# Patient Record
Sex: MALE | Race: WHITE | NOT HISPANIC OR LATINO
[De-identification: names, ages, dates, MRNs, and addresses within clinical notes are randomized per-mention and may not be internally consistent; named-entity substitution may affect disease eponyms.]

---

## 2017-03-23 PROBLEM — Z00.00 ENCOUNTER FOR PREVENTIVE HEALTH EXAMINATION: Status: ACTIVE | Noted: 2017-03-23

## 2017-07-11 ENCOUNTER — APPOINTMENT (OUTPATIENT)
Dept: VASCULAR SURGERY | Facility: CLINIC | Age: 72
End: 2017-07-11

## 2017-08-31 ENCOUNTER — OUTPATIENT (OUTPATIENT)
Dept: OUTPATIENT SERVICES | Facility: HOSPITAL | Age: 72
LOS: 1 days | Discharge: HOME | End: 2017-08-31

## 2017-08-31 DIAGNOSIS — I63.9 CEREBRAL INFARCTION, UNSPECIFIED: ICD-10-CM

## 2017-08-31 DIAGNOSIS — G81.90 HEMIPLEGIA, UNSPECIFIED AFFECTING UNSPECIFIED SIDE: ICD-10-CM

## 2017-08-31 DIAGNOSIS — I69.922 DYSARTHRIA FOLLOWING UNSPECIFIED CEREBROVASCULAR DISEASE: ICD-10-CM

## 2018-04-27 ENCOUNTER — OUTPATIENT (OUTPATIENT)
Dept: OUTPATIENT SERVICES | Facility: HOSPITAL | Age: 73
LOS: 1 days | Discharge: HOME | End: 2018-04-27

## 2018-04-27 DIAGNOSIS — I25.10 ATHEROSCLEROTIC HEART DISEASE OF NATIVE CORONARY ARTERY WITHOUT ANGINA PECTORIS: ICD-10-CM

## 2018-07-14 ENCOUNTER — TRANSCRIPTION ENCOUNTER (OUTPATIENT)
Age: 73
End: 2018-07-14

## 2019-02-09 ENCOUNTER — OUTPATIENT (OUTPATIENT)
Dept: OUTPATIENT SERVICES | Facility: HOSPITAL | Age: 74
LOS: 1 days | Discharge: HOME | End: 2019-02-09

## 2019-02-09 DIAGNOSIS — D64.9 ANEMIA, UNSPECIFIED: ICD-10-CM

## 2019-05-24 ENCOUNTER — OUTPATIENT (OUTPATIENT)
Dept: OUTPATIENT SERVICES | Facility: HOSPITAL | Age: 74
LOS: 1 days | Discharge: HOME | End: 2019-05-24

## 2019-05-24 ENCOUNTER — TRANSCRIPTION ENCOUNTER (OUTPATIENT)
Age: 74
End: 2019-05-24

## 2019-05-24 VITALS
SYSTOLIC BLOOD PRESSURE: 202 MMHG | HEART RATE: 81 BPM | DIASTOLIC BLOOD PRESSURE: 92 MMHG | RESPIRATION RATE: 18 BRPM | OXYGEN SATURATION: 100 %

## 2019-05-24 VITALS — WEIGHT: 153 LBS | HEIGHT: 61 IN

## 2019-05-24 DIAGNOSIS — Z90.49 ACQUIRED ABSENCE OF OTHER SPECIFIED PARTS OF DIGESTIVE TRACT: Chronic | ICD-10-CM

## 2019-05-24 DIAGNOSIS — Z95.1 PRESENCE OF AORTOCORONARY BYPASS GRAFT: Chronic | ICD-10-CM

## 2019-05-24 DIAGNOSIS — H26.9 UNSPECIFIED CATARACT: Chronic | ICD-10-CM

## 2019-05-24 NOTE — PRE-ANESTHESIA EVALUATION ADULT - NSATTENDATTESTRD_GEN_ALL_CORE
Patient Seen in: [facility] AND [facility] Emergency Department    History   Patient presents with:  Dyspnea MOODY SOB (respiratory)    Stated Complaint:     HPI    58-year-old male  without significant past medical history presents with complaints of anterior tri pupils equal and round no pallor or injection  ENT, Mouth: mucous membranes moist  Respiratory: there are no retractions, lungs are clear to auscultation. Minimal tenderness to palpation of the anterior sternum.   Cardiovascular: regular rate and rhythm  G 1349  ------------------------------------------------------------       MDM     Lab, x-ray, and EKG results noted. No cause of the patient's symptoms found at this time. Will discharge the patient home with plans to follow-up with his primary physician. The patient has been re-examined and I agree with the above assessment or I updated with my findings. Bladder non-tender and non-distended. Urine clear yellow.

## 2019-05-24 NOTE — ASU DISCHARGE PLAN (ADULT/PEDIATRIC) - CALL YOUR DOCTOR IF YOU HAVE ANY OF THE FOLLOWING:
Excessive diarrhea/Nausea and vomiting that does not stop/Bleeding that does not stop/Pain not relieved by Medications/Fever greater than (need to indicate Fahrenheit or Celsius)

## 2019-05-28 DIAGNOSIS — Z12.11 ENCOUNTER FOR SCREENING FOR MALIGNANT NEOPLASM OF COLON: ICD-10-CM

## 2019-05-28 DIAGNOSIS — K57.30 DIVERTICULOSIS OF LARGE INTESTINE WITHOUT PERFORATION OR ABSCESS WITHOUT BLEEDING: ICD-10-CM

## 2019-05-28 DIAGNOSIS — K64.8 OTHER HEMORRHOIDS: ICD-10-CM

## 2020-02-19 ENCOUNTER — TRANSCRIPTION ENCOUNTER (OUTPATIENT)
Age: 75
End: 2020-02-19

## 2021-06-06 ENCOUNTER — OUTPATIENT (OUTPATIENT)
Dept: OUTPATIENT SERVICES | Facility: HOSPITAL | Age: 76
LOS: 1 days | Discharge: HOME | End: 2021-06-06

## 2021-06-06 DIAGNOSIS — Z90.49 ACQUIRED ABSENCE OF OTHER SPECIFIED PARTS OF DIGESTIVE TRACT: Chronic | ICD-10-CM

## 2021-06-06 DIAGNOSIS — Z95.1 PRESENCE OF AORTOCORONARY BYPASS GRAFT: Chronic | ICD-10-CM

## 2021-06-06 DIAGNOSIS — H26.9 UNSPECIFIED CATARACT: Chronic | ICD-10-CM

## 2021-06-06 DIAGNOSIS — Z11.59 ENCOUNTER FOR SCREENING FOR OTHER VIRAL DISEASES: ICD-10-CM

## 2021-06-06 PROBLEM — F41.9 ANXIETY DISORDER, UNSPECIFIED: Chronic | Status: ACTIVE | Noted: 2019-05-24

## 2021-06-06 PROBLEM — N40.0 BENIGN PROSTATIC HYPERPLASIA WITHOUT LOWER URINARY TRACT SYMPTOMS: Chronic | Status: ACTIVE | Noted: 2019-05-24

## 2021-06-06 PROBLEM — K21.9 GASTRO-ESOPHAGEAL REFLUX DISEASE WITHOUT ESOPHAGITIS: Chronic | Status: ACTIVE | Noted: 2019-05-24

## 2021-06-06 PROBLEM — I63.9 CEREBRAL INFARCTION, UNSPECIFIED: Chronic | Status: ACTIVE | Noted: 2019-05-24

## 2021-06-06 PROBLEM — E11.9 TYPE 2 DIABETES MELLITUS WITHOUT COMPLICATIONS: Chronic | Status: ACTIVE | Noted: 2019-05-24

## 2021-06-06 PROBLEM — I10 ESSENTIAL (PRIMARY) HYPERTENSION: Chronic | Status: ACTIVE | Noted: 2019-05-24

## 2021-06-06 PROBLEM — I20.9 ANGINA PECTORIS, UNSPECIFIED: Chronic | Status: ACTIVE | Noted: 2019-05-24

## 2021-06-06 PROBLEM — F32.9 MAJOR DEPRESSIVE DISORDER, SINGLE EPISODE, UNSPECIFIED: Chronic | Status: ACTIVE | Noted: 2019-05-24

## 2021-06-06 PROBLEM — I25.10 ATHEROSCLEROTIC HEART DISEASE OF NATIVE CORONARY ARTERY WITHOUT ANGINA PECTORIS: Chronic | Status: ACTIVE | Noted: 2019-05-24

## 2021-06-09 ENCOUNTER — INPATIENT (INPATIENT)
Facility: HOSPITAL | Age: 76
LOS: 0 days | Discharge: HOME | End: 2021-06-10
Attending: INTERNAL MEDICINE | Admitting: INTERNAL MEDICINE

## 2021-06-09 ENCOUNTER — OUTPATIENT (OUTPATIENT)
Dept: OUTPATIENT SERVICES | Facility: HOSPITAL | Age: 76
LOS: 1 days | Discharge: HOME | End: 2021-06-09

## 2021-06-09 VITALS
DIASTOLIC BLOOD PRESSURE: 79 MMHG | HEIGHT: 61 IN | RESPIRATION RATE: 17 BRPM | SYSTOLIC BLOOD PRESSURE: 183 MMHG | OXYGEN SATURATION: 99 % | HEART RATE: 73 BPM | WEIGHT: 149.91 LBS

## 2021-06-09 DIAGNOSIS — Z90.49 ACQUIRED ABSENCE OF OTHER SPECIFIED PARTS OF DIGESTIVE TRACT: Chronic | ICD-10-CM

## 2021-06-09 DIAGNOSIS — H26.9 UNSPECIFIED CATARACT: Chronic | ICD-10-CM

## 2021-06-09 DIAGNOSIS — Z95.1 PRESENCE OF AORTOCORONARY BYPASS GRAFT: Chronic | ICD-10-CM

## 2021-06-09 LAB
ANION GAP SERPL CALC-SCNC: 11 MMOL/L — SIGNIFICANT CHANGE UP (ref 7–14)
BUN SERPL-MCNC: 23 MG/DL — HIGH (ref 10–20)
CALCIUM SERPL-MCNC: 9.5 MG/DL — SIGNIFICANT CHANGE UP (ref 8.5–10.1)
CHLORIDE SERPL-SCNC: 108 MMOL/L — SIGNIFICANT CHANGE UP (ref 98–110)
CO2 SERPL-SCNC: 24 MMOL/L — SIGNIFICANT CHANGE UP (ref 17–32)
CREAT SERPL-MCNC: 1.7 MG/DL — HIGH (ref 0.7–1.5)
GLUCOSE BLDC GLUCOMTR-MCNC: 117 MG/DL — HIGH (ref 70–99)
GLUCOSE SERPL-MCNC: 106 MG/DL — HIGH (ref 70–99)
HCT VFR BLD CALC: 39.8 % — LOW (ref 42–52)
HGB BLD-MCNC: 12.9 G/DL — LOW (ref 14–18)
MCHC RBC-ENTMCNC: 29.6 PG — SIGNIFICANT CHANGE UP (ref 27–31)
MCHC RBC-ENTMCNC: 32.4 G/DL — SIGNIFICANT CHANGE UP (ref 32–37)
MCV RBC AUTO: 91.3 FL — SIGNIFICANT CHANGE UP (ref 80–94)
NRBC # BLD: 0 /100 WBCS — SIGNIFICANT CHANGE UP (ref 0–0)
PLATELET # BLD AUTO: 240 K/UL — SIGNIFICANT CHANGE UP (ref 130–400)
POTASSIUM SERPL-MCNC: 4.5 MMOL/L — SIGNIFICANT CHANGE UP (ref 3.5–5)
POTASSIUM SERPL-SCNC: 4.5 MMOL/L — SIGNIFICANT CHANGE UP (ref 3.5–5)
RBC # BLD: 4.36 M/UL — LOW (ref 4.7–6.1)
RBC # FLD: 14.4 % — SIGNIFICANT CHANGE UP (ref 11.5–14.5)
SODIUM SERPL-SCNC: 143 MMOL/L — SIGNIFICANT CHANGE UP (ref 135–146)
WBC # BLD: 7.73 K/UL — SIGNIFICANT CHANGE UP (ref 4.8–10.8)
WBC # FLD AUTO: 7.73 K/UL — SIGNIFICANT CHANGE UP (ref 4.8–10.8)

## 2021-06-09 RX ORDER — RANOLAZINE 500 MG/1
500 TABLET, FILM COATED, EXTENDED RELEASE ORAL
Refills: 0 | Status: DISCONTINUED | OUTPATIENT
Start: 2021-06-09 | End: 2021-06-10

## 2021-06-09 RX ORDER — SODIUM CHLORIDE 9 MG/ML
300 INJECTION INTRAMUSCULAR; INTRAVENOUS; SUBCUTANEOUS
Refills: 0 | Status: DISCONTINUED | OUTPATIENT
Start: 2021-06-09 | End: 2021-06-10

## 2021-06-09 RX ORDER — VENLAFAXINE HCL 75 MG
75 CAPSULE, EXT RELEASE 24 HR ORAL EVERY 12 HOURS
Refills: 0 | Status: DISCONTINUED | OUTPATIENT
Start: 2021-06-09 | End: 2021-06-10

## 2021-06-09 RX ORDER — TAMSULOSIN HYDROCHLORIDE 0.4 MG/1
0.4 CAPSULE ORAL AT BEDTIME
Refills: 0 | Status: DISCONTINUED | OUTPATIENT
Start: 2021-06-09 | End: 2021-06-10

## 2021-06-09 RX ORDER — CLOPIDOGREL BISULFATE 75 MG/1
75 TABLET, FILM COATED ORAL DAILY
Refills: 0 | Status: DISCONTINUED | OUTPATIENT
Start: 2021-06-09 | End: 2021-06-10

## 2021-06-09 RX ORDER — METOPROLOL TARTRATE 50 MG
1 TABLET ORAL
Qty: 0 | Refills: 0 | DISCHARGE

## 2021-06-09 RX ORDER — LOSARTAN POTASSIUM 100 MG/1
100 TABLET, FILM COATED ORAL DAILY
Refills: 0 | Status: DISCONTINUED | OUTPATIENT
Start: 2021-06-09 | End: 2021-06-10

## 2021-06-09 RX ORDER — LOSARTAN POTASSIUM 100 MG/1
100 TABLET, FILM COATED ORAL DAILY
Refills: 0 | Status: DISCONTINUED | OUTPATIENT
Start: 2021-06-09 | End: 2021-06-09

## 2021-06-09 RX ORDER — ASPIRIN/CALCIUM CARB/MAGNESIUM 324 MG
81 TABLET ORAL DAILY
Refills: 0 | Status: DISCONTINUED | OUTPATIENT
Start: 2021-06-09 | End: 2021-06-10

## 2021-06-09 RX ORDER — ISOSORBIDE MONONITRATE 60 MG/1
60 TABLET, EXTENDED RELEASE ORAL DAILY
Refills: 0 | Status: DISCONTINUED | OUTPATIENT
Start: 2021-06-09 | End: 2021-06-10

## 2021-06-09 RX ORDER — PENTOXIFYLLINE 400 MG
400 TABLET, EXTENDED RELEASE ORAL THREE TIMES A DAY
Refills: 0 | Status: DISCONTINUED | OUTPATIENT
Start: 2021-06-09 | End: 2021-06-10

## 2021-06-09 RX ORDER — METOPROLOL TARTRATE 50 MG
25 TABLET ORAL DAILY
Refills: 0 | Status: DISCONTINUED | OUTPATIENT
Start: 2021-06-09 | End: 2021-06-10

## 2021-06-09 RX ORDER — ISOSORBIDE MONONITRATE 60 MG/1
1 TABLET, EXTENDED RELEASE ORAL
Qty: 0 | Refills: 0 | DISCHARGE

## 2021-06-09 RX ADMIN — LOSARTAN POTASSIUM 100 MILLIGRAM(S): 100 TABLET, FILM COATED ORAL at 19:32

## 2021-06-09 RX ADMIN — SODIUM CHLORIDE 75 MILLILITER(S): 9 INJECTION INTRAMUSCULAR; INTRAVENOUS; SUBCUTANEOUS at 19:32

## 2021-06-09 RX ADMIN — Medication 400 MILLIGRAM(S): at 22:14

## 2021-06-09 RX ADMIN — Medication 25 MILLIGRAM(S): at 20:11

## 2021-06-09 RX ADMIN — TAMSULOSIN HYDROCHLORIDE 0.4 MILLIGRAM(S): 0.4 CAPSULE ORAL at 22:14

## 2021-06-09 NOTE — ASU PATIENT PROFILE, ADULT - PSH
Bilateral cataracts    History of appendectomy    History of heart bypass surgery    
negative - no nasal congestion

## 2021-06-09 NOTE — H&P CARDIOLOGY - TIME:
[No studies available for review at this time.] : No studies available for review at this time. 14:18

## 2021-06-09 NOTE — ASU PATIENT PROFILE, ADULT - PMH
Angina pectoris    Anxiety    BPH (benign prostatic hyperplasia)    CAD (coronary artery disease)    CVA (cerebral vascular accident)    Depression    Diabetes  niddm  GERD (gastroesophageal reflux disease)    HTN (hypertension)

## 2021-06-09 NOTE — H&P CARDIOLOGY - HISTORY OF PRESENT ILLNESS
74 y/o male presents here today for [facility] d/t c/o CP and dyspnea, also stress echo + for ischemia     PMHx: CVA with left sided residual weakness, GERD, BPH, DM, anxiety, depression, HTN, CAD     PSHx: CABG x3 8/2009, appendectomy, BL cataract removal with IOL placement 2018    Pre cath note:    indication:  [ ] STEMI                [ ] NSTEMI                 [ ] Acute coronary syndrome                     [ ]Unstable Angina   [ ] high risk  [ ] intermediate risk  [ ] low risk                     [ ] Stable Angina     non-invasive testing:     stress echo                     Date:                     result: [x ] high risk  [ ] intermediate risk  [ ] low risk    Anti- Anginal medications:                    [ ] not used                       [x ] used                   [ ] not used but strong indication not to use    Ejection Fraction                   [ ] <29            [ ] 30-39%   [ ] 40-49%     [x ]>50%    CHF                   [ ] active (within last 14 days on meds   [ ] Chronic (on meds but no exacerbation)    COPD                   [ ] mild (on chronic bronchodilators)  [ ] moderate (on chronic steroid therapy)      [ ] severe (indication for home O2 or PACO2 >50)    Other risk factors:                       [ ] Previous MI                     [x ] CVA/ stroke                    [ ] carotid stent/ CEA                    [ ] PVD/PAD- (arterial aneurysm, non-palpable pulses, tortuous vessel with inability to insert catheter, infra-renal dissection, renal or subclavian artery stenosis)                    [x ] diabetic                    [x ] previous CABG                    [ ] Renal Failure         LEFT RADIAL ARTERY EVALUATION:  CHRISTINA TEST: WNL    Cath Bleeding Risk: 2.0%

## 2021-06-09 NOTE — CHART NOTE - NSCHARTNOTEFT_GEN_A_CORE
PRE-OP DIAGNOSIS: abnormal stress test    PROCEDURE:[  x] LHC                         [ x ] Coronary angiography                         [  ] RHC  Physician: Dr Stewart  Assistant: August    ANESTHESIA TYPE:  [  ]General Anesthesia  [ x ] Sedation  [  ] Local/Regional    ESTIMATED BLOOD LOSS:    10   mL    CONDITION  [  ] Critical  [  ] Serious  [  ]Fair  [ x ]Good    IV CONTRAST:  70  mL    FINDINGS  Left Heart Catheterization:  LVEF%: 50%  LVEDP: elevated  [ ] Normal Coronary Arteries  [ ] Luminal Irregularities  [ ] Non-obstructive CAD    ACCESS:    [ ] right radial artery  [x ] right femoral artery    HEMOSTASIS:    [ x] D-Stat  [ ] Perclose  [ ] Manual compression    LEFT HEART CATHETERIZATION                                    Left main:  mild disease  LAD: severe disease in prox segment , filled distally by a patent LIMA  Left Circumflex: moderate disease  OM: filled by patent SVG  Right Coronary Artery: 100% prox occlusion,   RPDA: filled by collaterals from the left      SYNTAX I/II SCORE:    INTERVENTION  IMPLANTS:    APPROPRIATE USE CRITERIA (AUC):    SPECIMEN REMOVED: N/A    RIGHT HEART CATHETERIZATION  PA:  PCW:  CO/CI:    POST-OP DIAGNOSIS  patent LIMA to LAD , patent SVG to OM  , % , filled distally by collaterals from LAD.      PLAN OF CARE  [ ] D/C Home today  [ ]  D/C in AM  [ ] Return to In-patient bed  [ x] Admit for observation  [ ] Return for staged procedure:  [ ] CT Surgery consult called  [ ]  Continue DAPT, B-blocker & Statin therapy

## 2021-06-10 ENCOUNTER — TRANSCRIPTION ENCOUNTER (OUTPATIENT)
Age: 76
End: 2021-06-10

## 2021-06-10 VITALS
DIASTOLIC BLOOD PRESSURE: 63 MMHG | RESPIRATION RATE: 20 BRPM | SYSTOLIC BLOOD PRESSURE: 134 MMHG | HEART RATE: 82 BPM | TEMPERATURE: 98 F

## 2021-06-10 DIAGNOSIS — Z02.9 ENCOUNTER FOR ADMINISTRATIVE EXAMINATIONS, UNSPECIFIED: ICD-10-CM

## 2021-06-10 LAB
COVID-19 SPIKE DOMAIN AB INTERP: POSITIVE
COVID-19 SPIKE DOMAIN ANTIBODY RESULT: >250 U/ML — HIGH
GLUCOSE BLDC GLUCOMTR-MCNC: 144 MG/DL — HIGH (ref 70–99)
GLUCOSE BLDC GLUCOMTR-MCNC: 168 MG/DL — HIGH (ref 70–99)
SARS-COV-2 IGG+IGM SERPL QL IA: >250 U/ML — HIGH
SARS-COV-2 IGG+IGM SERPL QL IA: POSITIVE

## 2021-06-10 RX ORDER — ISOSORBIDE DINITRATE 5 MG/1
60 TABLET ORAL
Qty: 0 | Refills: 0 | DISCHARGE

## 2021-06-10 RX ORDER — LABETALOL HCL 100 MG
100 TABLET ORAL ONCE
Refills: 0 | Status: COMPLETED | OUTPATIENT
Start: 2021-06-10 | End: 2021-06-10

## 2021-06-10 RX ORDER — ISOSORBIDE MONONITRATE 60 MG/1
1 TABLET, EXTENDED RELEASE ORAL
Qty: 30 | Refills: 0
Start: 2021-06-10 | End: 2021-07-09

## 2021-06-10 RX ADMIN — LOSARTAN POTASSIUM 100 MILLIGRAM(S): 100 TABLET, FILM COATED ORAL at 05:08

## 2021-06-10 RX ADMIN — CLOPIDOGREL BISULFATE 75 MILLIGRAM(S): 75 TABLET, FILM COATED ORAL at 13:59

## 2021-06-10 RX ADMIN — Medication 25 MILLIGRAM(S): at 05:08

## 2021-06-10 RX ADMIN — Medication 400 MILLIGRAM(S): at 05:08

## 2021-06-10 RX ADMIN — Medication 100 MILLIGRAM(S): at 06:41

## 2021-06-10 RX ADMIN — Medication 81 MILLIGRAM(S): at 14:01

## 2021-06-10 RX ADMIN — ISOSORBIDE MONONITRATE 60 MILLIGRAM(S): 60 TABLET, EXTENDED RELEASE ORAL at 13:58

## 2021-06-10 RX ADMIN — Medication 75 MILLIGRAM(S): at 05:08

## 2021-06-10 RX ADMIN — RANOLAZINE 500 MILLIGRAM(S): 500 TABLET, FILM COATED, EXTENDED RELEASE ORAL at 05:08

## 2021-06-10 RX ADMIN — Medication 400 MILLIGRAM(S): at 13:57

## 2021-06-10 NOTE — DISCHARGE NOTE NURSING/CASE MANAGEMENT/SOCIAL WORK - PATIENT PORTAL LINK FT
You can access the FollowMyHealth Patient Portal offered by [facility] by registering at the following website: [email or web address]. By joining [facility]’s FollowMyHealth portal, you will also be able to view your health information using other applications (apps) compatible with our system.

## 2021-06-10 NOTE — PROGRESS NOTE ADULT - SUBJECTIVE AND OBJECTIVE BOX
Cardiology Follow up    ERICK MARTINEZ   75y Male  PAST MEDICAL & SURGICAL HISTORY:  CAD (coronary artery disease)    HTN (hypertension)    Depression    Anxiety    Diabetes  niddm    Angina pectoris    BPH (benign prostatic hyperplasia)    GERD (gastroesophageal reflux disease)    CVA (cerebral vascular accident)    History of appendectomy    Bilateral cataracts    History of heart bypass surgery      HPI:  74 y/o male presents here today for [facility] d/t c/o CP and dyspnea, also stress echo + for ischemia     PMHx: CVA with left sided residual weakness, GERD, BPH, DM, anxiety, depression, HTN, CAD     PSHx: CABG x3 8/2009, appendectomy, BL cataract removal with IOL placement 2018                       Allergies    clindamycin (Rash)  PC Pen VK (Rash)  penicillin (Rash)    Intolerances    Patient seen and examined at bedside. No acute events overnight.  Patient without complaints. Pt ambulated without issues/symptoms  Denies CP, SOB, palpitations, or dizziness  No events on telemetry overnight    Vital Signs Last 24 Hrs  T(C): 36.8 (10 Roberto 2021 12:30), Max: 36.8 (10 Roberto 2021 04:30)  T(F): 98.2 (10 Roberto 2021 12:30), Max: 98.2 (10 Roberto 2021 04:30)  HR: 82 (10 Roberto 2021 12:30) (73 - 85)  BP: 134/63 (10 Roberto 2021 12:30) (134/63 - 194/87)  BP(mean): 113 (09 Jun 2021 14:18) (113 - 113)  RR: 20 (10 Roberto 2021 12:30) (17 - 20)  SpO2: 98% (10 Roberto 2021 08:50) (98% - 99%)    MEDICATIONS  (STANDING):  aspirin  chewable 81 milliGRAM(s) Oral daily  clopidogrel Tablet 75 milliGRAM(s) Oral daily  isosorbide   mononitrate ER Tablet (IMDUR) 60 milliGRAM(s) Oral daily  losartan 100 milliGRAM(s) Oral daily  metoprolol succinate ER 25 milliGRAM(s) Oral daily  pentoxifylline 400 milliGRAM(s) Oral three times a day  ranolazine 500 milliGRAM(s) Oral two times a day  sodium chloride 0.9%. 300 milliLiter(s) (75 mL/Hr) IV Continuous <Continuous>  tamsulosin 0.4 milliGRAM(s) Oral at bedtime  venlafaxine 75 milliGRAM(s) Oral every 12 hours    MEDICATIONS  (PRN):      REVIEW OF SYSTEMS:          All negative except as mentioned in HPI    PHYSICAL EXAM:           CONSTITUTIONAL: Well-developed; well-nourished; in no acute distress  	SKIN: warm, dry  	HEAD: Normocephalic; atraumatic  	EYES: PERRL.  	ENT: No nasal discharge, airway clear, mucous membranes moist  	NECK: Supple; non tender.  	CARD: +S1, +S2, no murmurs, gallops, or rubs. Regular rate and rhythm    	RESP: No wheezes, rales or rhonchi. CTA B/L  	ABD: soft ntnd, + BS x 4 quadrants  	EXT: moves all extremities,  no clubbing, cyanosis or edema  	NEURO: Alert and oriented x3, no focal deficits          PSYCH: Cooperative, appropriate          VASCULAR:  + Rad / + PTs / +  DPs          EXTREMITY:             Right Groin: dressing removed, access site soft, no hematoma, no pain, + pulses, no sign of infection, no numbness              LABS:                        12.9   7.73  )-----------( 240      ( 09 Jun 2021 14:26 )             39.8     06-09    143  |  108  |  23<H>  ----------------------------<  106<H>  4.5   |  24  |  1.7<H>    Ca    9.5      09 Jun 2021 14:26    A/P:  I discussed the case with Cardiologist Dr. Stewart and recommend the following:    S/P PCI:   LEFT HEART CATHETERIZATION                                    Left main:  mild disease  LAD: severe disease in prox segment , filled distally by a patent LIMA  Left Circumflex: moderate disease  OM: filled by patent SVG  Right Coronary Artery: 100% prox occlusion,   RPDA: filled by collaterals from the left    POST-OP DIAGNOSIS  patent LIMA to LAD , patent SVG to OM  , % , filled distally by collaterals from LAD.                      Ambulate patient around the unit with cane and assistance                   Monitor right groin    	     Continue DAPT ( Aspirin 81 mg daily and Plavix 75 mg daily ), ARB, Imdur, Ranexa, B-Blocker, Statin Therapy                   Patient agreeing to take DAPT for at least one year or as directed by cardiologist                    Pt given instructions on importance of taking antiplatelet medication                   Post cath instructions, access site care and activity restrictions reviewed with patient                     Discussed with patient to return to hospital if experience chest pain, shortness breath, dizziness and site bleeding                   Aggressive risk factor modification, diet counseling, smoking cessation discussed with patient                       Cardiac rehab information provided/ referral and communication to cardiac rehab provided                   Can discharge patient from cardiac standpoint                                Follow up with Cardiology Dr. Stewart in two weeks.  Instructed to call and make an appointment

## 2021-06-10 NOTE — DISCHARGE NOTE PROVIDER - CARE PROVIDER_API CALL
Charlie Stewart  CARDIOVASCULAR DISEASE  [address]  [place] [address]  Phone: [phone]  Fax: [phone]  Follow Up Time: 2 weeks

## 2021-06-10 NOTE — DISCHARGE NOTE PROVIDER - HOSPITAL COURSE
ERICK MARTINEZ   75y Male  PAST MEDICAL & SURGICAL HISTORY:  CAD (coronary artery disease)    HTN (hypertension)    Depression    Anxiety    Diabetes  niddm    Angina pectoris    BPH (benign prostatic hyperplasia)    GERD (gastroesophageal reflux disease)    CVA (cerebral vascular accident)    History of appendectomy    Bilateral cataracts    History of heart bypass surgery      HPI:  74 y/o male presents here today for [facility] d/t c/o CP and dyspnea, also stress echo + for ischemia     PMHx: CVA with left sided residual weakness, GERD, BPH, DM, anxiety, depression, HTN, CAD     PSHx: CABG x3 8/2009, appendectomy, BL cataract removal with IOL placement 2018 6/9/21  S/P PCI:   LEFT HEART CATHETERIZATION        VASCULAR:  + Rad / + PTs / +  DPs          EXTREMITY:             Right Groin: dressing removed, access site soft, no hematoma, no pain, + pulses, no sign of infection, no numbness                                Left main:  mild disease  LAD: severe disease in prox segment , filled distally by a patent LIMA  Left Circumflex: moderate disease  OM: filled by patent SVG  Right Coronary Artery: 100% prox occlusion,   RPDA: filled by collaterals from the left    POST-OP DIAGNOSIS  patent LIMA to LAD, patent SVG to OM, % , filled distally by collaterals from LAD.                      Ambulate patient around the unit with cane and assistance                   Monitor right groin    	     Continue DAPT ( Aspirin 81 mg daily and Plavix 75 mg daily ), ARB, Imdur, Ranexa, B-Blocker, Statin Therapy                   Patient agreeing to take DAPT for at least one year or as directed by cardiologist                    Pt given instructions on importance of taking antiplatelet medication                   Post cath instructions, access site care and activity restrictions reviewed with patient                     Discussed with patient to return to hospital if experience chest pain, shortness breath, dizziness and site bleeding                   Aggressive risk factor modification, diet counseling, smoking cessation discussed with patient                       Cardiac rehab information provided/ referral and communication to cardiac rehab provided                   Can discharge patient from cardiac standpoint                                Follow up with Cardiology Dr. Stewart in two weeks.  Instructed to call and make an appointment

## 2021-06-10 NOTE — DISCHARGE NOTE PROVIDER - NSDCCPTREATMENT_GEN_ALL_CORE_FT
PRINCIPAL PROCEDURE  Procedure: Left heart cardiac cath  Findings and Treatment: s/p Cardiac Cath, resume home medications, Continue DAPT, follow up with Cardiologist in 2 weeks, monitor right groin

## 2021-06-10 NOTE — DISCHARGE NOTE PROVIDER - NSDCCPCAREPLAN_GEN_ALL_CORE_FT
PRINCIPAL DISCHARGE DIAGNOSIS  Diagnosis: CAD (coronary artery disease)  Assessment and Plan of Treatment: s/p LHC, continue DAPT, follow up with Cardiologist in 2 weeks, monitor right groin, resume home medications

## 2021-06-10 NOTE — DISCHARGE NOTE PROVIDER - NSDCMRMEDTOKEN_GEN_ALL_CORE_FT
aspirin 81 mg oral tablet: 1 tab(s) orally once a day  diazePAM 10 mg oral tablet: 1 tab(s) orally 3 times a day  fenofibrate 120 mg oral tablet: 1 tab(s) orally once a day  isosorbide mononitrate 60 mg oral tablet, extended release: 1 tab(s) orally once a day MDD:1  Januvia 25 mg oral tablet: 1 tab(s) orally once a day  lansoprazole 30 mg oral delayed release capsule: 1 cap(s) orally once a day  losartan 100 mg oral tablet: 1 tab(s) orally once a day  metoprolol succinate 25 mg oral tablet, extended release: orally 2 times a day  Pentoxil 400 mg oral tablet, extended release: 1 tab(s) orally 3 times a day  Plavix 75 mg oral tablet: 1 tab(s) orally once a day  Ranexa 500 mg oral tablet, extended release: 1 tab(s) orally 2 times a day  rosuvastatin 40 mg oral tablet: 1 tab(s) orally once a day  tamsulosin 0.4 mg oral capsule: 1 cap(s) orally once a day  venlafaxine 75 mg oral tablet: 1 tab(s) orally 2 times a day

## 2021-06-21 DIAGNOSIS — I10 ESSENTIAL (PRIMARY) HYPERTENSION: ICD-10-CM

## 2021-06-21 DIAGNOSIS — F41.9 ANXIETY DISORDER, UNSPECIFIED: ICD-10-CM

## 2021-06-21 DIAGNOSIS — Z88.8 ALLERGY STATUS TO OTHER DRUGS, MEDICAMENTS AND BIOLOGICAL SUBSTANCES STATUS: ICD-10-CM

## 2021-06-21 DIAGNOSIS — R07.9 CHEST PAIN, UNSPECIFIED: ICD-10-CM

## 2021-06-21 DIAGNOSIS — I69.954 HEMIPLEGIA AND HEMIPARESIS FOLLOWING UNSPECIFIED CEREBROVASCULAR DISEASE AFFECTING LEFT NON-DOMINANT SIDE: ICD-10-CM

## 2021-06-21 DIAGNOSIS — Z88.0 ALLERGY STATUS TO PENICILLIN: ICD-10-CM

## 2021-06-21 DIAGNOSIS — E11.9 TYPE 2 DIABETES MELLITUS WITHOUT COMPLICATIONS: ICD-10-CM

## 2021-06-21 DIAGNOSIS — I25.118 ATHEROSCLEROTIC HEART DISEASE OF NATIVE CORONARY ARTERY WITH OTHER FORMS OF ANGINA PECTORIS: ICD-10-CM

## 2021-06-21 DIAGNOSIS — Z79.84 LONG TERM (CURRENT) USE OF ORAL HYPOGLYCEMIC DRUGS: ICD-10-CM

## 2021-06-21 DIAGNOSIS — F32.9 MAJOR DEPRESSIVE DISORDER, SINGLE EPISODE, UNSPECIFIED: ICD-10-CM

## 2021-06-21 DIAGNOSIS — K21.9 GASTRO-ESOPHAGEAL REFLUX DISEASE WITHOUT ESOPHAGITIS: ICD-10-CM

## 2021-06-21 DIAGNOSIS — N40.0 BENIGN PROSTATIC HYPERPLASIA WITHOUT LOWER URINARY TRACT SYMPTOMS: ICD-10-CM

## 2021-06-21 DIAGNOSIS — Z79.82 LONG TERM (CURRENT) USE OF ASPIRIN: ICD-10-CM

## 2021-06-30 DIAGNOSIS — I25.10 ATHEROSCLEROTIC HEART DISEASE OF NATIVE CORONARY ARTERY WITHOUT ANGINA PECTORIS: ICD-10-CM

## 2021-07-20 ENCOUNTER — INPATIENT (INPATIENT)
Facility: HOSPITAL | Age: 76
LOS: 7 days | Discharge: HOME HEALTH | End: 2021-07-28
Attending: STUDENT IN AN ORGANIZED HEALTH CARE EDUCATION/TRAINING PROGRAM | Admitting: STUDENT IN AN ORGANIZED HEALTH CARE EDUCATION/TRAINING PROGRAM
Payer: MEDICARE

## 2021-07-20 VITALS
DIASTOLIC BLOOD PRESSURE: 67 MMHG | SYSTOLIC BLOOD PRESSURE: 139 MMHG | TEMPERATURE: 98 F | OXYGEN SATURATION: 99 % | HEART RATE: 75 BPM | HEIGHT: 61 IN | RESPIRATION RATE: 18 BRPM

## 2021-07-20 DIAGNOSIS — G92 TOXIC ENCEPHALOPATHY: ICD-10-CM

## 2021-07-20 DIAGNOSIS — T42.4X5A ADVERSE EFFECT OF BENZODIAZEPINES, INITIAL ENCOUNTER: ICD-10-CM

## 2021-07-20 DIAGNOSIS — I95.1 ORTHOSTATIC HYPOTENSION: ICD-10-CM

## 2021-07-20 DIAGNOSIS — E78.5 HYPERLIPIDEMIA, UNSPECIFIED: ICD-10-CM

## 2021-07-20 DIAGNOSIS — I73.9 PERIPHERAL VASCULAR DISEASE, UNSPECIFIED: ICD-10-CM

## 2021-07-20 DIAGNOSIS — I65.23 OCCLUSION AND STENOSIS OF BILATERAL CAROTID ARTERIES: ICD-10-CM

## 2021-07-20 DIAGNOSIS — I25.10 ATHEROSCLEROTIC HEART DISEASE OF NATIVE CORONARY ARTERY WITHOUT ANGINA PECTORIS: ICD-10-CM

## 2021-07-20 DIAGNOSIS — F32.9 MAJOR DEPRESSIVE DISORDER, SINGLE EPISODE, UNSPECIFIED: ICD-10-CM

## 2021-07-20 DIAGNOSIS — I69.392 FACIAL WEAKNESS FOLLOWING CEREBRAL INFARCTION: ICD-10-CM

## 2021-07-20 DIAGNOSIS — N17.9 ACUTE KIDNEY FAILURE, UNSPECIFIED: ICD-10-CM

## 2021-07-20 DIAGNOSIS — Z88.0 ALLERGY STATUS TO PENICILLIN: ICD-10-CM

## 2021-07-20 DIAGNOSIS — N40.1 BENIGN PROSTATIC HYPERPLASIA WITH LOWER URINARY TRACT SYMPTOMS: ICD-10-CM

## 2021-07-20 DIAGNOSIS — I12.9 HYPERTENSIVE CHRONIC KIDNEY DISEASE WITH STAGE 1 THROUGH STAGE 4 CHRONIC KIDNEY DISEASE, OR UNSPECIFIED CHRONIC KIDNEY DISEASE: ICD-10-CM

## 2021-07-20 DIAGNOSIS — Z79.82 LONG TERM (CURRENT) USE OF ASPIRIN: ICD-10-CM

## 2021-07-20 DIAGNOSIS — H26.9 UNSPECIFIED CATARACT: Chronic | ICD-10-CM

## 2021-07-20 DIAGNOSIS — R55 SYNCOPE AND COLLAPSE: ICD-10-CM

## 2021-07-20 DIAGNOSIS — E11.51 TYPE 2 DIABETES MELLITUS WITH DIABETIC PERIPHERAL ANGIOPATHY WITHOUT GANGRENE: ICD-10-CM

## 2021-07-20 DIAGNOSIS — E11.43 TYPE 2 DIABETES MELLITUS WITH DIABETIC AUTONOMIC (POLY)NEUROPATHY: ICD-10-CM

## 2021-07-20 DIAGNOSIS — Z95.1 PRESENCE OF AORTOCORONARY BYPASS GRAFT: ICD-10-CM

## 2021-07-20 DIAGNOSIS — Z88.1 ALLERGY STATUS TO OTHER ANTIBIOTIC AGENTS STATUS: ICD-10-CM

## 2021-07-20 DIAGNOSIS — I70.203 UNSPECIFIED ATHEROSCLEROSIS OF NATIVE ARTERIES OF EXTREMITIES, BILATERAL LEGS: ICD-10-CM

## 2021-07-20 DIAGNOSIS — Z90.49 ACQUIRED ABSENCE OF OTHER SPECIFIED PARTS OF DIGESTIVE TRACT: Chronic | ICD-10-CM

## 2021-07-20 DIAGNOSIS — N18.30 CHRONIC KIDNEY DISEASE, STAGE 3 UNSPECIFIED: ICD-10-CM

## 2021-07-20 DIAGNOSIS — I42.8 OTHER CARDIOMYOPATHIES: ICD-10-CM

## 2021-07-20 DIAGNOSIS — E11.22 TYPE 2 DIABETES MELLITUS WITH DIABETIC CHRONIC KIDNEY DISEASE: ICD-10-CM

## 2021-07-20 DIAGNOSIS — Z79.01 LONG TERM (CURRENT) USE OF ANTICOAGULANTS: ICD-10-CM

## 2021-07-20 DIAGNOSIS — I69.354 HEMIPLEGIA AND HEMIPARESIS FOLLOWING CEREBRAL INFARCTION AFFECTING LEFT NON-DOMINANT SIDE: ICD-10-CM

## 2021-07-20 DIAGNOSIS — I63.9 CEREBRAL INFARCTION, UNSPECIFIED: ICD-10-CM

## 2021-07-20 DIAGNOSIS — Z95.1 PRESENCE OF AORTOCORONARY BYPASS GRAFT: Chronic | ICD-10-CM

## 2021-07-20 DIAGNOSIS — Z88.8 ALLERGY STATUS TO OTHER DRUGS, MEDICAMENTS AND BIOLOGICAL SUBSTANCES: ICD-10-CM

## 2021-07-20 DIAGNOSIS — M48.02 SPINAL STENOSIS, CERVICAL REGION: ICD-10-CM

## 2021-07-20 DIAGNOSIS — R47.81 SLURRED SPEECH: ICD-10-CM

## 2021-07-20 DIAGNOSIS — I16.0 HYPERTENSIVE URGENCY: ICD-10-CM

## 2021-07-20 DIAGNOSIS — K21.9 GASTRO-ESOPHAGEAL REFLUX DISEASE WITHOUT ESOPHAGITIS: ICD-10-CM

## 2021-07-20 LAB
ALBUMIN SERPL ELPH-MCNC: 4.4 G/DL — SIGNIFICANT CHANGE UP (ref 3.5–5.2)
ALP SERPL-CCNC: 34 U/L — SIGNIFICANT CHANGE UP (ref 30–115)
ALT FLD-CCNC: 10 U/L — SIGNIFICANT CHANGE UP (ref 0–41)
ANION GAP SERPL CALC-SCNC: 8 MMOL/L — SIGNIFICANT CHANGE UP (ref 7–14)
APPEARANCE UR: CLEAR — SIGNIFICANT CHANGE UP
AST SERPL-CCNC: 15 U/L — SIGNIFICANT CHANGE UP (ref 0–41)
BACTERIA # UR AUTO: NEGATIVE — SIGNIFICANT CHANGE UP
BASOPHILS # BLD AUTO: 0.05 K/UL — SIGNIFICANT CHANGE UP (ref 0–0.2)
BASOPHILS NFR BLD AUTO: 0.5 % — SIGNIFICANT CHANGE UP (ref 0–1)
BILIRUB SERPL-MCNC: 0.4 MG/DL — SIGNIFICANT CHANGE UP (ref 0.2–1.2)
BILIRUB UR-MCNC: NEGATIVE — SIGNIFICANT CHANGE UP
BUN SERPL-MCNC: 27 MG/DL — HIGH (ref 10–20)
CALCIUM SERPL-MCNC: 9.9 MG/DL — SIGNIFICANT CHANGE UP (ref 8.5–10.1)
CHLORIDE SERPL-SCNC: 107 MMOL/L — SIGNIFICANT CHANGE UP (ref 98–110)
CO2 SERPL-SCNC: 25 MMOL/L — SIGNIFICANT CHANGE UP (ref 17–32)
COLOR SPEC: SIGNIFICANT CHANGE UP
CREAT SERPL-MCNC: 1.6 MG/DL — HIGH (ref 0.7–1.5)
DIFF PNL FLD: NEGATIVE — SIGNIFICANT CHANGE UP
EOSINOPHIL # BLD AUTO: 0.1 K/UL — SIGNIFICANT CHANGE UP (ref 0–0.7)
EOSINOPHIL NFR BLD AUTO: 1.1 % — SIGNIFICANT CHANGE UP (ref 0–8)
EPI CELLS # UR: 1 /HPF — SIGNIFICANT CHANGE UP (ref 0–5)
ETHANOL SERPL-MCNC: <10 MG/DL — SIGNIFICANT CHANGE UP
GLUCOSE BLDC GLUCOMTR-MCNC: 106 MG/DL — HIGH (ref 70–99)
GLUCOSE SERPL-MCNC: 121 MG/DL — HIGH (ref 70–99)
GLUCOSE UR QL: NEGATIVE — SIGNIFICANT CHANGE UP
HCT VFR BLD CALC: 39.2 % — LOW (ref 42–52)
HGB BLD-MCNC: 12.3 G/DL — LOW (ref 14–18)
HYALINE CASTS # UR AUTO: 0 /LPF — SIGNIFICANT CHANGE UP (ref 0–7)
IMM GRANULOCYTES NFR BLD AUTO: 0.5 % — HIGH (ref 0.1–0.3)
KETONES UR-MCNC: NEGATIVE — SIGNIFICANT CHANGE UP
LACTATE SERPL-SCNC: 1.8 MMOL/L — SIGNIFICANT CHANGE UP (ref 0.7–2)
LEUKOCYTE ESTERASE UR-ACNC: NEGATIVE — SIGNIFICANT CHANGE UP
LIDOCAIN IGE QN: 62 U/L — HIGH (ref 7–60)
LYMPHOCYTES # BLD AUTO: 1.82 K/UL — SIGNIFICANT CHANGE UP (ref 1.2–3.4)
LYMPHOCYTES # BLD AUTO: 19.7 % — LOW (ref 20.5–51.1)
MAGNESIUM SERPL-MCNC: 2.3 MG/DL — SIGNIFICANT CHANGE UP (ref 1.8–2.4)
MCHC RBC-ENTMCNC: 28.1 PG — SIGNIFICANT CHANGE UP (ref 27–31)
MCHC RBC-ENTMCNC: 31.4 G/DL — LOW (ref 32–37)
MCV RBC AUTO: 89.5 FL — SIGNIFICANT CHANGE UP (ref 80–94)
MONOCYTES # BLD AUTO: 0.62 K/UL — HIGH (ref 0.1–0.6)
MONOCYTES NFR BLD AUTO: 6.7 % — SIGNIFICANT CHANGE UP (ref 1.7–9.3)
NEUTROPHILS # BLD AUTO: 6.61 K/UL — HIGH (ref 1.4–6.5)
NEUTROPHILS NFR BLD AUTO: 71.5 % — SIGNIFICANT CHANGE UP (ref 42.2–75.2)
NITRITE UR-MCNC: NEGATIVE — SIGNIFICANT CHANGE UP
NRBC # BLD: 0 /100 WBCS — SIGNIFICANT CHANGE UP (ref 0–0)
NT-PROBNP SERPL-SCNC: 335 PG/ML — HIGH (ref 0–300)
PH UR: 7 — SIGNIFICANT CHANGE UP (ref 5–8)
PLATELET # BLD AUTO: 247 K/UL — SIGNIFICANT CHANGE UP (ref 130–400)
POTASSIUM SERPL-MCNC: 4.6 MMOL/L — SIGNIFICANT CHANGE UP (ref 3.5–5)
POTASSIUM SERPL-SCNC: 4.6 MMOL/L — SIGNIFICANT CHANGE UP (ref 3.5–5)
PROT SERPL-MCNC: 6.8 G/DL — SIGNIFICANT CHANGE UP (ref 6–8)
PROT UR-MCNC: ABNORMAL
RBC # BLD: 4.38 M/UL — LOW (ref 4.7–6.1)
RBC # FLD: 14.2 % — SIGNIFICANT CHANGE UP (ref 11.5–14.5)
RBC CASTS # UR COMP ASSIST: 1 /HPF — SIGNIFICANT CHANGE UP (ref 0–4)
SARS-COV-2 RNA SPEC QL NAA+PROBE: SIGNIFICANT CHANGE UP
SODIUM SERPL-SCNC: 140 MMOL/L — SIGNIFICANT CHANGE UP (ref 135–146)
SP GR SPEC: 1.03 — SIGNIFICANT CHANGE UP (ref 1.01–1.03)
TROPONIN T SERPL-MCNC: <0.01 NG/ML — SIGNIFICANT CHANGE UP
TROPONIN T SERPL-MCNC: <0.01 NG/ML — SIGNIFICANT CHANGE UP
UROBILINOGEN FLD QL: SIGNIFICANT CHANGE UP
WBC # BLD: 9.25 K/UL — SIGNIFICANT CHANGE UP (ref 4.8–10.8)
WBC # FLD AUTO: 9.25 K/UL — SIGNIFICANT CHANGE UP (ref 4.8–10.8)
WBC UR QL: 0 /HPF — SIGNIFICANT CHANGE UP (ref 0–5)

## 2021-07-20 PROCEDURE — 73564 X-RAY EXAM KNEE 4 OR MORE: CPT | Mod: 26,RT

## 2021-07-20 PROCEDURE — 71260 CT THORAX DX C+: CPT | Mod: 26,MA

## 2021-07-20 PROCEDURE — 72170 X-RAY EXAM OF PELVIS: CPT | Mod: 26

## 2021-07-20 PROCEDURE — 93010 ELECTROCARDIOGRAM REPORT: CPT

## 2021-07-20 PROCEDURE — 99285 EMERGENCY DEPT VISIT HI MDM: CPT | Mod: GC

## 2021-07-20 PROCEDURE — 70450 CT HEAD/BRAIN W/O DYE: CPT | Mod: 26,MA

## 2021-07-20 PROCEDURE — 72125 CT NECK SPINE W/O DYE: CPT | Mod: 26,MA

## 2021-07-20 PROCEDURE — 74177 CT ABD & PELVIS W/CONTRAST: CPT | Mod: 26,MA

## 2021-07-20 PROCEDURE — 99223 1ST HOSP IP/OBS HIGH 75: CPT

## 2021-07-20 PROCEDURE — 71045 X-RAY EXAM CHEST 1 VIEW: CPT | Mod: 26

## 2021-07-20 RX ORDER — FENOFIBRATE,MICRONIZED 130 MG
120 CAPSULE ORAL DAILY
Refills: 0 | Status: DISCONTINUED | OUTPATIENT
Start: 2021-07-20 | End: 2021-07-21

## 2021-07-20 RX ORDER — VENLAFAXINE HCL 75 MG
75 CAPSULE, EXT RELEASE 24 HR ORAL DAILY
Refills: 0 | Status: DISCONTINUED | OUTPATIENT
Start: 2021-07-20 | End: 2021-07-22

## 2021-07-20 RX ORDER — SODIUM CHLORIDE 9 MG/ML
1000 INJECTION, SOLUTION INTRAVENOUS ONCE
Refills: 0 | Status: COMPLETED | OUTPATIENT
Start: 2021-07-20 | End: 2021-07-20

## 2021-07-20 RX ORDER — HEPARIN SODIUM 5000 [USP'U]/ML
5000 INJECTION INTRAVENOUS; SUBCUTANEOUS EVERY 8 HOURS
Refills: 0 | Status: DISCONTINUED | OUTPATIENT
Start: 2021-07-20 | End: 2021-07-28

## 2021-07-20 RX ORDER — METOPROLOL TARTRATE 50 MG
25 TABLET ORAL EVERY 12 HOURS
Refills: 0 | Status: DISCONTINUED | OUTPATIENT
Start: 2021-07-20 | End: 2021-07-28

## 2021-07-20 RX ORDER — LOSARTAN POTASSIUM 100 MG/1
100 TABLET, FILM COATED ORAL DAILY
Refills: 0 | Status: DISCONTINUED | OUTPATIENT
Start: 2021-07-20 | End: 2021-07-22

## 2021-07-20 RX ORDER — PENTOXIFYLLINE 400 MG
400 TABLET, EXTENDED RELEASE ORAL THREE TIMES A DAY
Refills: 0 | Status: DISCONTINUED | OUTPATIENT
Start: 2021-07-20 | End: 2021-07-28

## 2021-07-20 RX ORDER — ISOSORBIDE MONONITRATE 60 MG/1
60 TABLET, EXTENDED RELEASE ORAL DAILY
Refills: 0 | Status: DISCONTINUED | OUTPATIENT
Start: 2021-07-20 | End: 2021-07-28

## 2021-07-20 RX ORDER — ASPIRIN/CALCIUM CARB/MAGNESIUM 324 MG
81 TABLET ORAL DAILY
Refills: 0 | Status: DISCONTINUED | OUTPATIENT
Start: 2021-07-20 | End: 2021-07-28

## 2021-07-20 RX ORDER — RANOLAZINE 500 MG/1
500 TABLET, FILM COATED, EXTENDED RELEASE ORAL
Refills: 0 | Status: DISCONTINUED | OUTPATIENT
Start: 2021-07-20 | End: 2021-07-28

## 2021-07-20 RX ORDER — TETANUS TOXOID, REDUCED DIPHTHERIA TOXOID AND ACELLULAR PERTUSSIS VACCINE, ADSORBED 5; 2.5; 8; 8; 2.5 [IU]/.5ML; [IU]/.5ML; UG/.5ML; UG/.5ML; UG/.5ML
0.5 SUSPENSION INTRAMUSCULAR ONCE
Refills: 0 | Status: COMPLETED | OUTPATIENT
Start: 2021-07-20 | End: 2021-07-20

## 2021-07-20 RX ORDER — TAMSULOSIN HYDROCHLORIDE 0.4 MG/1
0.4 CAPSULE ORAL AT BEDTIME
Refills: 0 | Status: DISCONTINUED | OUTPATIENT
Start: 2021-07-20 | End: 2021-07-22

## 2021-07-20 RX ORDER — ATORVASTATIN CALCIUM 80 MG/1
80 TABLET, FILM COATED ORAL AT BEDTIME
Refills: 0 | Status: DISCONTINUED | OUTPATIENT
Start: 2021-07-20 | End: 2021-07-28

## 2021-07-20 RX ORDER — DIAZEPAM 5 MG
10 TABLET ORAL THREE TIMES A DAY
Refills: 0 | Status: DISCONTINUED | OUTPATIENT
Start: 2021-07-20 | End: 2021-07-25

## 2021-07-20 RX ORDER — CLOPIDOGREL BISULFATE 75 MG/1
75 TABLET, FILM COATED ORAL DAILY
Refills: 0 | Status: DISCONTINUED | OUTPATIENT
Start: 2021-07-20 | End: 2021-07-28

## 2021-07-20 RX ADMIN — Medication 10 MILLIGRAM(S): at 22:14

## 2021-07-20 RX ADMIN — Medication 400 MILLIGRAM(S): at 22:13

## 2021-07-20 RX ADMIN — TETANUS TOXOID, REDUCED DIPHTHERIA TOXOID AND ACELLULAR PERTUSSIS VACCINE, ADSORBED 0.5 MILLILITER(S): 5; 2.5; 8; 8; 2.5 SUSPENSION INTRAMUSCULAR at 15:17

## 2021-07-20 RX ADMIN — HEPARIN SODIUM 5000 UNIT(S): 5000 INJECTION INTRAVENOUS; SUBCUTANEOUS at 22:13

## 2021-07-20 RX ADMIN — ATORVASTATIN CALCIUM 80 MILLIGRAM(S): 80 TABLET, FILM COATED ORAL at 22:12

## 2021-07-20 RX ADMIN — SODIUM CHLORIDE 1000 MILLILITER(S): 9 INJECTION, SOLUTION INTRAVENOUS at 16:30

## 2021-07-20 RX ADMIN — TAMSULOSIN HYDROCHLORIDE 0.4 MILLIGRAM(S): 0.4 CAPSULE ORAL at 22:13

## 2021-07-20 RX ADMIN — SODIUM CHLORIDE 1000 MILLILITER(S): 9 INJECTION, SOLUTION INTRAVENOUS at 15:17

## 2021-07-20 NOTE — ED PROVIDER NOTE - PHYSICAL EXAMINATION
CONSTITUTIONAL: well-appearing, in NAD  SKIN: Warm dry, normal skin turgor  HEAD: NCAT; no raccoon eyes, hemotympanum, or chua sign  EYES: EOMI, PERRLA, no scleral icterus, conjunctiva pink  ENT: normal pharynx with no erythema or exudates  NECK: Supple; non tender. Full ROM. no c/t/l/s tenderness  CARD: RRR, no murmurs.  RESP: clear to ausculation b/l. No crackles or wheezing.  ABD: soft, non-tender, non-distended, no rebound or guarding.  EXT: Full ROM, no bony tenderness, no pedal edema, no calf tenderness  NEURO: decreased sensation in V1-3 distribution on L side, L sided facial droop, decreased sensation on LUE and RUE, motor strength 4/5 in LUE and RUE, all unchanged from baseline. CN II-XII otherwise intact; Cerebellar testing normal.   PSYCH: Cooperative, appropriate. CONSTITUTIONAL: well-appearing, in NAD  SKIN: Warm dry, normal skin turgor  HEAD: NCAT; no raccoon eyes, hemotympanum, or chua sign  EYES: EOMI, PERRLA, no scleral icterus, conjunctiva pink  ENT: normal pharynx with no erythema or exudates  NECK: Supple; non tender. Full ROM. no c/t/l/s tenderness  CARD: RRR, no murmurs.  RESP: clear to ausculation b/l. No crackles or wheezing.  ABD: soft, non-tender, non-distended, no rebound or guarding.  EXT: Full ROM, no bony tenderness, no pedal edema, no calf tenderness; abrasion over R knee, no bony tenderness  NEURO: decreased sensation in V1-3 distribution on L side, L sided facial droop, decreased sensation on LUE and RUE, motor strength 4/5 in LUE and RUE, all unchanged from baseline. CN II-XII otherwise intact; Cerebellar testing normal.   PSYCH: Cooperative, appropriate.

## 2021-07-20 NOTE — ED PROVIDER NOTE - OBJECTIVE STATEMENT
74 yo M with PMH of CAD s/p CABG 2017 (Yessi), CVA 2017 with residual L sided weakness, HTN, DM, HLD, GERD, BPH presenting for dizziness and multiple falls. Patient says he has been feeling intermittently dizzy over the past 3 days despite having adequate/normal PO intake. Patient fell yesterday at home after feeling dizzy hitting the back of his head, but with no LOC. No AC. This morning around 10 patient was walking outside, felt his legs get wobbly, and then fell straight down hitting his buttocks and R knee. Denies HT, but endorses LOC for a few seconds. Patient denies any pain, headache, vision changes, new numbness, weakness, tingling, chest pain, shortness of breath, nausea, vomiting, diarrhea, dysuria, hematuria, melena, hematochezia, fever, cold/flu symptoms, leg pain/swelling. Patient was ambulatory at scene.

## 2021-07-20 NOTE — ED PROVIDER NOTE - NS ED ROS FT
Constitutional:  (-) fever, (-) chills, (-) lethargy  Eyes:  (-) eye pain (-) visual changes  ENMT: (-) nasal discharge, (-) sore throat. (-) neck pain or stiffness  Cardiac: (-) chest pain (-) palpitations  Respiratory:  (-) cough (-) respiratory distress.   GI:  (-) nausea (-) vomiting (-) diarrhea (-) abdominal pain.  :  (-) dysuria (-) frequency (-) burning.  MS:  (-) back pain (-) joint pain.  Neuro:  (-) headache (-) numbness (-) tingling (-) focal weakness (+) dizziness  Skin:  (-) rash  Except as documented in the HPI,  all other systems are negative

## 2021-07-20 NOTE — H&P ADULT - NSICDXPASTMEDICALHX_GEN_ALL_CORE_FT
PAST MEDICAL HISTORY:  Angina pectoris     Anxiety     BPH (benign prostatic hyperplasia)     CAD (coronary artery disease)     CVA (cerebral vascular accident)     Depression     Diabetes niddm    GERD (gastroesophageal reflux disease)     HTN (hypertension)

## 2021-07-20 NOTE — ED ADULT TRIAGE NOTE - CHIEF COMPLAINT QUOTE
Pt had 2 syncopal episodes in the last 2 days. Pt fell to floor + head injury. Not on anticoagulant. GCS 15.

## 2021-07-20 NOTE — H&P ADULT - NSHPLABSRESULTS_GEN_ALL_CORE
12.3   9.25  )-----------( 247      ( 20 Jul 2021 13:17 )             39.2       07-20    140  |  107  |  27<H>  ----------------------------<  121<H>  4.6   |  25  |  1.6<H>    Ca    9.9      20 Jul 2021 13:17  Mg     2.3     07-20    TPro  6.8  /  Alb  4.4  /  TBili  0.4  /  DBili  x   /  AST  15  /  ALT  10  /  AlkPhos  34  07-20                      Lactate Trend  07-20 @ 13:17 Lactate:1.8       CARDIAC MARKERS ( 20 Jul 2021 13:17 )  x     / <0.01 ng/mL / x     / x     / x            CAPILLARY BLOOD GLUCOSE      POCT Blood Glucose.: 107 mg/dL (20 Jul 2021 13:08)

## 2021-07-20 NOTE — ED ADULT NURSE NOTE - NS ED NURSE LEVEL OF CONSCIOUSNESS ORIENTATION
Oriented - self; Oriented - place; Oriented - time/Asking repetitive questions/Age appropriate behavior

## 2021-07-20 NOTE — H&P ADULT - ASSESSMENT
76 yo M with PMH of CAD s/p CABG x3 (Yessi), CVA 2017 with residual L sided weakness, HTN, DM, HLD, GERD, BPH  presented for dizziness and multiple falls. Patient says he has been feeling intermittently dizzy over the past 3 days despite having adequate/normal PO intake. Patient fell yesterday at home after feeling dizzy hitting the back of his head, but with no LOC. No AC. This morning around 10 patient was walking outside, felt his legs get wobbly, and then fell straight down hitting his buttocks and R knee.      #Dizziness and fall  Pt denies LOC  CT spine: No evidence of acute cervical spine fracture or subluxation.Multilevel severe degenerative changes as described, with severe spinal noted stenosis at C2-3 and C3-4 and multilevel severe neural foraminal stenosis.  CTAP:No definite evidence of acute traumatic injury within the chest, abdomen, or pelvis.   CTH:  No evidence of acute intracranial hemorrhage. Probable chronic infarct within the right posterior frontal white matter. Lacunar infarcts within bilateral white matter and deep gray nuclei of indeterminate age. Mild/moderate chronic microvascular changes.  Chest X-Ray negative for pneumothorax, infiltrate, or effusion. XR Pelvis negative for fx's or dislocations   Trops Neg on admission, continue trending   ECG NSR, Rpt   obtain Orthostatics   Admit to tele  Neuro consult  f/u Echo   Fall precautions     #CAD s/p CABG X3  6/2021 :patent SALAZAR to LAD , patent SVG to OM , % , filled distally by  collaterals from LAD.  Continue DAPT ( Aspirin 81 mg daily and Plavix 75 mg daily ), ARB, Imdur,  Ranexa, B-Blocker, Statin Therapy  Cardio f/u in AM       #HTN  cont home meds     #HLD  f/u lipid panel     #CVA  Left UE/ Left LE weakness/ left facial from prior CVA  cont supportive care       #SAMANTA  Cr 1.3 in 2017, 1.5 in 2019, 1.7 in June, 1.6 today   Pt denies reduced PO intake   progression of CKD?      #DM  Hold home meds  Start Insulin regimen for FS >180      #DVT PPX : Hep SubQ  #Diet: DASH/TLC/CC  #GI PPX: Protonix  #Activity: Ambulate as tolerated  FULL CODE  76 yo M with PMH of CAD s/p CABG x3 (Yessi), CVA 2017 with residual L sided weakness, HTN, DM, HLD, GERD, BPH  presented for dizziness and multiple falls. Patient says he has been feeling intermittently dizzy over the past 3 days despite having adequate/normal PO intake. Patient fell yesterday at home after feeling dizzy hitting the back of his head, but with no LOC. No AC. This morning around 10 patient was walking outside, felt his legs get wobbly, and then fell straight down hitting his buttocks and R knee.      #Dizziness and fall after getting up from a sitting or supine position likely orthostatic  Pt denies LOC  CT spine: No evidence of acute cervical spine fracture or subluxation. Multilevel severe degenerative changes as described, with severe spinal noted stenosis at C2-3 and C3-4 and multilevel severe neural foraminal stenosis.  CTAP:No definite evidence of acute traumatic injury within the chest, abdomen, or pelvis.   CTH:  No evidence of acute intracranial hemorrhage. Probable chronic infarct within the right posterior frontal white matter. Lacunar infarcts within bilateral white matter and deep gray nuclei of indeterminate age. Mild/moderate chronic microvascular changes.  Chest X-Ray negative for pneumothorax, infiltrate, or effusion. XR Pelvis negative for fx's or dislocations   Trops Neg on admission, continue trending   ECG NSR, Rpt   obtain Orthostatics   Admit to tele  f/u Echo   Fall precautions   Pt on tizanidine 4mg q8h- hold for now,   Adjust BBs if Orthostatics come back positive      #CAD s/p CABG 2009 6/2021 :patent SALAZAR to LAD , patent SVG to OM , % , filled distally by  collaterals from LAD.  Continue DAPT ( Aspirin 81 mg daily and Plavix 75 mg daily ), ARB, Imdur,  Ranexa, B-Blocker, Statin Therapy  Cardio f/u in AM       #HTN  cont home meds     #HLD  f/u lipid panel     #CVA  Left UE/ Left LE weakness/ left facial from prior CVA  cont supportive care       #SAMANTA  Cr 1.3 in 2017, 1.5 in 2019, 1.7 in June, 1.6 today   Pt denies reduced PO intake   Monitor   Nephro consult if worsening       #DM  Hold home meds  Start Insulin regimen for FS >180      #DVT PPX : Hep SubQ  #Diet: DASH/TLC/CC  #GI PPX: Protonix  #Activity: Ambulate as tolerated  FULL CODE

## 2021-07-20 NOTE — H&P ADULT - NSHPPHYSICALEXAM_GEN_ALL_CORE
GENERAL: NAD, speaks in full sentences, no signs of respiratory distress  HEAD:  Atraumatic, Normocephalic  CHEST/LUNG: Clear to auscultation bilaterally; No wheeze; No crackles; No accessory muscles used  HEART: Regular rate and rhythm; No murmurs;   ABDOMEN: Soft, Nontender, Nondistended; Bowel sounds present; No guarding  EXTREMITIES:  2+ Peripheral Pulses, No cyanosis or edema  PSYCH: AAOx3  NEUROLOGY: Left UE/ Left LE weakness/ left facial from prior CVA  SKIN:  right knee abrasion GENERAL: NAD, speaks in full sentences, no signs of respiratory distress  HEAD:  Atraumatic, Normocephalic  CHEST/LUNG: Clear to auscultation bilaterally; No wheeze; No crackles; No accessory muscles used  HEART: Regular rate and rhythm; No murmurs;   ABDOMEN: Soft, Nontender, Nondistended; Bowel sounds present; No guarding  EXTREMITIES:  2+ Peripheral Pulses, No cyanosis or edema  PSYCH: AAOx3  NEUROLOGY: Left LE weakness/mild left facial droop from prior CVA  SKIN:  right knee abrasion

## 2021-07-20 NOTE — ED PROVIDER NOTE - CLINICAL SUMMARY MEDICAL DECISION MAKING FREE TEXT BOX
pt s/o to me from Dr. Shah- pt presenting with dizziness, falls. +HI, no LOC. labs imaging reviewed. will admit for further eval.

## 2021-07-20 NOTE — H&P ADULT - NSICDXPASTSURGICALHX_GEN_ALL_CORE_FT
PAST SURGICAL HISTORY:  Bilateral cataracts     History of appendectomy     History of heart bypass surgery

## 2021-07-20 NOTE — ED ADULT NURSE NOTE - INTERVENTIONS DEFINITIONS
[place] to call system/Call bell, personal items and telephone within reach/Instruct patient to call for assistance/Room bathroom lighting operational/Non-slip footwear when patient is off stretcher/Physically safe environment: no spills, clutter or unnecessary equipment/Stretcher in lowest position, wheels locked, appropriate side rails in place/Provide visual cue, wrist band, yellow gown, etc./Monitor gait and stability/Monitor for mental status changes and reorient to person, place, and time/Review medications for side effects contributing to fall risk/Provide visual clues: red socks

## 2021-07-21 ENCOUNTER — TRANSCRIPTION ENCOUNTER (OUTPATIENT)
Age: 76
End: 2021-07-21

## 2021-07-21 LAB
A1C WITH ESTIMATED AVERAGE GLUCOSE RESULT: 6.9 % — HIGH (ref 4–5.6)
ALBUMIN SERPL ELPH-MCNC: 4 G/DL — SIGNIFICANT CHANGE UP (ref 3.5–5.2)
ALP SERPL-CCNC: 33 U/L — SIGNIFICANT CHANGE UP (ref 30–115)
ALT FLD-CCNC: 11 U/L — SIGNIFICANT CHANGE UP (ref 0–41)
ANION GAP SERPL CALC-SCNC: 11 MMOL/L — SIGNIFICANT CHANGE UP (ref 7–14)
AST SERPL-CCNC: 16 U/L — SIGNIFICANT CHANGE UP (ref 0–41)
BILIRUB SERPL-MCNC: 0.3 MG/DL — SIGNIFICANT CHANGE UP (ref 0.2–1.2)
BUN SERPL-MCNC: 24 MG/DL — HIGH (ref 10–20)
CALCIUM SERPL-MCNC: 9.9 MG/DL — SIGNIFICANT CHANGE UP (ref 8.5–10.1)
CHLORIDE SERPL-SCNC: 106 MMOL/L — SIGNIFICANT CHANGE UP (ref 98–110)
CHOLEST SERPL-MCNC: 194 MG/DL — SIGNIFICANT CHANGE UP
CO2 SERPL-SCNC: 26 MMOL/L — SIGNIFICANT CHANGE UP (ref 17–32)
COVID-19 SPIKE DOMAIN AB INTERP: POSITIVE
COVID-19 SPIKE DOMAIN ANTIBODY RESULT: >250 U/ML — HIGH
CREAT SERPL-MCNC: 2.1 MG/DL — HIGH (ref 0.7–1.5)
ESTIMATED AVERAGE GLUCOSE: 151 MG/DL — HIGH (ref 68–114)
GLUCOSE BLDC GLUCOMTR-MCNC: 140 MG/DL — HIGH (ref 70–99)
GLUCOSE BLDC GLUCOMTR-MCNC: 158 MG/DL — HIGH (ref 70–99)
GLUCOSE BLDC GLUCOMTR-MCNC: 161 MG/DL — HIGH (ref 70–99)
GLUCOSE BLDC GLUCOMTR-MCNC: 177 MG/DL — HIGH (ref 70–99)
GLUCOSE SERPL-MCNC: 139 MG/DL — HIGH (ref 70–99)
HCT VFR BLD CALC: 36.4 % — LOW (ref 42–52)
HDLC SERPL-MCNC: 37 MG/DL — LOW
HGB BLD-MCNC: 11.5 G/DL — LOW (ref 14–18)
LIPID PNL WITH DIRECT LDL SERPL: 94 MG/DL — SIGNIFICANT CHANGE UP
MAGNESIUM SERPL-MCNC: 2.2 MG/DL — SIGNIFICANT CHANGE UP (ref 1.8–2.4)
MCHC RBC-ENTMCNC: 28.2 PG — SIGNIFICANT CHANGE UP (ref 27–31)
MCHC RBC-ENTMCNC: 31.6 G/DL — LOW (ref 32–37)
MCV RBC AUTO: 89.2 FL — SIGNIFICANT CHANGE UP (ref 80–94)
NON HDL CHOLESTEROL: 157 MG/DL — HIGH
NRBC # BLD: 0 /100 WBCS — SIGNIFICANT CHANGE UP (ref 0–0)
PLATELET # BLD AUTO: 236 K/UL — SIGNIFICANT CHANGE UP (ref 130–400)
POTASSIUM SERPL-MCNC: 4.2 MMOL/L — SIGNIFICANT CHANGE UP (ref 3.5–5)
POTASSIUM SERPL-SCNC: 4.2 MMOL/L — SIGNIFICANT CHANGE UP (ref 3.5–5)
PROT SERPL-MCNC: 6 G/DL — SIGNIFICANT CHANGE UP (ref 6–8)
RBC # BLD: 4.08 M/UL — LOW (ref 4.7–6.1)
RBC # FLD: 14.3 % — SIGNIFICANT CHANGE UP (ref 11.5–14.5)
SARS-COV-2 IGG+IGM SERPL QL IA: >250 U/ML — HIGH
SARS-COV-2 IGG+IGM SERPL QL IA: POSITIVE
SODIUM SERPL-SCNC: 143 MMOL/L — SIGNIFICANT CHANGE UP (ref 135–146)
TRIGL SERPL-MCNC: 527 MG/DL — HIGH
WBC # BLD: 6.99 K/UL — SIGNIFICANT CHANGE UP (ref 4.8–10.8)
WBC # FLD AUTO: 6.99 K/UL — SIGNIFICANT CHANGE UP (ref 4.8–10.8)

## 2021-07-21 PROCEDURE — 99222 1ST HOSP IP/OBS MODERATE 55: CPT

## 2021-07-21 PROCEDURE — 99233 SBSQ HOSP IP/OBS HIGH 50: CPT

## 2021-07-21 RX ORDER — FENOFIBRATE,MICRONIZED 130 MG
145 CAPSULE ORAL DAILY
Refills: 0 | Status: DISCONTINUED | OUTPATIENT
Start: 2021-07-21 | End: 2021-07-28

## 2021-07-21 RX ADMIN — TAMSULOSIN HYDROCHLORIDE 0.4 MILLIGRAM(S): 0.4 CAPSULE ORAL at 21:06

## 2021-07-21 RX ADMIN — Medication 81 MILLIGRAM(S): at 12:21

## 2021-07-21 RX ADMIN — HEPARIN SODIUM 5000 UNIT(S): 5000 INJECTION INTRAVENOUS; SUBCUTANEOUS at 21:06

## 2021-07-21 RX ADMIN — Medication 145 MILLIGRAM(S): at 13:51

## 2021-07-21 RX ADMIN — CLOPIDOGREL BISULFATE 75 MILLIGRAM(S): 75 TABLET, FILM COATED ORAL at 12:21

## 2021-07-21 RX ADMIN — Medication 10 MILLIGRAM(S): at 05:56

## 2021-07-21 RX ADMIN — Medication 400 MILLIGRAM(S): at 21:06

## 2021-07-21 RX ADMIN — Medication 10 MILLIGRAM(S): at 13:53

## 2021-07-21 RX ADMIN — Medication 25 MILLIGRAM(S): at 05:57

## 2021-07-21 RX ADMIN — RANOLAZINE 500 MILLIGRAM(S): 500 TABLET, FILM COATED, EXTENDED RELEASE ORAL at 17:46

## 2021-07-21 RX ADMIN — HEPARIN SODIUM 5000 UNIT(S): 5000 INJECTION INTRAVENOUS; SUBCUTANEOUS at 05:56

## 2021-07-21 RX ADMIN — Medication 400 MILLIGRAM(S): at 13:51

## 2021-07-21 RX ADMIN — Medication 25 MILLIGRAM(S): at 17:46

## 2021-07-21 RX ADMIN — ISOSORBIDE MONONITRATE 60 MILLIGRAM(S): 60 TABLET, EXTENDED RELEASE ORAL at 12:21

## 2021-07-21 RX ADMIN — Medication 400 MILLIGRAM(S): at 05:57

## 2021-07-21 RX ADMIN — ATORVASTATIN CALCIUM 80 MILLIGRAM(S): 80 TABLET, FILM COATED ORAL at 21:06

## 2021-07-21 RX ADMIN — Medication 75 MILLIGRAM(S): at 12:23

## 2021-07-21 RX ADMIN — LOSARTAN POTASSIUM 100 MILLIGRAM(S): 100 TABLET, FILM COATED ORAL at 05:57

## 2021-07-21 RX ADMIN — Medication 10 MILLIGRAM(S): at 21:06

## 2021-07-21 RX ADMIN — HEPARIN SODIUM 5000 UNIT(S): 5000 INJECTION INTRAVENOUS; SUBCUTANEOUS at 13:51

## 2021-07-21 RX ADMIN — RANOLAZINE 500 MILLIGRAM(S): 500 TABLET, FILM COATED, EXTENDED RELEASE ORAL at 05:56

## 2021-07-21 NOTE — CONSULT NOTE ADULT - ATTENDING COMMENTS
I have personally seen and examined this patient.  I have fully participated in the care of this patient.  I have reviewed all pertinent clinical information, including history, physical exam, plan and note.   I have reviewed all pertinent clinical information and reviewed all relevant imaging and diagnostic studies personally.  74 yo M w/ vascular risks and prior CVA w/ residual mild spastic LHP currently with recurrent episodes of presyncope/brief syncope.  No new focal deficits.  Recommendations as above.  Agree with above assessment except as noted.

## 2021-07-21 NOTE — CONSULT NOTE ADULT - ASSESSMENT
75M w/ h/o CAD (s/p CABG x3 2017 [Yessi]), CVA 2017 (w/ residual L sided weakness), HTN, DM, HLD, GERD, and BPH p/w dizziness and multiple falls. 75M w/ h/o CAD (s/p CABG x3 2017 [Tamburino]), CVA 2017 (w/ residual L sided weakness), HTN, DM, HLD, GERD, and BPH p/w dizziness and multiple falls in context of recent medication changes. Vital signs significant for orthostatic hypotension. Physical exam demonstrates left-sided deficits consistent w/ pt's stroke from 2017, but otherwise within expected limits. Trauma workup overall negative. CTH non-contrast demonstrates chronic right posterior frontal infarct and bilateral lacunar infarcts, but no evidence of acute ICH. Overall, pre-syncopal episodes do not appear to be neurological in nature. Pt's pre-syncope more likely to be explained by cardiogenic causes given recent increase in Imdur and orthostatic hypotension. However, posterior circulation insufficiency cannot yet be excluded.    Plan  1. Obtain CTA Head and Neck to r/o posterior circulation insufficiency  2. Adjust cardiac medications as per primary team to reduce or eliminate orthostatic HoTN  3. Consider cardiology consult given likely cardiac etiology of pre-syncope  4. If CTA Head and Neck negative, have pt f/u outpatient w/ Dr. Winkler in 4-6 weeks 75M w/ h/o CAD (s/p CABG x3 2017 [Tamburino]), CVA 2017 (w/ residual L sided weakness), HTN, DM, HLD, GERD, and BPH p/w dizziness and multiple falls in context of recent medication changes. Vital signs significant for orthostatic hypotension. Physical exam demonstrates left-sided deficits consistent w/ pt's stroke from 2017, but otherwise within expected limits. Trauma workup overall negative. CTH non-contrast demonstrates chronic right posterior frontal infarct and bilateral lacunar infarcts, but no evidence of acute ICH. Overall, pre-syncopal episodes do not appear to be neurologic in nature. Pt's pre-syncope more likely to be cardiogenic given pt's extensive cardiac history, orthostatic HoTN, and recent increase in Imdur. However, posterior circulation insufficiency cannot yet be excluded.    Plan  1. Obtain CTA Head and Neck to r/o posterior circulation insufficiency  2. Adjust cardiac medications as per primary team to reduce or eliminate orthostatic HoTN  3. Consider cardiology consult given likely cardiac etiology of pre-syncope  4. If CTA Head and Neck negative, have pt f/u outpatient w/ Dr. Winkler in 4-6 weeks

## 2021-07-21 NOTE — DISCHARGE NOTE NURSING/CASE MANAGEMENT/SOCIAL WORK - PATIENT PORTAL LINK FT
You can access the FollowMyHealth Patient Portal offered by [facility] by registering at the following website: [email or web address]. By joining [facility]’s FollowMyHealth portal, you will also be able to view your health information using other applications (apps) compatible with our system.

## 2021-07-21 NOTE — DISCHARGE NOTE NURSING/CASE MANAGEMENT/SOCIAL WORK - NSDCVIVACCINE_GEN_ALL_CORE_FT
Tdap; 20-Jul-2021 15:17; Dejuan Fry (RN); [employer]; [id number] (Exp. Date: 28-Jan-2023); IntraMuscular; Deltoid Left.; 0.5 milliLiter(s); VIS (VIS Published: 09-May-2013, VIS Presented: 20-Jul-2021);

## 2021-07-21 NOTE — PROGRESS NOTE ADULT - ASSESSMENT
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 Patient says he has been feeling intermittently dizzy over the past 3 days despite having adequate/normal PO intake. Patient fell yesterday at home after feeling dizzy hitting the back of his head, but with no LOC. No AC. This morning around 10 patient was walking outside, felt his legs get wobbly, and then fell straight down hitting his buttocks and R knee.    #Dizziness and fall after getting up from a sitting or supine position likely orthostatic  - Pt denied LOC  - CT spine: No evidence of acute cervical spine fracture or subluxation. Multilevel severe degenerative changes as described, with severe spinal noted stenosis at C2-3 and C3-4 and multilevel severe neural foraminal stenosis.  - CTAP:No definite evidence of acute traumatic injury within the chest, abdomen, or pelvis.   - CTH:  No evidence of acute intracranial hemorrhage. Probable chronic infarct within the right posterior frontal white matter. Lacunar infarcts within bilateral white matter and deep gray nuclei of indeterminate age. Mild/moderate chronic microvascular changes.  - Chest X-Ray negative for pneumothorax, infiltrate, or effusion. XR Pelvis negative for fx's or dislocations   - Trops Neg on admission, continue trending   - ECG NSR, Rpt   - f/u Echo   - Fall precautions   - Pt on tizanidine 4mg q8h - hold for now,   - obtain Orthostatics, adjust BBs if Orthostatics come back positive  - Follow up Neurology consult   - continue with tele monitoring for now   - patient still drives regularly, should not drive a vehicle     #CAD s/p CABG 2009  - 6/2021 :patent SALAZAR to LAD , patent SVG to OM , % , filled distally by collaterals from LAD.  - Continue DAPT ( Aspirin 81 mg daily and Plavix 75 mg daily ), ARB, Imdur,Ranexa, B-Blocker, Statin Therapy  - Cardio f/u OP     #HTN  - cont home meds     #HLD  - f/u lipid panel     #CVA - Left UE/ Left LE weakness/ left facial from prior CVA  - cont supportive care     #SAMANTA on CKD vs progression of CKD   - Cr 1.3 in 2017, 1.5 in 2019, 1.7 in June,   - Pt denies reduced PO intake   - Monitor   - Nephro consult if worsening       #DM  - Hold home meds  - Start Insulin regimen for FS >180      #DVT PPX : Hep SubQ  #Diet: DASH/TLC/CC  #GI PPX: Protonix  #Activity: Ambulate as tolerated  #Dispo: anticipate discharge home within 24-48h   FULL CODE

## 2021-07-21 NOTE — PROGRESS NOTE ADULT - SUBJECTIVE AND OBJECTIVE BOX
Hospital Day:  1d    Chief Complaint: Patient is a 75y old  Male who presents with a chief complaint of     24 hour events: No acute overnights.     Past Medical Hx:   CAD (coronary artery disease)    HTN (hypertension)    Depression    Anxiety    Diabetes    Angina pectoris    BPH (benign prostatic hyperplasia)    GERD (gastroesophageal reflux disease)    CVA (cerebral vascular accident)      Past Sx:  History of appendectomy    Bilateral cataracts    History of heart bypass surgery      Allergies:  clindamycin (Rash)  PC Pen VK (Rash)  penicillin (Rash)    Current Meds:   Standing Meds:  aspirin  chewable 81 milliGRAM(s) Oral daily  atorvastatin 80 milliGRAM(s) Oral at bedtime  clopidogrel Tablet 75 milliGRAM(s) Oral daily  diazepam    Tablet 10 milliGRAM(s) Oral three times a day  fenofibrate Tablet 145 milliGRAM(s) Oral daily  heparin   Injectable 5000 Unit(s) SubCutaneous every 8 hours  isosorbide   mononitrate ER Tablet (IMDUR) 60 milliGRAM(s) Oral daily  losartan 100 milliGRAM(s) Oral daily  metoprolol succinate ER 25 milliGRAM(s) Oral every 12 hours  pentoxifylline 400 milliGRAM(s) Oral three times a day  ranolazine 500 milliGRAM(s) Oral two times a day  tamsulosin 0.4 milliGRAM(s) Oral at bedtime  venlafaxine 75 milliGRAM(s) Oral daily    PRN Meds:    HOME MEDICATIONS:  aspirin 81 mg oral tablet: 1 tab(s) orally once a day  diazePAM 10 mg oral tablet: 1 tab(s) orally 3 times a day  fenofibrate 120 mg oral tablet: 1 tab(s) orally once a day  Januvia 25 mg oral tablet: 1 tab(s) orally once a day  lansoprazole 30 mg oral delayed release capsule: 1 cap(s) orally once a day  losartan 100 mg oral tablet: 1 tab(s) orally once a day  metoprolol succinate 25 mg oral tablet, extended release: orally 2 times a day  Pentoxil 400 mg oral tablet, extended release: 1 tab(s) orally 3 times a day  Plavix 75 mg oral tablet: 1 tab(s) orally once a day  Ranexa 500 mg oral tablet, extended release: 1 tab(s) orally 2 times a day  rosuvastatin 40 mg oral tablet: 1 tab(s) orally once a day  tamsulosin 0.4 mg oral capsule: 1 cap(s) orally once a day  tiZANidine 4 mg oral tablet: 1 tab(s) orally 3 times a day  venlafaxine 75 mg oral tablet: 1 tab(s) orally 2 times a day      Vital Signs:   T(F): 97.9 (21 @ 04:50), Max: 98.5 (21 @ 13:00)  HR: 83 (21 @ 04:50) (75 - 87)  BP: 136/74 (21 @ 04:50) (136/74 - 169/86)  RR: 18 (21 @ 04:50) (16 - 18)  SpO2: 99% (21 @ 07:44) (96% - 99%)      21 @ 07:01  -  21 @ 07:00  --------------------------------------------------------  IN: 400 mL / OUT: 675 mL / NET: -275 mL        Physical Exam:   GENERAL: NAD  HEENT: NCAT  CHEST/LUNG: audible breath sounds bilaterally   HEART: Regular rate and rhythm; s1 s2 appreciated  ABDOMEN: Soft, Nontender, Nondistended; Bowel sounds present  EXTREMITIES: No LE edema b/l  NERVOUS SYSTEM:  Alert & Oriented X3, L sided residual weakness   LINES/CATHETERS: peripheral         Labs:                         11.5   6.99  )-----------( 236      ( 2021 07:15 )             36.4     Neutophil% 71.5, Lymphocyte% 19.7, Monocyte% 6.7, Bands% 0.5 21 @ 13:17    2021 07:15    143    |  106    |  24     ----------------------------<  139    4.2     |  26     |  2.1      Ca    9.9        2021 07:15  Mg     2.2       2021 07:15    TPro  6.0    /  Alb  4.0    /  TBili  0.3    /  DBili  x      /  AST  16     /  ALT  11     /  AlkPhos  33     2021 07:15      Chol 194, LDL --, HDL 37, VLDL --,  21 @ 07:15    Amylase --, Lipase 62, 21 @ 13:17      Serum Pro-Brain Natriuretic Peptide: 335 pg/mL (21 @ 13:17)    Troponin <0.01, CKMB --, CK -- 21 @ 20:00  Troponin <0.01, CKMB --, CK -- 21 @ 13:17        Urinalysis Basic - ( 2021 16:34 )    Color: Light Yellow / Appearance: Clear / S.030 / pH: x  Gluc: x / Ketone: Negative  / Bili: Negative / Urobili: <2 mg/dL   Blood: x / Protein: 30 mg/dL / Nitrite: Negative   Leuk Esterase: Negative / RBC: 1 /HPF / WBC 0 /HPF   Sq Epi: x / Non Sq Epi: 1 /HPF / Bacteria: Negative            Radiology:

## 2021-07-21 NOTE — CONSULT NOTE ADULT - SUBJECTIVE AND OBJECTIVE BOX
Neurology Consult    Patient is a 75y old  Male who presents with a chief complaint of dizziness and multiple falls    HPI:  75M w/ h/o CAD (s/p CABG x3 2017 [Yessi]), CVA 2017 (w/ residual L sided weakness), HTN, DM, HLD, GERD, BPH p/w dizziness and multiple falls since Friday (). Patient says he has been feeling intermittently dizzy over since Friday despite having adequate/normal PO intake. Pt first felt on Friday right after getting out of his car. Pt stated that these episodes of dizziness occurs after getting up from a sitting or supine position. Patient fell yesterday at home after feeling dizzy hitting the back of his head, but with no LOC. Pt is not on AC. On day of admission around 10am, pt got out of his car, felt dizzy again and then fell straight down hitting his buttocks and R knee. No reported head trauma. No reported pain, headache, vision changes, new numbness, weakness, tingling, chest pain, shortness of breath, palpitations, nausea, vomiting, diarrhea, dysuria, hematuria, melena, hematochezia, fever, cold/flu symptoms, leg pain/swelling.     Of note, pt's Imdur dosage was recently increased by his cardiologist (Dr. Dickson) on  from 30mg to 60mg. About two months prior, pt was experiencing intermittent chest pain. Considering pt's extensive cardiac hx, cardiac cath performed on . Results showed no interval changes compared to previous cardiac cath. After discussing results w/ Dr. Dickson on , Imdur dosage increased. Pt reports no similar chest pain episodes since prior to medication change.    PAST MEDICAL & SURGICAL HISTORY:  CAD (coronary artery disease)  HTN (hypertension)  Depression  Anxiety  Diabetes (NIDDM)  Angina pectoris  BPH (benign prostatic hyperplasia)  GERD (gastroesophageal reflux disease)  CVA (cerebral vascular accident)  History of appendectomy  Bilateral cataracts  History of heart bypass surgery      FAMILY HISTORY:      Social History: (-) x 3    Allergies    clindamycin (Rash)  PC Pen VK (Rash)  penicillin (Rash)    Intolerances        MEDICATIONS  (STANDING):  aspirin  chewable 81 milliGRAM(s) Oral daily  atorvastatin 80 milliGRAM(s) Oral at bedtime  clopidogrel Tablet 75 milliGRAM(s) Oral daily  diazepam    Tablet 10 milliGRAM(s) Oral three times a day  fenofibrate Tablet 145 milliGRAM(s) Oral daily  heparin   Injectable 5000 Unit(s) SubCutaneous every 8 hours  isosorbide   mononitrate ER Tablet (IMDUR) 60 milliGRAM(s) Oral daily  losartan 100 milliGRAM(s) Oral daily  metoprolol succinate ER 25 milliGRAM(s) Oral every 12 hours  pentoxifylline 400 milliGRAM(s) Oral three times a day  ranolazine 500 milliGRAM(s) Oral two times a day  tamsulosin 0.4 milliGRAM(s) Oral at bedtime  venlafaxine 75 milliGRAM(s) Oral daily    MEDICATIONS  (PRN):      Review of systems:    Constitutional: as per HPI  Eyes: No eye pain or discharge  ENMT:  No difficulty hearing; No sinus or throat pain  Neck: No pain or stiffness  Respiratory: No cough, wheezing, chills or hemoptysis  Cardiovascular: No chest pain, palpitations, shortness of breath, dyspnea on exertion  Gastrointestinal: No abdominal pain, nausea, vomiting or hematemesis; No diarrhea or constipation.   Genitourinary: No dysuria, frequency, hematuria or incontinence  Neurological: As per HPI  Skin: No rashes or lesions   Endocrine: No heat or cold intolerance; No hair loss  Musculoskeletal: No joint pain or swelling  Psychiatric: No depression, anxiety, mood swings  Heme/Lymph: No easy bruising or bleeding gums    Vital Signs Last 24 Hrs  T(C): 36.6 (2021 04:50), Max: 36.9 (2021 13:00)  T(F): 97.9 (2021 04:50), Max: 98.5 (2021 13:00)  HR: 83 (2021 04:50) (75 - 87)  BP: 136/74 (2021 04:50) (136/74 - 169/86)  BP(mean): --  RR: 18 (2021 04:50) (16 - 18)  SpO2: 99% (2021 07:44) (96% - 99%)    Examination:  General:  Appearance is consistent with chronologic age.  No abnormal facies.  Gross skin survey within normal limits.    Cognitive/Language:  The patient is oriented to person, place, time and date.  Recent and remote memory intact.  Fund of knowledge is intact and normal.  Language with normal repetition, comprehension and naming.  Nondysarthric.    Eyes: intact VA, VFF.  EOMI w/o nystagmus, skew or reported double vision.  PERRL.  No ptosis/weakness of eyelid closure.    Face: L sided facial droop; Decreased facial sensation V2-3; Facial sensation normal V1    Ears/Nose/Throat:  Hearing grossly intact b/l.  Palate elevates midline.  Tongue and uvula midline.   Motor examination:   Normal tone, bulk and range of motion.  No tenderness, twitching, tremors or involuntary movements.  Formal Muscle Strength Testing: (MRC grade R/L) 5/5 RUE and RLE; 4/5 LUE; 3/5 LLE; decreased L  strength; No observable drift  Reflexes:   2+ b/l pectoralis, biceps, triceps, brachioradialis, patella and Achilles.  Plantar response downgoing b/l.  Masoud and clonus absent.  Sensory examination:  Decreased sensation to LT and pinprick on L side; Intact to LT and pinprick on right side  Cerebellum: FTN/HKS intact with normal JOEL in all limbs.  No dysmetria or dysdiadokinesia. Unable to assess gait secondary to dizziness    Respiratory:  no audible wheezing or inspiratory stridor.  no use of accessory muscles.   Cardiac: pulse palpable, no audible bruits  Abdomen: supple, no guarding, no TTP    Labs:   CBC Full  -  ( 2021 07:15 )  WBC Count : 6.99 K/uL  RBC Count : 4.08 M/uL  Hemoglobin : 11.5 g/dL  Hematocrit : 36.4 %  Platelet Count - Automated : 236 K/uL  Mean Cell Volume : 89.2 fL  Mean Cell Hemoglobin : 28.2 pg  Mean Cell Hemoglobin Concentration : 31.6 g/dL  Auto Neutrophil # : x  Auto Lymphocyte # : x  Auto Monocyte # : x  Auto Eosinophil # : x  Auto Basophil # : x  Auto Neutrophil % : x  Auto Lymphocyte % : x  Auto Monocyte % : x  Auto Eosinophil % : x  Auto Basophil % : x    -    143  |  106  |  24<H>  ----------------------------<  139<H>  4.2   |  26  |  2.1<H>    Ca    9.9      2021 07:15  Mg     2.2         TPro  6.0  /  Alb  4.0  /  TBili  0.3  /  DBili  x   /  AST  16  /  ALT  11  /  AlkPhos  33      LIVER FUNCTIONS - ( 2021 07:15 )  Alb: 4.0 g/dL / Pro: 6.0 g/dL / ALK PHOS: 33 U/L / ALT: 11 U/L / AST: 16 U/L / GGT: x             Urinalysis Basic - ( 2021 16:34 )    Color: Light Yellow / Appearance: Clear / S.030 / pH: x  Gluc: x / Ketone: Negative  / Bili: Negative / Urobili: <2 mg/dL   Blood: x / Protein: 30 mg/dL / Nitrite: Negative   Leuk Esterase: Negative / RBC: 1 /HPF / WBC 0 /HPF   Sq Epi: x / Non Sq Epi: 1 /HPF / Bacteria: Negative          Neuroimaging:  NCHCT: CT Head No Cont:   EXAM:  CT BRAIN            PROCEDURE DATE:  2021            INTERPRETATION:  Clinical History / Reason for exam: Trauma.    Technique: Noncontrast head CT.  Contiguous unenhanced CT axial images of the head from the base to the vertex with coronal and sagittal reformats.    Comparison: None available    Findings:    The ventricles and cortical sulci are compatible with a moderate degree of parenchymal volume loss.    There are patchy hypodensities throughout the hemispheric white matter without mass effect compatible with chronic microvascular changes.    There is a focal hypodensity within the right corona radiata associated with slight retraction of the right lateral ventricle, likely a chronic infarct.    Additional scattered focallucencies are seen within bilateral basal ganglia and corona radiata compatible with ischemic changes of indeterminate age.    There is no acute intracranial hemorrhage, extra-axial fluid collection or midline shift.  Gray white matter differentiation is maintained.    There is calcific atherosclerotic disease at the skull base.    There are polypoid opacities within the maxillary sinuses. The remaining visualized paranasal sinuses and mastoids are clear. The patient is status post bilateral cataract surgery.    IMPRESSION:    1.  No evidence of acute intracranial hemorrhage.    2.  Probable chronic infarct within the right posterior frontal white matter. Lacunar infarcts within bilateral white matter and deep gray nuclei of indeterminate age. Mild/moderate chronic microvascular changes.    --- End of Report ---      IRAJ ORTIZ MD; Attending Radiologist  This document has been electronically signed. 2021  3:20PM (21 @ 14:56)      21 @ 10:14 Neurology Consult    Patient is a 75y old  Male who presents with a chief complaint of dizziness and multiple falls    HPI:  75M w/ h/o CAD (s/p CABG x3 2017 [Yessi]), CVA 2017 (w/ residual L sided weakness; follows outpatient w/ Dr Winkler), HTN, DM, HLD, GERD, BPH p/w dizziness and multiple falls since Friday (). Patient says he has been feeling intermittently dizzy over since Friday despite having adequate/normal PO intake. Pt first felt on Friday right after getting out of his car. Pt stated that these episodes of dizziness occurs after getting up from a sitting or supine position. Patient fell yesterday at home after feeling dizzy hitting the back of his head, but with no LOC. Pt is not on AC. On day of admission around 10am, pt got out of his car, felt dizzy again and then fell straight down hitting his buttocks and R knee. No reported head trauma. No reported pain, headache, vision changes, new numbness, weakness, tingling, chest pain, shortness of breath, palpitations, nausea, vomiting, diarrhea, dysuria, hematuria, melena, hematochezia, fever, cold/flu symptoms, leg pain/swelling.     Additionally, pt's Imdur dosage was recently increased by his cardiologist (Dr. Dickson) on  from 30mg to 60mg. About two months prior, pt was experiencing intermittent chest pain. Considering pt's extensive cardiac hx, cardiac cath performed on . Results showed no interval changes compared to previous cardiac cath. After discussing results w/ Dr. Dickson on , Imdur dosage increased. Pt reports no similar chest pain episodes since prior to medication change.    PAST MEDICAL & SURGICAL HISTORY:  CAD (coronary artery disease)  HTN (hypertension)  Depression  Anxiety  Diabetes (NIDDM)  Angina pectoris  BPH (benign prostatic hyperplasia)  GERD (gastroesophageal reflux disease)  CVA (cerebral vascular accident)  History of appendectomy  Bilateral cataracts  History of heart bypass surgery      FAMILY HISTORY:      Social History: (-) x 3    Allergies    clindamycin (Rash)  PC Pen VK (Rash)  penicillin (Rash)    Intolerances        MEDICATIONS  (STANDING):  aspirin  chewable 81 milliGRAM(s) Oral daily  atorvastatin 80 milliGRAM(s) Oral at bedtime  clopidogrel Tablet 75 milliGRAM(s) Oral daily  diazepam    Tablet 10 milliGRAM(s) Oral three times a day  fenofibrate Tablet 145 milliGRAM(s) Oral daily  heparin   Injectable 5000 Unit(s) SubCutaneous every 8 hours  isosorbide   mononitrate ER Tablet (IMDUR) 60 milliGRAM(s) Oral daily  losartan 100 milliGRAM(s) Oral daily  metoprolol succinate ER 25 milliGRAM(s) Oral every 12 hours  pentoxifylline 400 milliGRAM(s) Oral three times a day  ranolazine 500 milliGRAM(s) Oral two times a day  tamsulosin 0.4 milliGRAM(s) Oral at bedtime  venlafaxine 75 milliGRAM(s) Oral daily    MEDICATIONS  (PRN):      Review of systems:    Constitutional: as per HPI  Eyes: No eye pain or discharge  ENMT:  No difficulty hearing; No sinus or throat pain  Neck: No pain or stiffness  Respiratory: No cough, wheezing, chills or hemoptysis  Cardiovascular: No chest pain, palpitations, shortness of breath, dyspnea on exertion  Gastrointestinal: No abdominal pain, nausea, vomiting or hematemesis; No diarrhea or constipation.   Genitourinary: No dysuria, frequency, hematuria or incontinence  Neurological: As per HPI  Skin: No rashes or lesions   Endocrine: No heat or cold intolerance; No hair loss  Musculoskeletal: No joint pain or swelling  Psychiatric: No depression, anxiety, mood swings  Heme/Lymph: No easy bruising or bleeding gums    Vital Signs Last 24 Hrs  T(C): 36.6 (2021 04:50), Max: 36.9 (2021 13:00)  T(F): 97.9 (2021 04:50), Max: 98.5 (2021 13:00)  HR: 83 (2021 04:50) (75 - 87)  BP: 136/74 (2021 04:50) (136/74 - 169/86)  BP(mean): --  RR: 18 (2021 04:50) (16 - 18)  SpO2: 99% (2021 07:44) (96% - 99%)    Examination:  General:  Appearance is consistent with chronologic age.  No abnormal facies.  Gross skin survey within normal limits.    Cognitive/Language:  The patient is oriented to person, place, time and date.  Recent and remote memory intact.  Fund of knowledge is intact and normal.  Language with normal repetition, comprehension and naming.  Nondysarthric.    Eyes: intact VA, VFF.  EOMI w/o nystagmus, skew or reported double vision.  PERRL.  No ptosis/weakness of eyelid closure.    Face: L sided facial droop; Decreased facial sensation V2-3; Facial sensation normal V1    Ears/Nose/Throat:  Hearing grossly intact b/l.  Palate elevates midline.  Tongue and uvula midline.   Motor examination:   Normal tone, bulk and range of motion.  No tenderness, twitching, tremors or involuntary movements.  Formal Muscle Strength Testing: (MRC grade R/L) 5/5 RUE and RLE; 4/5 LUE; 3/5 LLE; decreased L  strength; No observable drift  Reflexes: 3+ b/l pectoralis, biceps, triceps, brachioradialis, patella and Achilles.  Plantar response downgoing b/l.  Masoud and clonus absent.  Sensory examination:  Decreased sensation to LT and pinprick on L side; Intact to LT and pinprick on right side  Cerebellum: FTN/HKS intact with normal JOEL in all limbs.  No dysmetria or dysdiadokinesia. Unable to assess gait secondary to dizziness    Respiratory:  no audible wheezing or inspiratory stridor.  no use of accessory muscles.   Cardiac: pulse palpable, no audible bruits  Abdomen: supple, no guarding, no TTP    Labs:   CBC Full  -  ( 2021 07:15 )  WBC Count : 6.99 K/uL  RBC Count : 4.08 M/uL  Hemoglobin : 11.5 g/dL  Hematocrit : 36.4 %  Platelet Count - Automated : 236 K/uL  Mean Cell Volume : 89.2 fL  Mean Cell Hemoglobin : 28.2 pg  Mean Cell Hemoglobin Concentration : 31.6 g/dL  Auto Neutrophil # : x  Auto Lymphocyte # : x  Auto Monocyte # : x  Auto Eosinophil # : x  Auto Basophil # : x  Auto Neutrophil % : x  Auto Lymphocyte % : x  Auto Monocyte % : x  Auto Eosinophil % : x  Auto Basophil % : x    -    143  |  106  |  24<H>  ----------------------------<  139<H>  4.2   |  26  |  2.1<H>    Ca    9.9      2021 07:15  Mg     2.2     -    TPro  6.0  /  Alb  4.0  /  TBili  0.3  /  DBili  x   /  AST  16  /  ALT  11  /  AlkPhos  33      LIVER FUNCTIONS - ( 2021 07:15 )  Alb: 4.0 g/dL / Pro: 6.0 g/dL / ALK PHOS: 33 U/L / ALT: 11 U/L / AST: 16 U/L / GGT: x             Urinalysis Basic - ( 2021 16:34 )    Color: Light Yellow / Appearance: Clear / S.030 / pH: x  Gluc: x / Ketone: Negative  / Bili: Negative / Urobili: <2 mg/dL   Blood: x / Protein: 30 mg/dL / Nitrite: Negative   Leuk Esterase: Negative / RBC: 1 /HPF / WBC 0 /HPF   Sq Epi: x / Non Sq Epi: 1 /HPF / Bacteria: Negative          Neuroimaging:  NCHCT: CT Head No Cont:   EXAM:  CT BRAIN            PROCEDURE DATE:  2021            INTERPRETATION:  Clinical History / Reason for exam: Trauma.    Technique: Noncontrast head CT.  Contiguous unenhanced CT axial images of the head from the base to the vertex with coronal and sagittal reformats.    Comparison: None available    Findings:    The ventricles and cortical sulci are compatible with a moderate degree of parenchymal volume loss.    There are patchy hypodensities throughout the hemispheric white matter without mass effect compatible with chronic microvascular changes.    There is a focal hypodensity within the right corona radiata associated with slight retraction of the right lateral ventricle, likely a chronic infarct.    Additional scattered focallucencies are seen within bilateral basal ganglia and corona radiata compatible with ischemic changes of indeterminate age.    There is no acute intracranial hemorrhage, extra-axial fluid collection or midline shift.  Gray white matter differentiation is maintained.    There is calcific atherosclerotic disease at the skull base.    There are polypoid opacities within the maxillary sinuses. The remaining visualized paranasal sinuses and mastoids are clear. The patient is status post bilateral cataract surgery.    IMPRESSION:    1.  No evidence of acute intracranial hemorrhage.    2.  Probable chronic infarct within the right posterior frontal white matter. Lacunar infarcts within bilateral white matter and deep gray nuclei of indeterminate age. Mild/moderate chronic microvascular changes.    --- End of Report ---      IRAJ ORTIZ MD; Attending Radiologist  This document has been electronically signed. 2021  3:20PM (21 @ 14:56)      21 @ 10:14

## 2021-07-21 NOTE — PROGRESS NOTE ADULT - ATTENDING COMMENTS
Patient seen and evaluated at bedside with the resident. Advised NOT to drive anymore due to his comorbidity. Patient is at risk of being readmitted because of his disability and a sick wife at home. He will need home services and home PT  set up for discharge.     Check orthostatic BP    Anticipate discharge in 24 hrs.

## 2021-07-22 LAB
ALBUMIN SERPL ELPH-MCNC: 4.1 G/DL — SIGNIFICANT CHANGE UP (ref 3.5–5.2)
ALP SERPL-CCNC: 37 U/L — SIGNIFICANT CHANGE UP (ref 30–115)
ALT FLD-CCNC: 11 U/L — SIGNIFICANT CHANGE UP (ref 0–41)
ANION GAP SERPL CALC-SCNC: 11 MMOL/L — SIGNIFICANT CHANGE UP (ref 7–14)
AST SERPL-CCNC: 19 U/L — SIGNIFICANT CHANGE UP (ref 0–41)
BASOPHILS # BLD AUTO: 0.06 K/UL — SIGNIFICANT CHANGE UP (ref 0–0.2)
BASOPHILS NFR BLD AUTO: 0.7 % — SIGNIFICANT CHANGE UP (ref 0–1)
BILIRUB SERPL-MCNC: 0.3 MG/DL — SIGNIFICANT CHANGE UP (ref 0.2–1.2)
BUN SERPL-MCNC: 26 MG/DL — HIGH (ref 10–20)
CALCIUM SERPL-MCNC: 10.2 MG/DL — HIGH (ref 8.5–10.1)
CHLORIDE SERPL-SCNC: 108 MMOL/L — SIGNIFICANT CHANGE UP (ref 98–110)
CO2 SERPL-SCNC: 25 MMOL/L — SIGNIFICANT CHANGE UP (ref 17–32)
CREAT SERPL-MCNC: 1.9 MG/DL — HIGH (ref 0.7–1.5)
EOSINOPHIL # BLD AUTO: 0.18 K/UL — SIGNIFICANT CHANGE UP (ref 0–0.7)
EOSINOPHIL NFR BLD AUTO: 2.2 % — SIGNIFICANT CHANGE UP (ref 0–8)
GLUCOSE BLDC GLUCOMTR-MCNC: 118 MG/DL — HIGH (ref 70–99)
GLUCOSE BLDC GLUCOMTR-MCNC: 124 MG/DL — HIGH (ref 70–99)
GLUCOSE BLDC GLUCOMTR-MCNC: 158 MG/DL — HIGH (ref 70–99)
GLUCOSE BLDC GLUCOMTR-MCNC: 160 MG/DL — HIGH (ref 70–99)
GLUCOSE SERPL-MCNC: 133 MG/DL — HIGH (ref 70–99)
HCT VFR BLD CALC: 38.3 % — LOW (ref 42–52)
HGB BLD-MCNC: 12.3 G/DL — LOW (ref 14–18)
IMM GRANULOCYTES NFR BLD AUTO: 0.5 % — HIGH (ref 0.1–0.3)
LYMPHOCYTES # BLD AUTO: 2.32 K/UL — SIGNIFICANT CHANGE UP (ref 1.2–3.4)
LYMPHOCYTES # BLD AUTO: 28.5 % — SIGNIFICANT CHANGE UP (ref 20.5–51.1)
MAGNESIUM SERPL-MCNC: 2.3 MG/DL — SIGNIFICANT CHANGE UP (ref 1.8–2.4)
MCHC RBC-ENTMCNC: 29.2 PG — SIGNIFICANT CHANGE UP (ref 27–31)
MCHC RBC-ENTMCNC: 32.1 G/DL — SIGNIFICANT CHANGE UP (ref 32–37)
MCV RBC AUTO: 91 FL — SIGNIFICANT CHANGE UP (ref 80–94)
MONOCYTES # BLD AUTO: 0.68 K/UL — HIGH (ref 0.1–0.6)
MONOCYTES NFR BLD AUTO: 8.3 % — SIGNIFICANT CHANGE UP (ref 1.7–9.3)
NEUTROPHILS # BLD AUTO: 4.87 K/UL — SIGNIFICANT CHANGE UP (ref 1.4–6.5)
NEUTROPHILS NFR BLD AUTO: 59.8 % — SIGNIFICANT CHANGE UP (ref 42.2–75.2)
NRBC # BLD: 0 /100 WBCS — SIGNIFICANT CHANGE UP (ref 0–0)
PLATELET # BLD AUTO: 233 K/UL — SIGNIFICANT CHANGE UP (ref 130–400)
POTASSIUM SERPL-MCNC: 4.7 MMOL/L — SIGNIFICANT CHANGE UP (ref 3.5–5)
POTASSIUM SERPL-SCNC: 4.7 MMOL/L — SIGNIFICANT CHANGE UP (ref 3.5–5)
PROT SERPL-MCNC: 6.3 G/DL — SIGNIFICANT CHANGE UP (ref 6–8)
RBC # BLD: 4.21 M/UL — LOW (ref 4.7–6.1)
RBC # FLD: 14.2 % — SIGNIFICANT CHANGE UP (ref 11.5–14.5)
SODIUM SERPL-SCNC: 144 MMOL/L — SIGNIFICANT CHANGE UP (ref 135–146)
WBC # BLD: 8.15 K/UL — SIGNIFICANT CHANGE UP (ref 4.8–10.8)
WBC # FLD AUTO: 8.15 K/UL — SIGNIFICANT CHANGE UP (ref 4.8–10.8)

## 2021-07-22 PROCEDURE — 93306 TTE W/DOPPLER COMPLETE: CPT | Mod: 26

## 2021-07-22 PROCEDURE — 76775 US EXAM ABDO BACK WALL LIM: CPT | Mod: 26

## 2021-07-22 PROCEDURE — 99233 SBSQ HOSP IP/OBS HIGH 50: CPT

## 2021-07-22 RX ORDER — LOSARTAN POTASSIUM 100 MG/1
100 TABLET, FILM COATED ORAL AT BEDTIME
Refills: 0 | Status: DISCONTINUED | OUTPATIENT
Start: 2021-07-23 | End: 2021-07-27

## 2021-07-22 RX ORDER — SODIUM CHLORIDE 9 MG/ML
1000 INJECTION INTRAMUSCULAR; INTRAVENOUS; SUBCUTANEOUS
Refills: 0 | Status: DISCONTINUED | OUTPATIENT
Start: 2021-07-22 | End: 2021-07-24

## 2021-07-22 RX ORDER — VENLAFAXINE HCL 75 MG
75 CAPSULE, EXT RELEASE 24 HR ORAL DAILY
Refills: 0 | Status: DISCONTINUED | OUTPATIENT
Start: 2021-07-23 | End: 2021-07-28

## 2021-07-22 RX ADMIN — SODIUM CHLORIDE 75 MILLILITER(S): 9 INJECTION INTRAMUSCULAR; INTRAVENOUS; SUBCUTANEOUS at 07:10

## 2021-07-22 RX ADMIN — Medication 400 MILLIGRAM(S): at 21:18

## 2021-07-22 RX ADMIN — Medication 10 MILLIGRAM(S): at 05:49

## 2021-07-22 RX ADMIN — HEPARIN SODIUM 5000 UNIT(S): 5000 INJECTION INTRAVENOUS; SUBCUTANEOUS at 21:18

## 2021-07-22 RX ADMIN — HEPARIN SODIUM 5000 UNIT(S): 5000 INJECTION INTRAVENOUS; SUBCUTANEOUS at 05:49

## 2021-07-22 RX ADMIN — RANOLAZINE 500 MILLIGRAM(S): 500 TABLET, FILM COATED, EXTENDED RELEASE ORAL at 05:49

## 2021-07-22 RX ADMIN — RANOLAZINE 500 MILLIGRAM(S): 500 TABLET, FILM COATED, EXTENDED RELEASE ORAL at 17:24

## 2021-07-22 RX ADMIN — Medication 145 MILLIGRAM(S): at 12:18

## 2021-07-22 RX ADMIN — Medication 400 MILLIGRAM(S): at 05:49

## 2021-07-22 RX ADMIN — Medication 25 MILLIGRAM(S): at 17:24

## 2021-07-22 RX ADMIN — LOSARTAN POTASSIUM 100 MILLIGRAM(S): 100 TABLET, FILM COATED ORAL at 05:49

## 2021-07-22 RX ADMIN — ISOSORBIDE MONONITRATE 60 MILLIGRAM(S): 60 TABLET, EXTENDED RELEASE ORAL at 12:18

## 2021-07-22 RX ADMIN — Medication 25 MILLIGRAM(S): at 05:49

## 2021-07-22 RX ADMIN — Medication 400 MILLIGRAM(S): at 13:44

## 2021-07-22 RX ADMIN — HEPARIN SODIUM 5000 UNIT(S): 5000 INJECTION INTRAVENOUS; SUBCUTANEOUS at 13:44

## 2021-07-22 RX ADMIN — Medication 10 MILLIGRAM(S): at 13:44

## 2021-07-22 RX ADMIN — CLOPIDOGREL BISULFATE 75 MILLIGRAM(S): 75 TABLET, FILM COATED ORAL at 12:18

## 2021-07-22 RX ADMIN — ATORVASTATIN CALCIUM 80 MILLIGRAM(S): 80 TABLET, FILM COATED ORAL at 21:18

## 2021-07-22 RX ADMIN — Medication 81 MILLIGRAM(S): at 12:18

## 2021-07-22 RX ADMIN — Medication 10 MILLIGRAM(S): at 21:18

## 2021-07-22 NOTE — PHYSICAL THERAPY INITIAL EVALUATION ADULT - ADDITIONAL COMMENTS
PTA: pt lives in  c [place] c [place]. Stairs to 2nd floor c chair lift. Has SC and RW at home. Was using SC, but more recently began c RW 2/2 dizziness. IND c ADL's and was able to drive PTA.

## 2021-07-22 NOTE — PROGRESS NOTE ADULT - SUBJECTIVE AND OBJECTIVE BOX
Hospital Day:  2d    Chief Complaint: Patient is a 75y old  Male who presents with a chief complaint of dizziness (2021 13:13)    24 hour events: No acute events overnight events.     Past Medical Hx:   CAD (coronary artery disease)    HTN (hypertension)    Depression    Anxiety    Diabetes    Angina pectoris    BPH (benign prostatic hyperplasia)    GERD (gastroesophageal reflux disease)    CVA (cerebral vascular accident)      Past Sx:  History of appendectomy    Bilateral cataracts    History of heart bypass surgery      Allergies:  clindamycin (Rash)  PC Pen VK (Rash)  penicillin (Rash)    Current Meds:   Standing Meds:  aspirin  chewable 81 milliGRAM(s) Oral daily  atorvastatin 80 milliGRAM(s) Oral at bedtime  clopidogrel Tablet 75 milliGRAM(s) Oral daily  diazepam    Tablet 10 milliGRAM(s) Oral three times a day  fenofibrate Tablet 145 milliGRAM(s) Oral daily  heparin   Injectable 5000 Unit(s) SubCutaneous every 8 hours  isosorbide   mononitrate ER Tablet (IMDUR) 60 milliGRAM(s) Oral daily  metoprolol succinate ER 25 milliGRAM(s) Oral every 12 hours  pentoxifylline 400 milliGRAM(s) Oral three times a day  ranolazine 500 milliGRAM(s) Oral two times a day  sodium chloride 0.9%. 1000 milliLiter(s) (75 mL/Hr) IV Continuous <Continuous>    PRN Meds:    HOME MEDICATIONS:  aspirin 81 mg oral tablet: 1 tab(s) orally once a day  diazePAM 10 mg oral tablet: 1 tab(s) orally 3 times a day  fenofibrate 120 mg oral tablet: 1 tab(s) orally once a day  Januvia 25 mg oral tablet: 1 tab(s) orally once a day  lansoprazole 30 mg oral delayed release capsule: 1 cap(s) orally once a day  losartan 100 mg oral tablet: 1 tab(s) orally once a day  metoprolol succinate 25 mg oral tablet, extended release: orally 2 times a day  Pentoxil 400 mg oral tablet, extended release: 1 tab(s) orally 3 times a day  Plavix 75 mg oral tablet: 1 tab(s) orally once a day  Ranexa 500 mg oral tablet, extended release: 1 tab(s) orally 2 times a day  rosuvastatin 40 mg oral tablet: 1 tab(s) orally once a day  tamsulosin 0.4 mg oral capsule: 1 cap(s) orally once a day  tiZANidine 4 mg oral tablet: 1 tab(s) orally 3 times a day  venlafaxine 75 mg oral tablet: 1 tab(s) orally 2 times a day      Vital Signs:   T(F): 97.1 (21 @ 05:03), Max: 98.5 (21 @ 20:25)  HR: 69 (21 @ 05:03) (69 - 80)  BP: 152/68 (21 @ 05:03) (136/72 - 152/68)  RR: 17 (21 @ 05:03) (17 - 18)  SpO2: 95% (21 @ 20:24) (95% - 95%)      21 @ 07:01  -  21 @ 07:00  --------------------------------------------------------  IN: 260 mL / OUT: 775 mL / NET: -515 mL    21 @ 07:01  -  21 @ 14:15  --------------------------------------------------------  IN: 910 mL / OUT: 500 mL / NET: 410 mL        Physical Exam:   GENERAL: NAD  HEENT: NCAT  CHEST/LUNG: audible breath bilateral   HEART: Regular rate and rhythm; s1 s2 appreciated  ABDOMEN: Soft, Nontender, Nondistended abdomen   EXTREMITIES: No LE edema b/l  SKIN: no rashes, no new lesions  NERVOUS SYSTEM:  Alert & Oriented X3  LINES/CATHETERS: peripheral         Labs:                         12.3   8.15  )-----------( 233      ( 2021 06:43 )             38.3     Neutophil% 59.8, Lymphocyte% 28.5, Monocyte% 8.3, Bands% 0.5 21 @ 06:43    2021 06:43    144    |  108    |  26     ----------------------------<  133    4.7     |  25     |  1.9      Ca    10.2       2021 06:43  Mg     2.3       2021 06:43    TPro  6.3    /  Alb  4.1    /  TBili  0.3    /  DBili  x      /  AST  19     /  ALT  11     /  AlkPhos  37     2021 06:43        Amylase --, Lipase 62, 21 @ 13:17      Serum Pro-Brain Natriuretic Peptide: 335 pg/mL (21 @ 13:17)    Troponin <0.01, CKMB --, CK -- 21 @ 20:00  Troponin <0.01, CKMB --, CK -- 21 @ 13:17        Urinalysis Basic - ( 2021 16:34 )    Color: Light Yellow / Appearance: Clear / S.030 / pH: x  Gluc: x / Ketone: Negative  / Bili: Negative / Urobili: <2 mg/dL   Blood: x / Protein: 30 mg/dL / Nitrite: Negative   Leuk Esterase: Negative / RBC: 1 /HPF / WBC 0 /HPF   Sq Epi: x / Non Sq Epi: 1 /HPF / Bacteria: Negative            Radiology:

## 2021-07-22 NOTE — PHYSICAL THERAPY INITIAL EVALUATION ADULT - PATIENT PROFILE REVIEW, REHAB EVAL
Chart reviewed. Unable to complete PT IE 2/2 pt is at echo. PT to return in PM, if able./yes
yes
Chart reviewed. PT IE completed from 1:10-1:45PM. Total of 35 minutes./yes

## 2021-07-22 NOTE — PROGRESS NOTE ADULT - SUBJECTIVE AND OBJECTIVE BOX
Pt seen and examined at bedside. No CP or SOB.    PAST MEDICAL & SURGICAL HISTORY:  CAD (coronary artery disease)    HTN (hypertension)    Depression    Anxiety    Diabetes  niddm    Angina pectoris    BPH (benign prostatic hyperplasia)    GERD (gastroesophageal reflux disease)    CVA (cerebral vascular accident)    History of appendectomy    Bilateral cataracts    History of heart bypass surgery        VITAL SIGNS (Last 24 hrs):  T(C): 36.2 (21 @ 05:03), Max: 36.9 (21 @ 20:25)  HR: 69 (21 @ 05:03) (69 - 80)  BP: 152/68 (21 @ 05:03) (136/72 - 152/68)  RR: 17 (21 @ 05:03) (17 - 18)  SpO2: 95% (21 @ 20:24) (95% - 95%)  Wt(kg): --  Daily     Daily Weight in k.3 (2021 05:03)    I&O's Summary    2021 07:01  -  2021 07:00  --------------------------------------------------------  IN: 260 mL / OUT: 775 mL / NET: -515 mL    2021 07:01  -  2021 13:16  --------------------------------------------------------  IN: 910 mL / OUT: 500 mL / NET: 410 mL        PHYSICAL EXAM:  GENERAL: NAD, well-developed  HEAD:  Atraumatic, Normocephalic  EYES: EOMI, PERRLA, conjunctiva and sclera clear  NECK: Supple, No JVD  CHEST/LUNG: Clear to auscultation bilaterally; No wheeze  HEART: Regular rate and rhythm; No murmurs, rubs, or gallops  ABDOMEN: Soft, Nontender, Nondistended; Bowel sounds present  EXTREMITIES:  2+ Peripheral Pulses, No clubbing, cyanosis, or edema  PSYCH: AAOx3  NEUROLOGY: non-focal  SKIN: No rashes or lesions    Labs Reviewed  Spoke to patient in regards to abnormal labs.    CBC Full  -  ( 2021 06:43 )  WBC Count : 8.15 K/uL  Hemoglobin : 12.3 g/dL  Hematocrit : 38.3 %  Platelet Count - Automated : 233 K/uL  Mean Cell Volume : 91.0 fL  Mean Cell Hemoglobin : 29.2 pg  Mean Cell Hemoglobin Concentration : 32.1 g/dL  Auto Neutrophil # : 4.87 K/uL  Auto Lymphocyte # : 2.32 K/uL  Auto Monocyte # : 0.68 K/uL  Auto Eosinophil # : 0.18 K/uL  Auto Basophil # : 0.06 K/uL  Auto Neutrophil % : 59.8 %  Auto Lymphocyte % : 28.5 %  Auto Monocyte % : 8.3 %  Auto Eosinophil % : 2.2 %  Auto Basophil % : 0.7 %    BMP:     @ 06:43    Blood Urea Nitrogen - 26  Calcium - 10.2  Carbond Dioxide - 25  Chloride - 108  Creatinine - 1.9  Glucose - 133  Potassium - 4.7  Sodium - 144      Hemoglobin A1c -     Urine Culture:        COVID Labs      D-Dimer:        Imaging reviewed      MEDICATIONS  (STANDING):  aspirin  chewable 81 milliGRAM(s) Oral daily  atorvastatin 80 milliGRAM(s) Oral at bedtime  clopidogrel Tablet 75 milliGRAM(s) Oral daily  diazepam    Tablet 10 milliGRAM(s) Oral three times a day  fenofibrate Tablet 145 milliGRAM(s) Oral daily  heparin   Injectable 5000 Unit(s) SubCutaneous every 8 hours  isosorbide   mononitrate ER Tablet (IMDUR) 60 milliGRAM(s) Oral daily  losartan 100 milliGRAM(s) Oral daily  metoprolol succinate ER 25 milliGRAM(s) Oral every 12 hours  pentoxifylline 400 milliGRAM(s) Oral three times a day  ranolazine 500 milliGRAM(s) Oral two times a day  sodium chloride 0.9%. 1000 milliLiter(s) (75 mL/Hr) IV Continuous <Continuous>  tamsulosin 0.4 milliGRAM(s) Oral at bedtime  venlafaxine 75 milliGRAM(s) Oral daily    MEDICATIONS  (PRN):       Pt seen and examined at bedside. No CP or SOB. No dizziness     PAST MEDICAL & SURGICAL HISTORY:  CAD (coronary artery disease)    HTN (hypertension)    Depression    Anxiety    Diabetes  niddm    Angina pectoris    BPH (benign prostatic hyperplasia)    GERD (gastroesophageal reflux disease)    CVA (cerebral vascular accident)    History of appendectomy    Bilateral cataracts    History of heart bypass surgery        VITAL SIGNS (Last 24 hrs):  T(C): 36.2 (21 @ 05:03), Max: 36.9 (21 @ 20:25)  HR: 69 (21 @ 05:03) (69 - 80)  BP: 152/68 (21 @ 05:03) (136/72 - 152/68)  RR: 17 (21 @ 05:03) (17 - 18)  SpO2: 95% (21 @ 20:24) (95% - 95%)  Wt(kg): --  Daily     Daily Weight in k.3 (2021 05:03)    I&O's Summary    2021 07:  -  2021 07:00  --------------------------------------------------------  IN: 260 mL / OUT: 775 mL / NET: -515 mL    2021 07:01  -  2021 13:16  --------------------------------------------------------  IN: 910 mL / OUT: 500 mL / NET: 410 mL    Orthostatic VS    21 @ 13:26  Lying BP: 184/86 HR: 72   Sitting BP: 173/80 HR: 78  Standing BP: 148/68 HR: 81  Site: upper right arm   Mode: --    21 @ 13:41  Lying BP: 178/84 HR: 81   Sitting BP: 171/80 HR: 87  Standing BP: 137/70 HR: 90  Site: --   Mode: --      PHYSICAL EXAM:  GENERAL: NAD, well-developed  HEAD:  Atraumatic, Normocephalic  EYES: EOMI, PERRLA, conjunctiva and sclera clear  NECK: Supple, No JVD  CHEST/LUNG: Clear to auscultation bilaterally; No wheeze  HEART: Regular rate and rhythm; No murmurs, rubs, or gallops  ABDOMEN: Soft, Nontender, Nondistended; Bowel sounds present  EXTREMITIES:  2+ Peripheral Pulses, No clubbing, cyanosis, or edema  PSYCH: AAOx3  NEUROLOGY: non-focal  SKIN: No rashes or lesions    Labs Reviewed  Spoke to patient in regards to abnormal labs.    CBC Full  -  ( 2021 06:43 )  WBC Count : 8.15 K/uL  Hemoglobin : 12.3 g/dL  Hematocrit : 38.3 %  Platelet Count - Automated : 233 K/uL  Mean Cell Volume : 91.0 fL  Mean Cell Hemoglobin : 29.2 pg  Mean Cell Hemoglobin Concentration : 32.1 g/dL  Auto Neutrophil # : 4.87 K/uL  Auto Lymphocyte # : 2.32 K/uL  Auto Monocyte # : 0.68 K/uL  Auto Eosinophil # : 0.18 K/uL  Auto Basophil # : 0.06 K/uL  Auto Neutrophil % : 59.8 %  Auto Lymphocyte % : 28.5 %  Auto Monocyte % : 8.3 %  Auto Eosinophil % : 2.2 %  Auto Basophil % : 0.7 %    BMP:     @ 06:43    Blood Urea Nitrogen - 26  Calcium - 10.2  Carbond Dioxide - 25  Chloride - 108  Creatinine - 1.9  Glucose - 133  Potassium - 4.7  Sodium - 144      Hemoglobin A1c -     Urine Culture:        COVID Labs      D-Dimer:        Imaging reviewed:   < from: CT Chest w/ IV Cont (21 @ 15:12) >  IMPRESSION:    1. No definite evidence of acute traumatic injury within the chest, abdomen, or pelvis.    < end of copied text >          MEDICATIONS  (STANDING):  aspirin  chewable 81 milliGRAM(s) Oral daily  atorvastatin 80 milliGRAM(s) Oral at bedtime  clopidogrel Tablet 75 milliGRAM(s) Oral daily  diazepam    Tablet 10 milliGRAM(s) Oral three times a day  fenofibrate Tablet 145 milliGRAM(s) Oral daily  heparin   Injectable 5000 Unit(s) SubCutaneous every 8 hours  isosorbide   mononitrate ER Tablet (IMDUR) 60 milliGRAM(s) Oral daily  losartan 100 milliGRAM(s) Oral daily  metoprolol succinate ER 25 milliGRAM(s) Oral every 12 hours  pentoxifylline 400 milliGRAM(s) Oral three times a day  ranolazine 500 milliGRAM(s) Oral two times a day  sodium chloride 0.9%. 1000 milliLiter(s) (75 mL/Hr) IV Continuous <Continuous>  tamsulosin 0.4 milliGRAM(s) Oral at bedtime  venlafaxine 75 milliGRAM(s) Oral daily    MEDICATIONS  (PRN):

## 2021-07-22 NOTE — PHYSICAL THERAPY INITIAL EVALUATION ADULT - GAIT DISTANCE, PT EVAL
PT choose to limit gait distance today 2/2 + orthostatics upon standing despite abdominal binder and HI stockings/bed to chair

## 2021-07-22 NOTE — PHYSICAL THERAPY INITIAL EVALUATION ADULT - IMPAIRMENTS FOUND, PT EVAL
aerobic capacity/endurance/ergonomics and body mechanics/gait, locomotion, and balance/gross motor/muscle strength/posture/ROM/sensory integrity/tone

## 2021-07-22 NOTE — PHYSICAL THERAPY INITIAL EVALUATION ADULT - PLANNED THERAPY INTERVENTIONS, PT EVAL
balance training/bed mobility training/gait training/lumbar stabilization/neuromuscular re-education/postural re-education/strengthening/stretching/transfer training

## 2021-07-22 NOTE — PHYSICAL THERAPY INITIAL EVALUATION ADULT - PERTINENT HX OF CURRENT PROBLEM, REHAB EVAL
Presents to [facility] s/p mechanical fall- unclear if fall d/t syncope episode vs. orthostatic hypotension. Pt had been feeling dizzy for the past few days. Fell once at home on buttocks and hit his head (- LOC). Then fell again outside while getting out of his car and was unable to get up (BIBEMS). Pt states he does not recall the event of the fall, suggesting possible syncope episode.

## 2021-07-23 LAB
ALBUMIN SERPL ELPH-MCNC: 4 G/DL — SIGNIFICANT CHANGE UP (ref 3.5–5.2)
ALP SERPL-CCNC: 34 U/L — SIGNIFICANT CHANGE UP (ref 30–115)
ALT FLD-CCNC: 12 U/L — SIGNIFICANT CHANGE UP (ref 0–41)
ANION GAP SERPL CALC-SCNC: 11 MMOL/L — SIGNIFICANT CHANGE UP (ref 7–14)
AST SERPL-CCNC: 17 U/L — SIGNIFICANT CHANGE UP (ref 0–41)
BASOPHILS # BLD AUTO: 0.06 K/UL — SIGNIFICANT CHANGE UP (ref 0–0.2)
BASOPHILS NFR BLD AUTO: 0.7 % — SIGNIFICANT CHANGE UP (ref 0–1)
BILIRUB SERPL-MCNC: 0.4 MG/DL — SIGNIFICANT CHANGE UP (ref 0.2–1.2)
BUN SERPL-MCNC: 28 MG/DL — HIGH (ref 10–20)
CALCIUM SERPL-MCNC: 9 MG/DL — SIGNIFICANT CHANGE UP (ref 8.5–10.1)
CHLORIDE SERPL-SCNC: 108 MMOL/L — SIGNIFICANT CHANGE UP (ref 98–110)
CO2 SERPL-SCNC: 23 MMOL/L — SIGNIFICANT CHANGE UP (ref 17–32)
CREAT SERPL-MCNC: 2 MG/DL — HIGH (ref 0.7–1.5)
EOSINOPHIL # BLD AUTO: 0.21 K/UL — SIGNIFICANT CHANGE UP (ref 0–0.7)
EOSINOPHIL NFR BLD AUTO: 2.3 % — SIGNIFICANT CHANGE UP (ref 0–8)
GLUCOSE BLDC GLUCOMTR-MCNC: 132 MG/DL — HIGH (ref 70–99)
GLUCOSE BLDC GLUCOMTR-MCNC: 137 MG/DL — HIGH (ref 70–99)
GLUCOSE BLDC GLUCOMTR-MCNC: 139 MG/DL — HIGH (ref 70–99)
GLUCOSE BLDC GLUCOMTR-MCNC: 176 MG/DL — HIGH (ref 70–99)
GLUCOSE SERPL-MCNC: 129 MG/DL — HIGH (ref 70–99)
HCT VFR BLD CALC: 38.1 % — LOW (ref 42–52)
HGB BLD-MCNC: 11.9 G/DL — LOW (ref 14–18)
IMM GRANULOCYTES NFR BLD AUTO: 0.3 % — SIGNIFICANT CHANGE UP (ref 0.1–0.3)
LYMPHOCYTES # BLD AUTO: 1.97 K/UL — SIGNIFICANT CHANGE UP (ref 1.2–3.4)
LYMPHOCYTES # BLD AUTO: 21.6 % — SIGNIFICANT CHANGE UP (ref 20.5–51.1)
MAGNESIUM SERPL-MCNC: 2.2 MG/DL — SIGNIFICANT CHANGE UP (ref 1.8–2.4)
MCHC RBC-ENTMCNC: 28.7 PG — SIGNIFICANT CHANGE UP (ref 27–31)
MCHC RBC-ENTMCNC: 31.2 G/DL — LOW (ref 32–37)
MCV RBC AUTO: 91.8 FL — SIGNIFICANT CHANGE UP (ref 80–94)
MONOCYTES # BLD AUTO: 0.63 K/UL — HIGH (ref 0.1–0.6)
MONOCYTES NFR BLD AUTO: 6.9 % — SIGNIFICANT CHANGE UP (ref 1.7–9.3)
NEUTROPHILS # BLD AUTO: 6.24 K/UL — SIGNIFICANT CHANGE UP (ref 1.4–6.5)
NEUTROPHILS NFR BLD AUTO: 68.2 % — SIGNIFICANT CHANGE UP (ref 42.2–75.2)
NRBC # BLD: 0 /100 WBCS — SIGNIFICANT CHANGE UP (ref 0–0)
PLATELET # BLD AUTO: 237 K/UL — SIGNIFICANT CHANGE UP (ref 130–400)
POTASSIUM SERPL-MCNC: 4.4 MMOL/L — SIGNIFICANT CHANGE UP (ref 3.5–5)
POTASSIUM SERPL-SCNC: 4.4 MMOL/L — SIGNIFICANT CHANGE UP (ref 3.5–5)
PROT SERPL-MCNC: 6.1 G/DL — SIGNIFICANT CHANGE UP (ref 6–8)
RBC # BLD: 4.15 M/UL — LOW (ref 4.7–6.1)
RBC # FLD: 14.2 % — SIGNIFICANT CHANGE UP (ref 11.5–14.5)
SODIUM SERPL-SCNC: 142 MMOL/L — SIGNIFICANT CHANGE UP (ref 135–146)
WBC # BLD: 9.14 K/UL — SIGNIFICANT CHANGE UP (ref 4.8–10.8)
WBC # FLD AUTO: 9.14 K/UL — SIGNIFICANT CHANGE UP (ref 4.8–10.8)

## 2021-07-23 PROCEDURE — 70544 MR ANGIOGRAPHY HEAD W/O DYE: CPT | Mod: 26

## 2021-07-23 PROCEDURE — 99233 SBSQ HOSP IP/OBS HIGH 50: CPT

## 2021-07-23 PROCEDURE — 93925 LOWER EXTREMITY STUDY: CPT | Mod: 26

## 2021-07-23 PROCEDURE — 70547 MR ANGIOGRAPHY NECK W/O DYE: CPT | Mod: 26

## 2021-07-23 RX ADMIN — Medication 400 MILLIGRAM(S): at 14:57

## 2021-07-23 RX ADMIN — Medication 81 MILLIGRAM(S): at 11:02

## 2021-07-23 RX ADMIN — SODIUM CHLORIDE 75 MILLILITER(S): 9 INJECTION INTRAMUSCULAR; INTRAVENOUS; SUBCUTANEOUS at 17:04

## 2021-07-23 RX ADMIN — CLOPIDOGREL BISULFATE 75 MILLIGRAM(S): 75 TABLET, FILM COATED ORAL at 11:02

## 2021-07-23 RX ADMIN — Medication 75 MILLIGRAM(S): at 11:02

## 2021-07-23 RX ADMIN — HEPARIN SODIUM 5000 UNIT(S): 5000 INJECTION INTRAVENOUS; SUBCUTANEOUS at 14:57

## 2021-07-23 RX ADMIN — HEPARIN SODIUM 5000 UNIT(S): 5000 INJECTION INTRAVENOUS; SUBCUTANEOUS at 21:28

## 2021-07-23 RX ADMIN — Medication 400 MILLIGRAM(S): at 21:29

## 2021-07-23 RX ADMIN — RANOLAZINE 500 MILLIGRAM(S): 500 TABLET, FILM COATED, EXTENDED RELEASE ORAL at 05:24

## 2021-07-23 RX ADMIN — ATORVASTATIN CALCIUM 80 MILLIGRAM(S): 80 TABLET, FILM COATED ORAL at 21:28

## 2021-07-23 RX ADMIN — Medication 10 MILLIGRAM(S): at 14:57

## 2021-07-23 RX ADMIN — Medication 10 MILLIGRAM(S): at 05:23

## 2021-07-23 RX ADMIN — Medication 25 MILLIGRAM(S): at 05:24

## 2021-07-23 RX ADMIN — Medication 10 MILLIGRAM(S): at 21:30

## 2021-07-23 RX ADMIN — Medication 25 MILLIGRAM(S): at 17:04

## 2021-07-23 RX ADMIN — Medication 400 MILLIGRAM(S): at 05:24

## 2021-07-23 RX ADMIN — LOSARTAN POTASSIUM 100 MILLIGRAM(S): 100 TABLET, FILM COATED ORAL at 21:28

## 2021-07-23 RX ADMIN — Medication 145 MILLIGRAM(S): at 11:02

## 2021-07-23 RX ADMIN — RANOLAZINE 500 MILLIGRAM(S): 500 TABLET, FILM COATED, EXTENDED RELEASE ORAL at 17:04

## 2021-07-23 RX ADMIN — ISOSORBIDE MONONITRATE 60 MILLIGRAM(S): 60 TABLET, EXTENDED RELEASE ORAL at 11:02

## 2021-07-23 RX ADMIN — HEPARIN SODIUM 5000 UNIT(S): 5000 INJECTION INTRAVENOUS; SUBCUTANEOUS at 05:24

## 2021-07-23 NOTE — PROGRESS NOTE ADULT - ATTENDING COMMENTS
Mr Ruffin is a 76 yo Man with medical history of CAD s/p CABG x3 ([place]), CVA 2017 with residual L sided weakness, HTN, DM, HLD, GERD, BPH  presented for dizziness and multiple falls. Patient says he has been feeling intermittently dizzy over the past 3 days despite having adequate/normal PO intake. Patient fell yesterday at home after feeling dizzy hitting the back of his head, but with no LOC. No AC. This morning around 10 patient was walking outside, felt his legs get wobbly, and then fell straight down hitting his buttocks and R knee.    #Dizziness and fall after getting up from a sitting or supine position likely orthostatic  Pt denied LOC  CT spine: No evidence of acute cervical spine fracture or subluxation. Multilevel severe degenerative changes as described, with severe spinal noted stenosis at C2-3 and C3-4 and multilevel severe neural foraminal stenosis.  CTAP:No definite evidence of acute traumatic injury within the chest, abdomen, or pelvis.   CTH:  No evidence of acute intracranial hemorrhage. Probable chronic infarct within the right posterior frontal white matter. Lacunar infarcts within bilateral white matter and deep gray nuclei of indeterminate age. Mild/moderate chronic microvascular changes.  Chest X-Ray negative for pneumothorax, infiltrate, or effusion. XR Pelvis negative for fx's or dislocations   Trops Neg on admission, ECG NSR, Rpt   Fall precautions   Pt on tizanidine 4mg q8h - hold for now,   ortho positive, started on HI stockings and abdominal binder, remains ortho positive  hold flomax, move losartan to PM   Neurology consult appreciated, spoke to neuro team, in the setting of SAMANTA ok to MR angio head and neck.   c/w tele monitoring for now   patient still drives regularly, should not drive a vehicle   repeat orthostatics    #LE pain   f/u Arterial Duplex of lower extremity    #CAD s/p CABG 2009 6/2021 :patent SALAZAR to LAD , patent SVG to OM , % , filled distally by collaterals from LAD.  c/w DAPT ( Aspirin 81 mg daily and Plavix 75 mg daily ), ARB, Imdur,Ranexa, B-Blocker, Statin Therapy  Cardio f/u OP     #HTN  cont home meds     #HLD  f/u lipid panel     #CVA - Left UE/ Left LE weakness/ left facial from prior CVA  cont supportive care     #SAMANTA on CKD vs progression of CKD   Cr 1.3 in 2017, 1.5 in 2019, 1.7 in June,   Cr trending down 2.1 -> 1.9  Pt denies reduced PO intake   patient retaining on bladder scan harrison placed.    obtain renal bladder US.   Monitor   Nephro consult if worsening     #DM  Hold home meds  start Insulin regimen for FS >180    DVT PPX : Hep SubQ  Diet: DASH/TLC/CC  GI PPX: Protonix  Dispo: anticipate discharge home within 24-48h   FULL CODE     Progress Note Handoff:  Pending: MRA Head and Neck, Arterial Duplex   Dispo: acute

## 2021-07-23 NOTE — PROGRESS NOTE ADULT - SUBJECTIVE AND OBJECTIVE BOX
Hospital Day:  3d    Chief Complaint: Patient is a 75y old  Male who presents with a chief complaint of dizziness (2021 13:13)    24 hour events: No acute overnight events.     Past Medical Hx:   CAD (coronary artery disease)    HTN (hypertension)    Depression    Anxiety    Diabetes    Angina pectoris    BPH (benign prostatic hyperplasia)    GERD (gastroesophageal reflux disease)    CVA (cerebral vascular accident)      Past Sx:  History of appendectomy    Bilateral cataracts    History of heart bypass surgery      Allergies:  clindamycin (Rash)  PC Pen VK (Rash)  penicillin (Rash)    Current Meds:   Standing Meds:  aspirin  chewable 81 milliGRAM(s) Oral daily  atorvastatin 80 milliGRAM(s) Oral at bedtime  clopidogrel Tablet 75 milliGRAM(s) Oral daily  diazepam    Tablet 10 milliGRAM(s) Oral three times a day  fenofibrate Tablet 145 milliGRAM(s) Oral daily  heparin   Injectable 5000 Unit(s) SubCutaneous every 8 hours  isosorbide   mononitrate ER Tablet (IMDUR) 60 milliGRAM(s) Oral daily  losartan 100 milliGRAM(s) Oral at bedtime  metoprolol succinate ER 25 milliGRAM(s) Oral every 12 hours  pentoxifylline 400 milliGRAM(s) Oral three times a day  ranolazine 500 milliGRAM(s) Oral two times a day  sodium chloride 0.9%. 1000 milliLiter(s) (75 mL/Hr) IV Continuous <Continuous>  venlafaxine XR. 75 milliGRAM(s) Oral daily    PRN Meds:    HOME MEDICATIONS:  aspirin 81 mg oral tablet: 1 tab(s) orally once a day  diazePAM 10 mg oral tablet: 1 tab(s) orally 3 times a day  fenofibrate 120 mg oral tablet: 1 tab(s) orally once a day  Januvia 25 mg oral tablet: 1 tab(s) orally once a day  lansoprazole 30 mg oral delayed release capsule: 1 cap(s) orally once a day  losartan 100 mg oral tablet: 1 tab(s) orally once a day  metoprolol succinate 25 mg oral tablet, extended release: orally 2 times a day  Pentoxil 400 mg oral tablet, extended release: 1 tab(s) orally 3 times a day  Plavix 75 mg oral tablet: 1 tab(s) orally once a day  Ranexa 500 mg oral tablet, extended release: 1 tab(s) orally 2 times a day  rosuvastatin 40 mg oral tablet: 1 tab(s) orally once a day  tamsulosin 0.4 mg oral capsule: 1 cap(s) orally once a day  tiZANidine 4 mg oral tablet: 1 tab(s) orally 3 times a day  venlafaxine 75 mg oral tablet: 1 tab(s) orally 2 times a day      Vital Signs:   T(F): 97.4 (21 @ 04:36), Max: 97.5 (21 @ 13:26)  HR: 75 (21 @ 04:36) (75 - 75)  BP: 176/84 (21 @ 04:36) (143/74 - 176/84)  RR: 18 (21 @ 04:36) (18 - 18)  SpO2: --      21 @ 07:01  -  21 @ 07:00  --------------------------------------------------------  IN: 3125 mL / OUT: 3525 mL / NET: -400 mL        Physical Exam:   GENERAL: NAD  HEENT: NCAT  CHEST/LUNG: audible breath sounds bilaterally   HEART: Regular rate and rhythm; s1 s2 appreciated  ABDOMEN: Soft, Nontender, Nondistended; Bowel sounds present  EXTREMITIES: No LE edema b/l  SKIN: no rashes, no new lesions  NERVOUS SYSTEM:  Alert & Oriented X3  LINES/CATHETERS:        Labs:                         12.3   8.15  )-----------( 233      ( 2021 06:43 )             38.3       2021 06:43    144    |  108    |  26     ----------------------------<  133    4.7     |  25     |  1.9      Ca    10.2       2021 06:43  Mg     2.3       2021 06:43    TPro  6.3    /  Alb  4.1    /  TBili  0.3    /  DBili  x      /  AST  19     /  ALT  11     /  AlkPhos  37     2021 06:43        Amylase --, Lipase 62, 07- @ 13:17      Serum Pro-Brain Natriuretic Peptide: 335 pg/mL (21 @ 13:17)    Troponin <0.01, CKMB --, CK -- 21 @ 20:00  Troponin <0.01, CKMB --, CK -- 21 @ 13:17        Urinalysis Basic - ( 2021 16:34 )    Color: Light Yellow / Appearance: Clear / S.030 / pH: x  Gluc: x / Ketone: Negative  / Bili: Negative / Urobili: <2 mg/dL   Blood: x / Protein: 30 mg/dL / Nitrite: Negative   Leuk Esterase: Negative / RBC: 1 /HPF / WBC 0 /HPF   Sq Epi: x / Non Sq Epi: 1 /HPF / Bacteria: Negative            Radiology:

## 2021-07-24 LAB
ALBUMIN SERPL ELPH-MCNC: 4.2 G/DL — SIGNIFICANT CHANGE UP (ref 3.5–5.2)
ALP SERPL-CCNC: 41 U/L — SIGNIFICANT CHANGE UP (ref 30–115)
ALT FLD-CCNC: 17 U/L — SIGNIFICANT CHANGE UP (ref 0–41)
ANION GAP SERPL CALC-SCNC: 12 MMOL/L — SIGNIFICANT CHANGE UP (ref 7–14)
AST SERPL-CCNC: 26 U/L — SIGNIFICANT CHANGE UP (ref 0–41)
BASOPHILS # BLD AUTO: 0.05 K/UL — SIGNIFICANT CHANGE UP (ref 0–0.2)
BASOPHILS NFR BLD AUTO: 0.6 % — SIGNIFICANT CHANGE UP (ref 0–1)
BILIRUB SERPL-MCNC: 0.2 MG/DL — SIGNIFICANT CHANGE UP (ref 0.2–1.2)
BUN SERPL-MCNC: 30 MG/DL — HIGH (ref 10–20)
CALCIUM SERPL-MCNC: 9.4 MG/DL — SIGNIFICANT CHANGE UP (ref 8.5–10.1)
CHLORIDE SERPL-SCNC: 109 MMOL/L — SIGNIFICANT CHANGE UP (ref 98–110)
CO2 SERPL-SCNC: 22 MMOL/L — SIGNIFICANT CHANGE UP (ref 17–32)
CREAT SERPL-MCNC: 1.7 MG/DL — HIGH (ref 0.7–1.5)
EOSINOPHIL # BLD AUTO: 0.26 K/UL — SIGNIFICANT CHANGE UP (ref 0–0.7)
EOSINOPHIL NFR BLD AUTO: 3 % — SIGNIFICANT CHANGE UP (ref 0–8)
GAS PNL BLDA: SIGNIFICANT CHANGE UP
GLUCOSE BLDC GLUCOMTR-MCNC: 134 MG/DL — HIGH (ref 70–99)
GLUCOSE BLDC GLUCOMTR-MCNC: 154 MG/DL — HIGH (ref 70–99)
GLUCOSE BLDC GLUCOMTR-MCNC: 215 MG/DL — HIGH (ref 70–99)
GLUCOSE BLDC GLUCOMTR-MCNC: 245 MG/DL — HIGH (ref 70–99)
GLUCOSE BLDC GLUCOMTR-MCNC: 246 MG/DL — HIGH (ref 70–99)
GLUCOSE BLDC GLUCOMTR-MCNC: 457 MG/DL — CRITICAL HIGH (ref 70–99)
GLUCOSE SERPL-MCNC: 153 MG/DL — HIGH (ref 70–99)
HCT VFR BLD CALC: 40.1 % — LOW (ref 42–52)
HGB BLD-MCNC: 12.5 G/DL — LOW (ref 14–18)
IMM GRANULOCYTES NFR BLD AUTO: 0.5 % — HIGH (ref 0.1–0.3)
LYMPHOCYTES # BLD AUTO: 2.21 K/UL — SIGNIFICANT CHANGE UP (ref 1.2–3.4)
LYMPHOCYTES # BLD AUTO: 25.6 % — SIGNIFICANT CHANGE UP (ref 20.5–51.1)
MAGNESIUM SERPL-MCNC: 2.5 MG/DL — HIGH (ref 1.8–2.4)
MCHC RBC-ENTMCNC: 28 PG — SIGNIFICANT CHANGE UP (ref 27–31)
MCHC RBC-ENTMCNC: 31.2 G/DL — LOW (ref 32–37)
MCV RBC AUTO: 89.7 FL — SIGNIFICANT CHANGE UP (ref 80–94)
MONOCYTES # BLD AUTO: 0.61 K/UL — HIGH (ref 0.1–0.6)
MONOCYTES NFR BLD AUTO: 7.1 % — SIGNIFICANT CHANGE UP (ref 1.7–9.3)
NEUTROPHILS # BLD AUTO: 5.45 K/UL — SIGNIFICANT CHANGE UP (ref 1.4–6.5)
NEUTROPHILS NFR BLD AUTO: 63.2 % — SIGNIFICANT CHANGE UP (ref 42.2–75.2)
NRBC # BLD: 0 /100 WBCS — SIGNIFICANT CHANGE UP (ref 0–0)
PLATELET # BLD AUTO: 252 K/UL — SIGNIFICANT CHANGE UP (ref 130–400)
POTASSIUM SERPL-MCNC: 4.5 MMOL/L — SIGNIFICANT CHANGE UP (ref 3.5–5)
POTASSIUM SERPL-SCNC: 4.5 MMOL/L — SIGNIFICANT CHANGE UP (ref 3.5–5)
PROT SERPL-MCNC: 6.6 G/DL — SIGNIFICANT CHANGE UP (ref 6–8)
RBC # BLD: 4.47 M/UL — LOW (ref 4.7–6.1)
RBC # FLD: 14.3 % — SIGNIFICANT CHANGE UP (ref 11.5–14.5)
SODIUM SERPL-SCNC: 143 MMOL/L — SIGNIFICANT CHANGE UP (ref 135–146)
WBC # BLD: 8.62 K/UL — SIGNIFICANT CHANGE UP (ref 4.8–10.8)
WBC # FLD AUTO: 8.62 K/UL — SIGNIFICANT CHANGE UP (ref 4.8–10.8)

## 2021-07-24 PROCEDURE — 93880 EXTRACRANIAL BILAT STUDY: CPT | Mod: 26

## 2021-07-24 PROCEDURE — 70450 CT HEAD/BRAIN W/O DYE: CPT | Mod: 26

## 2021-07-24 PROCEDURE — 99232 SBSQ HOSP IP/OBS MODERATE 35: CPT

## 2021-07-24 PROCEDURE — 70551 MRI BRAIN STEM W/O DYE: CPT | Mod: 26

## 2021-07-24 PROCEDURE — 71045 X-RAY EXAM CHEST 1 VIEW: CPT | Mod: 26

## 2021-07-24 PROCEDURE — 99221 1ST HOSP IP/OBS SF/LOW 40: CPT

## 2021-07-24 PROCEDURE — 99233 SBSQ HOSP IP/OBS HIGH 50: CPT

## 2021-07-24 RX ORDER — LABETALOL HCL 100 MG
10 TABLET ORAL ONCE
Refills: 0 | Status: DISCONTINUED | OUTPATIENT
Start: 2021-07-24 | End: 2021-07-24

## 2021-07-24 RX ORDER — NIFEDIPINE 30 MG
60 TABLET, EXTENDED RELEASE 24 HR ORAL ONCE
Refills: 0 | Status: COMPLETED | OUTPATIENT
Start: 2021-07-24 | End: 2021-07-24

## 2021-07-24 RX ORDER — NIFEDIPINE 30 MG
60 TABLET, EXTENDED RELEASE 24 HR ORAL DAILY
Refills: 0 | Status: DISCONTINUED | OUTPATIENT
Start: 2021-07-25 | End: 2021-07-28

## 2021-07-24 RX ADMIN — Medication 400 MILLIGRAM(S): at 21:28

## 2021-07-24 RX ADMIN — Medication 25 MILLIGRAM(S): at 05:35

## 2021-07-24 RX ADMIN — Medication 75 MILLIGRAM(S): at 11:07

## 2021-07-24 RX ADMIN — Medication 81 MILLIGRAM(S): at 11:07

## 2021-07-24 RX ADMIN — Medication 60 MILLIGRAM(S): at 11:51

## 2021-07-24 RX ADMIN — HEPARIN SODIUM 5000 UNIT(S): 5000 INJECTION INTRAVENOUS; SUBCUTANEOUS at 21:29

## 2021-07-24 RX ADMIN — Medication 10 MILLIGRAM(S): at 21:28

## 2021-07-24 RX ADMIN — Medication 400 MILLIGRAM(S): at 05:35

## 2021-07-24 RX ADMIN — RANOLAZINE 500 MILLIGRAM(S): 500 TABLET, FILM COATED, EXTENDED RELEASE ORAL at 05:35

## 2021-07-24 RX ADMIN — Medication 145 MILLIGRAM(S): at 11:07

## 2021-07-24 RX ADMIN — ISOSORBIDE MONONITRATE 60 MILLIGRAM(S): 60 TABLET, EXTENDED RELEASE ORAL at 11:07

## 2021-07-24 RX ADMIN — CLOPIDOGREL BISULFATE 75 MILLIGRAM(S): 75 TABLET, FILM COATED ORAL at 11:07

## 2021-07-24 RX ADMIN — Medication 10 MILLIGRAM(S): at 05:34

## 2021-07-24 RX ADMIN — HEPARIN SODIUM 5000 UNIT(S): 5000 INJECTION INTRAVENOUS; SUBCUTANEOUS at 05:34

## 2021-07-24 RX ADMIN — ATORVASTATIN CALCIUM 80 MILLIGRAM(S): 80 TABLET, FILM COATED ORAL at 21:28

## 2021-07-24 RX ADMIN — HEPARIN SODIUM 5000 UNIT(S): 5000 INJECTION INTRAVENOUS; SUBCUTANEOUS at 13:47

## 2021-07-24 NOTE — PROGRESS NOTE ADULT - ATTENDING COMMENTS
Mr Ruffin is a 76 yo Man with medical history of CAD s/p CABG x3 ([place]), CVA 2017 with residual L sided weakness, HTN, DM, HLD, GERD, BPH  presented for dizziness and multiple falls. Patient says he has been feeling intermittently dizzy over the past 3 days despite having adequate/normal PO intake. Patient fell yesterday at home after feeling dizzy hitting the back of his head, but with no LOC. No AC. This morning around 10 patient was walking outside, felt his legs get wobbly, and then fell straight down hitting his buttocks and R knee.    #Dizziness and fall after getting up from a sitting or supine position likely orthostatic  Pt denied LOC  CT spine: No evidence of acute cervical spine fracture or subluxation. Multilevel severe degenerative changes as described, with severe spinal noted stenosis at C2-3 and C3-4 and multilevel severe neural foraminal stenosis.  CTAP:No definite evidence of acute traumatic injury within the chest, abdomen, or pelvis.   CTH:  No evidence of acute intracranial hemorrhage. Probable chronic infarct within the right posterior frontal white matter. Lacunar infarcts within bilateral white matter and deep gray nuclei of indeterminate age. Mild/moderate chronic microvascular changes.  Chest X-Ray negative for pneumothorax, infiltrate, or effusion. XR Pelvis negative for fx's or dislocations   Trops Neg on admission, ECG NSR, Rpt   Fall precautions   Pt on tizanidine 4mg q8h - hold for now,   ortho positive, started on HI stockings and abdominal binder, remains ortho positive, unable to tolerate HI stocking secondary to pain.   flomax held, moved losartan held due to SAMANTA - patient still ortho positive and symptomatic   continue with tele monitoring for now   patient still drives regularly, should not drive a vehicle   Neurology consult appreciated, spoke to neuro team, in the setting of SAMANTA ok to MR angio head and neck. MRA of the neck is limited by motion. Evaluation of the distal common carotid proximal internal carotid arteries appear grossly unremarkable though better quality study is recommended or CTA of the neck can be done for further evaluation. Evaluation of the proximal external carotid arteries are limited by motion. MRA of the [place] demonstrates decreased caliber of the distal right internal carotid artery. Short segment of stenosis is suspected involving both posterior cerebral arteries.   Follow up Carotid US   Neurology aware, no intervention at this time. Patient can Follow up with neurology at the clinic, as per conversation with neuro PA.   repeat orthostatics +    #LE pain likely secondary to PAD   Significant atherosclerotic disease noted in bilateral common femoral arteries  f/u vascular consult     #CAD s/p CABG 2009 6/2021 :patent SALAZAR to LAD , patent SVG to OM , % , filled distally by collaterals from LAD.  c/w DAPT ( Aspirin 81 mg daily and Plavix 75 mg daily ), Imdur,Ranexa, B-Blocker, Statin Therapy  ARB held due to SAMANTA  Cardio f/u OP     #HTN, patient had hypertensive urgency  c/w losartan 100mg   start nifedipine 60mg xL q24h     #HLD  c/w atorvastatin and fenofibrate     #CVA - Left UE/ Left LE weakness/ left facial from prior CVA  cont supportive care     #SAMANTA on CKD vs progression of CKD - resolved   Cr 1.3 in 2017, 1.5 in 2019, 1.7 in June,   Cr trending down 2.1 -> 1.9  Pt denies reduced PO intake   patient retaining on bladder scan harrison placed.    renal bladder US showed simple cysts within both kidneys without hydronephrosis or nephrolithiasis   Monitor   if Cr remains stable consider restarting losartan   Nephro consult if worsening     #DM  Hold home meds  Start Insulin regimen for FS >180      DVT PPX : Hep SubQ  FULL CODE    Progress Note Handoff:  Pending: Nephro consult   Dispo: acute

## 2021-07-24 NOTE — PROGRESS NOTE ADULT - SUBJECTIVE AND OBJECTIVE BOX
Neurology Progress Note    Interval History:      HPI:  74 yo M with PMH of CAD s/p CABG x3 2017 ([place]), CVA 2017 with residual L sided weakness, HTN, DM, HLD, GERD, BPH  presented for dizziness and multiple falls. Patient says he has been feeling intermittently dizzy over the past 3 days despite having adequate/normal PO intake. Pt first felt this on Friday right after getting out of his car. Pt stated that these episodes of dizziness occurs after getting up from a sitting or supine position. Patient fell yesterday at home after feeling dizzy hitting the back of his head, but with no LOC. pt is not on AC. This morning around 10 patient got out of his car, felt dizzy again and then fell straight down hitting his buttocks and R knee. Denies head trauma. Patient denies any pain, headache, vision changes, new numbness, weakness, tingling, chest pain, shortness of breath, palpitations, nausea, vomiting, diarrhea, dysuria, hematuria, melena, hematochezia, fever, cold/flu symptoms, leg pain/swelling.     In the ED, Temp 98.5, HR 83, /86 and saturating >97% on RA. Labs wnl except for Cr of 1.6. Ferdinand<10, , Trop <0.01. EKG- NSR.     Neurology consulted for management, MRA was completed showing some B/L  short segment stenosis in the posterior arteries. Today the team was asked to assess the patient today 7/24 for new lethargy noted on exam.    CT spine: No evidence of acute cervical spine fracture or subluxation.Multilevel severe degenerative changes as described, with severe spinal noted stenosis at C2-3 and C3-4 and multilevel severe neural foraminal stenosis.  CTAP:No definite evidence of acute traumatic injury within the chest, abdomen, or pelvis.   CTH:  No evidence of acute intracranial hemorrhage. Probable chronic infarct within the right posterior frontal white matter. Lacunar infarcts within bilateral white matter and deep gray nuclei of indeterminate age. Mild/moderate chronic microvascular changes.  Chest X-Ray negative for pneumothorax, infiltrate, or effusion. XR Pelvis negative for fx's or dislocations    (20 Jul 2021 17:18)      PAST MEDICAL & SURGICAL HISTORY:  CAD (coronary artery disease)  HTN (hypertension)  Depression  Anxiety  Diabetes  Angina pectoris  BPH (benign prostatic hyperplasia)  GERD (gastroesophageal reflux disease)  CVA (cerebral vascular accident)  History of appendectomy  Bilateral cataracts  History of heart bypass surgery    Medications:  aspirin  chewable 81 milliGRAM(s) Oral daily  atorvastatin 80 milliGRAM(s) Oral at bedtime  clopidogrel Tablet 75 milliGRAM(s) Oral daily  diazepam    Tablet 10 milliGRAM(s) Oral three times a day  fenofibrate Tablet 145 milliGRAM(s) Oral daily  heparin   Injectable 5000 Unit(s) SubCutaneous every 8 hours  isosorbide   mononitrate ER Tablet (IMDUR) 60 milliGRAM(s) Oral daily  losartan 100 milliGRAM(s) Oral at bedtime  metoprolol succinate ER 25 milliGRAM(s) Oral every 12 hours  pentoxifylline 400 milliGRAM(s) Oral three times a day  ranolazine 500 milliGRAM(s) Oral two times a day  venlafaxine XR. 75 milliGRAM(s) Oral daily      Vital Signs Last 24 Hrs  T(C): 36.3 (24 Jul 2021 13:35), Max: 36.6 (23 Jul 2021 20:24)  T(F): 97.4 (24 Jul 2021 13:35), Max: 97.9 (23 Jul 2021 20:24)  HR: 70 (24 Jul 2021 13:42) (70 - 83)  BP: 170/82 (24 Jul 2021 13:42) (163/81 - 181/86)  BP(mean): --  RR: 18 (24 Jul 2021 13:42) (18 - 18)  SpO2: 99% (24 Jul 2021 14:04) (97% - 99%)    Neurological Exam:   AxOx3, able to follow commands, slurred speech but able to understand what patient is saying  PERRL, EMOI, L sided facial droop  RUE/RLE 4/5, LUE/LLE, 3/5     Labs:  CBC Full  -  ( 24 Jul 2021 04:30 )  WBC Count : 8.62 K/uL  RBC Count : 4.47 M/uL  Hemoglobin : 12.5 g/dL  Hematocrit : 40.1 %  Platelet Count - Automated : 252 K/uL  Mean Cell Volume : 89.7 fL  Mean Cell Hemoglobin : 28.0 pg  Mean Cell Hemoglobin Concentration : 31.2 g/dL  Auto Neutrophil # : 5.45 K/uL  Auto Lymphocyte # : 2.21 K/uL  Auto Monocyte # : 0.61 K/uL  Auto Eosinophil # : 0.26 K/uL  Auto Basophil # : 0.05 K/uL  Auto Neutrophil % : 63.2 %  Auto Lymphocyte % : 25.6 %  Auto Monocyte % : 7.1 %  Auto Eosinophil % : 3.0 %  Auto Basophil % : 0.6 %    07-24    143  |  109  |  30<H>  ----------------------------<  153<H>  4.5   |  22  |  1.7<H>    Ca    9.4      24 Jul 2021 04:30  Mg     2.5     07-24    TPro  6.6  /  Alb  4.2  /  TBili  0.2  /  DBili  x   /  AST  26  /  ALT  17  /  AlkPhos  41  07-24    LIVER FUNCTIONS - ( 24 Jul 2021 04:30 )  Alb: 4.2 g/dL / Pro: 6.6 g/dL / ALK PHOS: 41 U/L / ALT: 17 U/L / AST: 26 U/L / GGT: x

## 2021-07-24 NOTE — PROGRESS NOTE ADULT - SUBJECTIVE AND OBJECTIVE BOX
Hospital Day:  4d     Chief Complaint: Patient is a 75y old  Male who presents with a chief complaint of dizziness (2021 07:25)      24 hour events: No acute overnight events.     Past Medical Hx:   CAD (coronary artery disease)    HTN (hypertension)    Depression    Anxiety    Diabetes    Angina pectoris    BPH (benign prostatic hyperplasia)    GERD (gastroesophageal reflux disease)    CVA (cerebral vascular accident)      Past Sx:  History of appendectomy    Bilateral cataracts    History of heart bypass surgery      Allergies:  clindamycin (Rash)  PC Pen VK (Rash)  penicillin (Rash)    Current Meds:   Standing Meds:  aspirin  chewable 81 milliGRAM(s) Oral daily  atorvastatin 80 milliGRAM(s) Oral at bedtime  clopidogrel Tablet 75 milliGRAM(s) Oral daily  diazepam    Tablet 10 milliGRAM(s) Oral three times a day  fenofibrate Tablet 145 milliGRAM(s) Oral daily  heparin   Injectable 5000 Unit(s) SubCutaneous every 8 hours  isosorbide   mononitrate ER Tablet (IMDUR) 60 milliGRAM(s) Oral daily  losartan 100 milliGRAM(s) Oral at bedtime  metoprolol succinate ER 25 milliGRAM(s) Oral every 12 hours  pentoxifylline 400 milliGRAM(s) Oral three times a day  ranolazine 500 milliGRAM(s) Oral two times a day  sodium chloride 0.9%. 1000 milliLiter(s) (75 mL/Hr) IV Continuous <Continuous>  venlafaxine XR. 75 milliGRAM(s) Oral daily    PRN Meds:    HOME MEDICATIONS:  aspirin 81 mg oral tablet: 1 tab(s) orally once a day  diazePAM 10 mg oral tablet: 1 tab(s) orally 3 times a day  fenofibrate 120 mg oral tablet: 1 tab(s) orally once a day  Januvia 25 mg oral tablet: 1 tab(s) orally once a day  lansoprazole 30 mg oral delayed release capsule: 1 cap(s) orally once a day  losartan 100 mg oral tablet: 1 tab(s) orally once a day  metoprolol succinate 25 mg oral tablet, extended release: orally 2 times a day  Pentoxil 400 mg oral tablet, extended release: 1 tab(s) orally 3 times a day  Plavix 75 mg oral tablet: 1 tab(s) orally once a day  Ranexa 500 mg oral tablet, extended release: 1 tab(s) orally 2 times a day  rosuvastatin 40 mg oral tablet: 1 tab(s) orally once a day  tamsulosin 0.4 mg oral capsule: 1 cap(s) orally once a day  tiZANidine 4 mg oral tablet: 1 tab(s) orally 3 times a day  venlafaxine 75 mg oral tablet: 1 tab(s) orally 2 times a day      Vital Signs:   T(F): 97.4 (21 @ 05:10), Max: 97.9 (21 @ 20:24)  HR: 77 (21 @ 05:10) (77 - 83)  BP: 176/92 (21 @ 05:10) (163/81 - 198/91)  RR: 18 (21 @ 05:10) (17 - 18)  SpO2: 98% (21 @ 05:10) (97% - 98%)      21 @ 07:01  -  21 @ 07:00  --------------------------------------------------------  IN: 1505 mL / OUT: 3300 mL / NET: -1795 mL        Physical Exam:   GENERAL: NAD  HEENT: NCAT  CHEST/LUNG: audible bs bilaterally   HEART: Regular rate and rhythm; s1 s2 appreciated  ABDOMEN: Soft, Nontender, Nondistended abdomen   EXTREMITIES: No LE edema b/l  NERVOUS SYSTEM:  Alert & Oriented X3, L sided residual weakness   LINES/CATHETERS:        Labs:                         12.5   8.62  )-----------( 252      ( 2021 04:30 )             40.1     Neutophil% 63.2, Lymphocyte% 25.6, Monocyte% 7.1, Bands% 0.5 21 @ 04:30    2021 04:30    143    |  109    |  30     ----------------------------<  153    4.5     |  22     |  1.7      Ca    9.4        2021 04:30  Mg     2.5       2021 04:30    TPro  6.6    /  Alb  4.2    /  TBili  0.2    /  DBili  x      /  AST  26     /  ALT  17     /  AlkPhos  41     2021 04:30        Amylase --, Lipase 62, 21 @ 13:17      Serum Pro-Brain Natriuretic Peptide: 335 pg/mL (21 @ 13:17)    Troponin <0.01, CKMB --, CK -- 21 @ 20:00  Troponin <0.01, CKMB --, CK -- 21 @ 13:17        Urinalysis Basic - ( 2021 16:34 )    Color: Light Yellow / Appearance: Clear / S.030 / pH: x  Gluc: x / Ketone: Negative  / Bili: Negative / Urobili: <2 mg/dL   Blood: x / Protein: 30 mg/dL / Nitrite: Negative   Leuk Esterase: Negative / RBC: 1 /HPF / WBC 0 /HPF   Sq Epi: x / Non Sq Epi: 1 /HPF / Bacteria: Negative            Radiology:   < from: VA Duplex Lower Extrem Arterial, Bilat (21 @ 13:03) >  Severely diminished flow noted in the right and left superficial femoral artery suggestive of a proximal superficial femoral or common femoral artery stenosis.  Significant atherosclerotic disease noted in the bilateral common femoral arteries  Impression:    Severely diminished flow noted in the right and left superficial femoral artery suggestive of a proximal superficial femoral or common femoral artery stenosis.  Significant atherosclerotic disease noted in the bilateral common femoral arteries    < end of copied text >   Hospital Day:  4d     Chief Complaint: Patient is a 75y old  Male who presents with a chief complaint of dizziness (2021 07:25)      24 hour events: No acute overnight events.     Past Medical Hx:   CAD (coronary artery disease)    HTN (hypertension)    Depression    Anxiety    Diabetes    Angina pectoris    BPH (benign prostatic hyperplasia)    GERD (gastroesophageal reflux disease)    CVA (cerebral vascular accident)      Past Sx:  History of appendectomy    Bilateral cataracts    History of heart bypass surgery      Allergies:  clindamycin (Rash)  PC Pen VK (Rash)  penicillin (Rash)    Current Meds:   Standing Meds:  aspirin  chewable 81 milliGRAM(s) Oral daily  atorvastatin 80 milliGRAM(s) Oral at bedtime  clopidogrel Tablet 75 milliGRAM(s) Oral daily  diazepam    Tablet 10 milliGRAM(s) Oral three times a day  fenofibrate Tablet 145 milliGRAM(s) Oral daily  heparin   Injectable 5000 Unit(s) SubCutaneous every 8 hours  isosorbide   mononitrate ER Tablet (IMDUR) 60 milliGRAM(s) Oral daily  losartan 100 milliGRAM(s) Oral at bedtime  metoprolol succinate ER 25 milliGRAM(s) Oral every 12 hours  pentoxifylline 400 milliGRAM(s) Oral three times a day  ranolazine 500 milliGRAM(s) Oral two times a day  sodium chloride 0.9%. 1000 milliLiter(s) (75 mL/Hr) IV Continuous <Continuous>  venlafaxine XR. 75 milliGRAM(s) Oral daily    PRN Meds:    HOME MEDICATIONS:  aspirin 81 mg oral tablet: 1 tab(s) orally once a day  diazePAM 10 mg oral tablet: 1 tab(s) orally 3 times a day  fenofibrate 120 mg oral tablet: 1 tab(s) orally once a day  Januvia 25 mg oral tablet: 1 tab(s) orally once a day  lansoprazole 30 mg oral delayed release capsule: 1 cap(s) orally once a day  losartan 100 mg oral tablet: 1 tab(s) orally once a day  metoprolol succinate 25 mg oral tablet, extended release: orally 2 times a day  Pentoxil 400 mg oral tablet, extended release: 1 tab(s) orally 3 times a day  Plavix 75 mg oral tablet: 1 tab(s) orally once a day  Ranexa 500 mg oral tablet, extended release: 1 tab(s) orally 2 times a day  rosuvastatin 40 mg oral tablet: 1 tab(s) orally once a day  tamsulosin 0.4 mg oral capsule: 1 cap(s) orally once a day  tiZANidine 4 mg oral tablet: 1 tab(s) orally 3 times a day  venlafaxine 75 mg oral tablet: 1 tab(s) orally 2 times a day      Vital Signs:   T(F): 97.4 (21 @ 05:10), Max: 97.9 (21 @ 20:24)  HR: 77 (21 @ 05:10) (77 - 83)  BP: 176/92 (21 @ 05:10) (163/81 - 198/91)  RR: 18 (21 @ 05:10) (17 - 18)  SpO2: 98% (21 @ 05:10) (97% - 98%)      21 @ 07:01  -  21 @ 07:00  --------------------------------------------------------  IN: 1505 mL / OUT: 3300 mL / NET: -1795 mL        Physical Exam:   GENERAL: NAD  HEENT: NCAT  CHEST/LUNG: audible bs bilaterally   HEART: Regular rate and rhythm; s1 s2 appreciated  ABDOMEN: Soft, Nontender, Nondistended abdomen   EXTREMITIES: No LE edema b/l  NERVOUS SYSTEM:  Alert & Oriented X3, L sided residual weakness   LINES/CATHETERS:        Labs:                         12.5   8.62  )-----------( 252      ( 2021 04:30 )             40.1     Neutophil% 63.2, Lymphocyte% 25.6, Monocyte% 7.1, Bands% 0.5 21 @ 04:30    2021 04:30    143    |  109    |  30     ----------------------------<  153    4.5     |  22     |  1.7      Ca    9.4        2021 04:30  Mg     2.5       2021 04:30    TPro  6.6    /  Alb  4.2    /  TBili  0.2    /  DBili  x      /  AST  26     /  ALT  17     /  AlkPhos  41     2021 04:30        Amylase --, Lipase 62, 21 @ 13:17      Serum Pro-Brain Natriuretic Peptide: 335 pg/mL (21 @ 13:17)    Troponin <0.01, CKMB --, CK -- 21 @ 20:00  Troponin <0.01, CKMB --, CK -- 21 @ 13:17        Urinalysis Basic - ( 2021 16:34 )    Color: Light Yellow / Appearance: Clear / S.030 / pH: x  Gluc: x / Ketone: Negative  / Bili: Negative / Urobili: <2 mg/dL   Blood: x / Protein: 30 mg/dL / Nitrite: Negative   Leuk Esterase: Negative / RBC: 1 /HPF / WBC 0 /HPF   Sq Epi: x / Non Sq Epi: 1 /HPF / Bacteria: Negative            Radiology:   < from: VA Duplex Lower Extrem Arterial, Bilat (21 @ 13:03) >  Severely diminished flow noted in the right and left superficial femoral artery suggestive of a proximal superficial femoral or common femoral artery stenosis.  Significant atherosclerotic disease noted in the bilateral common femoral arteries  Impression:    Severely diminished flow noted in the right and left superficial femoral artery suggestive of a proximal superficial femoral or common femoral artery stenosis.  Significant atherosclerotic disease noted in the bilateral common femoral arteries    < end of copied text >  < from: MR Angio Head No Cont (21 @ 16:55) >  IMPRESSION: MRA of the neck is limited by motion. Evaluation of the distal common carotid proximal internal carotid arteries appear grossly unremarkable though better quality study is recommended or CTA of the neck can be done for further evaluation. Evaluation of the proximal external carotid arteries are limited by motion.    MRA of the [place] demonstrates decreased caliber of the distal right internal carotid artery.    Short segment of stenosis is suspected involving both posterior cerebral arteries as described above.    --- End of Report ---    < end of copied text >  `

## 2021-07-24 NOTE — CONSULT NOTE ADULT - SUBJECTIVE AND OBJECTIVE BOX
VASCULAR SURGERY CONSULT NOTE      HPI:  74 yo M with PMH of CAD s/p CABG x3 2017 ([place]), CVA 2017 with residual L sided weakness, HTN, DM, HLD, GERD, BPH  presented for dizziness and multiple falls. Patient says he has been feeling intermittently dizzy over the past 3 days despite having adequate/normal PO intake. Pt first felt this on Friday right after getting out of his car. Pt stated that these episodes of dizziness occurs after getting up from a sitting or supine position. Patient fell yesterday at home after feeling dizzy hitting the back of his head, but with no LOC. pt is not on AC. This morning around 10 patient got out of his car, felt dizzy again and then fell straight down hitting his buttocks and R knee. Denies head trauma. Patient denies any pain, headache, vision changes, new numbness, weakness, tingling, chest pain, shortness of breath, palpitations, nausea, vomiting, diarrhea, dysuria, hematuria, melena, hematochezia, fever, cold/flu symptoms, leg pain/swelling.     In the ED, Temp 98.5, HR 83, /86 and saturating >97% on RA. Labs wnl except for Cr of 1.6. Ferdinand<10, , Trop <0.01. EKG- NSR.     CT spine: No evidence of acute cervical spine fracture or subluxation.Multilevel severe degenerative changes as described, with severe spinal noted stenosis at C2-3 and C3-4 and multilevel severe neural foraminal stenosis.  CTAP:No definite evidence of acute traumatic injury within the chest, abdomen, or pelvis.   CTH:  No evidence of acute intracranial hemorrhage. Probable chronic infarct within the right posterior frontal white matter. Lacunar infarcts within bilateral white matter and deep gray nuclei of indeterminate age. Mild/moderate chronic microvascular changes.  Chest X-Ray negative for pneumothorax, infiltrate, or effusion. XR Pelvis negative for fx's or dislocations    (20 Jul 2021 17:18)        PAST MEDICAL & SURGICAL HISTORY:  CAD (coronary artery disease)    HTN (hypertension)    Depression    Anxiety    Diabetes  niddm    Angina pectoris    BPH (benign prostatic hyperplasia)    GERD (gastroesophageal reflux disease)    CVA (cerebral vascular accident)    History of appendectomy    Bilateral cataracts    History of heart bypass surgery      clindamycin (Rash)  PC Pen VK (Rash)  penicillin (Rash)    Home Medications:  aspirin 81 mg oral tablet: 1 tab(s) orally once a day (09 Jun 2021 14:22)  diazePAM 10 mg oral tablet: 1 tab(s) orally 3 times a day (09 Jun 2021 14:22)  fenofibrate 120 mg oral tablet: 1 tab(s) orally once a day (09 Jun 2021 14:22)  Januvia 25 mg oral tablet: 1 tab(s) orally once a day (09 Jun 2021 14:22)  lansoprazole 30 mg oral delayed release capsule: 1 cap(s) orally once a day (09 Jun 2021 14:22)  losartan 100 mg oral tablet: 1 tab(s) orally once a day (09 Jun 2021 14:22)  metoprolol succinate 25 mg oral tablet, extended release: orally 2 times a day (09 Jun 2021 14:22)  Pentoxil 400 mg oral tablet, extended release: 1 tab(s) orally 3 times a day (09 Jun 2021 14:22)  Plavix 75 mg oral tablet: 1 tab(s) orally once a day (09 Jun 2021 14:22)  Ranexa 500 mg oral tablet, extended release: 1 tab(s) orally 2 times a day (09 Jun 2021 14:22)  rosuvastatin 40 mg oral tablet: 1 tab(s) orally once a day (09 Jun 2021 14:22)  tamsulosin 0.4 mg oral capsule: 1 cap(s) orally once a day (09 Jun 2021 14:22)  tiZANidine 4 mg oral tablet: 1 tab(s) orally 3 times a day (20 Jul 2021 18:40)  venlafaxine 75 mg oral tablet: 1 tab(s) orally 2 times a day (09 Jun 2021 14:22)    No permtinent family history of PVD    REVIEW OF SYSTEMS:  GENERAL:                                         negative  SKIN:                                                 negative  OPTHALMOLOGIC:                          negative  ENMT:                                               negative  RESPIRATORY AND THORAX:        negative  CARDIOVASCULAR:                         negative  GASTROINTESTINAL:                       negative  NEPHROLOGY:                                  negative  MUSCULOSKELETAL:                       negative  NEUROLOGIC:                                   negative  PSYCHIATRIC:                                    negative  HEMATOLOGY/LYMPHATICS:         negative  ENDOCRINE:                                     negative  ALLERGIC/IMMUNOLOGIC:            negative    12 point ROS otherwise normal except as stated in HPI    PHYSICAL EXAM  Vital Signs Last 24 Hrs  T(C): 36.3 (24 Jul 2021 13:35), Max: 36.6 (23 Jul 2021 20:24)  T(F): 97.4 (24 Jul 2021 13:35), Max: 97.9 (23 Jul 2021 20:24)  HR: 67 (24 Jul 2021 15:06) (67 - 83)  BP: 151/75 (24 Jul 2021 15:06) (151/75 - 181/86)  BP(mean): --  RR: 18 (24 Jul 2021 13:42) (18 - 18)  SpO2: 99% (24 Jul 2021 14:04) (98% - 99%)    Appearance: Normal	  HEENT:   Normal oral mucosa, PERRL, EOMI	  Neck: Supple, - JVD; Carotid Bruit   Cardiovascular: Normal S1 S2, No JVD, No murmurs,   Respiratory: Lungs clear to auscultation, No Rales, Rhonchi, Wheezing	  Gastrointestinal:  Soft, Non-tender, positive BS	  Skin: No rashes, No ecchymoses, No cyanosis  Extremities: Normal range of motion, No clubbing, cyanosis or edema  Vascular: Peripheral pulses palpable 2+ bilaterally  Neurologic: Non-focal  Psychiatry: A & O x 3, Mood & affect appropriate          MEDICATIONS:   MEDICATIONS  (STANDING):  aspirin  chewable 81 milliGRAM(s) Oral daily  atorvastatin 80 milliGRAM(s) Oral at bedtime  clopidogrel Tablet 75 milliGRAM(s) Oral daily  diazepam    Tablet 10 milliGRAM(s) Oral three times a day  fenofibrate Tablet 145 milliGRAM(s) Oral daily  heparin   Injectable 5000 Unit(s) SubCutaneous every 8 hours  isosorbide   mononitrate ER Tablet (IMDUR) 60 milliGRAM(s) Oral daily  losartan 100 milliGRAM(s) Oral at bedtime  metoprolol succinate ER 25 milliGRAM(s) Oral every 12 hours  pentoxifylline 400 milliGRAM(s) Oral three times a day  ranolazine 500 milliGRAM(s) Oral two times a day  venlafaxine XR. 75 milliGRAM(s) Oral daily    MEDICATIONS  (PRN):      LAB/STUDIES:                        12.5   8.62  )-----------( 252      ( 24 Jul 2021 04:30 )             40.1     07-24    143  |  109  |  30<H>  ----------------------------<  153<H>  4.5   |  22  |  1.7<H>    Ca    9.4      24 Jul 2021 04:30  Mg     2.5     07-24    TPro  6.6  /  Alb  4.2  /  TBili  0.2  /  DBili  x   /  AST  26  /  ALT  17  /  AlkPhos  41  07-24      LIVER FUNCTIONS - ( 24 Jul 2021 04:30 )  Alb: 4.2 g/dL / Pro: 6.6 g/dL / ALK PHOS: 41 U/L / ALT: 17 U/L / AST: 26 U/L / GGT: x                       ABG - ( 24 Jul 2021 13:27 )  pH, Arterial: 7.40  pH, Blood: x     /  pCO2: 38    /  pO2: 77    / HCO3: 24    / Base Excess: -0.9  /  SaO2: 95                    IMAGING:    < from: VA Duplex Lower Extrem Arterial, Bilat (07.23.21 @ 13:03) >  Impression:    Severely diminished flow noted in the right and left superficial femoral artery suggestive of a proximal superficial femoral or common femoral artery stenosis.  Significant atherosclerotic disease noted in the bilateral common femoral arteries    Vascular surgery consultation is recommended if clinically indicated  ICD-10: I70.223    --- End of Report ---      < end of copied text >    < from: VA Duplex Carotid, Bilat (07.24.21 @ 10:26) >  IMPRESSION: Mild 20-39% internal carotid artery stenosis bilaterally.    < end of copied text >

## 2021-07-24 NOTE — PROGRESS NOTE ADULT - ASSESSMENT
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 HCT completed upon admission showed chronic known infarct but nothing acute and no bleeding. MRA was completed which showed short segment stenosis of posterior arteries which neurology team is recommending outpatient follow up for. Today team was asked to see the patient for new lethargy noted by primary team and family at bedside. On exam patient was still neurologically at his baseline with the exception of worsening dysarthria and subjective feeling of more tired than usual. During the exam patient did not require stimulation to stay alert. Last UA was sent by primary team a few days ago, patient has history of urinary retention.     Plan:  - HCT without contrast  - MRI Brain w/wo  - Infectious work up to rule out any infection due to worsening of previous stroke symptoms & lethargy, concerning for recrudescence   - Care per primary team    Case discussed with Dr. 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 HCT completed upon admission showed chronic known infarct but nothing acute and no bleeding. MRA was completed which showed short segment stenosis of posterior arteries which neurology team is recommending outpatient follow up for. Today team was asked to see the patient for new lethargy noted by primary team and family at bedside. On exam patient was still neurologically at his baseline with the exception of worsening dysarthria and subjective feeling of more tired than usual. During the exam patient did not require stimulation to stay alert. Per son at bedside and Dr. Frias's last neuro exam, slurred speech is worsened today compared to last week. Last UA was sent by primary team a few days ago, patient has history of urinary retention.     Plan:  - HCT without contrast  - MRI Brain w/wo if HCT abnormal/symptoms worsen  - Infectious work up to rule out any infection due to worsening of previous stroke symptoms & lethargy, concerning for recrudescence   - Care per primary team    Case discussed with Dr. Frias

## 2021-07-24 NOTE — CONSULT NOTE ADULT - ASSESSMENT
ASSESSMENT: 74 yo M with PMH of CAD s/p CABG x3 2017 ([place]), CVA 2017 with residual L sided weakness, HTN, DM, HLD, GERD, BPH  presented for dizziness and multiple falls. Patient evaluated at bedside AAOX3, referring feeling well. Patient denies any pain or discomfort. On physical exam, no edema, erythema nor tenderness to palpation. Non palpable pulses, pulses in lower extremity DP and PT present on doppler.     Vascular surgery consulted for VA Duplex Lower Extrem Arterial, Bilat     Severely diminished flow noted in the right and left superficial femoral artery suggestive of a proximal superficial femoral or common femoral artery stenosis.  Significant atherosclerotic disease noted in the bilateral common femoral arteries        PLAN:   - Preop for LLE Angiogram with possible endovascular revascularization, Tuesday  - Pain management  - Clearance from cardiology  -Clearance from nephro  - Patient seen/examined or Plan Discussed with Fellow, Dr. Avila  - Plan to be discussed with Attending, Dr. Matos     ASSESSMENT: 74 yo M with PMH of CAD s/p CABG x3 2017 ([place]), CVA 2017 with residual L sided weakness, HTN, DM, HLD, GERD, BPH  presented for dizziness and multiple falls. Patient evaluated at bedside AAOX3, referring feeling well. Patient denies any pain or discomfort. On physical exam, no edema, erythema nor tenderness to palpation. Non palpable pulses, pulses in lower extremity DP and PT present on doppler.     Vascular surgery consulted for VA Duplex Lower Extrem Arterial, Bilat     Severely diminished flow noted in the right and left superficial femoral artery suggestive of a proximal superficial femoral or common femoral artery stenosis.  Significant atherosclerotic disease noted in the bilateral common femoral arteries        PLAN:   - Preop for LLE Angiogram with possible endovascular revascularization, Tuesday  - Pain management  - Clearance from cardiology  -Clearance from nephro  - Patient seen/examined or Plan Discussed with Fellow, Dr. Avila  - Plan discussed with Attending, Dr. Botello

## 2021-07-25 LAB
ALBUMIN SERPL ELPH-MCNC: 4.2 G/DL — SIGNIFICANT CHANGE UP (ref 3.5–5.2)
ALP SERPL-CCNC: 37 U/L — SIGNIFICANT CHANGE UP (ref 30–115)
ALT FLD-CCNC: 18 U/L — SIGNIFICANT CHANGE UP (ref 0–41)
ANION GAP SERPL CALC-SCNC: 14 MMOL/L — SIGNIFICANT CHANGE UP (ref 7–14)
AST SERPL-CCNC: 22 U/L — SIGNIFICANT CHANGE UP (ref 0–41)
BASOPHILS # BLD AUTO: 0.06 K/UL — SIGNIFICANT CHANGE UP (ref 0–0.2)
BASOPHILS NFR BLD AUTO: 0.7 % — SIGNIFICANT CHANGE UP (ref 0–1)
BILIRUB SERPL-MCNC: 0.3 MG/DL — SIGNIFICANT CHANGE UP (ref 0.2–1.2)
BUN SERPL-MCNC: 28 MG/DL — HIGH (ref 10–20)
CALCIUM SERPL-MCNC: 9.7 MG/DL — SIGNIFICANT CHANGE UP (ref 8.5–10.1)
CHLORIDE SERPL-SCNC: 107 MMOL/L — SIGNIFICANT CHANGE UP (ref 98–110)
CO2 SERPL-SCNC: 21 MMOL/L — SIGNIFICANT CHANGE UP (ref 17–32)
CREAT SERPL-MCNC: 1.8 MG/DL — HIGH (ref 0.7–1.5)
EOSINOPHIL # BLD AUTO: 0.3 K/UL — SIGNIFICANT CHANGE UP (ref 0–0.7)
EOSINOPHIL NFR BLD AUTO: 3.3 % — SIGNIFICANT CHANGE UP (ref 0–8)
GLUCOSE BLDC GLUCOMTR-MCNC: 131 MG/DL — HIGH (ref 70–99)
GLUCOSE BLDC GLUCOMTR-MCNC: 140 MG/DL — HIGH (ref 70–99)
GLUCOSE BLDC GLUCOMTR-MCNC: 164 MG/DL — HIGH (ref 70–99)
GLUCOSE SERPL-MCNC: 129 MG/DL — HIGH (ref 70–99)
HCT VFR BLD CALC: 41.9 % — LOW (ref 42–52)
HGB BLD-MCNC: 13.1 G/DL — LOW (ref 14–18)
IMM GRANULOCYTES NFR BLD AUTO: 0.6 % — HIGH (ref 0.1–0.3)
LYMPHOCYTES # BLD AUTO: 2.36 K/UL — SIGNIFICANT CHANGE UP (ref 1.2–3.4)
LYMPHOCYTES # BLD AUTO: 26.1 % — SIGNIFICANT CHANGE UP (ref 20.5–51.1)
MAGNESIUM SERPL-MCNC: 2.3 MG/DL — SIGNIFICANT CHANGE UP (ref 1.8–2.4)
MCHC RBC-ENTMCNC: 28.4 PG — SIGNIFICANT CHANGE UP (ref 27–31)
MCHC RBC-ENTMCNC: 31.3 G/DL — LOW (ref 32–37)
MCV RBC AUTO: 90.9 FL — SIGNIFICANT CHANGE UP (ref 80–94)
MONOCYTES # BLD AUTO: 0.56 K/UL — SIGNIFICANT CHANGE UP (ref 0.1–0.6)
MONOCYTES NFR BLD AUTO: 6.2 % — SIGNIFICANT CHANGE UP (ref 1.7–9.3)
NEUTROPHILS # BLD AUTO: 5.72 K/UL — SIGNIFICANT CHANGE UP (ref 1.4–6.5)
NEUTROPHILS NFR BLD AUTO: 63.1 % — SIGNIFICANT CHANGE UP (ref 42.2–75.2)
NRBC # BLD: 0 /100 WBCS — SIGNIFICANT CHANGE UP (ref 0–0)
PLATELET # BLD AUTO: 260 K/UL — SIGNIFICANT CHANGE UP (ref 130–400)
POTASSIUM SERPL-MCNC: 4.9 MMOL/L — SIGNIFICANT CHANGE UP (ref 3.5–5)
POTASSIUM SERPL-SCNC: 4.9 MMOL/L — SIGNIFICANT CHANGE UP (ref 3.5–5)
PROT SERPL-MCNC: 6.6 G/DL — SIGNIFICANT CHANGE UP (ref 6–8)
RBC # BLD: 4.61 M/UL — LOW (ref 4.7–6.1)
RBC # FLD: 14.6 % — HIGH (ref 11.5–14.5)
SODIUM SERPL-SCNC: 142 MMOL/L — SIGNIFICANT CHANGE UP (ref 135–146)
WBC # BLD: 9.05 K/UL — SIGNIFICANT CHANGE UP (ref 4.8–10.8)
WBC # FLD AUTO: 9.05 K/UL — SIGNIFICANT CHANGE UP (ref 4.8–10.8)

## 2021-07-25 PROCEDURE — 99233 SBSQ HOSP IP/OBS HIGH 50: CPT | Mod: GC

## 2021-07-25 PROCEDURE — 99231 SBSQ HOSP IP/OBS SF/LOW 25: CPT

## 2021-07-25 PROCEDURE — 99233 SBSQ HOSP IP/OBS HIGH 50: CPT

## 2021-07-25 RX ORDER — ACETAMINOPHEN 500 MG
1000 TABLET ORAL EVERY 6 HOURS
Refills: 0 | Status: DISCONTINUED | OUTPATIENT
Start: 2021-07-25 | End: 2021-07-28

## 2021-07-25 RX ORDER — DIAZEPAM 5 MG
5 TABLET ORAL THREE TIMES A DAY
Refills: 0 | Status: DISCONTINUED | OUTPATIENT
Start: 2021-07-25 | End: 2021-07-26

## 2021-07-25 RX ADMIN — Medication 145 MILLIGRAM(S): at 11:59

## 2021-07-25 RX ADMIN — Medication 1000 MILLIGRAM(S): at 03:22

## 2021-07-25 RX ADMIN — Medication 400 MILLIGRAM(S): at 21:48

## 2021-07-25 RX ADMIN — Medication 25 MILLIGRAM(S): at 17:00

## 2021-07-25 RX ADMIN — HEPARIN SODIUM 5000 UNIT(S): 5000 INJECTION INTRAVENOUS; SUBCUTANEOUS at 21:48

## 2021-07-25 RX ADMIN — Medication 10 MILLIGRAM(S): at 06:37

## 2021-07-25 RX ADMIN — Medication 1000 MILLIGRAM(S): at 02:48

## 2021-07-25 RX ADMIN — Medication 400 MILLIGRAM(S): at 06:38

## 2021-07-25 RX ADMIN — Medication 400 MILLIGRAM(S): at 15:00

## 2021-07-25 RX ADMIN — LOSARTAN POTASSIUM 100 MILLIGRAM(S): 100 TABLET, FILM COATED ORAL at 21:48

## 2021-07-25 RX ADMIN — ISOSORBIDE MONONITRATE 60 MILLIGRAM(S): 60 TABLET, EXTENDED RELEASE ORAL at 15:00

## 2021-07-25 RX ADMIN — CLOPIDOGREL BISULFATE 75 MILLIGRAM(S): 75 TABLET, FILM COATED ORAL at 11:58

## 2021-07-25 RX ADMIN — Medication 60 MILLIGRAM(S): at 08:14

## 2021-07-25 RX ADMIN — HEPARIN SODIUM 5000 UNIT(S): 5000 INJECTION INTRAVENOUS; SUBCUTANEOUS at 15:00

## 2021-07-25 RX ADMIN — HEPARIN SODIUM 5000 UNIT(S): 5000 INJECTION INTRAVENOUS; SUBCUTANEOUS at 06:38

## 2021-07-25 RX ADMIN — RANOLAZINE 500 MILLIGRAM(S): 500 TABLET, FILM COATED, EXTENDED RELEASE ORAL at 06:37

## 2021-07-25 RX ADMIN — Medication 81 MILLIGRAM(S): at 11:58

## 2021-07-25 RX ADMIN — Medication 5 MILLIGRAM(S): at 21:48

## 2021-07-25 RX ADMIN — ATORVASTATIN CALCIUM 80 MILLIGRAM(S): 80 TABLET, FILM COATED ORAL at 21:48

## 2021-07-25 RX ADMIN — Medication 25 MILLIGRAM(S): at 06:37

## 2021-07-25 RX ADMIN — Medication 75 MILLIGRAM(S): at 11:59

## 2021-07-25 RX ADMIN — Medication 10 MILLIGRAM(S): at 15:00

## 2021-07-25 RX ADMIN — RANOLAZINE 500 MILLIGRAM(S): 500 TABLET, FILM COATED, EXTENDED RELEASE ORAL at 17:00

## 2021-07-25 NOTE — PROGRESS NOTE ADULT - ASSESSMENT
74 yo M with PMH of CAD s/p CABG x3 2017 ([place]), CVA 2017 with residual L sided weakness, HTN, DM, HLD, GERD, BPH  presented for dizziness and multiple falls.    PLAN  - Preop for LLE Angiogram with possible endovascular revascularization, Tuesday  - Pain management  - Clearance from cardiology  -Clearance from nephro  - Patient seen/examined or Plan Discussed with Fellow, Dr. Avila   SPECTRA [id number]

## 2021-07-25 NOTE — CONSULT NOTE ADULT - SUBJECTIVE AND OBJECTIVE BOX
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 Patient says he has been feeling intermittently dizzy over the past 3 days despite having adequate/normal PO intake. Pt first felt this on Friday right after getting out of his car. Pt stated that these episodes of dizziness occurs after getting up from a sitting or supine position. Patient fell yesterday at home after feeling dizzy hitting the back of his head, but with no LOC. pt is not on AC. This morning around 10 patient got out of his car, felt dizzy again and then fell straight down hitting his buttocks and R knee. Denies head trauma. Patient denies any pain, headache, vision changes, new numbness, weakness, tingling, chest pain, shortness of breath, palpitations, nausea, vomiting, diarrhea, dysuria, hematuria, melena, hematochezia, fever, cold/flu symptoms, leg pain/swelling.     In the ED, Temp 98.5, HR 83, /86 and saturating >97% on RA. Labs wnl except for Cr of 1.6. Ferdinand<10, , Trop <0.01. EKG- NSR.     CT spine: No evidence of acute cervical spine fracture or subluxation.Multilevel severe degenerative changes as described, with severe spinal noted stenosis at C2-3 and C3-4 and multilevel severe neural foraminal stenosis.  CTAP:No definite evidence of acute traumatic injury within the chest, abdomen, or pelvis.   CTH:  No evidence of acute intracranial hemorrhage. Probable chronic infarct within the right posterior frontal white matter. Lacunar infarcts within bilateral white matter and deep gray nuclei of indeterminate age. Mild/moderate chronic microvascular changes.  Chest X-Ray negative for pneumothorax, infiltrate, or effusion. XR Pelvis negative for fx's or dislocations    (20 Jul 2021 17:18)    Cardiology called for pre-op clearence for MALIK angiogram      PAST MEDICAL & SURGICAL HISTORY  CAD (coronary artery disease)  HTN (hypertension)  Depression  Anxiety  Diabetes  niddm  Angina pectoris  BPH (benign prostatic hyperplasia)  GERD (gastroesophageal reflux disease)  CVA (cerebral vascular accident)  History of appendectomy  Bilateral cataracts  History of heart bypass surgery    FAMILY HISTORY:  FAMILY HISTORY:      SOCIAL HISTORY:  []smoker  []Alcohol  []Drug    ALLERGIES:  clindamycin (Rash)  PC Pen VK (Rash)  penicillin (Rash)      MEDICATIONS:  MEDICATIONS  (STANDING):  aspirin  chewable 81 milliGRAM(s) Oral daily  atorvastatin 80 milliGRAM(s) Oral at bedtime  clopidogrel Tablet 75 milliGRAM(s) Oral daily  diazepam    Tablet 10 milliGRAM(s) Oral three times a day  fenofibrate Tablet 145 milliGRAM(s) Oral daily  heparin   Injectable 5000 Unit(s) SubCutaneous every 8 hours  isosorbide   mononitrate ER Tablet (IMDUR) 60 milliGRAM(s) Oral daily  losartan 100 milliGRAM(s) Oral at bedtime  metoprolol succinate ER 25 milliGRAM(s) Oral every 12 hours  NIFEdipine XL 60 milliGRAM(s) Oral daily  pentoxifylline 400 milliGRAM(s) Oral three times a day  ranolazine 500 milliGRAM(s) Oral two times a day  venlafaxine XR. 75 milliGRAM(s) Oral daily    MEDICATIONS  (PRN):  acetaminophen   Tablet .. 1000 milliGRAM(s) Oral every 6 hours PRN Mild Pain (1 - 3), Moderate Pain (4 - 6)      HOME MEDICATIONS:  Home Medications:  aspirin 81 mg oral tablet: 1 tab(s) orally once a day (09 Jun 2021 14:22)  diazePAM 10 mg oral tablet: 1 tab(s) orally 3 times a day (09 Jun 2021 14:22)  fenofibrate 120 mg oral tablet: 1 tab(s) orally once a day (09 Jun 2021 14:22)  Januvia 25 mg oral tablet: 1 tab(s) orally once a day (09 Jun 2021 14:22)  lansoprazole 30 mg oral delayed release capsule: 1 cap(s) orally once a day (09 Jun 2021 14:22)  losartan 100 mg oral tablet: 1 tab(s) orally once a day (09 Jun 2021 14:22)  metoprolol succinate 25 mg oral tablet, extended release: orally 2 times a day (09 Jun 2021 14:22)  Pentoxil 400 mg oral tablet, extended release: 1 tab(s) orally 3 times a day (09 Jun 2021 14:22)  Plavix 75 mg oral tablet: 1 tab(s) orally once a day (09 Jun 2021 14:22)  Ranexa 500 mg oral tablet, extended release: 1 tab(s) orally 2 times a day (09 Jun 2021 14:22)  rosuvastatin 40 mg oral tablet: 1 tab(s) orally once a day (09 Jun 2021 14:22)  tamsulosin 0.4 mg oral capsule: 1 cap(s) orally once a day (09 Jun 2021 14:22)  tiZANidine 4 mg oral tablet: 1 tab(s) orally 3 times a day (20 Jul 2021 18:40)  venlafaxine 75 mg oral tablet: 1 tab(s) orally 2 times a day (09 Jun 2021 14:22)      VITALS:   T(F): 96.1 (07-25 @ 14:57), Max: 98 (07-25 @ 05:19)  HR: 78 (07-25 @ 14:57) (63 - 83)  BP: 171/81 (07-25 @ 14:57) (104/66 - 198/91)  BP(mean): --  RR: 19 (07-25 @ 14:57) (16 - 19)  SpO2: 98% (07-24 @ 16:53) (97% - 99%)    I&O's Summary    24 Jul 2021 07:01  -  25 Jul 2021 07:00  --------------------------------------------------------  IN: 0 mL / OUT: 2370 mL / NET: -2370 mL    25 Jul 2021 07:01  -  25 Jul 2021 15:00  --------------------------------------------------------  IN: 630 mL / OUT: 600 mL / NET: 30 mL    REVIEW OF SYSTEMS:  CONSTITUTIONAL: No weakness, fevers or chills  EYES: No visual changes  ENT: No vertigo or throat pain   NECK: No pain or stiffness  RESPIRATORY: No cough, wheezing, hemoptysis; No shortness of breath  CARDIOVASCULAR: No chest pain or palpitations  GASTROINTESTINAL: No abdominal or epigastric pain. No nausea, vomiting, or hematemesis; No diarrhea or constipation. No melena or hematochezia.  GENITOURINARY: No dysuria, frequency or hematuria  NEUROLOGICAL: No numbness or weakness  SKIN: No itching, no rashes  MSK: no    PHYSICAL EXAM:  NEURO: patient is awake , alert and oriented  GEN: Not in acute distress  NECK: no thyroid enlargement, no JVD  LUNGS: Clear to auscultation bilaterally   CARDIOVASCULAR: S1/S2 present, RRR , no murmurs or rubs, no carotid bruits,  + PP bilaterally  ABD: Soft, non-tender, non-distended, +BS  EXT: No MALIK  SKIN: Intact    LABS:                        13.1   9.05  )-----------( 260      ( 25 Jul 2021 06:14 )             41.9     07-25    142  |  107  |  28<H>  ----------------------------<  129<H>  4.9   |  21  |  1.8<H>    Ca    9.7      25 Jul 2021 06:14  Mg     2.3     07-25    TPro  6.6  /  Alb  4.2  /  TBili  0.3  /  DBili  x   /  AST  22  /  ALT  18  /  AlkPhos  37  07-25    07-21 Chol 194 LDL -- HDL 37<L> Trig 527<H>      RADIOLOGY:  -CXR:  -TTE:  < from: TTE Echo Complete w/o Contrast w/ Doppler (07.22.21 @ 09:48) >  Summary:   1. Left ventricular ejection fraction, by visual estimation, is 60 to 65%.   2. Moderately increased LV wall thickness.   3. Spectral Doppler shows impaired relaxation pattern of left ventricular myocardial filling (Grade I diastolic dysfunction).   4. Normal left atrial size.   5. Normal right atrial size.   6. Mild mitral valve regurgitation.   7. Mild thickening of the anterior and posterior mitral valve leaflets.   8. Moderate mitral annular calcification.   9. Mild tricuspid regurgitation.  10. Sclerotic aortic valve with normal opening.  11. There is moderate aortic root calcification.    < end of copied text >    -CCTA:  -STRESS TEST:  < from: NM Nuclear Stress Pharmacologic Multiple (04.27.18 @ 10:42) >  Impression:  1. IV adenosine Dual Isotope Study which was negative with respect to   symptoms and EKG changes.  2. Myocardial perfusion imaging reveals no evidence of ischemia. Prior   study in 2012 reveals inferior defect which has completely resolved.  3. Gated imaging reveals normal left ventricular systolic function,   ejection fraction calculated at 65%  Recommendation:  Based on this study medical therapy is recommended.    < end of copied text >    -CATHETERIZATION:  < from: Cardiac Cath Lab - Adult (06.09.21 @ 16:36) >  CORONARY CIRCULATION: The coronary circulation is right dominant. There was    severe 3-vessel coronary artery disease. Ostial left main: There was a 70    % stenosis. Distal left main: Angiography showed moderate atherosclerosis    with no clinical lesions appreciated. Proximal LAD: Angiography showed    severe atherosclerosis. Mid LAD: There was a 100 % stenosis. This lesion    is a chronic total occlusion. Distal LAD: The artery was supplied by a    patent bypass graft. There was a 50 % stenosis. Proximal circumflex: There    was a 70 % stenosis. Distal circumflex: Angiography showed severe    atherosclerosis. Proximal RCA: There was a 100 % stenosis. This lesion is    achronic total occlusion. Right PDA: The distal vessel was supplied by    collaterals from the LAD. Graft to the LAD: The graft was a normal sized    LIMA. Graft angiography showed no evidence of disease. There was a large    vascular territory distalto the lesion. Distal vessel angiography showed    a medium sized vessel and a single discrete lesion. Graft to the 1st    obtuse marginal: The graft was a large sized saphenous vein graft from the    ascending aorta. Graft angiography showed no evidence of disease. There    was a large vascular territory distal to the lesion. Distal vessel    angiography showed a large sized vessel and no disease. Graft to the RCA:    There was a 100 % stenosis in the proximal third of the graft.    < end of copied text >      ECG:    < from: 12 Lead ECG (07.20.21 @ 11:35) >  Ventricular Rate 71 BPM    Atrial Rate 71 BPM    P-R Interval 152 ms    QRS Duration 96 ms    Q-T Interval 406 ms    QTC Calculation(Bazett) 441 ms    P Axis 43 degrees    R Axis 15 degrees    T Axis 22 degrees    Diagnosis Line Normal sinus rhythm  Minimal voltage criteria for LVH, may be normal variant  Nonspecific ST abnormality  Abnormal ECG    < end of copied text >    TELEMETRY EVENTS: sinus rhythm

## 2021-07-25 NOTE — PROGRESS NOTE ADULT - SUBJECTIVE AND OBJECTIVE BOX
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 Patient says he has been feeling intermittently dizzy over the past 3 days despite having adequate/normal PO intake. Pt first felt this on Friday right after getting out of his car. Pt stated that these episodes of dizziness occurs after getting up from a sitting or supine position. Patient fell yesterday at home after feeling dizzy hitting the back of his head, but with no LOC. pt is not on AC. This morning around 10 patient got out of his car, felt dizzy again and then fell straight down hitting his buttocks and R knee. Denies head trauma. Patient denies any pain, headache, vision changes, new numbness, weakness, tingling, chest pain, shortness of breath, palpitations, nausea, vomiting, diarrhea, dysuria, hematuria, melena, hematochezia, fever, cold/flu symptoms, leg pain/swelling. In the ED, Temp 98.5, HR 83, /86 and saturating >97% on RA. Labs wnl except for Cr of 1.6. Ferdinand<10, , Trop <0.01. EKG- NSR.     CT spine: No evidence of acute cervical spine fracture or subluxation.Multilevel severe degenerative changes as described, with severe spinal noted stenosis at C2-3 and C3-4 and multilevel severe neural foraminal stenosis.  CTAP:No definite evidence of acute traumatic injury within the chest, abdomen, or pelvis.   CTH:  No evidence of acute intracranial hemorrhage. Probable chronic infarct within the right posterior frontal white matter. Lacunar infarcts within bilateral white matter and deep gray nuclei of indeterminate age. Mild/moderate chronic microvascular changes.  Chest X-Ray negative for pneumothorax, infiltrate, or effusion. XR Pelvis negative for fx's or dislocations    (20 Jul 2021 17:18)      PAST MEDICAL & SURGICAL HISTORY:  CAD (coronary artery disease)  HTN (hypertension)  Depression  Anxiety  Diabetes  niddm  Angina pectoris  BPH (benign prostatic hyperplasia)  GERD (gastroesophageal reflux disease)  CVA (cerebral vascular accident)  History of appendectomy  Bilateral cataracts  History of heart bypass surgery      Medications:  acetaminophen   Tablet .. 1000 milliGRAM(s) Oral every 6 hours PRN  aspirin  chewable 81 milliGRAM(s) Oral daily  atorvastatin 80 milliGRAM(s) Oral at bedtime  clopidogrel Tablet 75 milliGRAM(s) Oral daily  diazepam    Tablet 10 milliGRAM(s) Oral three times a day  fenofibrate Tablet 145 milliGRAM(s) Oral daily  heparin   Injectable 5000 Unit(s) SubCutaneous every 8 hours  isosorbide   mononitrate ER Tablet (IMDUR) 60 milliGRAM(s) Oral daily  losartan 100 milliGRAM(s) Oral at bedtime  metoprolol succinate ER 25 milliGRAM(s) Oral every 12 hours  NIFEdipine XL 60 milliGRAM(s) Oral daily  pentoxifylline 400 milliGRAM(s) Oral three times a day  ranolazine 500 milliGRAM(s) Oral two times a day  venlafaxine XR. 75 milliGRAM(s) Oral daily      Vital Signs Last 24 Hrs  T(C): 36.7 (25 Jul 2021 05:19), Max: 36.7 (25 Jul 2021 05:19)  T(F): 98 (25 Jul 2021 05:19), Max: 98 (25 Jul 2021 05:19)  HR: 75 (25 Jul 2021 08:12) (63 - 82)  BP: 131/62 (25 Jul 2021 08:12) (104/66 - 151/75)  BP(mean): --  RR: 18 (25 Jul 2021 05:19) (16 - 18)  SpO2: 98% (24 Jul 2021 16:53) (98% - 99%)    Neurological Exam:   AxOx3, able to follow commands  PERRL, EMOI, VFF, L facial droop  RUE/RLE 5/5, LUE/LLE: 4/5    Labs:  CBC Full  -  ( 25 Jul 2021 06:14 )  WBC Count : 9.05 K/uL  RBC Count : 4.61 M/uL  Hemoglobin : 13.1 g/dL  Hematocrit : 41.9 %  Platelet Count - Automated : 260 K/uL  Mean Cell Volume : 90.9 fL  Mean Cell Hemoglobin : 28.4 pg  Mean Cell Hemoglobin Concentration : 31.3 g/dL  Auto Neutrophil # : 5.72 K/uL  Auto Lymphocyte # : 2.36 K/uL  Auto Monocyte # : 0.56 K/uL  Auto Eosinophil # : 0.30 K/uL  Auto Basophil # : 0.06 K/uL  Auto Neutrophil % : 63.1 %  Auto Lymphocyte % : 26.1 %  Auto Monocyte % : 6.2 %  Auto Eosinophil % : 3.3 %  Auto Basophil % : 0.7 %    07-25    142  |  107  |  28<H>  ----------------------------<  129<H>  4.9   |  21  |  1.8<H>    Ca    9.7      25 Jul 2021 06:14  Mg     2.3     07-25    TPro  6.6  /  Alb  4.2  /  TBili  0.3  /  DBili  x   /  AST  22  /  ALT  18  /  AlkPhos  37  07-25    LIVER FUNCTIONS - ( 25 Jul 2021 06:14 )  Alb: 4.2 g/dL / Pro: 6.6 g/dL / ALK PHOS: 37 U/L / ALT: 18 U/L / AST: 22 U/L / GGT: x          Neurology Progress Note    Interval History:  Pt still slurring speech but more awake.  reports taking Valium 5 mg PO QD PRN when he feels anxious.  Currently getting Valium 10 mg TID.    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 Patient says he has been feeling intermittently dizzy over the past 3 days despite having adequate/normal PO intake. Pt first felt this on Friday right after getting out of his car. Pt stated that these episodes of dizziness occurs after getting up from a sitting or supine position. Patient fell yesterday at home after feeling dizzy hitting the back of his head, but with no LOC. pt is not on AC. This morning around 10 patient got out of his car, felt dizzy again and then fell straight down hitting his buttocks and R knee. Denies head trauma. Patient denies any pain, headache, vision changes, new numbness, weakness, tingling, chest pain, shortness of breath, palpitations, nausea, vomiting, diarrhea, dysuria, hematuria, melena, hematochezia, fever, cold/flu symptoms, leg pain/swelling. In the ED, Temp 98.5, HR 83, /86 and saturating >97% on RA. Labs wnl except for Cr of 1.6. Ferdinand<10, , Trop <0.01. EKG- NSR.     CT spine: No evidence of acute cervical spine fracture or subluxation.Multilevel severe degenerative changes as described, with severe spinal noted stenosis at C2-3 and C3-4 and multilevel severe neural foraminal stenosis.  CTAP:No definite evidence of acute traumatic injury within the chest, abdomen, or pelvis.   CTH:  No evidence of acute intracranial hemorrhage. Probable chronic infarct within the right posterior frontal white matter. Lacunar infarcts within bilateral white matter and deep gray nuclei of indeterminate age. Mild/moderate chronic microvascular changes.  Chest X-Ray negative for pneumothorax, infiltrate, or effusion. XR Pelvis negative for fx's or dislocations    (20 Jul 2021 17:18)      PAST MEDICAL & SURGICAL HISTORY:  CAD (coronary artery disease)  HTN (hypertension)  Depression  Anxiety  Diabetes  niddm  Angina pectoris  BPH (benign prostatic hyperplasia)  GERD (gastroesophageal reflux disease)  CVA (cerebral vascular accident)  History of appendectomy  Bilateral cataracts  History of heart bypass surgery      Medications:  acetaminophen   Tablet .. 1000 milliGRAM(s) Oral every 6 hours PRN  aspirin  chewable 81 milliGRAM(s) Oral daily  atorvastatin 80 milliGRAM(s) Oral at bedtime  clopidogrel Tablet 75 milliGRAM(s) Oral daily  diazepam    Tablet 10 milliGRAM(s) Oral three times a day  fenofibrate Tablet 145 milliGRAM(s) Oral daily  heparin   Injectable 5000 Unit(s) SubCutaneous every 8 hours  isosorbide   mononitrate ER Tablet (IMDUR) 60 milliGRAM(s) Oral daily  losartan 100 milliGRAM(s) Oral at bedtime  metoprolol succinate ER 25 milliGRAM(s) Oral every 12 hours  NIFEdipine XL 60 milliGRAM(s) Oral daily  pentoxifylline 400 milliGRAM(s) Oral three times a day  ranolazine 500 milliGRAM(s) Oral two times a day  venlafaxine XR. 75 milliGRAM(s) Oral daily      Vital Signs Last 24 Hrs  T(C): 36.7 (25 Jul 2021 05:19), Max: 36.7 (25 Jul 2021 05:19)  T(F): 98 (25 Jul 2021 05:19), Max: 98 (25 Jul 2021 05:19)  HR: 75 (25 Jul 2021 08:12) (63 - 82)  BP: 131/62 (25 Jul 2021 08:12) (104/66 - 151/75)  BP(mean): --  RR: 18 (25 Jul 2021 05:19) (16 - 18)  SpO2: 98% (24 Jul 2021 16:53) (98% - 99%)    Neurological Exam:   AxOx3, able to follow commands  PERRL, EMOI, VFF, L facial droop  RUE/RLE 5/5, LUE/LLE: 4/5    Labs:  CBC Full  -  ( 25 Jul 2021 06:14 )  WBC Count : 9.05 K/uL  RBC Count : 4.61 M/uL  Hemoglobin : 13.1 g/dL  Hematocrit : 41.9 %  Platelet Count - Automated : 260 K/uL  Mean Cell Volume : 90.9 fL  Mean Cell Hemoglobin : 28.4 pg  Mean Cell Hemoglobin Concentration : 31.3 g/dL  Auto Neutrophil # : 5.72 K/uL  Auto Lymphocyte # : 2.36 K/uL  Auto Monocyte # : 0.56 K/uL  Auto Eosinophil # : 0.30 K/uL  Auto Basophil # : 0.06 K/uL  Auto Neutrophil % : 63.1 %  Auto Lymphocyte % : 26.1 %  Auto Monocyte % : 6.2 %  Auto Eosinophil % : 3.3 %  Auto Basophil % : 0.7 %    07-25    142  |  107  |  28<H>  ----------------------------<  129<H>  4.9   |  21  |  1.8<H>    Ca    9.7      25 Jul 2021 06:14  Mg     2.3     07-25    TPro  6.6  /  Alb  4.2  /  TBili  0.3  /  DBili  x   /  AST  22  /  ALT  18  /  AlkPhos  37  07-25    LIVER FUNCTIONS - ( 25 Jul 2021 06:14 )  Alb: 4.2 g/dL / Pro: 6.6 g/dL / ALK PHOS: 37 U/L / ALT: 18 U/L / AST: 22 U/L / GGT: x

## 2021-07-25 NOTE — PROGRESS NOTE ADULT - ASSESSMENT
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 HCT completed upon admission showed chronic known infarct but nothing acute and no bleeding. MRA was completed which showed short segment stenosis of posterior arteries which neurology team is recommending outpatient follow up for. Team was asked to see the patient for new lethargy noted by primary team and family at bedside yesterday. CTH completed yesterday was negative. Patient accessed today and is less lethargic. Per chart review patient is taking 10mg of Valium TID standing. Patient states that he only takes 5mg of Valium PRN when he needs it and he does not take the medication everyday    Plan:  - decrease Valium to 5mg daily to prevent withdrawal   - hold sedating medication  - care per primary team  - Please reconsult neurology if any questions    Case discussed with Dr. 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 HCT completed upon admission showed chronic known infarct but nothing acute and no bleeding. MRA was completed which showed short segment stenosis of posterior arteries which neurology team is recommending outpatient follow up for. Team was asked to see the patient for new lethargy noted by primary team and family at bedside yesterday. CTH completed yesterday was negative. Patient accessed today and is less lethargic. Per chart review patient is taking 10mg of Valium TID standing. Patient states that he only takes 5mg of Valium PRN when he needs it and he does not take the medication everyday.  dysarthria and lethargy due to benzodiazepine overmedication.    Plan:  - decrease Valium to 5mg daily  - hold sedating medication  - care per primary team  - Please reconsult neurology if any questions    Case discussed with Dr. Frias

## 2021-07-25 NOTE — PROGRESS NOTE ADULT - SUBJECTIVE AND OBJECTIVE BOX
VASCULAR SURGERY PROGRESS NOTE    CC:   Hospital Day #1  Post-Op Day #    Procedure:     Events of past 24 hours: NONE      ROS otherwise negative except per subjective and HPI      PAST MEDICAL & SURGICAL HISTORY:  CAD (coronary artery disease)    HTN (hypertension)    Depression    Anxiety    Diabetes  niddm    Angina pectoris    BPH (benign prostatic hyperplasia)    GERD (gastroesophageal reflux disease)    CVA (cerebral vascular accident)    History of appendectomy    Bilateral cataracts    History of heart bypass surgery        Vital Signs Last 24 Hrs  T(C): 36.2 (24 Jul 2021 20:44), Max: 36.3 (24 Jul 2021 05:10)  T(F): 97.2 (24 Jul 2021 20:44), Max: 97.4 (24 Jul 2021 05:10)  HR: 82 (24 Jul 2021 21:33) (63 - 82)  BP: 104/66 (25 Jul 2021 01:26) (104/66 - 181/86)  BP(mean): --  RR: 18 (24 Jul 2021 20:44) (16 - 18)  SpO2: 98% (24 Jul 2021 16:53) (98% - 99%)    Pain (0-10):            Pain Control Adequate: [] YES [] N    Diet:    I&O's Detail    23 Jul 2021 07:01  -  24 Jul 2021 07:00  --------------------------------------------------------  IN:    Oral Fluid: 380 mL    sodium chloride 0.9%: 1125 mL  Total IN: 1505 mL    OUT:    Indwelling Catheter - Urethral (mL): 2700 mL    Voided (mL): 600 mL  Total OUT: 3300 mL    Total NET: -1795 mL      24 Jul 2021 07:01  -  25 Jul 2021 02:26  --------------------------------------------------------  IN:  Total IN: 0 mL    OUT:    Indwelling Catheter - Urethral (mL): 1370 mL  Total OUT: 1370 mL    Total NET: -1370 mL          Bowel Movement: : [] YES [] NO  Flatus: : [] YES [] NO    PHYSICAL EXAM    Appearance: Normal	  HEENT:   Normal oral mucosa, PERRL, EOMI	  Neck: Supple, - JVD; Carotid Bruit   Cardiovascular: Normal S1 S2, No JVD, No murmurs,   Respiratory: Lungs clear to auscultation/Decreased Breath Sounds/No Rales, Rhonchi, Wheezing	  Gastrointestinal:  Soft, Non-tender, + BS	  Skin: No rashes, No ecchymoses, No cyanosis  Extremities: Normal range of motion, No clubbing, cyanosis or edema  Neurologic: Non-focal  Psychiatry: A & O x 3, Mood & affect appropriate      MEDICATIONS:   MEDICATIONS  (STANDING):  aspirin  chewable 81 milliGRAM(s) Oral daily  atorvastatin 80 milliGRAM(s) Oral at bedtime  clopidogrel Tablet 75 milliGRAM(s) Oral daily  diazepam    Tablet 10 milliGRAM(s) Oral three times a day  fenofibrate Tablet 145 milliGRAM(s) Oral daily  heparin   Injectable 5000 Unit(s) SubCutaneous every 8 hours  isosorbide   mononitrate ER Tablet (IMDUR) 60 milliGRAM(s) Oral daily  losartan 100 milliGRAM(s) Oral at bedtime  metoprolol succinate ER 25 milliGRAM(s) Oral every 12 hours  NIFEdipine XL 60 milliGRAM(s) Oral daily  pentoxifylline 400 milliGRAM(s) Oral three times a day  ranolazine 500 milliGRAM(s) Oral two times a day  venlafaxine XR. 75 milliGRAM(s) Oral daily    MEDICATIONS  (PRN):      DVT PROPHYLAXIS: [] YES [] NO  GI PROPHYLAXIS: [] YES [] NO  ANTIPLATELETS: [] YES [] NO  ANTICOAGULATION: [] YES [] NO  ANTIBIOTICS: [] YES [] NO    LAB/STUDIES:                        12.5   8.62  )-----------( 252      ( 24 Jul 2021 04:30 )             40.1     07-24    143  |  109  |  30<H>  ----------------------------<  153<H>  4.5   |  22  |  1.7<H>    Ca    9.4      24 Jul 2021 04:30  Mg     2.5     07-24    TPro  6.6  /  Alb  4.2  /  TBili  0.2  /  DBili  x   /  AST  26  /  ALT  17  /  AlkPhos  41  07-24      LIVER FUNCTIONS - ( 24 Jul 2021 04:30 )  Alb: 4.2 g/dL / Pro: 6.6 g/dL / ALK PHOS: 41 U/L / ALT: 17 U/L / AST: 26 U/L / GGT: x                       ABG - ( 24 Jul 2021 13:27 )  pH, Arterial: 7.40  pH, Blood: x     /  pCO2: 38    /  pO2: 77    / HCO3: 24    / Base Excess: -0.9  /  SaO2: 95                    IMAGING:

## 2021-07-25 NOTE — PROGRESS NOTE ADULT - SUBJECTIVE AND OBJECTIVE BOX
ERICK MARTINEZ 75y Male  MRN#: [id number]   CODE STATUS:________    Hospital Day: 5d    Pt is currently admitted with the primary diagnosis of orthostatic ypotension    SUBJECTIVE    No Overnight events     No Subjective complaints     Present Today:   - Milena:  No                                           ----------------------------------------------------------  OBJECTIVE  PAST MEDICAL & SURGICAL HISTORY  CAD (coronary artery disease)    HTN (hypertension)    Depression    Anxiety    Diabetes  niddm    Angina pectoris    BPH (benign prostatic hyperplasia)    GERD (gastroesophageal reflux disease)    CVA (cerebral vascular accident)    History of appendectomy    Bilateral cataracts    History of heart bypass surgery                                              -----------------------------------------------------------  ALLERGIES:  clindamycin (Rash)  PC Pen VK (Rash)  penicillin (Rash)                                            ------------------------------------------------------------    HOME MEDICATIONS  Home Medications:  aspirin 81 mg oral tablet: 1 tab(s) orally once a day (09 Jun 2021 14:22)  diazePAM 10 mg oral tablet: 1 tab(s) orally 3 times a day (09 Jun 2021 14:22)  fenofibrate 120 mg oral tablet: 1 tab(s) orally once a day (09 Jun 2021 14:22)  Januvia 25 mg oral tablet: 1 tab(s) orally once a day (09 Jun 2021 14:22)  lansoprazole 30 mg oral delayed release capsule: 1 cap(s) orally once a day (09 Jun 2021 14:22)  losartan 100 mg oral tablet: 1 tab(s) orally once a day (09 Jun 2021 14:22)  metoprolol succinate 25 mg oral tablet, extended release: orally 2 times a day (09 Jun 2021 14:22)  Pentoxil 400 mg oral tablet, extended release: 1 tab(s) orally 3 times a day (09 Jun 2021 14:22)  Plavix 75 mg oral tablet: 1 tab(s) orally once a day (09 Jun 2021 14:22)  Ranexa 500 mg oral tablet, extended release: 1 tab(s) orally 2 times a day (09 Jun 2021 14:22)  rosuvastatin 40 mg oral tablet: 1 tab(s) orally once a day (09 Jun 2021 14:22)  tamsulosin 0.4 mg oral capsule: 1 cap(s) orally once a day (09 Jun 2021 14:22)  tiZANidine 4 mg oral tablet: 1 tab(s) orally 3 times a day (20 Jul 2021 18:40)  venlafaxine 75 mg oral tablet: 1 tab(s) orally 2 times a day (09 Jun 2021 14:22)                           MEDICATIONS:  STANDING MEDICATIONS  aspirin  chewable 81 milliGRAM(s) Oral daily  atorvastatin 80 milliGRAM(s) Oral at bedtime  clopidogrel Tablet 75 milliGRAM(s) Oral daily  diazepam    Tablet 10 milliGRAM(s) Oral three times a day  fenofibrate Tablet 145 milliGRAM(s) Oral daily  heparin   Injectable 5000 Unit(s) SubCutaneous every 8 hours  isosorbide   mononitrate ER Tablet (IMDUR) 60 milliGRAM(s) Oral daily  losartan 100 milliGRAM(s) Oral at bedtime  metoprolol succinate ER 25 milliGRAM(s) Oral every 12 hours  NIFEdipine XL 60 milliGRAM(s) Oral daily  pentoxifylline 400 milliGRAM(s) Oral three times a day  ranolazine 500 milliGRAM(s) Oral two times a day  venlafaxine XR. 75 milliGRAM(s) Oral daily    PRN MEDICATIONS  acetaminophen   Tablet .. 1000 milliGRAM(s) Oral every 6 hours PRN                                            ------------------------------------------------------------  VITAL SIGNS: Last 24 Hours  T(C): 36.2 (24 Jul 2021 20:44), Max: 36.3 (24 Jul 2021 05:10)  T(F): 97.2 (24 Jul 2021 20:44), Max: 97.4 (24 Jul 2021 05:10)  HR: 82 (24 Jul 2021 21:33) (63 - 82)  BP: 104/66 (25 Jul 2021 01:26) (104/66 - 181/86)  BP(mean): --  RR: 18 (24 Jul 2021 20:44) (16 - 18)  SpO2: 98% (24 Jul 2021 16:53) (98% - 99%)      07-23-21 @ 07:01  -  07-24-21 @ 07:00  --------------------------------------------------------  IN: 1505 mL / OUT: 3300 mL / NET: -1795 mL    07-24-21 @ 07:01  -  07-25-21 @ 02:54  --------------------------------------------------------  IN: 0 mL / OUT: 1370 mL / NET: -1370 mL                                             --------------------------------------------------------------  LABS:                        12.5   8.62  )-----------( 252      ( 24 Jul 2021 04:30 )             40.1     07-24    143  |  109  |  30<H>  ----------------------------<  153<H>  4.5   |  22  |  1.7<H>    Ca    9.4      24 Jul 2021 04:30  Mg     2.5     07-24    TPro  6.6  /  Alb  4.2  /  TBili  0.2  /  DBili  x   /  AST  26  /  ALT  17  /  AlkPhos  41  07-24        ABG - ( 24 Jul 2021 13:27 )  pH, Arterial: 7.40  pH, Blood: x     /  pCO2: 38    /  pO2: 77    / HCO3: 24    / Base Excess: -0.9  /  SaO2: 95                                                                    -------------------------------------------------------------  RADIOLOGY:    < from: CT Head No Cont (07.24.21 @ 17:14) >  IMPRESSION:  Since July 20, 2021;  1.  No acute/traumatic intracranial pathology.  2.  Microvascular ischemic changes and unchanged probable chronic infarct within the right posterior frontal lobe.    --- End of Report ---            LIZETH ROSARIO MD; Resident Radiologist  This document has been electronically signed.  GIO DEVRIES MD; Attending Radiologist  This document has been electronically signed. Jul 24 2021  6:06PM    < end of copied text >  < from: VA Duplex Carotid, Bilat (07.24.21 @ 10:26) >  IMPRESSION: Mild 20-39% internal carotid artery stenosis bilaterally.    Measurement of carotid stenosis is based on velocity parameters that correlate the residual internal carotid diameter with that of the more distal vessel in accordance with a method such as the [employer] Symptomatic Carotid Endarterectomy Trial (NASCET).              ******PRELIMINARY REPORT******    ******PRELIMINARY REPORT******          KELLY BURNS MD; Resident Radiologist    < end of copied text >  < from: MR Angio Head No Cont (07.23.21 @ 16:55) >  IMPRESSION: MRA of the neck is limited by motion. Evaluation of the distal common carotid proximal internal carotid arteries appear grossly unremarkable though better quality study is recommended or CTA of the neck can be done for further evaluation. Evaluation of the proximal external carotid arteries are limited by motion.    MRA of the [place] demonstrates decreased caliber of the distal right internal carotid artery.    Short segment of stenosis is suspected involving both posterior cerebral arteries as described above.    --- End of Report ---              DAVIS DEUTSCH MD; Attending Radiologist  This document has been electronically signed. Jul 24 2021  8:23AM    < end of copied text >  < from: VA Duplex Lower Extrem Arterial, Bilat (07.23.21 @ 13:03) >  Impression:    Severely diminished flow noted in the right and left superficial femoral artery suggestive of a proximal superficial femoral or common femoral artery stenosis.  Significant atherosclerotic disease noted in the bilateral common femoral arteries    Vascular surgery consultation is recommended if clinically indicated  ICD-10: I70.223    --- End of Report ---              NATALIA PAULA MD; Attending Vascular Surgery  This document has been electronically signed. Jul 23 2021  3:25PM    < end of copied text >  < from: US Renal (07.22.21 @ 18:44) >  IMPRESSION:    Simple cysts within both kidneys without hydronephrosis or nephrolithiasis.        --- End of Report ---              MAIKOL VALERIO MD; Attending Interventional Radiologist  This document has been electronically signed. Jul 23 2021  6:30AM    < end of copied text >  < from: CT Cervical Spine No Cont (07.20.21 @ 15:07) >  IMPRESSION:    1.  No evidence of acute cervical spine fracture or subluxation.    2.  Multilevel severe degenerative changes as described, with severe spinal noted stenosis at C2-3 and C3-4 and multilevel severe neural foraminal stenosis.    --- End of Report ---              IRAJ ORTIZ MD; Attending Radiologist  This document has been electronically signed. Jul 20 2021  3:19PM    < end of copied text >                                            --------------------------------------------------------------    PHYSICAL EXAM:    GENERAL: NAD  HEENT: NCAT  CHEST/LUNG: audible bs bilaterally   HEART: Regular rate and rhythm; s1 s2 appreciated  ABDOMEN: Soft, Nontender, Nondistended abdomen   EXTREMITIES: No LE edema b/l  NERVOUS SYSTEM:  Alert & Oriented X3, L sided residual weakness

## 2021-07-25 NOTE — PROGRESS NOTE ADULT - ASSESSMENT
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 Patient says he has been feeling intermittently dizzy over the past 3 days despite having adequate/normal PO intake. Patient fell yesterday at home after feeling dizzy hitting the back of his head, but with no LOC. No AC. This morning around 10 patient was walking outside, felt his legs get wobbly, and then fell straight down hitting his buttocks and R knee. he was found to have short segment of stenosis involving both posterior cerebral artery -> neuro consulted -> can follow as out. he was also found to have severely diminished flow to bilateral superficial femoral artery -> vascular consulted and planned for LLE angiogram on Tuesday (pending preop consults).    #Dizziness and fall after getting up from a sitting or supine position likely orthostatic  - Pt denied LOC  - CT spine: No evidence of acute cervical spine fracture or subluxation. Multilevel severe degenerative changes as described, with severe spinal noted stenosis at C2-3 and C3-4 and multilevel severe neural foraminal stenosis.  - CTAP:No definite evidence of acute traumatic injury within the chest, abdomen, or pelvis.   - CTH:  No evidence of acute intracranial hemorrhage. Probable chronic infarct within the right posterior frontal white matter. Lacunar infarcts within bilateral white matter and deep gray nuclei of indeterminate age. Mild/moderate chronic microvascular changes.  - Chest X-Ray negative for pneumothorax, infiltrate, or effusion. XR Pelvis negative for fx's or dislocations   - Trops Neg on admission, ECG NSR, Rpt   - Fall precautions   - Pt on tizanidine 4mg q8h - hold for now,   - ortho positive, started on HI stockings and abdominal binder, remains ortho positive, unable to tolerate HI stocking secondary to pain.   - flomax held, moved losartan to PM - patient still ortho positive and symptomatic   - continue with tele monitoring for now   - patient still drives regularly, should not drive a vehicle   - Neurology consult appreciated, spoke to neuro team, in the setting of SAMANTA ok to MR angio head and neck. MRA of the neck is limited by motion. Evaluation of the distal common carotid proximal internal carotid arteries appear grossly unremarkable though better quality study is recommended or CTA of the neck can be done for further evaluation. Evaluation of the proximal external carotid arteries are limited by motion. MRA of the [place] demonstrates decreased caliber of the distal right internal carotid artery. Short segment of stenosis is suspected involving both posterior cerebral arteries.   - Follow up Carotid US -> prelim :  Mild 20-39% internal carotid artery stenosis bilaterally  - Neurology aware, no intervention at this time. Patient can Follow up with neurology at the clinic, as per conversation with neuro PA.   - repeat orthostatics    # LE pain likely secondary to PAD   - Significant atherosclerotic disease noted in bilateral common femoral arteries  - doppler arterial lower limb -> Severely diminished flow noted in the right and left superficial femoral artery suggestive of a proximal superficial femoral or common femoral artery stenosis.  - vascular consult -> Preop for LLE Angiogram with possible endovascular revascularization, Tuesday. Clearance from cardiology and nephrology      #CAD s/p CABG 2009  - 6/2021 :patent SALAZAR to LAD , patent SVG to OM , % , filled distally by collaterals from LAD.  - Continue DAPT ( Aspirin 81 mg daily and Plavix 75 mg daily ), ARB, Imdur,Ranexa, B-Blocker, Statin Therapy  - Cardio f/u OP     #HTN, patient had hypertensive urgency  - continue with losartan 100mg   - start nifedipine 60mg xL q24h     #HLD  - continue with atorvastatin and fenofibrate     #CVA - Left UE/ Left LE weakness/ left facial from prior CVA  - cont supportive care     #SAMANTA on CKD vs progression of CKD - resolved   - Cr 1.3 in 2017, 1.5 in 2019, 1.7 in June,   - Cr trending down 2.1 -> 1.9  - Pt denies reduced PO intake   - patient retaining on bladder scan harrison placed.    - renal bladder US showed simple cysts within both kidneys without hydronephrosis or nephrolithiasis   - Monitor   - if Cr remains stable consider restarting losartan   - Nephro consult if worsening     #DM  - Hold home meds  - Start Insulin regimen for FS >180      #DVT PPX : Hep SubQ  #Diet: DASH/TLC/CC  #GI PPX: Protonix  #Activity: Ambulate as tolerated  #Dispo: acute  FULL CODE

## 2021-07-25 NOTE — PROGRESS NOTE ADULT - ATTENDING COMMENTS
74 yo M with PMH of CAD s/p CABG x3 ([place]), CVA 2017 with residual L sided weakness, HTN, DM, HLD, GERD, BPH  presented for dizziness and multiple falls. Patient says he has been feeling intermittently dizzy over the past 3 days despite having adequate/normal PO intake. Patient fell yesterday at home after feeling dizzy hitting the back of his head, but with no LOC. No AC. This morning around 10 patient was walking outside, felt his legs get wobbly, and then fell straight down hitting his buttocks and R knee. he was found to have short segment of stenosis involving both posterior cerebral artery -> neuro consulted -> can follow as out. he was also found to have severely diminished flow to bilateral superficial femoral artery -> vascular consulted and planned for LLE angiogram on Tuesday (pending preop consults).    #Dizziness and fall after getting up from a sitting or supine position likely orthostatic  Pt denied LOC  CT spine: No evidence of acute cervical spine fracture or subluxation. Multilevel severe degenerative changes as described, with severe spinal noted stenosis at C2-3 and C3-4 and multilevel severe neural foraminal stenosis.  CTAP:No definite evidence of acute traumatic injury within the chest, abdomen, or pelvis.   CTH:  No evidence of acute intracranial hemorrhage. Probable chronic infarct within the right posterior frontal white matter. Lacunar infarcts within bilateral white matter and deep gray nuclei of indeterminate age. Mild/moderate chronic microvascular changes.  Chest X-Ray negative for pneumothorax, infiltrate, or effusion. XR Pelvis negative for fx's or dislocations   Trops Neg on admission, ECG NSR, Rpt   Fall precautions   Pt on tizanidine 4mg q8h - hold for now,   ortho positive, started on HI stockings and abdominal binder, remains ortho positive, unable to tolerate HI stocking secondary to pain.   flomax held, moved losartan to PM - patient still ortho positive and symptomatic   continue with tele monitoring for now   patient still drives regularly, should not drive a vehicle   Neurology consult appreciated, spoke to neuro team, in the setting of SAMANTA ok to MR angio head and neck. MRA of the neck is limited by motion. Evaluation of the distal common carotid proximal internal carotid arteries appear grossly unremarkable though better quality study is recommended or CTA of the neck can be done for further evaluation. Evaluation of the proximal external carotid arteries are limited by motion. MRA of the [place] demonstrates decreased caliber of the distal right internal carotid artery. Short segment of stenosis is suspected involving both posterior cerebral arteries.   Follow up Carotid US -> prelim :  Mild 20-39% internal carotid artery stenosis bilaterally  Neurology aware, no intervention at this time. Patient can Follow up with neurology at the clinic, as per conversation with neuro PA.   repeat orthostatics    #LE pain likely secondary to PAD   Significant atherosclerotic disease noted in bilateral common femoral arteries  doppler arterial lower limb -> Severely diminished flow noted in the right and left superficial femoral artery suggestive of a proximal superficial femoral or common femoral artery stenosis.  vascular consult -> Preop for LLE Angiogram with possible endovascular revascularization, Tuesday. Clearance from cardiology and nephrology      #CAD s/p CABG 2009 6/2021 :patent SALAZAR to LAD , patent SVG to OM , % , filled distally by collaterals from LAD.  Continue DAPT ( Aspirin 81 mg daily and Plavix 75 mg daily ), ARB, Imdur,Ranexa, B-Blocker, Statin Therapy  Cardio f/u OP     #HTN, patient had hypertensive urgency  c/w losartan 100mg   c/w nifedipine 60mg xL q24h     #HLD  c/w atorvastatin and fenofibrate     #CVA - Left UE/ Left LE weakness/ left facial from prior CVA  cont supportive care     #SAMANTA on CKD vs progression of CKD - resolved   Cr 1.3 in 2017, 1.5 in 2019, 1.7 in June,   Cr trending down 2.1 -> 1.9  Pt denies reduced PO intake   patient retaining on bladder scan harrison placed.    renal bladder US showed simple cysts within both kidneys without hydronephrosis or nephrolithiasis   Monitor   if Cr remains stable consider restarting losartan   Nephro consult if worsening     #DM  Hold home meds  Start Insulin regimen for FS >180      DVT PPX : Hep SubQ  Dispo: acute  FULL CODE     Progress Note handoff:  Pending: Angiogram of LE  Dispo: acute

## 2021-07-25 NOTE — PROGRESS NOTE ADULT - ATTENDING COMMENTS
Patient Cr is around 1.8-1.9  His CTA shows patent aortoiliac arteries. On the right side, he has significant disease in proximal DFA and SFA. Possibly the SFA is occluded/ diseased distally. Per CTA findings, an endarterectomy will be a better option if he meets indications.    Will need to talk to the patient about his symptoms. Angiogram will put his kidneys at risk considering not having any sign of CLTI Patient Cr is around 1.8-1.9  His CTA shows patent aortoiliac arteries. On the right side, he has significant disease in proximal DFA and SFA. Possibly the SFA is occluded/ diseased distally. Per CTA findings, an endarterectomy will be a better option if he meets indications.    Will need to talk to the patient about his symptoms. Angiogram will put his kidneys at risk considering not having any sign of CLTI    Addendum:  I had a long conversation with the patient and Dr Stewart. We both agreed that with his high Cr and chief complaint of fall, there is not need for angiogram at this time. Cardiology will continue his work up.    I will follow the patient in 3 weeks in the office.

## 2021-07-25 NOTE — CONSULT NOTE ADULT - ASSESSMENT
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  Pre-op for LLE angiogram 7/27/21.    * Patient-based characteristics (Functional capacity)  Patient is able to achieve more than 4 MET (walk 4 blocks, climb 2 flights of stairs, etc...)          Y [X] / N []    High-risk patient features:  - Recent (<30 days) or active MI          Y [] / N [X]  - Unstable or severe angina          Y [] / N [X]  - Decompensated heart failure, or worsening or new-onset heart failure          Y [] / N [X]  - Severe valvular disease          Y [] / N [X]  - Significant arrhythmia (Tachy- or Bradyarrhythmia)          Y [] / N [X]    * Surgery/Procedure-based characteristics (Type of surgery)  - Low-risk procedure (outpatient procedure, elective, endoscopy, etc...)          Y [X] / N []  - Elevated or Moderate-risk procedure (Inpatient)          Y [] / N []  - High-risk procedure (urgent/emergent procedure, Intrathoracic, vascular, etc...)          Y [] / N []    * Revised Cardiac Risk Index (RCRI)  1- History of ischemic heart disease          Y [X] / N []  2- History of congestive heart failure          Y [] / N [X]  3- History of stroke/TIA          Y [X] / N []  4- History of insulin-dependent diabetes          Y [] / N [X]  5- Chronic kidney disease (Cr >2mg/dL)          Y [] / N [X]  6- Undergoing suprainguinal vascular, intraperitoneal, or intrathoracic surgery          Y [] / N [X]    Class III risk (Two factors) --> 10.1% risk (30-day risk of death, MI, or cardiac arrest)    * IMPRESSION & RECOMMENDATIONS:  Moderate / High (>25%) -risk patient for a low-risk surgery/procedure    No further cardiac work-up is needed at the moment. There are no current cardiac contraindications to prevent from proceeding with the scheduled surgery/procedure.    This consult serves only as a sara-operative cardiac risk stratification and evaluation to predict 30-days cardiac complications risk and mortality. The decision to proceed with the surgery/procedure is made by the performing physician and the patient -

## 2021-07-26 LAB
ALBUMIN SERPL ELPH-MCNC: 4.4 G/DL — SIGNIFICANT CHANGE UP (ref 3.5–5.2)
ALBUMIN SERPL ELPH-MCNC: 4.4 G/DL — SIGNIFICANT CHANGE UP (ref 3.5–5.2)
ALP SERPL-CCNC: 40 U/L — SIGNIFICANT CHANGE UP (ref 30–115)
ALP SERPL-CCNC: 40 U/L — SIGNIFICANT CHANGE UP (ref 30–115)
ALT FLD-CCNC: 20 U/L — SIGNIFICANT CHANGE UP (ref 0–41)
ALT FLD-CCNC: 22 U/L — SIGNIFICANT CHANGE UP (ref 0–41)
ANION GAP SERPL CALC-SCNC: 13 MMOL/L — SIGNIFICANT CHANGE UP (ref 7–14)
ANION GAP SERPL CALC-SCNC: 14 MMOL/L — SIGNIFICANT CHANGE UP (ref 7–14)
APTT BLD: 30.9 SEC — SIGNIFICANT CHANGE UP (ref 27–39.2)
AST SERPL-CCNC: 21 U/L — SIGNIFICANT CHANGE UP (ref 0–41)
AST SERPL-CCNC: 23 U/L — SIGNIFICANT CHANGE UP (ref 0–41)
BASOPHILS # BLD AUTO: 0.06 K/UL — SIGNIFICANT CHANGE UP (ref 0–0.2)
BASOPHILS # BLD AUTO: 0.08 K/UL — SIGNIFICANT CHANGE UP (ref 0–0.2)
BASOPHILS NFR BLD AUTO: 0.7 % — SIGNIFICANT CHANGE UP (ref 0–1)
BASOPHILS NFR BLD AUTO: 0.8 % — SIGNIFICANT CHANGE UP (ref 0–1)
BILIRUB SERPL-MCNC: 0.3 MG/DL — SIGNIFICANT CHANGE UP (ref 0.2–1.2)
BILIRUB SERPL-MCNC: 0.3 MG/DL — SIGNIFICANT CHANGE UP (ref 0.2–1.2)
BLD GP AB SCN SERPL QL: SIGNIFICANT CHANGE UP
BUN SERPL-MCNC: 37 MG/DL — HIGH (ref 10–20)
BUN SERPL-MCNC: 42 MG/DL — HIGH (ref 10–20)
CALCIUM SERPL-MCNC: 10.3 MG/DL — HIGH (ref 8.5–10.1)
CALCIUM SERPL-MCNC: 9.9 MG/DL — SIGNIFICANT CHANGE UP (ref 8.5–10.1)
CHLORIDE SERPL-SCNC: 102 MMOL/L — SIGNIFICANT CHANGE UP (ref 98–110)
CHLORIDE SERPL-SCNC: 105 MMOL/L — SIGNIFICANT CHANGE UP (ref 98–110)
CO2 SERPL-SCNC: 23 MMOL/L — SIGNIFICANT CHANGE UP (ref 17–32)
CO2 SERPL-SCNC: 25 MMOL/L — SIGNIFICANT CHANGE UP (ref 17–32)
CREAT SERPL-MCNC: 1.9 MG/DL — HIGH (ref 0.7–1.5)
CREAT SERPL-MCNC: 2.1 MG/DL — HIGH (ref 0.7–1.5)
EOSINOPHIL # BLD AUTO: 0.26 K/UL — SIGNIFICANT CHANGE UP (ref 0–0.7)
EOSINOPHIL # BLD AUTO: 0.3 K/UL — SIGNIFICANT CHANGE UP (ref 0–0.7)
EOSINOPHIL NFR BLD AUTO: 2.9 % — SIGNIFICANT CHANGE UP (ref 0–8)
EOSINOPHIL NFR BLD AUTO: 3.1 % — SIGNIFICANT CHANGE UP (ref 0–8)
GLUCOSE BLDC GLUCOMTR-MCNC: 147 MG/DL — HIGH (ref 70–99)
GLUCOSE BLDC GLUCOMTR-MCNC: 149 MG/DL — HIGH (ref 70–99)
GLUCOSE BLDC GLUCOMTR-MCNC: 150 MG/DL — HIGH (ref 70–99)
GLUCOSE BLDC GLUCOMTR-MCNC: 161 MG/DL — HIGH (ref 70–99)
GLUCOSE SERPL-MCNC: 147 MG/DL — HIGH (ref 70–99)
GLUCOSE SERPL-MCNC: 159 MG/DL — HIGH (ref 70–99)
HCT VFR BLD CALC: 42.7 % — SIGNIFICANT CHANGE UP (ref 42–52)
HCT VFR BLD CALC: 44 % — SIGNIFICANT CHANGE UP (ref 42–52)
HGB BLD-MCNC: 13.4 G/DL — LOW (ref 14–18)
HGB BLD-MCNC: 13.6 G/DL — LOW (ref 14–18)
IMM GRANULOCYTES NFR BLD AUTO: 0.6 % — HIGH (ref 0.1–0.3)
IMM GRANULOCYTES NFR BLD AUTO: 0.7 % — HIGH (ref 0.1–0.3)
INR BLD: 1.03 RATIO — SIGNIFICANT CHANGE UP (ref 0.65–1.3)
LYMPHOCYTES # BLD AUTO: 2.21 K/UL — SIGNIFICANT CHANGE UP (ref 1.2–3.4)
LYMPHOCYTES # BLD AUTO: 2.56 K/UL — SIGNIFICANT CHANGE UP (ref 1.2–3.4)
LYMPHOCYTES # BLD AUTO: 24.9 % — SIGNIFICANT CHANGE UP (ref 20.5–51.1)
LYMPHOCYTES # BLD AUTO: 26 % — SIGNIFICANT CHANGE UP (ref 20.5–51.1)
MAGNESIUM SERPL-MCNC: 2.5 MG/DL — HIGH (ref 1.8–2.4)
MCHC RBC-ENTMCNC: 27.7 PG — SIGNIFICANT CHANGE UP (ref 27–31)
MCHC RBC-ENTMCNC: 28.8 PG — SIGNIFICANT CHANGE UP (ref 27–31)
MCHC RBC-ENTMCNC: 30.9 G/DL — LOW (ref 32–37)
MCHC RBC-ENTMCNC: 31.4 G/DL — LOW (ref 32–37)
MCV RBC AUTO: 89.6 FL — SIGNIFICANT CHANGE UP (ref 80–94)
MCV RBC AUTO: 91.6 FL — SIGNIFICANT CHANGE UP (ref 80–94)
MONOCYTES # BLD AUTO: 0.66 K/UL — HIGH (ref 0.1–0.6)
MONOCYTES # BLD AUTO: 0.7 K/UL — HIGH (ref 0.1–0.6)
MONOCYTES NFR BLD AUTO: 7.1 % — SIGNIFICANT CHANGE UP (ref 1.7–9.3)
MONOCYTES NFR BLD AUTO: 7.4 % — SIGNIFICANT CHANGE UP (ref 1.7–9.3)
NEUTROPHILS # BLD AUTO: 5.64 K/UL — SIGNIFICANT CHANGE UP (ref 1.4–6.5)
NEUTROPHILS # BLD AUTO: 6.13 K/UL — SIGNIFICANT CHANGE UP (ref 1.4–6.5)
NEUTROPHILS NFR BLD AUTO: 62.4 % — SIGNIFICANT CHANGE UP (ref 42.2–75.2)
NEUTROPHILS NFR BLD AUTO: 63.4 % — SIGNIFICANT CHANGE UP (ref 42.2–75.2)
NRBC # BLD: 0 /100 WBCS — SIGNIFICANT CHANGE UP (ref 0–0)
NRBC # BLD: 0 /100 WBCS — SIGNIFICANT CHANGE UP (ref 0–0)
PLATELET # BLD AUTO: 290 K/UL — SIGNIFICANT CHANGE UP (ref 130–400)
PLATELET # BLD AUTO: 332 K/UL — SIGNIFICANT CHANGE UP (ref 130–400)
POTASSIUM SERPL-MCNC: 4.7 MMOL/L — SIGNIFICANT CHANGE UP (ref 3.5–5)
POTASSIUM SERPL-MCNC: 4.7 MMOL/L — SIGNIFICANT CHANGE UP (ref 3.5–5)
POTASSIUM SERPL-SCNC: 4.7 MMOL/L — SIGNIFICANT CHANGE UP (ref 3.5–5)
POTASSIUM SERPL-SCNC: 4.7 MMOL/L — SIGNIFICANT CHANGE UP (ref 3.5–5)
PROCALCITONIN SERPL-MCNC: 0.12 NG/ML — HIGH (ref 0.02–0.1)
PROT SERPL-MCNC: 6.8 G/DL — SIGNIFICANT CHANGE UP (ref 6–8)
PROT SERPL-MCNC: 6.9 G/DL — SIGNIFICANT CHANGE UP (ref 6–8)
PROTHROM AB SERPL-ACNC: 11.8 SEC — SIGNIFICANT CHANGE UP (ref 9.95–12.87)
RBC # BLD: 4.66 M/UL — LOW (ref 4.7–6.1)
RBC # BLD: 4.91 M/UL — SIGNIFICANT CHANGE UP (ref 4.7–6.1)
RBC # FLD: 14.6 % — HIGH (ref 11.5–14.5)
RBC # FLD: 14.7 % — HIGH (ref 11.5–14.5)
SODIUM SERPL-SCNC: 141 MMOL/L — SIGNIFICANT CHANGE UP (ref 135–146)
SODIUM SERPL-SCNC: 141 MMOL/L — SIGNIFICANT CHANGE UP (ref 135–146)
WBC # BLD: 8.89 K/UL — SIGNIFICANT CHANGE UP (ref 4.8–10.8)
WBC # BLD: 9.83 K/UL — SIGNIFICANT CHANGE UP (ref 4.8–10.8)
WBC # FLD AUTO: 8.89 K/UL — SIGNIFICANT CHANGE UP (ref 4.8–10.8)
WBC # FLD AUTO: 9.83 K/UL — SIGNIFICANT CHANGE UP (ref 4.8–10.8)

## 2021-07-26 PROCEDURE — 99233 SBSQ HOSP IP/OBS HIGH 50: CPT

## 2021-07-26 RX ORDER — DIAZEPAM 5 MG
5 TABLET ORAL DAILY
Refills: 0 | Status: DISCONTINUED | OUTPATIENT
Start: 2021-07-26 | End: 2021-07-28

## 2021-07-26 RX ADMIN — Medication 81 MILLIGRAM(S): at 11:07

## 2021-07-26 RX ADMIN — HEPARIN SODIUM 5000 UNIT(S): 5000 INJECTION INTRAVENOUS; SUBCUTANEOUS at 05:16

## 2021-07-26 RX ADMIN — ATORVASTATIN CALCIUM 80 MILLIGRAM(S): 80 TABLET, FILM COATED ORAL at 22:02

## 2021-07-26 RX ADMIN — Medication 5 MILLIGRAM(S): at 05:16

## 2021-07-26 RX ADMIN — RANOLAZINE 500 MILLIGRAM(S): 500 TABLET, FILM COATED, EXTENDED RELEASE ORAL at 05:16

## 2021-07-26 RX ADMIN — Medication 145 MILLIGRAM(S): at 11:07

## 2021-07-26 RX ADMIN — Medication 5 MILLIGRAM(S): at 14:52

## 2021-07-26 RX ADMIN — CLOPIDOGREL BISULFATE 75 MILLIGRAM(S): 75 TABLET, FILM COATED ORAL at 11:07

## 2021-07-26 RX ADMIN — ISOSORBIDE MONONITRATE 60 MILLIGRAM(S): 60 TABLET, EXTENDED RELEASE ORAL at 11:07

## 2021-07-26 RX ADMIN — HEPARIN SODIUM 5000 UNIT(S): 5000 INJECTION INTRAVENOUS; SUBCUTANEOUS at 14:53

## 2021-07-26 RX ADMIN — Medication 25 MILLIGRAM(S): at 17:43

## 2021-07-26 RX ADMIN — Medication 25 MILLIGRAM(S): at 05:17

## 2021-07-26 RX ADMIN — Medication 400 MILLIGRAM(S): at 14:53

## 2021-07-26 RX ADMIN — Medication 400 MILLIGRAM(S): at 05:16

## 2021-07-26 RX ADMIN — Medication 400 MILLIGRAM(S): at 22:02

## 2021-07-26 RX ADMIN — Medication 75 MILLIGRAM(S): at 11:07

## 2021-07-26 RX ADMIN — LOSARTAN POTASSIUM 100 MILLIGRAM(S): 100 TABLET, FILM COATED ORAL at 22:02

## 2021-07-26 RX ADMIN — Medication 60 MILLIGRAM(S): at 05:16

## 2021-07-26 RX ADMIN — RANOLAZINE 500 MILLIGRAM(S): 500 TABLET, FILM COATED, EXTENDED RELEASE ORAL at 17:43

## 2021-07-26 NOTE — PROGRESS NOTE ADULT - SUBJECTIVE AND OBJECTIVE BOX
ERICK MARTINEZ 75y Male  MRN#: [id number]   CODE STATUS: full code     Hospital Day: 6d    Pt is currently admitted with the primary diagnosis of dizziness     SUBJECTIVE  76 yo M with PMH of CAD s/p CABG x3 2017 ([facility]), CVA 2017 with residual L sided weakness, HTN, DM, HLD, GERD, BPH  presented for dizziness and multiple falls. Patient says he has been feeling intermittently dizzy over the past 3 days despite having adequate/normal PO intake. Pt first felt this on Friday right after getting out of his car. Pt stated that these episodes of dizziness occurs after getting up from a sitting or supine position. Patient fell yesterday at home after feeling dizzy hitting the back of his head, but with no LOC. pt is not on AC. This morning around 10 patient got out of his car, felt dizzy again and then fell straight down hitting his buttocks and R knee. Denies head trauma. Patient denies any pain, headache, vision changes, new numbness, weakness, tingling, chest pain, shortness of breath, palpitations, nausea, vomiting, diarrhea, dysuria, hematuria, melena, hematochezia, fever, cold/flu symptoms, leg pain/swelling.     In the ED, Temp 98.5, HR 83, /86 and saturating >97% on RA. Labs wnl except for Cr of 1.6. Ferdinand<10, , Trop <0.01. EKG- NSR.     CT spine: No evidence of acute cervical spine fracture or subluxation.Multilevel severe degenerative changes as described, with severe spinal noted stenosis at C2-3 and C3-4 and multilevel severe neural foraminal stenosis.  CTAP:No definite evidence of acute traumatic injury within the chest, abdomen, or pelvis.   CTH:  No evidence of acute intracranial hemorrhage. Probable chronic infarct within the right posterior frontal white matter. Lacunar infarcts within bilateral white matter and deep gray nuclei of indeterminate age. Mild/moderate chronic microvascular changes.  Chest X-Ray negative for pneumothorax, infiltrate, or effusion. XR Pelvis negative for fx's or dislocations       No events overnight                                               ----------------------------------------------------------  OBJECTIVE  PAST MEDICAL & SURGICAL HISTORY  CAD (coronary artery disease)    HTN (hypertension)    Depression    Anxiety    Diabetes  niddm    Angina pectoris    BPH (benign prostatic hyperplasia)    GERD (gastroesophageal reflux disease)    CVA (cerebral vascular accident)    History of appendectomy    Bilateral cataracts    History of heart bypass surgery                                              -----------------------------------------------------------  ALLERGIES:  clindamycin (Rash)  PC Pen VK (Rash)  penicillin (Rash)                                            ------------------------------------------------------------    HOME MEDICATIONS  Home Medications:  aspirin 81 mg oral tablet: 1 tab(s) orally once a day (09 Jun 2021 14:22)  diazePAM 10 mg oral tablet: 1 tab(s) orally 3 times a day (09 Jun 2021 14:22)  fenofibrate 120 mg oral tablet: 1 tab(s) orally once a day (09 Jun 2021 14:22)  Januvia 25 mg oral tablet: 1 tab(s) orally once a day (09 Jun 2021 14:22)  lansoprazole 30 mg oral delayed release capsule: 1 cap(s) orally once a day (09 Jun 2021 14:22)  losartan 100 mg oral tablet: 1 tab(s) orally once a day (09 Jun 2021 14:22)  metoprolol succinate 25 mg oral tablet, extended release: orally 2 times a day (09 Jun 2021 14:22)  Pentoxil 400 mg oral tablet, extended release: 1 tab(s) orally 3 times a day (09 Jun 2021 14:22)  Plavix 75 mg oral tablet: 1 tab(s) orally once a day (09 Jun 2021 14:22)  Ranexa 500 mg oral tablet, extended release: 1 tab(s) orally 2 times a day (09 Jun 2021 14:22)  rosuvastatin 40 mg oral tablet: 1 tab(s) orally once a day (09 Jun 2021 14:22)  tamsulosin 0.4 mg oral capsule: 1 cap(s) orally once a day (09 Jun 2021 14:22)  tiZANidine 4 mg oral tablet: 1 tab(s) orally 3 times a day (20 Jul 2021 18:40)  venlafaxine 75 mg oral tablet: 1 tab(s) orally 2 times a day (09 Jun 2021 14:22)                           MEDICATIONS:  STANDING MEDICATIONS  aspirin  chewable 81 milliGRAM(s) Oral daily  atorvastatin 80 milliGRAM(s) Oral at bedtime  clopidogrel Tablet 75 milliGRAM(s) Oral daily  diazepam    Tablet 5 milliGRAM(s) Oral three times a day  fenofibrate Tablet 145 milliGRAM(s) Oral daily  heparin   Injectable 5000 Unit(s) SubCutaneous every 8 hours  isosorbide   mononitrate ER Tablet (IMDUR) 60 milliGRAM(s) Oral daily  losartan 100 milliGRAM(s) Oral at bedtime  metoprolol succinate ER 25 milliGRAM(s) Oral every 12 hours  NIFEdipine XL 60 milliGRAM(s) Oral daily  pentoxifylline 400 milliGRAM(s) Oral three times a day  ranolazine 500 milliGRAM(s) Oral two times a day  venlafaxine XR. 75 milliGRAM(s) Oral daily    PRN MEDICATIONS  acetaminophen   Tablet .. 1000 milliGRAM(s) Oral every 6 hours PRN                                            ------------------------------------------------------------  VITAL SIGNS: Last 24 Hours  T(C): 36 (26 Jul 2021 05:22), Max: 36 (26 Jul 2021 05:22)  T(F): 96.8 (26 Jul 2021 05:22), Max: 96.8 (26 Jul 2021 05:22)  HR: 80 (26 Jul 2021 11:00) (73 - 80)  BP: 138/75 (26 Jul 2021 11:00) (126/80 - 171/81)  BP(mean): --  RR: 18 (26 Jul 2021 05:22) (18 - 19)  SpO2: 96% (26 Jul 2021 07:50) (96% - 96%)      07-25-21 @ 07:01  -  07-26-21 @ 07:00  --------------------------------------------------------  IN: 870 mL / OUT: 1350 mL / NET: -480 mL                                             --------------------------------------------------------------  LABS:                        13.4   9.83  )-----------( 290      ( 26 Jul 2021 06:26 )             42.7     07-26    141  |  105  |  37<H>  ----------------------------<  147<H>  4.7   |  23  |  1.9<H>    Ca    10.3<H>      26 Jul 2021 06:26  Mg     2.5     07-26    TPro  6.8  /  Alb  4.4  /  TBili  0.3  /  DBili  x   /  AST  21  /  ALT  20  /  AlkPhos  40  07-26        ABG - ( 24 Jul 2021 13:27 )  pH, Arterial: 7.40  pH, Blood: x     /  pCO2: 38    /  pO2: 77    / HCO3: 24    / Base Excess: -0.9  /  SaO2: 95                      Culture - Blood (collected 25 Jul 2021 06:14)  Source: .Blood None  Preliminary Report (26 Jul 2021 12:01):    No growth to date.    Culture - Blood (collected 24 Jul 2021 21:52)  Source: .Blood None  Preliminary Report (26 Jul 2021 09:01):    No growth to date.                                                    -------------------------------------------------------------  RADIOLOGY:      EXAM:  MR BRAIN            PROCEDURE DATE:  07/24/2021            INTERPRETATION:  CLINICAL INDICATION: Altered mental status    TECHNIQUE: MRI of the brain was performed without contrast.    COMPARISON: CT brain 7/24/2021    FINDINGS:  Acute lacunar infarcts involving the deep white matter of the left frontal lobe at the level of the centrum semiovale.    There is no acute intracranial hemorrhage, mass effect, or hydrocephalus. No extra-axial collection. Basal cisterns are patent.    Age-related involutional changes and chronic microvascular ischemic changes. Chronic lacunar infarcts involving the bilateral corona radiata.    Ventricles and sulci are age-appropriate in size.    Decreased caliber of the distal right internal carotid artery.Short segment stenoses involving the posterior cerebral arteries. Major intracranial flow-voids are otherwise preserved.    Bilateral cataract surgery. The orbits, sellar and suprasellar structures, and craniocervical junction are otherwise unremarkable.    Mucosal thickening involving the maxillary sinuses and right sphenoid sinus. Visualized paranasal sinuses and mastoid air cells are otherwise clear.    IMPRESSION:  Acute lacunar infarcts involving the deep white matter of the left frontal lobe at the level of the centrum semiovale. No acute hemorrhage.    --- End of Report ---          EXAM:  DUPLEX LOW ARTERIES COMP BILAT            PROCEDURE DATE:  07/23/2021            INTERPRETATION:  Clinical History / Reason for exam: This patient is a 75-year-old male with leg pain and swelling bilaterally. Lower extremity arterial duplex ultrasound was performed to assess for arterial occlusive disease.    The Bilateral common femoral, deep femoral, superficial femoral, popliteal, anterior tibial, posterior tibial and peroneal arteries were visualized.    Severely diminished flow noted in the right and left superficial femoral artery suggestive of a proximal superficial femoral or common femoral artery stenosis.  Significant atherosclerotic disease noted in the bilateral common femoral arteries  Impression:    Severely diminished flow noted in the right and left superficial femoral artery suggestive of a proximal superficial femoral or common femoral artery stenosis.  Significant atherosclerotic disease noted in the bilateral common femoral arteries    Vascular surgery consultation is recommended if clinically indicated  ICD-10: I70.223    --- End of Report ---              NATALIA PAULA MD; Attending Vascular Surgery  This document has been electronically signed. Jul 23 2021  3:25PM              Summary:   1. Left ventricular ejection fraction, by visual estimation, is 60 to 65%.   2. Moderately increased LV wall thickness.   3. Spectral Doppler shows impaired relaxation pattern of left ventricular myocardial filling (Grade I diastolic dysfunction).   4. Normal left atrial size.   5. Normal right atrial size.   6. Mild mitral valve regurgitation.   7. Mild thickening of the anterior and posterior mitral valve leaflets.   8. Moderate mitral annular calcification.   9. Mild tricuspid regurgitation.  10. Sclerotic aortic valve with normal opening.  11. There is moderate aortic root calcification.                                              --------------------------------------------------------------    PHYSICAL EXAM:  General: not in distress   HEENT: NCAT  LUNGS: CTAB, Good air entry bilat  HEART: RRR, +S1,S2, RRR  ABDOMEN: SNTTP, ND x 4 q's  EXT: Warm, well perfused x 4  NEURO: AxOx3, weakness left extremity and face, decreased sensation left upper and lower extremity   SKIN: No new breakdown or rashes noted                                           --------------------------------------------------------------

## 2021-07-26 NOTE — PROGRESS NOTE ADULT - ASSESSMENT
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 Patient says he has been feeling intermittently dizzy over the past 3 days despite having adequate/normal PO intake. Patient fell yesterday at home after feeling dizzy hitting the back of his head, but with no LOC. No AC. This morning around 10 patient was walking outside, felt his legs get wobbly, and then fell straight down hitting his buttocks and R knee. he was found to have short segment of stenosis involving both posterior cerebral artery -> neuro consulted -> can follow as out. he was also found to have severely diminished flow to bilateral superficial femoral artery -> vascular consulted and planned for LLE angiogram on Tuesday (pending preop consults).    #Dizziness and fall after getting up from a sitting or supine position likely orthostatic  - Pt denied LOC  - CT spine: No evidence of acute cervical spine fracture or subluxation. Multilevel severe degenerative changes as described, with severe spinal noted stenosis at C2-3 and C3-4 and multilevel severe neural foraminal stenosis.  - CTAP:No definite evidence of acute traumatic injury within the chest, abdomen, or pelvis.   - CTH:  No evidence of acute intracranial hemorrhage. Probable chronic infarct within the right posterior frontal white matter. Lacunar infarcts within bilateral white matter and deep gray nuclei of indeterminate age. Mild/moderate chronic microvascular changes.  - MRI Acute lacunar infarcts involving the deep white matter of the left frontal lobe at the level of the centrum semiovale. No acute hemorrhage.  - Fall precautions   - Pt on tizanidine 4mg q8h - hold for now,   - ortho positive, started on HI stockings and abdominal binder, remains ortho positive, unable to tolerate HI stocking secondary to pain.   - flomax held, moved losartan to PM - patient still ortho positive and symptomatic   - continue with tele monitoring for now   - patient still drives regularly, should not drive a vehicle   - Neurology consult appreciated, spoke to neuro team, in the setting of SAMANTA ok to MR angio head and neck. MRA of the neck is limited by motion. Evaluation of the distal common carotid proximal internal carotid arteries appear grossly unremarkable though better quality study is recommended or CTA of the neck can be done for further evaluation. Evaluation of the proximal external carotid arteries are limited by motion. MRA of the [place] demonstrates decreased caliber of the distal right internal carotid artery. Short segment of stenosis is suspected involving both posterior cerebral arteries.   - Follow up Carotid US -> prelim :  Mild 20-39% internal carotid artery stenosis bilaterally  - Neurology aware, no intervention at this time. Patient can Follow up with neurology at the clinic, as per conversation with neuro PA.   - on valium 10mg TId - confirm this is home dose     # LE pain likely secondary to PAD   - Significant atherosclerotic disease noted in bilateral common femoral arteries  - doppler arterial lower limb -> Severely diminished flow noted in the right and left superficial femoral artery suggestive of a proximal superficial femoral or common femoral artery stenosis.  - vascular consult -> Preop for LLE Angiogram with possible endovascular revascularization, Tuesday   - cardio clearance received   - nephrology consult for clearance   - preop labs ordered   - NPO at midnight     #CAD s/p CABG 2009  - 6/2021 :patent SALAZAR to LAD , patent SVG to OM , % , filled distally by collaterals from LAD.  - Continue DAPT ( Aspirin 81 mg daily and Plavix 75 mg daily ), ARB, Imdur,Ranexa, B-Blocker, Statin Therapy  - Cardio f/u OP     #HTN, patient had hypertensive urgency  - continue with losartan 100mg   - start nifedipine 60mg xL q24h     #HLD  - continue with atorvastatin and fenofibrate     #CVA - Left UE/ Left LE weakness/ left facial from prior CVA  - cont supportive care     #SAMANTA on CKD vs progression of CKD - resolved   - Cr 1.3 in 2017, 1.5 in 2019, 1.7 in June,   - Cr trending down 2.1 -> 1.9  - Pt denies reduced PO intake   - patient retaining on bladder scan harrison placed.    - renal bladder US showed simple cysts within both kidneys without hydronephrosis or nephrolithiasis   - Monitor   - if Cr remains stable consider restarting losartan     #DM  - Hold home meds  - Start Insulin regimen for FS >180      #DVT PPX : Hep SubQ  #Diet: DASH/TLC/CC  #GI PPX: Protonix  #Activity: Ambulate as tolerated  #Dispo: acute  FULL CODE

## 2021-07-26 NOTE — PROGRESS NOTE ADULT - ATTENDING COMMENTS
Mr Ruffin is a 76 yo Man with medical history of CAD s/p CABG x3 ([facility]), CVA 2017 with residual L sided weakness, HTN, DM, HLD, GERD, BPH  presented for dizziness and multiple falls. Patient says he has been feeling intermittently dizzy over the past 3 days despite having adequate/normal PO intake. Patient fell yesterday at home after feeling dizzy hitting the back of his head, but with no LOC. No AC. This morning around 10 patient was walking outside, felt his legs get wobbly, and then fell straight down hitting his buttocks and R knee. he was found to have short segment of stenosis involving both posterior cerebral artery -> neuro consulted -> can follow as out. he was also found to have severely diminished flow to bilateral superficial femoral artery -> vascular consulted and planned for LLE angiogram on Tuesday (pending preop consults).    #Dizziness and fall after getting up from a sitting or supine position likely orthostatic  Pt denied LOC  CT spine: No evidence of acute cervical spine fracture or subluxation. Multilevel severe degenerative changes as described, with severe spinal noted stenosis at C2-3 and C3-4 and multilevel severe neural foraminal stenosis.  CTAP:No definite evidence of acute traumatic injury within the chest, abdomen, or pelvis.   CTH:  No evidence of acute intracranial hemorrhage. Probable chronic infarct within the right posterior frontal white matter. Lacunar infarcts within bilateral white matter and deep gray nuclei of indeterminate age. Mild/moderate chronic microvascular changes.  MRI Acute lacunar infarcts involving the deep white matter of the left frontal lobe at the level of the centrum semiovale. No acute hemorrhage.  Fall precautions   Pt on tizanidine 4mg q8h - hold for now,   ortho positive, started on HI stockings and abdominal binder, remains ortho positive, unable to tolerate HI stocking secondary to pain.   flomax held, moved losartan to PM - patient still ortho positive and symptomatic   continue with tele monitoring for now   patient still drives regularly, should not drive a vehicle   Neurology consult appreciated, spoke to neuro team, in the setting of SAMANTA ok to MR angio head and neck. MRA of the neck is limited by motion. Evaluation of the distal common carotid proximal internal carotid arteries appear grossly unremarkable though better quality study is recommended or CTA of the neck can be done for further evaluation. Evaluation of the proximal external carotid arteries are limited by motion. MRA of the [place] demonstrates decreased caliber of the distal right internal carotid artery. Short segment of stenosis is suspected involving both posterior cerebral arteries.   Follow up Carotid US -> prelim :  Mild 20-39% internal carotid artery stenosis bilaterally  Neurology aware, no intervention at this time. Patient can Follow up with neurology at the clinic, as per conversation with neuro PA.   on valium 10mg TId - confirm this is home dose     #LE pain likely secondary to PAD   Significant atherosclerotic disease noted in bilateral common femoral arteries  doppler arterial lower limb -> Severely diminished flow noted in the right and left superficial femoral artery suggestive of a proximal superficial femoral or common femoral artery stenosis.  vascular consult -> Preop for LLE Angiogram with possible endovascular revascularization, Tuesday   cardio clearance received   nephrology consult for clearance   preop labs ordered   NPO at midnight     #CAD s/p CABG 2009 6/2021 :patent SALAZAR to LAD , patent SVG to OM , % , filled distally by collaterals from LAD.  Continue DAPT ( Aspirin 81 mg daily and Plavix 75 mg daily ), ARB, Imdur,Ranexa, B-Blocker, Statin Therapy  Cardio f/u OP     #HTN, patient had hypertensive urgency  c/w losartan 100mg   c/w nifedipine 60mg xL q24h     #HLD  c/w atorvastatin and fenofibrate     #CVA - Left UE/ Left LE weakness/ left facial from prior CVA  c/w supportive care     #SAMANTA on CKD vs progression of CKD - resolved   Cr 1.3 in 2017, 1.5 in 2019, 1.7 in June,   Cr trending down 2.1 -> 1.9  Pt denies reduced PO intake   patient retaining on bladder scan harrison placed.    renal bladder US showed simple cysts within both kidneys without hydronephrosis or nephrolithiasis   Monitor   if Cr remains stable consider restarting losartan     #DM  Hold home meds  Start Insulin regimen for FS >180      DVT PPX : Hep SubQ  FULL CODE       Progress Note Handoff:   Pending: Vascular procedure   Dispo: acute

## 2021-07-27 LAB
ALBUMIN SERPL ELPH-MCNC: 4.4 G/DL — SIGNIFICANT CHANGE UP (ref 3.5–5.2)
ALP SERPL-CCNC: 41 U/L — SIGNIFICANT CHANGE UP (ref 30–115)
ALT FLD-CCNC: 22 U/L — SIGNIFICANT CHANGE UP (ref 0–41)
ANION GAP SERPL CALC-SCNC: 12 MMOL/L — SIGNIFICANT CHANGE UP (ref 7–14)
APPEARANCE UR: CLEAR — SIGNIFICANT CHANGE UP
AST SERPL-CCNC: 22 U/L — SIGNIFICANT CHANGE UP (ref 0–41)
BACTERIA # UR AUTO: NEGATIVE — SIGNIFICANT CHANGE UP
BASOPHILS # BLD AUTO: 0.08 K/UL — SIGNIFICANT CHANGE UP (ref 0–0.2)
BASOPHILS NFR BLD AUTO: 0.7 % — SIGNIFICANT CHANGE UP (ref 0–1)
BILIRUB SERPL-MCNC: 0.4 MG/DL — SIGNIFICANT CHANGE UP (ref 0.2–1.2)
BILIRUB UR-MCNC: NEGATIVE — SIGNIFICANT CHANGE UP
BUN SERPL-MCNC: 45 MG/DL — HIGH (ref 10–20)
CALCIUM SERPL-MCNC: 9.6 MG/DL — SIGNIFICANT CHANGE UP (ref 8.5–10.1)
CHLORIDE SERPL-SCNC: 104 MMOL/L — SIGNIFICANT CHANGE UP (ref 98–110)
CO2 SERPL-SCNC: 25 MMOL/L — SIGNIFICANT CHANGE UP (ref 17–32)
COLOR SPEC: YELLOW — SIGNIFICANT CHANGE UP
CREAT ?TM UR-MCNC: 131 MG/DL — SIGNIFICANT CHANGE UP
CREAT SERPL-MCNC: 2.1 MG/DL — HIGH (ref 0.7–1.5)
DIFF PNL FLD: ABNORMAL
EOSINOPHIL # BLD AUTO: 0.26 K/UL — SIGNIFICANT CHANGE UP (ref 0–0.7)
EOSINOPHIL NFR BLD AUTO: 2.3 % — SIGNIFICANT CHANGE UP (ref 0–8)
EPI CELLS # UR: 1 /HPF — SIGNIFICANT CHANGE UP (ref 0–5)
GLUCOSE BLDC GLUCOMTR-MCNC: 140 MG/DL — HIGH (ref 70–99)
GLUCOSE BLDC GLUCOMTR-MCNC: 144 MG/DL — HIGH (ref 70–99)
GLUCOSE BLDC GLUCOMTR-MCNC: 147 MG/DL — HIGH (ref 70–99)
GLUCOSE BLDC GLUCOMTR-MCNC: 149 MG/DL — HIGH (ref 70–99)
GLUCOSE SERPL-MCNC: 144 MG/DL — HIGH (ref 70–99)
GLUCOSE UR QL: NEGATIVE — SIGNIFICANT CHANGE UP
HCT VFR BLD CALC: 44.8 % — SIGNIFICANT CHANGE UP (ref 42–52)
HGB BLD-MCNC: 13.9 G/DL — LOW (ref 14–18)
HYALINE CASTS # UR AUTO: 4 /LPF — SIGNIFICANT CHANGE UP (ref 0–7)
IMM GRANULOCYTES NFR BLD AUTO: 0.4 % — HIGH (ref 0.1–0.3)
KETONES UR-MCNC: NEGATIVE — SIGNIFICANT CHANGE UP
LEUKOCYTE ESTERASE UR-ACNC: NEGATIVE — SIGNIFICANT CHANGE UP
LYMPHOCYTES # BLD AUTO: 2.99 K/UL — SIGNIFICANT CHANGE UP (ref 1.2–3.4)
LYMPHOCYTES # BLD AUTO: 27 % — SIGNIFICANT CHANGE UP (ref 20.5–51.1)
MAGNESIUM SERPL-MCNC: 2.6 MG/DL — HIGH (ref 1.8–2.4)
MCHC RBC-ENTMCNC: 28.3 PG — SIGNIFICANT CHANGE UP (ref 27–31)
MCHC RBC-ENTMCNC: 31 G/DL — LOW (ref 32–37)
MCV RBC AUTO: 91.1 FL — SIGNIFICANT CHANGE UP (ref 80–94)
MONOCYTES # BLD AUTO: 0.7 K/UL — HIGH (ref 0.1–0.6)
MONOCYTES NFR BLD AUTO: 6.3 % — SIGNIFICANT CHANGE UP (ref 1.7–9.3)
NEUTROPHILS # BLD AUTO: 7.01 K/UL — HIGH (ref 1.4–6.5)
NEUTROPHILS NFR BLD AUTO: 63.3 % — SIGNIFICANT CHANGE UP (ref 42.2–75.2)
NITRITE UR-MCNC: NEGATIVE — SIGNIFICANT CHANGE UP
NRBC # BLD: 0 /100 WBCS — SIGNIFICANT CHANGE UP (ref 0–0)
OSMOLALITY UR: 528 MOS/KG — SIGNIFICANT CHANGE UP (ref 50–1200)
PH UR: 6 — SIGNIFICANT CHANGE UP (ref 5–8)
PLATELET # BLD AUTO: 335 K/UL — SIGNIFICANT CHANGE UP (ref 130–400)
POTASSIUM SERPL-MCNC: 4.6 MMOL/L — SIGNIFICANT CHANGE UP (ref 3.5–5)
POTASSIUM SERPL-SCNC: 4.6 MMOL/L — SIGNIFICANT CHANGE UP (ref 3.5–5)
PROT ?TM UR-MCNC: 215 MG/DLG/24H — SIGNIFICANT CHANGE UP
PROT ?TM UR-MCNC: 215 MG/DLG/24H — SIGNIFICANT CHANGE UP
PROT SERPL-MCNC: 7 G/DL — SIGNIFICANT CHANGE UP (ref 6–8)
PROT UR-MCNC: ABNORMAL
PROT/CREAT UR-RTO: 1.6 RATIO — HIGH (ref 0–0.2)
RBC # BLD: 4.92 M/UL — SIGNIFICANT CHANGE UP (ref 4.7–6.1)
RBC # FLD: 14.6 % — HIGH (ref 11.5–14.5)
RBC CASTS # UR COMP ASSIST: 90 /HPF — HIGH (ref 0–4)
SODIUM SERPL-SCNC: 141 MMOL/L — SIGNIFICANT CHANGE UP (ref 135–146)
SODIUM UR-SCNC: 22 MMOL/L — SIGNIFICANT CHANGE UP
SP GR SPEC: 1.02 — SIGNIFICANT CHANGE UP (ref 1.01–1.03)
UROBILINOGEN FLD QL: SIGNIFICANT CHANGE UP
WBC # BLD: 11.08 K/UL — HIGH (ref 4.8–10.8)
WBC # FLD AUTO: 11.08 K/UL — HIGH (ref 4.8–10.8)
WBC UR QL: 4 /HPF — SIGNIFICANT CHANGE UP (ref 0–5)

## 2021-07-27 PROCEDURE — 99233 SBSQ HOSP IP/OBS HIGH 50: CPT

## 2021-07-27 RX ORDER — MIDODRINE HYDROCHLORIDE 2.5 MG/1
5 TABLET ORAL
Refills: 0 | Status: DISCONTINUED | OUTPATIENT
Start: 2021-07-27 | End: 2021-07-28

## 2021-07-27 RX ORDER — SENNA PLUS 8.6 MG/1
2 TABLET ORAL AT BEDTIME
Refills: 0 | Status: DISCONTINUED | OUTPATIENT
Start: 2021-07-27 | End: 2021-07-28

## 2021-07-27 RX ORDER — SODIUM CHLORIDE 9 MG/ML
1000 INJECTION INTRAMUSCULAR; INTRAVENOUS; SUBCUTANEOUS
Refills: 0 | Status: DISCONTINUED | OUTPATIENT
Start: 2021-07-27 | End: 2021-07-28

## 2021-07-27 RX ORDER — SODIUM CHLORIDE 9 MG/ML
500 INJECTION INTRAMUSCULAR; INTRAVENOUS; SUBCUTANEOUS ONCE
Refills: 0 | Status: COMPLETED | OUTPATIENT
Start: 2021-07-27 | End: 2021-07-27

## 2021-07-27 RX ORDER — POLYETHYLENE GLYCOL 3350 17 G/17G
17 POWDER, FOR SOLUTION ORAL DAILY
Refills: 0 | Status: DISCONTINUED | OUTPATIENT
Start: 2021-07-27 | End: 2021-07-28

## 2021-07-27 RX ADMIN — RANOLAZINE 500 MILLIGRAM(S): 500 TABLET, FILM COATED, EXTENDED RELEASE ORAL at 05:22

## 2021-07-27 RX ADMIN — Medication 400 MILLIGRAM(S): at 22:03

## 2021-07-27 RX ADMIN — SODIUM CHLORIDE 75 MILLILITER(S): 9 INJECTION INTRAMUSCULAR; INTRAVENOUS; SUBCUTANEOUS at 19:30

## 2021-07-27 RX ADMIN — RANOLAZINE 500 MILLIGRAM(S): 500 TABLET, FILM COATED, EXTENDED RELEASE ORAL at 17:02

## 2021-07-27 RX ADMIN — Medication 400 MILLIGRAM(S): at 13:13

## 2021-07-27 RX ADMIN — Medication 75 MILLIGRAM(S): at 11:07

## 2021-07-27 RX ADMIN — HEPARIN SODIUM 5000 UNIT(S): 5000 INJECTION INTRAVENOUS; SUBCUTANEOUS at 22:04

## 2021-07-27 RX ADMIN — POLYETHYLENE GLYCOL 3350 17 GRAM(S): 17 POWDER, FOR SOLUTION ORAL at 17:01

## 2021-07-27 RX ADMIN — Medication 81 MILLIGRAM(S): at 11:07

## 2021-07-27 RX ADMIN — SODIUM CHLORIDE 1000 MILLILITER(S): 9 INJECTION INTRAMUSCULAR; INTRAVENOUS; SUBCUTANEOUS at 17:07

## 2021-07-27 RX ADMIN — Medication 25 MILLIGRAM(S): at 17:01

## 2021-07-27 RX ADMIN — SODIUM CHLORIDE 75 MILLILITER(S): 9 INJECTION INTRAMUSCULAR; INTRAVENOUS; SUBCUTANEOUS at 17:19

## 2021-07-27 RX ADMIN — Medication 145 MILLIGRAM(S): at 11:07

## 2021-07-27 RX ADMIN — MIDODRINE HYDROCHLORIDE 5 MILLIGRAM(S): 2.5 TABLET ORAL at 17:19

## 2021-07-27 RX ADMIN — ATORVASTATIN CALCIUM 80 MILLIGRAM(S): 80 TABLET, FILM COATED ORAL at 22:03

## 2021-07-27 RX ADMIN — SENNA PLUS 2 TABLET(S): 8.6 TABLET ORAL at 22:03

## 2021-07-27 RX ADMIN — ISOSORBIDE MONONITRATE 60 MILLIGRAM(S): 60 TABLET, EXTENDED RELEASE ORAL at 11:07

## 2021-07-27 RX ADMIN — Medication 400 MILLIGRAM(S): at 05:22

## 2021-07-27 RX ADMIN — Medication 25 MILLIGRAM(S): at 05:22

## 2021-07-27 RX ADMIN — Medication 60 MILLIGRAM(S): at 05:22

## 2021-07-27 RX ADMIN — CLOPIDOGREL BISULFATE 75 MILLIGRAM(S): 75 TABLET, FILM COATED ORAL at 11:07

## 2021-07-27 RX ADMIN — HEPARIN SODIUM 5000 UNIT(S): 5000 INJECTION INTRAVENOUS; SUBCUTANEOUS at 13:12

## 2021-07-27 NOTE — PROGRESS NOTE ADULT - ATTENDING COMMENTS
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 Patient says he has been feeling intermittently dizzy over the past 3 days despite having adequate/normal PO intake. Patient fell yesterday at home after feeling dizzy hitting the back of his head, but with no LOC. No AC. This morning around 10 patient was walking outside, felt his legs get wobbly, and then fell straight down hitting his buttocks and R knee. he was found to have short segment of stenosis involving both posterior cerebral artery -> neuro consulted -> can follow as out. he was also found to have severely diminished flow to bilateral superficial femoral artery -> vascular consulted and planned for LLE angiogram on Tuesday (pending preop consults).    #LE pain likely secondary to PAD   Significant atherosclerotic disease noted in bilateral common femoral arteries  doppler arterial lower limb -> Severely diminished flow noted in the right and left superficial femoral artery suggestive of a proximal superficial femoral or common femoral artery stenosis.  vascular consult -> Pl;an for LLE Angiogram with possible endovascular revascularization, Tuesday   cardio clearance received   Vascular discussed with cardiology and came to conclusion that given poor renal function and high risk candidate benefit do not outweigh risk      #Dizziness and fall after getting up from a sitting or supine position likely orthostatic  Pt denied LOC  CT spine: No evidence of acute cervical spine fracture or subluxation. Multilevel severe degenerative changes as described, with severe spinal noted stenosis at C2-3 and C3-4 and multilevel severe neural foraminal stenosis.  CTAP:No definite evidence of acute traumatic injury within the chest, abdomen, or pelvis.   CTH:  No evidence of acute intracranial hemorrhage. Probable chronic infarct within the right posterior frontal white matter. Lacunar infarcts within bilateral white matter and deep gray nuclei of indeterminate age. Mild/moderate chronic microvascular changes.  MRI Acute lacunar infarcts involving the deep white matter of the left frontal lobe at the level of the centrum semiovale. No acute hemorrhage.  Fall precautions   Pt on tizanidine 4mg q8h - hold for now,   ortho positive, started on HI stockings and abdominal binder, remains ortho positive, unable to tolerate HI stocking secondary to pain.   flomax held, moved losartan to PM - patient still ortho positive and symptomatic   continue with tele monitoring for now   patient still drives regularly, should not drive a vehicle   Neurology consult appreciated, spoke to neuro team, in the setting of SAMANTA ok to MR angio head and neck. MRA of the neck is limited by motion. Evaluation of the distal common carotid proximal internal carotid arteries appear grossly unremarkable though better quality study is recommended or CTA of the neck can be done for further evaluation. Evaluation of the proximal external carotid arteries are limited by motion. MRA of the [place] demonstrates decreased caliber of the distal right internal carotid artery. Short segment of stenosis is suspected involving both posterior cerebral arteries.   Follow up Carotid US -> prelim :  Mild 20-39% internal carotid artery stenosis bilaterally  Neurology aware, no intervention at this time. Patient can Follow up with neurology at the clinic, as per conversation with neuro PA.   on valium 5mg, prn qd  repeat Orthostatics positive - will give bolus 500   start Midodrine given that patient cannot tolerate vasc stockings due to pain in setting of severe PVD - repeat Orthostatics mariposa  Orthostasis could be due to autonomic dysfunction from Diabetic neuropathy       #CAD s/p CABG 2009 6/2021 :patent SALAZAR to LAD , patent SVG to OM , % , filled distally by collaterals from LAD.  Continue DAPT ( Aspirin 81 mg daily and Plavix 75 mg daily ), ARB, Imdur,Ranexa, B-Blocker, Statin Therapy  Cardio f/u OP     #HTN, patient had hypertensive urgency   BP well controlled   hold losartan 100mg given worsening renal function  continue nifedipine 60mg xL q24h     #HLD  c/w atorvastatin and fenofibrate     #CVA - Left UE/ Left LE weakness/ left facial from prior CVA  cont supportive care     #SAMANTA on CKD vs progression of CKD   Cr 1.3 in 2017, 1.5 in 2019, 1.7 in June,   Cr trend 2.1 -> 1.9-- > 2.1 today   Pt denies reduced PO intake   patient retaining on bladder scan harrison placed, still in place   renal bladder US showed simple cysts within both kidneys without hydronephrosis or nephrolithiasis   Monitor   if Cr remains stable consider restarting losartan     #DM  Hold home meds, glucose wnl   Start Insulin regimen for FS >180      DVT PPX : Hep SubQ  FULL CODE     Progress Note Handoff:  pending: clinical improvement  anticipate 24-48hr discharge  Dispo: acute

## 2021-07-27 NOTE — PROGRESS NOTE ADULT - SUBJECTIVE AND OBJECTIVE BOX
ERICK MARTINEZ 75y Male  MRN#: [id number]   CODE STATUS: full code     Hospital Day: 7d    Pt is currently admitted with the primary diagnosis of syncope, dizziness     SUBJECTIVE    No events overnight     Present Today:   - Abbott:  No [  ], Yes [ x  ] : Indication: retention     - Type of IV Access:       .. CVC/Piccline:  No [ x ], Yes [   ] : Indication:       .. Midline: No [ x ], Yes [   ] : Indication:                                             ----------------------------------------------------------  OBJECTIVE  PAST MEDICAL & SURGICAL HISTORY  CAD (coronary artery disease)    HTN (hypertension)    Depression    Anxiety    Diabetes  niddm    Angina pectoris    BPH (benign prostatic hyperplasia)    GERD (gastroesophageal reflux disease)    CVA (cerebral vascular accident)    History of appendectomy    Bilateral cataracts    History of heart bypass surgery                                              -----------------------------------------------------------  ALLERGIES:  clindamycin (Rash)  PC Pen VK (Rash)  penicillin (Rash)                                            ------------------------------------------------------------    HOME MEDICATIONS  Home Medications:  aspirin 81 mg oral tablet: 1 tab(s) orally once a day (09 Jun 2021 14:22)  diazePAM 10 mg oral tablet: 1 tab(s) orally 3 times a day (09 Jun 2021 14:22)  fenofibrate 120 mg oral tablet: 1 tab(s) orally once a day (09 Jun 2021 14:22)  Januvia 25 mg oral tablet: 1 tab(s) orally once a day (09 Jun 2021 14:22)  lansoprazole 30 mg oral delayed release capsule: 1 cap(s) orally once a day (09 Jun 2021 14:22)  losartan 100 mg oral tablet: 1 tab(s) orally once a day (09 Jun 2021 14:22)  metoprolol succinate 25 mg oral tablet, extended release: orally 2 times a day (09 Jun 2021 14:22)  Pentoxil 400 mg oral tablet, extended release: 1 tab(s) orally 3 times a day (09 Jun 2021 14:22)  Plavix 75 mg oral tablet: 1 tab(s) orally once a day (09 Jun 2021 14:22)  Ranexa 500 mg oral tablet, extended release: 1 tab(s) orally 2 times a day (09 Jun 2021 14:22)  rosuvastatin 40 mg oral tablet: 1 tab(s) orally once a day (09 Jun 2021 14:22)  tamsulosin 0.4 mg oral capsule: 1 cap(s) orally once a day (09 Jun 2021 14:22)  tiZANidine 4 mg oral tablet: 1 tab(s) orally 3 times a day (20 Jul 2021 18:40)  venlafaxine 75 mg oral tablet: 1 tab(s) orally 2 times a day (09 Jun 2021 14:22)                           MEDICATIONS:  STANDING MEDICATIONS  aspirin  chewable 81 milliGRAM(s) Oral daily  atorvastatin 80 milliGRAM(s) Oral at bedtime  clopidogrel Tablet 75 milliGRAM(s) Oral daily  fenofibrate Tablet 145 milliGRAM(s) Oral daily  heparin   Injectable 5000 Unit(s) SubCutaneous every 8 hours  isosorbide   mononitrate ER Tablet (IMDUR) 60 milliGRAM(s) Oral daily  losartan 100 milliGRAM(s) Oral at bedtime  metoprolol succinate ER 25 milliGRAM(s) Oral every 12 hours  NIFEdipine XL 60 milliGRAM(s) Oral daily  pentoxifylline 400 milliGRAM(s) Oral three times a day  ranolazine 500 milliGRAM(s) Oral two times a day  venlafaxine XR. 75 milliGRAM(s) Oral daily    PRN MEDICATIONS  acetaminophen   Tablet .. 1000 milliGRAM(s) Oral every 6 hours PRN  diazepam    Tablet 5 milliGRAM(s) Oral daily PRN                                            ------------------------------------------------------------  VITAL SIGNS: Last 24 Hours  T(C): 35.9 (27 Jul 2021 05:36), Max: 36.5 (26 Jul 2021 13:22)  T(F): 96.6 (27 Jul 2021 05:36), Max: 97.7 (26 Jul 2021 13:22)  HR: 81 (27 Jul 2021 05:36) (79 - 87)  BP: 151/73 (27 Jul 2021 05:36) (117/72 - 151/73)  BP(mean): --  RR: 18 (27 Jul 2021 05:36) (18 - 18)  SpO2: 97% (27 Jul 2021 07:40) (97% - 97%)      07-26-21 @ 07:01  -  07-27-21 @ 07:00  --------------------------------------------------------  IN: 570 mL / OUT: 1375 mL / NET: -805 mL                                             --------------------------------------------------------------  LABS:                        13.9   11.08 )-----------( 335      ( 27 Jul 2021 06:25 )             44.8     07-27    141  |  104  |  45<H>  ----------------------------<  144<H>  4.6   |  25  |  2.1<H>    Ca    9.6      27 Jul 2021 06:25  Mg     2.6     07-27    TPro  7.0  /  Alb  4.4  /  TBili  0.4  /  DBili  x   /  AST  22  /  ALT  22  /  AlkPhos  41  07-27    PT/INR - ( 26 Jul 2021 21:32 )   PT: 11.80 sec;   INR: 1.03 ratio         PTT - ( 26 Jul 2021 21:32 )  PTT:30.9 sec            Culture - Blood (collected 25 Jul 2021 06:14)  Source: .Blood None  Preliminary Report (26 Jul 2021 12:01):    No growth to date.    Culture - Blood (collected 24 Jul 2021 21:52)  Source: .Blood None  Preliminary Report (26 Jul 2021 09:01):    No growth to date.                                                    -------------------------------------------------------------  RADIOLOGY:    no new imaging                                           --------------------------------------------------------------    PHYSICAL EXAM:  General: not in distress   HEENT: NCAT  LUNGS: Good air entry bilat, decreased at lower lung bases   HEART: RRR, +S1,S2, RRR  ABDOMEN: SNTTP, ND x 4 q's  EXT: Warm, well perfused x 4  NEURO: AxOx3, weakness along the left upper and lower extremity, decreased sensation on the left side, facial droop left side   SKIN: No new breakdown or rashes noted                                           --------------------------------------------------------------

## 2021-07-27 NOTE — PROGRESS NOTE ADULT - ASSESSMENT
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 Patient says he has been feeling intermittently dizzy over the past 3 days despite having adequate/normal PO intake. Patient fell yesterday at home after feeling dizzy hitting the back of his head, but with no LOC. No AC. This morning around 10 patient was walking outside, felt his legs get wobbly, and then fell straight down hitting his buttocks and R knee. he was found to have short segment of stenosis involving both posterior cerebral artery -> neuro consulted -> can follow as out. he was also found to have severely diminished flow to bilateral superficial femoral artery -> vascular consulted and planned for LLE angiogram on Tuesday (pending preop consults).    # LE pain likely secondary to PAD   - Significant atherosclerotic disease noted in bilateral common femoral arteries  - doppler arterial lower limb -> Severely diminished flow noted in the right and left superficial femoral artery suggestive of a proximal superficial femoral or common femoral artery stenosis.  - vascular consult -> Pl;an for LLE Angiogram with possible endovascular revascularization, Tuesday   - cardio clearance received   - nephrology consult for clearance   - preop labs ordered   - NPO at midnight     #Dizziness and fall after getting up from a sitting or supine position likely orthostatic  - Pt denied LOC  - CT spine: No evidence of acute cervical spine fracture or subluxation. Multilevel severe degenerative changes as described, with severe spinal noted stenosis at C2-3 and C3-4 and multilevel severe neural foraminal stenosis.  - CTAP:No definite evidence of acute traumatic injury within the chest, abdomen, or pelvis.   - CTH:  No evidence of acute intracranial hemorrhage. Probable chronic infarct within the right posterior frontal white matter. Lacunar infarcts within bilateral white matter and deep gray nuclei of indeterminate age. Mild/moderate chronic microvascular changes.  - MRI Acute lacunar infarcts involving the deep white matter of the left frontal lobe at the level of the centrum semiovale. No acute hemorrhage.  - Fall precautions   - Pt on tizanidine 4mg q8h - hold for now,   - ortho positive, started on HI stockings and abdominal binder, remains ortho positive, unable to tolerate HI stocking secondary to pain.   - flomax held, moved losartan to PM - patient still ortho positive and symptomatic   - continue with tele monitoring for now   - patient still drives regularly, should not drive a vehicle   - Neurology consult appreciated, spoke to neuro team, in the setting of SAMANTA ok to MR angio head and neck. MRA of the neck is limited by motion. Evaluation of the distal common carotid proximal internal carotid arteries appear grossly unremarkable though better quality study is recommended or CTA of the neck can be done for further evaluation. Evaluation of the proximal external carotid arteries are limited by motion. MRA of the [place] Rodriguez demonstrates decreased caliber of the distal right internal carotid artery. Short segment of stenosis is suspected involving both posterior cerebral arteries.   - Follow up Carotid US -> prelim :  Mild 20-39% internal carotid artery stenosis bilaterally  - Neurology aware, no intervention at this time. Patient can Follow up with neurology at the clinic, as per conversation with neuro PA.   - on valium 5mg, prn qd,       #CAD s/p CABG 2009  - 6/2021 :patent SALAZAR to LAD , patent SVG to OM , % , filled distally by collaterals from LAD.  - Continue DAPT ( Aspirin 81 mg daily and Plavix 75 mg daily ), ARB, Imdur,Ranexa, B-Blocker, Statin Therapy  - Cardio f/u OP     #HTN, patient had hypertensive urgency   BP well controlled   - continue with losartan 100mg   - start nifedipine 60mg xL q24h     #HLD  - continue with atorvastatin and fenofibrate     #CVA - Left UE/ Left LE weakness/ left facial from prior CVA  - cont supportive care     #SAMANTA on CKD vs progression of CKD   - Cr 1.3 in 2017, 1.5 in 2019, 1.7 in June,   - Cr trend 2.1 -> 1.9-- > 2.1 today   - Pt denies reduced PO intake   - patient retaining on bladder scan harrison placed, still in place   - renal bladder US showed simple cysts within both kidneys without hydronephrosis or nephrolithiasis   - Monitor   - if Cr remains stable consider restarting losartan     #DM  - Hold home meds, glucose wnl   - Start Insulin regimen for FS >180      #DVT PPX : Hep SubQ  #Diet: DASH/TLC/CC  #GI PPX: Protonix  #Activity: Ambulate as tolerated  #Dispo: acute  FULL CODE

## 2021-07-28 ENCOUNTER — INPATIENT (INPATIENT)
Facility: HOSPITAL | Age: 76
LOS: 20 days | Discharge: HOME HEALTH | End: 2021-08-18
Attending: PHYSICAL MEDICINE & REHABILITATION | Admitting: PHYSICAL MEDICINE & REHABILITATION
Payer: MEDICARE

## 2021-07-28 ENCOUNTER — TRANSCRIPTION ENCOUNTER (OUTPATIENT)
Age: 76
End: 2021-07-28

## 2021-07-28 VITALS
SYSTOLIC BLOOD PRESSURE: 127 MMHG | HEART RATE: 74 BPM | TEMPERATURE: 97 F | WEIGHT: 156.53 LBS | HEIGHT: 61 IN | RESPIRATION RATE: 18 BRPM | DIASTOLIC BLOOD PRESSURE: 68 MMHG

## 2021-07-28 VITALS — HEART RATE: 75 BPM | DIASTOLIC BLOOD PRESSURE: 59 MMHG | SYSTOLIC BLOOD PRESSURE: 101 MMHG

## 2021-07-28 DIAGNOSIS — Z90.49 ACQUIRED ABSENCE OF OTHER SPECIFIED PARTS OF DIGESTIVE TRACT: Chronic | ICD-10-CM

## 2021-07-28 DIAGNOSIS — H26.9 UNSPECIFIED CATARACT: Chronic | ICD-10-CM

## 2021-07-28 DIAGNOSIS — Z95.1 PRESENCE OF AORTOCORONARY BYPASS GRAFT: Chronic | ICD-10-CM

## 2021-07-28 LAB
ALBUMIN SERPL ELPH-MCNC: 4 G/DL — SIGNIFICANT CHANGE UP (ref 3.5–5.2)
ALP SERPL-CCNC: 34 U/L — SIGNIFICANT CHANGE UP (ref 30–115)
ALT FLD-CCNC: 15 U/L — SIGNIFICANT CHANGE UP (ref 0–41)
ANION GAP SERPL CALC-SCNC: 9 MMOL/L — SIGNIFICANT CHANGE UP (ref 7–14)
AST SERPL-CCNC: 16 U/L — SIGNIFICANT CHANGE UP (ref 0–41)
BASOPHILS # BLD AUTO: 0.05 K/UL — SIGNIFICANT CHANGE UP (ref 0–0.2)
BASOPHILS NFR BLD AUTO: 0.7 % — SIGNIFICANT CHANGE UP (ref 0–1)
BILIRUB SERPL-MCNC: 0.3 MG/DL — SIGNIFICANT CHANGE UP (ref 0.2–1.2)
BUN SERPL-MCNC: 46 MG/DL — HIGH (ref 10–20)
CALCIUM SERPL-MCNC: 9.3 MG/DL — SIGNIFICANT CHANGE UP (ref 8.5–10.1)
CHLORIDE SERPL-SCNC: 108 MMOL/L — SIGNIFICANT CHANGE UP (ref 98–110)
CO2 SERPL-SCNC: 22 MMOL/L — SIGNIFICANT CHANGE UP (ref 17–32)
CREAT SERPL-MCNC: 1.8 MG/DL — HIGH (ref 0.7–1.5)
EOSINOPHIL # BLD AUTO: 0.2 K/UL — SIGNIFICANT CHANGE UP (ref 0–0.7)
EOSINOPHIL NFR BLD AUTO: 2.6 % — SIGNIFICANT CHANGE UP (ref 0–8)
GLUCOSE BLDC GLUCOMTR-MCNC: 131 MG/DL — HIGH (ref 70–99)
GLUCOSE SERPL-MCNC: 120 MG/DL — HIGH (ref 70–99)
HCT VFR BLD CALC: 40.7 % — LOW (ref 42–52)
HGB BLD-MCNC: 12.7 G/DL — LOW (ref 14–18)
IMM GRANULOCYTES NFR BLD AUTO: 0.7 % — HIGH (ref 0.1–0.3)
LYMPHOCYTES # BLD AUTO: 2.32 K/UL — SIGNIFICANT CHANGE UP (ref 1.2–3.4)
LYMPHOCYTES # BLD AUTO: 30.2 % — SIGNIFICANT CHANGE UP (ref 20.5–51.1)
MAGNESIUM SERPL-MCNC: 2.5 MG/DL — HIGH (ref 1.8–2.4)
MCHC RBC-ENTMCNC: 28.5 PG — SIGNIFICANT CHANGE UP (ref 27–31)
MCHC RBC-ENTMCNC: 31.2 G/DL — LOW (ref 32–37)
MCV RBC AUTO: 91.5 FL — SIGNIFICANT CHANGE UP (ref 80–94)
MONOCYTES # BLD AUTO: 0.59 K/UL — SIGNIFICANT CHANGE UP (ref 0.1–0.6)
MONOCYTES NFR BLD AUTO: 7.7 % — SIGNIFICANT CHANGE UP (ref 1.7–9.3)
NEUTROPHILS # BLD AUTO: 4.47 K/UL — SIGNIFICANT CHANGE UP (ref 1.4–6.5)
NEUTROPHILS NFR BLD AUTO: 58.1 % — SIGNIFICANT CHANGE UP (ref 42.2–75.2)
NRBC # BLD: 0 /100 WBCS — SIGNIFICANT CHANGE UP (ref 0–0)
PLATELET # BLD AUTO: 265 K/UL — SIGNIFICANT CHANGE UP (ref 130–400)
POTASSIUM SERPL-MCNC: 4.9 MMOL/L — SIGNIFICANT CHANGE UP (ref 3.5–5)
POTASSIUM SERPL-SCNC: 4.9 MMOL/L — SIGNIFICANT CHANGE UP (ref 3.5–5)
PROT SERPL-MCNC: 6.2 G/DL — SIGNIFICANT CHANGE UP (ref 6–8)
RBC # BLD: 4.45 M/UL — LOW (ref 4.7–6.1)
RBC # FLD: 14.6 % — HIGH (ref 11.5–14.5)
SODIUM SERPL-SCNC: 139 MMOL/L — SIGNIFICANT CHANGE UP (ref 135–146)
WBC # BLD: 7.68 K/UL — SIGNIFICANT CHANGE UP (ref 4.8–10.8)
WBC # FLD AUTO: 7.68 K/UL — SIGNIFICANT CHANGE UP (ref 4.8–10.8)

## 2021-07-28 PROCEDURE — 99239 HOSP IP/OBS DSCHRG MGMT >30: CPT

## 2021-07-28 RX ORDER — LANSOPRAZOLE 15 MG/1
1 CAPSULE, DELAYED RELEASE ORAL
Qty: 0 | Refills: 0 | DISCHARGE

## 2021-07-28 RX ORDER — DIAZEPAM 5 MG
1 TABLET ORAL
Qty: 0 | Refills: 0 | DISCHARGE

## 2021-07-28 RX ORDER — MIDODRINE HYDROCHLORIDE 2.5 MG/1
2.5 TABLET ORAL
Refills: 0 | Status: DISCONTINUED | OUTPATIENT
Start: 2021-07-28 | End: 2021-07-28

## 2021-07-28 RX ORDER — VENLAFAXINE HCL 75 MG
75 CAPSULE, EXT RELEASE 24 HR ORAL DAILY
Refills: 0 | Status: DISCONTINUED | OUTPATIENT
Start: 2021-07-28 | End: 2021-08-18

## 2021-07-28 RX ORDER — RANOLAZINE 500 MG/1
500 TABLET, FILM COATED, EXTENDED RELEASE ORAL
Refills: 0 | Status: DISCONTINUED | OUTPATIENT
Start: 2021-07-28 | End: 2021-08-18

## 2021-07-28 RX ORDER — METOPROLOL TARTRATE 50 MG
25 TABLET ORAL EVERY 12 HOURS
Refills: 0 | Status: DISCONTINUED | OUTPATIENT
Start: 2021-07-28 | End: 2021-08-18

## 2021-07-28 RX ORDER — PENTOXIFYLLINE 400 MG
400 TABLET, EXTENDED RELEASE ORAL THREE TIMES A DAY
Refills: 0 | Status: DISCONTINUED | OUTPATIENT
Start: 2021-07-28 | End: 2021-08-18

## 2021-07-28 RX ORDER — POLYETHYLENE GLYCOL 3350 17 G/17G
17 POWDER, FOR SOLUTION ORAL DAILY
Refills: 0 | Status: DISCONTINUED | OUTPATIENT
Start: 2021-07-28 | End: 2021-08-18

## 2021-07-28 RX ORDER — MIDODRINE HYDROCHLORIDE 2.5 MG/1
2.5 TABLET ORAL
Refills: 0 | Status: DISCONTINUED | OUTPATIENT
Start: 2021-07-28 | End: 2021-08-13

## 2021-07-28 RX ORDER — TAMSULOSIN HYDROCHLORIDE 0.4 MG/1
1 CAPSULE ORAL
Qty: 0 | Refills: 0 | DISCHARGE

## 2021-07-28 RX ORDER — ACETAMINOPHEN 500 MG
975 TABLET ORAL EVERY 6 HOURS
Refills: 0 | Status: DISCONTINUED | OUTPATIENT
Start: 2021-07-28 | End: 2021-08-18

## 2021-07-28 RX ORDER — ISOSORBIDE MONONITRATE 60 MG/1
60 TABLET, EXTENDED RELEASE ORAL DAILY
Refills: 0 | Status: DISCONTINUED | OUTPATIENT
Start: 2021-07-28 | End: 2021-08-18

## 2021-07-28 RX ORDER — LOSARTAN POTASSIUM 100 MG/1
1 TABLET, FILM COATED ORAL
Qty: 0 | Refills: 0 | DISCHARGE

## 2021-07-28 RX ORDER — MIDODRINE HYDROCHLORIDE 2.5 MG/1
1 TABLET ORAL
Qty: 0 | Refills: 0 | DISCHARGE
Start: 2021-07-28

## 2021-07-28 RX ORDER — SENNA PLUS 8.6 MG/1
2 TABLET ORAL
Qty: 0 | Refills: 0 | DISCHARGE
Start: 2021-07-28

## 2021-07-28 RX ORDER — ATORVASTATIN CALCIUM 80 MG/1
1 TABLET, FILM COATED ORAL
Qty: 0 | Refills: 0 | DISCHARGE
Start: 2021-07-28

## 2021-07-28 RX ORDER — NIFEDIPINE 30 MG
60 TABLET, EXTENDED RELEASE 24 HR ORAL DAILY
Refills: 0 | Status: DISCONTINUED | OUTPATIENT
Start: 2021-07-28 | End: 2021-08-18

## 2021-07-28 RX ORDER — ASPIRIN/CALCIUM CARB/MAGNESIUM 324 MG
81 TABLET ORAL DAILY
Refills: 0 | Status: DISCONTINUED | OUTPATIENT
Start: 2021-07-28 | End: 2021-08-18

## 2021-07-28 RX ORDER — LANOLIN ALCOHOL/MO/W.PET/CERES
3 CREAM (GRAM) TOPICAL AT BEDTIME
Refills: 0 | Status: DISCONTINUED | OUTPATIENT
Start: 2021-07-28 | End: 2021-07-28

## 2021-07-28 RX ORDER — SENNA PLUS 8.6 MG/1
2 TABLET ORAL AT BEDTIME
Refills: 0 | Status: DISCONTINUED | OUTPATIENT
Start: 2021-07-28 | End: 2021-08-06

## 2021-07-28 RX ORDER — TIZANIDINE 4 MG/1
1 TABLET ORAL
Qty: 0 | Refills: 0 | DISCHARGE

## 2021-07-28 RX ORDER — SITAGLIPTIN 50 MG/1
1 TABLET, FILM COATED ORAL
Qty: 0 | Refills: 0 | DISCHARGE

## 2021-07-28 RX ORDER — ROSUVASTATIN CALCIUM 5 MG/1
1 TABLET ORAL
Qty: 0 | Refills: 0 | DISCHARGE

## 2021-07-28 RX ORDER — FENOFIBRATE,MICRONIZED 130 MG
145 CAPSULE ORAL DAILY
Refills: 0 | Status: DISCONTINUED | OUTPATIENT
Start: 2021-07-28 | End: 2021-08-18

## 2021-07-28 RX ORDER — NIFEDIPINE 30 MG
1 TABLET, EXTENDED RELEASE 24 HR ORAL
Qty: 0 | Refills: 0 | DISCHARGE
Start: 2021-07-28

## 2021-07-28 RX ORDER — HEPARIN SODIUM 5000 [USP'U]/ML
5000 INJECTION INTRAVENOUS; SUBCUTANEOUS EVERY 8 HOURS
Refills: 0 | Status: DISCONTINUED | OUTPATIENT
Start: 2021-07-28 | End: 2021-08-18

## 2021-07-28 RX ORDER — CLOPIDOGREL BISULFATE 75 MG/1
75 TABLET, FILM COATED ORAL DAILY
Refills: 0 | Status: DISCONTINUED | OUTPATIENT
Start: 2021-07-28 | End: 2021-08-18

## 2021-07-28 RX ORDER — DIAZEPAM 5 MG
5 TABLET ORAL DAILY
Refills: 0 | Status: DISCONTINUED | OUTPATIENT
Start: 2021-07-28 | End: 2021-08-03

## 2021-07-28 RX ORDER — POLYETHYLENE GLYCOL 3350 17 G/17G
17 POWDER, FOR SOLUTION ORAL
Qty: 0 | Refills: 0 | DISCHARGE
Start: 2021-07-28

## 2021-07-28 RX ORDER — ATORVASTATIN CALCIUM 80 MG/1
80 TABLET, FILM COATED ORAL AT BEDTIME
Refills: 0 | Status: DISCONTINUED | OUTPATIENT
Start: 2021-07-28 | End: 2021-08-18

## 2021-07-28 RX ORDER — HEPARIN SODIUM 5000 [USP'U]/ML
5000 INJECTION INTRAVENOUS; SUBCUTANEOUS
Qty: 0 | Refills: 0 | DISCHARGE
Start: 2021-07-28

## 2021-07-28 RX ADMIN — RANOLAZINE 500 MILLIGRAM(S): 500 TABLET, FILM COATED, EXTENDED RELEASE ORAL at 05:45

## 2021-07-28 RX ADMIN — Medication 60 MILLIGRAM(S): at 05:45

## 2021-07-28 RX ADMIN — Medication 81 MILLIGRAM(S): at 11:47

## 2021-07-28 RX ADMIN — SENNA PLUS 2 TABLET(S): 8.6 TABLET ORAL at 22:31

## 2021-07-28 RX ADMIN — CLOPIDOGREL BISULFATE 75 MILLIGRAM(S): 75 TABLET, FILM COATED ORAL at 11:47

## 2021-07-28 RX ADMIN — ISOSORBIDE MONONITRATE 60 MILLIGRAM(S): 60 TABLET, EXTENDED RELEASE ORAL at 11:47

## 2021-07-28 RX ADMIN — MIDODRINE HYDROCHLORIDE 2.5 MILLIGRAM(S): 2.5 TABLET ORAL at 18:10

## 2021-07-28 RX ADMIN — Medication 400 MILLIGRAM(S): at 05:46

## 2021-07-28 RX ADMIN — Medication 25 MILLIGRAM(S): at 05:45

## 2021-07-28 RX ADMIN — Medication 400 MILLIGRAM(S): at 14:04

## 2021-07-28 RX ADMIN — ATORVASTATIN CALCIUM 80 MILLIGRAM(S): 80 TABLET, FILM COATED ORAL at 22:31

## 2021-07-28 RX ADMIN — Medication 75 MILLIGRAM(S): at 11:47

## 2021-07-28 RX ADMIN — RANOLAZINE 500 MILLIGRAM(S): 500 TABLET, FILM COATED, EXTENDED RELEASE ORAL at 18:10

## 2021-07-28 RX ADMIN — Medication 145 MILLIGRAM(S): at 11:47

## 2021-07-28 RX ADMIN — Medication 400 MILLIGRAM(S): at 22:31

## 2021-07-28 RX ADMIN — HEPARIN SODIUM 5000 UNIT(S): 5000 INJECTION INTRAVENOUS; SUBCUTANEOUS at 05:45

## 2021-07-28 RX ADMIN — HEPARIN SODIUM 5000 UNIT(S): 5000 INJECTION INTRAVENOUS; SUBCUTANEOUS at 14:04

## 2021-07-28 RX ADMIN — Medication 25 MILLIGRAM(S): at 18:10

## 2021-07-28 RX ADMIN — HEPARIN SODIUM 5000 UNIT(S): 5000 INJECTION INTRAVENOUS; SUBCUTANEOUS at 22:31

## 2021-07-28 NOTE — H&P ADULT - ATTENDING COMMENTS
I reviewed the chart and examined the patient with the resident and we discussed the findings and treatment plan. I agree with the findings and treatment plan above, which I modified as indicated. The patient requires 3 hrs a day of acute inpatient rehab.    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 Rehab of acute lacunar infarcts in the deep white matter of the left frontal lobe at the level fo the centrum semiovale and chronic left sided weakness from CVA of 2017    # Rehab of chronic left hemiparesis and dysarthria and dysphagia with decline in function and falls, found to have an acute left subcortical infarct.    MRI Acute lacunar infarcts involving the deep white matter of the left frontal lobe at the level of the centrum semiovale. No acute hemorrhage.  He requires 3 hours a day of at least PT and OT with speech therapy and neuropsych to eval. He also requires a hopital based rehab setting as he has been having symptomatic orthostatic hypotension and SAMANTA on CKD with adjustment in his meds. He requires close monitoring by rehab nursing and physiatry to monitor his BPs and check for orthostasis and adjust his medications while participating in rehab.    - Neurology was consulted and no intervention was recommended; no intervention at this time. Patient can Follow up with neurology at the clinic, as per conversation with neuro PA. Was already on DAPT and high dose statin.  - Patient requires PT/OT/SLT/Neuropsych eval an acute rehab program to return to previous level of function.    #Dizziness and fall 2/2 orthostatic hypotension - now controlled  - continue with HI stockings and abdominal binder  - continue midodrine 2x a day  - fall precautions   - home medications of flomax, tizanidine were held by medicine team prior to rehab admission; valium was changed from 10 mg standing to 5 mg PRN by medicine per neurology recommendations     # LE pain likely secondary to PAD   - Significant atherosclerotic disease noted in bilateral common femoral arteries  - Patient's family and vascular team agreed to not proceed with intervention at this time     # CAD s/p CABG 2009  - 6/2021 :patent SALAZAR to LAD , patent SVG to OM , % , filled distally by collaterals from LAD.  - Continue DAPT ( Aspirin 81 mg daily and Plavix 75 mg daily ), ARB, Imdur, Ranexa, B-Blocker, Statin Therapy  - Cardio f/u OP     #HTN, patient had hypertensive urgency   - losartan 100mg held for SAMANTA   - c/w nifedipine 60mg xL q24h     #HLD  - continue with atorvastatin and fenofibrate     #SAMANTA on CKD vs progression of CKD   - Cr 1.3 in 2017, 1.5 in 2019, 1.7 in June; Current Cr trend 2.1 -> 1.9-- > 2.1 --> 1.8  - Renal bladder US showed simple cysts within both kidneys without hydronephrosis or nephrolithiasis   - Patient retaining on bladder scan so harrison placed, still in place; attempt trial of void   - Monitor Cr, IVF as needed     #DM type 2  - Hold home meds, glucose wnl   - Start Insulin regimen for FS >180    # Presumed Urinary retension  - has harrison. will gige TOV in am and do CIC if needed    # Depression   - venlafaxine     # Anxiety   - Valium PRN as above    # Maintenance   - Pain control: none   - GI/Bowel Mgmt: miralax ,senna   - Bladder management: Harrison in place for urinary retention   - Skin: No active issues at this time  - FEN: monitor electrolytes, replete as needed   - Diet: DASH diet     # Precautions / PROPHYLAXIS:    - Fall precaution   - Ortho: Weight bearing status: WBAT  - DVT prophylaxis: SC heparin I reviewed the chart and examined the patient with the resident and we discussed the findings and treatment plan. I agree with the findings and treatment plan above, which I modified as indicated. The patient requires 3 hrs a day of acute inpatient rehab.    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 Rehab of acute lacunar infarcts in the deep white matter of the left frontal lobe at the level fo the centrum semiovale and chronic left sided weakness from CVA of 2017    # Rehab of chronic left hemiparesis and dysarthria and dysphagia with decline in function and falls, found to have an acute left subcortical infarct.    MRI Acute lacunar infarcts involving the deep white matter of the left frontal lobe at the level of the centrum semiovale. No acute hemorrhage.  He requires 3 hours a day of at least PT and OT with speech therapy and neuropsych to eval. He also requires a hospital based rehab setting as he has been having symptomatic orthostatic hypotension and SAMANTA on CKD with adjustment in his meds. He requires close monitoring by rehab nursing and physiatry to monitor his BPs and check for orthostasis and adjust his medications while participating in rehab.    - Neurology was consulted and no intervention was recommended; no intervention at this time. Patient can Follow up with neurology at the clinic, as per conversation with neuro PA. Was already on DAPT and high dose statin.  - Patient requires PT/OT/SLT/Neuropsych eval an acute rehab program to return to previous level of function.    #Dizziness and fall 2/2 orthostatic hypotension - now controlled  - continue with HI stockings and abdominal binder  - continue midodrine 2x a day  - fall precautions   - home medications of flomax, tizanidine were held by medicine team prior to rehab admission; valium was changed from 10 mg standing to 5 mg PRN by medicine per neurology recommendations     # LE pain likely secondary to PAD   - Significant atherosclerotic disease noted in bilateral common femoral arteries  - Patient's family and vascular team agreed to not proceed with intervention at this time     # CAD s/p CABG 2009  - 6/2021 :patent SALAZAR to LAD , patent SVG to OM , % , filled distally by collaterals from LAD.  - Continue DAPT ( Aspirin 81 mg daily and Plavix 75 mg daily ), Imdur, Ranexa, B-Blocker, Statin Therapy  - Cardio f/u OP     #HTN, patient had hypertensive urgency   - losartan 100mg held for SAMANTA   - c/w nifedipine 60mg xL q24h     #HLD  - continue with atorvastatin and fenofibrate     #SAMANTA on CKD vs progression of CKD   - Cr 1.3 in 2017, 1.5 in 2019, 1.7 in June; Current Cr trend 2.1 -> 1.9-- > 2.1 --> 1.8  - Renal bladder US showed simple cysts within both kidneys without hydronephrosis or nephrolithiasis   - Patient retaining on bladder scan so harrison placed, still in place; attempt trial of void   - Monitor Cr, IVF as needed     #DM type 2  - Hold home meds, glucose wnl   - Start Insulin regimen for FS >180    # Presumed Urinary retension  - has harrison. will do TOV in am and do CIC if needed    # Depression   - venlafaxine     # Anxiety   - Valium PRN as above    # Maintenance   - Pain control: none   - GI/Bowel Mgmt: miralax ,senna   - Bladder management: Harrison in place for urinary retention. TOV soon as above  - Skin: No active issues at this time  - FEN: monitor electrolytes, replete as needed   - Diet: DASH diet     # Precautions / PROPHYLAXIS:    - Fall precaution   - Ortho: Weight bearing status: WBAT  - DVT prophylaxis: SC heparin

## 2021-07-28 NOTE — DISCHARGE NOTE PROVIDER - CARE PROVIDER_API CALL
TANISHA CORREIA  [id number]  [address] [place] [address]  Phone: [phone]  Fax: [phone])-  Follow Up Time: 1 week

## 2021-07-28 NOTE — PROGRESS NOTE ADULT - ASSESSMENT
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 Patient says he has been feeling intermittently dizzy over the past 3 days despite having adequate/normal PO intake. Patient fell yesterday at home after feeling dizzy hitting the back of his head, but with no LOC. No AC. This morning around 10 patient was walking outside, felt his legs get wobbly, and then fell straight down hitting his buttocks and R knee. he was found to have short segment of stenosis involving both posterior cerebral artery -> neuro consulted -> can follow as out.     dizziness - orthostatic positive   HO CVA with left sided weakness   MRI - acute lacunar infarct   LE extremity pain     #Dizziness and fall after getting up from a sitting or supine position likely orthostatic  - Pt denied LOC  - CT spine: No evidence of acute cervical spine fracture or subluxation. Multilevel severe degenerative changes as described, with severe spinal noted stenosis at C2-3 and C3-4 and multilevel severe neural foraminal stenosis.  - CTAP:No definite evidence of acute traumatic injury within the chest, abdomen, or pelvis.   - CTH:  No evidence of acute intracranial hemorrhage. Probable chronic infarct within the right posterior frontal white matter. Lacunar infarcts within bilateral white matter and deep gray nuclei of indeterminate age. Mild/moderate chronic microvascular changes.  - MRI Acute lacunar infarcts involving the deep white matter of the left frontal lobe at the level of the centrum semiovale. No acute hemorrhage.  - Fall precautions   - Pt on tizanidine 4mg q8h - hold for now,   - ortho positive, started on HI stockings and abdominal binder, remains ortho positive, unable to tolerate HI stocking secondary to pain.   - flomax held, moved losartan to PM - patient still ortho positive and symptomatic   - continue with tele monitoring for now   - Neurology consult appreciated, spoke to neuro team, in the setting of SAMANTA ok to MR angio head and neck. MRA of the neck is limited by motion. Evaluation of the distal common carotid proximal internal carotid arteries appear grossly unremarkable though better quality study is recommended or CTA of the neck can be done for further evaluation. Evaluation of the proximal external carotid arteries are limited by motion. MRA of the [place] demonstrates decreased caliber of the distal right internal carotid artery. Short segment of stenosis is suspected involving both posterior cerebral arteries.   - Follow up Carotid US -> prelim :  Mild 20-39% internal carotid artery stenosis bilaterally  - Neurology aware, no intervention at this time. Patient can Follow up with neurology at the clinic, as per conversation with neuro PA.   - on valium 5mg, prn qd home dose     # LE pain likely secondary to PAD   - Significant atherosclerotic disease noted in bilateral common femoral arteries  - doppler arterial lower limb -> Severely diminished flow noted in the right and left superficial femoral artery suggestive of a proximal superficial femoral or common femoral artery stenosis.  - vascular consult -> Pl;an for LLE Angiogram with possible endovascular revascularization, Tuesday   - cardio clearance received   - nephrology consult for clearance  - no vascular intervention at this time     #CAD s/p CABG 2009  - 6/2021 :patent SALAZAR to LAD , patent SVG to OM , % , filled distally by collaterals from LAD.  - Continue DAPT ( Aspirin 81 mg daily and Plavix 75 mg daily ), ARB, Imdur,Ranexa, B-Blocker, Statin Therapy  - Cardio f/u OP     #HTN, patient had hypertensive urgency   BP elevated   - losartan 100mg held for SAMANTA   - start nifedipine 60mg xL q24h     #HLD  - continue with atorvastatin and fenofibrate     #CVA - Left UE/ Left LE weakness/ left facial from prior CVA  - cont supportive care     #SAMANTA on CKD vs progression of CKD   - Cr 1.3 in 2017, 1.5 in 2019, 1.7 in June,   - Cr trend 2.1 -> 1.9-- > 2.1 --> 1.8  - patient retaining on bladder scan harrison placed, still in place   - renal bladder US showed simple cysts within both kidneys without hydronephrosis or nephrolithiasis   - Monitor   - if Cr remains stable consider restarting losartan   - d/c IVF   - midodrine 2.5mg BID, hold for SBP > 150    #DM  - Hold home meds, glucose wnl   - Start Insulin regimen for FS >180      #DVT PPX : Hep SubQ  #Diet: DASH/TLC/CC  #GI PPX: Protonix  #Activity: Ambulate as tolerated  #Dispo: acute  FULL CODE

## 2021-07-28 NOTE — PROGRESS NOTE ADULT - PROVIDER SPECIALTY LIST ADULT
Internal Medicine
Neurology
Vascular Surgery
Internal Medicine
Neurology
Neurology
Hospitalist
Internal Medicine

## 2021-07-28 NOTE — DISCHARGE NOTE PROVIDER - NSFOLLOWUPCLINICS_GEN_ALL_ED_FT
[facility]  Neurology  [address] [place] [address], [facility] [address]  [place] [address]  Phone: [phone]  Fax:   Follow Up Time: 2 weeks

## 2021-07-28 NOTE — PROGRESS NOTE ADULT - NSICDXPILOT_GEN_ALL_CORE
[place]

## 2021-07-28 NOTE — PROGRESS NOTE ADULT - ATTENDING COMMENTS
# syncope- positive orthostatic hypotension- improved  # medication misshap- with benzodiazepine dosage change  continue midodrine- decrease dose to 2.5 mg BID  continue stockings and abdominal binder  patient treated with IV fluids    # acute lacunar stroke - as seen in MRI  Neurology following  contiune aspirin /plavix / statin    # h/o hypertension with episodes of supine hypertension.  patient currently on procardia, metoprolol, imdur  might need medication adjustment    If patient improved on orthostatic symptoms with supportive care and PT- can consider to d/c midodrine and santos readjust BP medications

## 2021-07-28 NOTE — H&P ADULT - NSHPREVIEWOFSYSTEMS_GEN_ALL_CORE
Constiutional:    [ x ] WNL           [   ] poor appetite   [   ] insomnia   [   ] tired   Cardio:                [ x ] WNL           [   ] CP   [   ] PRADO   [   ] palpitations               Resp:                   [ x ] WNL           [   ] SOB   [   ] cough   [   ] wheezing   GI:                        [ x ] WNL           [   ] constipation   [   ] diarrhea   [   ] abdominal pain   [   ] nausea   [   ] emesis                                :                      [ x ] WNL           [   ] HOGUE  [   ] dusuria   [   ] difficulty voiding             Endo:                   [ x ] WNL          [   ] po;yuria   [   ] temperature intolerance                 Skin:                     [ x ] WNL          [   ] pain   [   ] wound   [   ] rash   MSK:                    [ x ] WNL          [   ] muscle pain   [   ] joint pain/ stiffness   [   ] muscle tenderness   [   ] swelling   Neuro:                 [   ] WNL          [   ] HA   [   ] change in vision   [   ] tremor   [   ] weakness   [   ]dysphagia   [ x ] mildly dysarthric and L sided facial droop from previous R stroke from 2017   Cognitive:           [ x ] WNL           [   ]confusion      Psych:                  [ x ] WNL           [   ] hallucinations   [   ]agitation   [   ] delusion   [   ]depression Constiutional:    [ x ] WNL           [   ] poor appetite   [   ] insomnia   [   ] tired   Cardio:                [ x ] WNL           [   ] CP   [   ] PRADO   [   ] palpitations               Resp:                   [ x ] WNL           [   ] SOB   [   ] cough   [   ] wheezing   GI:                        [ x ] WNL           [   ] constipation   [   ] diarrhea   [   ] abdominal pain   [   ] nausea   [   ] emesis                                :                      [ x ] WNL           [   ] HOGUE  [   ] dusuria   [   ] difficulty voiding             Endo:                   [ x ] WNL          [   ] po;yuria   [   ] temperature intolerance                 Skin:                     [ x ] WNL          [   ] pain   [   ] wound   [   ] rash   MSK:                    [ x ] WNL          [   ] muscle pain   [   ] joint pain/ stiffness   [   ] muscle tenderness   [   ] swelling   Neuro:                 [   ] WNL          [   ] HA   [   ] change in vision   [   ] tremor   [   ] weakness   [   ]dysphagia   [ x ] mildly dysarthric and L sided facial droopand left hemiparesis from previous R stroke from 2017. New right leg buckling, unsteady gait   Cognitive:           [ x ] WNL           [   ]confusion      Psych:                  [ x ] WNL           [   ] hallucinations   [   ]agitation   [   ] delusion   [   ]depression

## 2021-07-28 NOTE — DISCHARGE NOTE PROVIDER - NSDCFUADDAPPT_GEN_ALL_CORE_FT
follow up with your PCP within a week following discharge     follow up with neurology following discharge

## 2021-07-28 NOTE — DISCHARGE NOTE PROVIDER - NSDCCPCAREPLAN_GEN_ALL_CORE_FT
PRINCIPAL DISCHARGE DIAGNOSIS  Diagnosis: Syncope  Assessment and Plan of Treatment: you came in after suffereing a fall with no loss of conciousness. you had brain imaging done with CT and MRI. MRi showeda new small lesion. You were found to have      SECONDARY DISCHARGE DIAGNOSES  Diagnosis: Dizziness  Assessment and Plan of Treatment:     Diagnosis: Fall  Assessment and Plan of Treatment:      PRINCIPAL DISCHARGE DIAGNOSIS  Diagnosis: Syncope  Assessment and Plan of Treatment: you came in after suffereing a fall with no loss of conciousness. you had brain imaging done with CT and MRI. MRi showeda new small lesion. You were found to have orthostatic hypotension, you were treated with stockings and abdominal binder, and started on midodrine twice a day.      SECONDARY DISCHARGE DIAGNOSES  Diagnosis: Dizziness  Assessment and Plan of Treatment: as above    Diagnosis: Fall  Assessment and Plan of Treatment: your fall was likely related to orthostatic hypotension. You were treated with stockings and binder and midodrine

## 2021-07-28 NOTE — PROGRESS NOTE ADULT - SUBJECTIVE AND OBJECTIVE BOX
Stroke Progress Note:    ERICK MARTINEZ    1. 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 Patient says he has been feeling intermittently dizzy over the past 3 days despite having adequate/normal PO intake. Pt first felt this on Friday right after getting out of his car. Pt stated that these episodes of dizziness occurs after getting up from a sitting or supine position. Patient fell yesterday at home after feeling dizzy hitting the back of his head, but with no LOC. pt is not on AC. This morning around 10 patient got out of his car, felt dizzy again and then fell straight down hitting his buttocks and R knee. Denies head trauma. Patient denies any pain, headache, vision changes, new numbness, weakness, tingling, chest pain, shortness of breath, palpitations, nausea, vomiting, diarrhea, dysuria, hematuria, melena, hematochezia, fever, cold/flu symptoms, leg pain/swelling.     In the ED, Temp 98.5, HR 83, /86 and saturating >97% on RA. Labs wnl except for Cr of 1.6. BAc<10, , Trop <0.01. EKG- NSR.     CT spine: No evidence of acute cervical spine fracture or subluxation.Multilevel severe degenerative changes as described, with severe spinal noted stenosis at C2-3 and C3-4 and multilevel severe neural foraminal stenosis.  CTAP:No definite evidence of acute traumatic injury within the chest, abdomen, or pelvis.   CTH:  No evidence of acute intracranial hemorrhage. Probable chronic infarct within the right posterior frontal white matter. Lacunar infarcts within bilateral white matter and deep gray nuclei of indeterminate age. Mild/moderate chronic microvascular changes.  Chest X-Ray negative for pneumothorax, infiltrate, or effusion. XR Pelvis negative for fx's or dislocations    (2021 17:18)      2. Relevant PMH:   Prior ischemic stroke/TIA[ ], Afib [ ], CAD [ ], HTN [ ], DLD [ ], DM [ ], PVD [ ], Obesity [ ],   Sedentary lifestyle [ ], CHF [ ], SANDY [ ], Cancer Hx [ ].    3. Social History: Smoking [ ], Drug Use [ ], Alcohol Use [ ], Other [ ]    4. Possible Location of Stroke:    5. Relevant Brain Tissue Imaging: < from: MR Head No Cont (21 @ 18:40) >  EXAM:  MR BRAIN            PROCEDURE DATE:  2021            INTERPRETATION:  CLINICAL INDICATION: Altered mental status    TECHNIQUE: MRI of the brain was performed without contrast.    COMPARISON: CT brain 2021    FINDINGS:  Acute lacunar infarcts involving the deep white matter of the left frontal lobe at the level of the centrum semiovale.    There is no acute intracranial hemorrhage, mass effect, or hydrocephalus. No extra-axial collection. Basal cisterns are patent.    Age-related involutional changes and chronic microvascular ischemic changes. Chronic lacunar infarcts involving the bilateral corona radiata.    Ventricles and sulci are age-appropriate in size.    Decreased caliber of the distal right internal carotid artery.Short segment stenoses involving the posterior cerebral arteries. Major intracranial flow-voids are otherwise preserved.    Bilateral cataract surgery. The orbits, sellar and suprasellar structures, and craniocervical junction are otherwise unremarkable.    Mucosal thickening involving the maxillary sinuses and right sphenoid sinus. Visualized paranasal sinuses and mastoid air cells are otherwise clear.    IMPRESSION:  Acute lacunar infarcts involving the deep white matter of the left frontal lobe at the level of the centrum semiovale. No acute hemorrhage.    --- End of Report ---    < end of copied text >      6. Relevant Cerebrovascular Imagin. Relevant blood tests:      8. Relevant cardiac rhythm monitorin. Relevant Cardiac Structure: (TTE/MISSY +/-):[ ]No intracardiac thrombus/[ ] no vegetation/[ ]no [place]/EF:    Home Medications:  aspirin 81 mg oral tablet: 1 tab(s) orally once a day (2021 14:22)  atorvastatin 80 mg oral tablet: 1 tab(s) orally once a day (at bedtime) (2021 15:17)  diazePAM 5 mg oral tablet: 1 tab(s) orally once a day, As Needed (2021 15:17)  fenofibrate 120 mg oral tablet: 1 tab(s) orally once a day (2021 14:22)  heparin: 5000 unit(s) subcutaneous every 8 hours (2021 15:17)  metoprolol succinate 25 mg oral tablet, extended release: orally 2 times a day (2021 14:22)  midodrine 2.5 mg oral tablet: 1 tab(s) orally  (2021 15:17)  NIFEdipine 60 mg oral tablet, extended release: 1 tab(s) orally once a day (2021 15:17)  Pentoxil 400 mg oral tablet, extended release: 1 tab(s) orally 3 times a day (2021 14:22)  Plavix 75 mg oral tablet: 1 tab(s) orally once a day (2021 14:22)  polyethylene glycol 3350 oral powder for reconstitution: 17 gram(s) orally once a day, As needed, Constipation (2021 15:17)  Ranexa 500 mg oral tablet, extended release: 1 tab(s) orally 2 times a day (2021 14:22)  senna oral tablet: 2 tab(s) orally once a day (at bedtime) (2021 15:17)  venlafaxine 75 mg oral tablet: 1 tab(s) orally 2 times a day (2021 14:22)      MEDICATIONS  (STANDING):  aspirin  chewable 81 milliGRAM(s) Oral daily  atorvastatin 80 milliGRAM(s) Oral at bedtime  clopidogrel Tablet 75 milliGRAM(s) Oral daily  fenofibrate Tablet 145 milliGRAM(s) Oral daily  heparin   Injectable 5000 Unit(s) SubCutaneous every 8 hours  isosorbide   mononitrate ER Tablet (IMDUR) 60 milliGRAM(s) Oral daily  metoprolol succinate ER 25 milliGRAM(s) Oral every 12 hours  midodrine. 2.5 milliGRAM(s) Oral <User Schedule>  NIFEdipine XL 60 milliGRAM(s) Oral daily  pentoxifylline 400 milliGRAM(s) Oral three times a day  ranolazine 500 milliGRAM(s) Oral two times a day  senna 2 Tablet(s) Oral at bedtime  venlafaxine XR. 75 milliGRAM(s) Oral daily      10. PT/OT/Speech/Rehab/S&Sw/ Cognitive eval results and recommendations:    11. Exam:    Vital Signs Last 24 Hrs  T(C): 36.3 (2021 12:59), Max: 36.3 (2021 12:59)  T(F): 97.3 (2021 12:59), Max: 97.3 (2021 12:59)  HR: 75 (2021 18:05) (70 - 75)  BP: 101/59 (2021 18:05) (101/59 - 184/87)  BP(mean): --  RR: 20 (2021 12:59) (18 - 20)  SpO2: --    12.   NIH STROKE SCALE  Item	                                                        Score  1 a.	Level of Consciousness	               	0  1 b. LOC Questions	                                0  1 c.	LOC Commands	                               	0  2.	Best Gaze	                                        0  3.	Visual	                                                0  4.	Facial Palsy	                                        1  5 a.	Motor Arm - Left	                                0  5 b.	Motor Arm - Right	                        0  6 a.	Motor Leg - Left	                                0  6 b.	Motor Leg - Right	                                0  7.	Limb Ataxia	                                        0  8.	Sensory	                                                0  9.	Language	                                        0  10.	Dysarthria	                                        0  11.	Extinction and Inattention  	        0  ______________________________________  TOTAL	                                                        0    Total NIHSS on admission:      NIHSS yesterday:      NIHSS today:     mRS:  0 No symptoms at all  1 No significant disability despite symptoms; able to carry out all usual duties and activities without assistance  2 Slight disability; unable to carry out all previous activities, but able to look after own affairs  3 Moderate disability; requiring some help, but able to walk without assistance  4 Moderately severe disability; unable to walk without assistance and unable to attend to own bodily needs without assistance  5 Severe disability; bedridden, incontinent and requiring constant nursing care and attention  6 Dead      13. Impression:      14. Probable cause/s of Stroke:      15. Suggestions:   Routine stroke workup includin. Disposition:   Stroke Progress Note:    ERICK MARTINEZ    1. 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 Patient says he has been feeling intermittently dizzy over the past 3 days despite having adequate/normal PO intake. Pt first felt this on Friday right after getting out of his car. Pt stated that these episodes of dizziness occurs after getting up from a sitting or supine position. Patient fell yesterday at home after feeling dizzy hitting the back of his head, but with no LOC. pt is not on AC. This morning around 10 patient got out of his car, felt dizzy again and then fell straight down hitting his buttocks and R knee. Denies head trauma. Patient denies any pain, headache, vision changes, new numbness, weakness, tingling, chest pain, shortness of breath, palpitations, nausea, vomiting, diarrhea, dysuria, hematuria, melena, hematochezia, fever, cold/flu symptoms, leg pain/swelling.     In the ED, Temp 98.5, HR 83, /86 and saturating >97% on RA. Labs wnl except for Cr of 1.6. BAc<10, , Trop <0.01. EKG- NSR.     CT spine: No evidence of acute cervical spine fracture or subluxation.Multilevel severe degenerative changes as described, with severe spinal noted stenosis at C2-3 and C3-4 and multilevel severe neural foraminal stenosis.  CTAP:No definite evidence of acute traumatic injury within the chest, abdomen, or pelvis.   CTH:  No evidence of acute intracranial hemorrhage. Probable chronic infarct within the right posterior frontal white matter. Lacunar infarcts within bilateral white matter and deep gray nuclei of indeterminate age. Mild/moderate chronic microvascular changes.  Chest X-Ray negative for pneumothorax, infiltrate, or effusion. XR Pelvis negative for fx's or dislocations    (2021 17:18)      2. Relevant PMH:   Prior ischemic stroke/TIA[ ], Afib [ ], CAD [ ], HTN [ ], DLD [ ], DM [ ], PVD [ ], Obesity [ ],   Sedentary lifestyle [ ], CHF [ ], SANDY [ ], Cancer Hx [ ].    3. Social History: Smoking [ ], Drug Use [ ], Alcohol Use [ ], Other [ ]    4. Possible Location of Stroke:    5. Relevant Brain Tissue Imaging: < from: MR Head No Cont (21 @ 18:40) >  EXAM:  MR BRAIN            PROCEDURE DATE:  2021            INTERPRETATION:  CLINICAL INDICATION: Altered mental status    TECHNIQUE: MRI of the brain was performed without contrast.    COMPARISON: CT brain 2021    FINDINGS:  Acute lacunar infarcts involving the deep white matter of the left frontal lobe at the level of the centrum semiovale.    There is no acute intracranial hemorrhage, mass effect, or hydrocephalus. No extra-axial collection. Basal cisterns are patent.    Age-related involutional changes and chronic microvascular ischemic changes. Chronic lacunar infarcts involving the bilateral corona radiata.    Ventricles and sulci are age-appropriate in size.    Decreased caliber of the distal right internal carotid artery.Short segment stenoses involving the posterior cerebral arteries. Major intracranial flow-voids are otherwise preserved.    Bilateral cataract surgery. The orbits, sellar and suprasellar structures, and craniocervical junction are otherwise unremarkable.    Mucosal thickening involving the maxillary sinuses and right sphenoid sinus. Visualized paranasal sinuses and mastoid air cells are otherwise clear.    IMPRESSION:  Acute lacunar infarcts involving the deep white matter of the left frontal lobe at the level of the centrum semiovale. No acute hemorrhage.    --- End of Report ---    < end of copied text >      6. Relevant Cerebrovascular Imagin. Relevant blood tests:      8. Relevant cardiac rhythm monitorin. Relevant Cardiac Structure: (TTE/MISSY +/-):[ ]No intracardiac thrombus/[ ] no vegetation/[ ]no [place]/EF:    Home Medications:  aspirin 81 mg oral tablet: 1 tab(s) orally once a day (2021 14:22)  atorvastatin 80 mg oral tablet: 1 tab(s) orally once a day (at bedtime) (2021 15:17)  diazePAM 5 mg oral tablet: 1 tab(s) orally once a day, As Needed (2021 15:17)  fenofibrate 120 mg oral tablet: 1 tab(s) orally once a day (2021 14:22)  heparin: 5000 unit(s) subcutaneous every 8 hours (2021 15:17)  metoprolol succinate 25 mg oral tablet, extended release: orally 2 times a day (2021 14:22)  midodrine 2.5 mg oral tablet: 1 tab(s) orally  (2021 15:17)  NIFEdipine 60 mg oral tablet, extended release: 1 tab(s) orally once a day (2021 15:17)  Pentoxil 400 mg oral tablet, extended release: 1 tab(s) orally 3 times a day (2021 14:22)  Plavix 75 mg oral tablet: 1 tab(s) orally once a day (2021 14:22)  polyethylene glycol 3350 oral powder for reconstitution: 17 gram(s) orally once a day, As needed, Constipation (2021 15:17)  Ranexa 500 mg oral tablet, extended release: 1 tab(s) orally 2 times a day (2021 14:22)  senna oral tablet: 2 tab(s) orally once a day (at bedtime) (2021 15:17)  venlafaxine 75 mg oral tablet: 1 tab(s) orally 2 times a day (2021 14:22)      MEDICATIONS  (STANDING):  aspirin  chewable 81 milliGRAM(s) Oral daily  atorvastatin 80 milliGRAM(s) Oral at bedtime  clopidogrel Tablet 75 milliGRAM(s) Oral daily  fenofibrate Tablet 145 milliGRAM(s) Oral daily  heparin   Injectable 5000 Unit(s) SubCutaneous every 8 hours  isosorbide   mononitrate ER Tablet (IMDUR) 60 milliGRAM(s) Oral daily  metoprolol succinate ER 25 milliGRAM(s) Oral every 12 hours  midodrine. 2.5 milliGRAM(s) Oral <User Schedule>  NIFEdipine XL 60 milliGRAM(s) Oral daily  pentoxifylline 400 milliGRAM(s) Oral three times a day  ranolazine 500 milliGRAM(s) Oral two times a day  senna 2 Tablet(s) Oral at bedtime  venlafaxine XR. 75 milliGRAM(s) Oral daily      10. PT/OT/Speech/Rehab/S&Sw/ Cognitive eval results and recommendations:    11. Exam:    Vital Signs Last 24 Hrs  T(C): 36.3 (2021 12:59), Max: 36.3 (2021 12:59)  T(F): 97.3 (2021 12:59), Max: 97.3 (2021 12:59)  HR: 75 (2021 18:05) (70 - 75)  BP: 101/59 (2021 18:05) (101/59 - 184/87)  BP(mean): --  RR: 20 (2021 12:59) (18 - 20)  SpO2: --    12.   NIH STROKE SCALE  Item	                                                        Score  1 a.	Level of Consciousness	               	0  1 b. LOC Questions	                                0  1 c.	LOC Commands	                               	0  2.	Best Gaze	                                        0  3.	Visual	                                                0  4.	Facial Palsy	                                        1  5 a.	Motor Arm - Left	                                1  5 b.	Motor Arm - Right	                        0  6 a.	Motor Leg - Left	                                1  6 b.	Motor Leg - Right	                                0  7.	Limb Ataxia	                                        0  8.	Sensory	                                                0  9.	Language	                                        0  10.	Dysarthria	                                        0  11.	Extinction and Inattention  	        0  ______________________________________  TOTAL	                                                        0    Total NIHSS on admission:      NIHSS yesterday:      NIHSS today: 3 (chronic deficits)    Unsteady gait    mRS:  0 No symptoms at all  1 No significant disability despite symptoms; able to carry out all usual duties and activities without assistance  2 Slight disability; unable to carry out all previous activities, but able to look after own affairs  3 Moderate disability; requiring some help, but able to walk without assistance  4 Moderately severe disability; unable to walk without assistance and unable to attend to own bodily needs without assistance  5 Severe disability; bedridden, incontinent and requiring constant nursing care and attention  6 Dead      13. 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 Mri brain showed small acute stroke in deep WM in L [place] territory. MRA with short segment stenosis in PCAs. Patient has been on DAPT and Statin prior to this event.      15. Suggestions:   Check ARU/PRU  TTE  Loop recorder  Rehab      Plan was discussed with neuroattending Dr. Carrizales

## 2021-07-28 NOTE — CONSULT NOTE ADULT - SUBJECTIVE AND OBJECTIVE BOX
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 Patient says he has been feeling intermittently dizzy over the past 3 days despite having adequate/normal PO intake. Pt first felt this on Friday right after getting out of his car. Pt stated that these episodes of dizziness occurs after getting up from a sitting or supine position. Patient fell yesterday at home after feeling dizzy hitting the back of his head, but with no LOC. pt is not on AC. This morning around 10 patient got out of his car, felt dizzy again and then fell straight down hitting his buttocks and R knee. Denies head trauma. Patient denies any pain, headache, vision changes, new numbness, weakness, tingling, chest pain, shortness of breath, palpitations, nausea, vomiting, diarrhea, dysuria, hematuria, melena, hematochezia, fever, cold/flu symptoms, leg pain/swelling.     In the ED, Temp 98.5, HR 83, /86 and saturating >97% on RA. Labs wnl except for Cr of 1.6. Ferdinand<10, , Trop <0.01. EKG- NSR.     CT spine: No evidence of acute cervical spine fracture or subluxation.Multilevel severe degenerative changes as described, with severe spinal noted stenosis at C2-3 and C3-4 and multilevel severe neural foraminal stenosis.  CTAP:No definite evidence of acute traumatic injury within the chest, abdomen, or pelvis.   CTH:  No evidence of acute intracranial hemorrhage. Probable chronic infarct within the right posterior frontal white matter. Lacunar infarcts within bilateral white matter and deep gray nuclei of indeterminate age. Mild/moderate chronic microvascular changes.  Chest X-Ray negative for pneumothorax, infiltrate, or effusion. XR Pelvis negative for fx's or dislocations   MRI of brain showed acute left lacunar infarct      PAST MEDICAL & SURGICAL HISTORY:  CAD (coronary artery disease)    HTN (hypertension)    Depression    Anxiety    Diabetes  niddm    Angina pectoris    BPH (benign prostatic hyperplasia)    GERD (gastroesophageal reflux disease)    CVA (cerebral vascular accident)    History of appendectomy    Bilateral cataracts    History of heart bypass surgery        Hospital Course:    TODAY'S SUBJECTIVE & REVIEW OF SYMPTOMS:     Constitutional WNL   Cardio WNL   Resp WNL   GI WNL  Heme WNL  Endo WNL  Skin WNL  MSK WNL  Neuro dizziness / old left hemiparesis  Cognitive WNL  Psych WNL      MEDICATIONS  (STANDING):  aspirin  chewable 81 milliGRAM(s) Oral daily  atorvastatin 80 milliGRAM(s) Oral at bedtime  clopidogrel Tablet 75 milliGRAM(s) Oral daily  fenofibrate Tablet 145 milliGRAM(s) Oral daily  heparin   Injectable 5000 Unit(s) SubCutaneous every 8 hours  isosorbide   mononitrate ER Tablet (IMDUR) 60 milliGRAM(s) Oral daily  metoprolol succinate ER 25 milliGRAM(s) Oral every 12 hours  midodrine. 2.5 milliGRAM(s) Oral <User Schedule>  NIFEdipine XL 60 milliGRAM(s) Oral daily  pentoxifylline 400 milliGRAM(s) Oral three times a day  ranolazine 500 milliGRAM(s) Oral two times a day  senna 2 Tablet(s) Oral at bedtime  venlafaxine XR. 75 milliGRAM(s) Oral daily    MEDICATIONS  (PRN):  acetaminophen   Tablet .. 1000 milliGRAM(s) Oral every 6 hours PRN Mild Pain (1 - 3), Moderate Pain (4 - 6)  diazepam    Tablet 5 milliGRAM(s) Oral daily PRN anxiety  polyethylene glycol 3350 17 Gram(s) Oral daily PRN Constipation      FAMILY HISTORY:      Allergies    clindamycin (Rash)  PC Pen VK (Rash)  penicillin (Rash)    Intolerances        SOCIAL HISTORY:    [  ] Etoh  [  ] Smoking  [  ] Substance abuse     Home Environment:  [   ] Home Alone  [ x  ] Lives with Family  [   ] Home Health Aid    Dwelling:  [   ] Apartment  [ x  ] Private House  [   ] Adult Home  [   ] Skilled Nursing Facility      [   ] Short Term  [   ] Long Term  [  x ] Stairs       Elevator [   ]    FUNCTIONAL STATUS PTA: (Check all that apply)  Ambulation: [ x   ]Independent    [   ] Dependent     [   ] Non-Ambulatory  Assistive Device: [  x ] SA Cane  [   ]  Q Cane  [   ] Walker  [   ]  Wheelchair  ADL : [ x  ] Independent  [    ]  Dependent       Vital Signs Last 24 Hrs  T(C): 36.3 (2021 12:59), Max: 36.3 (2021 12:59)  T(F): 97.3 (2021 12:59), Max: 97.3 (2021 12:59)  HR: 73 (2021 12:59) (69 - 73)  BP: 128/68 (2021 12:59) (111/71 - 184/87)  BP(mean): --  RR: 20 (2021 12:59) (18 - 20)  SpO2: --      PHYSICAL EXAM: Awake & Alert  GENERAL: NAD  HEAD:  Normocephalic  CHEST/LUNG: Clear   HEART: S1S2+  ABDOMEN: Soft, Nontender  EXTREMITIES:  no calf tenderness    NERVOUS SYSTEM:  Cranial Nerves 2-12 intact [   ] Abnormal  [ x  ]left facial droop / dysarthria  ROM: WFL all extremities [   ]  Abnormal [ x  ]limited left side   Motor Strength: WFL all extremities  [   ]  Abnormal [  x ]203/5 left side  Sensation: intact to light touch [ x ] Abnormal [   ]    FUNCTIONAL STATUS:  Bed Mobility: Independent [   ]  Supervision [   ]  Needs Assistance [x   ]  N/A [   ]  Transfers: Independent [   ]  Supervision [   ]  Needs Assistance [ x  ]  N/A [   ]   Ambulation: Independent [   ]  Supervision [   ]  Needs Assistance [ x  ]  N/A [   ]  ADL: Independent [   ] Requires Assistance [   ] N/A [   ]      LABS:                        12.7   7.68  )-----------( 265      ( 2021 07:42 )             40.7     07-28    139  |  108  |  46<H>  ----------------------------<  120<H>  4.9   |  22  |  1.8<H>    Ca    9.3      2021 07:42  Mg     2.5         TPro  6.2  /  Alb  4.0  /  TBili  0.3  /  DBili  x   /  AST  16  /  ALT  15  /  AlkPhos  34  07-28    PT/INR - ( 2021 21:32 )   PT: 11.80 sec;   INR: 1.03 ratio         PTT - ( 2021 21:32 )  PTT:30.9 sec  Urinalysis Basic - ( 2021 16:26 )    Color: Yellow / Appearance: Clear / S.023 / pH: x  Gluc: x / Ketone: Negative  / Bili: Negative / Urobili: <2 mg/dL   Blood: x / Protein: 300 mg/dL / Nitrite: Negative   Leuk Esterase: Negative / RBC: 90 /HPF / WBC 4 /HPF   Sq Epi: x / Non Sq Epi: 1 /HPF / Bacteria: Negative        RADIOLOGY & ADDITIONAL STUDIES:

## 2021-07-28 NOTE — H&P ADULT - NSHPSOCIALHISTORY_GEN_ALL_CORE
Pt lives in [facility] c HR. Stairs to 2nd floor c chair lift. Has SC and RW at home.  Patient is retired and lives with his wife. Pt lives in [facility] c HR. Stairs to 2nd floor c chair lift. Independent with SC at home.  Patient is retired and lives with his wife.

## 2021-07-28 NOTE — PROGRESS NOTE ADULT - SUBJECTIVE AND OBJECTIVE BOX
ERICK MARTINEZ 75y Male  MRN#: [id number]   CODE STATUS: full code     Hospital Day: 8d    Pt is currently admitted with the primary diagnosis of dizziness     SUBJECTIVE    still complaining of dizziness, no other complaints                                           ----------------------------------------------------------  OBJECTIVE  PAST MEDICAL & SURGICAL HISTORY  CAD (coronary artery disease)    HTN (hypertension)    Depression    Anxiety    Diabetes  niddm    Angina pectoris    BPH (benign prostatic hyperplasia)    GERD (gastroesophageal reflux disease)    CVA (cerebral vascular accident)    History of appendectomy    Bilateral cataracts    History of heart bypass surgery                                              -----------------------------------------------------------  ALLERGIES:  clindamycin (Rash)  PC Pen VK (Rash)  penicillin (Rash)                                            ------------------------------------------------------------    HOME MEDICATIONS  Home Medications:  aspirin 81 mg oral tablet: 1 tab(s) orally once a day (2021 14:22)  diazePAM 10 mg oral tablet: 1 tab(s) orally 3 times a day (2021 14:22)  fenofibrate 120 mg oral tablet: 1 tab(s) orally once a day (2021 14:22)  Januvia 25 mg oral tablet: 1 tab(s) orally once a day (2021 14:22)  lansoprazole 30 mg oral delayed release capsule: 1 cap(s) orally once a day (2021 14:22)  losartan 100 mg oral tablet: 1 tab(s) orally once a day (2021 14:22)  metoprolol succinate 25 mg oral tablet, extended release: orally 2 times a day (2021 14:22)  Pentoxil 400 mg oral tablet, extended release: 1 tab(s) orally 3 times a day (2021 14:22)  Plavix 75 mg oral tablet: 1 tab(s) orally once a day (2021 14:22)  Ranexa 500 mg oral tablet, extended release: 1 tab(s) orally 2 times a day (2021 14:22)  rosuvastatin 40 mg oral tablet: 1 tab(s) orally once a day (2021 14:22)  tamsulosin 0.4 mg oral capsule: 1 cap(s) orally once a day (2021 14:22)  tiZANidine 4 mg oral tablet: 1 tab(s) orally 3 times a day (2021 18:40)  venlafaxine 75 mg oral tablet: 1 tab(s) orally 2 times a day (2021 14:22)                           MEDICATIONS:  STANDING MEDICATIONS  aspirin  chewable 81 milliGRAM(s) Oral daily  atorvastatin 80 milliGRAM(s) Oral at bedtime  clopidogrel Tablet 75 milliGRAM(s) Oral daily  fenofibrate Tablet 145 milliGRAM(s) Oral daily  heparin   Injectable 5000 Unit(s) SubCutaneous every 8 hours  isosorbide   mononitrate ER Tablet (IMDUR) 60 milliGRAM(s) Oral daily  metoprolol succinate ER 25 milliGRAM(s) Oral every 12 hours  midodrine. 2.5 milliGRAM(s) Oral <User Schedule>  NIFEdipine XL 60 milliGRAM(s) Oral daily  pentoxifylline 400 milliGRAM(s) Oral three times a day  ranolazine 500 milliGRAM(s) Oral two times a day  senna 2 Tablet(s) Oral at bedtime  venlafaxine XR. 75 milliGRAM(s) Oral daily    PRN MEDICATIONS  acetaminophen   Tablet .. 1000 milliGRAM(s) Oral every 6 hours PRN  diazepam    Tablet 5 milliGRAM(s) Oral daily PRN  polyethylene glycol 3350 17 Gram(s) Oral daily PRN                                            ------------------------------------------------------------  VITAL SIGNS: Last 24 Hours  T(C): 35.6 (2021 05:13), Max: 36.2 (2021 12:41)  T(F): 96.1 (2021 05:13), Max: 97.2 (2021 12:41)  HR: 70 (2021 05:13) (69 - 73)  BP: 184/87 (2021 05:13) (111/71 - 184/87)  BP(mean): --  RR: 18 (2021 05:13) (18 - 18)  SpO2: --      21 @ 07:01  -  21 @ 07:00  --------------------------------------------------------  IN: 1325 mL / OUT: 1200 mL / NET: 125 mL    21 @ 07:01  -  21 @ 11:28  --------------------------------------------------------  IN: 330 mL / OUT: 0 mL / NET: 330 mL                                             --------------------------------------------------------------  LABS:                        12.7   7.68  )-----------( 265      ( 2021 07:42 )             40.7         139  |  108  |  46<H>  ----------------------------<  120<H>  4.9   |  22  |  1.8<H>    Ca    9.3      2021 07:42  Mg     2.5         TPro  6.2  /  Alb  4.0  /  TBili  0.3  /  DBili  x   /  AST  16  /  ALT  15  /  AlkPhos  34      PT/INR - ( 2021 21:32 )   PT: 11.80 sec;   INR: 1.03 ratio         PTT - ( 2021 21:32 )  PTT:30.9 sec  Urinalysis Basic - ( 2021 16:26 )    Color: Yellow / Appearance: Clear / S.023 / pH: x  Gluc: x / Ketone: Negative  / Bili: Negative / Urobili: <2 mg/dL   Blood: x / Protein: 300 mg/dL / Nitrite: Negative   Leuk Esterase: Negative / RBC: 90 /HPF / WBC 4 /HPF   Sq Epi: x / Non Sq Epi: 1 /HPF / Bacteria: Negative                                                            -------------------------------------------------------------  RADIOLOGY:    EXAM:  MR BRAIN            PROCEDURE DATE:  2021            INTERPRETATION:  CLINICAL INDICATION: Altered mental status    TECHNIQUE: MRI of the brain was performed without contrast.    COMPARISON: CT brain 2021    FINDINGS:  Acute lacunar infarcts involving the deep white matter of the left frontal lobe at the level of the centrum semiovale.    There is no acute intracranial hemorrhage, mass effect, or hydrocephalus. No extra-axial collection. Basal cisterns are patent.    Age-related involutional changes and chronic microvascular ischemic changes. Chronic lacunar infarcts involving the bilateral corona radiata.    Ventricles and sulci are age-appropriate in size.    Decreased caliber of the distal right internal carotid artery.Short segment stenoses involving the posterior cerebral arteries. Major intracranial flow-voids are otherwise preserved.    Bilateral cataract surgery. The orbits, sellar and suprasellar structures, and craniocervical junction are otherwise unremarkable.    Mucosal thickening involving the maxillary sinuses and right sphenoid sinus. Visualized paranasal sinuses and mastoid air cells are otherwise clear.    IMPRESSION:  Acute lacunar infarcts involving the deep white matter of the left frontal lobe at the level of the centrum semiovale. No acute hemorrhage.    --- End of Report ---              CARLOTTA RUIZ MD; Attending Radiologist  This document has been electronically signed. 2021 11:10AM                                            --------------------------------------------------------------    PHYSICAL EXAM:  General: in no distress   HEENT: NCAT  LUNGS: air entry bilat, decreased at lower bases   HEART: RRR, +S1,S2, RRR  ABDOMEN: SNTTP, ND x 4 q's  EXT: Warm, well perfused x 4  NEURO: AxOx3, left sided weakness, upper and lower extremity, decreased sensation, facial droop left side, no right sided symptoms   SKIN: No new breakdown or rashes noted                                           --------------------------------------------------------------

## 2021-07-28 NOTE — DISCHARGE NOTE PROVIDER - NSDCFUADDINST_GEN_ALL_CORE_FT
Syncope    Syncope is when you temporarily lose consciousness, also called fainting or passing out. Imachinery, or play sports until your health care provider says it is okay.    SEEK IMMEDIATE MEDICAL CARE IF YOU HAVE ANY OF THE FOLLOWING SYMPTOMS: severe headache, pain in your chest/abdomen/back, bleeding from your mouth or rectum, palpitations, shortness of breath, pain with breathing, seizure, confusion, or trouble walking.  Fall prevention includes ways to make your home and other areas safer. It also includes ways you can move more carefully to prevent a fall. Health conditions that cause changes in your blood pressure, vision, or muscle strength and coordination may increase your risk for falls. Medicines may also increase your risk for falls if they make you dizzy, weak, or sleepy.     SEEK IMMEDIATE MEDICAL ATTENTION IF: You have fallen and are unconscious, you have fallen and cannot move part of your body, or you have fallen and have pain or a headache.     FALL PREVENTION TIPS:  Stand or sit up slowly. Use assistive devices as directed. You may need to have grab bars put in your bathroom near the toilet or in the shower.  Wear shoes that fit well and have soles that . Wear shoes both inside and outside. Do not wear shoes with high heels.  Wear a personal alarm that can call 911 in an emergency.  Manage your medical conditions. Keep all appointments with your healthcare providers. Visit your eye doctor as directed.      HOME SAFETY TIPS:  Put nonslip strips on your bath or shower floor to prevent you from slipping. Use a shower seat so you do not need to stand while you shower. Sit on the toilet or a chair in your bathroom to dry yourself and put on clothing. This will prevent you from losing your balance from drying or dressing yourself while you are standing.  Keep paths clear. Remove books, shoes, and other objects from walkways and stairs. Place cords for telephones and lamps out of the way so that you do not need to walk over them. Tape them down if you cannot move them. Remove small rugs or secure it with double-sided tape to prevent you from tripping. Install bright lights in your home. Use night lights to help light paths to the bathroom or kitchen. Always turn on the light before you start walking.  Keep items you use often on shelves within reach. Do not use a step stool to help you reach an item.  Place reflective tape on the edges of your stairs to see better.

## 2021-07-28 NOTE — CONSULT NOTE ADULT - ASSESSMENT
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    PRECAUTIONS: [   ] Cardiac  [   ] Respiratory  [   ] Seizures [   ] Contact Isolation  [   ] Droplet Isolation  [   ] Other    Weight Bearing Status:     RECOMMENDATION:    Out of Bed to Chair     DVT/Decubiti Prophylaxis    REHAB PLAN:     [  x  ] Bedside P/T 3-5 times a week   [ x   ]   Bedside O/T  2-3 times a week             [ x   ] Speech Therapy               [    ]  No Rehab Therapy Indicated   Conditioning/ROM                                    ADL  Bed Mobility                                               Conditioning/ROM  Transfers                                                     Bed Mobility  Sitting /Standing Balance                         Transfers                                        Gait Training                                               Sitting/Standing Balance  Stair Training [   ]Applicable                    Home equipment Eval                                                                        Splinting  [   ] Only      GOALS:   ADL   [  x  ]   Independent                    Transfers  [  x  ] Independent                          Ambulation  [  x  ] Independent     [   x  ] With device                            [    ]  CG                                                         [    ]  CG                                                                  [    ] CG                            [    ] Min A                                                   [    ] Min A                                                              [    ] Min  A          DISCHARGE PLAN:   [  x  ]  Good candidate for Intensive Rehabilitation/Hospital based                                             Will tolerate 3hrs Intensive Rehab Daily                                       [     ]  Short Term Rehab in Skilled Nursing Facility                                       [     ]  Home with Outpatient or  services                                         [     ]  Possible Candidate for Intensive Hospital based Rehab

## 2021-07-28 NOTE — H&P ADULT - ASSESSMENT
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 Rehab of acute lacunar infarcts in the deep white matter of the left frontal lobe at the level fo the centrum semiovale and chronic left sided weakness from CVA of 2017    # MRI Acute lacunar infarcts involving the deep white matter of the left frontal lobe at the level of the centrum semiovale. No acute hemorrhage.  # CVA - Left UE/ Left LE weakness/ left facial from prior CVA  - Neurology was consulted and no intervention was recommended; no intervention at this time. Patient can Follow up with neurology at the clinic, as per conversation with neuro PA.   - PT/OT/SLT/Neuropsych    #Dizziness and fall 2/2 orthostatic hypotension   - continue with HI stockings and abdominal binder  - midodrine   - fall precautions   - home medications of flomax, tizanidine, valium were held by medicine team prior to rehab admission     # LE pain likely secondary to PAD   - Significant atherosclerotic disease noted in bilateral common femoral arteries  - Patient's family and vascular team agreed to not proceed with intervention at this time     # CAD s/p CABG 2009  - 6/2021 :patent SALAZAR to LAD , patent SVG to OM , % , filled distally by collaterals from LAD.  - Continue DAPT ( Aspirin 81 mg daily and Plavix 75 mg daily ), ARB, Imdur, Ranexa, B-Blocker, Statin Therapy  - Cardio f/u OP     #HTN, patient had hypertensive urgency   - losartan 100mg held for SAMANTA   - c/w nifedipine 60mg xL q24h     #HLD  - continue with atorvastatin and fenofibrate     #SAMANTA on CKD vs progression of CKD   - Cr 1.3 in 2017, 1.5 in 2019, 1.7 in June; Current Cr trend 2.1 -> 1.9-- > 2.1 --> 1.8  - Renal bladder US showed simple cysts within both kidneys without hydronephrosis or nephrolithiasis   - Patient retaining on bladder scan so harrison placed, still in place; attempt trial of void   - Monitor Cr, IVF as needed     #DM  - Hold home meds, glucose wnl   - Start Insulin regimen for FS >180    # Maintenance   - Pain control: none   - GI/Bowel Mgmt: miralax   - Bladder management: Harrison in place for urinary retention   - Skin: No active issues at this time  - FEN: monitor electrolytes, replete as needed   - Diet: DASH diet     # Precautions / PROPHYLAXIS:    - Fall precaution   - Ortho: Weight bearing status: WBAT  - DVT prophylaxis: SC heparin       MEDICAL PROGNOSIS: GOOD            REHAB POTENTIAL: GOOD             ESTIMATED DISPOSITION: HOME WITH HOME CARE       [ x ]  The goals of the IRF admission were discussed with the patient and or family member, who agreed             ELOS:  [     ] 7-14    [    ]  14-21    [    ]  Other    THERAPY ORDERS and INITIAL INDIVIDUALIZED PLAN OF CARE:  This initial individualized interdisciplinary plan of care, which was established by me (the attending physiatrist), is based on elements from the post admission evaluation. The interdisciplinary therapy program is to be at least 3 hrs a day, at least 5 days per week from from physical, occupational and/ or speech therapies as ordered by me below.      [ x  ] P.T. 90 mins. /day at least 5 out of 7 days:  [  x ] superficial  modalities prn, [ x  ] A/AAROM, [ x  ] PREs, [ x  ] transfer training,            [ x  ] progressive ambulation, [x   ] stairs                                               [ x  ] O.T. 90 mins. /day at least 5 out of 7 days::  [ x  ] modalities prn,  [ x  ]A/AAROM, [ x  ] PREs, [  x ] functional transfer training, [ x  ] ADL training           [   ] cognitive/ perceptual eval and training, [   ] splint eval                                                  [   ] S.L.P:  [   ] speech eval, [   ] swallow eval     [ x ] Neuropsychology eval     [ x  ] Individualized rec. therapy      RATIONALE FOR INPATIENT ADMISSION - Patient demonstrates the following: (check all that apply)  [X] Medically appropriate for acute rehabilitation admission. Requires interdisiplinary therapy consisting of at least PT and OT, at least 3 hrs. 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 Rehab of acute lacunar infarcts in the deep white matter of the left frontal lobe at the level fo the centrum semiovale and chronic left sided weakness from CVA of 2017    # MRI Acute lacunar infarcts involving the deep white matter of the left frontal lobe at the level of the centrum semiovale. No acute hemorrhage.  # CVA - Left UE/ Left LE weakness/ left facial from prior CVA  - Neurology was consulted and no intervention was recommended; no intervention at this time. Patient can Follow up with neurology at the clinic, as per conversation with neuro PA.   - PT/OT/SLT/Neuropsych    #Dizziness and fall 2/2 orthostatic hypotension   - continue with HI stockings and abdominal binder  - midodrine   - fall precautions   - home medications of flomax, tizanidine were held by medicine team prior to rehab admission; valium was changed from standing to PRN by medicine per neurology recommendations     # LE pain likely secondary to PAD   - Significant atherosclerotic disease noted in bilateral common femoral arteries  - Patient's family and vascular team agreed to not proceed with intervention at this time     # CAD s/p CABG 2009  - 6/2021 :patent SALAZAR to LAD , patent SVG to OM , % , filled distally by collaterals from [facility]AD.  - Continue DAPT ( Aspirin 81 mg daily and Plavix 75 mg daily ), ARB, Imdur, Ranexa, B-Blocker, Statin Therapy  - Cardio f/u OP     #HTN, patient had hypertensive urgency   - losartan 100mg held for SAMANTA   - c/w nifedipine 60mg xL q24h     #HLD  - continue with atorvastatin and fenofibrate     #SAMANTA on CKD vs progression of CKD   - Cr 1.3 in 2017, 1.5 in 2019, 1.7 in June; Current Cr trend 2.1 -> 1.9-- > 2.1 --> 1.8  - Renal bladder US showed simple cysts within both kidneys without hydronephrosis or nephrolithiasis   - Patient retaining on bladder scan so harrison placed, still in place; attempt trial of void   - Monitor Cr, IVF as needed     #DM type 2  - Hold home meds, glucose wnl   - Start Insulin regimen for FS >180    # Depression   - venlafaxine     # Anxiety   - Valium PRN     # Maintenance   - Pain control: none   - GI/Bowel Mgmt: miralax ,senna   - Bladder management: Harrison in place for urinary retention   - Skin: No active issues at this time  - FEN: monitor electrolytes, replete as needed   - Diet: DASH diet     # Precautions / PROPHYLAXIS:    - Fall precaution   - Ortho: Weight bearing status: WBAT  - DVT prophylaxis: SC heparin       MEDICAL PROGNOSIS: GOOD            REHAB POTENTIAL: GOOD             ESTIMATED DISPOSITION: HOME WITH HOME CARE       [ x ]  The goals of the IRF admission were discussed with the patient and or family member, who agreed             ELOS:  [     ] 7-14    [    ]  14-21    [    ]  Other    THERAPY ORDERS and INITIAL INDIVIDUALIZED PLAN OF CARE:  This initial individualized interdisciplinary plan of care, which was established by me (the attending physiatrist), is based on elements from the post admission evaluation. The interdisciplinary therapy program is to be at least 3 hrs a day, at least 5 days per week from from physical, occupational and/ or speech therapies as ordered by me below.      [ x  ] P.T. 90 mins. /day at least 5 out of 7 days:  [  x ] superficial  modalities prn, [ x  ] A/AAROM, [ x  ] PREs, [ x  ] transfer training,            [ x  ] progressive ambulation, [x   ] stairs                                               [ x  ] O.T. 90 mins. /day at least 5 out of 7 days::  [ x  ] modalities prn,  [ x  ]A/AAROM, [ x  ] PREs, [  x ] functional transfer training, [ x  ] ADL training           [   ] cognitive/ perceptual eval and training, [   ] splint eval                                                  [   ] S.L.P:  [   ] speech eval, [   ] swallow eval     [ x ] Neuropsychology eval     [ x  ] Individualized rec. therapy      RATIONALE FOR INPATIENT ADMISSION - Patient demonstrates the following: (check all that apply)  [X] Medically appropriate for acute rehabilitation admission. Requires interdisiplinary therapy consisting of at least PT and OT, at least 3 hrs. 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    MRI Acute lacunar infarcts involving the deep white matter of the left frontal lobe at the level of the centrum semiovale. No acute hemorrhage.    - Neurology was consulted and no intervention was recommended; no intervention at this time. Patient can Follow up with neurology at the clinic, as per conversation with neuro PA. Was already on DAPT and high dose statin.  - Patient requires PT/OT/SLT/Neuropsych eval an acute rehab program to return to previous level of function.    #Dizziness and fall 2/2 orthostatic hypotension - now controlled  - continue with HI stockings and abdominal binder  - continue midodrine 2x a day  - fall precautions   - home medications of flomax, tizanidine were held by medicine team prior to rehab admission; valium was changed from 10 mg standing to 5 mg PRN by medicine per neurology recommendations     # LE pain likely secondary to PAD   - Significant atherosclerotic disease noted in bilateral common femoral arteries  - Patient's family and vascular team agreed to not proceed with intervention at this time     # CAD s/p CABG 2009  - 6/2021 :patent SALAZAR to LAD , patent SVG to OM , % , filled distally by collaterals from [facility].  - Continue DAPT ( Aspirin 81 mg daily and Plavix 75 mg daily ), ARB, Imdur, Ranexa, B-Blocker, Statin Therapy  - Cardio f/u OP     #HTN, patient had hypertensive urgency   - losartan 100mg held for SAMANTA   - c/w nifedipine 60mg xL q24h     #HLD  - continue with atorvastatin and fenofibrate     #SAMANTA on CKD vs progression of CKD   - Cr 1.3 in 2017, 1.5 in 2019, 1.7 in June; Current Cr trend 2.1 -> 1.9-- > 2.1 --> 1.8  - Renal bladder US showed simple cysts within both kidneys without hydronephrosis or nephrolithiasis   - Patient retaining on bladder scan so harrison placed, still in place; attempt trial of void   - Monitor Cr, IVF as needed     #DM type 2  - Hold home meds, glucose wnl   - Start Insulin regimen for FS >180    # Presumed Urinary retension  - has harrison. will gige TOV in am and do CIC if needed    # Depression   - venlafaxine     # Anxiety   - Valium PRN as above    # Maintenance   - Pain control: none   - GI/Bowel Mgmt: miralax ,senna   - Bladder management: Harrison in place for urinary retention   - Skin: No active issues at this time  - FEN: monitor electrolytes, replete as needed   - Diet: DASH diet     # Precautions / PROPHYLAXIS:    - Fall precaution   - Ortho: Weight bearing status: WBAT  - DVT prophylaxis: SC heparin       MEDICAL PROGNOSIS: GOOD            REHAB POTENTIAL: GOOD             ESTIMATED DISPOSITION: HOME WITH HOME CARE       [ x ]  The goals of the IRF admission were discussed with the patient and or family member, who agreed             ELOS:  [   x  ] 7-14    [    ]  14-21    [    ]  Other    THERAPY ORDERS and INITIAL INDIVIDUALIZED PLAN OF CARE:  This initial individualized interdisciplinary plan of care, which was established by me (the attending physiatrist), is based on elements from the post admission evaluation. The interdisciplinary therapy program is to be at least 3 hrs a day, at least 5 days per week from from physical, occupational and/ or speech therapies as ordered by me below.      [ x  ] P.T. 90 mins. /day at least 5 out of 7 days:  [  x ] superficial  modalities prn, [ x  ] A/AAROM, [ x  ] PREs, [ x  ] transfer training,            [ x  ] progressive ambulation, [x   ] stairs                                               [ x  ] O.T. 90 mins. /day at least 5 out of 7 days::  [ x  ] modalities prn,  [ x  ]A/AAROM, [ x  ] PREs, [  x ] functional transfer training, [ x  ] ADL training           [x   ] cognitive/ perceptual eval and training, [   ] splint eval                                                  [ x  ] S.L.P:  [ x  ] speech eval, [  x ] swallow eval     [ x ] Neuropsychology eval     [ x  ] Individualized rec. therapy      RATIONALE FOR INPATIENT ADMISSION - Patient demonstrates the following: (check all that apply)  [X] Medically appropriate for acute rehabilitation admission. Requires interdisiplinary therapy consisting of at least PT and OT, at least 3 hrs. 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   - MRI Acute lacunar infarcts involving the deep white matter of the left frontal lobe at the level of the centrum semiovale. No acute hemorrhage.  - Neurology was consulted and no intervention was recommended; no intervention at this time. Patient can Follow up with neurology at the clinic, as per conversation with neuro PA. Was already on DAPT and high dose statin.  - Patient requires PT/OT/SLT/Neuropsych eval     #Dizziness and fall 2/2 orthostatic hypotension - now controlled  - continue with HI stockings and abdominal binder  - continue midodrine 2x a day  - fall precautions   - home medications of flomax, tizanidine were held by medicine team prior to rehab admission; valium was changed from 10 mg standing to 5 mg PRN by medicine per neurology recommendations     # LE pain likely secondary to PAD   - Significant atherosclerotic disease noted in bilateral common femoral arteries  - Patient's family and vascular team agreed to not proceed with intervention at this time     # CAD s/p CABG 2009  - 6/2021 :patent SALAZAR to LAD , patent SVG to OM , % , filled distally by collaterals from [facility].  - Continue DAPT ( Aspirin 81 mg daily and Plavix 75 mg daily ), ARB, Imdur, Ranexa, B-Blocker, Statin Therapy  - Cardio f/u OP     #HTN, patient had hypertensive urgency   - losartan 100mg held for SAMANTA   - c/w nifedipine 60mg xL q24h     #HLD  - continue with atorvastatin and fenofibrate     #SAMANTA on CKD vs progression of CKD   - Cr 1.3 in 2017, 1.5 in 2019, 1.7 in June; Current Cr trend 2.1 -> 1.9-- > 2.1 --> 1.8  - Renal bladder US showed simple cysts within both kidneys without hydronephrosis or nephrolithiasis   - Patient retaining on bladder scan so harrison placed, still in place; attempt trial of void   - Monitor Cr, IVF as needed     #DM type 2  - Hold home meds, glucose wnl   - Start Insulin regimen for FS >180    # Presumed Urinary retention  - has harrison. TOV in am and do CIC if needed    # Depression   - venlafaxine     # Anxiety   - Valium PRN as above    # Maintenance   - Pain control: none   - GI/Bowel Mgmt: miralax ,senna   - Bladder management: Harrison in place for urinary retention   - Skin: No active issues at this time  - FEN: monitor electrolytes, replete as needed   - Diet: DASH diet     # Precautions / PROPHYLAXIS:    - Fall precaution   - Ortho: Weight bearing status: WBAT  - DVT prophylaxis: SC heparin       MEDICAL PROGNOSIS: GOOD            REHAB POTENTIAL: GOOD             ESTIMATED DISPOSITION: HOME WITH HOME CARE       [ x ]  The goals of the IRF admission were discussed with the patient and or family member, who agreed             ELOS:  [   x  ] 7-14    [    ]  14-21    [    ]  Other    THERAPY ORDERS and INITIAL INDIVIDUALIZED PLAN OF CARE:  This initial individualized interdisciplinary plan of care, which was established by me (the attending physiatrist), is based on elements from the post admission evaluation. The interdisciplinary therapy program is to be at least 3 hrs a day, at least 5 days per week from from physical, occupational and/ or speech therapies as ordered by me below.      [ x  ] P.T. 90 mins. /day at least 5 out of 7 days:  [  x ] superficial  modalities prn, [ x  ] A/AAROM, [ x  ] PREs, [ x  ] transfer training,            [ x  ] progressive ambulation, [x   ] stairs                                               [ x  ] O.T. 90 mins. /day at least 5 out of 7 days::  [ x  ] modalities prn,  [ x  ]A/AAROM, [ x  ] PREs, [  x ] functional transfer training, [ x  ] ADL training           [x   ] cognitive/ perceptual eval and training, [   ] splint eval                                                  [ x  ] S.L.P:  [ x  ] speech eval, [  x ] swallow eval     [ x ] Neuropsychology eval     [ x  ] Individualized rec. therapy      RATIONALE FOR INPATIENT ADMISSION - Patient demonstrates the following: (check all that apply)  [X] Medically appropriate for acute rehabilitation admission. Requires interdisiplinary therapy consisting of at least PT and OT, at least 3 hrs. a day at least 5 days a week  [X] Has attainable rehab goals with an appropriate initial discharge plan  [X] Has rehabilitation potential (expected to make a significant improvement within a reasonable period of time)  [X] Requires close medical management by a rehab physician, rehab nursing care,  and comprehensive interdisciplinary team (including PT, OT)    [X] Requires evaluation by a physiatrist at least 3 days a week to evaluate and manage and coordinate rehab and medical problems

## 2021-07-28 NOTE — H&P ADULT - NSHPLABSRESULTS_GEN_ALL_CORE
12.7   7.68  )-----------( 265      ( 2021 07:42 )             40.7         139  |  108  |  46<H>  ----------------------------<  120<H>  4.9   |  22  |  1.8<H>    Ca    9.3      2021 07:42  Mg     2.5         TPro  6.2  /  Alb  4.0  /  TBili  0.3  /  DBili  x   /  AST  16  /  ALT  15  /  AlkPhos  34      PT/INR - ( 2021 21:32 )   PT: 11.80 sec;   INR: 1.03 ratio         PTT - ( 2021 21:32 )  PTT:30.9 sec  Urinalysis Basic - ( 2021 16:26 )    Color: Yellow / Appearance: Clear / S.023 / pH: x  Gluc: x / Ketone: Negative  / Bili: Negative / Urobili: <2 mg/dL   Blood: x / Protein: 300 mg/dL / Nitrite: Negative   Leuk Esterase: Negative / RBC: 90 /HPF / WBC 4 /HPF   Sq Epi: x / Non Sq Epi: 1 /HPF / Bacteria: Negative      POCT Blood Glucose.: 147 mg/dL (21 @ 21:39)  POCT Blood Glucose.: 140 mg/dL (21 @ 16:29)  POCT Blood Glucose.: 144 mg/dL (21 @ 11:31)  POCT Blood Glucose.: 149 mg/dL (21 @ 07:33)  POCT Blood Glucose.: 161 mg/dL (21 @ 21:25)  POCT Blood Glucose.: 150 mg/dL (21 @ 16:51)  POCT Blood Glucose.: 147 mg/dL (21 @ 11:28)  POCT Blood Glucose.: 149 mg/dL (21 @ 07:39)  POCT Blood Glucose.: 164 mg/dL (21 @ 21:23)  POCT Blood Glucose.: 140 mg/dL (21 @ 16:41)  POCT Blood Glucose.: 131 mg/dL (21 @ 07:51)  POCT Blood Glucose.: 154 mg/dL (21 @ 21:24)

## 2021-07-28 NOTE — CONSULT NOTE ADULT - CONSULT REASON
pre-op for LLE angiogram
Dizziness and multiple falls
cva
Severely diminished flow noted in the right and left superficial femoral artery suggestive of a proximal superficial femoral or common femoral artery stenosis.  Significant atherosclerotic disease noted in the bilateral common femoral arteries

## 2021-07-28 NOTE — DISCHARGE NOTE PROVIDER - HOSPITAL COURSE
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 Patient says he has been feeling intermittently dizzy over the past 3 days despite having adequate/normal PO intake. Pt first felt this on Friday right after getting out of his car. Pt stated that these episodes of dizziness occurs after getting up from a sitting or supine position. Patient fell yesterday at home after feeling dizzy hitting the back of his head, but with no LOC. pt is not on AC. This morning around 10 patient got out of his car, felt dizzy again and then fell straight down hitting his buttocks and R knee. Denies head trauma. Patient denies any pain, headache, vision changes, new numbness, weakness, tingling, chest pain, shortness of breath, palpitations, nausea, vomiting, diarrhea, dysuria, hematuria, melena, hematochezia, fever, cold/flu symptoms, leg pain/swelling.     In the ED, Temp 98.5, HR 83, /86 and saturating >97% on RA. Labs wnl except for Cr of 1.6. Ferdinand<10, , Trop <0.01. EKG- NSR.     CT spine: No evidence of acute cervical spine fracture or subluxation.Multilevel severe degenerative changes as described, with severe spinal noted stenosis at C2-3 and C3-4 and multilevel severe neural foraminal stenosis.  CTAP:No definite evidence of acute traumatic injury within the chest, abdomen, or pelvis.   CTH:  No evidence of acute intracranial hemorrhage. Probable chronic infarct within the right posterior frontal white matter. Lacunar infarcts within bilateral white matter and deep gray nuclei of indeterminate age. Mild/moderate chronic microvascular changes.  Chest X-Ray negative for pneumothorax, infiltrate, or effusion. XR Pelvis negative for fx's or dislocations       pt was admitted for further workup and management     MRI performed - showing acute lacunar stroke, pt on optimal medical therapy     Vascular consult for leg pain- US showing PAD, no intervention planned decision made in conjunction with patient and family.     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 Patient says he has been feeling intermittently dizzy over the past 3 days despite having adequate/normal PO intake. Pt first felt this on Friday right after getting out of his car. Pt stated that these episodes of dizziness occurs after getting up from a sitting or supine position. Patient fell yesterday at home after feeling dizzy hitting the back of his head, but with no LOC. pt is not on AC. This morning around 10 patient got out of his car, felt dizzy again and then fell straight down hitting his buttocks and R knee. Denies head trauma. Patient denies any pain, headache, vision changes, new numbness, weakness, tingling, chest pain, shortness of breath, palpitations, nausea, vomiting, diarrhea, dysuria, hematuria, melena, hematochezia, fever, cold/flu symptoms, leg pain/swelling.     In the ED, Temp 98.5, HR 83, /86 and saturating >97% on RA. Labs wnl except for Cr of 1.6. Ferdinand<10, , Trop <0.01. EKG- NSR.     CT spine: No evidence of acute cervical spine fracture or subluxation.Multilevel severe degenerative changes as described, with severe spinal noted stenosis at C2-3 and C3-4 and multilevel severe neural foraminal stenosis.  CTAP:No definite evidence of acute traumatic injury within the chest, abdomen, or pelvis.   CTH:  No evidence of acute intracranial hemorrhage. Probable chronic infarct within the right posterior frontal white matter. Lacunar infarcts within bilateral white matter and deep gray nuclei of indeterminate age. Mild/moderate chronic microvascular changes.  Chest X-Ray negative for pneumothorax, infiltrate, or effusion. XR Pelvis negative for fx's or dislocations       pt was admitted for further workup and management     MRI performed - showing small acute lacunar stroke, pt seen by neurology, on optimal medical therapy, no new sxs     Vascular consult for leg pain- US showing PAD, no intervention planned decision made in conjunction with patient and family.     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 Patient says he has been feeling intermittently dizzy over the past 3 days despite having adequate/normal PO intake. Pt first felt this on Friday right after getting out of his car. Pt stated that these episodes of dizziness occurs after getting up from a sitting or supine position. Patient fell yesterday at home after feeling dizzy hitting the back of his head, but with no LOC. pt is not on AC. This morning around 10 patient got out of his car, felt dizzy again and then fell straight down hitting his buttocks and R knee. Denies head trauma. Patient denies any pain, headache, vision changes, new numbness, weakness, tingling, chest pain, shortness of breath, palpitations, nausea, vomiting, diarrhea, dysuria, hematuria, melena, hematochezia, fever, cold/flu symptoms, leg pain/swelling.     In the ED, Temp 98.5, HR 83, /86 and saturating >97% on RA. Labs wnl except for Cr of 1.6. Ferdinand<10, , Trop <0.01. EKG- NSR.     CT spine: No evidence of acute cervical spine fracture or subluxation.Multilevel severe degenerative changes as described, with severe spinal noted stenosis at C2-3 and C3-4 and multilevel severe neural foraminal stenosis.  CTAP:No definite evidence of acute traumatic injury within the chest, abdomen, or pelvis.   CTH:  No evidence of acute intracranial hemorrhage. Probable chronic infarct within the right posterior frontal white matter. Lacunar infarcts within bilateral white matter and deep gray nuclei of indeterminate age. Mild/moderate chronic microvascular changes.  Chest X-Ray negative for pneumothorax, infiltrate, or effusion. XR Pelvis negative for fx's or dislocations       pt was admitted for further workup and management     MRI performed - showing small acute lacunar stroke, pt seen by neurology, on optimal medical therapy, no new sxs     Vascular consult for leg pain- US showing PAD, no intervention planned decision made in conjunction with patient and family.     pt was orthostatic positive - treated with stockings and abdominal binder, started on midodrine 2.5mg BID, home flomax held, pt had urinary retention, harrison was placed, remains in place   If patient improved on orthostatic symptoms with supportive care and PT- can consider to d/c midodrine and to readjust BP medications  Trial of void once improved on ambulation; Also plan to restart flomax if orthostatic symptoms has improved    pt had raghu was treated with fluids and home losartan held - creatinine improved     Pt sxs have improved stable for discharge to rehab

## 2021-07-28 NOTE — H&P ADULT - NSHPPHYSICALEXAM_GEN_ALL_CORE
PHYSICAL EXAMINATION   VItals: T(C): 36.3 (07-28-21 @ 12:59), Max: 36.3 (07-28-21 @ 12:59)  HR: 73 (07-28-21 @ 12:59) (69 - 73)  BP: 128/68 (07-28-21 @ 12:59) (111/71 - 184/87)  RR: 20 (07-28-21 @ 12:59) (18 - 20)  SpO2: --    General:[ x ] NAD, Resting Comfortable,   [   ] other:                                HEENT: [   ] NC/AT, EOMI, PERRL , Normal Conjunctivae,   [ x ] other: diminished hearing on R ear   Cardio: [ x ] RRR, no murmer,   [   ] other:                              Pulm: [ x ] No Respiratory Distress,  Lungs CTAB,   [   ] other:                       Abdomen: [ x ]ND/NT, Soft,   [   ] other:    : [   ] NO HOGUE CATHETER, [ x ] HOGUE CATHETER- no meatal tear, no discharge, [   ] other:                                            MSK: [ x ] No joint swelling, Full ROM,   [   ] other:                                         Ext: [ x ]No C/C/E, No calf tenderness,   [   ]other:    Skin: [ x ]intact,   [   ] other:                                                                   Neurological Examination:  Cognitive: [ x ] AAO x 3,   [    ]  other:                                                                      Attention:  [ x ] intact,   [    ]  other:                            Memory: [ x ] intact, 3/3 short term memory   [    ]  other:     Mood/Affect: [ x ] wnl,    [    ]  other:                                                                             Communication: [  ]Fluent, no dysarthria, following commands:  [ x ] other: mildly dysarthric    CN II - XII:  [ x ] intact,  [    ] other:                                                                                        Motor:   RIGHT UE: [ x ] WNL,  [   ] other:  LEFT    UE: [   ] WNL,  [ x ] other: L arm flexion and extension 4-/5, L hand grasp 3+/5  RIGHT LE: [ x ] WNL,  [   ] other:   LEFT    LE: [   ] WNL,  [ x ] other: L hip flexion and extension 4-/5, L knee flexion/extension 4-/5, L ankle dorsiflexion 4-/5; L ankle plantar flexion 4-/5    Tone: [ x ] wnl,   [    ]  other:  DTRs: [ x ]symmetric, [   ] other:  Coordination:   [    ] intact,   [ x ] other: impaired coordination on left side                                                                Sensory: [    ] Intact to light touch,   [ x ] other: R sided hemisensory loss PHYSICAL EXAMINATION   VItals: T(C): 36.3 (07-28-21 @ 12:59), Max: 36.3 (07-28-21 @ 12:59)  HR: 73 (07-28-21 @ 12:59) (69 - 73)  BP: 128/68 (07-28-21 @ 12:59) (111/71 - 184/87)  RR: 20 (07-28-21 @ 12:59) (18 - 20)  SpO2: --    General:[ x ] NAD, Resting Comfortable,   [   ] other:                                HEENT: [   ] NC/AT, EOMI, PERRL , Normal Conjunctivae,   [ x ] other: diminished hearing on R ear   Cardio: [ x ] RRR, no murmur,   [   ] other:                              Pulm: [ x ] No Respiratory Distress,  Lungs CTAB,   [   ] other:                       Abdomen: [ x ]ND/NT, Soft,   [   ] other:    : [   ] NO HOGUE CATHETER, [ x ] HOGUE CATHETER- no meatal tear, no discharge, [   ] other:                                            MSK: [ x ] No joint swelling, Full ROM,   [   ] other:                                         Ext: [ x ]No C/C/E, No calf tenderness,   [   ]other:    Skin: [ x ]intact,   [   ] other:                                                                   Neurological Examination:  Cognitive: [ x ] AAO x 3,   [    ]  other:                                                                      Attention:  [ x ] intact,   [    ]  other:                            Memory: [ x ] intact, 3/3 short term memory   [    ]  other:     Mood/Affect: [ x ] wnl,    [    ]  other:                                                                             Communication: [  ]Fluent, no dysarthria, following commands:  [ x ] other: mildly dysarthric    CN II - XII:  [ x ] intact,  [    ] other:                                                                                        Motor:   RIGHT UE: [ x ] WNL,  [   ] other:  LEFT    UE: [   ] WNL,  [ x ] other: L arm shoulde flexion 3-/5, L hand grasp 3+/5  RIGHT LE: [ x ] WNL,  [   ] other:   LEFT    LE: [   ] WNL,  [ x ] other: L hip flexion and extension 4-/5, L knee flexion/extension 4-/5, L ankle dorsiflexion 4-/5; L ankle plantar flexion 4-/5    Tone: [ x ] wnl,   [    ]  other:  DTRs: [ x ]symmetric, [   ] other:  Coordination:   [    ] intact,   [ x ] other: impaired coordination on left side                                                                Sensory: [    ] Intact to light touch,   [ x ] other: left sided hemisensory loss

## 2021-07-28 NOTE — H&P ADULT - HISTORY OF PRESENT ILLNESS
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 Patient says he has been feeling intermittently dizzy over three days  despite having adequate/normal PO intake. Pt stated that these episodes of dizziness occurs after getting up from a sitting or supine position. Patient fell at home after feeling dizzy hitting the back of his head, but with no LOC. Pt was not on AC prior to admission. Pt also got out of his car, felt dizzy and then fell straight down hitting his buttocks and R knee. Denies head trauma. Patient was admitted on 7/20/21 to internal medicine for falls and dizziness 2/2 orthostatic hypotension.   CT spine 7/20/21 showed no evidence of acute cervical spine fracture or subluxation. Multilevel severe degenerative changes as described, with severe spinal noted stenosis at C2-3 and C3-4 and multilevel severe neural foraminal stenosis.  CTAP 7/20/21 showed no definite evidence of acute traumatic injury within the chest, abdomen, or pelvis.   CTH 7/21/21 showed no evidence of acute intracranial hemorrhage. Probable chronic infarct within the right posterior frontal white matter. Lacunar infarcts within bilateral white matter and deep gray nuclei of indeterminate age. Mild/moderate chronic microvascular changes.  Chest X-Ray negative for pneumothorax, infiltrate, or effusion. XR Pelvis negative for fx's or dislocations.    Patient's hospital course was complicated by lower extremity claudication. Bilateral lower extremity ultrasounds were performed on 7/23/21, which revealed severely diminished flow noted in the right and left superficial femoral artery suggestive of a proximal superficial femoral or common femoral artery stenosis. Significant atherosclerotic disease noted in the bilateral common femoral arteries. Vascular surgery was consulted for LLE Angiogram with possible endovascular revascularization; cardiac clearance was obtained but the team in conjunction with the family had decided to not go through with intervention.     Patient's orthostatic hypotension improved with midodrine, compression stocking, abdominal binder.     MRA 7/23/21 showed the following: MRA of the neck is limited by motion. Evaluation of the distal common carotid proximal internal carotid arteries appear grossly unremarkable though better quality study is recommended or CTA of the neck can be done for further evaluation. Evaluation of the proximal external carotid arteries are limited by motion. MRA of the [place] demonstrates decreased caliber of the distal right internal carotid artery. Short segment of stenosis is suspected involving both posterior cerebral arteries.    CTH 7/24/21 showed the following: No acute/traumatic intracranial pathology. Microvascular ischemic changes and unchanged probable chronic infarct within the right posterior frontal lobe.  MRI Head 7/24/21 showed acute lacunar infarcts involving the deep white matter of the left frontal lobe at the level of the centrum semiovale. No acute hemorrhage.  Neurology followed the patient; patient is to continue aspirin and plavix.    Patient's prior level of function included independence with ADLs and independence with ambulation with a RW.   Patient's level of function on admission is min assist with bed mobility and min assist with transfers, patient is able to ambulate 4 small steps with rolling walker min assist.     Patient was evaluated by Dr. Ingram, a physical medicine and rehabilitation specialist and was found to be an excellent candidate for acute rehabilitation. Patient would benefit from 3 hours of interdisciplinary therapy per day.     Patient was admitted to rehab for acute lacunar infarcts in the deep white matter of the left frontal lobe at the level fo the centrum semiovale and chronic left sided weakness from CVA of 2017.        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 Patient says he has been feeling intermittently dizzy over three days  despite having adequate/normal PO intake. Pt stated that these episodes of dizziness occurs after getting up from a sitting or supine position. Patient fell at home after feeling dizzy hitting the back of his head, but with no LOC. Pt was not on AC prior to admission. Pt also got out of his car, felt dizzy and then fell straight down hitting his buttocks and R knee. Denies head trauma. Patient was admitted on 7/20/21 to internal medicine for falls and dizziness 2/2 orthostatic hypotension.   CT spine 7/20/21 showed no evidence of acute cervical spine fracture or subluxation. Multilevel severe degenerative changes as described, with severe spinal noted stenosis at C2-3 and C3-4 and multilevel severe neural foraminal stenosis.  CTAP 7/20/21 showed no definite evidence of acute traumatic injury within the chest, abdomen, or pelvis.   CTH 7/21/21 showed no evidence of acute intracranial hemorrhage. Probable chronic infarct within the right posterior frontal white matter. Lacunar infarcts within bilateral white matter and deep gray nuclei of indeterminate age. Mild/moderate chronic microvascular changes.  Chest X-Ray negative for pneumothorax, infiltrate, or effusion. XR Pelvis negative for fx's or dislocations.    Patient's hospital course was complicated by lower extremity claudication. Bilateral lower extremity ultrasounds were performed on 7/23/21, which revealed severely diminished flow noted in the right and left superficial femoral artery suggestive of a proximal superficial femoral or common femoral artery stenosis. Significant atherosclerotic disease noted in the bilateral common femoral arteries. Vascular surgery was consulted for LLE Angiogram with possible endovascular revascularization; cardiac clearance was obtained but the team in conjunction with the family had decided to not go through with intervention.     Patient's orthostatic hypotension improved with midodrine, compression stocking, abdominal binder.     MRA 7/23/21 showed the following: MRA of the neck is limited by motion. Evaluation of the distal common carotid proximal internal carotid arteries appear grossly unremarkable though better quality study is recommended or CTA of the neck can be done for further evaluation. Evaluation of the proximal external carotid arteries are limited by motion. MRA of the [place] demonstrates decreased caliber of the distal right internal carotid artery. Short segment of stenosis is suspected involving both posterior cerebral arteries.    CTH 7/24/21 showed the following: No acute/traumatic intracranial pathology. Microvascular ischemic changes and unchanged probable chronic infarct within the right posterior frontal lobe.  MRI Head 7/24/21 showed acute lacunar infarcts involving the deep white matter of the left frontal lobe at the level of the centrum semiovale. No acute hemorrhage.  Neurology followed the patient and did not recommend intervention; patient is to continue aspirin and plavix.    Patient's prior level of function included independence with ADLs and independence with ambulation with a RW.   Patient's level of function on admission is min assist with bed mobility and min assist with transfers, patient is able to ambulate 4 small steps with rolling walker min assist.     Patient was evaluated by Dr. Ingram, a physical medicine and rehabilitation specialist and was found to be an excellent candidate for acute rehabilitation. Patient would benefit from 3 hours of interdisciplinary therapy per day.     Patient was admitted to rehab for acute lacunar infarcts in the deep white matter of the left frontal lobe at the level fo the centrum semiovale and chronic left sided weakness from CVA of 2017.        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 Patient says he has been feeling intermittently dizzy over three days  despite having adequate/normal PO intake. Pt stated that these episodes of dizziness occurs after getting up from a sitting or supine position. Patient fell at home after feeling dizzy hitting the back of his head, but with no LOC. Pt was not on AC prior to admission. Pt also got out of his car, felt dizzy and then fell straight down hitting his buttocks and R knee. Denies head trauma. Patient was admitted on 7/20/21 to internal medicine for falls and dizziness 2/2 orthostatic hypotension.   CT spine 7/20/21 showed no evidence of acute cervical spine fracture or subluxation. Multilevel severe degenerative changes as described, with severe spinal noted stenosis at C2-3 and C3-4 and multilevel severe neural foraminal stenosis.  CTAP 7/20/21 showed no definite evidence of acute traumatic injury within the chest, abdomen, or pelvis.   CTH 7/21/21 showed no evidence of acute intracranial hemorrhage. Probable chronic infarct within the right posterior frontal white matter. Lacunar infarcts within bilateral white matter and deep gray nuclei of indeterminate age. Mild/moderate chronic microvascular changes.  Chest X-Ray negative for pneumothorax, infiltrate, or effusion. XR Pelvis negative for fx's or dislocations.    Patient's hospital course was complicated by lower extremity claudication. Bilateral lower extremity ultrasounds were performed on 7/23/21, which revealed severely diminished flow noted in the right and left superficial femoral artery suggestive of a proximal superficial femoral or common femoral artery stenosis. Significant atherosclerotic disease noted in the bilateral common femoral arteries. Vascular surgery was consulted for LLE Angiogram with possible endovascular revascularization; cardiac clearance was obtained but the team in conjunction with the family had decided to not go through with intervention.     Patient's orthostatic hypotension improved with midodrine, compression stocking, abdominal binder.     MRA 7/23/21 showed the following: MRA of the neck is limited by motion. Evaluation of the distal common carotid proximal internal carotid arteries appear grossly unremarkable though better quality study is recommended or CTA of the neck can be done for further evaluation. Evaluation of the proximal external carotid arteries are limited by motion. MRA of the [place] demonstrates decreased caliber of the distal right internal carotid artery. Short segment of stenosis is suspected involving both posterior cerebral arteries.    CTH 7/24/21 showed the following: No acute/traumatic intracranial pathology. Microvascular ischemic changes and unchanged probable chronic infarct within the right posterior frontal lobe.  MRI Head 7/24/21 showed acute lacunar infarcts involving the deep white matter of the left frontal lobe at the level of the centrum semiovale. No acute hemorrhage.  Neurology followed the patient and did not recommend intervention; patient is to continue aspirin and plavix.    Patient's prior level of function included independence with ADLs and independence with ambulation with a RW.   Patient's level of function on admission is min assist with bed mobility and min assist with transfers, patient is able to ambulate 4 small steps with rolling walker min assist.     Patient was evaluated by Dr. Ingram, a physical medicine and rehabilitation specialist and was found to be an excellent candidate for acute rehabilitation. Patient would benefit from 3 hours of interdisciplinary therapy per day.     Patient was admitted to rehab for acute lacunar infarcts in the deep white matter of the left frontal lobe at the level fo the centrum semiovale and chronic left sided weakness from CVA of 2017.        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 Patient says he has been feeling intermittently dizzy over three days  despite having adequate/normal PO intake. Pt stated that these episodes of dizziness occurs after getting up from a sitting or supine position. Patient fell at home after feeling dizzy hitting the back of his head, but with no LOC. Pt was not on AC prior to admission. Pt also got out of his car, felt dizzy and then fell straight down hitting his buttocks and R knee. Denies head trauma. Patient was admitted on 7/20/21 to internal medicine for falls and dizziness 2/2 orthostatic hypotension.   CT spine 7/20/21 showed no evidence of acute cervical spine fracture or subluxation. Multilevel severe degenerative changes as described, with severe spinal noted stenosis at C2-3 and C3-4 and multilevel severe neural foraminal stenosis.  CTAP 7/20/21 showed no definite evidence of acute traumatic injury within the chest, abdomen, or pelvis.   CTH 7/21/21 showed no evidence of acute intracranial hemorrhage. Probable chronic infarct within the right posterior frontal white matter. Lacunar infarcts within bilateral white matter and deep gray nuclei of indeterminate age. Mild/moderate chronic microvascular changes.  Chest X-Ray negative for pneumothorax, infiltrate, or effusion. XR Pelvis negative for fx's or dislocations.    Patient's hospital course was complicated by lower extremity claudication. Bilateral lower extremity ultrasounds were performed on 7/23/21, which revealed severely diminished flow noted in the right and left superficial femoral artery suggestive of a proximal superficial femoral or common femoral artery stenosis. Significant atherosclerotic disease noted in the bilateral common femoral arteries. Vascular surgery was consulted for LLE Angiogram with possible endovascular revascularization; cardiac clearance was obtained but the team in conjunction with the family had decided to not go through with intervention.     Patient's orthostatic hypotension improved with midodrine, compression stocking, abdominal binder.     MRA 7/23/21 showed the following: MRA of the neck is limited by motion. Evaluation of the distal common carotid proximal internal carotid arteries appear grossly unremarkable though better quality study is recommended or CTA of the neck can be done for further evaluation. Evaluation of the proximal external carotid arteries are limited by motion. MRA of the [place] demonstrates decreased caliber of the distal right internal carotid artery. Short segment of stenosis is suspected involving both posterior cerebral arteries.    CTH 7/24/21 showed the following: No acute/traumatic intracranial pathology. Microvascular ischemic changes and unchanged probable chronic infarct within the right posterior frontal lobe.  MRI Head 7/24/21 showed acute lacunar infarcts involving the deep white matter of the left frontal lobe at the level of the centrum semiovale. No acute hemorrhage.  Neurology followed the patient and did not recommend intervention; patient is to continue aspirin and plavix.    Patient's prior level of function included independence with ADLs and independence with ambulation with a RW.   Patient's level of function on admission is min assist with bed mobility and min assist with transfers, patient is able to ambulate 4 small steps with rolling walker min assist.     Patient was evaluated by Dr. Ingram, a physical medicine and rehabilitation specialist and was found to be an excellent candidate for acute rehabilitation. Patient would benefit from 3 hours of interdisciplinary therapy per day.     Patient was admitted to rehab for acute lacunar infarcts in the deep white matter of the left frontal lobe at the level of the centrum semiovale and chronic left sided weakness from CVA of 2017 with a significant decline in his baseline function of independent with a straight cane.

## 2021-07-28 NOTE — DISCHARGE NOTE PROVIDER - NSDCMRMEDTOKEN_GEN_ALL_CORE_FT
aspirin 81 mg oral tablet: 1 tab(s) orally once a day  diazePAM 10 mg oral tablet: 1 tab(s) orally 3 times a day  fenofibrate 120 mg oral tablet: 1 tab(s) orally once a day  isosorbide mononitrate 60 mg oral tablet, extended release: 1 tab(s) orally once a day MDD:1  Januvia 25 mg oral tablet: 1 tab(s) orally once a day  lansoprazole 30 mg oral delayed release capsule: 1 cap(s) orally once a day  losartan 100 mg oral tablet: 1 tab(s) orally once a day  metoprolol succinate 25 mg oral tablet, extended release: orally 2 times a day  Pentoxil 400 mg oral tablet, extended release: 1 tab(s) orally 3 times a day  Plavix 75 mg oral tablet: 1 tab(s) orally once a day  Ranexa 500 mg oral tablet, extended release: 1 tab(s) orally 2 times a day  rosuvastatin 40 mg oral tablet: 1 tab(s) orally once a day  tamsulosin 0.4 mg oral capsule: 1 cap(s) orally once a day  tiZANidine 4 mg oral tablet: 1 tab(s) orally 3 times a day  venlafaxine 75 mg oral tablet: 1 tab(s) orally 2 times a day   aspirin 81 mg oral tablet: 1 tab(s) orally once a day  atorvastatin 80 mg oral tablet: 1 tab(s) orally once a day (at bedtime)  diazePAM 5 mg oral tablet: 1 tab(s) orally once a day, As Needed  fenofibrate 120 mg oral tablet: 1 tab(s) orally once a day  heparin: 5000 unit(s) subcutaneous every 8 hours  isosorbide mononitrate 60 mg oral tablet, extended release: 1 tab(s) orally once a day MDD:1  losartan 100 mg oral tablet: 1 tab(s) orally once a day  metoprolol succinate 25 mg oral tablet, extended release: orally 2 times a day  midodrine 2.5 mg oral tablet: 1 tab(s) orally   NIFEdipine 60 mg oral tablet, extended release: 1 tab(s) orally once a day  Pentoxil 400 mg oral tablet, extended release: 1 tab(s) orally 3 times a day  Plavix 75 mg oral tablet: 1 tab(s) orally once a day  polyethylene glycol 3350 oral powder for reconstitution: 17 gram(s) orally once a day, As needed, Constipation  Ranexa 500 mg oral tablet, extended release: 1 tab(s) orally 2 times a day  senna oral tablet: 2 tab(s) orally once a day (at bedtime)  venlafaxine 75 mg oral tablet: 1 tab(s) orally 2 times a day   aspirin 81 mg oral tablet: 1 tab(s) orally once a day  atorvastatin 80 mg oral tablet: 1 tab(s) orally once a day (at bedtime)  diazePAM 5 mg oral tablet: 1 tab(s) orally once a day, As Needed  fenofibrate 120 mg oral tablet: 1 tab(s) orally once a day  heparin: 5000 unit(s) subcutaneous every 8 hours  isosorbide mononitrate 60 mg oral tablet, extended release: 1 tab(s) orally once a day MDD:1  metoprolol succinate 25 mg oral tablet, extended release: orally 2 times a day  midodrine 2.5 mg oral tablet: 1 tab(s) orally   NIFEdipine 60 mg oral tablet, extended release: 1 tab(s) orally once a day  Pentoxil 400 mg oral tablet, extended release: 1 tab(s) orally 3 times a day  Plavix 75 mg oral tablet: 1 tab(s) orally once a day  polyethylene glycol 3350 oral powder for reconstitution: 17 gram(s) orally once a day, As needed, Constipation  Ranexa 500 mg oral tablet, extended release: 1 tab(s) orally 2 times a day  senna oral tablet: 2 tab(s) orally once a day (at bedtime)  venlafaxine 75 mg oral tablet: 1 tab(s) orally 2 times a day

## 2021-07-29 LAB
ALBUMIN SERPL ELPH-MCNC: 4.6 G/DL — SIGNIFICANT CHANGE UP (ref 3.5–5.2)
ALP SERPL-CCNC: 41 U/L — SIGNIFICANT CHANGE UP (ref 30–115)
ALT FLD-CCNC: 17 U/L — SIGNIFICANT CHANGE UP (ref 0–41)
ANION GAP SERPL CALC-SCNC: 11 MMOL/L — SIGNIFICANT CHANGE UP (ref 7–14)
AST SERPL-CCNC: 19 U/L — SIGNIFICANT CHANGE UP (ref 0–41)
BASOPHILS # BLD AUTO: 0.08 K/UL — SIGNIFICANT CHANGE UP (ref 0–0.2)
BASOPHILS NFR BLD AUTO: 0.7 % — SIGNIFICANT CHANGE UP (ref 0–1)
BILIRUB SERPL-MCNC: 0.3 MG/DL — SIGNIFICANT CHANGE UP (ref 0.2–1.2)
BUN SERPL-MCNC: 41 MG/DL — HIGH (ref 10–20)
CALCIUM SERPL-MCNC: 9.8 MG/DL — SIGNIFICANT CHANGE UP (ref 8.5–10.1)
CHLORIDE SERPL-SCNC: 105 MMOL/L — SIGNIFICANT CHANGE UP (ref 98–110)
CO2 SERPL-SCNC: 21 MMOL/L — SIGNIFICANT CHANGE UP (ref 17–32)
COVID-19 SPIKE DOMAIN AB INTERP: POSITIVE
COVID-19 SPIKE DOMAIN ANTIBODY RESULT: >250 U/ML — HIGH
CREAT SERPL-MCNC: 2 MG/DL — HIGH (ref 0.7–1.5)
EOSINOPHIL # BLD AUTO: 0.24 K/UL — SIGNIFICANT CHANGE UP (ref 0–0.7)
EOSINOPHIL NFR BLD AUTO: 2.2 % — SIGNIFICANT CHANGE UP (ref 0–8)
GLUCOSE BLDC GLUCOMTR-MCNC: 124 MG/DL — HIGH (ref 70–99)
GLUCOSE BLDC GLUCOMTR-MCNC: 129 MG/DL — HIGH (ref 70–99)
GLUCOSE BLDC GLUCOMTR-MCNC: 131 MG/DL — HIGH (ref 70–99)
GLUCOSE BLDC GLUCOMTR-MCNC: 148 MG/DL — HIGH (ref 70–99)
GLUCOSE SERPL-MCNC: 148 MG/DL — HIGH (ref 70–99)
HCT VFR BLD CALC: 43.9 % — SIGNIFICANT CHANGE UP (ref 42–52)
HGB BLD-MCNC: 13.6 G/DL — LOW (ref 14–18)
IMM GRANULOCYTES NFR BLD AUTO: 0.6 % — HIGH (ref 0.1–0.3)
LYMPHOCYTES # BLD AUTO: 2.58 K/UL — SIGNIFICANT CHANGE UP (ref 1.2–3.4)
LYMPHOCYTES # BLD AUTO: 23.7 % — SIGNIFICANT CHANGE UP (ref 20.5–51.1)
MAGNESIUM SERPL-MCNC: 2.4 MG/DL — SIGNIFICANT CHANGE UP (ref 1.8–2.4)
MCHC RBC-ENTMCNC: 28.8 PG — SIGNIFICANT CHANGE UP (ref 27–31)
MCHC RBC-ENTMCNC: 31 G/DL — LOW (ref 32–37)
MCV RBC AUTO: 93 FL — SIGNIFICANT CHANGE UP (ref 80–94)
MONOCYTES # BLD AUTO: 0.75 K/UL — HIGH (ref 0.1–0.6)
MONOCYTES NFR BLD AUTO: 6.9 % — SIGNIFICANT CHANGE UP (ref 1.7–9.3)
NEUTROPHILS # BLD AUTO: 7.17 K/UL — HIGH (ref 1.4–6.5)
NEUTROPHILS NFR BLD AUTO: 65.9 % — SIGNIFICANT CHANGE UP (ref 42.2–75.2)
NRBC # BLD: 0 /100 WBCS — SIGNIFICANT CHANGE UP (ref 0–0)
PLATELET # BLD AUTO: 312 K/UL — SIGNIFICANT CHANGE UP (ref 130–400)
POTASSIUM SERPL-MCNC: 4.5 MMOL/L — SIGNIFICANT CHANGE UP (ref 3.5–5)
POTASSIUM SERPL-SCNC: 4.5 MMOL/L — SIGNIFICANT CHANGE UP (ref 3.5–5)
PROT SERPL-MCNC: 7.1 G/DL — SIGNIFICANT CHANGE UP (ref 6–8)
RBC # BLD: 4.72 M/UL — SIGNIFICANT CHANGE UP (ref 4.7–6.1)
RBC # FLD: 14.7 % — HIGH (ref 11.5–14.5)
SARS-COV-2 IGG+IGM SERPL QL IA: >250 U/ML — HIGH
SARS-COV-2 IGG+IGM SERPL QL IA: POSITIVE
SODIUM SERPL-SCNC: 137 MMOL/L — SIGNIFICANT CHANGE UP (ref 135–146)
WBC # BLD: 10.89 K/UL — HIGH (ref 4.8–10.8)
WBC # FLD AUTO: 10.89 K/UL — HIGH (ref 4.8–10.8)

## 2021-07-29 RX ORDER — GUAIFENESIN/DEXTROMETHORPHAN 600MG-30MG
10 TABLET, EXTENDED RELEASE 12 HR ORAL ONCE
Refills: 0 | Status: COMPLETED | OUTPATIENT
Start: 2021-07-29 | End: 2021-07-29

## 2021-07-29 RX ADMIN — Medication 400 MILLIGRAM(S): at 21:27

## 2021-07-29 RX ADMIN — RANOLAZINE 500 MILLIGRAM(S): 500 TABLET, FILM COATED, EXTENDED RELEASE ORAL at 05:38

## 2021-07-29 RX ADMIN — ATORVASTATIN CALCIUM 80 MILLIGRAM(S): 80 TABLET, FILM COATED ORAL at 21:27

## 2021-07-29 RX ADMIN — Medication 10 MILLILITER(S): at 21:28

## 2021-07-29 RX ADMIN — Medication 75 MILLIGRAM(S): at 12:49

## 2021-07-29 RX ADMIN — Medication 60 MILLIGRAM(S): at 05:38

## 2021-07-29 RX ADMIN — Medication 400 MILLIGRAM(S): at 13:34

## 2021-07-29 RX ADMIN — Medication 81 MILLIGRAM(S): at 12:49

## 2021-07-29 RX ADMIN — HEPARIN SODIUM 5000 UNIT(S): 5000 INJECTION INTRAVENOUS; SUBCUTANEOUS at 13:33

## 2021-07-29 RX ADMIN — SENNA PLUS 2 TABLET(S): 8.6 TABLET ORAL at 21:27

## 2021-07-29 RX ADMIN — CLOPIDOGREL BISULFATE 75 MILLIGRAM(S): 75 TABLET, FILM COATED ORAL at 12:49

## 2021-07-29 RX ADMIN — HEPARIN SODIUM 5000 UNIT(S): 5000 INJECTION INTRAVENOUS; SUBCUTANEOUS at 21:28

## 2021-07-29 RX ADMIN — Medication 25 MILLIGRAM(S): at 05:38

## 2021-07-29 RX ADMIN — ISOSORBIDE MONONITRATE 60 MILLIGRAM(S): 60 TABLET, EXTENDED RELEASE ORAL at 12:48

## 2021-07-29 RX ADMIN — Medication 400 MILLIGRAM(S): at 05:37

## 2021-07-29 RX ADMIN — MIDODRINE HYDROCHLORIDE 2.5 MILLIGRAM(S): 2.5 TABLET ORAL at 08:09

## 2021-07-29 RX ADMIN — Medication 145 MILLIGRAM(S): at 12:49

## 2021-07-29 RX ADMIN — HEPARIN SODIUM 5000 UNIT(S): 5000 INJECTION INTRAVENOUS; SUBCUTANEOUS at 05:38

## 2021-07-29 RX ADMIN — RANOLAZINE 500 MILLIGRAM(S): 500 TABLET, FILM COATED, EXTENDED RELEASE ORAL at 17:46

## 2021-07-29 RX ADMIN — MIDODRINE HYDROCHLORIDE 2.5 MILLIGRAM(S): 2.5 TABLET ORAL at 12:48

## 2021-07-29 NOTE — PROGRESS NOTE ADULT - ASSESSMENT
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 Rehab of acute lacunar infarcts in the deep white matter of the left frontal lobe at the level fo the centrum semiovale and chronic left sided weakness from CVA of 2017      # Rehab of chronic left hemiparisis and dysarthria and dysphagia with decline in function and falls, found to have an acute left subcortical infarct.   - MRI Acute lacunar infarcts involving the deep white matter of the left frontal lobe at the level of the centrum semiovale. No acute hemorrhage.  - Neurology was consulted and no intervention was recommended; no intervention at this time. Patient can Follow up with neurology at the clinic, as per conversation with neuro PA. Was already on DAPT and high dose statin.  - Patient requires PT/OT/SLT/Neuropsych eval     # Dizziness and fall 2/2 orthostatic hypotension - now controlled  - continue with HI stockings and abdominal binder  - continue midodrine 2x a day  - fall precautions   - home medications of flomax, tizanidine were held by medicine team prior to rehab admission; valium was changed from 10 mg standing to 5 mg PRN by medicine per neurology recommendations     # LE pain likely secondary to PAD   - Significant atherosclerotic disease noted in bilateral common femoral arteries  - Patient's family and vascular team agreed to not proceed with intervention at this time     # CAD s/p CABG 2009  - 6/2021 :patent SALAZAR to LAD , patent SVG to OM , % , filled distally by collaterals from [facility].  - Continue DAPT ( Aspirin 81 mg daily and Plavix 75 mg daily ), ARB, Imdur, Ranexa, B-Blocker, Statin Therapy  - Cardio f/u OP     # HTN, patient had hypertensive urgency   - losartan 100mg held for SAMANTA   - c/w nifedipine 60mg xL q24h     #HLD  - continue with atorvastatin and fenofibrate     # SAMANTA on CKD vs progression of CKD   # Urinary retention  - Cr 1.3 in 2017, 1.5 in 2019, 1.7 in June; Current Cr trend 2.1 -> 1.9-- > 2.1 --> 1.8  - Renal bladder US showed simple cysts within both kidneys without hydronephrosis or nephrolithiasis   - Patient retaining on bladder scan so harrison placed, still in place; attempt trial of void   - Monitor Cr, IVF as needed     # DM type 2  - Hold home meds, glucose wnl   - Start Insulin regimen for FS >180    # Depression   - venlafaxine     # Anxiety   - Valium PRN as above    # Maintenance   - Pain control: none   - GI/Bowel Mgmt: miralax ,senna   - Bladder management: Harrison in place for urinary retention   - Skin: No active issues at this time  - FEN: monitor electrolytes, replete as needed   - Diet: DASH diet     # Precautions / PROPHYLAXIS:    - Fall precaution   - Ortho: Weight bearing status: WBAT  - DVT prophylaxis: SC heparin        76 yo M with PMH of CAD s/p CABG x3 2017 (Yessi), 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 Rehab of acute lacunar infarcts in the deep white matter of the left frontal lobe at the level fo the centrum semiovale and chronic left sided weakness from CVA of 2017      # Rehab of chronic left hemiparisis (nondominant), and dysarthria and dysphagia with decline in function and falls, found to have an acute left subcortical infarct.   - MRI Acute lacunar infarcts involving the deep white matter of the left frontal lobe at the level of the centrum semiovale. No acute hemorrhage.  - Neurology was consulted and no intervention was recommended; no intervention at this time. Patient can Follow up with neurology at the clinic, as per conversation with neuro PA. Was already on DAPT and high dose statin.  - Patient requires PT/OT/SLT/Neuropsych eval   -Full rehab eval in progress    # Dizziness and fall 2/2 orthostatic hypotension - now controlled  - continue with HI stockings and abdominal binder  - continue midodrine 2x a day  - fall precautions   - home medications of flomax, tizanidine were held by medicine team prior to rehab admission; valium was changed from 10 mg standing to 5 mg PRN by medicine per neurology recommendations     # LE pain likely secondary to PAD   - Significant atherosclerotic disease noted in bilateral common femoral arteries  - Patient's family and vascular team agreed to not proceed with intervention at this time     # CAD s/p CABG 2009  - 6/2021 :patent SALAZAR to LAD , patent SVG to OM , % , filled distally by collaterals from [facility].  - Continue DAPT ( Aspirin 81 mg daily and Plavix 75 mg daily ), ARB, Imdur, Ranexa, B-Blocker, Statin Therapy  - Cardio f/u OP     # HTN, patient had hypertensive urgency   - losartan 100mg held for SAMANTA   - c/w nifedipine 60mg xL q24h     #HLD  - continue with atorvastatin and fenofibrate     # SAMANTA on CKD vs progression of CKD   # Urinary retention  - Cr 1.3 in 2017, 1.5 in 2019, 1.7 in June; Current Cr trend 2.1 -> 1.9-- > 2.1 --> 1.8  - Renal bladder US showed simple cysts within both kidneys without hydronephrosis or nephrolithiasis   - Patient retaining on bladder scan so harrison placed, still in place; attempt trial of void   - Monitor Cr, IVF as needed     # DM type 2  - Hold home meds, glucose wnl   - Start Insulin regimen for FS >180    # Depression   - venlafaxine     # Anxiety   - Valium PRN as above    # Maintenance   - Pain control: none   - GI/Bowel Mgmt: miralax ,senna   - Bladder management: Harrison in place for urinary retention   - Skin: No active issues at this time  - FEN: monitor electrolytes, replete as needed   - Diet: Diet, DASH/TLC:   Sodium & Cholesterol Restricted  Consistent Carbohydrate No Snacks (07-28-21 @ 15:46) [Active]    # Precautions / PROPHYLAXIS:    - Fall precaution   - Ortho: Weight bearing status: WBAT  - DVT prophylaxis: SC heparin        76 yo M with PMH of CAD s/p CABG x3 2017 (Yessi), 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 Rehab of acute lacunar infarcts in the deep white matter of the left frontal lobe at the level fo the centrum semiovale and chronic left sided weakness from CVA of 2017      # Rehab of chronic left hemiparisis (dominant), and dysarthria and dysphagia with decline in function and falls, found to have an acute left subcortical infarct.   - MRI Acute lacunar infarcts involving the deep white matter of the left frontal lobe at the level of the centrum semiovale. No acute hemorrhage.  - Neurology was consulted and no intervention was recommended; no intervention at this time. Patient can Follow up with neurology at the clinic, as per conversation with neuro PA. Was already on DAPT and high dose statin.  - Patient requires PT/OT/SLT/Neuropsych eval   -Full rehab eval in progress    # Dizziness and fall 2/2 orthostatic hypotension - now controlled  - continue with HI stockings and abdominal binder  - continue midodrine 2x a day  - fall precautions   - home medications of flomax, tizanidine were held by medicine team prior to rehab admission; valium was changed from 10 mg standing to 5 mg PRN by medicine per neurology recommendations     # LE pain likely secondary to PAD   - Significant atherosclerotic disease noted in bilateral common femoral arteries  - Patient's family and vascular team agreed to not proceed with intervention at this time     # CAD s/p CABG 2009  - 6/2021 :patent SALAZAR to LAD , patent SVG to OM , % , filled distally by collaterals from [facility].  - Continue DAPT ( Aspirin 81 mg daily and Plavix 75 mg daily ), ARB, Imdur, Ranexa, B-Blocker, Statin Therapy  - Cardio f/u OP     # HTN, patient had hypertensive urgency   - losartan 100mg held for SAMANTA   - c/w nifedipine 60mg xL q24h     #HLD  - continue with atorvastatin and fenofibrate     # SAMANTA on CKD vs progression of CKD   # Urinary retention  - Cr 1.3 in 2017, 1.5 in 2019, 1.7 in June; Current Cr trend 2.1 -> 1.9-- > 2.1 --> 1.8  - Renal bladder US showed simple cysts within both kidneys without hydronephrosis or nephrolithiasis   - Patient retaining on bladder scan so harrison placed, still in place; attempt trial of void   - Monitor Cr, IVF as needed     # DM type 2  - Hold home meds, glucose wnl   - Start Insulin regimen for FS >180    # Depression   - venlafaxine     # Anxiety   - Valium PRN as above    # Maintenance   - Pain control: none   - GI/Bowel Mgmt: miralax ,senna   - Bladder management: Harrison in place for urinary retention   - Skin: No active issues at this time  - FEN: monitor electrolytes, replete as needed   - Diet: Diet, DASH/TLC:   Sodium & Cholesterol Restricted  Consistent Carbohydrate No Snacks (07-28-21 @ 15:46) [Active]    # Precautions / PROPHYLAXIS:    - Fall precaution   - Ortho: Weight bearing status: WBAT  - DVT prophylaxis: SC heparin

## 2021-07-29 NOTE — PROGRESS NOTE ADULT - ATTENDING COMMENTS
I reviewed the chart and examined the patient with the resident and we discussed the findings and treatment plan. I agree with the findings and treatment plan above, which I modified as indicated. The patient requires 3 hrs a day of acute inpatient rehab.    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 Rehab of acute lacunar infarcts in the deep white matter of the left frontal lobe at the level fo the centrum semiovale and chronic left sided weakness from CVA of 2017    # Rehab of chronic left hemiparesis and dysarthria and dysphagia with decline in function and falls, found to have an acute left subcortical infarct.    MRI Acute lacunar infarcts involving the deep white matter of the left frontal lobe at the level of the centrum semiovale. No acute hemorrhage.  He requires 3 hours a day of at least PT and OT with speech therapy and neuropsych to eval. He also requires a hospital based rehab setting as he has been having symptomatic orthostatic hypotension and SAMANTA on CKD with adjustment in his meds. He requires close monitoring by rehab nursing and physiatry to monitor his BPs and check for orthostasis and adjust his medications while participating in rehab.    - Neurology was consulted and no intervention was recommended; no intervention at this time. Patient can Follow up with neurology at the clinic, as per conversation with neuro PA. Was already on DAPT and high dose statin.  - Patient requires PT/OT/SLT/Neuropsych eval an acute rehab program to return to previous level of function.    #Dizziness and fall 2/2 orthostatic hypotension - now controlled  - continue with HI stockings and abdominal binder  - continue midodrine 2x a day  - fall precautions   - home medications of flomax, tizanidine were held by medicine team prior to rehab admission; valium was changed from 10 mg standing to 5 mg PRN by medicine per neurology recommendations     # LE pain likely secondary to PAD   - Significant atherosclerotic disease noted in bilateral common femoral arteries  - Patient's family and vascular team agreed to not proceed with intervention at this time     # CAD s/p CABG 2009  - 6/2021 :patent SALAZAR to LAD , patent SVG to OM , % , filled distally by collaterals from [facility].  - Continue DAPT ( Aspirin 81 mg daily and Plavix 75 mg daily ), Imdur, Ranexa, B-Blocker, Statin Therapy  - Cardio f/u OP     #HTN, patient had hypertensive urgency   - losartan 100mg held for SAMANTA   - c/w nifedipine 60mg xL q24h     #HLD  - continue with atorvastatin and fenofibrate     #SAMANTA on CKD vs progression of CKD   - Cr 1.3 in 2017, 1.5 in 2019, 1.7 in June; Current Cr trend 2.1 -> 1.9-- > 2.1 --> 1.8  - Renal bladder US showed simple cysts within both kidneys without hydronephrosis or nephrolithiasis   - Patient retaining on bladder scan so harrison placed, still in place; attempt trial of void   - Monitor Cr, IVF as needed     #DM type 2  - Hold home meds, glucose wnl   - Start Insulin regimen for FS >180    # Presumed Urinary retension  - has harrison. will do TOV in am and do CIC if needed    # Depression   - venlafaxine     # Anxiety   - Valium PRN as above    # Maintenance   - Pain control: none   - GI/Bowel Mgmt: miralax ,senna   - Bladder management: Harrison in place for urinary retention. TOV soon as above  - Skin: No active issues at this time  - FEN: monitor electrolytes, replete as needed   - Diet: DASH diet     # Precautions / PROPHYLAXIS:    - Fall precaution   - Ortho: Weight bearing status: WBAT  - DVT prophylaxis: SC heparin. I reviewed the chart and examined the patient with the resident and we discussed the findings and treatment plan. I agree with the findings and treatment plan above, which I modified as indicated. The patient requires 3 hrs a day of acute inpatient rehab.    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 Rehab of acute lacunar infarcts in the deep white matter of the left frontal lobe at the level fo the centrum semiovale and chronic left sided weakness from CVA of 2017    # Rehab of chronic left hemiparesis, (dominant) and dysarthria and dysphagia with decline in function and falls, found to have an acute left subcortical infarct. He states that his dysarthria is worse.    MRI Acute lacunar infarcts involving the deep white matter of the left frontal lobe at the level of the centrum semiovale. No acute hemorrhage.  He requires 3 hours a day of at least PT and OT with speech therapy and neuropsych to eval. He also requires a hospital based rehab setting as he has been having symptomatic orthostatic hypotension and SAMANTA on CKD with adjustment in his meds. He requires close monitoring by rehab nursing and physiatry to monitor his BPs and check for orthostasis and adjust his medications while participating in rehab.    - Neurology was consulted and no intervention was recommended; no intervention at this time. Patient can Follow up with neurology at the clinic, as per conversation with neuro PA. Was already on DAPT and high dose statin.  - Patient requires PT/OT/SLT/Neuropsych eval an acute rehab program to return to previous level of function.    #Dizziness and fall 2/2 orthostatic hypotension - now controlled  - continue with HI stockings and abdominal binder  - continue midodrine 2x a day  - fall precautions   - home medications of flomax, tizanidine were held by medicine team prior to rehab admission; valium was changed from 10 mg standing to 5 mg PRN by medicine per neurology recommendations     # LE pain likely secondary to PAD   - Significant atherosclerotic disease noted in bilateral common femoral arteries  - Patient's family and vascular team agreed to not proceed with intervention at this time     # CAD s/p CABG 2009  - 6/2021 :patent SALAZAR to LAD , patent SVG to OM , % , filled distally by collaterals from [facility].  - Continue DAPT ( Aspirin 81 mg daily and Plavix 75 mg daily ), Imdur, Ranexa, B-Blocker, Statin Therapy  - Cardio f/u OP     #HTN, patient had hypertensive urgency   - losartan 100mg held for SAMANTA   - c/w nifedipine 60mg xL q24h     #HLD  - continue with atorvastatin and fenofibrate     #SAMANTA on CKD vs progression of CKD   - Cr 1.3 in 2017, 1.5 in 2019, 1.7 in June; Current Cr trend 2.1 -> 1.9-- > 2.1 --> 1.8  - Renal bladder US showed simple cysts within both kidneys without hydronephrosis or nephrolithiasis   - Patient retaining on bladder scan so harrison placed, still in place; attempt trial of void in am  - Monitor Cr, IVF as needed     # Urinary Retention  - Flomax had to be stopped secondary to symptomatic orthostatic hypotension.   - Will give TOV in am. Patient reportedly not able to tolerate CIC on medical floor    #DM type 2  - Hold home meds, glucose wnl   - Start Insulin regimen for FS >180    # Depression   - venlafaxine     # Anxiety   - Valium PRN as above    # Maintenance   - Pain control: none   - GI/Bowel Mgmt: miralax ,senna   - Bladder management: Harrison in place for urinary retention. TOV soon as above  - Skin: No active issues at this time  - FEN: monitor electrolytes, replete as needed   - Diet: DASH diet     # Precautions / PROPHYLAXIS:    - Fall precaution   - Ortho: Weight bearing status: WBAT  - DVT prophylaxis: SC heparin.

## 2021-07-29 NOTE — PROGRESS NOTE ADULT - SUBJECTIVE AND OBJECTIVE BOX
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 Patient says he has been feeling intermittently dizzy over three days  despite having adequate/normal PO intake. Pt stated that these episodes of dizziness occurs after getting up from a sitting or supine position. Patient fell at home after feeling dizzy hitting the back of his head, but with no LOC. Pt was not on AC prior to admission. Pt also got out of his car, felt dizzy and then fell straight down hitting his buttocks and R knee. Denies head trauma. Patient was admitted on 21 to internal medicine for falls and dizziness 2/2 orthostatic hypotension.   CT spine 21 showed no evidence of acute cervical spine fracture or subluxation. Multilevel severe degenerative changes as described, with severe spinal noted stenosis at C2-3 and C3-4 and multilevel severe neural foraminal stenosis.  CTAP 21 showed no definite evidence of acute traumatic injury within the chest, abdomen, or pelvis.   CTH 21 showed no evidence of acute intracranial hemorrhage. Probable chronic infarct within the right posterior frontal white matter. Lacunar infarcts within bilateral white matter and deep gray nuclei of indeterminate age. Mild/moderate chronic microvascular changes.  Chest X-Ray negative for pneumothorax, infiltrate, or effusion. XR Pelvis negative for fx's or dislocations.    Patient's hospital course was complicated by lower extremity claudication. Bilateral lower extremity ultrasounds were performed on 21, which revealed severely diminished flow noted in the right and left superficial femoral artery suggestive of a proximal superficial femoral or common femoral artery stenosis. Significant atherosclerotic disease noted in the bilateral common femoral arteries. Vascular surgery was consulted for LLE Angiogram with possible endovascular revascularization; cardiac clearance was obtained but the team in conjunction with the family had decided to not go through with intervention.     Patient's orthostatic hypotension improved with midodrine, compression stocking, abdominal binder.     MRA 21 showed the following: MRA of the neck is limited by motion. Evaluation of the distal common carotid proximal internal carotid arteries appear grossly unremarkable though better quality study is recommended or CTA of the neck can be done for further evaluation. Evaluation of the proximal external carotid arteries are limited by motion. MRA of the [place] demonstrates decreased caliber of the distal right internal carotid artery. Short segment of stenosis is suspected involving both posterior cerebral arteries.    CTH 21 showed the following: No acute/traumatic intracranial pathology. Microvascular ischemic changes and unchanged probable chronic infarct within the right posterior frontal lobe.  MRI Head 21 showed acute lacunar infarcts involving the deep white matter of the left frontal lobe at the level of the centrum semiovale. No acute hemorrhage.  Neurology followed the patient and did not recommend intervention; patient is to continue aspirin and plavix.    Patient's prior level of function included independence with ADLs and independence with ambulation with a RW.   Patient's level of function on admission is min assist with bed mobility and min assist with transfers, patient is able to ambulate 4 small steps with rolling walker min assist.     Patient was evaluated by Dr. Ingram, a physical medicine and rehabilitation specialist and was found to be an excellent candidate for acute rehabilitation. Patient would benefit from 3 hours of interdisciplinary therapy per day.     Patient was admitted to rehab for acute lacunar infarcts in the deep white matter of the left frontal lobe at the level of the centrum semiovale and chronic left sided weakness from CVA of  with a significant decline in his baseline function of independent with a straight cane.       (2021 15:42)      I examined the patient and reviewed the chart. There have been no significant changes since my history and physical except where documented below.    TODAY'S SUBJECTIVE & REVIEW OF SYMPTOMS:  Patient was seen this morning with Dr. Ingram. Patient is doing well. No acute events overnight        Constiutional:    [ x ] WNL           [   ] poor appetite   [   ] insomnia   [   ] tired   Cardio:                [ x ] WNL           [   ] CP   [   ] PRADO   [   ] palpitations               Resp:                   [ x ] WNL           [   ] SOB   [   ] cough   [   ] wheezing   GI:                        [ x ] WNL           [   ] constipation   [   ] diarrhea   [   ] abdominal pain   [   ] nausea   [   ] emesis                                :                      [ x ] WNL           [   ] HOGUE  [   ] dysuria   [   ] difficulty voiding             Endo:                   [ x ] WNL          [   ] polyuria   [   ] temperature intolerance                 Skin:                     [ x ] WNL          [   ] pain   [   ] wound   [   ] rash   MSK:                    [ x ] WNL          [   ] muscle pain   [   ] joint pain/ stiffness   [   ] muscle tenderness   [   ] swelling   Neuro:                 [ x ] WNL          [   ] HA   [   ] change in vision   [   ] tremor   [   ] weakness   [   ]dysphagia              Cognitive:           [ x ] WNL           [   ]confusion      Psych:                  [ x ] WNL           [   ] hallucinations   [   ]agitation   [   ] delusion   [   ]depression      PHYSICAL EXAM    Vital Signs Last 24 Hrs  T(C): 36.1 (2021 06:03), Max: 36.3 (2021 12:59)  T(F): 97 (2021 06:03), Max: 97.3 (2021 12:59)  HR: 70 (2021 06:03) (70 - 75)  BP: 189/84 (2021 06:03) (101/59 - 189/84)  BP(mean): --  RR: 18 (2021 06:03) (18 - 20)  SpO2: --    Constitutional - [ x ] NAD, Comfortable        [   ] other:  Chest - [ x ] CTA     [   ] other:  Cardiovascular - [ x ] RRR, no murmer     [   ] other:  Abdomen - [ x ] Soft, NT/ND      [   ] other:        -  [   ] NO HOGUE CATHETER   [ x ] YES  if yes: [ x ] NO MEATAL TEAR OR DISCHARGE [   ] other:  Extremities - [ x ] No C/C/E, No calf tenderness       [   ] other:  ROM - [ x ] WFL     [   ] other:  Neurologic Exam -                 Cognitive - [ x ]Awake, Alert, AAO to self, place, date, year, situation         [    ] other:      Communication - [   ]Fluent, No dysarthria       [ x ] other: mildly dysarthric      Motor - No focal deficits                    Right UE -  [ x ] WNL      [    ] other:                    Left UE -     [   ] WNL      [ x ] other: L arm shoulde flexion 3-/5, L hand grasp 3+/5                    Right LE -   [ x ] WNL       [    ] other:                    Left LE -      [   ]WNL       [ x ] other: L arm shoulde flexion 3-/5, L hand grasp 3+/5     Sensory - [   ] Intact to LT      [ x ] other: L sided hemisensory loss      Reflexes - [ x ] wnl/ symmetric     [   ] other:     Psychiatric - [ x ]Mood stable, Affect WNL     [   ]other:     Skin - [ x ] intact      [   ] other      acetaminophen   Tablet .. 975 milliGRAM(s) Oral every 6 hours PRN  aluminum hydroxide/magnesium hydroxide/simethicone Suspension 30 milliLiter(s) Oral every 4 hours PRN  aspirin  chewable 81 milliGRAM(s) Oral daily  atorvastatin 80 milliGRAM(s) Oral at bedtime  clopidogrel Tablet 75 milliGRAM(s) Oral daily  diazepam    Tablet 5 milliGRAM(s) Oral daily PRN  fenofibrate Tablet 145 milliGRAM(s) Oral daily  heparin   Injectable 5000 Unit(s) SubCutaneous every 8 hours  isosorbide   mononitrate ER Tablet (IMDUR) 60 milliGRAM(s) Oral daily  metoprolol succinate ER 25 milliGRAM(s) Oral every 12 hours  midodrine. 2.5 milliGRAM(s) Oral <User Schedule>  NIFEdipine XL 60 milliGRAM(s) Oral daily  pentoxifylline 400 milliGRAM(s) Oral three times a day  polyethylene glycol 3350 17 Gram(s) Oral daily PRN  ranolazine 500 milliGRAM(s) Oral two times a day  senna 2 Tablet(s) Oral at bedtime  venlafaxine XR. 75 milliGRAM(s) Oral daily      RECENT LABS/IMAGING                        12.7   7.68  )-----------( 265      ( 2021 07:42 )             40.7         139  |  108  |  46<H>  ----------------------------<  120<H>  4.9   |  22  |  1.8<H>    Ca    9.3      2021 07:42  Mg     2.5         TPro  6.2  /  Alb  4.0  /  TBili  0.3  /  DBili  x   /  AST  16  /  ALT  15  /  AlkPhos  34        Urinalysis Basic - ( 2021 16:26 )    Color: Yellow / Appearance: Clear / S.023 / pH: x  Gluc: x / Ketone: Negative  / Bili: Negative / Urobili: <2 mg/dL   Blood: x / Protein: 300 mg/dL / Nitrite: Negative   Leuk Esterase: Negative / RBC: 90 /HPF / WBC 4 /HPF   Sq Epi: x / Non Sq Epi: 1 /HPF / Bacteria: Negative        ASSESSMENT/PLAN:    Rehab of:    Patient requires 3 hrs daily of interdisciplinary inpatient rehab at least 5 days a week.     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 Patient says he has been feeling intermittently dizzy over three days  despite having adequate/normal PO intake. Pt stated that these episodes of dizziness occurs after getting up from a sitting or supine position. Patient fell at home after feeling dizzy hitting the back of his head, but with no LOC. Pt was not on AC prior to admission. Pt also got out of his car, felt dizzy and then fell straight down hitting his buttocks and R knee. Denies head trauma. Patient was admitted on 21 to internal medicine for falls and dizziness 2/2 orthostatic hypotension.   CT spine 21 showed no evidence of acute cervical spine fracture or subluxation. Multilevel severe degenerative changes as described, with severe spinal noted stenosis at C2-3 and C3-4 and multilevel severe neural foraminal stenosis.  CTAP 21 showed no definite evidence of acute traumatic injury within the chest, abdomen, or pelvis.   CTH 21 showed no evidence of acute intracranial hemorrhage. Probable chronic infarct within the right posterior frontal white matter. Lacunar infarcts within bilateral white matter and deep gray nuclei of indeterminate age. Mild/moderate chronic microvascular changes.  Chest X-Ray negative for pneumothorax, infiltrate, or effusion. XR Pelvis negative for fx's or dislocations.    Patient's hospital course was complicated by lower extremity claudication. Bilateral lower extremity ultrasounds were performed on 21, which revealed severely diminished flow noted in the right and left superficial femoral artery suggestive of a proximal superficial femoral or common femoral artery stenosis. Significant atherosclerotic disease noted in the bilateral common femoral arteries. Vascular surgery was consulted for LLE Angiogram with possible endovascular revascularization; cardiac clearance was obtained but the team in conjunction with the family had decided to not go through with intervention.     Patient's orthostatic hypotension improved with midodrine, compression stocking, abdominal binder.     MRA 21 showed the following: MRA of the neck is limited by motion. Evaluation of the distal common carotid proximal internal carotid arteries appear grossly unremarkable though better quality study is recommended or CTA of the neck can be done for further evaluation. Evaluation of the proximal external carotid arteries are limited by motion. MRA of the [place] demonstrates decreased caliber of the distal right internal carotid artery. Short segment of stenosis is suspected involving both posterior cerebral arteries.    CTH 21 showed the following: No acute/traumatic intracranial pathology. Microvascular ischemic changes and unchanged probable chronic infarct within the right posterior frontal lobe.  MRI Head 21 showed acute lacunar infarcts involving the deep white matter of the left frontal lobe at the level of the centrum semiovale. No acute hemorrhage.  Neurology followed the patient and did not recommend intervention; patient is to continue aspirin and plavix.    Patient's prior level of function included independence with ADLs and independence with ambulation with a RW.   Patient's level of function on admission is min assist with bed mobility and min assist with transfers, patient is able to ambulate 4 small steps with rolling walker min assist.     Patient was evaluated by Dr. Ingram, a physical medicine and rehabilitation specialist and was found to be an excellent candidate for acute rehabilitation. Patient would benefit from 3 hours of interdisciplinary therapy per day.     Patient was admitted to rehab for acute lacunar infarcts in the deep white matter of the left frontal lobe at the level of the centrum semiovale and chronic left sided weakness from CVA of  with a significant decline in his baseline function of independent with a straight cane.       (2021 15:42)      I examined the patient and reviewed the chart. There have been no significant changes since my history and physical except where documented below.    TODAY'S SUBJECTIVE & REVIEW OF SYMPTOMS:  Patient was seen this morning with Dr. Ingram. Patient is doing well. No acute events overnight        Constiutional:    [ x ] WNL           [   ] poor appetite   [   ] insomnia   [   ] tired   Cardio:                [ x ] WNL           [   ] CP   [   ] PRADO   [   ] palpitations               Resp:                   [ x ] WNL           [   ] SOB   [   ] cough   [   ] wheezing   GI:                        [ x ] WNL           [   ] constipation   [   ] diarrhea   [   ] abdominal pain   [   ] nausea   [   ] emesis                                :                      [ x ] WNL           [   ] HOGUE  [   ] dysuria   [   ] difficulty voiding             Endo:                   [ x ] WNL          [   ] polyuria   [   ] temperature intolerance                 Skin:                     [ x ] WNL          [   ] pain   [   ] wound   [   ] rash   MSK:                    [ x ] WNL          [   ] muscle pain   [   ] joint pain/ stiffness   [   ] muscle tenderness   [   ] swelling   Neuro:                 [  ] WNL           [ x ]  as per HPI            Cognitive:           [ x ] WNL           [   ]confusion      Psych:                  [ x ] WNL           [   ] hallucinations   [   ]agitation   [   ] delusion   [   ]depression      PHYSICAL EXAM    Vital Signs Last 24 Hrs  T(C): 36.1 (2021 06:03), Max: 36.3 (2021 12:59)  T(F): 97 (2021 06:03), Max: 97.3 (2021 12:59)  HR: 70 (2021 06:03) (70 - 75)  BP: 189/84 (2021 06:03) (101/59 - 189/84)  BP(mean): --  RR: 18 (2021 06:03) (18 - 20)  SpO2: --    Constitutional - [ x ] NAD, Comfortable        [   ] other:  Chest - [ x ] CTA     [   ] other:  Cardiovascular - [ x ] RRR, no murmer     [   ] other:  Abdomen - [ x ] Soft, NT/ND      [   ] other:        -  [   ] NO HOGUE CATHETER   [ x ] YES  if yes: [ x ] NO MEATAL TEAR OR DISCHARGE [   ] other:  Extremities - [ x ] No C/C/E, No calf tenderness       [   ] other:  ROM - [ x ] WFL     [   ] other:  Neurologic Exam -                 Cognitive - [ x ]Awake, Alert, AAO to self, place, date, year, situation         [    ] other:      Communication - [   ]Fluent, No dysarthria       [ x ] other: mildly dysarthric      Motor - No focal deficits                    Right UE -  [ x ] WNL      [    ] other:                    Left UE -     [   ] WNL      [ x ] other: L arm shoulder flexion 3-/5, L hand grasp 3+/5                    Right LE -   [ x ] WNL       [    ] other:                    Left LE -      [   ]WNL       [ x ] other: L arm shoulder flexion 3-/5, L hand grasp 3+/5     Sensory - [   ] Intact to LT      [ x ] other: L sided hemisensory loss      Reflexes - [ x ] wnl/ symmetric     [   ] other:     Psychiatric - [ x ]Mood stable, Affect WNL     [   ]other:     Skin - [ x ] intact      [   ] other      acetaminophen   Tablet .. 975 milliGRAM(s) Oral every 6 hours PRN  aluminum hydroxide/magnesium hydroxide/simethicone Suspension 30 milliLiter(s) Oral every 4 hours PRN  aspirin  chewable 81 milliGRAM(s) Oral daily  atorvastatin 80 milliGRAM(s) Oral at bedtime  clopidogrel Tablet 75 milliGRAM(s) Oral daily  diazepam    Tablet 5 milliGRAM(s) Oral daily PRN  fenofibrate Tablet 145 milliGRAM(s) Oral daily  heparin   Injectable 5000 Unit(s) SubCutaneous every 8 hours  isosorbide   mononitrate ER Tablet (IMDUR) 60 milliGRAM(s) Oral daily  metoprolol succinate ER 25 milliGRAM(s) Oral every 12 hours  midodrine. 2.5 milliGRAM(s) Oral <User Schedule>  NIFEdipine XL 60 milliGRAM(s) Oral daily  pentoxifylline 400 milliGRAM(s) Oral three times a day  polyethylene glycol 3350 17 Gram(s) Oral daily PRN  ranolazine 500 milliGRAM(s) Oral two times a day  senna 2 Tablet(s) Oral at bedtime  venlafaxine XR. 75 milliGRAM(s) Oral daily      RECENT LABS/IMAGING                        12.7   7.68  )-----------( 265      ( 2021 07:42 )             40.7     07-    139  |  108  |  46<H>  ----------------------------<  120<H>  4.9   |  22  |  1.8<H>    Ca    9.3      2021 07:42  Mg     2.5         TPro  6.2  /  Alb  4.0  /  TBili  0.3  /  DBili  x   /  AST  16  /  ALT  15  /  AlkPhos  34        Urinalysis Basic - ( 2021 16:26 )    Color: Yellow / Appearance: Clear / S.023 / pH: x  Gluc: x / Ketone: Negative  / Bili: Negative / Urobili: <2 mg/dL   Blood: x / Protein: 300 mg/dL / Nitrite: Negative   Leuk Esterase: Negative / RBC: 90 /HPF / WBC 4 /HPF   Sq Epi: x / Non Sq Epi: 1 /HPF / Bacteria: Negative

## 2021-07-29 NOTE — PROGRESS NOTE ADULT - ASSESSMENT
13. 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 Mri brain showed small acute stroke in deep WM in L [place] territory. MRA with short segment stenosis in PCAs. Patient has been on DAPT and Statin prior to this event.      15. Suggestions:   Check ARU/PRU  Loop recorder  Rehab               13. 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 Mri brain showed small acute stroke in deep WM in [place] territory. MRA with short segment stenosis in PCAs. Patient has been on DAPT and Statin prior to this event.      15. Suggestions:   Check ARU/PRU- please go to cardiothoracic unit and arrange testing   obtain echo  pt may need loop recorder depending on echo results   Rehab  attending note to follow

## 2021-07-29 NOTE — PROGRESS NOTE ADULT - SUBJECTIVE AND OBJECTIVE BOX
Stroke Progress Note:    ERICK MARTINEZ    1. 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 Patient says he has been feeling intermittently dizzy over the past 3 days despite having adequate/normal PO intake. Pt first felt this on Friday right after getting out of his car. Pt stated that these episodes of dizziness occurs after getting up from a sitting or supine position. Patient fell yesterday at home after feeling dizzy hitting the back of his head, but with no LOC. pt is not on AC. This morning around 10 patient got out of his car, felt dizzy again and then fell straight down hitting his buttocks and R knee. Denies head trauma. Patient denies any pain, headache, vision changes, new numbness, weakness, tingling, chest pain, shortness of breath, palpitations, nausea, vomiting, diarrhea, dysuria, hematuria, melena, hematochezia, fever, cold/flu symptoms, leg pain/swelling.     In the ED, Temp 98.5, HR 83, /86 and saturating >97% on RA. Labs wnl except for Cr of 1.6. BAc<10, , Trop <0.01. EKG- NSR.     CT spine: No evidence of acute cervical spine fracture or subluxation.Multilevel severe degenerative changes as described, with severe spinal noted stenosis at C2-3 and C3-4 and multilevel severe neural foraminal stenosis.  CTAP:No definite evidence of acute traumatic injury within the chest, abdomen, or pelvis.   CTH:  No evidence of acute intracranial hemorrhage. Probable chronic infarct within the right posterior frontal white matter. Lacunar infarcts within bilateral white matter and deep gray nuclei of indeterminate age. Mild/moderate chronic microvascular changes.  Chest X-Ray negative for pneumothorax, infiltrate, or effusion. XR Pelvis negative for fx's or dislocations    (2021 17:18)      2. Relevant PMH:   Prior ischemic stroke/TIA[ ], Afib [ ], CAD [ ], HTN [ ], DLD [ ], DM [ ], PVD [ ], Obesity [ ],   Sedentary lifestyle [ ], CHF [ ], SANDY [ ], Cancer Hx [ ].    3. Social History: Smoking [ ], Drug Use [ ], Alcohol Use [ ], Other [ ]    4. Possible Location of Stroke:    5. Relevant Brain Tissue Imaging: < from: MR Head No Cont (21 @ 18:40) >  EXAM:  MR BRAIN            PROCEDURE DATE:  2021            INTERPRETATION:  CLINICAL INDICATION: Altered mental status    TECHNIQUE: MRI of the brain was performed without contrast.    COMPARISON: CT brain 2021    FINDINGS:  Acute lacunar infarcts involving the deep white matter of the left frontal lobe at the level of the centrum semiovale.    There is no acute intracranial hemorrhage, mass effect, or hydrocephalus. No extra-axial collection. Basal cisterns are patent.    Age-related involutional changes and chronic microvascular ischemic changes. Chronic lacunar infarcts involving the bilateral corona radiata.    Ventricles and sulci are age-appropriate in size.    Decreased caliber of the distal right internal carotid artery.Short segment stenoses involving the posterior cerebral arteries. Major intracranial flow-voids are otherwise preserved.    Bilateral cataract surgery. The orbits, sellar and suprasellar structures, and craniocervical junction are otherwise unremarkable.    Mucosal thickening involving the maxillary sinuses and right sphenoid sinus. Visualized paranasal sinuses and mastoid air cells are otherwise clear.    IMPRESSION:  Acute lacunar infarcts involving the deep white matter of the left frontal lobe at the level of the centrum semiovale. No acute hemorrhage.    --- End of Report ---    < end of copied text >      6. Relevant Cerebrovascular Imagin. Relevant blood tests:      8. Relevant cardiac rhythm monitorin. Relevant Cardiac Structure: (TTE/MISSY +/-):[ ]No intracardiac thrombus/[ ] no vegetation/[ ]no [place]/EF:    Home Medications:  aspirin 81 mg oral tablet: 1 tab(s) orally once a day (2021 14:22)  atorvastatin 80 mg oral tablet: 1 tab(s) orally once a day (at bedtime) (2021 15:17)  diazePAM 5 mg oral tablet: 1 tab(s) orally once a day, As Needed (2021 15:17)  fenofibrate 120 mg oral tablet: 1 tab(s) orally once a day (2021 14:22)  heparin: 5000 unit(s) subcutaneous every 8 hours (2021 15:17)  metoprolol succinate 25 mg oral tablet, extended release: orally 2 times a day (2021 14:22)  midodrine 2.5 mg oral tablet: 1 tab(s) orally  (2021 15:17)  NIFEdipine 60 mg oral tablet, extended release: 1 tab(s) orally once a day (2021 15:17)  Pentoxil 400 mg oral tablet, extended release: 1 tab(s) orally 3 times a day (2021 14:22)  Plavix 75 mg oral tablet: 1 tab(s) orally once a day (2021 14:22)  polyethylene glycol 3350 oral powder for reconstitution: 17 gram(s) orally once a day, As needed, Constipation (2021 15:17)  Ranexa 500 mg oral tablet, extended release: 1 tab(s) orally 2 times a day (2021 14:22)  senna oral tablet: 2 tab(s) orally once a day (at bedtime) (2021 15:17)  venlafaxine 75 mg oral tablet: 1 tab(s) orally 2 times a day (2021 14:22)      MEDICATIONS  (STANDING):  aspirin  chewable 81 milliGRAM(s) Oral daily  atorvastatin 80 milliGRAM(s) Oral at bedtime  clopidogrel Tablet 75 milliGRAM(s) Oral daily  fenofibrate Tablet 145 milliGRAM(s) Oral daily  heparin   Injectable 5000 Unit(s) SubCutaneous every 8 hours  isosorbide   mononitrate ER Tablet (IMDUR) 60 milliGRAM(s) Oral daily  metoprolol succinate ER 25 milliGRAM(s) Oral every 12 hours  midodrine. 2.5 milliGRAM(s) Oral <User Schedule>  NIFEdipine XL 60 milliGRAM(s) Oral daily  pentoxifylline 400 milliGRAM(s) Oral three times a day  ranolazine 500 milliGRAM(s) Oral two times a day  senna 2 Tablet(s) Oral at bedtime  venlafaxine XR. 75 milliGRAM(s) Oral daily      10. PT/OT/Speech/Rehab/S&Sw/ Cognitive eval results and recommendations:    11. Exam:  ICU Vital Signs Last 24 Hrs  T(C): 36.2 (2021 13:30), Max: 36.2 (2021 13:30)  T(F): 97.2 (2021 13:30), Max: 97.2 (2021 13:30)  HR: 71 (2021 13:30) (70 - 76)  BP: 140/56 (2021 13:30) (101/59 - 189/84)  BP(mean): --  ABP: --  ABP(mean): --  RR: 18 (2021 13:30) (18 - 18)  SpO2: --      12.   NIH STROKE SCALE  Item	                                                        Score  1 a.	Level of Consciousness	               	0  1 b. LOC Questions	                                0  1 c.	LOC Commands	                               	0  2.	Best Gaze	                                        0  3.	Visual	                                                0  4.	Facial Palsy	                                        1  5 a.	Motor Arm - Left	                                1  5 b.	Motor Arm - Right	                        0  6 a.	Motor Leg - Left	                                1  6 b.	Motor Leg - Right	                                0  7.	Limb Ataxia	                                        0  8.	Sensory	                                                0  9.	Language	                                        0  10.	Dysarthria	                                        0  11.	Extinction and Inattention  	        0  ______________________________________  TOTAL	                                                        0    Total NIHSS on admission:      NIHSS yesterday:      NIHSS today: 3 (chronic deficits)    Unsteady gait    mRS:  0 No symptoms at all  1 No significant disability despite symptoms; able to carry out all usual duties and activities without assistance  2 Slight disability; unable to carry out all previous activities, but able to look after own affairs  3 Moderate disability; requiring some help, but able to walk without assistance  4 Moderately severe disability; unable to walk without assistance and unable to attend to own bodily needs without assistance  5 Severe disability; bedridden, incontinent and requiring constant nursing care and attention  6 Dead           Stroke Progress Note:    ERICK MARTINEZ    1. 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 Patient says he has been feeling intermittently dizzy over the past 3 days despite having adequate/normal PO intake. Pt first felt this on Friday right after getting out of his car. Pt stated that these episodes of dizziness occurs after getting up from a sitting or supine position. Patient fell yesterday at home after feeling dizzy hitting the back of his head, but with no LOC. pt is not on AC. This morning around 10 patient got out of his car, felt dizzy again and then fell straight down hitting his buttocks and R knee. Denies head trauma. Patient denies any pain, headache, vision changes, new numbness, weakness, tingling, chest pain, shortness of breath, palpitations, nausea, vomiting, diarrhea, dysuria, hematuria, melena, hematochezia, fever, cold/flu symptoms, leg pain/swelling.     In the ED, Temp 98.5, HR 83, /86 and saturating >97% on RA. Labs wnl except for Cr of 1.6. BAc<10, , Trop <0.01. EKG- NSR.     CT spine: No evidence of acute cervical spine fracture or subluxation.Multilevel severe degenerative changes as described, with severe spinal noted stenosis at C2-3 and C3-4 and multilevel severe neural foraminal stenosis.  CTAP:No definite evidence of acute traumatic injury within the chest, abdomen, or pelvis.   CTH:  No evidence of acute intracranial hemorrhage. Probable chronic infarct within the right posterior frontal white matter. Lacunar infarcts within bilateral white matter and deep gray nuclei of indeterminate age. Mild/moderate chronic microvascular changes.  Chest X-Ray negative for pneumothorax, infiltrate, or effusion. XR Pelvis negative for fx's or dislocations    (2021 17:18)      2. Relevant PMH:   Prior ischemic stroke/TIA[ ], Afib [ ], CAD [ ], HTN [ ], DLD [ ], DM [ ], PVD [ ], Obesity [ ],   Sedentary lifestyle [ ], CHF [ ], SANDY [ ], Cancer Hx [ ].    3. Social History: Smoking [ ], Drug Use [ ], Alcohol Use [ ], Other [ ]    4. Possible Location of Stroke:    5. Relevant Brain Tissue Imaging: < from: MR Head No Cont (21 @ 18:40) >  EXAM:  MR BRAIN            PROCEDURE DATE:  2021            INTERPRETATION:  CLINICAL INDICATION: Altered mental status    TECHNIQUE: MRI of the brain was performed without contrast.    COMPARISON: CT brain 2021    FINDINGS:  Acute lacunar infarcts involving the deep white matter of the left frontal lobe at the level of the centrum semiovale.    There is no acute intracranial hemorrhage, mass effect, or hydrocephalus. No extra-axial collection. Basal cisterns are patent.    Age-related involutional changes and chronic microvascular ischemic changes. Chronic lacunar infarcts involving the bilateral corona radiata.    Ventricles and sulci are age-appropriate in size.    Decreased caliber of the distal right internal carotid artery.Short segment stenoses involving the posterior cerebral arteries. Major intracranial flow-voids are otherwise preserved.    Bilateral cataract surgery. The orbits, sellar and suprasellar structures, and craniocervical junction are otherwise unremarkable.    Mucosal thickening involving the maxillary sinuses and right sphenoid sinus. Visualized paranasal sinuses and mastoid air cells are otherwise clear.    IMPRESSION:  Acute lacunar infarcts involving the deep white matter of the left frontal lobe at the level of the centrum semiovale. No acute hemorrhage.    --- End of Report ---    < end of copied text >      6. Relevant Cerebrovascular Imagin. Relevant blood tests:      8. Relevant cardiac rhythm monitorin. Relevant Cardiac Structure: (TTE/MISSY +/-):[ ]No intracardiac thrombus/[ ] no vegetation/[ ]no [place]/EF:    Home Medications:  aspirin 81 mg oral tablet: 1 tab(s) orally once a day (2021 14:22)  atorvastatin 80 mg oral tablet: 1 tab(s) orally once a day (at bedtime) (2021 15:17)  diazePAM 5 mg oral tablet: 1 tab(s) orally once a day, As Needed (2021 15:17)  fenofibrate 120 mg oral tablet: 1 tab(s) orally once a day (2021 14:22)  heparin: 5000 unit(s) subcutaneous every 8 hours (2021 15:17)  metoprolol succinate 25 mg oral tablet, extended release: orally 2 times a day (2021 14:22)  midodrine 2.5 mg oral tablet: 1 tab(s) orally  (2021 15:17)  NIFEdipine 60 mg oral tablet, extended release: 1 tab(s) orally once a day (2021 15:17)  Pentoxil 400 mg oral tablet, extended release: 1 tab(s) orally 3 times a day (2021 14:22)  Plavix 75 mg oral tablet: 1 tab(s) orally once a day (2021 14:22)  polyethylene glycol 3350 oral powder for reconstitution: 17 gram(s) orally once a day, As needed, Constipation (2021 15:17)  Ranexa 500 mg oral tablet, extended release: 1 tab(s) orally 2 times a day (2021 14:22)  senna oral tablet: 2 tab(s) orally once a day (at bedtime) (2021 15:17)  venlafaxine 75 mg oral tablet: 1 tab(s) orally 2 times a day (2021 14:22)      MEDICATIONS  (STANDING):  aspirin  chewable 81 milliGRAM(s) Oral daily  atorvastatin 80 milliGRAM(s) Oral at bedtime  clopidogrel Tablet 75 milliGRAM(s) Oral daily  fenofibrate Tablet 145 milliGRAM(s) Oral daily  heparin   Injectable 5000 Unit(s) SubCutaneous every 8 hours  isosorbide   mononitrate ER Tablet (IMDUR) 60 milliGRAM(s) Oral daily  metoprolol succinate ER 25 milliGRAM(s) Oral every 12 hours  midodrine. 2.5 milliGRAM(s) Oral <User Schedule>  NIFEdipine XL 60 milliGRAM(s) Oral daily  pentoxifylline 400 milliGRAM(s) Oral three times a day  ranolazine 500 milliGRAM(s) Oral two times a day  senna 2 Tablet(s) Oral at bedtime  venlafaxine XR. 75 milliGRAM(s) Oral daily      10. PT/OT/Speech/Rehab/S&Sw/ Cognitive eval results and recommendations: in rehab     11. Exam:  ICU Vital Signs Last 24 Hrs  T(C): 36.2 (2021 13:30), Max: 36.2 (2021 13:30)  T(F): 97.2 (2021 13:30), Max: 97.2 (2021 13:30)  HR: 71 (2021 13:30) (70 - 76)  BP: 140/56 (2021 13:30) (101/59 - 189/84)  BP(mean): --  ABP: --  ABP(mean): --  RR: 18 (2021 13:30) (18 - 18)  SpO2: --      12. AxOx3, able to follow commands  EMOI, VFF,   RUE/RLE 5/5, LUE/LLE: 4/5  sensory intact   gait defered

## 2021-07-30 LAB
CULTURE RESULTS: SIGNIFICANT CHANGE UP
CULTURE RESULTS: SIGNIFICANT CHANGE UP
GLUCOSE BLDC GLUCOMTR-MCNC: 105 MG/DL — HIGH (ref 70–99)
GLUCOSE BLDC GLUCOMTR-MCNC: 143 MG/DL — HIGH (ref 70–99)
GLUCOSE BLDC GLUCOMTR-MCNC: 145 MG/DL — HIGH (ref 70–99)
GLUCOSE BLDC GLUCOMTR-MCNC: 149 MG/DL — HIGH (ref 70–99)
SPECIMEN SOURCE: SIGNIFICANT CHANGE UP
SPECIMEN SOURCE: SIGNIFICANT CHANGE UP

## 2021-07-30 RX ORDER — GABAPENTIN 400 MG/1
100 CAPSULE ORAL
Refills: 0 | Status: DISCONTINUED | OUTPATIENT
Start: 2021-07-30 | End: 2021-08-18

## 2021-07-30 RX ORDER — GABAPENTIN 400 MG/1
200 CAPSULE ORAL AT BEDTIME
Refills: 0 | Status: DISCONTINUED | OUTPATIENT
Start: 2021-07-30 | End: 2021-08-18

## 2021-07-30 RX ADMIN — Medication 400 MILLIGRAM(S): at 13:28

## 2021-07-30 RX ADMIN — Medication 75 MILLIGRAM(S): at 12:45

## 2021-07-30 RX ADMIN — Medication 81 MILLIGRAM(S): at 12:45

## 2021-07-30 RX ADMIN — SENNA PLUS 2 TABLET(S): 8.6 TABLET ORAL at 21:26

## 2021-07-30 RX ADMIN — ATORVASTATIN CALCIUM 80 MILLIGRAM(S): 80 TABLET, FILM COATED ORAL at 21:25

## 2021-07-30 RX ADMIN — Medication 60 MILLIGRAM(S): at 06:27

## 2021-07-30 RX ADMIN — Medication 400 MILLIGRAM(S): at 06:27

## 2021-07-30 RX ADMIN — CLOPIDOGREL BISULFATE 75 MILLIGRAM(S): 75 TABLET, FILM COATED ORAL at 12:45

## 2021-07-30 RX ADMIN — GABAPENTIN 200 MILLIGRAM(S): 400 CAPSULE ORAL at 21:26

## 2021-07-30 RX ADMIN — GABAPENTIN 100 MILLIGRAM(S): 400 CAPSULE ORAL at 16:14

## 2021-07-30 RX ADMIN — Medication 145 MILLIGRAM(S): at 12:45

## 2021-07-30 RX ADMIN — RANOLAZINE 500 MILLIGRAM(S): 500 TABLET, FILM COATED, EXTENDED RELEASE ORAL at 06:27

## 2021-07-30 RX ADMIN — ISOSORBIDE MONONITRATE 60 MILLIGRAM(S): 60 TABLET, EXTENDED RELEASE ORAL at 12:45

## 2021-07-30 RX ADMIN — HEPARIN SODIUM 5000 UNIT(S): 5000 INJECTION INTRAVENOUS; SUBCUTANEOUS at 06:30

## 2021-07-30 RX ADMIN — Medication 25 MILLIGRAM(S): at 17:26

## 2021-07-30 RX ADMIN — HEPARIN SODIUM 5000 UNIT(S): 5000 INJECTION INTRAVENOUS; SUBCUTANEOUS at 21:25

## 2021-07-30 RX ADMIN — Medication 400 MILLIGRAM(S): at 21:26

## 2021-07-30 RX ADMIN — Medication 25 MILLIGRAM(S): at 06:27

## 2021-07-30 RX ADMIN — MIDODRINE HYDROCHLORIDE 2.5 MILLIGRAM(S): 2.5 TABLET ORAL at 06:27

## 2021-07-30 RX ADMIN — RANOLAZINE 500 MILLIGRAM(S): 500 TABLET, FILM COATED, EXTENDED RELEASE ORAL at 17:26

## 2021-07-30 RX ADMIN — MIDODRINE HYDROCHLORIDE 2.5 MILLIGRAM(S): 2.5 TABLET ORAL at 12:45

## 2021-07-30 RX ADMIN — HEPARIN SODIUM 5000 UNIT(S): 5000 INJECTION INTRAVENOUS; SUBCUTANEOUS at 13:28

## 2021-07-30 NOTE — PROGRESS NOTE ADULT - ASSESSMENT
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 Rehab of acute lacunar infarcts in the deep white matter of the left frontal lobe at the level fo the centrum semiovale and chronic left sided weakness from CVA of 2017      # Rehab of chronic left hemiparisis (dominant), and dysarthria and dysphagia with decline in function and falls, found to have an acute left subcortical infarct.   - MRI Acute lacunar infarcts involving the deep white matter of the left frontal lobe at the level of the centrum semiovale. No acute hemorrhage.  - Neurology was consulted and no intervention was recommended; no intervention at this time. Patient can Follow up with neurology at the clinic, as per conversation with neuro PA. Was already on DAPT and high dose statin.  - Patient requires PT/OT/SLT/Neuropsych eval   - Full rehab eval in progress  - Neurology recommended an ECHO and ARU/PRU    # Dizziness and fall 2/2 orthostatic hypotension - now controlled  - continue with HI stockings and abdominal binder  - continue midodrine 2x a day  - fall precautions   - home medications of flomax, tizanidine were held by medicine team prior to rehab admission; valium was changed from 10 mg standing to 5 mg PRN by medicine per neurology recommendations     # LE pain likely secondary to PAD   - Significant atherosclerotic disease noted in bilateral common femoral arteries  - Patient's family and vascular team agreed to not proceed with intervention at this time     # CAD s/p CABG 2009  - 6/2021 :patent SALAZAR to [facility] , patent SVG to OM , % , filled distally by collaterals from [facility].  - Continue DAPT ( Aspirin 81 mg daily and Plavix 75 mg daily ), ARB, Imdur, Ranexa, B-Blocker, Statin Therapy  - Cardio f/u OP     # HTN, patient had hypertensive urgency   - losartan 100mg held for SAMANTA   - c/w nifedipine 60mg xL q24h     #HLD  - continue with atorvastatin and fenofibrate     # SAMANTA on CKD vs progression of CKD   # Urinary retention  - Cr 1.3 in 2017, 1.5 in 2019, 1.7 in June; Current Cr trend 2.1 -> 1.9-- > 2.1 --> 1.8  - Renal bladder US showed simple cysts within both kidneys without hydronephrosis or nephrolithiasis   - Patient retaining on bladder scan so harrison placed, still in place; attempt trial of void   - Monitor Cr, IVF as needed     # DM type 2  - Hold home meds, glucose wnl   - Start Insulin regimen for FS >180    # Depression   - venlafaxine     # Anxiety   - Valium PRN as above    # Maintenance   - Pain control: none   - GI/Bowel Mgmt: miralax ,senna   - Bladder management: Harrison in place for urinary retention   - Skin: No active issues at this time  - FEN: monitor electrolytes, replete as needed   - Diet: Diet, DASH/TLC:   Sodium & Cholesterol Restricted  Consistent Carbohydrate No Snacks (07-28-21 @ 15:46) [Active]    # Precautions / PROPHYLAXIS:    - Fall precaution   - Ortho: Weight bearing status: WBAT  - DVT prophylaxis: SC heparin        76 yo M with PMH of CAD s/p CABG x3 2017 ([facility]), 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 Rehab of acute lacunar infarcts in the deep white matter of the left frontal lobe at the level fo the centrum semiovale and chronic left sided weakness from CVA of 2017      # Rehab of chronic left hemiparisis (dominant), and dysarthria and dysphagia with decline in function and falls, found to have an acute left subcortical infarct.   - MRI Acute lacunar infarcts involving the deep white matter of the left frontal lobe at the level of the centrum semiovale. No acute hemorrhage.  - Neurology was consulted and no intervention was recommended; no intervention at this time. Patient can Follow up with neurology at the clinic, as per conversation with neuro PA. Was already on DAPT and high dose statin.  - Patient requires PT/OT/SLT/Neuropsych eval   - Full rehab eval in progress  - Neurology recommended an ECHO and ARU/PRU    # Dizziness and fall 2/2 orthostatic hypotension - now controlled  - continue with HI stockings and abdominal binder  - continue midodrine 2x a day  - fall precautions   - home medications of flomax, tizanidine were held by medicine team prior to rehab admission; valium was changed from 10 mg standing to 5 mg PRN by medicine per neurology recommendations     # LE pain likely secondary to PAD   - Significant atherosclerotic disease noted in bilateral common femoral arteries  - Patient's family and vascular team agreed to not proceed with intervention at this time     # CAD s/p CABG 2009  - 6/2021 :patent SALAZAR to LAD , patent SVG to OM , % , filled distally by collaterals from [facility].  - Continue DAPT ( Aspirin 81 mg daily and Plavix 75 mg daily ), ARB, Imdur, Ranexa, B-Blocker, Statin Therapy  - Cardio f/u OP     # HTN, patient had hypertensive urgency  - now well controlled  - losartan 100mg discontinued for SAMANTA   - c/w nifedipine 60mg xL q24h     #HLD  - continue with atorvastatin and fenofibrate     # SAMANTA on CKD vs progression of CKD   # Urinary retention  - Cr 1.3 in 2017, 1.5 in 2019, 1.7 in June; Current Cr trend 2.1 -> 1.9-- > 2.1 --> 1.8  - Renal bladder US showed simple cysts within both kidneys without hydronephrosis or nephrolithiasis   - Patient retaining on bladder scan so harrison placed, still in place; attempt trial of void   - Monitor Cr, IVF as needed     # DM type 2  - Hold home meds, glucose wnl   - Start Insulin regimen for FS >180    # Depression   - venlafaxine     # Anxiety   - Valium PRN as above    # Maintenance   - Pain control: none   - GI/Bowel Mgmt: miralax ,senna   - Bladder management: Harrison in place for urinary retention   - Skin: No active issues at this time  - FEN: monitor electrolytes, replete as needed   - Diet: Diet, DASH/TLC:   Sodium & Cholesterol Restricted  Consistent Carbohydrate No Snacks (07-28-21 @ 15:46) [Active]    # Precautions / PROPHYLAXIS:    - Fall precaution   - Ortho: Weight bearing status: WBAT  - DVT prophylaxis: SC heparin

## 2021-07-30 NOTE — PROGRESS NOTE ADULT - SUBJECTIVE AND OBJECTIVE BOX
Stroke Progress Note:    ERICK MARTINEZ    1. 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 Patient says he has been feeling intermittently dizzy over three days  despite having adequate/normal PO intake. Pt stated that these episodes of dizziness occurs after getting up from a sitting or supine position. Patient fell at home after feeling dizzy hitting the back of his head, but with no LOC. Pt was not on AC prior to admission. Pt also got out of his car, felt dizzy and then fell straight down hitting his buttocks and R knee. Denies head trauma. Patient was admitted on 21 to internal medicine for falls and dizziness 2/2 orthostatic hypotension.   CT spine 21 showed no evidence of acute cervical spine fracture or subluxation. Multilevel severe degenerative changes as described, with severe spinal noted stenosis at C2-3 and C3-4 and multilevel severe neural foraminal stenosis.  CTAP 21 showed no definite evidence of acute traumatic injury within the chest, abdomen, or pelvis.   CTH 21 showed no evidence of acute intracranial hemorrhage. Probable chronic infarct within the right posterior frontal white matter. Lacunar infarcts within bilateral white matter and deep gray nuclei of indeterminate age. Mild/moderate chronic microvascular changes.  Chest X-Ray negative for pneumothorax, infiltrate, or effusion. XR Pelvis negative for fx's or dislocations.    Patient's hospital course was complicated by lower extremity claudication. Bilateral lower extremity ultrasounds were performed on 21, which revealed severely diminished flow noted in the right and left superficial femoral artery suggestive of a proximal superficial femoral or common femoral artery stenosis. Significant atherosclerotic disease noted in the bilateral common femoral arteries. Vascular surgery was consulted for LLE Angiogram with possible endovascular revascularization; cardiac clearance was obtained but the team in conjunction with the family had decided to not go through with intervention.     Patient's orthostatic hypotension improved with midodrine, compression stocking, abdominal binder.     MRA 21 showed the following: MRA of the neck is limited by motion. Evaluation of the distal common carotid proximal internal carotid arteries appear grossly unremarkable though better quality study is recommended or CTA of the neck can be done for further evaluation. Evaluation of the proximal external carotid arteries are limited by motion. MRA of the [place] demonstrates decreased caliber of the distal right internal carotid artery. Short segment of stenosis is suspected involving both posterior cerebral arteries.    CTH 21 showed the following: No acute/traumatic intracranial pathology. Microvascular ischemic changes and unchanged probable chronic infarct within the right posterior frontal lobe.  MRI Head 21 showed acute lacunar infarcts involving the deep white matter of the left frontal lobe at the level of the centrum semiovale. No acute hemorrhage.  Neurology followed the patient and did not recommend intervention; patient is to continue aspirin and plavix.    Patient's prior level of function included independence with ADLs and independence with ambulation with a RW.   Patient's level of function on admission is min assist with bed mobility and min assist with transfers, patient is able to ambulate 4 small steps with rolling walker min assist.     Patient was evaluated by Dr. Ingram, a physical medicine and rehabilitation specialist and was found to be an excellent candidate for acute rehabilitation. Patient would benefit from 3 hours of interdisciplinary therapy per day.     Patient was admitted to rehab for acute lacunar infarcts in the deep white matter of the left frontal lobe at the level of the centrum semiovale and chronic left sided weakness from CVA of 2017 with a significant decline in his baseline function of independent with a straight cane.       (2021 15:42)      2. Relevant PMH:   Prior ischemic stroke/TIA[ ], Afib [ ], CAD [ ], HTN [ ], DLD [ ], DM [ ], PVD [ ], Obesity [ ],   Sedentary lifestyle [ ], CHF [ ], SANDY [ ], Cancer Hx [ ].    3. Social History: Smoking [ ], Drug Use [ ], Alcohol Use [ ], Other [ ]    4. Possible Location of Stroke:    5. Relevant Brain Tissue Imaging: < from: MR Head No Cont (21 @ 18:40) >  EXAM:  MR BRAIN            PROCEDURE DATE:  2021            INTERPRETATION:  CLINICAL INDICATION: Altered mental status    TECHNIQUE: MRI of the brain was performed without contrast.    COMPARISON: CT brain 2021    FINDINGS:  Acute lacunar infarcts involving the deep white matter of the left frontal lobe at the level of the centrum semiovale.    There is no acute intracranial hemorrhage, mass effect, or hydrocephalus. No extra-axial collection. Basal cisterns are patent.    Age-related involutional changes and chronic microvascular ischemic changes. Chronic lacunar infarcts involving the bilateral corona radiata.    Ventricles and sulci are age-appropriate in size.    Decreased caliber of the distal right internal carotid artery.Short segment stenoses involving the posterior cerebral arteries. Major intracranial flow-voids are otherwise preserved.    Bilateral cataract surgery. The orbits, sellar and suprasellar structures, and craniocervical junction are otherwise unremarkable.    Mucosal thickening involving the maxillary sinuses and right sphenoid sinus. Visualized paranasal sinuses and mastoid air cells are otherwise clear.    IMPRESSION:  Acute lacunar infarcts involving the deep white matter of the left frontal lobe at the level of the centrum semiovale. No acute hemorrhage.    --- End of Report ---    < end of copied text >      6. Relevant Cerebrovascular Imagin. Relevant blood tests:      8. Relevant cardiac rhythm monitorin. Relevant Cardiac Structure: (TTE/MISSY +/-):[ ]No intracardiac thrombus/[ ] no vegetation/[ ]no [place]/EF:    Home Medications:  aspirin 81 mg oral tablet: 1 tab(s) orally once a day (2021 14:22)  atorvastatin 80 mg oral tablet: 1 tab(s) orally once a day (at bedtime) (2021 15:17)  diazePAM 5 mg oral tablet: 1 tab(s) orally once a day, As Needed (2021 15:17)  fenofibrate 120 mg oral tablet: 1 tab(s) orally once a day (2021 14:22)  heparin: 5000 unit(s) subcutaneous every 8 hours (2021 15:17)  metoprolol succinate 25 mg oral tablet, extended release: orally 2 times a day (2021 14:22)  midodrine 2.5 mg oral tablet: 1 tab(s) orally  (2021 15:17)  NIFEdipine 60 mg oral tablet, extended release: 1 tab(s) orally once a day (2021 15:17)  Pentoxil 400 mg oral tablet, extended release: 1 tab(s) orally 3 times a day (2021 14:22)  Plavix 75 mg oral tablet: 1 tab(s) orally once a day (2021 14:22)  polyethylene glycol 3350 oral powder for reconstitution: 17 gram(s) orally once a day, As needed, Constipation (2021 15:17)  Ranexa 500 mg oral tablet, extended release: 1 tab(s) orally 2 times a day (2021 14:22)  senna oral tablet: 2 tab(s) orally once a day (at bedtime) (2021 15:17)  venlafaxine 75 mg oral tablet: 1 tab(s) orally 2 times a day (2021 14:22)      MEDICATIONS  (STANDING):  aspirin  chewable 81 milliGRAM(s) Oral daily  atorvastatin 80 milliGRAM(s) Oral at bedtime  clopidogrel Tablet 75 milliGRAM(s) Oral daily  fenofibrate Tablet 145 milliGRAM(s) Oral daily  gabapentin 200 milliGRAM(s) Oral at bedtime  gabapentin 100 milliGRAM(s) Oral <User Schedule>  heparin   Injectable 5000 Unit(s) SubCutaneous every 8 hours  isosorbide   mononitrate ER Tablet (IMDUR) 60 milliGRAM(s) Oral daily  metoprolol succinate ER 25 milliGRAM(s) Oral every 12 hours  midodrine. 2.5 milliGRAM(s) Oral <User Schedule>  NIFEdipine XL 60 milliGRAM(s) Oral daily  pentoxifylline 400 milliGRAM(s) Oral three times a day  ranolazine 500 milliGRAM(s) Oral two times a day  senna 2 Tablet(s) Oral at bedtime  venlafaxine XR. 75 milliGRAM(s) Oral daily      10. PT/OT/Speech/Rehab/S&Sw/ Cognitive eval results and recommendations:    11. Exam:    Vital Signs Last 24 Hrs  T(C): 36.6 (2021 13:55), Max: 36.6 (2021 12:40)  T(F): 97.8 (2021 13:55), Max: 97.8 (2021 12:40)  HR: 84 (2021 17:27) (71 - 84)  BP: 140/71 (2021 17:27) (133/65 - 158/74)  BP(mean): --  RR: 10 (2021 13:55) (10 - 20)  SpO2: --    12.   NIH STROKE SCALE  Item	                                                        Score  1 a.	Level of Consciousness	               	0  1 b. LOC Questions	                                0  1 c.	LOC Commands	                               	0  2.	Best Gaze	                                        0  3.	Visual	                                                0  4.	Facial Palsy	                                        0  5 a.	Motor Arm - Left	                                1  5 b.	Motor Arm - Right	                        0  6 a.	Motor Leg - Left	                                0  6 b.	Motor Leg - Right	                                0  7.	Limb Ataxia	                                        0  8.	Sensory	                                                0  9.	Language	                                        0  10.	Dysarthria	                                        0  11.	Extinction and Inattention  	        1  ______________________________________  TOTAL	                                                        0    Total NIHSS on admission:      NIHSS yesterday:      NIHSS today: 2    mRS:  0 No symptoms at all  1 No significant disability despite symptoms; able to carry out all usual duties and activities without assistance  2 Slight disability; unable to carry out all previous activities, but able to look after own affairs  3 Moderate disability; requiring some help, but able to walk without assistance  4 Moderately severe disability; unable to walk without assistance and unable to attend to own bodily needs without assistance  5 Severe disability; bedridden, incontinent and requiring constant nursing care and attention  6 Dead      13. 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 Mri brain showed small acute stroke in deep WM in L [place] territory. MRA with short segment stenosis in PCAs. Patient has been on DAPT and Statin prior to this event.  ARU is 389  PRU is 142      15. Suggestions:   Acumetrics are therapeutic, so patient is responsive to Aspirin and Plavix  Patient will need ILR  For gait instability recommend check B12, Homocysteine and methylmalonic acid levels  Continue Rehab        Plan discussed with neuroattending Dr. Carrizales

## 2021-07-30 NOTE — CHART NOTE - NSCHARTNOTEFT_GEN_A_CORE
Patient had  ARU/PRU  blood tests   today .   PRU  142 and  ,   Eveluated by dr Carrizales, it is Wnl .. Patient would need loop recorder placement next week .    labs vit B12, homocysteine and methylalanine   labs requested by neuro team .

## 2021-07-30 NOTE — PROGRESS NOTE ADULT - ATTENDING COMMENTS
Accumetrics well in therapeutic range.   Patient with left SUSANNE DWI positive stroke that is most likely embolic.   will proceed with loop.   Cont DAPT  please check B12, homocysteine and methylmalonic acid

## 2021-07-30 NOTE — PROGRESS NOTE ADULT - ATTENDING COMMENTS
I reviewed the chart and examined the patient with the resident and we discussed the findings and treatment plan. I agree with the findings and treatment plan above, which I modified as indicated. The patient requires 3 hrs a day of acute inpatient rehab.    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 Rehab of acute lacunar infarcts in the deep white matter of the left frontal lobe at the level fo the centrum semiovale and chronic left sided weakness from CVA of 2017    # Rehab of chronic left hemiparesis, (dominant) and dysarthria and dysphagia with decline in function and falls, found to have an acute left subcortical infarct. He states that his dysarthria is worse.    MRI Acute lacunar infarcts involving the deep white matter of the left frontal lobe at the level of the centrum semiovale. No acute hemorrhage.  He requires 3 hours a day of at least PT and OT with speech therapy and neuropsych to eval. He also requires a hospital based rehab setting as he has been having symptomatic orthostatic hypotension and SAMANTA on CKD with adjustment in his meds. He requires close monitoring by rehab nursing and physiatry to monitor his BPs and check for orthostasis and adjust his medications while participating in rehab.    - Neurology was consulted and no intervention was recommended; no intervention at this time. Patient can Follow up with neurology at the clinic, as per conversation with neuro PA. Was already on DAPT and high dose statin.  - Patient requires PT/OT/SLT/Neuropsych eval an acute rehab program to return to previous level of function.    #Dizziness and fall 2/2 orthostatic hypotension - now controlled  - continue with HI stockings and abdominal binder  - continue midodrine 2x a day  - fall precautions   - home medications of flomax, tizanidine were held by medicine team prior to rehab admission; valium was changed from 10 mg standing to 5 mg PRN by medicine per neurology recommendations     # LE pain likely secondary to PAD   - Significant atherosclerotic disease noted in bilateral common femoral arteries  - Patient's family and vascular team agreed to not proceed with intervention at this time     # CAD s/p CABG 2009  - 6/2021 :patent SALAZAR to LAD , patent SVG to OM , % , filled distally by collaterals from LAD.  - Continue DAPT ( Aspirin 81 mg daily and Plavix 75 mg daily ), Imdur, Ranexa, B-Blocker, Statin Therapy  - Cardio f/u OP     #HTN, patient had hypertensive urgency   - losartan 100mg held for SAMANTA   - c/w nifedipine 60mg xL q24h     #HLD  - continue with atorvastatin and fenofibrate     #SAMANTA on CKD vs progression of CKD   - Cr 1.3 in 2017, 1.5 in 2019, 1.7 in June; Current Cr trend 2.1 -> 1.9-- > 2.1 --> 1.8  - Renal bladder US showed simple cysts within both kidneys without hydronephrosis or nephrolithiasis   - Patient retaining on bladder scan so harrison placed, still in place; attempt trial of void in am  - Monitor Cr, IVF as needed     # Urinary Retention  - Flomax had to be stopped secondary to symptomatic orthostatic hypotension.   - Will give TOV in am. Patient reportedly not able to tolerate CIC on medical floor    #DM type 2  - Hold home meds, glucose wnl   - Start Insulin regimen for FS >180    # Depression   - venlafaxine     # Anxiety   - Valium PRN as above    # Maintenance   - Pain control: none   - GI/Bowel Mgmt: miralax ,senna   - Bladder management: Harrison in place for urinary retention. TOV soon as above  - Skin: No active issues at this time  - FEN: monitor electrolytes, replete as needed   - Diet: DASH diet     # Precautions / PROPHYLAXIS:    - Fall precaution   - Ortho: Weight bearing status: WBAT  - DVT prophylaxis: SC heparin. I reviewed the chart and examined the patient with the resident and we discussed the findings and treatment plan. I agree with the findings and treatment plan above, which I modified as indicated. The patient requires 3 hrs a day of acute inpatient rehab.    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 Rehab of acute lacunar infarcts in the deep white matter of the left frontal lobe at the level fo the centrum semiovale and chronic left sided weakness from CVA of 2017    # Rehab of chronic left hemiparesis, (dominant) and dysarthria and dysphagia with decline in function and falls, found to have an acute left subcortical infarct. He states that his dysarthria is worse.    MRI Acute lacunar infarcts involving the deep white matter of the left frontal lobe at the level of the centrum semiovale. No acute hemorrhage.  He requires 3 hours a day of at least PT and OT with speech therapy and neuropsych to eval. He also requires a hospital based rehab setting as he has been having symptomatic orthostatic hypotension and SAMANTA on CKD with adjustment in his meds. He requires close monitoring by rehab nursing and physiatry to monitor his BPs and check for orthostasis and adjust his medications while participating in rehab.    - Neurology was consulted and no intervention was recommended; no intervention at this time. Patient can Follow up with neurology at the clinic, as per conversation with neuro PA. Was already on DAPT and high dose statin.  -Neuro F/U recommending ASA and Plavix assay and Echol  - Patient requires PT/OT/SLT/Neuropsych eval an acute rehab program to return to previous level of function.  -Tolerating therapy well. Transfers and ambulates with RW with min assistance.    #Dizziness and fall 2/2 orthostatic hypotension - now controlled  - continue with HI stockings and abdominal binder  - continue midodrine 2x a day  - fall precautions   - home medications of flomax, tizanidine were held by medicine team prior to rehab admission; valium was changed from 10 mg standing to 5 mg PRN by medicine per neurology recommendations     # LE pain likely secondary to PAD - Asymptomatic at this time.  - Significant atherosclerotic disease noted in bilateral common femoral arteries  - Patient's family and vascular team agreed to not proceed with intervention at this time     # CAD s/p CABG 2009  - 6/2021 :patent SALAZAR to LAD , patent SVG to OM , % , filled distally by collaterals from LAD.  - Continue DAPT ( Aspirin 81 mg daily and Plavix 75 mg daily ), Imdur, Ranexa, B-Blocker, Statin Therapy  - Cardio f/u OP     #HTN, patient had hypertensive urgency   - losartan 100mg held for SAMANTA   - c/w nifedipine 60mg xL q24h     #HLD  - continue with atorvastatin and fenofibrate     #SAMANTA on CKD vs progression of CKD   - Cr 1.3 in 2017, 1.5 in 2019, 1.7 in June; Current Cr trend 2.1 -> 1.9-- > 2.1 --> 1.8  - Renal bladder US showed simple cysts within both kidneys without hydronephrosis or nephrolithiasis   - Patient retaining on bladder scan so harrison placed, still in place; attempt trial of void in am  - Monitor Cr, IVF as needed     # Urinary Retention  - Flomax had to be stopped secondary to symptomatic orthostatic hypotension.   - Will give TOV today. Patient reportedly not able to tolerate CIC on medical floor    #DM type 2  - Hold home meds, glucose wnl   - Start Insulin regimen for FS >180    # Depression   - venlafaxine     # Anxiety   - Valium PRN as above    # Maintenance   - Pain control: none   - GI/Bowel Mgmt: miralax ,senna   - Bladder management: Harrison in place for urinary retention. TOV soon as above  - Skin: No active issues at this time  - FEN: monitor electrolytes, replete as needed   - Diet: Diet, DASH/TLC:   Sodium & Cholesterol Restricted  Consistent Carbohydrate {No Snacks} (07-28-21 @ 15:46) [Active]    # Precautions / PROPHYLAXIS:    - Fall precaution   - Ortho: Weight bearing status: WBAT  - DVT prophylaxis: SC heparin.

## 2021-07-30 NOTE — PROGRESS NOTE ADULT - ASSESSMENT
Neuropsychology Family Contact Note: Kristina Ruffin (Mauricio Ruffin)    Pertinent Social History: Spoke with the patient’s son. No cognitive, mood, or behavior changes were noted. The only thing that the family is concerned about are his continued speech problems. His son noted that the patient’s mood fluctuated before coming to the hospital, but denied instances of depression. His son shared that the patient enjoys going on the computer, playing games on phone, and watching TV.     Premorbid Functioning:  ADLs: Independent  IADLs: Independent with medications and finances; they have a  for cleaning; his wife cooks; he still drives. No decline in functioning was indicated.  Psychiatric History/Treatment: No history of counseling. Psychiatric illness was denied   Previous Coping: Watching TV, going on computer, and playing games on phone.  Cognition: WFL.   Substance Use: Stopped smoking 30 years ago. No alcohol use.     Current Status:  Mood Changes: None.  Cognition Changes: Expressive language. No other changes noted.  Behavior Changes: Increased tiredness and fatigue.     Family Education: The family was educated about the rehabilitation process, current status, and family education sessions. Neurocognitive testing will be conducted. Discharge planning initiated.    Family Goals: The family goal is to return to functioning as normal within the household.

## 2021-07-30 NOTE — PROGRESS NOTE ADULT - SUBJECTIVE AND OBJECTIVE BOX
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 Patient says he has been feeling intermittently dizzy over three days  despite having adequate/normal PO intake. Pt stated that these episodes of dizziness occurs after getting up from a sitting or supine position. Patient fell at home after feeling dizzy hitting the back of his head, but with no LOC. Pt was not on AC prior to admission. Pt also got out of his car, felt dizzy and then fell straight down hitting his buttocks and R knee. Denies head trauma. Patient was admitted on 21 to internal medicine for falls and dizziness 2/2 orthostatic hypotension.   CT spine 21 showed no evidence of acute cervical spine fracture or subluxation. Multilevel severe degenerative changes as described, with severe spinal noted stenosis at C2-3 and C3-4 and multilevel severe neural foraminal stenosis.  CTAP 21 showed no definite evidence of acute traumatic injury within the chest, abdomen, or pelvis.   CTH 21 showed no evidence of acute intracranial hemorrhage. Probable chronic infarct within the right posterior frontal white matter. Lacunar infarcts within bilateral white matter and deep gray nuclei of indeterminate age. Mild/moderate chronic microvascular changes.  Chest X-Ray negative for pneumothorax, infiltrate, or effusion. XR Pelvis negative for fx's or dislocations.    Patient's hospital course was complicated by lower extremity claudication. Bilateral lower extremity ultrasounds were performed on 21, which revealed severely diminished flow noted in the right and left superficial femoral artery suggestive of a proximal superficial femoral or common femoral artery stenosis. Significant atherosclerotic disease noted in the bilateral common femoral arteries. Vascular surgery was consulted for LLE Angiogram with possible endovascular revascularization; cardiac clearance was obtained but the team in conjunction with the family had decided to not go through with intervention.     Patient's orthostatic hypotension improved with midodrine, compression stocking, abdominal binder.     MRA 21 showed the following: MRA of the neck is limited by motion. Evaluation of the distal common carotid proximal internal carotid arteries appear grossly unremarkable though better quality study is recommended or CTA of the neck can be done for further evaluation. Evaluation of the proximal external carotid arteries are limited by motion. MRA of the [place] demonstrates decreased caliber of the distal right internal carotid artery. Short segment of stenosis is suspected involving both posterior cerebral arteries.    CTH 21 showed the following: No acute/traumatic intracranial pathology. Microvascular ischemic changes and unchanged probable chronic infarct within the right posterior frontal lobe.  MRI Head 21 showed acute lacunar infarcts involving the deep white matter of the left frontal lobe at the level of the centrum semiovale. No acute hemorrhage.  Neurology followed the patient and did not recommend intervention; patient is to continue aspirin and plavix.    Patient's prior level of function included independence with ADLs and independence with ambulation with a RW.   Patient's level of function on admission is min assist with bed mobility and min assist with transfers, patient is able to ambulate 4 small steps with rolling walker min assist.     Patient was evaluated by Dr. Ingram, a physical medicine and rehabilitation specialist and was found to be an excellent candidate for acute rehabilitation. Patient would benefit from 3 hours of interdisciplinary therapy per day.     Patient was admitted to rehab for acute lacunar infarcts in the deep white matter of the left frontal lobe at the level of the centrum semiovale and chronic left sided weakness from CVA of  with a significant decline in his baseline function of independent with a straight cane.       (2021 15:42)      I examined the patient and reviewed the chart. There have been no significant changes since my history and physical except where documented below.    TODAY'S SUBJECTIVE & REVIEW OF SYMPTOMS:  Patient was seen this morning with Dr. Ingram. Patient is doing well. No acute events overnight        Constiutional:    [ x ] WNL           [   ] poor appetite   [   ] insomnia   [   ] tired   Cardio:                [ x ] WNL           [   ] CP   [   ] PRADO   [   ] palpitations               Resp:                   [ x ] WNL           [   ] SOB   [   ] cough   [   ] wheezing   GI:                        [ x ] WNL           [   ] constipation   [   ] diarrhea   [   ] abdominal pain   [   ] nausea   [   ] emesis                                :                      [ x ] WNL           [   ] HOGUE  [   ] dysuria   [   ] difficulty voiding             Endo:                   [ x ] WNL          [   ] polyuria   [   ] temperature intolerance                 Skin:                     [ x ] WNL          [   ] pain   [   ] wound   [   ] rash   MSK:                    [ x ] WNL          [   ] muscle pain   [   ] joint pain/ stiffness   [   ] muscle tenderness   [   ] swelling   Neuro:                 [  ] WNL           [ x ]  as per HPI            Cognitive:           [ x ] WNL           [   ]confusion      Psych:                  [ x ] WNL           [   ] hallucinations   [   ]agitation   [   ] delusion   [   ]depression      PHYSICAL EXAM    Vital Signs Last 24 Hrs  T(C): 35.6 (2021 05:23), Max: 36.2 (2021 13:30)  T(F): 96 (2021 05:23), Max: 97.2 (2021 13:30)  HR: 71 (2021 05:23) (71 - 74)  BP: 134/65 (2021 05:23) (133/65 - 140/56)  BP(mean): --  RR: 18 (2021 05:23) (18 - 20)  SpO2: --    Constitutional - [ x ] NAD, Comfortable        [   ] other:  Chest - [ x ] CTA     [   ] other:  Cardiovascular - [ x ] RRR, no murmer     [   ] other:  Abdomen - [ x ] Soft, NT/ND      [   ] other:        -  [   ] NO HOGUE CATHETER   [ x ] YES  if yes: [ x ] NO MEATAL TEAR OR DISCHARGE [   ] other:  Extremities - [ x ] No C/C/E, No calf tenderness       [   ] other:  ROM - [ x ] WFL     [   ] other:  Neurologic Exam -                 Cognitive - [ x ]Awake, Alert, AAO to self, place, date, year, situation         [    ] other:      Communication - [   ]Fluent, No dysarthria       [ x ] other: mildly dysarthric      Motor - No focal deficits                    Right UE -  [ x ] WNL      [    ] other:                    Left UE -     [   ] WNL      [ x ] other: L arm shoulder flexion 3-/5, L hand grasp 3+/5                    Right LE -   [ x ] WNL       [    ] other:                    Left LE -      [   ]WNL       [ x ] other: L arm shoulder flexion 3-/5, L hand grasp 3+/5     Sensory - [   ] Intact to LT      [ x ] other: L sided hemisensory loss      Reflexes - [ x ] wnl/ symmetric     [   ] other:     Psychiatric - [ x ]Mood stable, Affect WNL     [   ]other:     Skin - [ x ] intact      [   ] other    MEDICATIONS  (STANDING):  aspirin  chewable 81 milliGRAM(s) Oral daily  atorvastatin 80 milliGRAM(s) Oral at bedtime  clopidogrel Tablet 75 milliGRAM(s) Oral daily  fenofibrate Tablet 145 milliGRAM(s) Oral daily  heparin   Injectable 5000 Unit(s) SubCutaneous every 8 hours  isosorbide   mononitrate ER Tablet (IMDUR) 60 milliGRAM(s) Oral daily  metoprolol succinate ER 25 milliGRAM(s) Oral every 12 hours  midodrine. 2.5 milliGRAM(s) Oral <User Schedule>  NIFEdipine XL 60 milliGRAM(s) Oral daily  pentoxifylline 400 milliGRAM(s) Oral three times a day  ranolazine 500 milliGRAM(s) Oral two times a day  senna 2 Tablet(s) Oral at bedtime  venlafaxine XR. 75 milliGRAM(s) Oral daily    MEDICATIONS  (PRN):  acetaminophen   Tablet .. 975 milliGRAM(s) Oral every 6 hours PRN Temp greater or equal to 38C (100.4F), Mild Pain (1 - 3), Moderate Pain (4 - 6)  aluminum hydroxide/magnesium hydroxide/simethicone Suspension 30 milliLiter(s) Oral every 4 hours PRN Dyspepsia  diazepam    Tablet 5 milliGRAM(s) Oral daily PRN anxiety  polyethylene glycol 3350 17 Gram(s) Oral daily PRN Constipation      RECENT LABS/IMAGING                        12.7   7.68  )-----------( 265      ( 2021 07:42 )             40.7         139  |  108  |  46<H>  ----------------------------<  120<H>  4.9   |  22  |  1.8<H>    Ca    9.3      2021 07:42  Mg     2.5         TPro  6.2  /  Alb  4.0  /  TBili  0.3  /  DBili  x   /  AST  16  /  ALT  15  /  AlkPhos  34        Urinalysis Basic - ( 2021 16:26 )    Color: Yellow / Appearance: Clear / S.023 / pH: x  Gluc: x / Ketone: Negative  / Bili: Negative / Urobili: <2 mg/dL   Blood: x / Protein: 300 mg/dL / Nitrite: Negative   Leuk Esterase: Negative / RBC: 90 /HPF / WBC 4 /HPF   Sq Epi: x / Non Sq Epi: 1 /HPF / Bacteria: Negative         Patient is a 75y old  Male who presents with a chief complaint of     HPI:  76 yo M with PMH of CAD s/p CABG x3 2017 ([place]), 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 Patient says he has been feeling intermittently dizzy over three days  despite having adequate/normal PO intake. Pt stated that these episodes of dizziness occurs after getting up from a sitting or supine position. Patient fell at home after feeling dizzy hitting the back of his head, but with no LOC. Pt was not on AC prior to admission. Pt also got out of his car, felt dizzy and then fell straight down hitting his buttocks and R knee. Denies head trauma. Patient was admitted on 21 to internal medicine for falls and dizziness 2/2 orthostatic hypotension.   CT spine 21 showed no evidence of acute cervical spine fracture or subluxation. Multilevel severe degenerative changes as described, with severe spinal noted stenosis at C2-3 and C3-4 and multilevel severe neural foraminal stenosis.  CTAP 21 showed no definite evidence of acute traumatic injury within the chest, abdomen, or pelvis.   CTH 21 showed no evidence of acute intracranial hemorrhage. Probable chronic infarct within the right posterior frontal white matter. Lacunar infarcts within bilateral white matter and deep gray nuclei of indeterminate age. Mild/moderate chronic microvascular changes.  Chest X-Ray negative for pneumothorax, infiltrate, or effusion. XR Pelvis negative for fx's or dislocations.    Patient's hospital course was complicated by lower extremity claudication. Bilateral lower extremity ultrasounds were performed on 21, which revealed severely diminished flow noted in the right and left superficial femoral artery suggestive of a proximal superficial femoral or common femoral artery stenosis. Significant atherosclerotic disease noted in the bilateral common femoral arteries. Vascular surgery was consulted for LLE Angiogram with possible endovascular revascularization; cardiac clearance was obtained but the team in conjunction with the family had decided to not go through with intervention.     Patient's orthostatic hypotension improved with midodrine, compression stocking, abdominal binder.     MRA 21 showed the following: MRA of the neck is limited by motion. Evaluation of the distal common carotid proximal internal carotid arteries appear grossly unremarkable though better quality study is recommended or CTA of the neck can be done for further evaluation. Evaluation of the proximal external carotid arteries are limited by motion. MRA of the [place] demonstrates decreased caliber of the distal right internal carotid artery. Short segment of stenosis is suspected involving both posterior cerebral arteries.    CTH 21 showed the following: No acute/traumatic intracranial pathology. Microvascular ischemic changes and unchanged probable chronic infarct within the right posterior frontal lobe.  MRI Head 21 showed acute lacunar infarcts involving the deep white matter of the left frontal lobe at the level of the centrum semiovale. No acute hemorrhage.  Neurology followed the patient and did not recommend intervention; patient is to continue aspirin and plavix.    Patient's prior level of function included independence with ADLs and independence with ambulation with a RW.   Patient's level of function on admission is min assist with bed mobility and min assist with transfers, patient is able to ambulate 4 small steps with rolling walker min assist.     Patient was evaluated by Dr. Ingram, a physical medicine and rehabilitation specialist and was found to be an excellent candidate for acute rehabilitation. Patient would benefit from 3 hours of interdisciplinary therapy per day.     Patient was admitted to rehab for acute lacunar infarcts in the deep white matter of the left frontal lobe at the level of the centrum semiovale and chronic left sided weakness from CVA of  with a significant decline in his baseline function of independent with a straight cane.      TODAY'S SUBJECTIVE & REVIEW OF SYMPTOMS:  Patient was seen this morning with Dr. Ingram. Patient is doing well. No acute events overnight        Constiutional:    [ x ] WNL           [   ] poor appetite   [   ] insomnia   [   ] tired   Cardio:                [ x ] WNL           [   ] CP   [   ] PRADO   [   ] palpitations               Resp:                   [ x ] WNL           [   ] SOB   [   ] cough   [   ] wheezing   GI:                        [ x ] WNL           [   ] constipation   [   ] diarrhea   [   ] abdominal pain   [   ] nausea   [   ] emesis                                :                      [ x ] WNL           [   ] HOGUE  [   ] dysuria   [   ] difficulty voiding             Endo:                   [ x ] WNL          [   ] polyuria   [   ] temperature intolerance                 Skin:                     [ x ] WNL          [   ] pain   [   ] wound   [   ] rash   MSK:                    [ x ] WNL          [   ] muscle pain   [   ] joint pain/ stiffness   [   ] muscle tenderness   [   ] swelling   Neuro:                 [  ] WNL           [ x ]  as per HPI            Cognitive:           [ x ] WNL           [   ]confusion      Psych:                  [ x ] WNL           [   ] hallucinations   [   ]agitation   [   ] delusion   [   ]depression      PHYSICAL EXAM    Vital Signs Last 24 Hrs  T(C): 35.6 (2021 05:23), Max: 36.2 (2021 13:30)  T(F): 96 (2021 05:23), Max: 97.2 (2021 13:30)  HR: 71 (2021 05:23) (71 - 74)  BP: 134/65 (2021 05:23) (133/65 - 140/56)  BP(mean): --  RR: 18 (2021 05:23) (18 - 20)  SpO2: --    Constitutional - [ x ] NAD, Comfortable        [   ] other:  Chest - [ x ] CTA     [   ] other:  Cardiovascular - [ x ] RRR, no murmer     [   ] other:  Abdomen - [ x ] Soft, NT/ND      [   ] other:        -  [   ] NO HOGUE CATHETER   [ x ] YES  if yes: [ x ] NO MEATAL TEAR OR DISCHARGE [   ] other:  Extremities - [ x ] No C/C/E, No calf tenderness       [   ] other:  ROM - [ x ] WFL     [   ] other:  Neurologic Exam -                 Cognitive - [ x ]Awake, Alert, AAO to self, place, date, year, situation         [    ] other:      Communication - [   ]Fluent, No dysarthria       [ x ] other: mildly dysarthric      Motor - No focal deficits                    Right UE -  [ x ] WNL      [    ] other:                    Left UE -     [   ] WNL      [ x ] other: L arm shoulder flexion 3-/5, L hand grasp 3+/5                    Right LE -   [ x ] WNL       [    ] other:                    Left LE -      [   ]WNL       [ x ] other: L arm shoulder flexion 3-/5, L hand grasp 3+/5     Sensory - [   ] Intact to LT      [ x ] other: L sided hemisensory loss      Reflexes - [ x ] wnl/ symmetric     [   ] other:     Psychiatric - [ x ]Mood stable, Affect WNL     [   ]other:     Skin - [ x ] intact      [   ] other    MEDICATIONS  (STANDING):  aspirin  chewable 81 milliGRAM(s) Oral daily  atorvastatin 80 milliGRAM(s) Oral at bedtime  clopidogrel Tablet 75 milliGRAM(s) Oral daily  fenofibrate Tablet 145 milliGRAM(s) Oral daily  heparin   Injectable 5000 Unit(s) SubCutaneous every 8 hours  isosorbide   mononitrate ER Tablet (IMDUR) 60 milliGRAM(s) Oral daily  metoprolol succinate ER 25 milliGRAM(s) Oral every 12 hours  midodrine. 2.5 milliGRAM(s) Oral <User Schedule>  NIFEdipine XL 60 milliGRAM(s) Oral daily  pentoxifylline 400 milliGRAM(s) Oral three times a day  ranolazine 500 milliGRAM(s) Oral two times a day  senna 2 Tablet(s) Oral at bedtime  venlafaxine XR. 75 milliGRAM(s) Oral daily    MEDICATIONS  (PRN):  acetaminophen   Tablet .. 975 milliGRAM(s) Oral every 6 hours PRN Temp greater or equal to 38C (100.4F), Mild Pain (1 - 3), Moderate Pain (4 - 6)  aluminum hydroxide/magnesium hydroxide/simethicone Suspension 30 milliLiter(s) Oral every 4 hours PRN Dyspepsia  diazepam    Tablet 5 milliGRAM(s) Oral daily PRN anxiety  polyethylene glycol 3350 17 Gram(s) Oral daily PRN Constipation      RECENT LABS/IMAGING                        12.7   7.68  )-----------( 265      ( 2021 07:42 )             40.7         139  |  108  |  46<H>  ----------------------------<  120<H>  4.9   |  22  |  1.8<H>    Ca    9.3      2021 07:42  Mg     2.5         TPro  6.2  /  Alb  4.0  /  TBili  0.3  /  DBili  x   /  AST  16  /  ALT  15  /  AlkPhos  34        Urinalysis Basic - ( 2021 16:26 )    Color: Yellow / Appearance: Clear / S.023 / pH: x  Gluc: x / Ketone: Negative  / Bili: Negative / Urobili: <2 mg/dL   Blood: x / Protein: 300 mg/dL / Nitrite: Negative   Leuk Esterase: Negative / RBC: 90 /HPF / WBC 4 /HPF   Sq Epi: x / Non Sq Epi: 1 /HPF / Bacteria: Negative

## 2021-07-31 LAB
GLUCOSE BLDC GLUCOMTR-MCNC: 133 MG/DL — HIGH (ref 70–99)
GLUCOSE BLDC GLUCOMTR-MCNC: 146 MG/DL — HIGH (ref 70–99)
GLUCOSE BLDC GLUCOMTR-MCNC: 147 MG/DL — HIGH (ref 70–99)

## 2021-07-31 RX ADMIN — RANOLAZINE 500 MILLIGRAM(S): 500 TABLET, FILM COATED, EXTENDED RELEASE ORAL at 18:01

## 2021-07-31 RX ADMIN — GABAPENTIN 200 MILLIGRAM(S): 400 CAPSULE ORAL at 21:18

## 2021-07-31 RX ADMIN — Medication 400 MILLIGRAM(S): at 05:45

## 2021-07-31 RX ADMIN — GABAPENTIN 100 MILLIGRAM(S): 400 CAPSULE ORAL at 08:04

## 2021-07-31 RX ADMIN — ISOSORBIDE MONONITRATE 60 MILLIGRAM(S): 60 TABLET, EXTENDED RELEASE ORAL at 13:06

## 2021-07-31 RX ADMIN — Medication 81 MILLIGRAM(S): at 13:04

## 2021-07-31 RX ADMIN — SENNA PLUS 2 TABLET(S): 8.6 TABLET ORAL at 21:18

## 2021-07-31 RX ADMIN — MIDODRINE HYDROCHLORIDE 2.5 MILLIGRAM(S): 2.5 TABLET ORAL at 13:04

## 2021-07-31 RX ADMIN — Medication 25 MILLIGRAM(S): at 05:45

## 2021-07-31 RX ADMIN — Medication 145 MILLIGRAM(S): at 13:06

## 2021-07-31 RX ADMIN — Medication 25 MILLIGRAM(S): at 18:01

## 2021-07-31 RX ADMIN — HEPARIN SODIUM 5000 UNIT(S): 5000 INJECTION INTRAVENOUS; SUBCUTANEOUS at 05:45

## 2021-07-31 RX ADMIN — MIDODRINE HYDROCHLORIDE 2.5 MILLIGRAM(S): 2.5 TABLET ORAL at 05:45

## 2021-07-31 RX ADMIN — Medication 400 MILLIGRAM(S): at 15:27

## 2021-07-31 RX ADMIN — RANOLAZINE 500 MILLIGRAM(S): 500 TABLET, FILM COATED, EXTENDED RELEASE ORAL at 05:45

## 2021-07-31 RX ADMIN — HEPARIN SODIUM 5000 UNIT(S): 5000 INJECTION INTRAVENOUS; SUBCUTANEOUS at 21:18

## 2021-07-31 RX ADMIN — Medication 75 MILLIGRAM(S): at 13:04

## 2021-07-31 RX ADMIN — CLOPIDOGREL BISULFATE 75 MILLIGRAM(S): 75 TABLET, FILM COATED ORAL at 13:04

## 2021-07-31 RX ADMIN — Medication 400 MILLIGRAM(S): at 23:02

## 2021-07-31 RX ADMIN — HEPARIN SODIUM 5000 UNIT(S): 5000 INJECTION INTRAVENOUS; SUBCUTANEOUS at 13:07

## 2021-07-31 RX ADMIN — ATORVASTATIN CALCIUM 80 MILLIGRAM(S): 80 TABLET, FILM COATED ORAL at 21:18

## 2021-07-31 RX ADMIN — Medication 60 MILLIGRAM(S): at 05:45

## 2021-07-31 NOTE — CONSULT NOTE ADULT - ATTENDING COMMENTS
Cryptogenic Stroke  Recommend ILR for long term detection of AF  We discussed the risks/benefits/alternatives, nature of procedure, and follow up care after device is implanted. We also discussed remote monitoring in details. Patient is in agreement with proceeding with the device implant. We discussed the risks of bleeding, hematoma, infection, device malfunction. Patient expressed understanding of the discussion. I answered all the questions in details.

## 2021-07-31 NOTE — PROGRESS NOTE ADULT - SUBJECTIVE AND OBJECTIVE BOX
T(C): 35.6 (07-31-21 @ 13:07), Max: 36.1 (07-30-21 @ 20:23)  HR: 76 (07-31-21 @ 13:07) (70 - 85)  BP: 150/71 (07-31-21 @ 13:07) (127/68 - 150/71)  RR: 18 (07-31-21 @ 13:07) (18 - 18)  SpO2: --      Patient was stable overnight; however, she failed trail of void and had a PVR of 800 cc.     PE:    Alert   LUNGS- clear  COR- RRR  ABD- SOFT, NT  EXTR- w/o edema  NEURO- stable                      Rehab of CVA; urinary retension    Continue acute rehab program.

## 2021-07-31 NOTE — CHART NOTE - NSCHARTNOTEFT_GEN_A_CORE
Patient's harrison was discontinued yesterday ( 7/30 )  he was unable to urinate  all day and PVr was 450 and straight catheterized with 800 cc of urine.   Pt will get bladder scan q8h  and straight cath schedule   q8h  if more than 400  cc of urine .

## 2021-07-31 NOTE — CONSULT NOTE ADULT - ASSESSMENT
Cards: Dr Stewart  Neuro Dr Carrizales    75y Male with HTN, CAD, CABG, DM2, CVA 2017 and now 7/2021 recommended for implantable loop recorder placement for long term cardiac monitoring for etiology of cryptogenic stroke  Plan discussed with patient, agreeable for the procedure  will proceed with placement of loop recorded Mon or Tues   COVID PCR sent   NO need for NPO

## 2021-07-31 NOTE — CONSULT NOTE ADULT - SUBJECTIVE AND OBJECTIVE BOX
Patient is a 75y old  Male who presents with a chief complaint of unsteady gait, new stroke (30 Jul 2021 18:01)        HPI:  74 yo M with PMH of CAD s/p CABG x3 2017 ([place]), 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 Patient says he has been feeling intermittently dizzy over three days  despite having adequate/normal PO intake. Pt stated that these episodes of dizziness occurs after getting up from a sitting or supine position. Patient fell at home after feeling dizzy hitting the back of his head, but with no LOC. Pt was not on AC prior to admission. Pt also got out of his car, felt dizzy and then fell straight down hitting his buttocks and R knee. Denies head trauma. Patient was admitted on 7/20/21 to internal medicine for falls and dizziness 2/2 orthostatic hypotension.   CT spine 7/20/21 showed no evidence of acute cervical spine fracture or subluxation. Multilevel severe degenerative changes as described, with severe spinal noted stenosis at C2-3 and C3-4 and multilevel severe neural foraminal stenosis.  CTAP 7/20/21 showed no definite evidence of acute traumatic injury within the chest, abdomen, or pelvis.   CTH 7/21/21 showed no evidence of acute intracranial hemorrhage. Probable chronic infarct within the right posterior frontal white matter. Lacunar infarcts within bilateral white matter and deep gray nuclei of indeterminate age. Mild/moderate chronic microvascular changes.  Chest X-Ray negative for pneumothorax, infiltrate, or effusion. XR Pelvis negative for fx's or dislocations.    Patient's hospital course was complicated by lower extremity claudication. Bilateral lower extremity ultrasounds were performed on 7/23/21, which revealed severely diminished flow noted in the right and left superficial femoral artery suggestive of a proximal superficial femoral or common femoral artery stenosis. Significant atherosclerotic disease noted in the bilateral common femoral arteries. Vascular surgery was consulted for LLE Angiogram with possible endovascular revascularization; cardiac clearance was obtained but the team in conjunction with the family had decided to not go through with intervention.     Patient's orthostatic hypotension improved with midodrine, compression stocking, abdominal binder.     MRA 7/23/21 showed the following: MRA of the neck is limited by motion. Evaluation of the distal common carotid proximal internal carotid arteries appear grossly unremarkable though better quality study is recommended or CTA of the neck can be done for further evaluation. Evaluation of the proximal external carotid arteries are limited by motion. MRA of the [place] demonstrates decreased caliber of the distal right internal carotid artery. Short segment of stenosis is suspected involving both posterior cerebral arteries.    CTH 7/24/21 showed the following: No acute/traumatic intracranial pathology. Microvascular ischemic changes and unchanged probable chronic infarct within the right posterior frontal lobe.  MRI Head 7/24/21 showed acute lacunar infarcts involving the deep white matter of the left frontal lobe at the level of the centrum semiovale. No acute hemorrhage.  Neurology followed the patient and did not recommend intervention; patient is to continue aspirin and plavix.    Patient's prior level of function included independence with ADLs and independence with ambulation with a RW.   Patient's level of function on admission is min assist with bed mobility and min assist with transfers, patient is able to ambulate 4 small steps with rolling walker min assist.     Patient was evaluated by Dr. Ingram, a physical medicine and rehabilitation specialist and was found to be an excellent candidate for acute rehabilitation. Patient would benefit from 3 hours of interdisciplinary therapy per day.     Patient was admitted to rehab for acute lacunar infarcts in the deep white matter of the left frontal lobe at the level of the centrum semiovale and chronic left sided weakness from CVA of 2017 with a significant decline in his baseline function of independent with a straight cane.       (28 Jul 2021 15:42)      Electrophysiology:  75y Male    REVIEW OF SYSTEMS    [ ] A ten-point review of systems was otherwise negative except as noted.  [ ] Due to altered mental status/intubation, subjective information were not able to be obtained from the patient. History was obtained, to the extent possible, from review of the chart and collateral sources of information.      PAST MEDICAL & SURGICAL HISTORY:  CAD (coronary artery disease)    HTN (hypertension)    Depression    Anxiety    Diabetes  niddm    Angina pectoris    BPH (benign prostatic hyperplasia)    GERD (gastroesophageal reflux disease)    CVA (cerebral vascular accident)    History of appendectomy    Bilateral cataracts    History of heart bypass surgery        Home Medications:  aspirin 81 mg oral tablet: 1 tab(s) orally once a day (09 Jun 2021 14:22)  atorvastatin 80 mg oral tablet: 1 tab(s) orally once a day (at bedtime) (28 Jul 2021 15:17)  diazePAM 5 mg oral tablet: 1 tab(s) orally once a day, As Needed (28 Jul 2021 15:17)  fenofibrate 120 mg oral tablet: 1 tab(s) orally once a day (09 Jun 2021 14:22)  heparin: 5000 unit(s) subcutaneous every 8 hours (28 Jul 2021 15:17)  metoprolol succinate 25 mg oral tablet, extended release: orally 2 times a day (09 Jun 2021 14:22)  midodrine 2.5 mg oral tablet: 1 tab(s) orally  (28 Jul 2021 15:17)  NIFEdipine 60 mg oral tablet, extended release: 1 tab(s) orally once a day (28 Jul 2021 15:17)  Pentoxil 400 mg oral tablet, extended release: 1 tab(s) orally 3 times a day (09 Jun 2021 14:22)  Plavix 75 mg oral tablet: 1 tab(s) orally once a day (09 Jun 2021 14:22)  polyethylene glycol 3350 oral powder for reconstitution: 17 gram(s) orally once a day, As needed, Constipation (28 Jul 2021 15:17)  Ranexa 500 mg oral tablet, extended release: 1 tab(s) orally 2 times a day (09 Jun 2021 14:22)  senna oral tablet: 2 tab(s) orally once a day (at bedtime) (28 Jul 2021 15:17)  venlafaxine 75 mg oral tablet: 1 tab(s) orally 2 times a day (09 Jun 2021 14:22)      Allergies:  clindamycin (Rash)  PC Pen VK (Rash)  penicillin (Rash)      FAMILY HISTORY:      SOCIAL HISTORY:    CIGARETTES:  ALCOHOL:        PREVIOUS DIAGNOSTIC TESTING:      ECHO  FINDINGS:    STRESS  FINDINGS:    CATHETERIZATION  FINDINGS:    ELECTROPHYSIOLOGY STUDY  FINDINGS:    CAROTID ULTRASOUND:  FINDINGS    VENOUS DUPLEX SCAN:  FINDINGS:    CHEST CT PULMONARY ANGIO with IV Contrast:  FINDINGS:      MEDICATIONS  (STANDING):  aspirin  chewable 81 milliGRAM(s) Oral daily  atorvastatin 80 milliGRAM(s) Oral at bedtime  clopidogrel Tablet 75 milliGRAM(s) Oral daily  fenofibrate Tablet 145 milliGRAM(s) Oral daily  gabapentin 200 milliGRAM(s) Oral at bedtime  gabapentin 100 milliGRAM(s) Oral <User Schedule>  heparin   Injectable 5000 Unit(s) SubCutaneous every 8 hours  isosorbide   mononitrate ER Tablet (IMDUR) 60 milliGRAM(s) Oral daily  metoprolol succinate ER 25 milliGRAM(s) Oral every 12 hours  midodrine. 2.5 milliGRAM(s) Oral <User Schedule>  NIFEdipine XL 60 milliGRAM(s) Oral daily  pentoxifylline 400 milliGRAM(s) Oral three times a day  ranolazine 500 milliGRAM(s) Oral two times a day  senna 2 Tablet(s) Oral at bedtime  venlafaxine XR. 75 milliGRAM(s) Oral daily    MEDICATIONS  (PRN):  acetaminophen   Tablet .. 975 milliGRAM(s) Oral every 6 hours PRN Temp greater or equal to 38C (100.4F), Mild Pain (1 - 3), Moderate Pain (4 - 6)  aluminum hydroxide/magnesium hydroxide/simethicone Suspension 30 milliLiter(s) Oral every 4 hours PRN Dyspepsia  diazepam    Tablet 5 milliGRAM(s) Oral daily PRN anxiety  polyethylene glycol 3350 17 Gram(s) Oral daily PRN Constipation      Vital Signs Last 24 Hrs  T(C): 35.6 (31 Jul 2021 13:07), Max: 36.1 (30 Jul 2021 20:23)  T(F): 96.1 (31 Jul 2021 13:07), Max: 96.9 (30 Jul 2021 20:23)  HR: 76 (31 Jul 2021 13:07) (70 - 85)  BP: 150/71 (31 Jul 2021 13:07) (127/68 - 150/71)  BP(mean): --  RR: 18 (31 Jul 2021 13:07) (18 - 18)  SpO2: --    PHYSICAL EXAM:    GENERAL: In no apparent distress, well nourished, and hydrated.  HEAD:  Atraumatic, Normocephalic  EYES: EOMI, PERRLA, conjunctiva and sclera clear  NECK: Supple and normal thyroid.  No JVD or carotid bruit.  Carotid pulse is 2+ bilaterally.  HEART: Regular rate and rhythm; No murmurs, rubs, or gallops.  PULMONARY: Clear to auscultation and perfusion.  No rales, wheezing, or rhonchi bilaterally.  ABDOMEN: Soft, Nontender, Nondistended; Bowel sounds present  EXTREMITIES:  2+ Peripheral Pulses, No clubbing, cyanosis, or edema  NEUROLOGICAL: Grossly nonfocal    I&O's Detail    30 Jul 2021 07:01  -  31 Jul 2021 07:00  --------------------------------------------------------  IN:  Total IN: 0 mL    OUT:    Voided (mL): 75 mL  Total OUT: 75 mL    Total NET: -75 mL      31 Jul 2021 07:01  -  31 Jul 2021 16:02  --------------------------------------------------------  IN:  Total IN: 0 mL    OUT:    Voided (mL): 100 mL  Total OUT: 100 mL    Total NET: -100 mL        Daily     Daily     INTERPRETATION OF TELEMETRY:    ECG:        LABS:                  BNP            RADIOLOGY & ADDITIONAL STUDIES:       Patient is a 75y old  Male who presents with a chief complaint of unsteady gait, new stroke (30 Jul 2021 18:01)        HPI:  76 yo M with PMH of CAD s/p CABG x3 2017 ([place]), 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 Patient says he has been feeling intermittently dizzy over three days  despite having adequate/normal PO intake. Pt stated that these episodes of dizziness occurs after getting up from a sitting or supine position. Patient fell at home after feeling dizzy hitting the back of his head, but with no LOC. Pt was not on AC prior to admission. Pt also got out of his car, felt dizzy and then fell straight down hitting his buttocks and R knee. Denies head trauma. Patient was admitted on 7/20/21 to internal medicine for falls and dizziness 2/2 orthostatic hypotension.   CT spine 7/20/21 showed no evidence of acute cervical spine fracture or subluxation. Multilevel severe degenerative changes as described, with severe spinal noted stenosis at C2-3 and C3-4 and multilevel severe neural foraminal stenosis.  CTAP 7/20/21 showed no definite evidence of acute traumatic injury within the chest, abdomen, or pelvis.   CTH 7/21/21 showed no evidence of acute intracranial hemorrhage. Probable chronic infarct within the right posterior frontal white matter. Lacunar infarcts within bilateral white matter and deep gray nuclei of indeterminate age. Mild/moderate chronic microvascular changes.  Chest X-Ray negative for pneumothorax, infiltrate, or effusion. XR Pelvis negative for fx's or dislocations.    Patient's hospital course was complicated by lower extremity claudication. Bilateral lower extremity ultrasounds were performed on 7/23/21, which revealed severely diminished flow noted in the right and left superficial femoral artery suggestive of a proximal superficial femoral or common femoral artery stenosis. Significant atherosclerotic disease noted in the bilateral common femoral arteries. Vascular surgery was consulted for LLE Angiogram with possible endovascular revascularization; cardiac clearance was obtained but the team in conjunction with the family had decided to not go through with intervention.     Patient's orthostatic hypotension improved with midodrine, compression stocking, abdominal binder.     MRA 7/23/21 showed the following: MRA of the neck is limited by motion. Evaluation of the distal common carotid proximal internal carotid arteries appear grossly unremarkable though better quality study is recommended or CTA of the neck can be done for further evaluation. Evaluation of the proximal external carotid arteries are limited by motion. MRA of the [place] demonstrates decreased caliber of the distal right internal carotid artery. Short segment of stenosis is suspected involving both posterior cerebral arteries.    CTH 7/24/21 showed the following: No acute/traumatic intracranial pathology. Microvascular ischemic changes and unchanged probable chronic infarct within the right posterior frontal lobe.  MRI Head 7/24/21 showed acute lacunar infarcts involving the deep white matter of the left frontal lobe at the level of the centrum semiovale. No acute hemorrhage.  Neurology followed the patient and did not recommend intervention; patient is to continue aspirin and plavix.    Patient's prior level of function included independence with ADLs and independence with ambulation with a RW.   Patient's level of function on admission is min assist with bed mobility and min assist with transfers, patient is able to ambulate 4 small steps with rolling walker min assist.     Patient was evaluated by Dr. Ingram, a physical medicine and rehabilitation specialist and was found to be an excellent candidate for acute rehabilitation. Patient would benefit from 3 hours of interdisciplinary therapy per day.     Patient was admitted to rehab for acute lacunar infarcts in the deep white matter of the left frontal lobe at the level of the centrum semiovale and chronic left sided weakness from CVA of 2017 with a significant decline in his baseline function of independent with a straight cane.       (28 Jul 2021 15:42)      Electrophysiology:  75y Male with HTN, CAD, CABG, DM2, CVA 2017 and now 7/2021 recommended for implantable loop recorder placement for long term cardiac monitoring for etiology of cryptogenic stroke.  Patient denies episodes of palpitations dyspnea, dizziness/lightheadedness (except at the time of CVA)  Followed by Dr Stewart  Recent cath 6/2021     REVIEW OF SYSTEMS    [ ] A ten-point review of systems was otherwise negative except as noted.  [ ] Due to altered mental status/intubation, subjective information were not able to be obtained from the patient. History was obtained, to the extent possible, from review of the chart and collateral sources of information.      PAST MEDICAL & SURGICAL HISTORY:  CAD (coronary artery disease)    HTN (hypertension)    Depression    Anxiety    Diabetes  niddm    Angina pectoris    BPH (benign prostatic hyperplasia)    GERD (gastroesophageal reflux disease)    CVA (cerebral vascular accident)    History of appendectomy    Bilateral cataracts    History of heart bypass surgery        Home Medications:  aspirin 81 mg oral tablet: 1 tab(s) orally once a day (09 Jun 2021 14:22)  atorvastatin 80 mg oral tablet: 1 tab(s) orally once a day (at bedtime) (28 Jul 2021 15:17)  diazePAM 5 mg oral tablet: 1 tab(s) orally once a day, As Needed (28 Jul 2021 15:17)  fenofibrate 120 mg oral tablet: 1 tab(s) orally once a day (09 Jun 2021 14:22)  heparin: 5000 unit(s) subcutaneous every 8 hours (28 Jul 2021 15:17)  metoprolol succinate 25 mg oral tablet, extended release: orally 2 times a day (09 Jun 2021 14:22)  midodrine 2.5 mg oral tablet: 1 tab(s) orally  (28 Jul 2021 15:17)  NIFEdipine 60 mg oral tablet, extended release: 1 tab(s) orally once a day (28 Jul 2021 15:17)  Pentoxil 400 mg oral tablet, extended release: 1 tab(s) orally 3 times a day (09 Jun 2021 14:22)  Plavix 75 mg oral tablet: 1 tab(s) orally once a day (09 Jun 2021 14:22)  polyethylene glycol 3350 oral powder for reconstitution: 17 gram(s) orally once a day, As needed, Constipation (28 Jul 2021 15:17)  Ranexa 500 mg oral tablet, extended release: 1 tab(s) orally 2 times a day (09 Jun 2021 14:22)  senna oral tablet: 2 tab(s) orally once a day (at bedtime) (28 Jul 2021 15:17)  venlafaxine 75 mg oral tablet: 1 tab(s) orally 2 times a day (09 Jun 2021 14:22)      Allergies:  clindamycin (Rash)  PC Pen VK (Rash)  penicillin (Rash)      FAMILY HISTORY:      SOCIAL HISTORY:    CIGARETTES:  ALCOHOL:        PREVIOUS DIAGNOSTIC TESTING:      ECHO  FINDINGS:  < from: TTE Echo Complete w/o Contrast w/ Doppler (07.22.21 @ 09:48) >  Summary:   1. Left ventricular ejection fraction, by visual estimation, is 60 to 65%.   2. Moderately increased LV wall thickness.   3. Spectral Doppler shows impaired relaxation pattern of left ventricular myocardial filling (Grade I diastolic dysfunction).   4. Normal left atrial size.   5. Normal right atrial size.   6. Mild mitral valve regurgitation.   7. Mild thickening of the anterior and posterior mitral valve leaflets.   8. Moderate mitral annular calcification.   9. Mild tricuspid regurgitation.  10. Sclerotic aortic valve with normal opening.  11. There is moderate aortic root calcification.    < end of copied text >    STRESS  FINDINGS:    CATHETERIZATION  FINDINGS:  LEFT HEART CATHETERIZATION                                  6/9/21  Left main:  mild disease  LAD: severe disease in prox segment , filled distally by a patent LIMA  Left Circumflex: moderate disease  OM: filled by patent SVG  Right Coronary Artery: 100% prox occlusion,   RPDA: filled by collaterals from the left    POST-OP DIAGNOSIS  patent LIMA to LAD , patent SVG to OM  , % , filled distally by collaterals from LAD.      ELECTROPHYSIOLOGY STUDY  FINDINGS:    CAROTID ULTRASOUND:  FINDINGS  < from: VA Duplex Carotid, Bilat (07.24.21 @ 10:26) >  IMPRESSION: Mild 20-39% internal carotid artery stenosis bilaterally.    < end of copied text >    LE DUPLEX SCAN:  FINDINGS:  < from: VA Duplex Lower Extrem Arterial, Bilat (07.23.21 @ 13:03) >  INTERPRETATION:  Clinical History / Reason for exam: This patient is a 75-year-old male with leg pain and swelling bilaterally. Lower extremity arterial duplex ultrasound was performed to assess for arterial occlusive disease.    The Bilateral common femoral, deep femoral, superficial femoral, popliteal, anterior tibial, posterior tibial and peroneal arteries were visualized.    Severely diminished flow noted in the right and left superficial femoral artery suggestive of a proximal superficial femoral or common femoral artery stenosis.  Significant atherosclerotic disease noted in the bilateral common femoral arteries  Impression:    Severely diminished flow noted in the right and left superficial femoral artery suggestive of a proximal superficial femoral or common femoral artery stenosis.  Significant atherosclerotic disease noted in the bilateral common femoral arteries    < end of copied text >    CHEST CT PULMONARY ANGIO with IV Contrast:  FINDINGS:      MEDICATIONS  (STANDING):  aspirin  chewable 81 milliGRAM(s) Oral daily  atorvastatin 80 milliGRAM(s) Oral at bedtime  clopidogrel Tablet 75 milliGRAM(s) Oral daily  fenofibrate Tablet 145 milliGRAM(s) Oral daily  gabapentin 200 milliGRAM(s) Oral at bedtime  gabapentin 100 milliGRAM(s) Oral <User Schedule>  heparin   Injectable 5000 Unit(s) SubCutaneous every 8 hours  isosorbide   mononitrate ER Tablet (IMDUR) 60 milliGRAM(s) Oral daily  metoprolol succinate ER 25 milliGRAM(s) Oral every 12 hours  midodrine. 2.5 milliGRAM(s) Oral <User Schedule>  NIFEdipine XL 60 milliGRAM(s) Oral daily  pentoxifylline 400 milliGRAM(s) Oral three times a day  ranolazine 500 milliGRAM(s) Oral two times a day  senna 2 Tablet(s) Oral at bedtime  venlafaxine XR. 75 milliGRAM(s) Oral daily    MEDICATIONS  (PRN):  acetaminophen   Tablet .. 975 milliGRAM(s) Oral every 6 hours PRN Temp greater or equal to 38C (100.4F), Mild Pain (1 - 3), Moderate Pain (4 - 6)  aluminum hydroxide/magnesium hydroxide/simethicone Suspension 30 milliLiter(s) Oral every 4 hours PRN Dyspepsia  diazepam    Tablet 5 milliGRAM(s) Oral daily PRN anxiety  polyethylene glycol 3350 17 Gram(s) Oral daily PRN Constipation      Vital Signs Last 24 Hrs  T(C): 35.6 (31 Jul 2021 13:07), Max: 36.1 (30 Jul 2021 20:23)  T(F): 96.1 (31 Jul 2021 13:07), Max: 96.9 (30 Jul 2021 20:23)  HR: 76 (31 Jul 2021 13:07) (70 - 85)  BP: 150/71 (31 Jul 2021 13:07) (127/68 - 150/71)  BP(mean): --  RR: 18 (31 Jul 2021 13:07) (18 - 18)  SpO2: --    PHYSICAL EXAM:    GENERAL: In no apparent distress, well nourished, and hydrated.  HEAD:  Atraumatic, Normocephalic  EYES: EOMI, PERRLA, conjunctiva and sclera clear  NECK: Supple and normal thyroid.  No JVD or carotid bruit.  Carotid pulse is 2+ bilaterally.  HEART: Regular rate and rhythm; No murmurs, rubs, or gallops.  PULMONARY: Clear to auscultation and perfusion.  No rales, wheezing, or rhonchi bilaterally.  ABDOMEN: Soft, Nontender, Nondistended; Bowel sounds present  EXTREMITIES:  2+ Peripheral Pulses, No clubbing, cyanosis, or edema  NEUROLOGICAL: Grossly nonfocal Left sided weakness U+L Ext    I&O's Detail    30 Jul 2021 07:01  -  31 Jul 2021 07:00  --------------------------------------------------------  IN:  Total IN: 0 mL    OUT:    Voided (mL): 75 mL  Total OUT: 75 mL    Total NET: -75 mL      31 Jul 2021 07:01  -  31 Jul 2021 16:02  --------------------------------------------------------  IN:  Total IN: 0 mL    OUT:    Voided (mL): 100 mL  Total OUT: 100 mL    Total NET: -100 mL        Daily     Daily     INTERPRETATION OF TELEMETRY:    ECG:    < from: 12 Lead ECG (07.20.21 @ 11:35) >  Ventricular Rate 71 BPM    Atrial Rate 71 BPM    P-R Interval 152 ms    QRS Duration 96 ms    Q-T Interval 406 ms    QTC Calculation(Bazett) 441 ms    P Axis 43 degrees    R Axis 15 degrees    T Axis 22 degrees    Diagnosis Line Normal sinus rhythm  Minimal voltage criteria for LVH, may be normal variant  Nonspecific ST abnormality  Abnormal ECG    < end of copied text >      LABS:    Comprehensive Metabolic Panel in AM (07.29.21 @ 04:30)   Sodium, Serum: 137 mmol/L   Potassium, Serum: 4.5 mmol/L   Chloride, Serum: 105 mmol/L   Carbon Dioxide, Serum: 21 mmol/L   Anion Gap, Serum: 11 mmol/L   Blood Urea Nitrogen, Serum: 41 mg/dL   Creatinine, Serum: 2.0 mg/dL   Glucose, Serum: 148 mg/dL   Calcium, Total Serum: 9.8 mg/dL   Protein Total, Serum: 7.1 g/dL   Albumin, Serum: 4.6 g/dL   Bilirubin Total, Serum: 0.3 mg/dL   Alkaline Phosphatase, Serum: 41 U/L   Aspartate Aminotransferase (AST/SGOT): 19 U/L   Alanine Aminotransferase (ALT/SGPT): 17 U/L   eGFR if Non African American: 31  Magnesium, Serum: 2.4 mg/dL (07.29.21 @ 04:30)   Complete Blood Count + Automated Diff in AM (07.29.21 @ 04:30)   WBC Count: 10.89 K/uL   RBC Count: 4.72 M/uL   Hemoglobin: 13.6 g/dL   Hematocrit: 43.9 %   Mean Cell Volume: 93.0 fL   Mean Cell Hemoglobin: 28.8 pg   Mean Cell Hemoglobin Conc: 31.0 g/dL   Red Cell Distrib Width: 14.7 %   Platelet Count - Automated: 312 K/uL           BNP            RADIOLOGY & ADDITIONAL STUDIES:    < from: CT Head No Cont (07.24.21 @ 17:14) >  IMPRESSION:  Since July 20, 2021;  1.  No acute/traumatic intracranial pathology.  2.  Microvascular ischemic changes and unchanged probable chronic infarct within the right posterior frontal lobe.    < end of copied text >    < from: MR Head No Cont (07.24.21 @ 18:40) >  FINDINGS:  Acute lacunar infarcts involving the deep white matter of the left frontal lobe at the level of the centrum semiovale.    There is no acute intracranial hemorrhage, mass effect, or hydrocephalus. No extra-axial collection. Basal cisterns are patent.    Age-related involutional changes and chronic microvascular ischemic changes. Chronic lacunar infarcts involving the bilateral corona radiata.    Ventricles and sulci are age-appropriate in size.    Decreased caliber of the distal right internal carotid artery.Short segment stenoses involving the posterior cerebral arteries. Major intracranial flow-voids are otherwise preserved.    Bilateral cataract surgery. The orbits, sellar and suprasellar structures, and craniocervical junction are otherwise unremarkable.    Mucosal thickening involving the maxillary sinuses and right sphenoid sinus. Visualized paranasal sinuses and mastoid air cells are otherwise clear.    IMPRESSION:  Acute lacunar infarcts involving the deep white matter of the left frontal lobe at the level of the centrum semiovale. No acute hemorrhage.    < end of copied text >    < from: MR Angio Head No Cont (07.23.21 @ 16:55) >  IMPRESSION: MRA of the neck is limited by motion. Evaluation of the distal common carotid proximal internal carotid arteries appear grossly unremarkable though better quality study is recommended or CTA of the neck can be done for further evaluation. Evaluation of the proximal external carotid arteries are limited by motion.    MRA of the [place] [facility] demonstrates decreased caliber of the distal right internal carotid artery.    Short segment of stenosis is suspected involving both posterior cerebral arteries as described above.      < end of copied text >

## 2021-08-01 LAB
GLUCOSE BLDC GLUCOMTR-MCNC: 132 MG/DL — HIGH (ref 70–99)
GLUCOSE BLDC GLUCOMTR-MCNC: 135 MG/DL — HIGH (ref 70–99)
SARS-COV-2 RNA SPEC QL NAA+PROBE: SIGNIFICANT CHANGE UP

## 2021-08-01 PROCEDURE — 99223 1ST HOSP IP/OBS HIGH 75: CPT | Mod: 57

## 2021-08-01 RX ADMIN — Medication 145 MILLIGRAM(S): at 12:00

## 2021-08-01 RX ADMIN — Medication 400 MILLIGRAM(S): at 06:18

## 2021-08-01 RX ADMIN — Medication 25 MILLIGRAM(S): at 06:18

## 2021-08-01 RX ADMIN — Medication 25 MILLIGRAM(S): at 17:45

## 2021-08-01 RX ADMIN — MIDODRINE HYDROCHLORIDE 2.5 MILLIGRAM(S): 2.5 TABLET ORAL at 11:57

## 2021-08-01 RX ADMIN — Medication 81 MILLIGRAM(S): at 11:57

## 2021-08-01 RX ADMIN — CLOPIDOGREL BISULFATE 75 MILLIGRAM(S): 75 TABLET, FILM COATED ORAL at 11:57

## 2021-08-01 RX ADMIN — SENNA PLUS 2 TABLET(S): 8.6 TABLET ORAL at 22:32

## 2021-08-01 RX ADMIN — Medication 75 MILLIGRAM(S): at 12:05

## 2021-08-01 RX ADMIN — Medication 400 MILLIGRAM(S): at 22:32

## 2021-08-01 RX ADMIN — HEPARIN SODIUM 5000 UNIT(S): 5000 INJECTION INTRAVENOUS; SUBCUTANEOUS at 13:25

## 2021-08-01 RX ADMIN — ATORVASTATIN CALCIUM 80 MILLIGRAM(S): 80 TABLET, FILM COATED ORAL at 22:32

## 2021-08-01 RX ADMIN — HEPARIN SODIUM 5000 UNIT(S): 5000 INJECTION INTRAVENOUS; SUBCUTANEOUS at 06:18

## 2021-08-01 RX ADMIN — ISOSORBIDE MONONITRATE 60 MILLIGRAM(S): 60 TABLET, EXTENDED RELEASE ORAL at 11:57

## 2021-08-01 RX ADMIN — MIDODRINE HYDROCHLORIDE 2.5 MILLIGRAM(S): 2.5 TABLET ORAL at 06:18

## 2021-08-01 RX ADMIN — GABAPENTIN 100 MILLIGRAM(S): 400 CAPSULE ORAL at 08:50

## 2021-08-01 RX ADMIN — RANOLAZINE 500 MILLIGRAM(S): 500 TABLET, FILM COATED, EXTENDED RELEASE ORAL at 06:18

## 2021-08-01 RX ADMIN — RANOLAZINE 500 MILLIGRAM(S): 500 TABLET, FILM COATED, EXTENDED RELEASE ORAL at 17:45

## 2021-08-01 RX ADMIN — Medication 60 MILLIGRAM(S): at 06:18

## 2021-08-01 RX ADMIN — GABAPENTIN 200 MILLIGRAM(S): 400 CAPSULE ORAL at 22:32

## 2021-08-01 RX ADMIN — HEPARIN SODIUM 5000 UNIT(S): 5000 INJECTION INTRAVENOUS; SUBCUTANEOUS at 22:32

## 2021-08-01 RX ADMIN — Medication 400 MILLIGRAM(S): at 13:25

## 2021-08-01 NOTE — PROGRESS NOTE ADULT - ATTENDING COMMENTS
I reviewed the chart and examined the patient with the resident and we discussed the findings and treatment plan. I agree with the findings and treatment plan above, which I modified as indicated. The patient requires 3 hrs a day of acute inpatient rehab.    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 Rehab of acute lacunar infarcts in the deep white matter of the left frontal lobe at the level fo the centrum semiovale and chronic left sided weakness from CVA of 2017    # Rehab of chronic left hemiparesis, (dominant) and dysarthria and dysphagia with decline in function and falls, found to have an acute left subcortical infarct. He states that his dysarthria is worse.    MRI Acute lacunar infarcts involving the deep white matter of the left frontal lobe at the level of the centrum semiovale. No acute hemorrhage.  He requires 3 hours a day of at least PT and OT with speech therapy and neuropsych to eval. He also requires a hospital based rehab setting as he has been having symptomatic orthostatic hypotension and SAMANTA on CKD with adjustment in his meds. He requires close monitoring by rehab nursing and physiatry to monitor his BPs and check for orthostasis and adjust his medications while participating in rehab.    - Neurology was consulted and no intervention was recommended; no intervention at this time. Patient can Follow up with neurology at the clinic, as per conversation with neuro PA. Was already on DAPT and high dose statin.  -Neuro F/U recommending ASA and Plavix assay and Echol  - Patient requires PT/OT/SLT/Neuropsych eval an acute rehab program to return to previous level of function.  -Tolerating therapy well. Transfers and ambulates with RW with min assistance.    #Dizziness and fall 2/2 orthostatic hypotension - now controlled  - continue with HI stockings and abdominal binder  - continue midodrine 2x a day  - fall precautions   - home medications of flomax, tizanidine were held by medicine team prior to rehab admission; valium was changed from 10 mg standing to 5 mg PRN by medicine per neurology recommendations     # LE pain likely secondary to PAD - Asymptomatic at this time.  - Significant atherosclerotic disease noted in bilateral common femoral arteries  - Patient's family and vascular team agreed to not proceed with intervention at this time     # CAD s/p CABG 2009  - 6/2021 :patent SALAZAR to LAD , patent SVG to OM , % , filled distally by collaterals from LAD.  - Continue DAPT ( Aspirin 81 mg daily and Plavix 75 mg daily ), Imdur, Ranexa, B-Blocker, Statin Therapy  - Cardio f/u OP     #HTN, patient had hypertensive urgency   - losartan 100mg held for SAMANTA   - c/w nifedipine 60mg xL q24h     #HLD  - continue with atorvastatin and fenofibrate     #SAMANTA on CKD vs progression of CKD   - Cr 1.3 in 2017, 1.5 in 2019, 1.7 in June; Current Cr trend 2.1 -> 1.9-- > 2.1 --> 1.8  - Renal bladder US showed simple cysts within both kidneys without hydronephrosis or nephrolithiasis   - Patient retaining on bladder scan so harrison placed, still in place; attempt trial of void in am  - Monitor Cr, IVF as needed     # Urinary Retention  - Flomax had to be stopped secondary to symptomatic orthostatic hypotension.   - Will give TOV today. Patient reportedly not able to tolerate CIC on medical floor    #DM type 2  - Hold home meds, glucose wnl   - Start Insulin regimen for FS >180    # Depression   - venlafaxine     # Anxiety   - Valium PRN as above    # Maintenance   - Pain control: none   - GI/Bowel Mgmt: miralax ,senna   - Bladder management: Harrison in place for urinary retention. TOV soon as above  - Skin: No active issues at this time  - FEN: monitor electrolytes, replete as needed   - Diet: Diet, DASH/TLC:   Sodium & Cholesterol Restricted  Consistent Carbohydrate {No Snacks} (07-28-21 @ 15:46) [Active]    # Precautions / PROPHYLAXIS:    - Fall precaution   - Ortho: Weight bearing status: WBAT  - DVT prophylaxis: SC heparin. I reviewed the chart and examined the patient with the resident and we discussed the findings and treatment plan. I agree with the findings and treatment plan above, which I modified as indicated. The patient requires 3 hrs a day of acute inpatient rehab.    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 Rehab of acute lacunar infarcts in the deep white matter of the left frontal lobe at the level fo the centrum semiovale and chronic left sided weakness from CVA of 2017    # Rehab of chronic left hemiparesis, (dominant) and dysarthria and dysphagia with decline in function and falls, found to have an acute left subcortical infarct. He states that his dysarthria is worse.    MRI Acute lacunar infarcts involving the deep white matter of the left frontal lobe at the level of the centrum semiovale. No acute hemorrhage.  He requires 3 hours a day of at least PT and OT with speech therapy and neuropsych to eval. He also requires a hospital based rehab setting as he has been having symptomatic orthostatic hypotension and SAMANTA on CKD with adjustment in his meds. He requires close monitoring by rehab nursing and physiatry to monitor his BPs and check for orthostasis and adjust his medications while participating in rehab.    - Neurology was consulted and no intervention was recommended; no intervention at this time. Patient can Follow up with neurology at the clinic, as per conversation with neuro PA. Was already on DAPT and high dose statin.  -Neuro F/U recommending ASA and Plavix assay and Echol  - Patient requires PT/OT/SLT/Neuropsych eval an acute rehab program to return to previous level of function.  -Tolerating therapy well. Transfers and ambulates with RW with min assistance.    #Dizziness and fall 2/2 orthostatic hypotension - now controlled  - continue with HI stockings and abdominal binder  - continue midodrine 2x a day  - fall precautions   - home medications of flomax, tizanidine were held by medicine team prior to rehab admission; valium was changed from 10 mg standing to 5 mg PRN by medicine per neurology recommendations     # LE pain likely secondary to PAD - Asymptomatic at this time.  - Significant atherosclerotic disease noted in bilateral common femoral arteries  - Patient's family and vascular team agreed to not proceed with intervention at this time     # CAD s/p CABG 2009  - 6/2021 :patent SALAZAR to LAD , patent SVG to OM , % , filled distally by collaterals from LAD.  - Continue DAPT ( Aspirin 81 mg daily and Plavix 75 mg daily ), Imdur, Ranexa, B-Blocker, Statin Therapy  - Cardio f/u OP   - F/u results of ASA and Plavix resistance assays    #HTN, patient had hypertensive urgency   - losartan 100mg held for SAMANTA   - c/w nifedipine 60mg xL q24h     #HLD  - continue with atorvastatin and fenofibrate     #SAMANTA on CKD vs progression of CKD   - Cr 1.3 in 2017, 1.5 in 2019, 1.7 in June; Current Cr trend 2.1 -> 1.9-- > 2.1 --> 1.8  - Renal bladder US showed simple cysts within both kidneys without hydronephrosis or nephrolithiasis   - Monitor Cr, IVF as needed     # Urinary Retention  - Flomax had to be stopped secondary to symptomatic orthostatic hypotension.   - attempted TOV, patient failed and having minimal spontaneous void. currently on bladder scan q6h with straight cath as needed. May consider restarting Flomax if orthostasis is controlled    #DM type 2  - Hold home meds, glucose wnl   - Start Insulin regimen for FS >180    # Depression   - venlafaxine     # Anxiety   - Valium PRN as above    # Maintenance   - Pain control: none   - GI/Bowel Mgmt: miralax ,senna   - Bladder management: Abbott in place for urinary retention. TOV soon as above  - Skin: No active issues at this time  - FEN: monitor electrolytes, replete as needed   - Diet: Diet, DASH/TLC:   Sodium & Cholesterol Restricted  Consistent Carbohydrate {No Snacks} (07-28-21 @ 15:46) [Active]    # Precautions / PROPHYLAXIS:    - Fall precaution   - Ortho: Weight bearing status: WBAT  - DVT prophylaxis: SC heparin.

## 2021-08-01 NOTE — PROGRESS NOTE ADULT - SUBJECTIVE AND OBJECTIVE BOX
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 Patient says he has been feeling intermittently dizzy over three days  despite having adequate/normal PO intake. Pt stated that these episodes of dizziness occurs after getting up from a sitting or supine position. Patient fell at home after feeling dizzy hitting the back of his head, but with no LOC. Pt was not on AC prior to admission. Pt also got out of his car, felt dizzy and then fell straight down hitting his buttocks and R knee. Denies head trauma. Patient was admitted on 21 to internal medicine for falls and dizziness 2/2 orthostatic hypotension.   CT spine 21 showed no evidence of acute cervical spine fracture or subluxation. Multilevel severe degenerative changes as described, with severe spinal noted stenosis at C2-3 and C3-4 and multilevel severe neural foraminal stenosis.  CTAP 21 showed no definite evidence of acute traumatic injury within the chest, abdomen, or pelvis.   CTH 21 showed no evidence of acute intracranial hemorrhage. Probable chronic infarct within the right posterior frontal white matter. Lacunar infarcts within bilateral white matter and deep gray nuclei of indeterminate age. Mild/moderate chronic microvascular changes.  Chest X-Ray negative for pneumothorax, infiltrate, or effusion. XR Pelvis negative for fx's or dislocations.    Patient's hospital course was complicated by lower extremity claudication. Bilateral lower extremity ultrasounds were performed on 21, which revealed severely diminished flow noted in the right and left superficial femoral artery suggestive of a proximal superficial femoral or common femoral artery stenosis. Significant atherosclerotic disease noted in the bilateral common femoral arteries. Vascular surgery was consulted for LLE Angiogram with possible endovascular revascularization; cardiac clearance was obtained but the team in conjunction with the family had decided to not go through with intervention.     Patient's orthostatic hypotension improved with midodrine, compression stocking, abdominal binder.     MRA 21 showed the following: MRA of the neck is limited by motion. Evaluation of the distal common carotid proximal internal carotid arteries appear grossly unremarkable though better quality study is recommended or CTA of the neck can be done for further evaluation. Evaluation of the proximal external carotid arteries are limited by motion. MRA of the [place] Rodriguez demonstrates decreased caliber of the distal right internal carotid artery. Short segment of stenosis is suspected involving both posterior cerebral arteries.    CTH 21 showed the following: No acute/traumatic intracranial pathology. Microvascular ischemic changes and unchanged probable chronic infarct within the right posterior frontal lobe.  MRI Head 21 showed acute lacunar infarcts involving the deep white matter of the left frontal lobe at the level of the centrum semiovale. No acute hemorrhage.  Neurology followed the patient and did not recommend intervention; patient is to continue aspirin and plavix.    Patient's prior level of function included independence with ADLs and independence with ambulation with a RW.   Patient's level of function on admission is min assist with bed mobility and min assist with transfers, patient is able to ambulate 4 small steps with rolling walker min assist.     Patient was evaluated by Dr. Ingram, a physical medicine and rehabilitation specialist and was found to be an excellent candidate for acute rehabilitation. Patient would benefit from 3 hours of interdisciplinary therapy per day.     Patient was admitted to rehab for acute lacunar infarcts in the deep white matter of the left frontal lobe at the level of the centrum semiovale and chronic left sided weakness from CVA of  with a significant decline in his baseline function of independent with a straight cane.      TODAY'S SUBJECTIVE & REVIEW OF SYMPTOMS:  Patient was seen this morning with Dr. Ingram. Patient is doing well. Patient failed trial of void so is currently on bladder scan q8h with straight catheterization as needed. Patient is pending loop recorder placement tomorrow       Constiutional:    [ x ] WNL           [   ] poor appetite   [   ] insomnia   [   ] tired   Cardio:                [ x ] WNL           [   ] CP   [   ] PRADO   [   ] palpitations               Resp:                   [ x ] WNL           [   ] SOB   [   ] cough   [   ] wheezing   GI:                        [ x ] WNL           [   ] constipation   [   ] diarrhea   [   ] abdominal pain   [   ] nausea   [   ] emesis                                :                      [ x ] WNL           [   ] HOGUE  [   ] dysuria   [   ] difficulty voiding             Endo:                   [ x ] WNL          [   ] polyuria   [   ] temperature intolerance                 Skin:                     [ x ] WNL          [   ] pain   [   ] wound   [   ] rash   MSK:                    [ x ] WNL          [   ] muscle pain   [   ] joint pain/ stiffness   [   ] muscle tenderness   [   ] swelling   Neuro:                 [  ] WNL           [ x ]  as per HPI            Cognitive:           [ x ] WNL           [   ]confusion      Psych:                  [ x ] WNL           [   ] hallucinations   [   ]agitation   [   ] delusion   [   ]depression      PHYSICAL EXAM    Vital Signs Last 24 Hrs  T(C): 35.4 (2021 20:32), Max: 35.6 (2021 13:07)  T(F): 95.8 (2021 20:32), Max: 96.1 (2021 13:07)  HR: 87 (2021 20:32) (76 - 88)  BP: 137/85 (2021 20:32) (137/85 - 152/83)  BP(mean): --  RR: 18 (2021 20:32) (18 - 18)  SpO2: --    Constitutional - [ x ] NAD, Comfortable        [   ] other:  Chest - [ x ] CTA     [   ] other:  Cardiovascular - [ x ] RRR, no murmer     [   ] other:  Abdomen - [ x ] Soft, NT/ND      [   ] other:        -  [   ] NO HOGUE CATHETER   [ x ] YES  if yes: [ x ] NO MEATAL TEAR OR DISCHARGE [   ] other:  Extremities - [ x ] No C/C/E, No calf tenderness       [   ] other:  ROM - [ x ] WFL     [   ] other:  Neurologic Exam -                 Cognitive - [ x ]Awake, Alert, AAO to self, place, date, year, situation         [    ] other:      Communication - [   ]Fluent, No dysarthria       [ x ] other: mildly dysarthric      Motor - No focal deficits                    Right UE -  [ x ] WNL      [    ] other:                    Left UE -     [   ] WNL      [ x ] other: L arm shoulder flexion 3-/5, L hand grasp 3+/5                    Right LE -   [ x ] WNL       [    ] other:                    Left LE -      [   ]WNL       [ x ] other: L arm shoulder flexion 3-/5, L hand grasp 3+/5     Sensory - [   ] Intact to LT      [ x ] other: L sided hemisensory loss      Reflexes - [ x ] wnl/ symmetric     [   ] other:     Psychiatric - [ x ]Mood stable, Affect WNL     [   ]other:     Skin - [ x ] intact      [   ] other    MEDICATIONS  (STANDING):  aspirin  chewable 81 milliGRAM(s) Oral daily  atorvastatin 80 milliGRAM(s) Oral at bedtime  clopidogrel Tablet 75 milliGRAM(s) Oral daily  fenofibrate Tablet 145 milliGRAM(s) Oral daily  gabapentin 200 milliGRAM(s) Oral at bedtime  gabapentin 100 milliGRAM(s) Oral <User Schedule>  heparin   Injectable 5000 Unit(s) SubCutaneous every 8 hours  isosorbide   mononitrate ER Tablet (IMDUR) 60 milliGRAM(s) Oral daily  metoprolol succinate ER 25 milliGRAM(s) Oral every 12 hours  midodrine. 2.5 milliGRAM(s) Oral <User Schedule>  NIFEdipine XL 60 milliGRAM(s) Oral daily  pentoxifylline 400 milliGRAM(s) Oral three times a day  ranolazine 500 milliGRAM(s) Oral two times a day  senna 2 Tablet(s) Oral at bedtime  venlafaxine XR. 75 milliGRAM(s) Oral daily    MEDICATIONS  (PRN):  acetaminophen   Tablet .. 975 milliGRAM(s) Oral every 6 hours PRN Temp greater or equal to 38C (100.4F), Mild Pain (1 - 3), Moderate Pain (4 - 6)  aluminum hydroxide/magnesium hydroxide/simethicone Suspension 30 milliLiter(s) Oral every 4 hours PRN Dyspepsia  diazepam    Tablet 5 milliGRAM(s) Oral daily PRN anxiety  polyethylene glycol 3350 17 Gram(s) Oral daily PRN Constipation      RECENT LABS/IMAGING                        12.7   7.68  )-----------( 265      ( 2021 07:42 )             40.7         139  |  108  |  46<H>  ----------------------------<  120<H>  4.9   |  22  |  1.8<H>    Ca    9.3      2021 07:42  Mg     2.5         TPro  6.2  /  Alb  4.0  /  TBili  0.3  /  DBili  x   /  AST  16  /  ALT  15  /  AlkPhos  34        Urinalysis Basic - ( 2021 16:26 )    Color: Yellow / Appearance: Clear / S.023 / pH: x  Gluc: x / Ketone: Negative  / Bili: Negative / Urobili: <2 mg/dL   Blood: x / Protein: 300 mg/dL / Nitrite: Negative   Leuk Esterase: Negative / RBC: 90 /HPF / WBC 4 /HPF   Sq Epi: x / Non Sq Epi: 1 /HPF / Bacteria: Negative         Patient is a 75y old  Male who presents with a chief complaint of     HPI:  76 yo M with PMH of CAD s/p CABG x3 2017 ([place]), 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 Patient says he has been feeling intermittently dizzy over three days  despite having adequate/normal PO intake. Pt stated that these episodes of dizziness occurs after getting up from a sitting or supine position. Patient fell at home after feeling dizzy hitting the back of his head, but with no LOC. Pt was not on AC prior to admission. Pt also got out of his car, felt dizzy and then fell straight down hitting his buttocks and R knee. Denies head trauma. Patient was admitted on 21 to internal medicine for falls and dizziness 2/2 orthostatic hypotension.   CT spine 21 showed no evidence of acute cervical spine fracture or subluxation. Multilevel severe degenerative changes as described, with severe spinal noted stenosis at C2-3 and C3-4 and multilevel severe neural foraminal stenosis.  CTAP 21 showed no definite evidence of acute traumatic injury within the chest, abdomen, or pelvis.   CTH 21 showed no evidence of acute intracranial hemorrhage. Probable chronic infarct within the right posterior frontal white matter. Lacunar infarcts within bilateral white matter and deep gray nuclei of indeterminate age. Mild/moderate chronic microvascular changes.  Chest X-Ray negative for pneumothorax, infiltrate, or effusion. XR Pelvis negative for fx's or dislocations.    Patient's hospital course was complicated by lower extremity claudication. Bilateral lower extremity ultrasounds were performed on 21, which revealed severely diminished flow noted in the right and left superficial femoral artery suggestive of a proximal superficial femoral or common femoral artery stenosis. Significant atherosclerotic disease noted in the bilateral common femoral arteries. Vascular surgery was consulted for LLE Angiogram with possible endovascular revascularization; cardiac clearance was obtained but the team in conjunction with the family had decided to not go through with intervention.     Patient's orthostatic hypotension improved with midodrine, compression stocking, abdominal binder.     MRA 21 showed the following: MRA of the neck is limited by motion. Evaluation of the distal common carotid proximal internal carotid arteries appear grossly unremarkable though better quality study is recommended or CTA of the neck can be done for further evaluation. Evaluation of the proximal external carotid arteries are limited by motion. MRA of the [place] Rodriguez demonstrates decreased caliber of the distal right internal carotid artery. Short segment of stenosis is suspected involving both posterior cerebral arteries.    CTH 21 showed the following: No acute/traumatic intracranial pathology. Microvascular ischemic changes and unchanged probable chronic infarct within the right posterior frontal lobe.  MRI Head 21 showed acute lacunar infarcts involving the deep white matter of the left frontal lobe at the level of the centrum semiovale. No acute hemorrhage.  Neurology followed the patient and did not recommend intervention; patient is to continue aspirin and plavix.    Patient's prior level of function included independence with ADLs and independence with ambulation with a RW.   Patient's level of function on admission is min assist with bed mobility and min assist with transfers, patient is able to ambulate 4 small steps with rolling walker min assist.     Patient was evaluated by Dr. Ingram, a physical medicine and rehabilitation specialist and was found to be an excellent candidate for acute rehabilitation. Patient would benefit from 3 hours of interdisciplinary therapy per day.     Patient was admitted to rehab for acute lacunar infarcts in the deep white matter of the left frontal lobe at the level of the centrum semiovale and chronic left sided weakness from CVA of 2017 with a significant decline in his baseline function of independent with a straight cane.      TODAY'S SUBJECTIVE & REVIEW OF SYMPTOMS:  Patient was seen this morning with Dr. Ingram. Patient is doing well. He has had minimal spontaneous void and has been requiring CIC q 6hrs. Patient is pending loop recorder placement tomorrow       Constiutional:    [ x ] WNL           [   ] poor appetite   [   ] insomnia   [   ] tired   Cardio:                [ x ] WNL           [   ] CP   [   ] PRADO   [   ] palpitations               Resp:                   [ x ] WNL           [   ] SOB   [   ] cough   [   ] wheezing   GI:                        [ x ] WNL           [   ] constipation   [   ] diarrhea   [   ] abdominal pain   [   ] nausea   [   ] emesis                                :                      [ x ] WNL           [   ] HOGUE  [   ] dysuria   [   ] difficulty voiding             Endo:                   [ x ] WNL          [   ] polyuria   [   ] temperature intolerance                 Skin:                     [ x ] WNL          [   ] pain   [   ] wound   [   ] rash   MSK:                    [ x ] WNL          [   ] muscle pain   [   ] joint pain/ stiffness   [   ] muscle tenderness   [   ] swelling   Neuro:                 [  ] WNL           [ x ]  as per HPI            Cognitive:           [ x ] WNL           [   ]confusion      Psych:                  [ x ] WNL           [   ] hallucinations   [   ]agitation   [   ] delusion   [   ]depression      PHYSICAL EXAM    Vital Signs Last 24 Hrs  T(C): 35.6 (01 Aug 2021 13:30), Max: 35.6 (01 Aug 2021 13:30)  T(F): 96.1 (01 Aug 2021 13:30), Max: 96.1 (01 Aug 2021 13:30)  HR: 91 (01 Aug 2021 13:) (87 - 91)  BP: 155/80 (01 Aug 2021 13:30) (137/85 - 155/80)  BP(mean): --  RR: 18 (01 Aug 2021 13:30) (18 - 18)  SpO2: --    Constitutional - [ x ] NAD, Comfortable        [   ] other:  Chest - [ x ] CTA     [   ] other:  Cardiovascular - [ x ] RRR, no murmer     [   ] other:  Abdomen - [ x ] Soft, NT/ND      [   ] other:        -  [ x  ] NO HOGUE CATHETER   [  ] YES  if yes: [  ] NO MEATAL TEAR OR DISCHARGE [   ] other:  Extremities - [ x ] No C/C/E, No calf tenderness       [   ] other:  ROM - [ x ] WFL     [   ] other:  Neurologic Exam -                 Cognitive - [ x ]Awake, Alert, AAO to self, place, date, year, situation         [    ] other:      Communication - [   ]Fluent, No dysarthria       [ x ] other: mildly dysarthric      Motor - No focal deficits                    Right UE -  [ x ] WNL      [    ] other:                    Left UE -     [   ] WNL      [ x ] other: L arm shoulder flexion 3-/5, L hand grasp 3+/5                    Right LE -   [ x ] WNL       [    ] other:                    Left LE -      [   ]WNL       [ x ] other: L arm shoulder flexion 3-/5, L hand grasp 3+/5     Sensory - [   ] Intact to LT      [ x ] other: L sided hemisensory loss      Reflexes - [ x ] wnl/ symmetric     [   ] other:     Psychiatric - [ x ]Mood stable, Affect WNL     [   ]other:     Skin - [ x ] intact      [   ] other    MEDICATIONS  (STANDING):  aspirin  chewable 81 milliGRAM(s) Oral daily  atorvastatin 80 milliGRAM(s) Oral at bedtime  clopidogrel Tablet 75 milliGRAM(s) Oral daily  fenofibrate Tablet 145 milliGRAM(s) Oral daily  gabapentin 200 milliGRAM(s) Oral at bedtime  gabapentin 100 milliGRAM(s) Oral <User Schedule>  heparin   Injectable 5000 Unit(s) SubCutaneous every 8 hours  isosorbide   mononitrate ER Tablet (IMDUR) 60 milliGRAM(s) Oral daily  metoprolol succinate ER 25 milliGRAM(s) Oral every 12 hours  midodrine. 2.5 milliGRAM(s) Oral <User Schedule>  NIFEdipine XL 60 milliGRAM(s) Oral daily  pentoxifylline 400 milliGRAM(s) Oral three times a day  ranolazine 500 milliGRAM(s) Oral two times a day  senna 2 Tablet(s) Oral at bedtime  venlafaxine XR. 75 milliGRAM(s) Oral daily    MEDICATIONS  (PRN):  acetaminophen   Tablet .. 975 milliGRAM(s) Oral every 6 hours PRN Temp greater or equal to 38C (100.4F), Mild Pain (1 - 3), Moderate Pain (4 - 6)  aluminum hydroxide/magnesium hydroxide/simethicone Suspension 30 milliLiter(s) Oral every 4 hours PRN Dyspepsia  diazepam    Tablet 5 milliGRAM(s) Oral daily PRN anxiety  polyethylene glycol 3350 17 Gram(s) Oral daily PRN Constipation      RECENT LABS/IMAGING                        12.7   7.68  )-----------( 265      ( 2021 07:42 )             40.7         139  |  108  |  46<H>  ----------------------------<  120<H>  4.9   |  22  |  1.8<H>    Ca    9.3      2021 07:42  Mg     2.5         TPro  6.2  /  Alb  4.0  /  TBili  0.3  /  DBili  x   /  AST  16  /  ALT  15  /  AlkPhos  34        Urinalysis Basic - ( 2021 16:26 )    Color: Yellow / Appearance: Clear / S.023 / pH: x  Gluc: x / Ketone: Negative  / Bili: Negative / Urobili: <2 mg/dL   Blood: x / Protein: 300 mg/dL / Nitrite: Negative   Leuk Esterase: Negative / RBC: 90 /HPF / WBC 4 /HPF   Sq Epi: x / Non Sq Epi: 1 /HPF / Bacteria: Negative

## 2021-08-01 NOTE — PROGRESS NOTE ADULT - ASSESSMENT
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 Rehab of acute lacunar infarcts in the deep white matter of the left frontal lobe at the level fo the centrum semiovale and chronic left sided weakness from CVA of 2017      # Rehab of chronic left hemiparisis (dominant), and dysarthria and dysphagia with decline in function and falls, found to have an acute left subcortical infarct.   - MRI Acute lacunar infarcts involving the deep white matter of the left frontal lobe at the level of the centrum semiovale. No acute hemorrhage.  - Neurology was consulted and no intervention was recommended; no intervention at this time. Patient can Follow up with neurology at the clinic, as per conversation with neuro PA. Was already on DAPT and high dose statin.  - Patient requires PT/OT/SLT/Neuropsych eval   - Full rehab eval in progress  - Neurology recommended an ECHO, still pending; Loop recorder placement due 8/2/21     # Dizziness and fall 2/2 orthostatic hypotension - now controlled  - continue with HI stockings and abdominal binder  - continue midodrine 2x a day  - fall precautions   - home medications of flomax, tizanidine were held by medicine team prior to rehab admission; valium was changed from 10 mg standing to 5 mg PRN by medicine per neurology recommendations     # LE pain likely secondary to PAD   - Significant atherosclerotic disease noted in bilateral common femoral arteries  - Patient's family and vascular team agreed to not proceed with intervention at this time     # CAD s/p CABG 2009  - 6/2021 :patent SALAZAR to LAD , patent SVG to OM , % , filled distally by collaterals from LAD.  - Continue DAPT ( Aspirin 81 mg daily and Plavix 75 mg daily ), ARB, Imdur, Ranexa, B-Blocker, Statin Therapy  - Cardio f/u OP     # HTN, patient had hypertensive urgency  - now well controlled  - losartan 100mg discontinued for SAMANTA   - c/w nifedipine 60mg xL q24h     #HLD  - continue with atorvastatin and fenofibrate     # SAMANTA on CKD vs progression of CKD   # Urinary retention  - Cr 1.3 in 2017, 1.5 in 2019, 1.7 in June; Current Cr trend 2.1 -> 1.9-- > 2.1 --> 1.8  - Renal bladder US showed simple cysts within both kidneys without hydronephrosis or nephrolithiasis   - Patient retaining on bladder scan so harrison placed, still in place; attempt trial of void   - Monitor Cr, IVF as needed     # DM type 2  - Hold home meds, glucose wnl   - Start Insulin regimen for FS >180    # Depression   - venlafaxine     # Anxiety   - Valium PRN as above    # Maintenance   - Pain control: none   - GI/Bowel Mgmt: miralax ,senna   - Bladder management: Harrison in place for urinary retention   - Skin: No active issues at this time  - FEN: monitor electrolytes, replete as needed   - Diet: Diet, DASH/TLC:   Sodium & Cholesterol Restricted  Consistent Carbohydrate No Snacks (07-28-21 @ 15:46) [Active]    # Precautions / PROPHYLAXIS:    - Fall precaution   - Ortho: Weight bearing status: WBAT  - DVT prophylaxis: SC heparin        76 yo M with PMH of CAD s/p CABG x3 2017 (Yessi), 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 Rehab of acute lacunar infarcts in the deep white matter of the left frontal lobe at the level fo the centrum semiovale and chronic left sided weakness from CVA of 2017      # Rehab of chronic left hemiparisis (dominant), and dysarthria and dysphagia with decline in function and falls, found to have an acute left subcortical infarct.   - MRI Acute lacunar infarcts involving the deep white matter of the left frontal lobe at the level of the centrum semiovale. No acute hemorrhage.  - Neurology was consulted and no intervention was recommended; no intervention at this time. Patient can Follow up with neurology at the clinic, as per conversation with neuro PA. Was already on DAPT and high dose statin.  - Patient requires PT/OT/SLT/Neuropsych eval   - Full rehab eval in progress  - Neurology recommended an ECHO, still pending; Loop recorder placement due 8/2/21     # Dizziness and fall 2/2 orthostatic hypotension - now controlled  - continue with HI stockings and abdominal binder  - continue midodrine 2x a day  - fall precautions   - home medications of flomax, tizanidine were held by medicine team prior to rehab admission; valium was changed from 10 mg standing to 5 mg PRN by medicine per neurology recommendations     # LE pain likely secondary to PAD   - Significant atherosclerotic disease noted in bilateral common femoral arteries  - Patient's family and vascular team agreed to not proceed with intervention at this time     # CAD s/p CABG 2009  - 6/2021 :patent SALAZAR to LAD , patent SVG to OM , % , filled distally by collaterals from LAD.  - Continue DAPT ( Aspirin 81 mg daily and Plavix 75 mg daily ), ARB, Imdur, Ranexa, B-Blocker, Statin Therapy  - Cardio f/u OP     # HTN, patient had hypertensive urgency  - now well controlled  - losartan 100mg discontinued for SAMANTA   - c/w nifedipine 60mg xL q24h     #HLD  - continue with atorvastatin and fenofibrate     # SAMANTA on CKD vs progression of CKD   # Urinary retention  - Cr 1.3 in 2017, 1.5 in 2019, 1.7 in June; Current Cr trend 2.1 -> 1.9-- > 2.1 --> 1.8  - Renal bladder US showed simple cysts within both kidneys without hydronephrosis or nephrolithiasis   - Monitor Cr, IVF as needed   - attempted TOV, patient failed; currently on bladder scan q8h with straight cath as needed    # DM type 2  - Hold home meds, glucose wnl   - Start Insulin regimen for FS >180    # Depression   - venlafaxine     # Anxiety   - Valium PRN as above    # Maintenance   - Pain control: none   - GI/Bowel Mgmt: miralax ,senna   - Bladder management: Abbott in place for urinary retention   - Skin: No active issues at this time  - FEN: monitor electrolytes, replete as needed   - Diet: Diet, DASH/TLC:   Sodium & Cholesterol Restricted  Consistent Carbohydrate No Snacks (07-28-21 @ 15:46) [Active]    # Precautions / PROPHYLAXIS:    - Fall precaution   - Ortho: Weight bearing status: WBAT  - DVT prophylaxis: SC heparin        76 yo M with PMH of CAD s/p CABG x3 2017 (Yessi), 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 Rehab of acute lacunar infarcts in the deep white matter of the left frontal lobe at the level fo the centrum semiovale and chronic left sided weakness from CVA of 2017      # Rehab of chronic left hemiparisis (dominant), and dysarthria and dysphagia with decline in function and falls, found to have an acute left subcortical infarct.   - MRI Acute lacunar infarcts involving the deep white matter of the left frontal lobe at the level of the centrum semiovale. No acute hemorrhage.  - Neurology was consulted and no intervention was recommended; no intervention at this time. Patient can Follow up with neurology at the clinic, as per conversation with neuro PA. Was already on DAPT and high dose statin.  - Patient requires PT/OT/SLT/Neuropsych eval   - Full rehab eval in progress  - Neurology recommended an ECHO, still pending; Loop recorder placement due 8/2/21     # Dizziness and fall 2/2 orthostatic hypotension - now controlled  - continue with HI stockings and abdominal binder  - continue midodrine 2x a day  - fall precautions   - home medications of flomax, tizanidine were held by medicine team prior to rehab admission; valium was changed from 10 mg standing to 5 mg PRN by medicine per neurology recommendations     # LE pain likely secondary to PAD   - Significant atherosclerotic disease noted in bilateral common femoral arteries  - Patient's family and vascular team agreed to not proceed with intervention at this time     # CAD s/p CABG 2009  - 6/2021 :patent SALAZAR to LAD , patent SVG to OM , % , filled distally by collaterals from LAD.  - Continue DAPT ( Aspirin 81 mg daily and Plavix 75 mg daily ), ARB, Imdur, Ranexa, B-Blocker, Statin Therapy  - Cardio f/u OP     # HTN, patient had hypertensive urgency  - now well controlled  - losartan 100mg discontinued for SAMANTA   - c/w nifedipine 60mg xL q24h     #HLD  - continue with atorvastatin and fenofibrate     # SAMANTA on CKD vs progression of CKD   # Urinary retention  - Cr 1.3 in 2017, 1.5 in 2019, 1.7 in June; Current Cr trend 2.1 -> 1.9-- > 2.1 --> 1.8  - Renal bladder US showed simple cysts within both kidneys without hydronephrosis or nephrolithiasis   - Monitor Cr, IVF as needed   - attempted TOV, patient failed and having minimal spontaneous void. currently on bladder scan q6h with straight cath as needed. May consider restarting Flomax if orthostasis is controlled    # DM type 2  - Hold home meds, glucose wnl   - Start Insulin regimen for FS >180    # Depression   - venlafaxine     # Anxiety   - Valium PRN as above    # Maintenance   - Pain control: none   - GI/Bowel Mgmt: miralax ,senna   - Bladder management: Abbott in place for urinary retention   - Skin: No active issues at this time  - FEN: monitor electrolytes, replete as needed   - Diet: Diet, DASH/TLC:   Sodium & Cholesterol Restricted  Consistent Carbohydrate No Snacks (07-28-21 @ 15:46) [Active]    # Precautions / PROPHYLAXIS:    - Fall precaution   - Ortho: Weight bearing status: WBAT  - DVT prophylaxis: SC heparin

## 2021-08-02 LAB
ALBUMIN SERPL ELPH-MCNC: 4.7 G/DL — SIGNIFICANT CHANGE UP (ref 3.5–5.2)
ALP SERPL-CCNC: 41 U/L — SIGNIFICANT CHANGE UP (ref 30–115)
ALT FLD-CCNC: 16 U/L — SIGNIFICANT CHANGE UP (ref 0–41)
ANION GAP SERPL CALC-SCNC: 15 MMOL/L — HIGH (ref 7–14)
AST SERPL-CCNC: 19 U/L — SIGNIFICANT CHANGE UP (ref 0–41)
BASOPHILS # BLD AUTO: 0.07 K/UL — SIGNIFICANT CHANGE UP (ref 0–0.2)
BASOPHILS NFR BLD AUTO: 0.7 % — SIGNIFICANT CHANGE UP (ref 0–1)
BILIRUB SERPL-MCNC: 0.4 MG/DL — SIGNIFICANT CHANGE UP (ref 0.2–1.2)
BUN SERPL-MCNC: 38 MG/DL — HIGH (ref 10–20)
CALCIUM SERPL-MCNC: 10.1 MG/DL — SIGNIFICANT CHANGE UP (ref 8.5–10.1)
CHLORIDE SERPL-SCNC: 103 MMOL/L — SIGNIFICANT CHANGE UP (ref 98–110)
CO2 SERPL-SCNC: 22 MMOL/L — SIGNIFICANT CHANGE UP (ref 17–32)
CREAT SERPL-MCNC: 1.9 MG/DL — HIGH (ref 0.7–1.5)
EOSINOPHIL # BLD AUTO: 0.24 K/UL — SIGNIFICANT CHANGE UP (ref 0–0.7)
EOSINOPHIL NFR BLD AUTO: 2.5 % — SIGNIFICANT CHANGE UP (ref 0–8)
GLUCOSE BLDC GLUCOMTR-MCNC: 105 MG/DL — HIGH (ref 70–99)
GLUCOSE BLDC GLUCOMTR-MCNC: 121 MG/DL — HIGH (ref 70–99)
GLUCOSE BLDC GLUCOMTR-MCNC: 127 MG/DL — HIGH (ref 70–99)
GLUCOSE BLDC GLUCOMTR-MCNC: 189 MG/DL — HIGH (ref 70–99)
GLUCOSE SERPL-MCNC: 124 MG/DL — HIGH (ref 70–99)
HCT VFR BLD CALC: 43.5 % — SIGNIFICANT CHANGE UP (ref 42–52)
HGB BLD-MCNC: 13.8 G/DL — LOW (ref 14–18)
IMM GRANULOCYTES NFR BLD AUTO: 0.5 % — HIGH (ref 0.1–0.3)
LYMPHOCYTES # BLD AUTO: 2.1 K/UL — SIGNIFICANT CHANGE UP (ref 1.2–3.4)
LYMPHOCYTES # BLD AUTO: 21.9 % — SIGNIFICANT CHANGE UP (ref 20.5–51.1)
MAGNESIUM SERPL-MCNC: 2.3 MG/DL — SIGNIFICANT CHANGE UP (ref 1.8–2.4)
MCHC RBC-ENTMCNC: 29 PG — SIGNIFICANT CHANGE UP (ref 27–31)
MCHC RBC-ENTMCNC: 31.7 G/DL — LOW (ref 32–37)
MCV RBC AUTO: 91.4 FL — SIGNIFICANT CHANGE UP (ref 80–94)
MONOCYTES # BLD AUTO: 0.63 K/UL — HIGH (ref 0.1–0.6)
MONOCYTES NFR BLD AUTO: 6.6 % — SIGNIFICANT CHANGE UP (ref 1.7–9.3)
NEUTROPHILS # BLD AUTO: 6.49 K/UL — SIGNIFICANT CHANGE UP (ref 1.4–6.5)
NEUTROPHILS NFR BLD AUTO: 67.8 % — SIGNIFICANT CHANGE UP (ref 42.2–75.2)
NRBC # BLD: 0 /100 WBCS — SIGNIFICANT CHANGE UP (ref 0–0)
PLATELET # BLD AUTO: 316 K/UL — SIGNIFICANT CHANGE UP (ref 130–400)
POTASSIUM SERPL-MCNC: 4.4 MMOL/L — SIGNIFICANT CHANGE UP (ref 3.5–5)
POTASSIUM SERPL-SCNC: 4.4 MMOL/L — SIGNIFICANT CHANGE UP (ref 3.5–5)
PROT SERPL-MCNC: 7.3 G/DL — SIGNIFICANT CHANGE UP (ref 6–8)
RBC # BLD: 4.76 M/UL — SIGNIFICANT CHANGE UP (ref 4.7–6.1)
RBC # FLD: 14.6 % — HIGH (ref 11.5–14.5)
SODIUM SERPL-SCNC: 140 MMOL/L — SIGNIFICANT CHANGE UP (ref 135–146)
T3 SERPL-MCNC: 101 NG/DL — SIGNIFICANT CHANGE UP (ref 80–200)
T4 AB SER-ACNC: 8.6 UG/DL — SIGNIFICANT CHANGE UP (ref 4.6–12)
TSH SERPL-MCNC: 2.56 UIU/ML — SIGNIFICANT CHANGE UP (ref 0.27–4.2)
VIT B12 SERPL-MCNC: 436 PG/ML — SIGNIFICANT CHANGE UP (ref 232–1245)
WBC # BLD: 9.58 K/UL — SIGNIFICANT CHANGE UP (ref 4.8–10.8)
WBC # FLD AUTO: 9.58 K/UL — SIGNIFICANT CHANGE UP (ref 4.8–10.8)

## 2021-08-02 RX ORDER — ALFUZOSIN HYDROCHLORIDE 10 MG/1
10 TABLET, EXTENDED RELEASE ORAL
Refills: 0 | Status: DISCONTINUED | OUTPATIENT
Start: 2021-08-03 | End: 2021-08-05

## 2021-08-02 RX ADMIN — HEPARIN SODIUM 5000 UNIT(S): 5000 INJECTION INTRAVENOUS; SUBCUTANEOUS at 05:59

## 2021-08-02 RX ADMIN — Medication 400 MILLIGRAM(S): at 06:00

## 2021-08-02 RX ADMIN — Medication 400 MILLIGRAM(S): at 21:20

## 2021-08-02 RX ADMIN — Medication 81 MILLIGRAM(S): at 12:05

## 2021-08-02 RX ADMIN — HEPARIN SODIUM 5000 UNIT(S): 5000 INJECTION INTRAVENOUS; SUBCUTANEOUS at 13:22

## 2021-08-02 RX ADMIN — SENNA PLUS 2 TABLET(S): 8.6 TABLET ORAL at 21:20

## 2021-08-02 RX ADMIN — CLOPIDOGREL BISULFATE 75 MILLIGRAM(S): 75 TABLET, FILM COATED ORAL at 12:06

## 2021-08-02 RX ADMIN — GABAPENTIN 100 MILLIGRAM(S): 400 CAPSULE ORAL at 08:08

## 2021-08-02 RX ADMIN — ISOSORBIDE MONONITRATE 60 MILLIGRAM(S): 60 TABLET, EXTENDED RELEASE ORAL at 12:10

## 2021-08-02 RX ADMIN — RANOLAZINE 500 MILLIGRAM(S): 500 TABLET, FILM COATED, EXTENDED RELEASE ORAL at 06:00

## 2021-08-02 RX ADMIN — GABAPENTIN 200 MILLIGRAM(S): 400 CAPSULE ORAL at 21:20

## 2021-08-02 RX ADMIN — Medication 75 MILLIGRAM(S): at 12:06

## 2021-08-02 RX ADMIN — HEPARIN SODIUM 5000 UNIT(S): 5000 INJECTION INTRAVENOUS; SUBCUTANEOUS at 21:20

## 2021-08-02 RX ADMIN — Medication 145 MILLIGRAM(S): at 12:06

## 2021-08-02 RX ADMIN — Medication 25 MILLIGRAM(S): at 05:59

## 2021-08-02 RX ADMIN — RANOLAZINE 500 MILLIGRAM(S): 500 TABLET, FILM COATED, EXTENDED RELEASE ORAL at 17:11

## 2021-08-02 RX ADMIN — MIDODRINE HYDROCHLORIDE 2.5 MILLIGRAM(S): 2.5 TABLET ORAL at 05:59

## 2021-08-02 RX ADMIN — ATORVASTATIN CALCIUM 80 MILLIGRAM(S): 80 TABLET, FILM COATED ORAL at 21:20

## 2021-08-02 RX ADMIN — Medication 60 MILLIGRAM(S): at 05:59

## 2021-08-02 RX ADMIN — Medication 25 MILLIGRAM(S): at 17:12

## 2021-08-02 RX ADMIN — Medication 400 MILLIGRAM(S): at 13:22

## 2021-08-02 NOTE — PROGRESS NOTE ADULT - SUBJECTIVE AND OBJECTIVE BOX
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 Patient says he has been feeling intermittently dizzy over three days  despite having adequate/normal PO intake. Pt stated that these episodes of dizziness occurs after getting up from a sitting or supine position. Patient fell at home after feeling dizzy hitting the back of his head, but with no LOC. Pt was not on AC prior to admission. Pt also got out of his car, felt dizzy and then fell straight down hitting his buttocks and R knee. Denies head trauma. Patient was admitted on 7/20/21 to internal medicine for falls and dizziness 2/2 orthostatic hypotension.   CT spine 7/20/21 showed no evidence of acute cervical spine fracture or subluxation. Multilevel severe degenerative changes as described, with severe spinal noted stenosis at C2-3 and C3-4 and multilevel severe neural foraminal stenosis.  CTAP 7/20/21 showed no definite evidence of acute traumatic injury within the chest, abdomen, or pelvis.   CTH 7/21/21 showed no evidence of acute intracranial hemorrhage. Probable chronic infarct within the right posterior frontal white matter. Lacunar infarcts within bilateral white matter and deep gray nuclei of indeterminate age. Mild/moderate chronic microvascular changes.  Chest X-Ray negative for pneumothorax, infiltrate, or effusion. XR Pelvis negative for fx's or dislocations.    Patient's hospital course was complicated by lower extremity claudication. Bilateral lower extremity ultrasounds were performed on 7/23/21, which revealed severely diminished flow noted in the right and left superficial femoral artery suggestive of a proximal superficial femoral or common femoral artery stenosis. Significant atherosclerotic disease noted in the bilateral common femoral arteries. Vascular surgery was consulted for LLE Angiogram with possible endovascular revascularization; cardiac clearance was obtained but the team in conjunction with the family had decided to not go through with intervention.     Patient's orthostatic hypotension improved with midodrine, compression stocking, abdominal binder.     MRA 7/23/21 showed the following: MRA of the neck is limited by motion. Evaluation of the distal common carotid proximal internal carotid arteries appear grossly unremarkable though better quality study is recommended or CTA of the neck can be done for further evaluation. Evaluation of the proximal external carotid arteries are limited by motion. MRA of the [place] demonstrates decreased caliber of the distal right internal carotid artery. Short segment of stenosis is suspected involving both posterior cerebral arteries.    CTH 7/24/21 showed the following: No acute/traumatic intracranial pathology. Microvascular ischemic changes and unchanged probable chronic infarct within the right posterior frontal lobe.  MRI Head 7/24/21 showed acute lacunar infarcts involving the deep white matter of the left frontal lobe at the level of the centrum semiovale. No acute hemorrhage.  Neurology followed the patient and did not recommend intervention; patient is to continue aspirin and plavix.    Patient's prior level of function included independence with ADLs and independence with ambulation with a RW.   Patient's level of function on admission is min assist with bed mobility and min assist with transfers, patient is able to ambulate 4 small steps with rolling walker min assist.     Patient was evaluated by Dr. Ingram, a physical medicine and rehabilitation specialist and was found to be an excellent candidate for acute rehabilitation. Patient would benefit from 3 hours of interdisciplinary therapy per day.     Patient was admitted to rehab for acute lacunar infarcts in the deep white matter of the left frontal lobe at the level of the centrum semiovale and chronic left sided weakness from CVA of 2017 with a significant decline in his baseline function of independent with a straight cane.      TODAY'S SUBJECTIVE & REVIEW OF SYMPTOMS:  Patient is doing well. He has had minimal spontaneous void and has been requiring CIC q 6hrs. Patient is pending loop recorder placement        Constiutional:    [ x ] WNL           [   ] poor appetite   [   ] insomnia   [   ] tired   Cardio:                [ x ] WNL           [   ] CP   [   ] PRADO   [   ] palpitations               Resp:                   [ x ] WNL           [   ] SOB   [   ] cough   [   ] wheezing   GI:                        [ x ] WNL           [   ] constipation   [   ] diarrhea   [   ] abdominal pain   [   ] nausea   [   ] emesis                                :                      [ x ] WNL           [   ] HOGUE  [   ] dysuria   [   ] difficulty voiding             Endo:                   [ x ] WNL          [   ] polyuria   [   ] temperature intolerance                 Skin:                     [ x ] WNL          [   ] pain   [   ] wound   [   ] rash   MSK:                    [ x ] WNL          [   ] muscle pain   [   ] joint pain/ stiffness   [   ] muscle tenderness   [   ] swelling   Neuro:                 [  ] WNL           [ x ]  as per HPI            Cognitive:           [ x ] WNL           [   ]confusion      Psych:                  [ x ] WNL           [   ] hallucinations   [   ]agitation   [   ] delusion   [   ]depression      PHYSICAL EXAM    Vital Signs Last 24 Hrs  T(C): 35.6 (01 Aug 2021 21:06), Max: 35.6 (01 Aug 2021 13:30)  T(F): 96 (01 Aug 2021 21:06), Max: 96.1 (01 Aug 2021 13:30)  HR: 82 (01 Aug 2021 21:06) (82 - 91)  BP: 134/65 (01 Aug 2021 21:06) (134/65 - 155/80)  BP(mean): --  RR: 18 (01 Aug 2021 21:06) (18 - 18)  SpO2: --    Constitutional - [ x ] NAD, Comfortable        [   ] other:  Chest - [ x ] CTA     [   ] other:  Cardiovascular - [ x ] RRR, no murmer     [   ] other:  Abdomen - [ x ] Soft, NT/ND      [   ] other:        -  [ x  ] NO HOGUE CATHETER   [  ] YES  if yes: [  ] NO MEATAL TEAR OR DISCHARGE [   ] other:  Extremities - [ x ] No C/C/E, No calf tenderness       [   ] other:  ROM - [ x ] WFL     [   ] other:  Neurologic Exam -                 Cognitive - [ x ]Awake, Alert, AAO to self, place, date, year, situation         [    ] other:      Communication - [   ]Fluent, No dysarthria       [ x ] other: mildly dysarthric      Motor - No focal deficits                    Right UE -  [ x ] WNL      [    ] other:                    Left UE -     [   ] WNL      [ x ] other: L arm shoulder flexion 3-/5, L hand grasp 3+/5                    Right LE -   [ x ] WNL       [    ] other:                    Left LE -      [   ]WNL       [ x ] other: L arm shoulder flexion 3-/5,  3+/5 distally     Sensory - [   ] Intact to LT      [ x ] other: L sided hemisensory loss      Reflexes - [ x ] wnl/ symmetric     [   ] other:     Psychiatric - [ x ]Mood stable, Affect WNL     [   ]other:     Skin - [ x ] intact      [   ] other    MEDICATIONS  (STANDING):  aspirin  chewable 81 milliGRAM(s) Oral daily  atorvastatin 80 milliGRAM(s) Oral at bedtime  clopidogrel Tablet 75 milliGRAM(s) Oral daily  fenofibrate Tablet 145 milliGRAM(s) Oral daily  gabapentin 200 milliGRAM(s) Oral at bedtime  gabapentin 100 milliGRAM(s) Oral <User Schedule>  heparin   Injectable 5000 Unit(s) SubCutaneous every 8 hours  isosorbide   mononitrate ER Tablet (IMDUR) 60 milliGRAM(s) Oral daily  metoprolol succinate ER 25 milliGRAM(s) Oral every 12 hours  midodrine. 2.5 milliGRAM(s) Oral <User Schedule>  NIFEdipine XL 60 milliGRAM(s) Oral daily  pentoxifylline 400 milliGRAM(s) Oral three times a day  ranolazine 500 milliGRAM(s) Oral two times a day  senna 2 Tablet(s) Oral at bedtime  venlafaxine XR. 75 milliGRAM(s) Oral daily    MEDICATIONS  (PRN):  acetaminophen   Tablet .. 975 milliGRAM(s) Oral every 6 hours PRN Temp greater or equal to 38C (100.4F), Mild Pain (1 - 3), Moderate Pain (4 - 6)  aluminum hydroxide/magnesium hydroxide/simethicone Suspension 30 milliLiter(s) Oral every 4 hours PRN Dyspepsia  diazepam    Tablet 5 milliGRAM(s) Oral daily PRN anxiety  polyethylene glycol 3350 17 Gram(s) Oral daily PRN Constipation      RECENT LABS/IMAGING                                   13.8   9.58  )-----------( 316      ( 02 Aug 2021 07:19 )             43.5     08-02    140  |  103  |  38<H>  ----------------------------<  124<H>  4.4   |  22  |  1.9<H>      Ca    10.1      02 Aug 2021 07:19  Mg     2.3     08-02    TPro  7.3  /  Alb  4.7  /  TBili  0.4  /  DBili  x   /  AST  19  /  ALT  16  /  AlkPhos  41  08-02        POCT Blood Glucose.: 121 mg/dL (08-02-21 @ 07:24)  POCT Blood Glucose.: 135 mg/dL (08-01-21 @ 12:00)  POCT Blood Glucose.: 132 mg/dL (08-01-21 @ 07:33)  POCT Blood Glucose.: 146 mg/dL (07-31-21 @ 16:08)  POCT Blood Glucose.: 133 mg/dL (07-31-21 @ 11:56)  POCT Blood Glucose.: 147 mg/dL (07-31-21 @ 08:00)  POCT Blood Glucose.: 143 mg/dL (07-30-21 @ 21:05)  POCT Blood Glucose.: 105 mg/dL (07-30-21 @ 16:05)  POCT Blood Glucose.: 145 mg/dL (07-30-21 @ 12:07)  POCT Blood Glucose.: 149 mg/dL (07-30-21 @ 07:13)  POCT Blood Glucose.: 148 mg/dL (07-29-21 @ 21:24)  POCT Blood Glucose.: 129 mg/dL (07-29-21 @ 16:31)

## 2021-08-02 NOTE — PROGRESS NOTE ADULT - ASSESSMENT
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 Rehab of acute lacunar infarcts in the deep white matter of the left frontal lobe at the level fo the centrum semiovale and chronic left sided weakness from CVA of 2017      # Rehab of chronic left hemiparisis (dominant), and dysarthria and dysphagia with decline in function and falls, found to have an acute left subcortical infarct.   - MRI Acute lacunar infarcts involving the deep white matter of the left frontal lobe at the level of the centrum semiovale. No acute hemorrhage.  - Neurology was consulted and no intervention was recommended; no intervention at this time. Patient can Follow up with neurology at the clinic, as per conversation with neuro PA. Was already on DAPT and high dose statin.  - Patient requires PT/OT/SLT/Neuropsych eval   - Ambulates with min assist w/ device  - Neurology recommended an ECHO, still pending; Loop recorder placement due 8/2/21     # Dizziness and fall 2/2 orthostatic hypotension - now controlled  - continue with HI stockings and abdominal binder  - continue midodrine 2x a day  - fall precautions   - home medications of flomax, tizanidine were held by medicine team prior to rehab admission; valium was changed from 10 mg standing to 5 mg PRN by medicine per neurology recommendations     # LE pain likely secondary to PAD   - Significant atherosclerotic disease noted in bilateral common femoral arteries  - Patient's family and vascular team agreed to not proceed with intervention at this time     # CAD s/p CABG 2009  - 6/2021 :patent SALAZAR to LAD , patent SVG to OM , % , filled distally by collaterals from LAD.  - Continue DAPT ( Aspirin 81 mg daily and Plavix 75 mg daily ), ARB, Imdur, Ranexa, B-Blocker, Statin Therapy  - Cardio f/u OP     # HTN, patient had hypertensive urgency  - now well controlled  - losartan 100mg discontinued for SAMANTA   - c/w nifedipine 60mg xL q24h     #HLD  - continue with atorvastatin and fenofibrate     # SAMANTA on CKD vs progression of CKD   # Urinary retention  - Cr 1.3 in 2017, 1.5 in 2019, 1.7 in June; Current Cr trend 2.1 -> 1.9-- > 2.1 --> 1.8--> 1,9  - Renal bladder US showed simple cysts within both kidneys without hydronephrosis or nephrolithiasis   - Monitor Cr, IVF as needed   - attempted TOV, patient failed and having minimal spontaneous void. currently on bladder scan q6h with straight cath as needed. May consider restarting Flomax if orthostasis is controlled    # DM type 2  - Hold home meds, glucose wnl   - Start Insulin regimen for FS >180    # Depression   - venlafaxine     # Anxiety   - Valium PRN as above    # Maintenance   - Pain control: none   - GI/Bowel Mgmt: miralax ,senna   - Bladder management: Abbott in place for urinary retention   - Skin: No active issues at this time  - FEN: monitor electrolytes, replete as needed   - Diet: Diet, DASH/TLC:   Sodium & Cholesterol Restricted  Consistent Carbohydrate No Snacks (07-28-21 @ 15:46) [Active]      # Precautions / PROPHYLAXIS:    - Fall precaution   - Ortho: Weight bearing status: WBAT  - DVT prophylaxis: SC heparin

## 2021-08-03 LAB
GLUCOSE BLDC GLUCOMTR-MCNC: 113 MG/DL — HIGH (ref 70–99)
GLUCOSE BLDC GLUCOMTR-MCNC: 126 MG/DL — HIGH (ref 70–99)
GLUCOSE BLDC GLUCOMTR-MCNC: 130 MG/DL — HIGH (ref 70–99)
GLUCOSE BLDC GLUCOMTR-MCNC: 132 MG/DL — HIGH (ref 70–99)

## 2021-08-03 RX ORDER — DIAZEPAM 5 MG
5 TABLET ORAL DAILY
Refills: 0 | Status: DISCONTINUED | OUTPATIENT
Start: 2021-08-03 | End: 2021-08-03

## 2021-08-03 RX ORDER — SODIUM CHLORIDE 9 MG/ML
1000 INJECTION, SOLUTION INTRAVENOUS
Refills: 0 | Status: DISCONTINUED | OUTPATIENT
Start: 2021-08-03 | End: 2021-08-03

## 2021-08-03 RX ADMIN — RANOLAZINE 500 MILLIGRAM(S): 500 TABLET, FILM COATED, EXTENDED RELEASE ORAL at 06:03

## 2021-08-03 RX ADMIN — Medication 81 MILLIGRAM(S): at 12:38

## 2021-08-03 RX ADMIN — Medication 25 MILLIGRAM(S): at 06:03

## 2021-08-03 RX ADMIN — RANOLAZINE 500 MILLIGRAM(S): 500 TABLET, FILM COATED, EXTENDED RELEASE ORAL at 17:10

## 2021-08-03 RX ADMIN — ATORVASTATIN CALCIUM 80 MILLIGRAM(S): 80 TABLET, FILM COATED ORAL at 21:38

## 2021-08-03 RX ADMIN — Medication 25 MILLIGRAM(S): at 17:10

## 2021-08-03 RX ADMIN — Medication 400 MILLIGRAM(S): at 14:31

## 2021-08-03 RX ADMIN — HEPARIN SODIUM 5000 UNIT(S): 5000 INJECTION INTRAVENOUS; SUBCUTANEOUS at 21:36

## 2021-08-03 RX ADMIN — HEPARIN SODIUM 5000 UNIT(S): 5000 INJECTION INTRAVENOUS; SUBCUTANEOUS at 14:30

## 2021-08-03 RX ADMIN — GABAPENTIN 100 MILLIGRAM(S): 400 CAPSULE ORAL at 07:57

## 2021-08-03 RX ADMIN — GABAPENTIN 200 MILLIGRAM(S): 400 CAPSULE ORAL at 21:38

## 2021-08-03 RX ADMIN — ISOSORBIDE MONONITRATE 60 MILLIGRAM(S): 60 TABLET, EXTENDED RELEASE ORAL at 12:39

## 2021-08-03 RX ADMIN — MIDODRINE HYDROCHLORIDE 2.5 MILLIGRAM(S): 2.5 TABLET ORAL at 07:57

## 2021-08-03 RX ADMIN — Medication 400 MILLIGRAM(S): at 06:03

## 2021-08-03 RX ADMIN — HEPARIN SODIUM 5000 UNIT(S): 5000 INJECTION INTRAVENOUS; SUBCUTANEOUS at 06:02

## 2021-08-03 RX ADMIN — CLOPIDOGREL BISULFATE 75 MILLIGRAM(S): 75 TABLET, FILM COATED ORAL at 12:38

## 2021-08-03 RX ADMIN — Medication 400 MILLIGRAM(S): at 21:38

## 2021-08-03 RX ADMIN — Medication 145 MILLIGRAM(S): at 12:38

## 2021-08-03 RX ADMIN — Medication 75 MILLIGRAM(S): at 12:39

## 2021-08-03 NOTE — PROGRESS NOTE ADULT - ASSESSMENT
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 Rehab of acute lacunar infarcts in the deep white matter of the left frontal lobe at the level fo the centrum semiovale and chronic left sided weakness from CVA of 2017.      # Rehab of chronic left hemiparisis (dominant), and dysarthria and dysphagia with decline in function and falls, found to have an acute left subcortical infarct.   - MRI Acute lacunar infarcts involving the deep white matter of the left frontal lobe at the level of the centrum semiovale. No acute hemorrhage.  - Neurology was consulted and no intervention was recommended; no intervention at this time. Patient can Follow up with neurology at the clinic, as per conversation with neuro PA. Was already on DAPT and high dose statin.  - Patient requires PT/OT/SLT/Neuropsych eval   - Ambulates with min assist w/ device  - Neurology recommended an ECHO, still pending; Loop recorder placement due today 8/3/21     # Dizziness and fall 2/2 orthostatic hypotension - now controlled  - continue with HI stockings and abdominal binder  - continue midodrine 2x a day  - fall precautions   - home medications of flomax, tizanidine were held by medicine team prior to rehab admission; valium was changed from 10 mg standing to 5 mg PRN by medicine per neurology recommendations     # LE pain likely secondary to PAD   - Significant atherosclerotic disease noted in bilateral common femoral arteries  - Patient's family and vascular team agreed to not proceed with intervention at this time     # CAD s/p CABG 2009  - 6/2021 :patent SALAZAR to [facility] , patent SVG to OM , % , filled distally by collaterals from [facility].  - Continue DAPT ( Aspirin 81 mg daily and Plavix 75 mg daily ), ARB, Imdur, Ranexa, B-Blocker, Statin Therapy  - Cardio f/u OP     # HTN, patient had hypertensive urgency  - now well controlled  - losartan 100mg discontinued for SAMANTA   - c/w nifedipine 60mg xL q24h     #HLD  - continue with atorvastatin and fenofibrate     # SAMANTA on CKD vs progression of CKD   # Urinary retention  - Cr 1.3 in 2017, 1.5 in 2019, 1.7 in June; Current Cr trend 2.1 -> 1.9-- > 2.1 --> 1.8--> 1,9   - Renal bladder US showed simple cysts within both kidneys without hydronephrosis or nephrolithiasis   - Monitor Cr, IVF as needed   - attempted TOV, patient failed and having minimal spontaneous void. Previously on bladder scan q6h with straight cath as needed. Changed to straight cath standing q8h. Considering trial of Uraxatral for urinary retention. Use of Flomax questionable due to orthostasis.     # DM type 2  - Hold home meds, glucose wnl   - Start Insulin regimen for FS >180    # Depression   - venlafaxine     # Anxiety   - Valium PRN as above    # Maintenance   - Pain control: none   - GI/Bowel Mgmt: miralax ,senna   - Bladder management: Straight cath q8h for urinary retention   - Skin: No active issues at this time  - FEN: monitor electrolytes, replete as needed   - Diet: Diet, DASH/TLC:   Sodium & Cholesterol Restricted  Consistent Carbohydrate No Snacks (07-28-21 @ 15:46) [Active]      # Precautions / PROPHYLAXIS:    - Fall precaution   - Ortho: Weight bearing status: WBAT  - DVT prophylaxis: SC heparin        76 yo M with PMH of CAD s/p CABG x3 2017 ([facility]), 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 Rehab of acute lacunar infarcts in the deep white matter of the left frontal lobe at the level fo the centrum semiovale and chronic left sided weakness from CVA of 2017.      # Rehab of chronic left hemiparisis (dominant), and dysarthria and dysphagia with decline in function and falls, found to have an acute left subcortical infarct.   - MRI Acute lacunar infarcts involving the deep white matter of the left frontal lobe at the level of the centrum semiovale. No acute hemorrhage.  - Neurology was consulted and no intervention was recommended; no intervention at this time. Patient can Follow up with neurology at the clinic, as per conversation with neuro PA. Was already on DAPT and high dose statin.  - Patient requires PT/OT/SLT/Neuropsych eval   - Ambulates with min assist w/ device  - Loop recorder placement due today 8/3/21     # Dizziness and fall 2/2 orthostatic hypotension - now controlled  - continue with HI stockings and abdominal binder  - continue midodrine 2x a day  - fall precautions   - home medications of flomax, tizanidine were held by medicine team prior to rehab admission; valium was changed from 10 mg standing to 5 mg PRN by medicine per neurology recommendations   - controlled    # LE pain likely secondary to PAD   - Significant atherosclerotic disease noted in bilateral common femoral arteries  - Patient's family and vascular team agreed to not proceed with intervention at this time     # CAD s/p CABG 2009  - 6/2021 :patent SALAZAR to LAD , patent SVG to OM , % , filled distally by collaterals from [facility].  - Continue DAPT ( Aspirin 81 mg daily and Plavix 75 mg daily ), ARB, Imdur, Ranexa, B-Blocker, Statin Therapy  - Cardio f/u OP     # HTN, patient had hypertensive urgency  - now well controlled  - losartan 100mg discontinued for SAMANTA   - c/w nifedipine 60mg xL q24h     #HLD  - continue with atorvastatin and fenofibrate     # CKD 3  # Urinary retention  - Cr 1.3 in 2017, 1.5 in 2019, 1.7 in June; Current Cr trend 2.1 -> 1.9-- > 2.1 --> 1.8--> 1,9.   - Renal bladder US showed simple cysts within both kidneys without hydronephrosis or nephrolithiasis   - Monitor Cr, IVF as needed   - Has been stable  - attempted TOV, patient failed, but now having increses spontaneous void. Previously on bladder scan q6h with straight cath as needed. Changed to straight cath standing q8h. Considering trial of Uraxatral for urinary retention. Use of Flomax questionable due to orthostasis.     # DM type 2  - fair control on carb consistent diet alone.   -D/C fingersticks    # Depression   - venlafaxine     # Anxiety   - Valium PRN as above    # Maintenance   - Pain control: none   - GI/Bowel Mgmt: miralax ,senna   - Bladder management: Straight cath q8h for urinary retention   - Skin: No active issues at this time  - FEN: monitor electrolytes, replete as needed   - Diet: Diet, DASH/TLC:   Sodium & Cholesterol Restricted  Consistent Carbohydrate No Snacks (07-28-21 @ 15:46) [Active]      # Precautions / PROPHYLAXIS:    - Fall precaution   - Ortho: Weight bearing status: WBAT  - DVT prophylaxis: SC heparin

## 2021-08-03 NOTE — PROGRESS NOTE ADULT - SUBJECTIVE AND OBJECTIVE BOX
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 Patient says he has been feeling intermittently dizzy over three days  despite having adequate/normal PO intake. Pt stated that these episodes of dizziness occurs after getting up from a sitting or supine position. Patient fell at home after feeling dizzy hitting the back of his head, but with no LOC. Pt was not on AC prior to admission. Pt also got out of his car, felt dizzy and then fell straight down hitting his buttocks and R knee. Denies head trauma. Patient was admitted on 7/20/21 to internal medicine for falls and dizziness 2/2 orthostatic hypotension.   CT spine 7/20/21 showed no evidence of acute cervical spine fracture or subluxation. Multilevel severe degenerative changes as described, with severe spinal noted stenosis at C2-3 and C3-4 and multilevel severe neural foraminal stenosis.  CTAP 7/20/21 showed no definite evidence of acute traumatic injury within the chest, abdomen, or pelvis.   CTH 7/21/21 showed no evidence of acute intracranial hemorrhage. Probable chronic infarct within the right posterior frontal white matter. Lacunar infarcts within bilateral white matter and deep gray nuclei of indeterminate age. Mild/moderate chronic microvascular changes.  Chest X-Ray negative for pneumothorax, infiltrate, or effusion. XR Pelvis negative for fx's or dislocations.    Patient's hospital course was complicated by lower extremity claudication. Bilateral lower extremity ultrasounds were performed on 7/23/21, which revealed severely diminished flow noted in the right and left superficial femoral artery suggestive of a proximal superficial femoral or common femoral artery stenosis. Significant atherosclerotic disease noted in the bilateral common femoral arteries. Vascular surgery was consulted for LLE Angiogram with possible endovascular revascularization; cardiac clearance was obtained but the team in conjunction with the family had decided to not go through with intervention.     Patient's orthostatic hypotension improved with midodrine, compression stocking, abdominal binder.     MRA 7/23/21 showed the following: MRA of the neck is limited by motion. Evaluation of the distal common carotid proximal internal carotid arteries appear grossly unremarkable though better quality study is recommended or CTA of the neck can be done for further evaluation. Evaluation of the proximal external carotid arteries are limited by motion. MRA of the [place] demonstrates decreased caliber of the distal right internal carotid artery. Short segment of stenosis is suspected involving both posterior cerebral arteries.    CTH 7/24/21 showed the following: No acute/traumatic intracranial pathology. Microvascular ischemic changes and unchanged probable chronic infarct within the right posterior frontal lobe.  MRI Head 7/24/21 showed acute lacunar infarcts involving the deep white matter of the left frontal lobe at the level of the centrum semiovale. No acute hemorrhage.  Neurology followed the patient and did not recommend intervention; patient is to continue aspirin and plavix.    Patient's prior level of function included independence with ADLs and independence with ambulation with a RW.   Patient's level of function on admission is min assist with bed mobility and min assist with transfers, patient is able to ambulate 4 small steps with rolling walker min assist.     Patient was evaluated by Dr. Ingram, a physical medicine and rehabilitation specialist and was found to be an excellent candidate for acute rehabilitation. Patient would benefit from 3 hours of interdisciplinary therapy per day.     Patient was admitted to rehab for acute lacunar infarcts in the deep white matter of the left frontal lobe at the level of the centrum semiovale and chronic left sided weakness from CVA of 2017 with a significant decline in his baseline function of independent with a straight cane.      TODAY'S SUBJECTIVE & REVIEW OF SYMPTOMS:  Patient is doing well. Still having urinary retention. He has had minimal spontaneous void and has been requiring CIC q8hrs. Patient is pending loop recorder placement.        Constiutional:    [ x ] WNL           [   ] poor appetite   [   ] insomnia   [   ] tired   Cardio:                [ x ] WNL           [   ] CP   [   ] PRADO   [   ] palpitations               Resp:                   [ x ] WNL           [   ] SOB   [   ] cough   [   ] wheezing   GI:                        [ x ] WNL           [   ] constipation   [   ] diarrhea   [   ] abdominal pain   [   ] nausea   [   ] emesis                                :                      [ x ] WNL           [   ] HOGUE  [   ] dysuria   [   ] difficulty voiding             Endo:                   [ x ] WNL          [   ] polyuria   [   ] temperature intolerance                 Skin:                     [ x ] WNL          [   ] pain   [   ] wound   [   ] rash   MSK:                    [ x ] WNL          [   ] muscle pain   [   ] joint pain/ stiffness   [   ] muscle tenderness   [   ] swelling   Neuro:                 [  ] WNL           [ x ]  as per HPI            Cognitive:           [ x ] WNL           [   ]confusion      Psych:                  [ x ] WNL           [   ] hallucinations   [   ]agitation   [   ] delusion   [   ]depression      PHYSICAL EXAM    Vital Signs Last 24 Hrs  T(C): 35.7 (03 Aug 2021 05:47), Max: 36.2 (02 Aug 2021 12:53)  T(F): 96.2 (03 Aug 2021 05:47), Max: 97.1 (02 Aug 2021 12:53)  HR: 72 (03 Aug 2021 05:47) (72 - 86)  BP: 123/67 (03 Aug 2021 05:47) (123/67 - 150/68)  BP(mean): --  RR: 18 (03 Aug 2021 05:47) (18 - 18)  SpO2: --    Constitutional - [ x ] NAD, Comfortable        [   ] other:  Chest - [ x ] CTA     [   ] other:  Cardiovascular - [ x ] RRR, no murmer     [   ] other:  Abdomen - [ x ] Soft, NT/ND      [   ] other:        -  [ x  ] NO HOGUE CATHETER   [  ] YES  if yes: [  ] NO MEATAL TEAR OR DISCHARGE [   ] other:  Extremities - [ x ] No C/C/E, No calf tenderness       [   ] other:  ROM - [ x ] WFL     [   ] other:  Neurologic Exam -                 Cognitive - [ x ]Awake, Alert, AAO to self, place, date, year, situation         [    ] other:      Communication - [   ]Fluent, No dysarthria       [ x ] other: mildly dysarthric      Motor - No focal deficits                    Right UE -  [ x ] WNL      [    ] other:                    Left UE -     [   ] WNL      [ x ] other: L arm shoulder flexion 3-/5, L hand grasp 3+/5                    Right LE -   [ x ] WNL       [    ] other:                    Left LE -      [   ]WNL       [ x ] other: L arm shoulder flexion 3-/5,  3+/5 distally     Sensory - [   ] Intact to LT      [ x ] other: L sided hemisensory loss      Reflexes - [ x ] wnl/ symmetric     [   ] other:     Psychiatric - [ x ]Mood stable, Affect WNL     [   ]other:     Skin - [ x ] intact      [   ] other    MEDICATIONS  (STANDING):  alfuzosin 10 milliGRAM(s) Oral <User Schedule>  aspirin  chewable 81 milliGRAM(s) Oral daily  atorvastatin 80 milliGRAM(s) Oral at bedtime  clopidogrel Tablet 75 milliGRAM(s) Oral daily  fenofibrate Tablet 145 milliGRAM(s) Oral daily  gabapentin 200 milliGRAM(s) Oral at bedtime  gabapentin 100 milliGRAM(s) Oral <User Schedule>  heparin   Injectable 5000 Unit(s) SubCutaneous every 8 hours  isosorbide   mononitrate ER Tablet (IMDUR) 60 milliGRAM(s) Oral daily  metoprolol succinate ER 25 milliGRAM(s) Oral every 12 hours  midodrine. 2.5 milliGRAM(s) Oral <User Schedule>  NIFEdipine XL 60 milliGRAM(s) Oral daily  pentoxifylline 400 milliGRAM(s) Oral three times a day  ranolazine 500 milliGRAM(s) Oral two times a day  senna 2 Tablet(s) Oral at bedtime  venlafaxine XR. 75 milliGRAM(s) Oral daily    MEDICATIONS  (PRN):  acetaminophen   Tablet .. 975 milliGRAM(s) Oral every 6 hours PRN Temp greater or equal to 38C (100.4F), Mild Pain (1 - 3), Moderate Pain (4 - 6)  aluminum hydroxide/magnesium hydroxide/simethicone Suspension 30 milliLiter(s) Oral every 4 hours PRN Dyspepsia  diazepam    Tablet 5 milliGRAM(s) Oral daily PRN anxiety  polyethylene glycol 3350 17 Gram(s) Oral daily PRN Constipation      LABS / IMAGING                        13.8   9.58  )-----------( 316      ( 02 Aug 2021 07:19 )             43.5     08-02    140  |  103  |  38<H>  ----------------------------<  124<H>  4.4   |  22  |  1.9<H>    Ca    10.1      02 Aug 2021 07:19  Mg     2.3     08-02    TPro  7.3  /  Alb  4.7  /  TBili  0.4  /  DBili  x   /  AST  19  /  ALT  16  /  AlkPhos  41  08-02      POCT Blood Glucose.: 132 mg/dL (08-03-21 @ 12:03)  POCT Blood Glucose.: 130 mg/dL (08-03-21 @ 07:31)  POCT Blood Glucose.: 189 mg/dL (08-02-21 @ 21:17)  POCT Blood Glucose.: 127 mg/dL (08-02-21 @ 15:56)  POCT Blood Glucose.: 105 mg/dL (08-02-21 @ 11:35)  POCT Blood Glucose.: 121 mg/dL (08-02-21 @ 07:24)  POCT Blood Glucose.: 135 mg/dL (08-01-21 @ 12:00)  POCT Blood Glucose.: 132 mg/dL (08-01-21 @ 07:33)  POCT Blood Glucose.: 146 mg/dL (07-31-21 @ 16:08)  POCT Blood Glucose.: 133 mg/dL (07-31-21 @ 11:56)  POCT Blood Glucose.: 147 mg/dL (07-31-21 @ 08:00)  POCT Blood Glucose.: 143 mg/dL (07-30-21 @ 21:05)      CT Head No Cont: 07/24/2021:   IMPRESSION: Since July 20, 2021;  1.  No acute/traumatic intracranial pathology.  2.  Microvascular ischemic changes and unchanged probable chronic infarct within the right posterior frontal lobe.             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 Patient says he has been feeling intermittently dizzy over three days  despite having adequate/normal PO intake. Pt stated that these episodes of dizziness occurs after getting up from a sitting or supine position. Patient fell at home after feeling dizzy hitting the back of his head, but with no LOC. Pt was not on AC prior to admission. Pt also got out of his car, felt dizzy and then fell straight down hitting his buttocks and R knee. Denies head trauma. Patient was admitted on 7/20/21 to internal medicine for falls and dizziness 2/2 orthostatic hypotension.   CT spine 7/20/21 showed no evidence of acute cervical spine fracture or subluxation. Multilevel severe degenerative changes as described, with severe spinal noted stenosis at C2-3 and C3-4 and multilevel severe neural foraminal stenosis.  CTAP 7/20/21 showed no definite evidence of acute traumatic injury within the chest, abdomen, or pelvis.   CTH 7/21/21 showed no evidence of acute intracranial hemorrhage. Probable chronic infarct within the right posterior frontal white matter. Lacunar infarcts within bilateral white matter and deep gray nuclei of indeterminate age. Mild/moderate chronic microvascular changes.  Chest X-Ray negative for pneumothorax, infiltrate, or effusion. XR Pelvis negative for fx's or dislocations.    Patient's hospital course was complicated by lower extremity claudication. Bilateral lower extremity ultrasounds were performed on 7/23/21, which revealed severely diminished flow noted in the right and left superficial femoral artery suggestive of a proximal superficial femoral or common femoral artery stenosis. Significant atherosclerotic disease noted in the bilateral common femoral arteries. Vascular surgery was consulted for LLE Angiogram with possible endovascular revascularization; cardiac clearance was obtained but the team in conjunction with the family had decided to not go through with intervention.     Patient's orthostatic hypotension improved with midodrine, compression stocking, abdominal binder.     MRA 7/23/21 showed the following: MRA of the neck is limited by motion. Evaluation of the distal common carotid proximal internal carotid arteries appear grossly unremarkable though better quality study is recommended or CTA of the neck can be done for further evaluation. Evaluation of the proximal external carotid arteries are limited by motion. MRA of the [place] demonstrates decreased caliber of the distal right internal carotid artery. Short segment of stenosis is suspected involving both posterior cerebral arteries.    CTH 7/24/21 showed the following: No acute/traumatic intracranial pathology. Microvascular ischemic changes and unchanged probable chronic infarct within the right posterior frontal lobe.  MRI Head 7/24/21 showed acute lacunar infarcts involving the deep white matter of the left frontal lobe at the level of the centrum semiovale. No acute hemorrhage.  Neurology followed the patient and did not recommend intervention; patient is to continue aspirin and plavix.    Patient's prior level of function included independence with ADLs and independence with ambulation with a RW.   Patient's level of function on admission is min assist with bed mobility and min assist with transfers, patient is able to ambulate 4 small steps with rolling walker min assist.     Patient was evaluated by Dr. Ingram, a physical medicine and rehabilitation specialist and was found to be an excellent candidate for acute rehabilitation. Patient would benefit from 3 hours of interdisciplinary therapy per day.     Patient was admitted to rehab for acute lacunar infarcts in the deep white matter of the left frontal lobe at the level of the centrum semiovale and chronic left sided weakness from CVA of 2017 with a significant decline in his baseline function of independent with a straight cane.      TODAY'S SUBJECTIVE & REVIEW OF SYMPTOMS:  Patient is doing well. Still having urinary retention. He has been requiring CIC q8hrs. Having increased spontaneous urination. Patient is pending loop recorder placement.        Constiutional:    [ x ] WNL           [   ] poor appetite   [   ] insomnia   [   ] tired   Cardio:                [ x ] WNL           [   ] CP   [   ] PRADO   [   ] palpitations               Resp:                   [ x ] WNL           [   ] SOB   [   ] cough   [   ] wheezing   GI:                        [ x ] WNL           [   ] constipation   [   ] diarrhea   [   ] abdominal pain   [   ] nausea   [   ] emesis                                :                      [  ] WNL           [   ] HOGUE  [   ] dysuria   [  x ] difficulty voiding   - strt cath q 8h           Endo:                   [ x ] WNL          [   ] polyuria   [   ] temperature intolerance                 Skin:                     [ x ] WNL          [   ] pain   [   ] wound   [   ] rash   MSK:                    [ x ] WNL          [   ] muscle pain   [   ] joint pain/ stiffness   [   ] muscle tenderness   [   ] swelling   Neuro:                 [  ] WNL           [ x ]  as per HPI            Cognitive:           [ x ] WNL           [   ]confusion      Psych:                  [ x ] WNL           [   ] hallucinations   [   ]agitation   [   ] delusion   [   ]depression      PHYSICAL EXAM    Vital Signs Last 24 Hrs  T(C): 35.7 (03 Aug 2021 05:47), Max: 36.2 (02 Aug 2021 12:53)  T(F): 96.2 (03 Aug 2021 05:47), Max: 97.1 (02 Aug 2021 12:53)  HR: 72 (03 Aug 2021 05:47) (72 - 86)  BP: 123/67 (03 Aug 2021 05:47) (123/67 - 150/68)  BP(mean): --  RR: 18 (03 Aug 2021 05:47) (18 - 18)  SpO2: --    Constitutional - [ x ] NAD, Comfortable        [   ] other:  Chest - [ x ] CTA     [   ] other:  Cardiovascular - [ x ] RRR, no murmer     [   ] other:  Abdomen - [ x ] Soft, NT/ND      [   ] other:        -  [ x  ] NO HOGUE CATHETER   [  ] YES  if yes: [  ] NO MEATAL TEAR OR DISCHARGE [   ] other:  Extremities - [ x ] No C/C/E, No calf tenderness       [   ] other:  ROM - [ x ] WFL     [   ] other:  Neurologic Exam -                 Cognitive - [ x ]Awake, Alert, AAO to self, place, date, year, situation         [    ] other:      Communication - [   ]Fluent, No dysarthria       [ x ] other: mildly dysarthric      Motor - No focal deficits                    Right UE -  [ x ] WNL      [    ] other:                    Left UE -     [   ] WNL      [ x ] other: L arm shoulder flexion 4-/5, L hand grasp 4-/5                    Right LE -   [ x ] WNL       [    ] other:                    Left LE -      [   ]WNL       [ x ] other: L hip flexion 3-/5,  3+/5 distally     Sensory - [   ] Intact to LT      [ x ] other: L sided hemisensory loss      Reflexes - [ x ] wnl/ symmetric     [   ] other:     Psychiatric - [ x ]Mood stable, Affect WNL     [   ]other:     Skin - [ x ] intact      [   ] other    MEDICATIONS  (STANDING):  alfuzosin 10 milliGRAM(s) Oral <User Schedule>  aspirin  chewable 81 milliGRAM(s) Oral daily  atorvastatin 80 milliGRAM(s) Oral at bedtime  clopidogrel Tablet 75 milliGRAM(s) Oral daily  fenofibrate Tablet 145 milliGRAM(s) Oral daily  gabapentin 200 milliGRAM(s) Oral at bedtime  gabapentin 100 milliGRAM(s) Oral <User Schedule>  heparin   Injectable 5000 Unit(s) SubCutaneous every 8 hours  isosorbide   mononitrate ER Tablet (IMDUR) 60 milliGRAM(s) Oral daily  metoprolol succinate ER 25 milliGRAM(s) Oral every 12 hours  midodrine. 2.5 milliGRAM(s) Oral <User Schedule>  NIFEdipine XL 60 milliGRAM(s) Oral daily  pentoxifylline 400 milliGRAM(s) Oral three times a day  ranolazine 500 milliGRAM(s) Oral two times a day  senna 2 Tablet(s) Oral at bedtime  venlafaxine XR. 75 milliGRAM(s) Oral daily    MEDICATIONS  (PRN):  acetaminophen   Tablet .. 975 milliGRAM(s) Oral every 6 hours PRN Temp greater or equal to 38C (100.4F), Mild Pain (1 - 3), Moderate Pain (4 - 6)  aluminum hydroxide/magnesium hydroxide/simethicone Suspension 30 milliLiter(s) Oral every 4 hours PRN Dyspepsia  diazepam    Tablet 5 milliGRAM(s) Oral daily PRN anxiety  polyethylene glycol 3350 17 Gram(s) Oral daily PRN Constipation      LABS / IMAGING                        13.8   9.58  )-----------( 316      ( 02 Aug 2021 07:19 )             43.5     08-02    140  |  103  |  38<H>  ----------------------------<  124<H>  4.4   |  22  |  1.9<H>    Ca    10.1      02 Aug 2021 07:19  Mg     2.3     08-02    TPro  7.3  /  Alb  4.7  /  TBili  0.4  /  DBili  x   /  AST  19  /  ALT  16  /  AlkPhos  41  08-02    POCT Blood Glucose.: 132 mg/dL (08-03-21 @ 12:03)  POCT Blood Glucose.: 130 mg/dL (08-03-21 @ 07:31)  POCT Blood Glucose.: 189 mg/dL (08-02-21 @ 21:17)  POCT Blood Glucose.: 127 mg/dL (08-02-21 @ 15:56)  POCT Blood Glucose.: 105 mg/dL (08-02-21 @ 11:35)  POCT Blood Glucose.: 121 mg/dL (08-02-21 @ 07:24)  POCT Blood Glucose.: 135 mg/dL (08-01-21 @ 12:00)  POCT Blood Glucose.: 132 mg/dL (08-01-21 @ 07:33)  POCT Blood Glucose.: 146 mg/dL (07-31-21 @ 16:08)  POCT Blood Glucose.: 133 mg/dL (07-31-21 @ 11:56)  POCT Blood Glucose.: 147 mg/dL (07-31-21 @ 08:00)  POCT Blood Glucose.: 143 mg/dL (07-30-21 @ 21:05)    Thyroid Stimulating Hormone, Serum: 2.56 uIU/mL (08.02.21 @ 07:19)

## 2021-08-03 NOTE — PROGRESS NOTE ADULT - ATTENDING COMMENTS
I reviewed the chart and examined the patient with the resident and we discussed the findings and treatment plan.  The patient is tolerating the rehab program well. I agree with the findings and treatment plan above, which I modified as indicated. The patient requires 3 hrs a day of acute inpatient rehab.    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 Rehab of acute lacunar infarcts in the deep white matter of the left frontal lobe at the level fo the centrum semiovale and chronic left sided weakness from CVA of 2017.    # Rehab of chronic left hemiparisis (dominant), and dysarthria and dysphagia with decline in function and falls, found to have an acute left subcortical infarct.   - MRI Acute lacunar infarcts involving the deep white matter of the left frontal lobe at the level of the centrum semiovale. No acute hemorrhage.  - Neurology was consulted and no intervention was recommended; no intervention at this time. Patient can Follow up with neurology at the clinic, as per conversation with neuro PA. Was already on DAPT and high dose statin.  - Patient requires PT/OT/SLT/Neuropsych eval   - Ambulates with min assist w/ device  - Loop recorder placement due today 8/3/21     # Dizziness and fall 2/2 orthostatic hypotension - now controlled  - continue with IH stockings and abdominal binder  - continue midodrine 2x a day  - fall precautions   - home medications of flomax, tizanidine were held by medicine team prior to rehab admission; valium was changed from 10 mg standing to 5 mg PRN by medicine per neurology recommendations     # LE pain likely secondary to PAD   - Significant atherosclerotic disease noted in bilateral common femoral arteries  - Patient's family and vascular team agreed to not proceed with intervention at this time     # CAD s/p CABG 2009  - 6/2021 :patent SALAZAR to LAD , patent SVG to OM , % , filled distally by collaterals from LAD.  - Continue DAPT ( Aspirin 81 mg daily and Plavix 75 mg daily ), ARB, Imdur, Ranexa, B-Blocker, Statin Therapy  - Cardio f/u OP     # HTN, patient had hypertensive urgency  - now well controlled  - losartan 100mg discontinued for SAMANTA   - c/w nifedipine 60mg xL q24h     #HLD  - continue with atorvastatin and fenofibrate     # CKD 3  # Urinary retention  - Cr 1.3 in 2017, 1.5 in 2019, 1.7 in June; Current Cr trend 2.1 -> 1.9-- > 2.1 --> 1.8--> 1,9.   - Renal bladder US showed simple cysts within both kidneys without hydronephrosis or nephrolithiasis   - Monitor Cr, IVF as needed   - Has been stable  - attempted TOV, patient failed, but now having increses spontaneous void. Previously on bladder scan q6h with straight cath as needed. Changed to straight cath standing q8h. Considering trial of Uraxatral for urinary retention. Use of Flomax questionable due to orthostasis.     # DM type 2  - fair control on carb consistent diet alone.   -D/C fingersticks    # Depression   - venlafaxine     # Anxiety   - Valium PRN as above    # Maintenance   - Pain control: none   - GI/Bowel Mgmt: miralax ,senna   - Bladder management: Straight cath q8h for urinary retention   - Skin: No active issues at this time  - FEN: monitor electrolytes, replete as needed   - Diet: Diet, DASH/TLC:   Sodium & Cholesterol Restricted  Consistent Carbohydrate No Snacks (07-28-21 @ 15:46) [Active]      # Precautions / PROPHYLAXIS:    - Fall precaution   - Ortho: Weight bearing status: WBAT  - DVT prophylaxis: SC heparin

## 2021-08-04 LAB
GLUCOSE BLDC GLUCOMTR-MCNC: 108 MG/DL — HIGH (ref 70–99)
GLUCOSE BLDC GLUCOMTR-MCNC: 117 MG/DL — HIGH (ref 70–99)
GLUCOSE BLDC GLUCOMTR-MCNC: 125 MG/DL — HIGH (ref 70–99)

## 2021-08-04 PROCEDURE — 33285 INSJ SUBQ CAR RHYTHM MNTR: CPT

## 2021-08-04 RX ADMIN — Medication 25 MILLIGRAM(S): at 05:59

## 2021-08-04 RX ADMIN — HEPARIN SODIUM 5000 UNIT(S): 5000 INJECTION INTRAVENOUS; SUBCUTANEOUS at 13:30

## 2021-08-04 RX ADMIN — Medication 400 MILLIGRAM(S): at 21:48

## 2021-08-04 RX ADMIN — Medication 145 MILLIGRAM(S): at 11:55

## 2021-08-04 RX ADMIN — HEPARIN SODIUM 5000 UNIT(S): 5000 INJECTION INTRAVENOUS; SUBCUTANEOUS at 21:50

## 2021-08-04 RX ADMIN — CLOPIDOGREL BISULFATE 75 MILLIGRAM(S): 75 TABLET, FILM COATED ORAL at 11:56

## 2021-08-04 RX ADMIN — Medication 1 TABLET(S): at 07:59

## 2021-08-04 RX ADMIN — MIDODRINE HYDROCHLORIDE 2.5 MILLIGRAM(S): 2.5 TABLET ORAL at 11:56

## 2021-08-04 RX ADMIN — Medication 60 MILLIGRAM(S): at 05:59

## 2021-08-04 RX ADMIN — Medication 400 MILLIGRAM(S): at 13:30

## 2021-08-04 RX ADMIN — ALFUZOSIN HYDROCHLORIDE 10 MILLIGRAM(S): 10 TABLET, EXTENDED RELEASE ORAL at 18:04

## 2021-08-04 RX ADMIN — HEPARIN SODIUM 5000 UNIT(S): 5000 INJECTION INTRAVENOUS; SUBCUTANEOUS at 05:59

## 2021-08-04 RX ADMIN — ISOSORBIDE MONONITRATE 60 MILLIGRAM(S): 60 TABLET, EXTENDED RELEASE ORAL at 11:55

## 2021-08-04 RX ADMIN — Medication 400 MILLIGRAM(S): at 05:59

## 2021-08-04 RX ADMIN — GABAPENTIN 100 MILLIGRAM(S): 400 CAPSULE ORAL at 10:14

## 2021-08-04 RX ADMIN — GABAPENTIN 200 MILLIGRAM(S): 400 CAPSULE ORAL at 21:48

## 2021-08-04 RX ADMIN — ATORVASTATIN CALCIUM 80 MILLIGRAM(S): 80 TABLET, FILM COATED ORAL at 21:48

## 2021-08-04 RX ADMIN — Medication 81 MILLIGRAM(S): at 11:55

## 2021-08-04 RX ADMIN — Medication 25 MILLIGRAM(S): at 17:09

## 2021-08-04 RX ADMIN — RANOLAZINE 500 MILLIGRAM(S): 500 TABLET, FILM COATED, EXTENDED RELEASE ORAL at 05:59

## 2021-08-04 RX ADMIN — Medication 75 MILLIGRAM(S): at 11:56

## 2021-08-04 RX ADMIN — RANOLAZINE 500 MILLIGRAM(S): 500 TABLET, FILM COATED, EXTENDED RELEASE ORAL at 17:09

## 2021-08-04 NOTE — PROGRESS NOTE ADULT - SUBJECTIVE AND OBJECTIVE BOX
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 Patient says he has been feeling intermittently dizzy over three days  despite having adequate/normal PO intake. Pt stated that these episodes of dizziness occurs after getting up from a sitting or supine position. Patient fell at home after feeling dizzy hitting the back of his head, but with no LOC. Pt was not on AC prior to admission. Pt also got out of his car, felt dizzy and then fell straight down hitting his buttocks and R knee. Denies head trauma. Patient was admitted on 7/20/21 to internal medicine for falls and dizziness 2/2 orthostatic hypotension.   CT spine 7/20/21 showed no evidence of acute cervical spine fracture or subluxation. Multilevel severe degenerative changes as described, with severe spinal noted stenosis at C2-3 and C3-4 and multilevel severe neural foraminal stenosis.  CTAP 7/20/21 showed no definite evidence of acute traumatic injury within the chest, abdomen, or pelvis.   CTH 7/21/21 showed no evidence of acute intracranial hemorrhage. Probable chronic infarct within the right posterior frontal white matter. Lacunar infarcts within bilateral white matter and deep gray nuclei of indeterminate age. Mild/moderate chronic microvascular changes.  Chest X-Ray negative for pneumothorax, infiltrate, or effusion. XR Pelvis negative for fx's or dislocations.    Patient's hospital course was complicated by lower extremity claudication. Bilateral lower extremity ultrasounds were performed on 7/23/21, which revealed severely diminished flow noted in the right and left superficial femoral artery suggestive of a proximal superficial femoral or common femoral artery stenosis. Significant atherosclerotic disease noted in the bilateral common femoral arteries. Vascular surgery was consulted for LLE Angiogram with possible endovascular revascularization; cardiac clearance was obtained but the team in conjunction with the family had decided to not go through with intervention.     Patient's orthostatic hypotension improved with midodrine, compression stocking, abdominal binder.     MRA 7/23/21 showed the following: MRA of the neck is limited by motion. Evaluation of the distal common carotid proximal internal carotid arteries appear grossly unremarkable though better quality study is recommended or CTA of the neck can be done for further evaluation. Evaluation of the proximal external carotid arteries are limited by motion. MRA of the [place] demonstrates decreased caliber of the distal right internal carotid artery. Short segment of stenosis is suspected involving both posterior cerebral arteries.    CTH 7/24/21 showed the following: No acute/traumatic intracranial pathology. Microvascular ischemic changes and unchanged probable chronic infarct within the right posterior frontal lobe.  MRI Head 7/24/21 showed acute lacunar infarcts involving the deep white matter of the left frontal lobe at the level of the centrum semiovale. No acute hemorrhage.  Neurology followed the patient and did not recommend intervention; patient is to continue aspirin and plavix.    Patient's prior level of function included independence with ADLs and independence with ambulation with a RW.   Patient's level of function on admission is min assist with bed mobility and min assist with transfers, patient is able to ambulate 4 small steps with rolling walker min assist.     Patient was evaluated by Dr. Ingram, a physical medicine and rehabilitation specialist and was found to be an excellent candidate for acute rehabilitation. Patient would benefit from 3 hours of interdisciplinary therapy per day.     Patient was admitted to rehab for acute lacunar infarcts in the deep white matter of the left frontal lobe at the level of the centrum semiovale and chronic left sided weakness from CVA of 2017 with a significant decline in his baseline function of independent with a straight cane.      TODAY'S SUBJECTIVE & REVIEW OF SYMPTOMS:  Patient is doing well. Still having urinary retention. Having increased spontaneous urination. He has been requiring CIC q8hrs. Patient is pending loop recorder placement today.   CLOF: Transfers and ambulates with min assist w/ device     Constiutional:    [ x ] WNL           [   ] poor appetite   [   ] insomnia   [   ] tired   Cardio:                [ x ] WNL           [   ] CP   [   ] PRADO   [   ] palpitations               Resp:                   [ x ] WNL           [   ] SOB   [   ] cough   [   ] wheezing   GI:                        [ x ] WNL           [   ] constipation   [   ] diarrhea   [   ] abdominal pain   [   ] nausea   [   ] emesis                                :                      [  ] WNL           [   ] HOGUE  [   ] dysuria   [  x ] difficulty voiding   - strt cath q 8h           Endo:                   [ x ] WNL          [   ] polyuria   [   ] temperature intolerance                 Skin:                     [ x ] WNL          [   ] pain   [   ] wound   [   ] rash   MSK:                    [ x ] WNL          [   ] muscle pain   [   ] joint pain/ stiffness   [   ] muscle tenderness   [   ] swelling   Neuro:                 [  ] WNL           [ x ]  as per HPI            Cognitive:           [ x ] WNL           [   ]confusion      Psych:                  [ x ] WNL           [   ] hallucinations   [   ]agitation   [   ] delusion   [   ]depression      PHYSICAL EXAM    Vital Signs Last 24 Hrs  T(C): 35.7 (04 Aug 2021 06:02), Max: 36.4 (03 Aug 2021 20:00)  T(F): 96.2 (04 Aug 2021 06:02), Max: 97.6 (03 Aug 2021 20:00)  HR: 72 (04 Aug 2021 06:02) (72 - 89)  BP: 173/79 (04 Aug 2021 06:02) (134/81 - 173/79)  BP(mean): --  RR: 18 (04 Aug 2021 06:02) (18 - 18)  SpO2: --    Constitutional - [ x ] NAD, Comfortable        [   ] other:  Chest - [ x ] CTA     [   ] other:  Cardiovascular - [ x ] RRR, no murmer     [   ] other:  Abdomen - [ x ] Soft, NT/ND      [   ] other:        -  [ x  ] NO HOGUE CATHETER   [  ] YES  if yes: [  ] NO MEATAL TEAR OR DISCHARGE [   ] other:  Extremities - [ x ] No C/C/E, No calf tenderness       [   ] other:  ROM - [ x ] WFL     [   ] other:  Neurologic Exam -                 Cognitive - [ x ]Awake, Alert, AAO to self, place, date, year, situation         [    ] other:      Communication - [   ]Fluent, No dysarthria       [ x ] other: mildly dysarthric      Motor - No focal deficits                    Right UE -  [ x ] WNL      [    ] other:                    Left UE -     [   ] WNL      [ x ] other: L arm shoulder flexion 4-/5, L hand grasp 4-/5                    Right LE -   [ x ] WNL       [    ] other:                    Left LE -      [   ]WNL       [ x ] other: L hip flexion 3-/5,  3+/5 distally     Sensory - [   ] Intact to LT      [ x ] other: L sided hemisensory loss      Reflexes - [ x ] wnl/ symmetric     [   ] other:     Psychiatric - [ x ]Mood stable, Affect WNL     [   ]other:     Skin - [ x ] intact      [   ] other      MEDICATIONS  (STANDING):  alfuzosin 10 milliGRAM(s) Oral <User Schedule>  aspirin  chewable 81 milliGRAM(s) Oral daily  atorvastatin 80 milliGRAM(s) Oral at bedtime  clopidogrel Tablet 75 milliGRAM(s) Oral daily  fenofibrate Tablet 145 milliGRAM(s) Oral daily  gabapentin 200 milliGRAM(s) Oral at bedtime  gabapentin 100 milliGRAM(s) Oral <User Schedule>  heparin   Injectable 5000 Unit(s) SubCutaneous every 8 hours  isosorbide   mononitrate ER Tablet (IMDUR) 60 milliGRAM(s) Oral daily  metoprolol succinate ER 25 milliGRAM(s) Oral every 12 hours  midodrine. 2.5 milliGRAM(s) Oral <User Schedule>  NIFEdipine XL 60 milliGRAM(s) Oral daily  pentoxifylline 400 milliGRAM(s) Oral three times a day  ranolazine 500 milliGRAM(s) Oral two times a day  senna 2 Tablet(s) Oral at bedtime  venlafaxine XR. 75 milliGRAM(s) Oral daily    MEDICATIONS  (PRN):  acetaminophen   Tablet .. 975 milliGRAM(s) Oral every 6 hours PRN Temp greater or equal to 38C (100.4F), Mild Pain (1 - 3), Moderate Pain (4 - 6)  aluminum hydroxide/magnesium hydroxide/simethicone Suspension 30 milliLiter(s) Oral every 4 hours PRN Dyspepsia  diazepam    Tablet 5 milliGRAM(s) Oral daily PRN anxiety  polyethylene glycol 3350 17 Gram(s) Oral daily PRN Constipation      LABS / IMAGING                        13.8   9.58  )-----------( 316      ( 02 Aug 2021 07:19 )             43.5     08-02    140  |  103  |  38<H>  ----------------------------<  124<H>  4.4   |  22  |  1.9<H>    Ca    10.1      02 Aug 2021 07:19  Mg     2.3     08-02    TPro  7.3  /  Alb  4.7  /  TBili  0.4  /  DBili  x   /  AST  19  /  ALT  16  /  AlkPhos  41  08-02    POCT Blood Glucose.: 108 mg/dL (08-04-21 @ 07:16)  POCT Blood Glucose.: 126 mg/dL (08-03-21 @ 20:33)  POCT Blood Glucose.: 113 mg/dL (08-03-21 @ 16:22)  POCT Blood Glucose.: 132 mg/dL (08-03-21 @ 12:03)  POCT Blood Glucose.: 130 mg/dL (08-03-21 @ 07:31)  POCT Blood Glucose.: 189 mg/dL (08-02-21 @ 21:17)  POCT Blood Glucose.: 127 mg/dL (08-02-21 @ 15:56)  POCT Blood Glucose.: 105 mg/dL (08-02-21 @ 11:35)  POCT Blood Glucose.: 121 mg/dL (08-02-21 @ 07:24)  POCT Blood Glucose.: 135 mg/dL (08-01-21 @ 12:00)  POCT Blood Glucose.: 132 mg/dL (08-01-21 @ 07:33)  POCT Blood Glucose.: 146 mg/dL (07-31-21 @ 16:08)  )    Thyroid Stimulating Hormone, Serum: 2.56 uIU/mL (08.02.21 @ 07:19)                 HPI:  76 yo M with PMH of CAD s/p CABG x3 2017 ([place]), 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 Patient says he has been feeling intermittently dizzy over three days  despite having adequate/normal PO intake. Pt stated that these episodes of dizziness occurs after getting up from a sitting or supine position. Patient fell at home after feeling dizzy hitting the back of his head, but with no LOC. Pt was not on AC prior to admission. Pt also got out of his car, felt dizzy and then fell straight down hitting his buttocks and R knee. Denies head trauma. Patient was admitted on 7/20/21 to internal medicine for falls and dizziness 2/2 orthostatic hypotension.   CT spine 7/20/21 showed no evidence of acute cervical spine fracture or subluxation. Multilevel severe degenerative changes as described, with severe spinal noted stenosis at C2-3 and C3-4 and multilevel severe neural foraminal stenosis.  CTAP 7/20/21 showed no definite evidence of acute traumatic injury within the chest, abdomen, or pelvis.   CTH 7/21/21 showed no evidence of acute intracranial hemorrhage. Probable chronic infarct within the right posterior frontal white matter. Lacunar infarcts within bilateral white matter and deep gray nuclei of indeterminate age. Mild/moderate chronic microvascular changes.  Chest X-Ray negative for pneumothorax, infiltrate, or effusion. XR Pelvis negative for fx's or dislocations.    Patient's hospital course was complicated by lower extremity claudication. Bilateral lower extremity ultrasounds were performed on 7/23/21, which revealed severely diminished flow noted in the right and left superficial femoral artery suggestive of a proximal superficial femoral or common femoral artery stenosis. Significant atherosclerotic disease noted in the bilateral common femoral arteries. Vascular surgery was consulted for LLE Angiogram with possible endovascular revascularization; cardiac clearance was obtained but the team in conjunction with the family had decided to not go through with intervention.     Patient's orthostatic hypotension improved with midodrine, compression stocking, abdominal binder.     MRA 7/23/21 showed the following: MRA of the neck is limited by motion. Evaluation of the distal common carotid proximal internal carotid arteries appear grossly unremarkable though better quality study is recommended or CTA of the neck can be done for further evaluation. Evaluation of the proximal external carotid arteries are limited by motion. MRA of the [place] demonstrates decreased caliber of the distal right internal carotid artery. Short segment of stenosis is suspected involving both posterior cerebral arteries.    CTH 7/24/21 showed the following: No acute/traumatic intracranial pathology. Microvascular ischemic changes and unchanged probable chronic infarct within the right posterior frontal lobe.  MRI Head 7/24/21 showed acute lacunar infarcts involving the deep white matter of the left frontal lobe at the level of the centrum semiovale. No acute hemorrhage.  Neurology followed the patient and did not recommend intervention; patient is to continue aspirin and plavix.    Patient's prior level of function included independence with ADLs and independence with ambulation with a RW.   Patient's level of function on admission is min assist with bed mobility and min assist with transfers, patient is able to ambulate 4 small steps with rolling walker min assist.     Patient was evaluated by Dr. Ingram, a physical medicine and rehabilitation specialist and was found to be an excellent candidate for acute rehabilitation. Patient would benefit from 3 hours of interdisciplinary therapy per day.     Patient was admitted to rehab for acute lacunar infarcts in the deep white matter of the left frontal lobe at the level of the centrum semiovale and chronic left sided weakness from CVA of 2017 with a significant decline in his baseline function of independent with a straight cane.      TODAY'S SUBJECTIVE & REVIEW OF SYMPTOMS:  Patient is doing well. Still having urinary retention. Having increased spontaneous urination. He has been requiring CIC q8hrs. Patient is pending loop recorder placement today.     CLOF: Transfers and ambulates with min assist w/ device     Constiutional:    [ x ] WNL           [   ] poor appetite   [   ] insomnia   [   ] tired   Cardio:                [ x ] WNL           [   ] CP   [   ] PRADO   [   ] palpitations               Resp:                   [ x ] WNL           [   ] SOB   [   ] cough   [   ] wheezing   GI:                        [ x ] WNL           [   ] constipation   [   ] diarrhea   [   ] abdominal pain   [   ] nausea   [   ] emesis                                :                      [  ] WNL           [   ] HOGUE  [   ] dysuria   [  x ] difficulty voiding   - strt cath q 8h           Endo:                   [ x ] WNL          [   ] polyuria   [   ] temperature intolerance                 Skin:                     [ x ] WNL          [   ] pain   [   ] wound   [   ] rash   MSK:                    [ x ] WNL          [   ] muscle pain   [   ] joint pain/ stiffness   [   ] muscle tenderness   [   ] swelling   Neuro:                 [  ] WNL           [ x ]  as per HPI            Cognitive:           [ x ] WNL           [   ]confusion      Psych:                  [ x ] WNL           [   ] hallucinations   [   ]agitation   [   ] delusion   [   ]depression      PHYSICAL EXAM    Vital Signs Last 24 Hrs  T(C): 35.7 (04 Aug 2021 06:02), Max: 36.4 (03 Aug 2021 20:00)  T(F): 96.2 (04 Aug 2021 06:02), Max: 97.6 (03 Aug 2021 20:00)  HR: 72 (04 Aug 2021 06:02) (72 - 89)  BP: 173/79 (04 Aug 2021 06:02) (134/81 - 173/79)  BP(mean): --  RR: 18 (04 Aug 2021 06:02) (18 - 18)  SpO2: --    Constitutional - [ x ] NAD, Comfortable        [   ] other:  Chest - [ x ] CTA     [   ] other:  Cardiovascular - [ x ] RRR, no murmer     [   ] other:  Abdomen - [ x ] Soft, NT/ND      [   ] other:        -  [ x  ] NO HOGUE CATHETER   [  ] YES  if yes: [  ] NO MEATAL TEAR OR DISCHARGE [   ] other:  Extremities - [ x ] No C/C/E, No calf tenderness       [   ] other:  ROM - [ x ] WFL     [   ] other:  Neurologic Exam -                 Cognitive - [ x ]Awake, Alert, AAO to self, place, date, year, situation         [    ] other:      Communication - [   ]Fluent, No dysarthria       [ x ] other: mildly dysarthric      Motor - No focal deficits                    Right UE -  [ x ] WNL      [    ] other:                    Left UE -     [   ] WNL      [ x ] other: L arm shoulder flexion 4-/5, L hand grasp 4-/5                    Right LE -   [ x ] WNL       [    ] other:                    Left LE -      [   ]WNL       [ x ] other: L hip flexion 3-/5,  3+/5 distally     Sensory - [   ] Intact to LT      [ x ] other: L sided hemisensory loss      Reflexes - [ x ] wnl/ symmetric     [   ] other:     Psychiatric - [ x ]Mood stable, Affect WNL     [   ]other:     Skin - [ x ] intact      [   ] other      MEDICATIONS  (STANDING):  alfuzosin 10 milliGRAM(s) Oral <User Schedule>  aspirin  chewable 81 milliGRAM(s) Oral daily  atorvastatin 80 milliGRAM(s) Oral at bedtime  clopidogrel Tablet 75 milliGRAM(s) Oral daily  fenofibrate Tablet 145 milliGRAM(s) Oral daily  gabapentin 200 milliGRAM(s) Oral at bedtime  gabapentin 100 milliGRAM(s) Oral <User Schedule>  heparin   Injectable 5000 Unit(s) SubCutaneous every 8 hours  isosorbide   mononitrate ER Tablet (IMDUR) 60 milliGRAM(s) Oral daily  metoprolol succinate ER 25 milliGRAM(s) Oral every 12 hours  midodrine. 2.5 milliGRAM(s) Oral <User Schedule>  NIFEdipine XL 60 milliGRAM(s) Oral daily  pentoxifylline 400 milliGRAM(s) Oral three times a day  ranolazine 500 milliGRAM(s) Oral two times a day  senna 2 Tablet(s) Oral at bedtime  venlafaxine XR. 75 milliGRAM(s) Oral daily    MEDICATIONS  (PRN):  acetaminophen   Tablet .. 975 milliGRAM(s) Oral every 6 hours PRN Temp greater or equal to 38C (100.4F), Mild Pain (1 - 3), Moderate Pain (4 - 6)  aluminum hydroxide/magnesium hydroxide/simethicone Suspension 30 milliLiter(s) Oral every 4 hours PRN Dyspepsia  diazepam    Tablet 5 milliGRAM(s) Oral daily PRN anxiety  polyethylene glycol 3350 17 Gram(s) Oral daily PRN Constipation      LABS / IMAGING                        13.8   9.58  )-----------( 316      ( 02 Aug 2021 07:19 )             43.5     08-02    140  |  103  |  38<H>  ----------------------------<  124<H>  4.4   |  22  |  1.9<H>    Ca    10.1      02 Aug 2021 07:19  Mg     2.3     08-02    TPro  7.3  /  Alb  4.7  /  TBili  0.4  /  DBili  x   /  AST  19  /  ALT  16  /  AlkPhos  41  08-02    POCT Blood Glucose.: 108 mg/dL (08-04-21 @ 07:16)  POCT Blood Glucose.: 126 mg/dL (08-03-21 @ 20:33)  POCT Blood Glucose.: 113 mg/dL (08-03-21 @ 16:22)  POCT Blood Glucose.: 132 mg/dL (08-03-21 @ 12:03)  POCT Blood Glucose.: 130 mg/dL (08-03-21 @ 07:31)  POCT Blood Glucose.: 189 mg/dL (08-02-21 @ 21:17)  POCT Blood Glucose.: 127 mg/dL (08-02-21 @ 15:56)  POCT Blood Glucose.: 105 mg/dL (08-02-21 @ 11:35)  POCT Blood Glucose.: 121 mg/dL (08-02-21 @ 07:24)  POCT Blood Glucose.: 135 mg/dL (08-01-21 @ 12:00)  POCT Blood Glucose.: 132 mg/dL (08-01-21 @ 07:33)  POCT Blood Glucose.: 146 mg/dL (07-31-21 @ 16:08)  )    Thyroid Stimulating Hormone, Serum: 2.56 uIU/mL (08.02.21 @ 07:19)

## 2021-08-04 NOTE — CHART NOTE - NSCHARTNOTEFT_GEN_A_CORE
EP PROCEDURE NOTE    Date of Procedure: 08-04-21  EP Attending: SUNIL Diggs MD  Assistant: IGOR MANUEL  Referring Physician:  Dr Stewart  Procedure: Loop Recorder Implant    Indication: 75y Male with history of recurrent CVA referred for implantable loop recorder.     Anesthesia: Local    EQUIPMENT IMPLANTED  : [employer]  Model Number: [id number]  Serial Number: [id number]    PROCEDURE DESCRIPTION  The patient was brought to the electrophysiology procedure area in a non-sedated state and received preoperative antibiotics. Informed, written consent was obtained prior to the procedure. The left anterior chest region was prepped and cleaned from the nipple to the upper left clavicular border with serial applications of Chlorhexidine. Patient was then covered with sterile drapes in the usual manner. Blood pressure, oxygenation, and level of comfort were stable throughout.     Following infiltration with local anesthetic, a 1-cm incision was made along the left sternal border at the fourth intercostal space. Using the tunneling device the implantable loop recorder was inserted into the subcutaneous tissue. Direct pressure was applied to the wound to obtain hemostasis. The wound was approximated with Dermabond. Steri-strips and a dry, sterile dressing were placed over the wound. R waves were noted to be *** mV.     The patient tolerated the procedure well.     COMPLICATIONS  No immediate complications    CONCLUSIONS  Successful loop recorder implant    EBL: 2 cc EP PROCEDURE NOTE    Date of Procedure: 08-04-21  EP Attending: SUNIL Diggs MD  Assistant: IGOR MANUEL  Referring Physician:  Dr Stewart  Procedure: Loop Recorder Implant    Indication: 75y Male with history of recurrent CVA referred for implantable loop recorder.     Anesthesia: Local    EQUIPMENT IMPLANTED  : [employer] Linq  Model Number: [id number]  Serial Number: [id number]    PROCEDURE DESCRIPTION  The patient was brought to the electrophysiology procedure area in a non-sedated state and received preoperative antibiotics. Informed, written consent was obtained prior to the procedure. The left anterior chest region was prepped and cleaned from the nipple to the upper left clavicular border with serial applications of Chlorhexidine. Patient was then covered with sterile drapes in the usual manner. Blood pressure, oxygenation, and level of comfort were stable throughout.     Following infiltration with local anesthetic, a 1-cm incision was made along the left sternal border at the fourth intercostal space. Using the tunneling device the implantable loop recorder was inserted into the subcutaneous tissue. Direct pressure was applied to the wound to obtain hemostasis. The wound was approximated with Dermabond. Steri-strips and a dry, sterile dressing were placed over the wound. R waves were noted to be 0.73 mV.     The patient tolerated the procedure well.     COMPLICATIONS  No immediate complications    CONCLUSIONS  Successful loop recorder implant    EBL: 2 cc EP PROCEDURE NOTE    Date of Procedure: 08-04-21  EP Attending: SUNIL Diggs MD  Assistant: IGOR MANUEL  Referring Physician:  Dr Stewart  Procedure: Loop Recorder Implant    Indication: 75y Male with history of recurrent CVA referred for implantable loop recorder.     Anesthesia: Local    EQUIPMENT IMPLANTED  : [employer]  Model Number: [id number]  Serial Number: [id number]    PROCEDURE DESCRIPTION  The patient was brought to the electrophysiology procedure area in a non-sedated state and received preoperative antibiotics. Informed, written consent was obtained prior to the procedure. The left anterior chest region was prepped and cleaned from the nipple to the upper left clavicular border with serial applications of Chlorhexidine. Patient was then covered with sterile drapes in the usual manner. Blood pressure, oxygenation, and level of comfort were stable throughout.     Following infiltration with local anesthetic, a 1-cm incision was made along the left sternal border at the fourth intercostal space. Using the tunneling device the implantable loop recorder was inserted into the subcutaneous tissue. Direct pressure was applied to the wound to obtain hemostasis. The wound was approximated with Dermabond. Steri-strips and a dry, sterile dressing were placed over the wound. R waves were noted to be 0.73 mV.     The patient tolerated the procedure well.     COMPLICATIONS  No immediate complications    CONCLUSIONS  Successful loop recorder implant    EBL: 2 cc

## 2021-08-04 NOTE — PROGRESS NOTE ADULT - ASSESSMENT
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 Rehab of acute lacunar infarcts in the deep white matter of the left frontal lobe at the level fo the centrum semiovale and chronic left sided weakness from CVA of 2017.      # Rehab of chronic left hemiparisis (dominant), and dysarthria and dysphagia with decline in function and falls, found to have an acute left subcortical infarct.   - MRI Acute lacunar infarcts involving the deep white matter of the left frontal lobe at the level of the centrum semiovale. No acute hemorrhage.  - Neurology was consulted and no intervention was recommended; no intervention at this time. Patient can Follow up with neurology at the clinic, as per conversation with neuro PA. Was already on DAPT and high dose statin.  - Patient requires PT/OT/SLT/Neuropsych eval   - Ambulates with min assist w/ device  - Loop recorder placement due today 8/4/21     # Dizziness and fall 2/2 orthostatic hypotension - now controlled  - continue with HI stockings and abdominal binder  - continue midodrine 2x a day  - fall precautions   - home medications of flomax, tizanidine were held by medicine team prior to rehab admission; valium was changed from 10 mg standing to 5 mg PRN by medicine per neurology recommendations for anxiety  - controlled    # LE pain likely secondary to PAD   - Significant atherosclerotic disease noted in bilateral common femoral arteries  - Patient's family and vascular team agreed to not proceed with intervention at this time     # CAD s/p CABG 2009  - 6/2021 :patent SALAZAR to LAD , patent SVG to OM , % , filled distally by collaterals from [facility].  - Continue DAPT ( Aspirin 81 mg daily and Plavix 75 mg daily ), ARB, Imdur, Ranexa, B-Blocker, Statin Therapy  - Cardio f/u OP     # HTN, patient had hypertensive urgency  - now well controlled  - losartan 100mg discontinued for SAMANTA   - c/w nifedipine 60mg xL q24h     #HLD  - continue with atorvastatin and fenofibrate     # CKD 3  # Urinary retention  - Cr 1.3 in 2017, 1.5 in 2019, 1.7 in June; Current Cr trend 2.1 -> 1.9-- > 2.1 --> 1.8--> 1,9.   - Renal bladder US showed simple cysts within both kidneys without hydronephrosis or nephrolithiasis   - Monitor Cr, IVF as needed   - Has been stable  - attempted TOV, patient failed, but now having increses spontaneous void.  Now receiving straight cath standing q8h. Considering trial of Uraxatral for urinary retention. Use of Flomax questionable due to orthostasis.     # DM type 2  - fair control on carb consistent diet alone.   - D/C fingersticks    # Depression   - venlafaxine     # Anxiety   - Valium PRN as above    # Maintenance   - Pain control: none   - GI/Bowel Mgmt: miralax ,senna   - Bladder management: Straight cath q8h for urinary retention   - Skin: No active issues at this time  - FEN: monitor electrolytes, replete as needed   - Diet: Diet, DASH/TLC:   Sodium & Cholesterol Restricted  Consistent Carbohydrate No Snacks (07-28-21 @ 15:46) [Active]      # Precautions / PROPHYLAXIS:    - Fall precaution   - Ortho: Weight bearing status: WBAT  - DVT prophylaxis: SC heparin        76 yo M with PMH of CAD s/p CABG x3 2017 (Yessi), 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 Rehab of acute lacunar infarcts in the deep white matter of the left frontal lobe at the level fo the centrum semiovale and chronic left sided weakness from CVA of 2017.    # Rehab of chronic left hemiparisis (dominant), and dysarthria and dysphagia with decline in function and falls, found to have an acute left subcortical infarct.   - MRI Acute lacunar infarcts involving the deep white matter of the left frontal lobe at the level of the centrum semiovale. No acute hemorrhage.  - Neurology was consulted and no intervention was recommended; no intervention at this time. Patient can Follow up with neurology at the clinic, as per conversation with neuro PA. Was already on DAPT and high dose statin.  - Patient requires PT/OT/SLT/Neuropsych eval   - Ambulates with min assist w/ device  - Loop recorder placement placed today 8/4/21     # Dizziness and fall 2/2 orthostatic hypotension - now controlled  - continue with HI stockings and abdominal binder  - continue midodrine 2x a day  - fall precautions   - home medications of flomax, tizanidine were held by medicine team prior to rehab admission; valium was changed from 10 mg standing to 5 mg PRN by medicine per neurology recommendations for anxiety  - controlled    # LE pain likely secondary to PAD   - Significant atherosclerotic disease noted in bilateral common femoral arteries  - Patient's family and vascular team agreed to not proceed with intervention at this time   - no symptoms reported since on Rehab    # CAD s/p CABG 2009  - 6/2021 :patent SALAZAR to LAD , patent SVG to OM , % , filled distally by collaterals from LAD.  - Continue DAPT ( Aspirin 81 mg daily and Plavix 75 mg daily ), ARB, Imdur, Ranexa, B-Blocker, Statin Therapy  - Cardio f/u OP     # HTN  - now controlled  - losartan 100mg discontinued for SAMANTA   - c/w nifedipine 60mg xL q24h   - Need to keep BP in higher range given symptomatic orthostasis    #HLD  - continue with atorvastatin and fenofibrate     # CKD 3  # Urinary retention  - Cr 1.3 in 2017, 1.5 in 2019, 1.7 in June; Current Cr trend 2.1 -> 1.9-- > 2.1 --> 1.8--> 1,9.   - Renal bladder US showed simple cysts within both kidneys without hydronephrosis or nephrolithiasis   - Monitor Cr, IVF as needed   - Has been stable  - attempted TOV, patient failed, but now having incresed spontaneous void.  Now receiving PVR q 6 hrs with strt cath prn.  Ordered trial of Uraxatral for urinary retention ( less risk of orthostasis)     # DM type 2  - fair control on carb consistent diet alone.   - D/C fingersticks    # Depression   - venlafaxine     # Anxiety   - Valium PRN as above    # Maintenance   - Pain control: none   - GI/Bowel Mgmt: miralax ,senna   - Bladder management: Straight cath q8h for urinary retention   - Skin: No active issues at this time  - FEN: monitor electrolytes, replete as needed   - Diet: Diet, DASH/TLC:   Sodium & Cholesterol Restricted  Consistent Carbohydrate No Snacks (07-28-21 @ 15:46) [Active]      # Precautions / PROPHYLAXIS:    - Fall precaution   - Ortho: Weight bearing status: WBAT  - DVT prophylaxis: SC heparin

## 2021-08-04 NOTE — PROGRESS NOTE ADULT - SUBJECTIVE AND OBJECTIVE BOX
Electrophysiology Brief Post-Op Note    I have personally seen and examined the patient.  I agree with the history and physical which I have reviewed and noted any changes below.  08-04-21 @ 08:39    PRE-OP DIAGNOSIS:  Cryptogenic CVA    POST-OP DIAGNOSIS:  Cryptogenic CVA    PROCEDURE: Loop Implnat    Physician: SUNIL Diggs MD  Assistant: IGOR MANUEL    ESTIMATED BLOOD LOSS:  2    mL    ANESTHESIA TYPE:  [  ]General Anesthesia  [  ] Sedation  [X  ] Local/Regional    CONDITION  [  ] Critical  [  ] Serious  [  ]Fair  [ X ]Good    SPECIMENS REMOVED (IF APPLICABLE):  none    IMPLANTS (IF APPLICABLE)  Loop Recorder ([employer])    FINDINGS  PLAN OF CARE  - May remove bandaid in 2 days  - May shower in 2 days  Follow up in EP office Mayi Briseno NP 8/30/21 @10am  Dr Diggs office  [address] [place] [address], [address] [facility]  [phone]

## 2021-08-04 NOTE — PROGRESS NOTE ADULT - SUBJECTIVE AND OBJECTIVE BOX
Neuropsychology Assessment on 8/2/2021    Pain:  Denied   Location: N/A  Pain Rating: None noted. Intervention: N/A  Orientation: Self /Place/ Event /Month/ Year/ Time   Arousal Level: Alert      Affect Range: WFL                 Behavior: Mr. Ruffin was pleasant and cooperated with the testing. He was alert and attentive to examiner instructions. Mood reported as a 6/10 (10 being the best possible). Affect was consistent with context. He has bilateral hearing loss, but could understand the examiner.   Needed: No    Awareness of Deficits: Fair-Good                 Tests Administered: ? Neuropsychological Assessment Battery – Screening Module (NAB; Form 1) ? NAB Naming subtest ? NAB Judgment subtest    NAB Screening Module, Form 1  Domain	Index  Score	T-  Score	Percentile	Qualitative Range  Attention	-	-	-	-  Orientation	-	-	50	Average  Digits Forward	-	42	21	Below Average  Digits Backwards	-	40	16	Below Average  Language	-	-	-	-  Auditory Comprehension	-	55	69	Above average  Memory	114	-	82	Above Average  Shape Learning Immediate	-	60	84	Above average  Shape Learning Delayed	-	53	62	Average  Story Learning Immediate	-	59	82	Above Average  Story Leaning Delayed	-	55	69	Above Average  Spatial	-	-	-	-  Visual Discrimination	-	61	86	Above average    NAB Language and Executive Functioning Modules, Select Subtests  Subtest	T-  Score	Percentile	Qualitative Range  Naming 	39	14	Mildly impaired  Judgment	59	82	Above Average

## 2021-08-04 NOTE — PROGRESS NOTE ADULT - ATTENDING COMMENTS
I reviewed the chart and examined the patient with the resident and we discussed the findings and treatment plan.  The patient is tolerating the rehab program well. I agree with the findings and treatment plan above, which I modified as indicated. The patient requires 3 hrs a day of acute inpatient rehab.    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 Rehab of acute lacunar infarcts in the deep white matter of the left frontal lobe at the level fo the centrum semiovale and chronic left sided weakness from CVA of 2017.    # Rehab of chronic left hemiparisis (dominant), and dysarthria and dysphagia with decline in function and falls, found to have an acute left subcortical infarct.   - MRI Acute lacunar infarcts involving the deep white matter of the left frontal lobe at the level of the centrum semiovale. No acute hemorrhage.  - Neurology was consulted and no intervention was recommended; no intervention at this time. Patient can Follow up with neurology at the clinic, as per conversation with neuro PA. Was already on DAPT and high dose statin.  - Patient requires PT/OT/SLT/Neuropsych eval   - Ambulates with min assist w/ device  - Loop recorder placement due today 8/3/21     # Dizziness and fall 2/2 orthostatic hypotension - now controlled  - continue with HI stockings and abdominal binder  - continue midodrine 2x a day  - fall precautions   - home medications of flomax, tizanidine were held by medicine team prior to rehab admission; valium was changed from 10 mg standing to 5 mg PRN by medicine per neurology recommendations     # LE pain likely secondary to PAD   - Significant atherosclerotic disease noted in bilateral common femoral arteries  - Patient's family and vascular team agreed to not proceed with intervention at this time     # CAD s/p CABG 2009  - 6/2021 :patent SALAZAR to LAD , patent SVG to OM , % , filled distally by collaterals from LAD.  - Continue DAPT ( Aspirin 81 mg daily and Plavix 75 mg daily ), ARB, Imdur, Ranexa, B-Blocker, Statin Therapy  - Cardio f/u OP     # HTN, patient had hypertensive urgency  - now well controlled  - losartan 100mg discontinued for SAMANTA   - c/w nifedipine 60mg xL q24h     #HLD  - continue with atorvastatin and fenofibrate     # CKD 3  # Urinary retention  - Cr 1.3 in 2017, 1.5 in 2019, 1.7 in June; Current Cr trend 2.1 -> 1.9-- > 2.1 --> 1.8--> 1,9.   - Renal bladder US showed simple cysts within both kidneys without hydronephrosis or nephrolithiasis   - Monitor Cr, IVF as needed   - Has been stable  - attempted TOV, patient failed, but now having increses spontaneous void. Previously on bladder scan q6h with straight cath as needed. Changed to straight cath standing q8h. Considering trial of Uraxatral for urinary retention. Use of Flomax questionable due to orthostasis.     # DM type 2  - fair control on carb consistent diet alone.   -D/C fingersticks    # Depression   - venlafaxine     # Anxiety   - Valium PRN as above    # Maintenance   - Pain control: none   - GI/Bowel Mgmt: miralax ,senna   - Bladder management: Straight cath q8h for urinary retention   - Skin: No active issues at this time  - FEN: monitor electrolytes, replete as needed   - Diet: Diet, DASH/TLC:   Sodium & Cholesterol Restricted  Consistent Carbohydrate No Snacks (07-28-21 @ 15:46) [Active]      # Precautions / PROPHYLAXIS:    - Fall precaution   - Ortho: Weight bearing status: WBAT  - DVT prophylaxis: SC heparin I reviewed the chart and examined the patient with the resident and we discussed the findings and treatment plan.  The patient is tolerating the rehab program well. I agree with the findings and treatment plan above, which I modified as indicated. The patient requires 3 hrs a day of acute inpatient rehab.    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 Rehab of acute lacunar infarcts in the deep white matter of the left frontal lobe at the level fo the centrum semiovale and chronic left sided weakness from CVA of 2017.    # Rehab of chronic left hemiparisis (dominant), and dysarthria and dysphagia with decline in function and falls, found to have an acute left subcortical infarct.   - MRI Acute lacunar infarcts involving the deep white matter of the left frontal lobe at the level of the centrum semiovale. No acute hemorrhage.  - Neurology was consulted and no intervention was recommended; no intervention at this time. Patient can Follow up with neurology at the clinic, as per conversation with neuro PA. Was already on DAPT and high dose statin.  - Patient requires PT/OT/SLT/Neuropsych eval   - Ambulates with min assist w/ device  - Loop recorder placement placed today 8/4/21     # Dizziness and fall 2/2 orthostatic hypotension - now controlled  - continue with HI stockings and abdominal binder  - continue midodrine 2x a day  - fall precautions   - home medications of flomax, tizanidine were held by medicine team prior to rehab admission; valium was changed from 10 mg standing to 5 mg PRN by medicine per neurology recommendations for anxiety  - controlled    # LE pain likely secondary to PAD   - Significant atherosclerotic disease noted in bilateral common femoral arteries  - Patient's family and vascular team agreed to not proceed with intervention at this time   - no symptoms reported since on Rehab    # CAD s/p CABG 2009  - 6/2021 :patent SALAZAR to LAD , patent SVG to OM , % , filled distally by collaterals from LAD.  - Continue DAPT ( Aspirin 81 mg daily and Plavix 75 mg daily ), ARB, Imdur, Ranexa, B-Blocker, Statin Therapy  - Cardio f/u OP     # HTN  - now controlled  - losartan 100mg discontinued for SAMANTA   - c/w nifedipine 60mg xL q24h   - Need to keep BP in higher range given symptomatic orthostasis    #HLD  - continue with atorvastatin and fenofibrate     # CKD 3  # Urinary retention  - Cr 1.3 in 2017, 1.5 in 2019, 1.7 in June; Current Cr trend 2.1 -> 1.9-- > 2.1 --> 1.8--> 1,9.   - Renal bladder US showed simple cysts within both kidneys without hydronephrosis or nephrolithiasis   - Monitor Cr, IVF as needed   - Has been stable  - attempted TOV, patient failed, but now having incresed spontaneous void.  Now receiving PVR q 6 hrs with strt cath prn.  Ordered trial of Uraxatral for urinary retention ( less risk of orthostasis)     # DM type 2  - fair control on carb consistent diet alone.   - D/C fingersticks    # Depression   - venlafaxine     # Anxiety   - Valium PRN as above    # Maintenance   - Pain control: none   - GI/Bowel Mgmt: miralax ,senna   - Bladder management: Straight cath q8h for urinary retention   - Skin: No active issues at this time  - FEN: monitor electrolytes, replete as needed   - Diet: Diet, DASH/TLC:   Sodium & Cholesterol Restricted  Consistent Carbohydrate No Snacks (07-28-21 @ 15:46) [Active]      # Precautions / PROPHYLAXIS:    - Fall precaution   - Ortho: Weight bearing status: WBAT  - DVT prophylaxis: SC heparin

## 2021-08-04 NOTE — PROGRESS NOTE ADULT - ASSESSMENT
Summary and Conclusions:   Mr. Ruffin was oriented to person, time, place, and situation. He demonstrated cognitive strengths in visual perception, receptive language, learning, and both verbal and visual memory. Although comprehension was relatively preserved, he needed repetition of instructions due to bilateral hearing loss. No overt problems with processing speed were observed during objective testing or subjective conversation. Mr. Ruffin displayed above average judgment and safety awareness of potential dangers and everyday scenarios (e.g., what should you do if you take too much of a prescription medication?). Both auditory attention and working memory for digits were slightly below expectations for his age and education level. However, he noted that he has always had difficulty with numbers, which may partially explain his performance. Another consideration is bilateral hearing loss since the attention and working memory tasks were verbally based. Expressive language was notable for dysarthria and hypophonia. On standardized testing, he demonstrated mildly impaired word finding/naming of common objects. His current deficits are relatively consistent with the locations of his current and prior strokes.     Recommendations:   The following is recommended:  A comprehensive outpatient neuropsychological evaluation be beneficial to further assess longstanding deficits associated with history of stroke and small microvascular ischemic disease.  Encourage adequate sleep at night, frequent breaks, rests, and naps.  Encourage working in quiet environments.   Cue the individual to focus and refocus attention to task requirements as needed.  Make sure the individual is paying attention when speaking to them or when they begin a task and ask them to repeat important information.  To improve general expression, it can be helpful to have the individual practice conversational skills and engage in storytelling activities.  One of the simplest strategies to compensate for language deficits is to have others provide frequent language cues whenever the individual is experiencing difficulties. However, it is very important that others be careful not to speak for the person if they can do it themselves.   Similarly, individuals can be provided with phonetic cues to improve their language skills. For example, individuals can be provided with a sentence that describes the intended word, plus given the first sound (i.e., phoneme) of the word, such as "You eat soup with a s___?"  Furthermore, individuals can be provided with syllable cues, such as "You eat soup with a spoo__?"  Paraphrase what others state to ensure that the information to be communicated has been understood.    Goal:  ? Feedback of results to MD, Treatment Team, Patient and Family ? Individual Therapy ? Cognitive assessment ? Family contact/support/education  Plan: 1-2 times a week 30-60 minutes

## 2021-08-05 LAB
GLUCOSE BLDC GLUCOMTR-MCNC: 119 MG/DL — HIGH (ref 70–99)
GLUCOSE BLDC GLUCOMTR-MCNC: 133 MG/DL — HIGH (ref 70–99)
GLUCOSE BLDC GLUCOMTR-MCNC: 138 MG/DL — HIGH (ref 70–99)
GLUCOSE BLDC GLUCOMTR-MCNC: 145 MG/DL — HIGH (ref 70–99)
HOMOCYSTEINE LEVEL: 24.3 UMOL/L — HIGH
METHYLMALONIC ACID LEVEL: 544 NMOL/L — HIGH (ref 87–318)
SARS-COV-2 RNA SPEC QL NAA+PROBE: SIGNIFICANT CHANGE UP

## 2021-08-05 RX ADMIN — RANOLAZINE 500 MILLIGRAM(S): 500 TABLET, FILM COATED, EXTENDED RELEASE ORAL at 05:12

## 2021-08-05 RX ADMIN — HEPARIN SODIUM 5000 UNIT(S): 5000 INJECTION INTRAVENOUS; SUBCUTANEOUS at 05:12

## 2021-08-05 RX ADMIN — Medication 25 MILLIGRAM(S): at 17:33

## 2021-08-05 RX ADMIN — Medication 400 MILLIGRAM(S): at 21:37

## 2021-08-05 RX ADMIN — Medication 145 MILLIGRAM(S): at 12:29

## 2021-08-05 RX ADMIN — RANOLAZINE 500 MILLIGRAM(S): 500 TABLET, FILM COATED, EXTENDED RELEASE ORAL at 17:33

## 2021-08-05 RX ADMIN — Medication 25 MILLIGRAM(S): at 05:12

## 2021-08-05 RX ADMIN — Medication 81 MILLIGRAM(S): at 12:29

## 2021-08-05 RX ADMIN — MIDODRINE HYDROCHLORIDE 2.5 MILLIGRAM(S): 2.5 TABLET ORAL at 12:29

## 2021-08-05 RX ADMIN — GABAPENTIN 100 MILLIGRAM(S): 400 CAPSULE ORAL at 08:32

## 2021-08-05 RX ADMIN — Medication 400 MILLIGRAM(S): at 15:17

## 2021-08-05 RX ADMIN — ATORVASTATIN CALCIUM 80 MILLIGRAM(S): 80 TABLET, FILM COATED ORAL at 21:37

## 2021-08-05 RX ADMIN — CLOPIDOGREL BISULFATE 75 MILLIGRAM(S): 75 TABLET, FILM COATED ORAL at 12:29

## 2021-08-05 RX ADMIN — ISOSORBIDE MONONITRATE 60 MILLIGRAM(S): 60 TABLET, EXTENDED RELEASE ORAL at 12:29

## 2021-08-05 RX ADMIN — HEPARIN SODIUM 5000 UNIT(S): 5000 INJECTION INTRAVENOUS; SUBCUTANEOUS at 12:29

## 2021-08-05 RX ADMIN — Medication 400 MILLIGRAM(S): at 05:12

## 2021-08-05 RX ADMIN — GABAPENTIN 200 MILLIGRAM(S): 400 CAPSULE ORAL at 21:37

## 2021-08-05 RX ADMIN — HEPARIN SODIUM 5000 UNIT(S): 5000 INJECTION INTRAVENOUS; SUBCUTANEOUS at 21:37

## 2021-08-05 RX ADMIN — Medication 75 MILLIGRAM(S): at 12:29

## 2021-08-05 RX ADMIN — SENNA PLUS 2 TABLET(S): 8.6 TABLET ORAL at 21:37

## 2021-08-05 RX ADMIN — MIDODRINE HYDROCHLORIDE 2.5 MILLIGRAM(S): 2.5 TABLET ORAL at 06:09

## 2021-08-05 NOTE — PROGRESS NOTE ADULT - ATTENDING COMMENTS
I reviewed the chart and examined the patient with the resident and we discussed the findings and treatment plan.  The patient is tolerating the rehab program well. I agree with the findings and treatment plan above, which I modified as indicated. The patient requires 3 hrs a day of acute inpatient rehab.     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 Rehab of acute lacunar infarcts in the deep white matter of the left frontal lobe at the level fo the centrum semiovale and chronic left sided weakness from CVA of 2017.    # Rehab of chronic left hemiparisis (dominant), and dysarthria and dysphagia with decline in function and falls, found to have an acute left subcortical infarct.   - MRI Acute lacunar infarcts involving the deep white matter of the left frontal lobe at the level of the centrum semiovale. No acute hemorrhage.  - Neurology was consulted and no intervention was recommended; no intervention at this time. Patient can Follow up with neurology at the clinic, as per conversation with neuro PA. Was already on DAPT and high dose statin.  - Patient requires PT/OT/SLT/Neuropsych eval   - Loop recorder placement  8/4/21   - Ambulates with min assist w/ device    # Dizziness and fall 2/2 orthostatic hypotension   - continue with HI stockings and abdominal binder  - continue midodrine 2x a day  - home medications of flomax, tizanidine were held by medicine team prior to rehab admission; valium was changed from 10 mg standing to 5 mg PRN by medicine per neurology recommendations for anxiety  - Became symptomatic again after 1 dose of Uroxatral now discontinued.    # LE pain likely secondary to PAD   - Significant atherosclerotic disease noted in bilateral common femoral arteries  - Patient's family and vascular team agreed to not proceed with intervention at this time   - no symptoms reported since on Rehab    # CAD s/p CABG 2009  - 6/2021 :patent SALAZAR to LAD , patent SVG to OM , % , filled distally by collaterals from LAD.  - Continue DAPT ( Aspirin 81 mg daily and Plavix 75 mg daily ), ARB, Imdur, Ranexa, B-Blocker, Statin Therapy  - Cardio f/u OP     # HTN  - now controlled  - losartan 100mg discontinued for SAMANTA   - c/w nifedipine 60mg xL q24h   - Need to keep BP in higher range given symptomatic orthostasis    #HLD  - continue with atorvastatin and fenofibrate     # CKD 3  # Urinary retention  - Cr 1.3 in 2017, 1.5 in 2019, 1.7 in June; Current Cr trend 2.1 -> 1.9-- > 2.1 --> 1.8--> 1,9.   - Renal bladder US showed simple cysts within both kidneys without hydronephrosis or nephrolithiasis   - Monitor Cr, IVF as needed   - Has been stable  - attempted TOV, patient failed, but now having increased spontaneous void.  Now receiving PVR q 6 hrs with strt cath prn.      # DM type 2  - fair control on carb consistent diet alone.   - D/C fingersticks    # Depression   - venlafaxine     # Anxiety   - Valium PRN as above    # Maintenance   - Pain control: none   - GI/Bowel Mgmt: miralax ,senna   - Bladder management: Straight cath q8h for urinary retention   - Skin: No active issues at this time  - FEN: monitor electrolytes, replete as needed   - Diet: Diet, DASH/TLC:   Sodium & Cholesterol Restricted  Consistent Carbohydrate No Snacks (07-28-21 @ 15:46) [Active]      # Precautions / PROPHYLAXIS:    - Fall precaution   - Ortho: Weight bearing status: WBAT  - DVT prophylaxis: SC heparin

## 2021-08-05 NOTE — PROGRESS NOTE ADULT - SUBJECTIVE AND OBJECTIVE BOX
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 Patient says he has been feeling intermittently dizzy over three days  despite having adequate/normal PO intake. Pt stated that these episodes of dizziness occurs after getting up from a sitting or supine position. Patient fell at home after feeling dizzy hitting the back of his head, but with no LOC. Pt was not on AC prior to admission. Pt also got out of his car, felt dizzy and then fell straight down hitting his buttocks and R knee. Denies head trauma. Patient was admitted on 7/20/21 to internal medicine for falls and dizziness 2/2 orthostatic hypotension.   CT spine 7/20/21 showed no evidence of acute cervical spine fracture or subluxation. Multilevel severe degenerative changes as described, with severe spinal noted stenosis at C2-3 and C3-4 and multilevel severe neural foraminal stenosis.  CTAP 7/20/21 showed no definite evidence of acute traumatic injury within the chest, abdomen, or pelvis.   CTH 7/21/21 showed no evidence of acute intracranial hemorrhage. Probable chronic infarct within the right posterior frontal white matter. Lacunar infarcts within bilateral white matter and deep gray nuclei of indeterminate age. Mild/moderate chronic microvascular changes.  Chest X-Ray negative for pneumothorax, infiltrate, or effusion. XR Pelvis negative for fx's or dislocations.    Patient's hospital course was complicated by lower extremity claudication. Bilateral lower extremity ultrasounds were performed on 7/23/21, which revealed severely diminished flow noted in the right and left superficial femoral artery suggestive of a proximal superficial femoral or common femoral artery stenosis. Significant atherosclerotic disease noted in the bilateral common femoral arteries. Vascular surgery was consulted for LLE Angiogram with possible endovascular revascularization; cardiac clearance was obtained but the team in conjunction with the family had decided to not go through with intervention.     Patient's orthostatic hypotension improved with midodrine, compression stocking, abdominal binder.     MRA 7/23/21 showed the following: MRA of the neck is limited by motion. Evaluation of the distal common carotid proximal internal carotid arteries appear grossly unremarkable though better quality study is recommended or CTA of the neck can be done for further evaluation. Evaluation of the proximal external carotid arteries are limited by motion. MRA of the [place] demonstrates decreased caliber of the distal right internal carotid artery. Short segment of stenosis is suspected involving both posterior cerebral arteries.    CTH 7/24/21 showed the following: No acute/traumatic intracranial pathology. Microvascular ischemic changes and unchanged probable chronic infarct within the right posterior frontal lobe.  MRI Head 7/24/21 showed acute lacunar infarcts involving the deep white matter of the left frontal lobe at the level of the centrum semiovale. No acute hemorrhage.  Neurology followed the patient and did not recommend intervention; patient is to continue aspirin and plavix.    Patient's prior level of function included independence with ADLs and independence with ambulation with a RW.   Patient's level of function on admission is min assist with bed mobility and min assist with transfers, patient is able to ambulate 4 small steps with rolling walker min assist.     Patient was evaluated by Dr. Ingram, a physical medicine and rehabilitation specialist and was found to be an excellent candidate for acute rehabilitation. Patient would benefit from 3 hours of interdisciplinary therapy per day.     Patient was admitted to rehab for acute lacunar infarcts in the deep white matter of the left frontal lobe at the level of the centrum semiovale and chronic left sided weakness from CVA of 2017 with a significant decline in his baseline function of independent with a straight cane.      TODAY'S SUBJECTIVE & REVIEW OF SYMPTOMS:  Patient is doing well. Less dizzy when standing. Still having urinary retention. Having increased spontaneous urination. He has been requiring CIC q8hrs. Started on Uraxatral. Loop recorder placed yesterday   CLOF: Transfers and ambulates with min assist w/ device     Constiutional:    [ x ] WNL           [   ] poor appetite   [   ] insomnia   [   ] tired   Cardio:                [ x ] WNL           [   ] CP   [   ] PRDAO   [   ] palpitations               Resp:                   [ x ] WNL           [   ] SOB   [   ] cough   [   ] wheezing   GI:                        [ x ] WNL           [   ] constipation   [   ] diarrhea   [   ] abdominal pain   [   ] nausea   [   ] emesis                                :                      [  ] WNL           [   ] HOGUE  [   ] dysuria   [  x ] difficulty voiding   - strt cath q 8h           Endo:                   [ x ] WNL          [   ] polyuria   [   ] temperature intolerance                 Skin:                     [ x ] WNL          [   ] pain   [   ] wound   [   ] rash   MSK:                    [ x ] WNL          [   ] muscle pain   [   ] joint pain/ stiffness   [   ] muscle tenderness   [   ] swelling   Neuro:                 [  ] WNL           [ x ]  as per HPI            Cognitive:           [ x ] WNL           [   ]confusion      Psych:                  [ x ] WNL           [   ] hallucinations   [   ]agitation   [   ] delusion   [   ]depression      PHYSICAL EXAM    Vital Signs Last 24 Hrs  T(C): 35.9 (05 Aug 2021 05:05), Max: 36.1 (04 Aug 2021 21:03)  T(F): 96.7 (05 Aug 2021 05:05), Max: 96.9 (04 Aug 2021 21:03)  HR: 79 (05 Aug 2021 05:05) (79 - 87)  BP: 128/63 (05 Aug 2021 05:05) (128/63 - 144/66)  BP(mean): --  RR: 18 (05 Aug 2021 05:05) (18 - 18)  SpO2: --    Constitutional - [ x ] NAD, Comfortable        [   ] other:  Chest - [ x ] CTA     [   ] other:  Cardiovascular - [ x ] RRR, no murmer     [   ] other:  Abdomen - [ x ] Soft, NT/ND      [   ] other:        -  [ x  ] NO HOGUE CATHETER   [  ] YES  if yes: [  ] NO MEATAL TEAR OR DISCHARGE [   ] other:  Extremities - [ x ] No C/C/E, No calf tenderness       [   ] other:  ROM - [ x ] WFL     [   ] other:  Neurologic Exam -                 Cognitive - [ x ]Awake, Alert, AAO to self, place, date, year, situation         [    ] other:      Communication - [   ]Fluent, No dysarthria       [ x ] other: mildly dysarthric      Motor - No focal deficits                    Right UE -  [ x ] WNL      [    ] other:                    Left UE -     [   ] WNL      [ x ] other: L arm shoulder flexion 4-/5, L hand grasp 4-/5                    Right LE -   [ x ] WNL       [    ] other:    MEDICATIONS  (STANDING):  alfuzosin 10 milliGRAM(s) Oral <User Schedule>  aspirin  chewable 81 milliGRAM(s) Oral daily  atorvastatin 80 milliGRAM(s) Oral at bedtime  clopidogrel Tablet 75 milliGRAM(s) Oral daily  fenofibrate Tablet 145 milliGRAM(s) Oral daily  gabapentin 200 milliGRAM(s) Oral at bedtime  gabapentin 100 milliGRAM(s) Oral <User Schedule>  heparin   Injectable 5000 Unit(s) SubCutaneous every 8 hours  isosorbide   mononitrate ER Tablet (IMDUR) 60 milliGRAM(s) Oral daily  metoprolol succinate ER 25 milliGRAM(s) Oral every 12 hours  midodrine. 2.5 milliGRAM(s) Oral <User Schedule>  NIFEdipine XL 60 milliGRAM(s) Oral daily  pentoxifylline 400 milliGRAM(s) Oral three times a day  ranolazine 500 milliGRAM(s) Oral two times a day  senna 2 Tablet(s) Oral at bedtime  venlafaxine XR. 75 milliGRAM(s) Oral daily    MEDICATIONS  (PRN):  acetaminophen   Tablet .. 975 milliGRAM(s) Oral every 6 hours PRN Temp greater or equal to 38C (100.4F), Mild Pain (1 - 3), Moderate Pain (4 - 6)  aluminum hydroxide/magnesium hydroxide/simethicone Suspension 30 milliLiter(s) Oral every 4 hours PRN Dyspepsia  diazepam    Tablet 5 milliGRAM(s) Oral daily PRN anxiety  polyethylene glycol 3350 17 Gram(s) Oral daily PRN Constipation                    Left LE -      [   ]WNL       [ x ] other: L hip flexion 3-/5,  3+/5 distally     Sensory - [   ] Intact to LT      [ x ] other: L sided hemisensory loss      Reflexes - [ x ] wnl/ symmetric     [   ] other:     Psychiatric - [ x ]Mood stable, Affect WNL     [   ]other:     Skin - [ x ] intact      [   ] other      LABS / IMAGING                        13.8   9.58  )-----------( 316      ( 02 Aug 2021 07:19 )             43.5     08-02    140  |  103  |  38<H>  ----------------------------<  124<H>  4.4   |  22  |  1.9<H>    Ca    10.1      02 Aug 2021 07:19  Mg     2.3     08-02    TPro  7.3  /  Alb  4.7  /  TBili  0.4  /  DBili  x   /  AST  19  /  ALT  16  /  AlkPhos  41  08-02        POCT Blood Glucose.: 133 mg/dL (08-05-21 @ 07:30)  POCT Blood Glucose.: 117 mg/dL (08-04-21 @ 21:09)  POCT Blood Glucose.: 125 mg/dL (08-04-21 @ 16:23)  POCT Blood Glucose.: 108 mg/dL (08-04-21 @ 07:16)  POCT Blood Glucose.: 126 mg/dL (08-03-21 @ 20:33)  POCT Blood Glucose.: 113 mg/dL (08-03-21 @ 16:22)  POCT Blood Glucose.: 132 mg/dL (08-03-21 @ 12:03)  POCT Blood Glucose.: 130 mg/dL (08-03-21 @ 07:31)  POCT Blood Glucose.: 189 mg/dL (08-02-21 @ 21:17)  POCT Blood Glucose.: 127 mg/dL (08-02-21 @ 15:56)  POCT Blood Glucose.: 105 mg/dL (08-02-21 @ 11:35)  POCT Blood Glucose.: 121 mg/dL (08-02-21 @ 07:24)      Thyroid Stimulating Hormone, Serum: 2.56 uIU/mL (08.02.21 @ 07:19)                 HPI:  76 yo M with PMH of CAD s/p CABG x3 2017 ([place]), 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 Patient says he has been feeling intermittently dizzy over three days  despite having adequate/normal PO intake. Pt stated that these episodes of dizziness occurs after getting up from a sitting or supine position. Patient fell at home after feeling dizzy hitting the back of his head, but with no LOC. Pt was not on AC prior to admission. Pt also got out of his car, felt dizzy and then fell straight down hitting his buttocks and R knee. Denies head trauma. Patient was admitted on 7/20/21 to internal medicine for falls and dizziness 2/2 orthostatic hypotension.   CT spine 7/20/21 showed no evidence of acute cervical spine fracture or subluxation. Multilevel severe degenerative changes as described, with severe spinal noted stenosis at C2-3 and C3-4 and multilevel severe neural foraminal stenosis.  CTAP 7/20/21 showed no definite evidence of acute traumatic injury within the chest, abdomen, or pelvis.   CTH 7/21/21 showed no evidence of acute intracranial hemorrhage. Probable chronic infarct within the right posterior frontal white matter. Lacunar infarcts within bilateral white matter and deep gray nuclei of indeterminate age. Mild/moderate chronic microvascular changes.  Chest X-Ray negative for pneumothorax, infiltrate, or effusion. XR Pelvis negative for fx's or dislocations.    Patient's hospital course was complicated by lower extremity claudication. Bilateral lower extremity ultrasounds were performed on 7/23/21, which revealed severely diminished flow noted in the right and left superficial femoral artery suggestive of a proximal superficial femoral or common femoral artery stenosis. Significant atherosclerotic disease noted in the bilateral common femoral arteries. Vascular surgery was consulted for LLE Angiogram with possible endovascular revascularization; cardiac clearance was obtained but the team in conjunction with the family had decided to not go through with intervention.     Patient's orthostatic hypotension improved with midodrine, compression stocking, abdominal binder.     MRA 7/23/21 showed the following: MRA of the neck is limited by motion. Evaluation of the distal common carotid proximal internal carotid arteries appear grossly unremarkable though better quality study is recommended or CTA of the neck can be done for further evaluation. Evaluation of the proximal external carotid arteries are limited by motion. MRA of the [place] demonstrates decreased caliber of the distal right internal carotid artery. Short segment of stenosis is suspected involving both posterior cerebral arteries.    CTH 7/24/21 showed the following: No acute/traumatic intracranial pathology. Microvascular ischemic changes and unchanged probable chronic infarct within the right posterior frontal lobe.  MRI Head 7/24/21 showed acute lacunar infarcts involving the deep white matter of the left frontal lobe at the level of the centrum semiovale. No acute hemorrhage.  Neurology followed the patient and did not recommend intervention; patient is to continue aspirin and plavix.    Patient's prior level of function included independence with ADLs and independence with ambulation with a RW.   Patient's level of function on admission is min assist with bed mobility and min assist with transfers, patient is able to ambulate 4 small steps with rolling walker min assist.     Patient was evaluated by Dr. Ingram, a physical medicine and rehabilitation specialist and was found to be an excellent candidate for acute rehabilitation. Patient would benefit from 3 hours of interdisciplinary therapy per day.     Patient was admitted to rehab for acute lacunar infarcts in the deep white matter of the left frontal lobe at the level of the centrum semiovale and chronic left sided weakness from CVA of 2017 with a significant decline in his baseline function of independent with a straight cane.      TODAY'S SUBJECTIVE & REVIEW OF SYMPTOMS:  Patient is doing well. Less dizzy when standing. Still having urinary retention. Having increased spontaneous urination. He has been requiring CIC q8hrs. Started on Uroxatral Loop recorder placed yesterday   Complaining of dizziness and not feeling well after being brought down to PT in his wheelchair this morning. BP was reportedly about 120/70. He was brought back to his be and is feeling better. He received his first dose of Uroxatral last night.     CLOF: Transfers and ambulates with min assist w/ device     Constiutional:    [ x ] WNL           [   ] poor appetite   [   ] insomnia   [   ] tired   Cardio:                [ x ] WNL           [   ] CP   [   ] PRADO   [   ] palpitations               Resp:                   [ x ] WNL           [   ] SOB   [   ] cough   [   ] wheezing   GI:                        [ x ] WNL           [   ] constipation   [   ] diarrhea   [   ] abdominal pain   [   ] nausea   [   ] emesis                                :                      [  ] WNL           [   ] HOGUE  [   ] dysuria   [  x ] difficulty voiding   - strt cath q 8h           Endo:                   [ x ] WNL          [   ] polyuria   [   ] temperature intolerance                 Skin:                     [ x ] WNL          [   ] pain   [   ] wound   [   ] rash   MSK:                    [ x ] WNL          [   ] muscle pain   [   ] joint pain/ stiffness   [   ] muscle tenderness   [   ] swelling   Neuro:                 [  ] WNL           [ x ]  as per HPI            Cognitive:           [ x ] WNL           [   ]confusion      Psych:                  [ x ] WNL           [   ] hallucinations   [   ]agitation   [   ] delusion   [   ]depression      PHYSICAL EXAM    Vital Signs Last 24 Hrs  T(C): 35.9 (05 Aug 2021 05:05), Max: 36.1 (04 Aug 2021 21:03)  T(F): 96.7 (05 Aug 2021 05:05), Max: 96.9 (04 Aug 2021 21:03)  HR: 79 (05 Aug 2021 05:05) (79 - 87)  BP: 128/63 (05 Aug 2021 05:05) (128/63 - 144/66)  BP(mean): --  RR: 18 (05 Aug 2021 05:05) (18 - 18)  SpO2: --    Constitutional - [ x ] NAD, Comfortable        [   ] other:  Chest - [ x ] CTA     [   ] other:  Cardiovascular - [ x ] RRR, no murmer     [   ] other:  Abdomen - [ x ] Soft, NT/ND      [   ] other:        -  [ x  ] NO HOGUE CATHETER   [  ] YES  if yes: [  ] NO MEATAL TEAR OR DISCHARGE [   ] other:  Extremities - [ x ] No C/C/E, No calf tenderness       [   ] other:  ROM - [ x ] WFL     [   ] other:  Neurologic Exam -                 Cognitive - [ x ]Awake, Alert, AAO to self, place, date, year, situation         [    ] other:      Communication - [   ]Fluent, No dysarthria       [ x ] other: mildly dysarthric      Motor - No focal deficits                    Right UE -  [ x ] WNL      [    ] other:                    Left UE -     [   ] WNL      [ x ] other: L arm shoulder flexion 4/5, L hand grasp 4-/5                    Right LE -   [ x ] WNL       [    ] other:    MEDICATIONS  (STANDING):  alfuzosin 10 milliGRAM(s) Oral <User Schedule>  aspirin  chewable 81 milliGRAM(s) Oral daily  atorvastatin 80 milliGRAM(s) Oral at bedtime  clopidogrel Tablet 75 milliGRAM(s) Oral daily  fenofibrate Tablet 145 milliGRAM(s) Oral daily  gabapentin 200 milliGRAM(s) Oral at bedtime  gabapentin 100 milliGRAM(s) Oral <User Schedule>  heparin   Injectable 5000 Unit(s) SubCutaneous every 8 hours  isosorbide   mononitrate ER Tablet (IMDUR) 60 milliGRAM(s) Oral daily  metoprolol succinate ER 25 milliGRAM(s) Oral every 12 hours  midodrine. 2.5 milliGRAM(s) Oral <User Schedule>  NIFEdipine XL 60 milliGRAM(s) Oral daily  pentoxifylline 400 milliGRAM(s) Oral three times a day  ranolazine 500 milliGRAM(s) Oral two times a day  senna 2 Tablet(s) Oral at bedtime  venlafaxine XR. 75 milliGRAM(s) Oral daily    MEDICATIONS  (PRN):  acetaminophen   Tablet .. 975 milliGRAM(s) Oral every 6 hours PRN Temp greater or equal to 38C (100.4F), Mild Pain (1 - 3), Moderate Pain (4 - 6)  aluminum hydroxide/magnesium hydroxide/simethicone Suspension 30 milliLiter(s) Oral every 4 hours PRN Dyspepsia  diazepam    Tablet 5 milliGRAM(s) Oral daily PRN anxiety  polyethylene glycol 3350 17 Gram(s) Oral daily PRN Constipation                    Left LE -      [   ]WNL       [ x ] other: L hip flexion 3-/5,  3+/5 distally     Sensory - [   ] Intact to LT      [ x ] other: L sided hemisensory loss      Reflexes - [ x ] wnl/ symmetric     [   ] other:     Psychiatric - [ x ]Mood stable, Affect WNL     [   ]other:     Skin - [ x ] intact      [   ] other      LABS / IMAGING                        13.8   9.58  )-----------( 316      ( 02 Aug 2021 07:19 )             43.5     08-02    140  |  103  |  38<H>  ----------------------------<  124<H>  4.4   |  22  |  1.9<H>    Ca    10.1      02 Aug 2021 07:19  Mg     2.3     08-02    TPro  7.3  /  Alb  4.7  /  TBili  0.4  /  DBili  x   /  AST  19  /  ALT  16  /  AlkPhos  41  08-02        POCT Blood Glucose.: 133 mg/dL (08-05-21 @ 07:30)  POCT Blood Glucose.: 117 mg/dL (08-04-21 @ 21:09)  POCT Blood Glucose.: 125 mg/dL (08-04-21 @ 16:23)  POCT Blood Glucose.: 108 mg/dL (08-04-21 @ 07:16)  POCT Blood Glucose.: 126 mg/dL (08-03-21 @ 20:33)  POCT Blood Glucose.: 113 mg/dL (08-03-21 @ 16:22)  POCT Blood Glucose.: 132 mg/dL (08-03-21 @ 12:03)  POCT Blood Glucose.: 130 mg/dL (08-03-21 @ 07:31)  POCT Blood Glucose.: 189 mg/dL (08-02-21 @ 21:17)  POCT Blood Glucose.: 127 mg/dL (08-02-21 @ 15:56)  POCT Blood Glucose.: 105 mg/dL (08-02-21 @ 11:35)  POCT Blood Glucose.: 121 mg/dL (08-02-21 @ 07:24)      Thyroid Stimulating Hormone, Serum: 2.56 uIU/mL (08.02.21 @ 07:19)

## 2021-08-05 NOTE — PROGRESS NOTE ADULT - ASSESSMENT
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 Rehab of acute lacunar infarcts in the deep white matter of the left frontal lobe at the level fo the centrum semiovale and chronic left sided weakness from CVA of 2017.    # Rehab of chronic left hemiparisis (dominant), and dysarthria and dysphagia with decline in function and falls, found to have an acute left subcortical infarct.   - MRI Acute lacunar infarcts involving the deep white matter of the left frontal lobe at the level of the centrum semiovale. No acute hemorrhage.  - Neurology was consulted and no intervention was recommended; no intervention at this time. Patient can Follow up with neurology at the clinic, as per conversation with neuro PA. Was already on DAPT and high dose statin.  - Patient requires PT/OT/SLT/Neuropsych eval   - Ambulates with min assist w/ device  - Loop recorder placement  8/4/21     # Dizziness and fall 2/2 orthostatic hypotension - now controlled  - continue with HI stockings and abdominal binder  - continue midodrine 2x a day  - fall precautions   - home medications of flomax, tizanidine were held by medicine team prior to rehab admission; valium was changed from 10 mg standing to 5 mg PRN by medicine per neurology recommendations for anxiety  - improved     # LE pain likely secondary to PAD   - Significant atherosclerotic disease noted in bilateral common femoral arteries  - Patient's family and vascular team agreed to not proceed with intervention at this time   - no symptoms reported since on Rehab    # CAD s/p CABG 2009  - 6/2021 :patent SALAZAR to LAD , patent SVG to OM , % , filled distally by collaterals from [facility].  - Continue DAPT ( Aspirin 81 mg daily and Plavix 75 mg daily ), ARB, Imdur, Ranexa, B-Blocker, Statin Therapy  - Cardio f/u OP     # HTN  - now controlled  - losartan 100mg discontinued for SAMANTA   - c/w nifedipine 60mg xL q24h   - Need to keep BP in higher range given symptomatic orthostasis    #HLD  - continue with atorvastatin and fenofibrate     # CKD 3  # Urinary retention  - Cr 1.3 in 2017, 1.5 in 2019, 1.7 in June; Current Cr trend 2.1 -> 1.9-- > 2.1 --> 1.8--> 1,9.   - Renal bladder US showed simple cysts within both kidneys without hydronephrosis or nephrolithiasis   - Monitor Cr, IVF as needed   - Has been stable  - attempted TOV, patient failed, but now having incresed spontaneous void.  Now receiving PVR q 6 hrs with strt cath prn.  Started on Uroxatral for urinary retention ( less risk of orthostasis)     # DM type 2  - fair control on carb consistent diet alone.   - D/C fingersticks    # Depression   - venlafaxine     # Anxiety   - Valium PRN as above    # Maintenance   - Pain control: none   - GI/Bowel Mgmt: miralax ,senna   - Bladder management: Straight cath q8h for urinary retention   - Skin: No active issues at this time  - FEN: monitor electrolytes, replete as needed   - Diet: Diet, DASH/TLC:   Sodium & Cholesterol Restricted  Consistent Carbohydrate No Snacks (07-28-21 @ 15:46) [Active]      # Precautions / PROPHYLAXIS:    - Fall precaution   - Ortho: Weight bearing status: WBAT  - DVT prophylaxis: SC heparin        74 yo M with PMH of CAD s/p CABG x3 2017 ([facility]essi), 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 Rehab of acute lacunar infarcts in the deep white matter of the left frontal lobe at the level fo the centrum semiovale and chronic left sided weakness from CVA of 2017.    # Rehab of chronic left hemiparisis (dominant), and dysarthria and dysphagia with decline in function and falls, found to have an acute left subcortical infarct.   - MRI Acute lacunar infarcts involving the deep white matter of the left frontal lobe at the level of the centrum semiovale. No acute hemorrhage.  - Neurology was consulted and no intervention was recommended; no intervention at this time. Patient can Follow up with neurology at the clinic, as per conversation with neuro PA. Was already on DAPT and high dose statin.  - Patient requires PT/OT/SLT/Neuropsych eval   - Loop recorder placement  8/4/21   - Ambulates with min assist w/ device    # Dizziness and fall 2/2 orthostatic hypotension   - continue with HI stockings and abdominal binder  - continue midodrine 2x a day  - home medications of flomax, tizanidine were held by medicine team prior to rehab admission; valium was changed from 10 mg standing to 5 mg PRN by medicine per neurology recommendations for anxiety  - Became symptomatic again after 1 dose of Uroxatral now discontinued.    # LE pain likely secondary to PAD   - Significant atherosclerotic disease noted in bilateral common femoral arteries  - Patient's family and vascular team agreed to not proceed with intervention at this time   - no symptoms reported since on Rehab    # CAD s/p CABG 2009  - 6/2021 :patent SALAZAR to LAD , patent SVG to OM , % , filled distally by collaterals from LAD.  - Continue DAPT ( Aspirin 81 mg daily and Plavix 75 mg daily ), ARB, Imdur, Ranexa, B-Blocker, Statin Therapy  - Cardio f/u OP     # HTN  - now controlled  - losartan 100mg discontinued for SAMANTA   - c/w nifedipine 60mg xL q24h   - Need to keep BP in higher range given symptomatic orthostasis    #HLD  - continue with atorvastatin and fenofibrate     # CKD 3  # Urinary retention  - Cr 1.3 in 2017, 1.5 in 2019, 1.7 in June; Current Cr trend 2.1 -> 1.9-- > 2.1 --> 1.8--> 1,9.   - Renal bladder US showed simple cysts within both kidneys without hydronephrosis or nephrolithiasis   - Monitor Cr, IVF as needed   - Has been stable  - attempted TOV, patient failed, but now having increased spontaneous void.  Now receiving PVR q 6 hrs with strt cath prn.      # DM type 2  - fair control on carb consistent diet alone.   - D/C fingersticks    # Depression   - venlafaxine     # Anxiety   - Valium PRN as above    # Maintenance   - Pain control: none   - GI/Bowel Mgmt: miralax ,senna   - Bladder management: Straight cath q8h for urinary retention   - Skin: No active issues at this time  - FEN: monitor electrolytes, replete as needed   - Diet: Diet, DASH/TLC:   Sodium & Cholesterol Restricted  Consistent Carbohydrate No Snacks (07-28-21 @ 15:46) [Active]      # Precautions / PROPHYLAXIS:    - Fall precaution   - Ortho: Weight bearing status: WBAT  - DVT prophylaxis: SC heparin

## 2021-08-05 NOTE — PROGRESS NOTE ADULT - ASSESSMENT
Pain: Denied                             Location: N/A               Ratin/10                 Intervention: N/A  Orientation: Self /Place/ Event /Date (Month/Date/Year/Day/ Time  Arousal Level: Alert Attention: ? WFL   Behavior: Pleasant/Cooperative  Affect Range: WFL     Needed: ? No        Insight into illness/deficits: Good  Treatment Session Focused on Cognition     The patient was seen to review neuropsychological testing results and recommendations. Strengths and weaknesses of his cognitive profile were reviewed. Recommendations to compensate for word finding and attention difficulty were discussed. Examples were given on how he can apply these strategies in his everyday environment. The patient was open to a potential outpatient neuropsychological evaluation. He plans on seeking out additional services to improve himself cognitively and physically once discharged. There are limited concerns about returning home, but was worried about his driving status. The patient and clinician processed his thoughts around driving and safety. The patient will be followed to discuss limitations, barriers, and treatment planning.    Goals: ? Facilitate Positive Coping ? Family Support / Education   Plan: 1-2 times a week 30-60 minutes ? Individual Psychotherapy ? Family contact/support/education

## 2021-08-06 LAB
GLUCOSE BLDC GLUCOMTR-MCNC: 120 MG/DL — HIGH (ref 70–99)
GLUCOSE BLDC GLUCOMTR-MCNC: 133 MG/DL — HIGH (ref 70–99)
GLUCOSE BLDC GLUCOMTR-MCNC: 141 MG/DL — HIGH (ref 70–99)

## 2021-08-06 RX ORDER — SENNA PLUS 8.6 MG/1
2 TABLET ORAL AT BEDTIME
Refills: 0 | Status: DISCONTINUED | OUTPATIENT
Start: 2021-08-06 | End: 2021-08-18

## 2021-08-06 RX ADMIN — CLOPIDOGREL BISULFATE 75 MILLIGRAM(S): 75 TABLET, FILM COATED ORAL at 14:51

## 2021-08-06 RX ADMIN — MIDODRINE HYDROCHLORIDE 2.5 MILLIGRAM(S): 2.5 TABLET ORAL at 14:52

## 2021-08-06 RX ADMIN — Medication 400 MILLIGRAM(S): at 06:07

## 2021-08-06 RX ADMIN — Medication 75 MILLIGRAM(S): at 14:51

## 2021-08-06 RX ADMIN — ISOSORBIDE MONONITRATE 60 MILLIGRAM(S): 60 TABLET, EXTENDED RELEASE ORAL at 14:51

## 2021-08-06 RX ADMIN — Medication 400 MILLIGRAM(S): at 22:26

## 2021-08-06 RX ADMIN — RANOLAZINE 500 MILLIGRAM(S): 500 TABLET, FILM COATED, EXTENDED RELEASE ORAL at 06:05

## 2021-08-06 RX ADMIN — Medication 60 MILLIGRAM(S): at 06:05

## 2021-08-06 RX ADMIN — HEPARIN SODIUM 5000 UNIT(S): 5000 INJECTION INTRAVENOUS; SUBCUTANEOUS at 22:25

## 2021-08-06 RX ADMIN — Medication 145 MILLIGRAM(S): at 14:51

## 2021-08-06 RX ADMIN — RANOLAZINE 500 MILLIGRAM(S): 500 TABLET, FILM COATED, EXTENDED RELEASE ORAL at 17:49

## 2021-08-06 RX ADMIN — MIDODRINE HYDROCHLORIDE 2.5 MILLIGRAM(S): 2.5 TABLET ORAL at 06:07

## 2021-08-06 RX ADMIN — GABAPENTIN 100 MILLIGRAM(S): 400 CAPSULE ORAL at 08:07

## 2021-08-06 RX ADMIN — HEPARIN SODIUM 5000 UNIT(S): 5000 INJECTION INTRAVENOUS; SUBCUTANEOUS at 14:51

## 2021-08-06 RX ADMIN — Medication 25 MILLIGRAM(S): at 17:50

## 2021-08-06 RX ADMIN — ATORVASTATIN CALCIUM 80 MILLIGRAM(S): 80 TABLET, FILM COATED ORAL at 22:26

## 2021-08-06 RX ADMIN — Medication 81 MILLIGRAM(S): at 14:51

## 2021-08-06 RX ADMIN — Medication 400 MILLIGRAM(S): at 14:51

## 2021-08-06 RX ADMIN — HEPARIN SODIUM 5000 UNIT(S): 5000 INJECTION INTRAVENOUS; SUBCUTANEOUS at 06:07

## 2021-08-06 RX ADMIN — Medication 25 MILLIGRAM(S): at 06:05

## 2021-08-06 RX ADMIN — GABAPENTIN 200 MILLIGRAM(S): 400 CAPSULE ORAL at 22:26

## 2021-08-06 NOTE — PROGRESS NOTE ADULT - ASSESSMENT
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 Rehab of acute lacunar infarcts in the deep white matter of the left frontal lobe at the level fo the centrum semiovale and chronic left sided weakness from CVA of 2017.    # Rehab of chronic left hemiparisis (dominant), and dysarthria and dysphagia with decline in function and falls, found to have an acute left subcortical infarct.   - MRI Acute lacunar infarcts involving the deep white matter of the left frontal lobe at the level of the centrum semiovale. No acute hemorrhage.  - Neurology was consulted and no intervention was recommended; no intervention at this time. Patient can Follow up with neurology at the clinic, as per conversation with neuro PA. Was already on DAPT and high dose statin.  - Patient requires PT/OT/SLT/Neuropsych eval   - Loop recorder placement  8/4/21   - Ambulates with min assist w/ device    # Dizziness and fall 2/2 orthostatic hypotension   - continue with HI stockings and abdominal binder  - continue midodrine 2x a day  - home medications of flomax, tizanidine were held by medicine team prior to rehab admission; valium was changed from 10 mg standing to 5 mg PRN by medicine per neurology recommendations for anxiety  - Became symptomatic again after 1 dose of Uroxatral now discontinued. No further episodes of dizziness.     # LE pain likely secondary to PAD   - Significant atherosclerotic disease noted in bilateral common femoral arteries  - Patient's family and vascular team agreed to not proceed with intervention at this time   - no symptoms reported since on Rehab    # CAD s/p CABG 2009  - 6/2021 :patent SALAZAR to LAD , patent SVG to OM , % , filled distally by collaterals from [facility].  - Continue DAPT ( Aspirin 81 mg daily and Plavix 75 mg daily ), ARB, Imdur, Ranexa, B-Blocker, Statin Therapy  - Cardio f/u OP     # HTN  - now controlled  - losartan 100mg discontinued for SAMANTA   - c/w nifedipine 60mg xL q24h   - Need to keep BP in higher range given symptomatic orthostasis    #HLD  - continue with atorvastatin and fenofibrate     # CKD 3  # Urinary retention  - Cr 1.3 in 2017, 1.5 in 2019, 1.7 in June; Current Cr trend 2.1 -> 1.9-- > 2.1 --> 1.8--> 1,9.   - Renal bladder US showed simple cysts within both kidneys without hydronephrosis or nephrolithiasis   - Monitor Cr, IVF as needed   - Has been stable  - attempted TOV, patient failed, but now having increased spontaneous void.  Now receiving PVR q 6 hrs with strt cath prn.      # DM type 2  - fair control on carb consistent diet alone.     # Depression   - venlafaxine     # Anxiety   - Valium PRN as above    # Maintenance   - Pain control: none   - GI/Bowel Mgmt: miralax ,senna   - Bladder management: Straight cath q8h for urinary retention   - Skin: No active issues at this time  - FEN: monitor electrolytes, replete as needed   - Diet: Diet, DASH/TLC:   Sodium & Cholesterol Restricted  Consistent Carbohydrate No Snacks (07-28-21 @ 15:46) [Active]      # Precautions / PROPHYLAXIS:    - Fall precaution   - Ortho: Weight bearing status: WBAT  - DVT prophylaxis: SC heparin

## 2021-08-06 NOTE — PROGRESS NOTE ADULT - SUBJECTIVE AND OBJECTIVE BOX
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 Patient says he has been feeling intermittently dizzy over three days  despite having adequate/normal PO intake. Pt stated that these episodes of dizziness occurs after getting up from a sitting or supine position. Patient fell at home after feeling dizzy hitting the back of his head, but with no LOC. Pt was not on AC prior to admission. Pt also got out of his car, felt dizzy and then fell straight down hitting his buttocks and R knee. Denies head trauma. Patient was admitted on 7/20/21 to internal medicine for falls and dizziness 2/2 orthostatic hypotension.   CT spine 7/20/21 showed no evidence of acute cervical spine fracture or subluxation. Multilevel severe degenerative changes as described, with severe spinal noted stenosis at C2-3 and C3-4 and multilevel severe neural foraminal stenosis.  CTAP 7/20/21 showed no definite evidence of acute traumatic injury within the chest, abdomen, or pelvis.   CTH 7/21/21 showed no evidence of acute intracranial hemorrhage. Probable chronic infarct within the right posterior frontal white matter. Lacunar infarcts within bilateral white matter and deep gray nuclei of indeterminate age. Mild/moderate chronic microvascular changes.  Chest X-Ray negative for pneumothorax, infiltrate, or effusion. XR Pelvis negative for fx's or dislocations.    Patient's hospital course was complicated by lower extremity claudication. Bilateral lower extremity ultrasounds were performed on 7/23/21, which revealed severely diminished flow noted in the right and left superficial femoral artery suggestive of a proximal superficial femoral or common femoral artery stenosis. Significant atherosclerotic disease noted in the bilateral common femoral arteries. Vascular surgery was consulted for LLE Angiogram with possible endovascular revascularization; cardiac clearance was obtained but the team in conjunction with the family had decided to not go through with intervention.     Patient's orthostatic hypotension improved with midodrine, compression stocking, abdominal binder.     MRA 7/23/21 showed the following: MRA of the neck is limited by motion. Evaluation of the distal common carotid proximal internal carotid arteries appear grossly unremarkable though better quality study is recommended or CTA of the neck can be done for further evaluation. Evaluation of the proximal external carotid arteries are limited by motion. MRA of the [place] demonstrates decreased caliber of the distal right internal carotid artery. Short segment of stenosis is suspected involving both posterior cerebral arteries.    CTH 7/24/21 showed the following: No acute/traumatic intracranial pathology. Microvascular ischemic changes and unchanged probable chronic infarct within the right posterior frontal lobe.  MRI Head 7/24/21 showed acute lacunar infarcts involving the deep white matter of the left frontal lobe at the level of the centrum semiovale. No acute hemorrhage.  Neurology followed the patient and did not recommend intervention; patient is to continue aspirin and plavix.    Patient's prior level of function included independence with ADLs and independence with ambulation with a RW.   Patient's level of function on admission is min assist with bed mobility and min assist with transfers, patient is able to ambulate 4 small steps with rolling walker min assist.     Patient was evaluated by Dr. Ingram, a physical medicine and rehabilitation specialist and was found to be an excellent candidate for acute rehabilitation. Patient would benefit from 3 hours of interdisciplinary therapy per day.     Patient was admitted to rehab for acute lacunar infarcts in the deep white matter of the left frontal lobe at the level of the centrum semiovale and chronic left sided weakness from CVA of 2017 with a significant decline in his baseline function of independent with a straight cane.      TODAY'S SUBJECTIVE & REVIEW OF SYMPTOMS:  Patient is doing well. Still having urinary retention. Having increased spontaneous urination. He has been receiving PVR bladder scans q6h and straight cath prn. No further episodes of dizziness after discontinuing Uroxatral.    CLOF: Transfers and ambulates with min assist w/ device     Constiutional:    [ x ] WNL           [   ] poor appetite   [   ] insomnia   [   ] tired   Cardio:                [ x ] WNL           [   ] CP   [   ] PRADO   [   ] palpitations               Resp:                   [ x ] WNL           [   ] SOB   [   ] cough   [   ] wheezing   GI:                        [ x ] WNL           [   ] constipation   [   ] diarrhea   [   ] abdominal pain   [   ] nausea   [   ] emesis                                :                      [  ] WNL           [   ] HOGUE  [   ] dysuria   [  x ] difficulty voiding   - strt cath q 8h           Endo:                   [ x ] WNL          [   ] polyuria   [   ] temperature intolerance                 Skin:                     [ x ] WNL          [   ] pain   [   ] wound   [   ] rash   MSK:                    [ x ] WNL          [   ] muscle pain   [   ] joint pain/ stiffness   [   ] muscle tenderness   [   ] swelling   Neuro:                 [  ] WNL           [ x ]  as per HPI            Cognitive:           [ x ] WNL           [   ]confusion      Psych:                  [ x ] WNL           [   ] hallucinations   [   ]agitation   [   ] delusion   [   ]depression      PHYSICAL EXAM    Vital Signs Last 24 Hrs  T(C): 35.6 (06 Aug 2021 04:55), Max: 35.8 (05 Aug 2021 12:33)  T(F): 96.1 (06 Aug 2021 04:55), Max: 96.5 (05 Aug 2021 12:33)  HR: 74 (06 Aug 2021 04:55) (74 - 86)  BP: 151/71 (06 Aug 2021 04:55) (128/69 - 151/71)  BP(mean): --  RR: 18 (06 Aug 2021 04:55) (18 - 18)  SpO2: --    Constitutional - [ x ] NAD, Comfortable        [   ] other:  Chest - [ x ] CTA     [   ] other:  Cardiovascular - [ x ] RRR, no murmer     [   ] other:  Abdomen - [ x ] Soft, NT/ND      [   ] other:        -  [ x  ] NO HOGUE CATHETER   [  ] YES  if yes: [  ] NO MEATAL TEAR OR DISCHARGE [   ] other:  Extremities - [ x ] No C/C/E, No calf tenderness       [   ] other:  ROM - [ x ] WFL     [   ] other:  Neurologic Exam -                 Cognitive - [ x ]Awake, Alert, AAO to self, place, date, year, situation         [    ] other:      Communication - [   ]Fluent, No dysarthria       [ x ] other: mildly dysarthric      Motor - No focal deficits                    Right UE -  [ x ] WNL      [    ] other:                    Left UE -     [   ] WNL      [ x ] other: L arm shoulder flexion 4/5, L hand grasp 4-/5                    Right LE -   [ x ] WNL       [    ] other:                    Left LE -      [   ]WNL       [ x ] other: L hip flexion 3-/5,  3+/5 distally     Sensory - [   ] Intact to LT      [ x ] other: L sided hemisensory loss      Reflexes - [ x ] wnl/ symmetric     [   ] other:     Psychiatric - [ x ]Mood stable, Affect WNL     [   ]other:     Skin - [ x ] intact      [   ] other      MEDICATIONS  (STANDING):  aspirin  chewable 81 milliGRAM(s) Oral daily  atorvastatin 80 milliGRAM(s) Oral at bedtime  clopidogrel Tablet 75 milliGRAM(s) Oral daily  fenofibrate Tablet 145 milliGRAM(s) Oral daily  gabapentin 200 milliGRAM(s) Oral at bedtime  gabapentin 100 milliGRAM(s) Oral <User Schedule>  heparin   Injectable 5000 Unit(s) SubCutaneous every 8 hours  isosorbide   mononitrate ER Tablet (IMDUR) 60 milliGRAM(s) Oral daily  metoprolol succinate ER 25 milliGRAM(s) Oral every 12 hours  midodrine. 2.5 milliGRAM(s) Oral <User Schedule>  NIFEdipine XL 60 milliGRAM(s) Oral daily  pentoxifylline 400 milliGRAM(s) Oral three times a day  ranolazine 500 milliGRAM(s) Oral two times a day  senna 2 Tablet(s) Oral at bedtime  venlafaxine XR. 75 milliGRAM(s) Oral daily    MEDICATIONS  (PRN):  acetaminophen   Tablet .. 975 milliGRAM(s) Oral every 6 hours PRN Temp greater or equal to 38C (100.4F), Mild Pain (1 - 3), Moderate Pain (4 - 6)  aluminum hydroxide/magnesium hydroxide/simethicone Suspension 30 milliLiter(s) Oral every 4 hours PRN Dyspepsia  diazepam    Tablet 5 milliGRAM(s) Oral daily PRN anxiety  polyethylene glycol 3350 17 Gram(s) Oral daily PRN Constipation          LABS / IMAGING                        13.8   9.58  )-----------( 316      ( 02 Aug 2021 07:19 )             43.5     08-02    140  |  103  |  38<H>  ----------------------------<  124<H>  4.4   |  22  |  1.9<H>    Ca    10.1      02 Aug 2021 07:19  Mg     2.3     08-02    TPro  7.3  /  Alb  4.7  /  TBili  0.4  /  DBili  x   /  AST  19  /  ALT  16  /  AlkPhos  41  08-02      POCT Blood Glucose.: 120 mg/dL (08-06-21 @ 08:10)  POCT Blood Glucose.: 145 mg/dL (08-05-21 @ 21:20)  POCT Blood Glucose.: 138 mg/dL (08-05-21 @ 16:14)  POCT Blood Glucose.: 119 mg/dL (08-05-21 @ 10:58)  POCT Blood Glucose.: 133 mg/dL (08-05-21 @ 07:30)  POCT Blood Glucose.: 117 mg/dL (08-04-21 @ 21:09)  POCT Blood Glucose.: 125 mg/dL (08-04-21 @ 16:23)  POCT Blood Glucose.: 108 mg/dL (08-04-21 @ 07:16)  POCT Blood Glucose.: 126 mg/dL (08-03-21 @ 20:33)  POCT Blood Glucose.: 113 mg/dL (08-03-21 @ 16:22)  POCT Blood Glucose.: 132 mg/dL (08-03-21 @ 12:03)  POCT Blood Glucose.: 130 mg/dL (08-03-21 @ 07:31)        Thyroid Stimulating Hormone, Serum: 2.56 uIU/mL (08.02.21 @ 07:19)                 HPI:  76 yo M with PMH of CAD s/p CABG x3 2017 ([place]), 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 Patient says he has been feeling intermittently dizzy over three days  despite having adequate/normal PO intake. Pt stated that these episodes of dizziness occurs after getting up from a sitting or supine position. Patient fell at home after feeling dizzy hitting the back of his head, but with no LOC. Pt was not on AC prior to admission. Pt also got out of his car, felt dizzy and then fell straight down hitting his buttocks and R knee. Denies head trauma. Patient was admitted on 7/20/21 to internal medicine for falls and dizziness 2/2 orthostatic hypotension.   CT spine 7/20/21 showed no evidence of acute cervical spine fracture or subluxation. Multilevel severe degenerative changes as described, with severe spinal noted stenosis at C2-3 and C3-4 and multilevel severe neural foraminal stenosis.  CTAP 7/20/21 showed no definite evidence of acute traumatic injury within the chest, abdomen, or pelvis.   CTH 7/21/21 showed no evidence of acute intracranial hemorrhage. Probable chronic infarct within the right posterior frontal white matter. Lacunar infarcts within bilateral white matter and deep gray nuclei of indeterminate age. Mild/moderate chronic microvascular changes.  Chest X-Ray negative for pneumothorax, infiltrate, or effusion. XR Pelvis negative for fx's or dislocations.    Patient's hospital course was complicated by lower extremity claudication. Bilateral lower extremity ultrasounds were performed on 7/23/21, which revealed severely diminished flow noted in the right and left superficial femoral artery suggestive of a proximal superficial femoral or common femoral artery stenosis. Significant atherosclerotic disease noted in the bilateral common femoral arteries. Vascular surgery was consulted for LLE Angiogram with possible endovascular revascularization; cardiac clearance was obtained but the team in conjunction with the family had decided to not go through with intervention.     Patient's orthostatic hypotension improved with midodrine, compression stocking, abdominal binder.     MRA 7/23/21 showed the following: MRA of the neck is limited by motion. Evaluation of the distal common carotid proximal internal carotid arteries appear grossly unremarkable though better quality study is recommended or CTA of the neck can be done for further evaluation. Evaluation of the proximal external carotid arteries are limited by motion. MRA of the [place] demonstrates decreased caliber of the distal right internal carotid artery. Short segment of stenosis is suspected involving both posterior cerebral arteries.    CTH 7/24/21 showed the following: No acute/traumatic intracranial pathology. Microvascular ischemic changes and unchanged probable chronic infarct within the right posterior frontal lobe.  MRI Head 7/24/21 showed acute lacunar infarcts involving the deep white matter of the left frontal lobe at the level of the centrum semiovale. No acute hemorrhage.  Neurology followed the patient and did not recommend intervention; patient is to continue aspirin and plavix.    Patient's prior level of function included independence with ADLs and independence with ambulation with a RW.   Patient's level of function on admission is min assist with bed mobility and min assist with transfers, patient is able to ambulate 4 small steps with rolling walker min assist.     Patient was evaluated by Dr. Ingram, a physical medicine and rehabilitation specialist and was found to be an excellent candidate for acute rehabilitation. Patient would benefit from 3 hours of interdisciplinary therapy per day.     Patient was admitted to rehab for acute lacunar infarcts in the deep white matter of the left frontal lobe at the level of the centrum semiovale and chronic left sided weakness from CVA of 2017 with a significant decline in his baseline function of independent with a straight cane.      TODAY'S SUBJECTIVE & REVIEW OF SYMPTOMS:  Patient is doing well. Still having urinary retention. Having some spontaneous urination. He has been getting PVR and strt cath q8 hrs. He is willing to learn strt cath self  He had an episode of dizziness yesterday after getting up felt to be symptomatic orthostasis. He has received his first dose of Uroxatral the night before which was the d/c'd. He has been asymptomatic since then.    CLOF: Transfers and ambulates with min assist w/ device     Constiutional:    [ x ] WNL           [   ] poor appetite   [   ] insomnia   [   ] tired   Cardio:                [ x ] WNL           [   ] CP   [   ] PRADO   [   ] palpitations               Resp:                   [ x ] WNL           [   ] SOB   [   ] cough   [   ] wheezing   GI:                        [ x ] WNL           [   ] constipation   [   ] diarrhea   [   ] abdominal pain   [   ] nausea   [   ] emesis                                :                      [  ] WNL           [   ] HOGUE  [   ] dysuria   [  x ] difficulty voiding   - strt cath q 8h           Endo:                   [ x ] WNL          [   ] polyuria   [   ] temperature intolerance                 Skin:                     [ x ] WNL          [   ] pain   [   ] wound   [   ] rash   MSK:                    [ x ] WNL          [   ] muscle pain   [   ] joint pain/ stiffness   [   ] muscle tenderness   [   ] swelling   Neuro:                 [  ] WNL           [ x ]  as per HPI            Cognitive:           [ x ] WNL           [   ]confusion      Psych:                  [ x ] WNL           [   ] hallucinations   [   ]agitation   [   ] delusion   [   ]depression      PHYSICAL EXAM    Vital Signs Last 24 Hrs  T(C): 35.6 (06 Aug 2021 04:55), Max: 35.8 (05 Aug 2021 12:33)  T(F): 96.1 (06 Aug 2021 04:55), Max: 96.5 (05 Aug 2021 12:33)  HR: 74 (06 Aug 2021 04:55) (74 - 86)  BP: 151/71 (06 Aug 2021 04:55) (128/69 - 151/71)  BP(mean): --  RR: 18 (06 Aug 2021 04:55) (18 - 18)  SpO2: --    Constitutional - [ x ] NAD, Comfortable        [   ] other:  Chest - [ x ] CTA     [   ] other:  Cardiovascular - [ x ] RRR, no murmer     [   ] other:  Abdomen - [ x ] Soft, NT/ND      [   ] other:        -  [ x  ] NO HOGUE CATHETER   [  ] YES  if yes: [  ] NO MEATAL TEAR OR DISCHARGE [   ] other:  Extremities - [ x ] No C/C/E, No calf tenderness       [   ] other:  ROM - [ x ] WFL     [   ] other:  Neurologic Exam -                 Cognitive - [ x ]Awake, Alert, AAO to self, place, date, year, situation         [    ] other:      Communication - [   ]Fluent, No dysarthria       [ x ] other: mildly dysarthric      Motor - No focal deficits                    Right UE -  [ x ] WNL      [    ] other:                    Left UE -     [   ] WNL      [ x ] other: LUE 4/5                    Right LE -   [ x ] WNL       [    ] other:                    Left LE -      [   ]WNL       [ x ] other: LLE 4/5     Sensory - [   ] Intact to LT      [ x ] other: L sided hemisensory loss      Reflexes - [ x ] wnl/ symmetric     [   ] other:     Psychiatric - [ x ]Mood stable, Affect WNL     [   ]other:     Skin - [ x ] intact      [   ] other      MEDICATIONS  (STANDING):  aspirin  chewable 81 milliGRAM(s) Oral daily  atorvastatin 80 milliGRAM(s) Oral at bedtime  clopidogrel Tablet 75 milliGRAM(s) Oral daily  fenofibrate Tablet 145 milliGRAM(s) Oral daily  gabapentin 200 milliGRAM(s) Oral at bedtime  gabapentin 100 milliGRAM(s) Oral <User Schedule>  heparin   Injectable 5000 Unit(s) SubCutaneous every 8 hours  isosorbide   mononitrate ER Tablet (IMDUR) 60 milliGRAM(s) Oral daily  metoprolol succinate ER 25 milliGRAM(s) Oral every 12 hours  midodrine. 2.5 milliGRAM(s) Oral <User Schedule>  NIFEdipine XL 60 milliGRAM(s) Oral daily  pentoxifylline 400 milliGRAM(s) Oral three times a day  ranolazine 500 milliGRAM(s) Oral two times a day  senna 2 Tablet(s) Oral at bedtime  venlafaxine XR. 75 milliGRAM(s) Oral daily    MEDICATIONS  (PRN):  acetaminophen   Tablet .. 975 milliGRAM(s) Oral every 6 hours PRN Temp greater or equal to 38C (100.4F), Mild Pain (1 - 3), Moderate Pain (4 - 6)  aluminum hydroxide/magnesium hydroxide/simethicone Suspension 30 milliLiter(s) Oral every 4 hours PRN Dyspepsia  diazepam    Tablet 5 milliGRAM(s) Oral daily PRN anxiety  polyethylene glycol 3350 17 Gram(s) Oral daily PRN Constipation          LABS / IMAGING                        13.8   9.58  )-----------( 316      ( 02 Aug 2021 07:19 )             43.5     08-02    140  |  103  |  38<H>  ----------------------------<  124<H>  4.4   |  22  |  1.9<H>    Ca    10.1      02 Aug 2021 07:19  Mg     2.3     08-02    TPro  7.3  /  Alb  4.7  /  TBili  0.4  /  DBili  x   /  AST  19  /  ALT  16  /  AlkPhos  41  08-02      POCT Blood Glucose.: 120 mg/dL (08-06-21 @ 08:10)  POCT Blood Glucose.: 145 mg/dL (08-05-21 @ 21:20)  POCT Blood Glucose.: 138 mg/dL (08-05-21 @ 16:14)  POCT Blood Glucose.: 119 mg/dL (08-05-21 @ 10:58)  POCT Blood Glucose.: 133 mg/dL (08-05-21 @ 07:30)  POCT Blood Glucose.: 117 mg/dL (08-04-21 @ 21:09)  POCT Blood Glucose.: 125 mg/dL (08-04-21 @ 16:23)  POCT Blood Glucose.: 108 mg/dL (08-04-21 @ 07:16)  POCT Blood Glucose.: 126 mg/dL (08-03-21 @ 20:33)  POCT Blood Glucose.: 113 mg/dL (08-03-21 @ 16:22)  POCT Blood Glucose.: 132 mg/dL (08-03-21 @ 12:03)  POCT Blood Glucose.: 130 mg/dL (08-03-21 @ 07:31)        Thyroid Stimulating Hormone, Serum: 2.56 uIU/mL (08.02.21 @ 07:19)                 HPI:  74 yo M with PMH of CAD s/p CABG x3 2017 ([place]), 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 Patient says he has been feeling intermittently dizzy over three days  despite having adequate/normal PO intake. Pt stated that these episodes of dizziness occurs after getting up from a sitting or supine position. Patient fell at home after feeling dizzy hitting the back of his head, but with no LOC. Pt was not on AC prior to admission. Pt also got out of his car, felt dizzy and then fell straight down hitting his buttocks and R knee. Denies head trauma. Patient was admitted on 7/20/21 to internal medicine for falls and dizziness 2/2 orthostatic hypotension.   CT spine 7/20/21 showed no evidence of acute cervical spine fracture or subluxation. Multilevel severe degenerative changes as described, with severe spinal noted stenosis at C2-3 and C3-4 and multilevel severe neural foraminal stenosis.  CTAP 7/20/21 showed no definite evidence of acute traumatic injury within the chest, abdomen, or pelvis.   CTH 7/21/21 showed no evidence of acute intracranial hemorrhage. Probable chronic infarct within the right posterior frontal white matter. Lacunar infarcts within bilateral white matter and deep gray nuclei of indeterminate age. Mild/moderate chronic microvascular changes.  Chest X-Ray negative for pneumothorax, infiltrate, or effusion. XR Pelvis negative for fx's or dislocations.    Patient's hospital course was complicated by lower extremity claudication. Bilateral lower extremity ultrasounds were performed on 7/23/21, which revealed severely diminished flow noted in the right and left superficial femoral artery suggestive of a proximal superficial femoral or common femoral artery stenosis. Significant atherosclerotic disease noted in the bilateral common femoral arteries. Vascular surgery was consulted for LLE Angiogram with possible endovascular revascularization; cardiac clearance was obtained but the team in conjunction with the family had decided to not go through with intervention.     Patient's orthostatic hypotension improved with midodrine, compression stocking, abdominal binder.     MRA 7/23/21 showed the following: MRA of the neck is limited by motion. Evaluation of the distal common carotid proximal internal carotid arteries appear grossly unremarkable though better quality study is recommended or CTA of the neck can be done for further evaluation. Evaluation of the proximal external carotid arteries are limited by motion. MRA of the [place] demonstrates decreased caliber of the distal right internal carotid artery. Short segment of stenosis is suspected involving both posterior cerebral arteries.    CTH 7/24/21 showed the following: No acute/traumatic intracranial pathology. Microvascular ischemic changes and unchanged probable chronic infarct within the right posterior frontal lobe.  MRI Head 7/24/21 showed acute lacunar infarcts involving the deep white matter of the left frontal lobe at the level of the centrum semiovale. No acute hemorrhage.  Neurology followed the patient and did not recommend intervention; patient is to continue aspirin and plavix.    Patient's prior level of function included independence with ADLs and independence with ambulation with a RW.   Patient's level of function on admission is min assist with bed mobility and min assist with transfers, patient is able to ambulate 4 small steps with rolling walker min assist.     Patient was evaluated by Dr. Ingram, a physical medicine and rehabilitation specialist and was found to be an excellent candidate for acute rehabilitation. Patient would benefit from 3 hours of interdisciplinary therapy per day.     Patient was admitted to rehab for acute lacunar infarcts in the deep white matter of the left frontal lobe at the level of the centrum semiovale and chronic left sided weakness from CVA of 2017 with a significant decline in his baseline function of independent with a straight cane.      TODAY'S SUBJECTIVE & REVIEW OF SYMPTOMS:  Patient is doing well. Still having urinary retention. Having some spontaneous urination. He has been getting PVR and strt cath q8 hrs. He is willing to learn strt cath self  He had an episode of dizziness yesterday after getting up felt to be symptomatic orthostasis. He has received his first dose of Uroxatral the night before which was the d/c'd. He has been asymptomatic since then.    CLOF: modA for bed mobility and transf. Thi / CG for ambulation 20ft. Thi/supervision for ADLs       Constiutional:    [ x ] WNL           [   ] poor appetite   [   ] insomnia   [   ] tired   Cardio:                [ x ] WNL           [   ] CP   [   ] PRADO   [   ] palpitations               Resp:                   [ x ] WNL           [   ] SOB   [   ] cough   [   ] wheezing   GI:                        [ x ] WNL           [   ] constipation   [   ] diarrhea   [   ] abdominal pain   [   ] nausea   [   ] emesis                                :                      [  ] WNL           [   ] HOGUE  [   ] dysuria   [  x ] difficulty voiding   - strt cath q 8h           Endo:                   [ x ] WNL          [   ] polyuria   [   ] temperature intolerance                 Skin:                     [ x ] WNL          [   ] pain   [   ] wound   [   ] rash   MSK:                    [ x ] WNL          [   ] muscle pain   [   ] joint pain/ stiffness   [   ] muscle tenderness   [   ] swelling   Neuro:                 [  ] WNL           [ x ]  as per HPI            Cognitive:           [ x ] WNL           [   ]confusion      Psych:                  [ x ] WNL           [   ] hallucinations   [   ]agitation   [   ] delusion   [   ]depression      PHYSICAL EXAM    Vital Signs Last 24 Hrs  T(C): 35.6 (06 Aug 2021 04:55), Max: 35.8 (05 Aug 2021 12:33)  T(F): 96.1 (06 Aug 2021 04:55), Max: 96.5 (05 Aug 2021 12:33)  HR: 74 (06 Aug 2021 04:55) (74 - 86)  BP: 151/71 (06 Aug 2021 04:55) (128/69 - 151/71)  BP(mean): --  RR: 18 (06 Aug 2021 04:55) (18 - 18)  SpO2: --    Constitutional - [ x ] NAD, Comfortable        [   ] other:  Chest - [ x ] CTA     [   ] other:  Cardiovascular - [ x ] RRR, no murmer     [   ] other:  Abdomen - [ x ] Soft, NT/ND      [   ] other:        -  [ x  ] NO HOGUE CATHETER   [  ] YES  if yes: [  ] NO MEATAL TEAR OR DISCHARGE [   ] other:  Extremities - [ x ] No C/C/E, No calf tenderness       [   ] other:  ROM - [ x ] WFL     [   ] other:  Neurologic Exam -                 Cognitive - [ x ]Awake, Alert, AAO to self, place, date, year, situation         [    ] other:      Communication - [   ]Fluent, No dysarthria       [ x ] other: mildly dysarthric      Motor - No focal deficits                    Right UE -  [ x ] WNL      [    ] other:                    Left UE -     [   ] WNL      [ x ] other: LUE 4/5                    Right LE -   [ x ] WNL       [    ] other:                    Left LE -      [   ]WNL       [ x ] other: LLE 4/5     Sensory - [   ] Intact to LT      [ x ] other: L sided hemisensory loss      Reflexes - [ x ] wnl/ symmetric     [   ] other:     Psychiatric - [ x ]Mood stable, Affect WNL     [   ]other:     Skin - [ x ] intact      [   ] other      MEDICATIONS  (STANDING):  aspirin  chewable 81 milliGRAM(s) Oral daily  atorvastatin 80 milliGRAM(s) Oral at bedtime  clopidogrel Tablet 75 milliGRAM(s) Oral daily  fenofibrate Tablet 145 milliGRAM(s) Oral daily  gabapentin 200 milliGRAM(s) Oral at bedtime  gabapentin 100 milliGRAM(s) Oral <User Schedule>  heparin   Injectable 5000 Unit(s) SubCutaneous every 8 hours  isosorbide   mononitrate ER Tablet (IMDUR) 60 milliGRAM(s) Oral daily  metoprolol succinate ER 25 milliGRAM(s) Oral every 12 hours  midodrine. 2.5 milliGRAM(s) Oral <User Schedule>  NIFEdipine XL 60 milliGRAM(s) Oral daily  pentoxifylline 400 milliGRAM(s) Oral three times a day  ranolazine 500 milliGRAM(s) Oral two times a day  senna 2 Tablet(s) Oral at bedtime  venlafaxine XR. 75 milliGRAM(s) Oral daily    MEDICATIONS  (PRN):  acetaminophen   Tablet .. 975 milliGRAM(s) Oral every 6 hours PRN Temp greater or equal to 38C (100.4F), Mild Pain (1 - 3), Moderate Pain (4 - 6)  aluminum hydroxide/magnesium hydroxide/simethicone Suspension 30 milliLiter(s) Oral every 4 hours PRN Dyspepsia  diazepam    Tablet 5 milliGRAM(s) Oral daily PRN anxiety  polyethylene glycol 3350 17 Gram(s) Oral daily PRN Constipation          LABS / IMAGING                        13.8   9.58  )-----------( 316      ( 02 Aug 2021 07:19 )             43.5     08-02    140  |  103  |  38<H>  ----------------------------<  124<H>  4.4   |  22  |  1.9<H>    Ca    10.1      02 Aug 2021 07:19  Mg     2.3     08-02    TPro  7.3  /  Alb  4.7  /  TBili  0.4  /  DBili  x   /  AST  19  /  ALT  16  /  AlkPhos  41  08-02      POCT Blood Glucose.: 120 mg/dL (08-06-21 @ 08:10)  POCT Blood Glucose.: 145 mg/dL (08-05-21 @ 21:20)  POCT Blood Glucose.: 138 mg/dL (08-05-21 @ 16:14)  POCT Blood Glucose.: 119 mg/dL (08-05-21 @ 10:58)  POCT Blood Glucose.: 133 mg/dL (08-05-21 @ 07:30)  POCT Blood Glucose.: 117 mg/dL (08-04-21 @ 21:09)  POCT Blood Glucose.: 125 mg/dL (08-04-21 @ 16:23)  POCT Blood Glucose.: 108 mg/dL (08-04-21 @ 07:16)  POCT Blood Glucose.: 126 mg/dL (08-03-21 @ 20:33)  POCT Blood Glucose.: 113 mg/dL (08-03-21 @ 16:22)  POCT Blood Glucose.: 132 mg/dL (08-03-21 @ 12:03)  POCT Blood Glucose.: 130 mg/dL (08-03-21 @ 07:31)        Thyroid Stimulating Hormone, Serum: 2.56 uIU/mL (08.02.21 @ 07:19)

## 2021-08-06 NOTE — PROGRESS NOTE ADULT - ATTENDING COMMENTS
I reviewed the chart and examined the patient with the resident and we discussed the findings and treatment plan.  The patient is tolerating the rehab program well. I agree with the findings and treatment plan above, which I modified as indicated. The patient requires 3 hrs a day of acute inpatient rehab.    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 Rehab of acute lacunar infarcts in the deep white matter of the left frontal lobe at the level fo the centrum semiovale and chronic left sided weakness from CVA of 2017.    # Rehab of chronic left hemiparisis (dominant), and dysarthria and dysphagia with decline in function and falls, found to have an acute left subcortical infarct.   - MRI Acute lacunar infarcts involving the deep white matter of the left frontal lobe at the level of the centrum semiovale. No acute hemorrhage.  - Neurology was consulted and no intervention was recommended; no intervention at this time. Patient can Follow up with neurology at the clinic, as per conversation with neuro PA. Was already on DAPT and high dose statin.  - Patient requires PT/OT/SLT/Neuropsych eval   - Loop recorder placement  8/4/21   - Ambulates with min assist w/ device    # Dizziness and fall 2/2 orthostatic hypotension   - continue with HI stockings and abdominal binder  - continue midodrine 2x a day  - home medications of flomax, tizanidine were held by medicine team prior to rehab admission; valium was changed from 10 mg standing to 5 mg PRN by medicine per neurology recommendations for anxiety  - Became symptomatic again after 1 dose of Uroxatral now discontinued. No further episodes of dizziness.     # LE pain likely secondary to PAD   - Significant atherosclerotic disease noted in bilateral common femoral arteries  - Patient's family and vascular team agreed to not proceed with intervention at this time   - no symptoms reported since on Rehab    # CAD s/p CABG 2009  - 6/2021 :patent SALAZAR to [facility] , patent SVG to OM , % , filled distally by collaterals from [facility].  - Continue DAPT ( Aspirin 81 mg daily and Plavix 75 mg daily ), ARB, Imdur, Ranexa, B-Blocker, Statin Therapy  - Cardio f/u OP     # HTN  - now controlled  - losartan 100mg discontinued for SAMANTA   - c/w nifedipine 60mg xL q24h   - Need to keep BP in higher range given symptomatic orthostasis    #HLD  - continue with atorvastatin and fenofibrate     # CKD 3  # Urinary retention  - Cr 1.3 in 2017, 1.5 in 2019, 1.7 in June; Current Cr trend 2.1 -> 1.9-- > 2.1 --> 1.8--> 1,9.   - Renal bladder US showed simple cysts within both kidneys without hydronephrosis or nephrolithiasis   - Monitor Cr, IVF as needed   - Has been stable  - attempted TOV, patient failed, but now having some spontaneous void.  Now receiving PVR q 8 hrs with strt cath q 8 hrs.  - He is willing to learn self catheterization.    # DM type 2  - fair control on carb consistent diet alone.     # Depression   - venlafaxine     # Anxiety   - Valium PRN as above    # Maintenance   - Pain control: none   - GI/Bowel Mgmt: miralax ,senna   - Bladder management: Straight cath q8h for urinary retention   - Skin: No active issues at this time  - FEN: monitor electrolytes, replete as needed   - Diet: Diet, DASH/TLC:   Sodium & Cholesterol Restricted  Consistent Carbohydrate No Snacks (07-28-21 @ 15:46) [Active]      # Precautions / PROPHYLAXIS:    - Fall precaution   - Ortho: Weight bearing status: WBAT  - DVT prophylaxis: SC heparin

## 2021-08-06 NOTE — PROGRESS NOTE ADULT - ASSESSMENT
Pain: Denied                  Location: N/A            Ratin/10                       Intervention: N/A  Orientation: Self /Place/ Event /Date (Month/Date/Year/Day/ Time  Arousal Level: Alert   Behavior: Pleasant/Cooperative  Affect Range: WFL     Needed: ? No        Attention: ? WFL   Insight into illness/deficits: Good  ?Treatment Session Focused on Coping  / Cognition     The patient was seen for follow up to discuss neuropsychological testing feedback. The patient had minimal questions about testing and displayed good understanding of the recommendations. He was open to altering his environment at home to account for newfound difficulties. He discussed having groceries delivered and other ways to address safety concerns. He demonstrated improved insight on potential driving limitations. He showed some frustration today due to having a bathroom accident. The clinician and patient processed his concerns.    Goals: ? Facilitate Positive Coping ? Family Support / Education   Plan: 1-2 times a week 30-60 minutes ? Individual Psychotherapy ? Family contact/support/education

## 2021-08-07 LAB
GLUCOSE BLDC GLUCOMTR-MCNC: 105 MG/DL — HIGH (ref 70–99)
GLUCOSE BLDC GLUCOMTR-MCNC: 111 MG/DL — HIGH (ref 70–99)
GLUCOSE BLDC GLUCOMTR-MCNC: 127 MG/DL — HIGH (ref 70–99)
GLUCOSE BLDC GLUCOMTR-MCNC: 164 MG/DL — HIGH (ref 70–99)

## 2021-08-07 RX ADMIN — Medication 75 MILLIGRAM(S): at 13:29

## 2021-08-07 RX ADMIN — ATORVASTATIN CALCIUM 80 MILLIGRAM(S): 80 TABLET, FILM COATED ORAL at 22:08

## 2021-08-07 RX ADMIN — Medication 25 MILLIGRAM(S): at 06:06

## 2021-08-07 RX ADMIN — Medication 145 MILLIGRAM(S): at 13:26

## 2021-08-07 RX ADMIN — Medication 400 MILLIGRAM(S): at 22:08

## 2021-08-07 RX ADMIN — MIDODRINE HYDROCHLORIDE 2.5 MILLIGRAM(S): 2.5 TABLET ORAL at 13:27

## 2021-08-07 RX ADMIN — HEPARIN SODIUM 5000 UNIT(S): 5000 INJECTION INTRAVENOUS; SUBCUTANEOUS at 13:29

## 2021-08-07 RX ADMIN — GABAPENTIN 100 MILLIGRAM(S): 400 CAPSULE ORAL at 08:05

## 2021-08-07 RX ADMIN — Medication 400 MILLIGRAM(S): at 06:06

## 2021-08-07 RX ADMIN — GABAPENTIN 200 MILLIGRAM(S): 400 CAPSULE ORAL at 22:08

## 2021-08-07 RX ADMIN — CLOPIDOGREL BISULFATE 75 MILLIGRAM(S): 75 TABLET, FILM COATED ORAL at 13:25

## 2021-08-07 RX ADMIN — Medication 25 MILLIGRAM(S): at 17:36

## 2021-08-07 RX ADMIN — MIDODRINE HYDROCHLORIDE 2.5 MILLIGRAM(S): 2.5 TABLET ORAL at 06:06

## 2021-08-07 RX ADMIN — Medication 60 MILLIGRAM(S): at 06:06

## 2021-08-07 RX ADMIN — RANOLAZINE 500 MILLIGRAM(S): 500 TABLET, FILM COATED, EXTENDED RELEASE ORAL at 17:36

## 2021-08-07 RX ADMIN — Medication 81 MILLIGRAM(S): at 13:26

## 2021-08-07 RX ADMIN — RANOLAZINE 500 MILLIGRAM(S): 500 TABLET, FILM COATED, EXTENDED RELEASE ORAL at 06:06

## 2021-08-07 RX ADMIN — ISOSORBIDE MONONITRATE 60 MILLIGRAM(S): 60 TABLET, EXTENDED RELEASE ORAL at 13:26

## 2021-08-07 RX ADMIN — HEPARIN SODIUM 5000 UNIT(S): 5000 INJECTION INTRAVENOUS; SUBCUTANEOUS at 22:08

## 2021-08-07 RX ADMIN — HEPARIN SODIUM 5000 UNIT(S): 5000 INJECTION INTRAVENOUS; SUBCUTANEOUS at 06:05

## 2021-08-07 RX ADMIN — Medication 400 MILLIGRAM(S): at 13:28

## 2021-08-07 NOTE — PROGRESS NOTE ADULT - SUBJECTIVE AND OBJECTIVE BOX
MEDICATIONS  (STANDING):  aspirin  chewable 81 milliGRAM(s) Oral daily  atorvastatin 80 milliGRAM(s) Oral at bedtime  clopidogrel Tablet 75 milliGRAM(s) Oral daily  fenofibrate Tablet 145 milliGRAM(s) Oral daily  gabapentin 200 milliGRAM(s) Oral at bedtime  gabapentin 100 milliGRAM(s) Oral <User Schedule>  heparin   Injectable 5000 Unit(s) SubCutaneous every 8 hours  isosorbide   mononitrate ER Tablet (IMDUR) 60 milliGRAM(s) Oral daily  metoprolol succinate ER 25 milliGRAM(s) Oral every 12 hours  midodrine. 2.5 milliGRAM(s) Oral <User Schedule>  NIFEdipine XL 60 milliGRAM(s) Oral daily  pentoxifylline 400 milliGRAM(s) Oral three times a day  ranolazine 500 milliGRAM(s) Oral two times a day  venlafaxine XR. 75 milliGRAM(s) Oral daily    MEDICATIONS  (PRN):  acetaminophen   Tablet .. 975 milliGRAM(s) Oral every 6 hours PRN Temp greater or equal to 38C (100.4F), Mild Pain (1 - 3), Moderate Pain (4 - 6)  aluminum hydroxide/magnesium hydroxide/simethicone Suspension 30 milliLiter(s) Oral every 4 hours PRN Dyspepsia  diazepam    Tablet 5 milliGRAM(s) Oral daily PRN anxiety  polyethylene glycol 3350 17 Gram(s) Oral daily PRN Constipation  senna 2 Tablet(s) Oral at bedtime PRN Constipation      Patient was stable overnight and expresses no complaint of waking every 2 hours overnight with urge to urinate.    T(C): 35.8 (08-07-21 @ 05:50), Max: 36.5 (08-06-21 @ 14:30)  HR: 75 (08-07-21 @ 05:50) (65 - 90)  BP: 145/67 (08-07-21 @ 05:50) (114/57 - 145/67)  RR: 20 (08-07-21 @ 05:50) (18 - 20)  SpO2: --      PE:    Alert   LUNGS- clear  COR- RRR S1S2  ABD- SOFT, NT  EXTR- w/o edema  NEURO- stable                      Rehab for CVA    Continue full acute rehab program.    Urine for UA MEDICATIONS  (STANDING):  aspirin  chewable 81 milliGRAM(s) Oral daily  atorvastatin 80 milliGRAM(s) Oral at bedtime  clopidogrel Tablet 75 milliGRAM(s) Oral daily  fenofibrate Tablet 145 milliGRAM(s) Oral daily  gabapentin 200 milliGRAM(s) Oral at bedtime  gabapentin 100 milliGRAM(s) Oral <User Schedule>  heparin   Injectable 5000 Unit(s) SubCutaneous every 8 hours  isosorbide   mononitrate ER Tablet (IMDUR) 60 milliGRAM(s) Oral daily  metoprolol succinate ER 25 milliGRAM(s) Oral every 12 hours  midodrine. 2.5 milliGRAM(s) Oral <User Schedule>  NIFEdipine XL 60 milliGRAM(s) Oral daily  pentoxifylline 400 milliGRAM(s) Oral three times a day  ranolazine 500 milliGRAM(s) Oral two times a day  venlafaxine XR. 75 milliGRAM(s) Oral daily    MEDICATIONS  (PRN):  acetaminophen   Tablet .. 975 milliGRAM(s) Oral every 6 hours PRN Temp greater or equal to 38C (100.4F), Mild Pain (1 - 3), Moderate Pain (4 - 6)  aluminum hydroxide/magnesium hydroxide/simethicone Suspension 30 milliLiter(s) Oral every 4 hours PRN Dyspepsia  diazepam    Tablet 5 milliGRAM(s) Oral daily PRN anxiety  polyethylene glycol 3350 17 Gram(s) Oral daily PRN Constipation  senna 2 Tablet(s) Oral at bedtime PRN Constipation      Patient was stable overnight and expresses no complaint of waking every 2 hours overnight with urge to urinate.    T(C): 35.8 (08-07-21 @ 05:50), Max: 36.5 (08-06-21 @ 14:30)  HR: 75 (08-07-21 @ 05:50) (65 - 90)  BP: 145/67 (08-07-21 @ 05:50) (114/57 - 145/67)  RR: 20 (08-07-21 @ 05:50) (18 - 20)  SpO2: --      PE:    Alert   LUNGS- clear  COR- RRR S1S2  ABD- SOFT, NT  EXTR- w/o edema  NEURO- stable                      Rehab for chronic left hemiparesis (dominant), dysarthria, and dysphagia with decline in function and falls, found to have an acute left subcortical infarct    Continue full acute rehab program.    Urine for UA

## 2021-08-08 LAB
GLUCOSE BLDC GLUCOMTR-MCNC: 123 MG/DL — HIGH (ref 70–99)
GLUCOSE BLDC GLUCOMTR-MCNC: 127 MG/DL — HIGH (ref 70–99)
GLUCOSE BLDC GLUCOMTR-MCNC: 134 MG/DL — HIGH (ref 70–99)
GLUCOSE BLDC GLUCOMTR-MCNC: 89 MG/DL — SIGNIFICANT CHANGE UP (ref 70–99)

## 2021-08-08 RX ADMIN — HEPARIN SODIUM 5000 UNIT(S): 5000 INJECTION INTRAVENOUS; SUBCUTANEOUS at 21:24

## 2021-08-08 RX ADMIN — Medication 60 MILLIGRAM(S): at 06:22

## 2021-08-08 RX ADMIN — Medication 81 MILLIGRAM(S): at 12:56

## 2021-08-08 RX ADMIN — Medication 25 MILLIGRAM(S): at 17:54

## 2021-08-08 RX ADMIN — RANOLAZINE 500 MILLIGRAM(S): 500 TABLET, FILM COATED, EXTENDED RELEASE ORAL at 17:54

## 2021-08-08 RX ADMIN — MIDODRINE HYDROCHLORIDE 2.5 MILLIGRAM(S): 2.5 TABLET ORAL at 07:55

## 2021-08-08 RX ADMIN — HEPARIN SODIUM 5000 UNIT(S): 5000 INJECTION INTRAVENOUS; SUBCUTANEOUS at 06:22

## 2021-08-08 RX ADMIN — ISOSORBIDE MONONITRATE 60 MILLIGRAM(S): 60 TABLET, EXTENDED RELEASE ORAL at 12:57

## 2021-08-08 RX ADMIN — Medication 400 MILLIGRAM(S): at 06:22

## 2021-08-08 RX ADMIN — Medication 400 MILLIGRAM(S): at 21:24

## 2021-08-08 RX ADMIN — RANOLAZINE 500 MILLIGRAM(S): 500 TABLET, FILM COATED, EXTENDED RELEASE ORAL at 06:22

## 2021-08-08 RX ADMIN — CLOPIDOGREL BISULFATE 75 MILLIGRAM(S): 75 TABLET, FILM COATED ORAL at 12:56

## 2021-08-08 RX ADMIN — ATORVASTATIN CALCIUM 80 MILLIGRAM(S): 80 TABLET, FILM COATED ORAL at 21:23

## 2021-08-08 RX ADMIN — Medication 75 MILLIGRAM(S): at 12:56

## 2021-08-08 RX ADMIN — GABAPENTIN 200 MILLIGRAM(S): 400 CAPSULE ORAL at 21:23

## 2021-08-08 RX ADMIN — GABAPENTIN 100 MILLIGRAM(S): 400 CAPSULE ORAL at 07:55

## 2021-08-08 RX ADMIN — HEPARIN SODIUM 5000 UNIT(S): 5000 INJECTION INTRAVENOUS; SUBCUTANEOUS at 13:41

## 2021-08-08 RX ADMIN — Medication 145 MILLIGRAM(S): at 12:56

## 2021-08-08 RX ADMIN — Medication 25 MILLIGRAM(S): at 06:22

## 2021-08-08 NOTE — PROGRESS NOTE ADULT - SUBJECTIVE AND OBJECTIVE BOX
MEDICATIONS  (STANDING):  aspirin  chewable 81 milliGRAM(s) Oral daily  atorvastatin 80 milliGRAM(s) Oral at bedtime  clopidogrel Tablet 75 milliGRAM(s) Oral daily  fenofibrate Tablet 145 milliGRAM(s) Oral daily  gabapentin 200 milliGRAM(s) Oral at bedtime  gabapentin 100 milliGRAM(s) Oral <User Schedule>  heparin   Injectable 5000 Unit(s) SubCutaneous every 8 hours  isosorbide   mononitrate ER Tablet (IMDUR) 60 milliGRAM(s) Oral daily  metoprolol succinate ER 25 milliGRAM(s) Oral every 12 hours  midodrine. 2.5 milliGRAM(s) Oral <User Schedule>  NIFEdipine XL 60 milliGRAM(s) Oral daily  pentoxifylline 400 milliGRAM(s) Oral three times a day  ranolazine 500 milliGRAM(s) Oral two times a day  venlafaxine XR. 75 milliGRAM(s) Oral daily    MEDICATIONS  (PRN):  acetaminophen   Tablet .. 975 milliGRAM(s) Oral every 6 hours PRN Temp greater or equal to 38C (100.4F), Mild Pain (1 - 3), Moderate Pain (4 - 6)  aluminum hydroxide/magnesium hydroxide/simethicone Suspension 30 milliLiter(s) Oral every 4 hours PRN Dyspepsia  diazepam    Tablet 5 milliGRAM(s) Oral daily PRN anxiety  polyethylene glycol 3350 17 Gram(s) Oral daily PRN Constipation  senna 2 Tablet(s) Oral at bedtime PRN Constipation      Patient was stable overnight and expresses no new complaints.    T(C): 35.4 (08-08-21 @ 04:45), Max: 36.2 (08-07-21 @ 20:52)  HR: 77 (08-08-21 @ 04:45) (75 - 77)  BP: 174/75 (08-08-21 @ 04:45) (134/64 - 174/75)  RR: 18 (08-08-21 @ 04:45) (18 - 20)  SpO2: --      PE:    Alert   LUNGS- clear  COR- RRR S1S2  ABD- SOFT, NT  EXTR- w/o edema  NEURO- stable                      Rehab for chronic left hemiparesis (dominant), dysarthria, and dysphagia with decline in function and falls, found to have an acute left subcortical infarct    Continue full acute rehab program.

## 2021-08-09 LAB
ALBUMIN SERPL ELPH-MCNC: 4.5 G/DL — SIGNIFICANT CHANGE UP (ref 3.5–5.2)
ALP SERPL-CCNC: 36 U/L — SIGNIFICANT CHANGE UP (ref 30–115)
ALT FLD-CCNC: 18 U/L — SIGNIFICANT CHANGE UP (ref 0–41)
ANION GAP SERPL CALC-SCNC: 10 MMOL/L — SIGNIFICANT CHANGE UP (ref 7–14)
APPEARANCE UR: CLEAR — SIGNIFICANT CHANGE UP
AST SERPL-CCNC: 22 U/L — SIGNIFICANT CHANGE UP (ref 0–41)
BACTERIA # UR AUTO: NEGATIVE — SIGNIFICANT CHANGE UP
BASOPHILS # BLD AUTO: 0.07 K/UL — SIGNIFICANT CHANGE UP (ref 0–0.2)
BASOPHILS NFR BLD AUTO: 0.7 % — SIGNIFICANT CHANGE UP (ref 0–1)
BILIRUB SERPL-MCNC: 0.3 MG/DL — SIGNIFICANT CHANGE UP (ref 0.2–1.2)
BILIRUB UR-MCNC: NEGATIVE — SIGNIFICANT CHANGE UP
BUN SERPL-MCNC: 31 MG/DL — HIGH (ref 10–20)
CALCIUM SERPL-MCNC: 10.2 MG/DL — HIGH (ref 8.5–10.1)
CHLORIDE SERPL-SCNC: 107 MMOL/L — SIGNIFICANT CHANGE UP (ref 98–110)
CO2 SERPL-SCNC: 25 MMOL/L — SIGNIFICANT CHANGE UP (ref 17–32)
COLOR SPEC: YELLOW — SIGNIFICANT CHANGE UP
CREAT SERPL-MCNC: 1.9 MG/DL — HIGH (ref 0.7–1.5)
DIFF PNL FLD: NEGATIVE — SIGNIFICANT CHANGE UP
EOSINOPHIL # BLD AUTO: 0.41 K/UL — SIGNIFICANT CHANGE UP (ref 0–0.7)
EOSINOPHIL NFR BLD AUTO: 4 % — SIGNIFICANT CHANGE UP (ref 0–8)
EPI CELLS # UR: 1 /HPF — SIGNIFICANT CHANGE UP (ref 0–5)
GLUCOSE BLDC GLUCOMTR-MCNC: 119 MG/DL — HIGH (ref 70–99)
GLUCOSE BLDC GLUCOMTR-MCNC: 123 MG/DL — HIGH (ref 70–99)
GLUCOSE BLDC GLUCOMTR-MCNC: 136 MG/DL — HIGH (ref 70–99)
GLUCOSE SERPL-MCNC: 115 MG/DL — HIGH (ref 70–99)
GLUCOSE UR QL: NEGATIVE — SIGNIFICANT CHANGE UP
HCT VFR BLD CALC: 40.8 % — LOW (ref 42–52)
HGB BLD-MCNC: 12.6 G/DL — LOW (ref 14–18)
HYALINE CASTS # UR AUTO: 0 /LPF — SIGNIFICANT CHANGE UP (ref 0–7)
IMM GRANULOCYTES NFR BLD AUTO: 0.6 % — HIGH (ref 0.1–0.3)
KETONES UR-MCNC: NEGATIVE — SIGNIFICANT CHANGE UP
LEUKOCYTE ESTERASE UR-ACNC: NEGATIVE — SIGNIFICANT CHANGE UP
LYMPHOCYTES # BLD AUTO: 1.87 K/UL — SIGNIFICANT CHANGE UP (ref 1.2–3.4)
LYMPHOCYTES # BLD AUTO: 18.2 % — LOW (ref 20.5–51.1)
MAGNESIUM SERPL-MCNC: 2.3 MG/DL — SIGNIFICANT CHANGE UP (ref 1.8–2.4)
MCHC RBC-ENTMCNC: 28.1 PG — SIGNIFICANT CHANGE UP (ref 27–31)
MCHC RBC-ENTMCNC: 30.9 G/DL — LOW (ref 32–37)
MCV RBC AUTO: 91.1 FL — SIGNIFICANT CHANGE UP (ref 80–94)
MONOCYTES # BLD AUTO: 0.73 K/UL — HIGH (ref 0.1–0.6)
MONOCYTES NFR BLD AUTO: 7.1 % — SIGNIFICANT CHANGE UP (ref 1.7–9.3)
NEUTROPHILS # BLD AUTO: 7.13 K/UL — HIGH (ref 1.4–6.5)
NEUTROPHILS NFR BLD AUTO: 69.4 % — SIGNIFICANT CHANGE UP (ref 42.2–75.2)
NITRITE UR-MCNC: NEGATIVE — SIGNIFICANT CHANGE UP
NRBC # BLD: 0 /100 WBCS — SIGNIFICANT CHANGE UP (ref 0–0)
PH UR: 6 — SIGNIFICANT CHANGE UP (ref 5–8)
PLATELET # BLD AUTO: 314 K/UL — SIGNIFICANT CHANGE UP (ref 130–400)
POTASSIUM SERPL-MCNC: 5.3 MMOL/L — HIGH (ref 3.5–5)
POTASSIUM SERPL-SCNC: 5.3 MMOL/L — HIGH (ref 3.5–5)
PROT SERPL-MCNC: 7 G/DL — SIGNIFICANT CHANGE UP (ref 6–8)
PROT UR-MCNC: ABNORMAL
RBC # BLD: 4.48 M/UL — LOW (ref 4.7–6.1)
RBC # FLD: 14.9 % — HIGH (ref 11.5–14.5)
RBC CASTS # UR COMP ASSIST: 1 /HPF — SIGNIFICANT CHANGE UP (ref 0–4)
SARS-COV-2 RNA SPEC QL NAA+PROBE: SIGNIFICANT CHANGE UP
SODIUM SERPL-SCNC: 142 MMOL/L — SIGNIFICANT CHANGE UP (ref 135–146)
SP GR SPEC: 1.02 — SIGNIFICANT CHANGE UP (ref 1.01–1.03)
UROBILINOGEN FLD QL: SIGNIFICANT CHANGE UP
WBC # BLD: 10.27 K/UL — SIGNIFICANT CHANGE UP (ref 4.8–10.8)
WBC # FLD AUTO: 10.27 K/UL — SIGNIFICANT CHANGE UP (ref 4.8–10.8)
WBC UR QL: 1 /HPF — SIGNIFICANT CHANGE UP (ref 0–5)

## 2021-08-09 RX ORDER — SODIUM ZIRCONIUM CYCLOSILICATE 10 G/10G
5 POWDER, FOR SUSPENSION ORAL ONCE
Refills: 0 | Status: COMPLETED | OUTPATIENT
Start: 2021-08-09 | End: 2021-08-09

## 2021-08-09 RX ORDER — SODIUM ZIRCONIUM CYCLOSILICATE 10 G/10G
10 POWDER, FOR SUSPENSION ORAL EVERY 12 HOURS
Refills: 0 | Status: DISCONTINUED | OUTPATIENT
Start: 2021-08-09 | End: 2021-08-09

## 2021-08-09 RX ADMIN — Medication 25 MILLIGRAM(S): at 17:41

## 2021-08-09 RX ADMIN — Medication 400 MILLIGRAM(S): at 21:40

## 2021-08-09 RX ADMIN — Medication 400 MILLIGRAM(S): at 14:30

## 2021-08-09 RX ADMIN — ATORVASTATIN CALCIUM 80 MILLIGRAM(S): 80 TABLET, FILM COATED ORAL at 21:40

## 2021-08-09 RX ADMIN — HEPARIN SODIUM 5000 UNIT(S): 5000 INJECTION INTRAVENOUS; SUBCUTANEOUS at 05:41

## 2021-08-09 RX ADMIN — HEPARIN SODIUM 5000 UNIT(S): 5000 INJECTION INTRAVENOUS; SUBCUTANEOUS at 14:29

## 2021-08-09 RX ADMIN — HEPARIN SODIUM 5000 UNIT(S): 5000 INJECTION INTRAVENOUS; SUBCUTANEOUS at 21:40

## 2021-08-09 RX ADMIN — CLOPIDOGREL BISULFATE 75 MILLIGRAM(S): 75 TABLET, FILM COATED ORAL at 12:28

## 2021-08-09 RX ADMIN — MIDODRINE HYDROCHLORIDE 2.5 MILLIGRAM(S): 2.5 TABLET ORAL at 12:28

## 2021-08-09 RX ADMIN — RANOLAZINE 500 MILLIGRAM(S): 500 TABLET, FILM COATED, EXTENDED RELEASE ORAL at 05:40

## 2021-08-09 RX ADMIN — Medication 60 MILLIGRAM(S): at 05:39

## 2021-08-09 RX ADMIN — GABAPENTIN 200 MILLIGRAM(S): 400 CAPSULE ORAL at 21:40

## 2021-08-09 RX ADMIN — Medication 81 MILLIGRAM(S): at 12:28

## 2021-08-09 RX ADMIN — Medication 75 MILLIGRAM(S): at 12:28

## 2021-08-09 RX ADMIN — GABAPENTIN 100 MILLIGRAM(S): 400 CAPSULE ORAL at 05:41

## 2021-08-09 RX ADMIN — SODIUM ZIRCONIUM CYCLOSILICATE 5 GRAM(S): 10 POWDER, FOR SUSPENSION ORAL at 12:25

## 2021-08-09 RX ADMIN — ISOSORBIDE MONONITRATE 60 MILLIGRAM(S): 60 TABLET, EXTENDED RELEASE ORAL at 12:28

## 2021-08-09 RX ADMIN — Medication 25 MILLIGRAM(S): at 05:40

## 2021-08-09 RX ADMIN — Medication 400 MILLIGRAM(S): at 05:40

## 2021-08-09 RX ADMIN — Medication 145 MILLIGRAM(S): at 12:28

## 2021-08-09 RX ADMIN — RANOLAZINE 500 MILLIGRAM(S): 500 TABLET, FILM COATED, EXTENDED RELEASE ORAL at 17:41

## 2021-08-09 NOTE — PROGRESS NOTE ADULT - ATTENDING COMMENTS
I reviewed the chart and examined the patient with the resident and we discussed the findings and treatment plan.  The patient is tolerating the rehab program well. I agree with the findings and treatment plan above, which I modified as indicated. The patient requires 3 hrs a day of acute inpatient rehab.    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 Rehab of acute lacunar infarcts in the deep white matter of the left frontal lobe at the level fo the centrum semiovale and chronic left sided weakness from CVA of 2017.    # Rehab of chronic left hemiparisis (dominant), and dysarthria and dysphagia with decline in function and falls, found to have an acute left subcortical infarct.   - MRI Acute lacunar infarcts involving the deep white matter of the left frontal lobe at the level of the centrum semiovale. No acute hemorrhage.  - Neurology was consulted and no intervention was recommended; no intervention at this time. Patient can Follow up with neurology at the clinic, as per conversation with neuro PA. Was already on DAPT and high dose statin.  - Patient requires PT/OT/SLT/Neuropsych eval   - Loop recorder placement  8/4/21   - Ambulates with min assist w/ device    # Dizziness and fall 2/2 orthostatic hypotension   - Now controlled  - continue with HI stockings and abdominal binder  - continue midodrine 2x a day  - home medications of flomax, tizanidine were held by medicine team prior to rehab admission; valium was changed from 10 mg standing to 5 mg PRN by medicine per neurology recommendations for anxiety  - Became symptomatic again after 1 dose of Uroxatral now discontinued. No further episodes of dizziness.     # LE pain likely secondary to PAD   - Significant atherosclerotic disease noted in bilateral common femoral arteries  - Patient's family and vascular team agreed to not proceed with intervention at this time   - no symptoms reported since on Rehab    # CAD s/p CABG 2009  - 6/2021 :patent SALAZAR to LAD , patent SVG to OM , % , filled distally by collaterals from [facility].  - Continue DAPT ( Aspirin 81 mg daily and Plavix 75 mg daily ), ARB, Imdur, Ranexa, B-Blocker, Statin Therapy  - Cardio f/u OP     # HTN  - now controlled  - losartan 100mg discontinued for SAMANTA   - c/w nifedipine 60mg xL q24h   - Need to keep BP in higher range given symptomatic orthostasis    #HLD  - continue with atorvastatin and fenofibrate     # CKD 3  # Urinary retention  - Cr 1.3 in 2017, 1.5 in 2019, 1.7 in June; Current Cr 1.9  - Renal bladder US showed simple cysts within both kidneys without hydronephrosis or nephrolithiasis   - Monitor Cr, IVF as needed   - Has been stable  - attempted TOV, patient failed, but now having increased spontaneous void.  Now receiving PVR q 6 hrs with strt cath prn.    - having increase urinary frequency with decrease PVRs. Monitor    # DM type 2  - fair control on carb consistent diet alone.     # Depression   - venlafaxine     # Anxiety   - Valium PRN as above    # Maintenance   - Pain control: none   - GI/Bowel Mgmt: miralax ,senna   - Bladder management: Straight cath q8h for urinary retention   - Skin: No active issues at this time  - FEN: monitor electrolytes, replete as needed   - Diet: Diet, DASH/TLC:   Sodium & Cholesterol Restricted  Consistent Carbohydrate No Snacks (07-28-21 @ 15:46) [Active]      # Precautions / PROPHYLAXIS:    - Fall precaution   - Ortho: Weight bearing status: WBAT  - DVT prophylaxis: SC heparin

## 2021-08-09 NOTE — PROGRESS NOTE ADULT - SUBJECTIVE AND OBJECTIVE BOX
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 Patient says he has been feeling intermittently dizzy over three days  despite having adequate/normal PO intake. Pt stated that these episodes of dizziness occurs after getting up from a sitting or supine position. Patient fell at home after feeling dizzy hitting the back of his head, but with no LOC. Pt was not on AC prior to admission. Pt also got out of his car, felt dizzy and then fell straight down hitting his buttocks and R knee. Denies head trauma. Patient was admitted on 7/20/21 to internal medicine for falls and dizziness 2/2 orthostatic hypotension.   CT spine 7/20/21 showed no evidence of acute cervical spine fracture or subluxation. Multilevel severe degenerative changes as described, with severe spinal noted stenosis at C2-3 and C3-4 and multilevel severe neural foraminal stenosis.  CTAP 7/20/21 showed no definite evidence of acute traumatic injury within the chest, abdomen, or pelvis.   CTH 7/21/21 showed no evidence of acute intracranial hemorrhage. Probable chronic infarct within the right posterior frontal white matter. Lacunar infarcts within bilateral white matter and deep gray nuclei of indeterminate age. Mild/moderate chronic microvascular changes.  Chest X-Ray negative for pneumothorax, infiltrate, or effusion. XR Pelvis negative for fx's or dislocations.    Patient's hospital course was complicated by lower extremity claudication. Bilateral lower extremity ultrasounds were performed on 7/23/21, which revealed severely diminished flow noted in the right and left superficial femoral artery suggestive of a proximal superficial femoral or common femoral artery stenosis. Significant atherosclerotic disease noted in the bilateral common femoral arteries. Vascular surgery was consulted for LLE Angiogram with possible endovascular revascularization; cardiac clearance was obtained but the team in conjunction with the family had decided to not go through with intervention.     Patient's orthostatic hypotension improved with midodrine, compression stocking, abdominal binder.     MRA 7/23/21 showed the following: MRA of the neck is limited by motion. Evaluation of the distal common carotid proximal internal carotid arteries appear grossly unremarkable though better quality study is recommended or CTA of the neck can be done for further evaluation. Evaluation of the proximal external carotid arteries are limited by motion. MRA of the [place] demonstrates decreased caliber of the distal right internal carotid artery. Short segment of stenosis is suspected involving both posterior cerebral arteries.    CTH 7/24/21 showed the following: No acute/traumatic intracranial pathology. Microvascular ischemic changes and unchanged probable chronic infarct within the right posterior frontal lobe.  MRI Head 7/24/21 showed acute lacunar infarcts involving the deep white matter of the left frontal lobe at the level of the centrum semiovale. No acute hemorrhage.  Neurology followed the patient and did not recommend intervention; patient is to continue aspirin and plavix.    Patient's prior level of function included independence with ADLs and independence with ambulation with a RW.   Patient's level of function on admission is min assist with bed mobility and min assist with transfers, patient is able to ambulate 4 small steps with rolling walker min assist.     Patient was evaluated by Dr. Ingram, a physical medicine and rehabilitation specialist and was found to be an excellent candidate for acute rehabilitation. Patient would benefit from 3 hours of interdisciplinary therapy per day.     Patient was admitted to rehab for acute lacunar infarcts in the deep white matter of the left frontal lobe at the level of the centrum semiovale and chronic left sided weakness from CVA of 2017 with a significant decline in his baseline function of independent with a straight cane.      TODAY'S SUBJECTIVE & REVIEW OF SYMPTOMS:  Patient is doing well. Reporting frequent spontaneous urination and post void residual scans have been minimal. Previously getting strt cath q8 hrs prn. He is willing to learn strt cath himself at home if necessary.   No longer complaining of dizziness. Patient was hypertensive to 180/80 overnight, midodrine held.     CLOF: modA for bed mobility and transf. Thi / CG for ambulation 20ft. Thi/supervision for ADLs       Constiutional:    [ x ] WNL           [   ] poor appetite   [   ] insomnia   [   ] tired   Cardio:                [ x ] WNL           [   ] CP   [   ] PRADO   [   ] palpitations               Resp:                   [ x ] WNL           [   ] SOB   [   ] cough   [   ] wheezing   GI:                        [ x ] WNL           [   ] constipation   [   ] diarrhea   [   ] abdominal pain   [   ] nausea   [   ] emesis                                :                      [  ] WNL           [   ] HOGUE  [   ] dysuria   [  x ] difficulty voiding   - strt cath q 8h           Endo:                   [ x ] WNL          [   ] polyuria   [   ] temperature intolerance                 Skin:                     [ x ] WNL          [   ] pain   [   ] wound   [   ] rash   MSK:                    [ x ] WNL          [   ] muscle pain   [   ] joint pain/ stiffness   [   ] muscle tenderness   [   ] swelling   Neuro:                 [  ] WNL           [ x ]  as per HPI            Cognitive:           [ x ] WNL           [   ]confusion      Psych:                  [ x ] WNL           [   ] hallucinations   [   ]agitation   [   ] delusion   [   ]depression      PHYSICAL EXAM    Vital Signs Last 24 Hrs  T(C): 35.7 (09 Aug 2021 06:13), Max: 36.9 (08 Aug 2021 22:55)  T(F): 96.3 (09 Aug 2021 06:13), Max: 98.5 (08 Aug 2021 22:55)  HR: 75 (09 Aug 2021 06:13) (75 - 78)  BP: 180/80 (09 Aug 2021 06:13) (135/65 - 180/80)  BP(mean): --  RR: 18 (09 Aug 2021 06:13) (18 - 20)  SpO2: --    Constitutional - [ x ] NAD, Comfortable        [   ] other:  Chest - [ x ] CTA     [   ] other:  Cardiovascular - [ x ] RRR, no murmer     [   ] other:  Abdomen - [ x ] Soft, NT/ND      [   ] other:        -  [ x  ] NO HOGUE CATHETER   [  ] YES  if yes: [  ] NO MEATAL TEAR OR DISCHARGE [   ] other:  Extremities - [ x ] No C/C/E, No calf tenderness       [   ] other:  ROM - [ x ] WFL     [   ] other:  Neurologic Exam -                 Cognitive - [ x ]Awake, Alert, AAO to self, place, date, year, situation         [    ] other:      Communication - [   ]Fluent, No dysarthria       [ x ] other: mildly dysarthric      Motor - No focal deficits                    Right UE -  [ x ] WNL      [    ] other:                    Left UE -     [   ] WNL      [ x ] other: LUE 4/5                    Right LE -   [ x ] WNL       [    ] other:                    Left LE -      [   ]WNL       [ x ] other: LLE 4/5     Sensory - [   ] Intact to LT      [ x ] other: L sided hemisensory loss      Reflexes - [ x ] wnl/ symmetric     [   ] other:     Psychiatric - [ x ]Mood stable, Affect WNL     [   ]other:     Skin - [ x ] intact      [   ] other    MEDICATIONS  (STANDING):  aspirin  chewable 81 milliGRAM(s) Oral daily  atorvastatin 80 milliGRAM(s) Oral at bedtime  clopidogrel Tablet 75 milliGRAM(s) Oral daily  fenofibrate Tablet 145 milliGRAM(s) Oral daily  gabapentin 200 milliGRAM(s) Oral at bedtime  gabapentin 100 milliGRAM(s) Oral <User Schedule>  heparin   Injectable 5000 Unit(s) SubCutaneous every 8 hours  isosorbide   mononitrate ER Tablet (IMDUR) 60 milliGRAM(s) Oral daily  metoprolol succinate ER 25 milliGRAM(s) Oral every 12 hours  midodrine. 2.5 milliGRAM(s) Oral <User Schedule>  NIFEdipine XL 60 milliGRAM(s) Oral daily  pentoxifylline 400 milliGRAM(s) Oral three times a day  ranolazine 500 milliGRAM(s) Oral two times a day  venlafaxine XR. 75 milliGRAM(s) Oral daily    MEDICATIONS  (PRN):  acetaminophen   Tablet .. 975 milliGRAM(s) Oral every 6 hours PRN Temp greater or equal to 38C (100.4F), Mild Pain (1 - 3), Moderate Pain (4 - 6)  aluminum hydroxide/magnesium hydroxide/simethicone Suspension 30 milliLiter(s) Oral every 4 hours PRN Dyspepsia  diazepam    Tablet 5 milliGRAM(s) Oral daily PRN anxiety  polyethylene glycol 3350 17 Gram(s) Oral daily PRN Constipation  senna 2 Tablet(s) Oral at bedtime PRN Constipation      LABS / IMAGING                         12.6   10.27 )-----------( 314      ( 09 Aug 2021 05:28 )             40.8     08-09    142  |  107  |  31<H>  ----------------------------<  115<H>  5.3<H>   |  25  |  1.9<H>    Ca    10.2<H>      09 Aug 2021 05:28  Mg     2.3     08-09    TPro  7.0  /  Alb  4.5  /  TBili  0.3  /  DBili  x   /  AST  22  /  ALT  18  /  AlkPhos  36  08-09        POCT Blood Glucose.: 119 mg/dL (08-09-21 @ 07:26)  POCT Blood Glucose.: 123 mg/dL (08-08-21 @ 22:19)  POCT Blood Glucose.: 134 mg/dL (08-08-21 @ 17:00)  POCT Blood Glucose.: 89 mg/dL (08-08-21 @ 11:55)  POCT Blood Glucose.: 127 mg/dL (08-08-21 @ 07:27)  POCT Blood Glucose.: 127 mg/dL (08-07-21 @ 21:28)  POCT Blood Glucose.: 105 mg/dL (08-07-21 @ 16:19)  POCT Blood Glucose.: 164 mg/dL (08-07-21 @ 11:39)  POCT Blood Glucose.: 111 mg/dL (08-07-21 @ 07:15)  POCT Blood Glucose.: 133 mg/dL (08-06-21 @ 16:11)  POCT Blood Glucose.: 141 mg/dL (08-06-21 @ 11:36)  POCT Blood Glucose.: 120 mg/dL (08-06-21 @ 08:10)        Thyroid Stimulating Hormone, Serum: 2.56 uIU/mL (08.02.21 @ 07:19)                 HPI:  74 yo M with PMH of CAD s/p CABG x3 2017 ([place]), 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 Patient says he has been feeling intermittently dizzy over three days  despite having adequate/normal PO intake. Pt stated that these episodes of dizziness occurs after getting up from a sitting or supine position. Patient fell at home after feeling dizzy hitting the back of his head, but with no LOC. Pt was not on AC prior to admission. Pt also got out of his car, felt dizzy and then fell straight down hitting his buttocks and R knee. Denies head trauma. Patient was admitted on 7/20/21 to internal medicine for falls and dizziness 2/2 orthostatic hypotension.   CT spine 7/20/21 showed no evidence of acute cervical spine fracture or subluxation. Multilevel severe degenerative changes as described, with severe spinal noted stenosis at C2-3 and C3-4 and multilevel severe neural foraminal stenosis.  CTAP 7/20/21 showed no definite evidence of acute traumatic injury within the chest, abdomen, or pelvis.   CTH 7/21/21 showed no evidence of acute intracranial hemorrhage. Probable chronic infarct within the right posterior frontal white matter. Lacunar infarcts within bilateral white matter and deep gray nuclei of indeterminate age. Mild/moderate chronic microvascular changes.  Chest X-Ray negative for pneumothorax, infiltrate, or effusion. XR Pelvis negative for fx's or dislocations.    Patient's hospital course was complicated by lower extremity claudication. Bilateral lower extremity ultrasounds were performed on 7/23/21, which revealed severely diminished flow noted in the right and left superficial femoral artery suggestive of a proximal superficial femoral or common femoral artery stenosis. Significant atherosclerotic disease noted in the bilateral common femoral arteries. Vascular surgery was consulted for LLE Angiogram with possible endovascular revascularization; cardiac clearance was obtained but the team in conjunction with the family had decided to not go through with intervention.     Patient's orthostatic hypotension improved with midodrine, compression stocking, abdominal binder.     MRA 7/23/21 showed the following: MRA of the neck is limited by motion. Evaluation of the distal common carotid proximal internal carotid arteries appear grossly unremarkable though better quality study is recommended or CTA of the neck can be done for further evaluation. Evaluation of the proximal external carotid arteries are limited by motion. MRA of the [place] demonstrates decreased caliber of the distal right internal carotid artery. Short segment of stenosis is suspected involving both posterior cerebral arteries.    CTH 7/24/21 showed the following: No acute/traumatic intracranial pathology. Microvascular ischemic changes and unchanged probable chronic infarct within the right posterior frontal lobe.  MRI Head 7/24/21 showed acute lacunar infarcts involving the deep white matter of the left frontal lobe at the level of the centrum semiovale. No acute hemorrhage.  Neurology followed the patient and did not recommend intervention; patient is to continue aspirin and plavix.    Patient's prior level of function included independence with ADLs and independence with ambulation with a RW.   Patient's level of function on admission is min assist with bed mobility and min assist with transfers, patient is able to ambulate 4 small steps with rolling walker min assist.     Patient was evaluated by Dr. Ingram, a physical medicine and rehabilitation specialist and was found to be an excellent candidate for acute rehabilitation. Patient would benefit from 3 hours of interdisciplinary therapy per day.     Patient was admitted to rehab for acute lacunar infarcts in the deep white matter of the left frontal lobe at the level of the centrum semiovale and chronic left sided weakness from CVA of 2017 with a significant decline in his baseline function of independent with a straight cane.      TODAY'S SUBJECTIVE & REVIEW OF SYMPTOMS:  Patient is doing well. Reporting frequent spontaneous urination. Post void residual bladder scans have been minimal. Previously getting strt cath q8 hrs prn. He is willing to learn strt cath himself at home if necessary.   No longer complaining of dizziness. Patient was hypertensive to 180/80 overnight, midodrine held.     CLOF: modA for bed mobility and transf. Thi / CG for ambulation 20ft. Thi/supervision for ADLs       Constiutional:    [ x ] WNL           [   ] poor appetite   [   ] insomnia   [   ] tired   Cardio:                [ x ] WNL           [   ] CP   [   ] PRADO   [   ] palpitations               Resp:                   [ x ] WNL           [   ] SOB   [   ] cough   [   ] wheezing   GI:                        [ x ] WNL           [   ] constipation   [   ] diarrhea   [   ] abdominal pain   [   ] nausea   [   ] emesis                                :                      [  ] WNL           [   ] HOGUE  [   ] dysuria   [  x ] difficulty voiding   - strt cath q 8h           Endo:                   [ x ] WNL          [   ] polyuria   [   ] temperature intolerance                 Skin:                     [ x ] WNL          [   ] pain   [   ] wound   [   ] rash   MSK:                    [ x ] WNL          [   ] muscle pain   [   ] joint pain/ stiffness   [   ] muscle tenderness   [   ] swelling   Neuro:                 [  ] WNL           [ x ]  as per HPI            Cognitive:           [ x ] WNL           [   ]confusion      Psych:                  [ x ] WNL           [   ] hallucinations   [   ]agitation   [   ] delusion   [   ]depression      PHYSICAL EXAM    Vital Signs Last 24 Hrs  T(C): 35.7 (09 Aug 2021 06:13), Max: 36.9 (08 Aug 2021 22:55)  T(F): 96.3 (09 Aug 2021 06:13), Max: 98.5 (08 Aug 2021 22:55)  HR: 75 (09 Aug 2021 06:13) (75 - 78)  BP: 180/80 (09 Aug 2021 06:13) (135/65 - 180/80)  BP(mean): --  RR: 18 (09 Aug 2021 06:13) (18 - 20)  SpO2: --    Constitutional - [ x ] NAD, Comfortable        [   ] other:  Chest - [ x ] CTA     [   ] other:  Cardiovascular - [ x ] RRR, no murmer     [   ] other:  Abdomen - [ x ] Soft, NT/ND      [   ] other:        -  [ x  ] NO HOGUE CATHETER   [  ] YES  if yes: [  ] NO MEATAL TEAR OR DISCHARGE [   ] other:  Extremities - [ x ] No C/C/E, No calf tenderness       [   ] other:  ROM - [ x ] WFL     [   ] other:  Neurologic Exam -                 Cognitive - [ x ]Awake, Alert, AAO to self, place, date, year, situation         [    ] other:      Communication - [   ]Fluent, No dysarthria       [ x ] other: mildly dysarthric      Motor - No focal deficits                    Right UE -  [ x ] WNL      [    ] other:                    Left UE -     [   ] WNL      [ x ] other: LUE 4/5                    Right LE -   [ x ] WNL       [    ] other:                    Left LE -      [   ]WNL       [ x ] other: LLE 4/5     Sensory - [   ] Intact to LT      [ x ] other: L sided hemisensory loss      Reflexes - [ x ] wnl/ symmetric     [   ] other:     Psychiatric - [ x ]Mood stable, Affect WNL     [   ]other:     Skin - [ x ] intact      [   ] other    MEDICATIONS  (STANDING):  aspirin  chewable 81 milliGRAM(s) Oral daily  atorvastatin 80 milliGRAM(s) Oral at bedtime  clopidogrel Tablet 75 milliGRAM(s) Oral daily  fenofibrate Tablet 145 milliGRAM(s) Oral daily  gabapentin 200 milliGRAM(s) Oral at bedtime  gabapentin 100 milliGRAM(s) Oral <User Schedule>  heparin   Injectable 5000 Unit(s) SubCutaneous every 8 hours  isosorbide   mononitrate ER Tablet (IMDUR) 60 milliGRAM(s) Oral daily  metoprolol succinate ER 25 milliGRAM(s) Oral every 12 hours  midodrine. 2.5 milliGRAM(s) Oral <User Schedule>  NIFEdipine XL 60 milliGRAM(s) Oral daily  pentoxifylline 400 milliGRAM(s) Oral three times a day  ranolazine 500 milliGRAM(s) Oral two times a day  venlafaxine XR. 75 milliGRAM(s) Oral daily    MEDICATIONS  (PRN):  acetaminophen   Tablet .. 975 milliGRAM(s) Oral every 6 hours PRN Temp greater or equal to 38C (100.4F), Mild Pain (1 - 3), Moderate Pain (4 - 6)  aluminum hydroxide/magnesium hydroxide/simethicone Suspension 30 milliLiter(s) Oral every 4 hours PRN Dyspepsia  diazepam    Tablet 5 milliGRAM(s) Oral daily PRN anxiety  polyethylene glycol 3350 17 Gram(s) Oral daily PRN Constipation  senna 2 Tablet(s) Oral at bedtime PRN Constipation      LABS / IMAGING                         12.6   10.27 )-----------( 314      ( 09 Aug 2021 05:28 )             40.8     08-09    142  |  107  |  31<H>  ----------------------------<  115<H>  5.3<H>   |  25  |  1.9<H>    Ca    10.2<H>      09 Aug 2021 05:28  Mg     2.3     08-09    TPro  7.0  /  Alb  4.5  /  TBili  0.3  /  DBili  x   /  AST  22  /  ALT  18  /  AlkPhos  36  08-09        POCT Blood Glucose.: 119 mg/dL (08-09-21 @ 07:26)  POCT Blood Glucose.: 123 mg/dL (08-08-21 @ 22:19)  POCT Blood Glucose.: 134 mg/dL (08-08-21 @ 17:00)  POCT Blood Glucose.: 89 mg/dL (08-08-21 @ 11:55)  POCT Blood Glucose.: 127 mg/dL (08-08-21 @ 07:27)  POCT Blood Glucose.: 127 mg/dL (08-07-21 @ 21:28)  POCT Blood Glucose.: 105 mg/dL (08-07-21 @ 16:19)  POCT Blood Glucose.: 164 mg/dL (08-07-21 @ 11:39)  POCT Blood Glucose.: 111 mg/dL (08-07-21 @ 07:15)  POCT Blood Glucose.: 133 mg/dL (08-06-21 @ 16:11)  POCT Blood Glucose.: 141 mg/dL (08-06-21 @ 11:36)  POCT Blood Glucose.: 120 mg/dL (08-06-21 @ 08:10)        Thyroid Stimulating Hormone, Serum: 2.56 uIU/mL (08.02.21 @ 07:19)                 HPI:  74 yo M with PMH of CAD s/p CABG x3 2017 ([place]), 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 Patient says he has been feeling intermittently dizzy over three days  despite having adequate/normal PO intake. Pt stated that these episodes of dizziness occurs after getting up from a sitting or supine position. Patient fell at home after feeling dizzy hitting the back of his head, but with no LOC. Pt was not on AC prior to admission. Pt also got out of his car, felt dizzy and then fell straight down hitting his buttocks and R knee. Denies head trauma. Patient was admitted on 7/20/21 to internal medicine for falls and dizziness 2/2 orthostatic hypotension.   CT spine 7/20/21 showed no evidence of acute cervical spine fracture or subluxation. Multilevel severe degenerative changes as described, with severe spinal noted stenosis at C2-3 and C3-4 and multilevel severe neural foraminal stenosis.  CTAP 7/20/21 showed no definite evidence of acute traumatic injury within the chest, abdomen, or pelvis.   CTH 7/21/21 showed no evidence of acute intracranial hemorrhage. Probable chronic infarct within the right posterior frontal white matter. Lacunar infarcts within bilateral white matter and deep gray nuclei of indeterminate age. Mild/moderate chronic microvascular changes.  Chest X-Ray negative for pneumothorax, infiltrate, or effusion. XR Pelvis negative for fx's or dislocations.    Patient's hospital course was complicated by lower extremity claudication. Bilateral lower extremity ultrasounds were performed on 7/23/21, which revealed severely diminished flow noted in the right and left superficial femoral artery suggestive of a proximal superficial femoral or common femoral artery stenosis. Significant atherosclerotic disease noted in the bilateral common femoral arteries. Vascular surgery was consulted for LLE Angiogram with possible endovascular revascularization; cardiac clearance was obtained but the team in conjunction with the family had decided to not go through with intervention.     Patient's orthostatic hypotension improved with midodrine, compression stocking, abdominal binder.     MRA 7/23/21 showed the following: MRA of the neck is limited by motion. Evaluation of the distal common carotid proximal internal carotid arteries appear grossly unremarkable though better quality study is recommended or CTA of the neck can be done for further evaluation. Evaluation of the proximal external carotid arteries are limited by motion. MRA of the [place] Rodriguez demonstrates decreased caliber of the distal right internal carotid artery. Short segment of stenosis is suspected involving both posterior cerebral arteries.    CTH 7/24/21 showed the following: No acute/traumatic intracranial pathology. Microvascular ischemic changes and unchanged probable chronic infarct within the right posterior frontal lobe.  MRI Head 7/24/21 showed acute lacunar infarcts involving the deep white matter of the left frontal lobe at the level of the centrum semiovale. No acute hemorrhage.  Neurology followed the patient and did not recommend intervention; patient is to continue aspirin and plavix.    Patient's prior level of function included independence with ADLs and independence with ambulation with a RW.   Patient's level of function on admission is min assist with bed mobility and min assist with transfers, patient is able to ambulate 4 small steps with rolling walker min assist.     Patient was evaluated by Dr. Ingram, a physical medicine and rehabilitation specialist and was found to be an excellent candidate for acute rehabilitation. Patient would benefit from 3 hours of interdisciplinary therapy per day.     Patient was admitted to rehab for acute lacunar infarcts in the deep white matter of the left frontal lobe at the level of the centrum semiovale and chronic left sided weakness from CVA of 2017 with a significant decline in his baseline function of independent with a straight cane.      TODAY'S SUBJECTIVE & REVIEW OF SYMPTOMS:  Patient is doing well. Reporting frequent spontaneous urination. Post void residual bladder scans have been minimal. Previously getting strt cath q8 hrs prn. He is willing to learn strt cath himself at home if necessary.   No longer complaining of dizziness. Patient was hypertensive to 180/80 overnight, midodrine held.     CLOF: modA for bed mobility and transf. Thi / CG for ambulation 20ft. Thi/supervision for ADLs       Constiutional:    [ x ] WNL           [   ] poor appetite   [   ] insomnia   [   ] tired   Cardio:                [ x ] WNL           [   ] CP   [   ] PRADO   [   ] palpitations               Resp:                   [ x ] WNL           [   ] SOB   [   ] cough   [   ] wheezing   GI:                        [ x ] WNL           [   ] constipation   [   ] diarrhea   [   ] abdominal pain   [   ] nausea   [   ] emesis                                :                      [  ] WNL           [   ] HOGUE  [   ] dysuria   [  x ] difficulty voiding   - c/o frequency - voiding q 30 mins      Endo:                   [ x ] WNL          [   ] polyuria   [   ] temperature intolerance                 Skin:                     [ x ] WNL          [   ] pain   [   ] wound   [   ] rash   MSK:                    [ x ] WNL          [   ] muscle pain   [   ] joint pain/ stiffness   [   ] muscle tenderness   [   ] swelling   Neuro:                 [  ] WNL           [ x ]  as per HPI            Cognitive:           [ x ] WNL           [   ]confusion      Psych:                  [ x ] WNL           [   ] hallucinations   [   ]agitation   [   ] delusion   [   ]depression      PHYSICAL EXAM    Vital Signs Last 24 Hrs  T(C): 35.7 (09 Aug 2021 06:13), Max: 36.9 (08 Aug 2021 22:55)  T(F): 96.3 (09 Aug 2021 06:13), Max: 98.5 (08 Aug 2021 22:55)  HR: 75 (09 Aug 2021 06:13) (75 - 78)  BP: 180/80 (09 Aug 2021 06:13) (135/65 - 180/80)  BP(mean): --  RR: 18 (09 Aug 2021 06:13) (18 - 20)  SpO2: --    Constitutional - [ x ] NAD, Comfortable        [   ] other:  Chest - [ x ] CTA     [   ] other:  Cardiovascular - [ x ] RRR, no murmer     [   ] other:  Abdomen - [ x ] Soft, NT/ND      [   ] other:        -  [ x  ] NO HOGUE CATHETER   [  ] YES  if yes: [  ] NO MEATAL TEAR OR DISCHARGE [   ] other:  Extremities - [ x ] No C/C/E, No calf tenderness       [   ] other:  ROM - [ x ] WFL     [   ] other:  Neurologic Exam -                 Cognitive - [ x ]Awake, Alert, AAO to self, place, date, year, situation         [    ] other:      Communication - [   ]Fluent, No dysarthria       [ x ] other: mildly dysarthric      Motor - No focal deficits                    Right UE -  [ x ] WNL      [    ] other:                    Left UE -     [   ] WNL      [ x ] other: LUE 4/5                    Right LE -   [ x ] WNL       [    ] other:                    Left LE -      [   ]WNL       [ x ] other: LLE 4/5     Sensory - [   ] Intact to LT      [ x ] other: L sided hemisensory loss      Reflexes - [ x ] wnl/ symmetric     [   ] other:     Psychiatric - [ x ]Mood stable, Affect WNL     [   ]other:     Skin - [ x ] intact      [   ] other    MEDICATIONS  (STANDING):  aspirin  chewable 81 milliGRAM(s) Oral daily  atorvastatin 80 milliGRAM(s) Oral at bedtime  clopidogrel Tablet 75 milliGRAM(s) Oral daily  fenofibrate Tablet 145 milliGRAM(s) Oral daily  gabapentin 200 milliGRAM(s) Oral at bedtime  gabapentin 100 milliGRAM(s) Oral <User Schedule>  heparin   Injectable 5000 Unit(s) SubCutaneous every 8 hours  isosorbide   mononitrate ER Tablet (IMDUR) 60 milliGRAM(s) Oral daily  metoprolol succinate ER 25 milliGRAM(s) Oral every 12 hours  midodrine. 2.5 milliGRAM(s) Oral <User Schedule>  NIFEdipine XL 60 milliGRAM(s) Oral daily  pentoxifylline 400 milliGRAM(s) Oral three times a day  ranolazine 500 milliGRAM(s) Oral two times a day  venlafaxine XR. 75 milliGRAM(s) Oral daily    MEDICATIONS  (PRN):  acetaminophen   Tablet .. 975 milliGRAM(s) Oral every 6 hours PRN Temp greater or equal to 38C (100.4F), Mild Pain (1 - 3), Moderate Pain (4 - 6)  aluminum hydroxide/magnesium hydroxide/simethicone Suspension 30 milliLiter(s) Oral every 4 hours PRN Dyspepsia  diazepam    Tablet 5 milliGRAM(s) Oral daily PRN anxiety  polyethylene glycol 3350 17 Gram(s) Oral daily PRN Constipation  senna 2 Tablet(s) Oral at bedtime PRN Constipation      LABS / IMAGING                         12.6   10.27 )-----------( 314      ( 09 Aug 2021 05:28 )             40.8     08-09    142  |  107  |  31<H>  ----------------------------<  115<H>  5.3<H>   |  25  |  1.9<H>    Ca    10.2<H>      09 Aug 2021 05:28  Mg     2.3     08-09    TPro  7.0  /  Alb  4.5  /  TBili  0.3  /  DBili  x   /  AST  22  /  ALT  18  /  AlkPhos  36  08-09        POCT Blood Glucose.: 119 mg/dL (08-09-21 @ 07:26)  POCT Blood Glucose.: 123 mg/dL (08-08-21 @ 22:19)  POCT Blood Glucose.: 134 mg/dL (08-08-21 @ 17:00)  POCT Blood Glucose.: 89 mg/dL (08-08-21 @ 11:55)  POCT Blood Glucose.: 127 mg/dL (08-08-21 @ 07:27)  POCT Blood Glucose.: 127 mg/dL (08-07-21 @ 21:28)  POCT Blood Glucose.: 105 mg/dL (08-07-21 @ 16:19)  POCT Blood Glucose.: 164 mg/dL (08-07-21 @ 11:39)  POCT Blood Glucose.: 111 mg/dL (08-07-21 @ 07:15)  POCT Blood Glucose.: 133 mg/dL (08-06-21 @ 16:11)  POCT Blood Glucose.: 141 mg/dL (08-06-21 @ 11:36)  POCT Blood Glucose.: 120 mg/dL (08-06-21 @ 08:10)        Thyroid Stimulating Hormone, Serum: 2.56 uIU/mL (08.02.21 @ 07:19)

## 2021-08-09 NOTE — PROGRESS NOTE ADULT - NSICDXPILOT_GEN_ALL_CORE
[place]

## 2021-08-09 NOTE — PROGRESS NOTE ADULT - ASSESSMENT
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 Rehab of acute lacunar infarcts in the deep white matter of the left frontal lobe at the level fo the centrum semiovale and chronic left sided weakness from CVA of 2017.    # Rehab of chronic left hemiparisis (dominant), and dysarthria and dysphagia with decline in function and falls, found to have an acute left subcortical infarct.   - MRI Acute lacunar infarcts involving the deep white matter of the left frontal lobe at the level of the centrum semiovale. No acute hemorrhage.  - Neurology was consulted and no intervention was recommended; no intervention at this time. Patient can Follow up with neurology at the clinic, as per conversation with neuro PA. Was already on DAPT and high dose statin.  - Patient requires PT/OT/SLT/Neuropsych eval   - Loop recorder placement  8/4/21   - Ambulates with min assist w/ device    # Dizziness and fall 2/2 orthostatic hypotension   - continue with HI stockings and abdominal binder  - continue midodrine 2x a day  - home medications of flomax, tizanidine were held by medicine team prior to rehab admission; valium was changed from 10 mg standing to 5 mg PRN by medicine per neurology recommendations for anxiety  - Became symptomatic again after 1 dose of Uroxatral now discontinued. No further episodes of dizziness.     # LE pain likely secondary to PAD   - Significant atherosclerotic disease noted in bilateral common femoral arteries  - Patient's family and vascular team agreed to not proceed with intervention at this time   - no symptoms reported since on Rehab    # CAD s/p CABG 2009  - 6/2021 :patent SALAZAR to LAD , patent SVG to OM , % , filled distally by collaterals from LAD.  - Continue DAPT ( Aspirin 81 mg daily and Plavix 75 mg daily ), ARB, Imdur, Ranexa, B-Blocker, Statin Therapy  - Cardio f/u OP     # HTN  - now controlled  - losartan 100mg discontinued for SAMANTA   - c/w nifedipine 60mg xL q24h   - Need to keep BP in higher range given symptomatic orthostasis    #HLD  - continue with atorvastatin and fenofibrate     # CKD 3  # Urinary retention  - Cr 1.3 in 2017, 1.5 in 2019, 1.7 in June; Current Cr 1.9  - Renal bladder US showed simple cysts within both kidneys without hydronephrosis or nephrolithiasis   - Monitor Cr, IVF as needed   - Has been stable  - attempted TOV, patient failed, but now having increased spontaneous void.  Now receiving PVR q 6 hrs with strt cath prn.      # DM type 2  - fair control on carb consistent diet alone.     # Depression   - venlafaxine     # Anxiety   - Valium PRN as above    # Maintenance   - Pain control: none   - GI/Bowel Mgmt: miralax ,senna   - Bladder management: Straight cath q8h for urinary retention   - Skin: No active issues at this time  - FEN: monitor electrolytes, replete as needed   - Diet: Diet, DASH/TLC:   Sodium & Cholesterol Restricted  Consistent Carbohydrate No Snacks (07-28-21 @ 15:46) [Active]      # Precautions / PROPHYLAXIS:    - Fall precaution   - Ortho: Weight bearing status: WBAT  - DVT prophylaxis: SC heparin        76 yo M with PMH of CAD s/p CABG x3 2017 (Yessi), 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 Rehab of acute lacunar infarcts in the deep white matter of the left frontal lobe at the level fo the centrum semiovale and chronic left sided weakness from CVA of 2017.    # Rehab of chronic left hemiparisis (dominant), and dysarthria and dysphagia with decline in function and falls, found to have an acute left subcortical infarct.   - MRI Acute lacunar infarcts involving the deep white matter of the left frontal lobe at the level of the centrum semiovale. No acute hemorrhage.  - Neurology was consulted and no intervention was recommended; no intervention at this time. Patient can Follow up with neurology at the clinic, as per conversation with neuro PA. Was already on DAPT and high dose statin.  - Patient requires PT/OT/SLT/Neuropsych eval   - Loop recorder placement  8/4/21   - Ambulates with min assist w/ device    # Dizziness and fall 2/2 orthostatic hypotension   - Now controlled  - continue with HI stockings and abdominal binder  - continue midodrine 2x a day  - home medications of flomax, tizanidine were held by medicine team prior to rehab admission; valium was changed from 10 mg standing to 5 mg PRN by medicine per neurology recommendations for anxiety  - Became symptomatic again after 1 dose of Uroxatral now discontinued. No further episodes of dizziness.     # LE pain likely secondary to PAD   - Significant atherosclerotic disease noted in bilateral common femoral arteries  - Patient's family and vascular team agreed to not proceed with intervention at this time   - no symptoms reported since on Rehab    # CAD s/p CABG 2009  - 6/2021 :patent SALAZAR to LAD , patent SVG to OM , % , filled distally by collaterals from LAD.  - Continue DAPT ( Aspirin 81 mg daily and Plavix 75 mg daily ), ARB, Imdur, Ranexa, B-Blocker, Statin Therapy  - Cardio f/u OP     # HTN  - now controlled  - losartan 100mg discontinued for SAMANTA   - c/w nifedipine 60mg xL q24h   - Need to keep BP in higher range given symptomatic orthostasis    #HLD  - continue with atorvastatin and fenofibrate     # CKD 3  # Urinary retention  - Cr 1.3 in 2017, 1.5 in 2019, 1.7 in June; Current Cr 1.9  - Renal bladder US showed simple cysts within both kidneys without hydronephrosis or nephrolithiasis   - Monitor Cr, IVF as needed   - Has been stable  - attempted TOV, patient failed, but now having increased spontaneous void.  Now receiving PVR q 6 hrs with strt cath prn.    - having increase urinary frequency with decrease PVRs. Monitor    # DM type 2  - fair control on carb consistent diet alone.     # Depression   - venlafaxine     # Anxiety   - Valium PRN as above    # Maintenance   - Pain control: none   - GI/Bowel Mgmt: miralax ,senna   - Bladder management: Straight cath q8h for urinary retention   - Skin: No active issues at this time  - FEN: monitor electrolytes, replete as needed   - Diet: Diet, DASH/TLC:   Sodium & Cholesterol Restricted  Consistent Carbohydrate No Snacks (07-28-21 @ 15:46) [Active]      # Precautions / PROPHYLAXIS:    - Fall precaution   - Ortho: Weight bearing status: WBAT  - DVT prophylaxis: SC heparin

## 2021-08-10 LAB
ANION GAP SERPL CALC-SCNC: 12 MMOL/L — SIGNIFICANT CHANGE UP (ref 7–14)
BUN SERPL-MCNC: 33 MG/DL — HIGH (ref 10–20)
CALCIUM SERPL-MCNC: 9.6 MG/DL — SIGNIFICANT CHANGE UP (ref 8.5–10.1)
CHLORIDE SERPL-SCNC: 105 MMOL/L — SIGNIFICANT CHANGE UP (ref 98–110)
CO2 SERPL-SCNC: 23 MMOL/L — SIGNIFICANT CHANGE UP (ref 17–32)
CREAT SERPL-MCNC: 1.9 MG/DL — HIGH (ref 0.7–1.5)
GLUCOSE BLDC GLUCOMTR-MCNC: 119 MG/DL — HIGH (ref 70–99)
GLUCOSE BLDC GLUCOMTR-MCNC: 126 MG/DL — HIGH (ref 70–99)
GLUCOSE BLDC GLUCOMTR-MCNC: 142 MG/DL — HIGH (ref 70–99)
GLUCOSE BLDC GLUCOMTR-MCNC: 146 MG/DL — HIGH (ref 70–99)
GLUCOSE SERPL-MCNC: 123 MG/DL — HIGH (ref 70–99)
POTASSIUM SERPL-MCNC: 4.5 MMOL/L — SIGNIFICANT CHANGE UP (ref 3.5–5)
POTASSIUM SERPL-SCNC: 4.5 MMOL/L — SIGNIFICANT CHANGE UP (ref 3.5–5)
SODIUM SERPL-SCNC: 140 MMOL/L — SIGNIFICANT CHANGE UP (ref 135–146)

## 2021-08-10 RX ADMIN — CLOPIDOGREL BISULFATE 75 MILLIGRAM(S): 75 TABLET, FILM COATED ORAL at 12:29

## 2021-08-10 RX ADMIN — MIDODRINE HYDROCHLORIDE 2.5 MILLIGRAM(S): 2.5 TABLET ORAL at 12:30

## 2021-08-10 RX ADMIN — GABAPENTIN 100 MILLIGRAM(S): 400 CAPSULE ORAL at 12:27

## 2021-08-10 RX ADMIN — GABAPENTIN 200 MILLIGRAM(S): 400 CAPSULE ORAL at 21:48

## 2021-08-10 RX ADMIN — ISOSORBIDE MONONITRATE 60 MILLIGRAM(S): 60 TABLET, EXTENDED RELEASE ORAL at 12:30

## 2021-08-10 RX ADMIN — HEPARIN SODIUM 5000 UNIT(S): 5000 INJECTION INTRAVENOUS; SUBCUTANEOUS at 06:19

## 2021-08-10 RX ADMIN — HEPARIN SODIUM 5000 UNIT(S): 5000 INJECTION INTRAVENOUS; SUBCUTANEOUS at 13:20

## 2021-08-10 RX ADMIN — RANOLAZINE 500 MILLIGRAM(S): 500 TABLET, FILM COATED, EXTENDED RELEASE ORAL at 17:55

## 2021-08-10 RX ADMIN — MIDODRINE HYDROCHLORIDE 2.5 MILLIGRAM(S): 2.5 TABLET ORAL at 06:18

## 2021-08-10 RX ADMIN — ATORVASTATIN CALCIUM 80 MILLIGRAM(S): 80 TABLET, FILM COATED ORAL at 21:48

## 2021-08-10 RX ADMIN — Medication 25 MILLIGRAM(S): at 17:55

## 2021-08-10 RX ADMIN — Medication 75 MILLIGRAM(S): at 12:30

## 2021-08-10 RX ADMIN — Medication 145 MILLIGRAM(S): at 12:30

## 2021-08-10 RX ADMIN — RANOLAZINE 500 MILLIGRAM(S): 500 TABLET, FILM COATED, EXTENDED RELEASE ORAL at 06:18

## 2021-08-10 RX ADMIN — Medication 400 MILLIGRAM(S): at 06:17

## 2021-08-10 RX ADMIN — HEPARIN SODIUM 5000 UNIT(S): 5000 INJECTION INTRAVENOUS; SUBCUTANEOUS at 21:48

## 2021-08-10 RX ADMIN — Medication 60 MILLIGRAM(S): at 06:18

## 2021-08-10 RX ADMIN — Medication 25 MILLIGRAM(S): at 06:17

## 2021-08-10 RX ADMIN — Medication 400 MILLIGRAM(S): at 13:20

## 2021-08-10 RX ADMIN — Medication 400 MILLIGRAM(S): at 21:48

## 2021-08-10 RX ADMIN — Medication 81 MILLIGRAM(S): at 12:30

## 2021-08-10 NOTE — PROGRESS NOTE ADULT - ASSESSMENT
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 Rehab of acute lacunar infarcts in the deep white matter of the left frontal lobe at the level of the centrum semiovale and chronic left sided weakness from CVA of 2017.    # Rehab of chronic left hemiparisis (dominant), and dysarthria and dysphagia with decline in function and falls, found to have an acute left subcortical infarct.   - MRI Acute lacunar infarcts involving the deep white matter of the left frontal lobe at the level of the centrum semiovale. No acute hemorrhage.  - Neurology was consulted and no intervention was recommended; no intervention at this time. Patient can Follow up with neurology at the clinic, as per conversation with neuro PA. Was already on DAPT and high dose statin.  - PT/OT/SLT/Neuropsych therapies   - Loop recorder placement  8/4/21   - Ambulates with min assist w/ device    # Dizziness and fall 2/2 orthostatic hypotension   - Now controlled  - continue with HI stockings and abdominal binder  - continue midodrine 2x a day  - home medications of flomax, tizanidine were held by medicine team prior to rehab admission; valium was changed from 10 mg standing to 5 mg PRN by medicine per neurology recommendations for anxiety  - Became symptomatic again after 1 dose of Uroxatral, now discontinued. No further episodes of dizziness.     # LE pain likely secondary to PAD   - Significant atherosclerotic disease noted in bilateral common femoral arteries  - Patient's family and vascular team agreed to not proceed with intervention at this time   - no symptoms reported since on Rehab    # CAD s/p CABG 2009  - 6/2021: patent LIMA to LAD , patent SVG to OM , % , filled distally by collaterals from LAD.  - Continue DAPT ( Aspirin 81 mg daily and Plavix 75 mg daily ), ARB, Imdur, Ranexa, B-Blocker, Statin Therapy  - Cardio f/u OP     # HTN  - now controlled  - losartan 100mg discontinued for SAMANTA   - c/w nifedipine 60mg xL q24h   - Need to keep BP in higher range given symptomatic orthostasis    #HLD  - continue with atorvastatin and fenofibrate     # CKD 3  # Urinary retention  - Cr 1.3 in 2017, 1.5 in 2019, 1.7 in June; Current Cr 1.9  - Renal bladder US showed simple cysts within both kidneys without hydronephrosis or nephrolithiasis   - Monitor Cr, IVF as needed   - Has been stable  - attempted TOV, patient failed, but now having increased spontaneous void.  Now receiving PVR q8hrs with strt cath prn.    - having increased urinary frequency with decreased PVRs. Monitor    # DM type 2  - fair control on carb consistent diet alone.     # Depression   - venlafaxine     # Anxiety   - Valium PRN as above    # Maintenance   - Pain control: none   - GI/Bowel Mgmt: miralax ,senna   - Bladder management: Straight cath q8h for urinary retention   - Skin: No active issues at this time  - FEN: monitor electrolytes, replete as needed   - Diet: Diet, DASH/TLC:   Sodium & Cholesterol Restricted  Consistent Carbohydrate No Snacks (07-28-21 @ 15:46) [Active]      # Precautions / PROPHYLAXIS:    - Fall precaution   - Ortho: Weight bearing status: WBAT  - DVT prophylaxis: SC heparin        76 yo M with PMH of CAD s/p CABG x3 2017 (Yessi), 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 Rehab of acute lacunar infarcts in the deep white matter of the left frontal lobe at the level of the centrum semiovale and chronic left sided weakness from CVA of 2017.    # Rehab of chronic left hemiparisis (dominant), and dysarthria and dysphagia with decline in function and falls, found to have an acute left subcortical infarct.   - MRI Acute lacunar infarcts involving the deep white matter of the left frontal lobe at the level of the centrum semiovale. No acute hemorrhage.  - Neurology was consulted and no intervention was recommended; no intervention at this time. Patient can Follow up with neurology at the clinic, as per conversation with neuro PA. Was already on DAPT and high dose statin.  - PT/OT/SLT/Neuropsych therapies   - Loop recorder placement  8/4/21   - Ambulates with min assist w/ device    # Dizziness and fall 2/2 orthostatic hypotension   - Now controlled  - continue with HI stockings and abdominal binder  - continue midodrine 2x a day  - home medications of flomax, tizanidine were held by medicine team prior to rehab admission; valium was changed from 10 mg standing to 5 mg PRN by medicine per neurology recommendations for anxiety  - Became symptomatic again after 1 dose of Uroxatral, now discontinued. No further episodes of dizziness.     # LE pain likely secondary to PAD   - Significant atherosclerotic disease noted in bilateral common femoral arteries  - Patient's family and vascular team agreed to not proceed with intervention at this time   - no symptoms reported since on Rehab    # CAD s/p CABG 2009  - 6/2021: patent LIMA to LAD , patent SVG to OM , % , filled distally by collaterals from LAD.  - Continue DAPT ( Aspirin 81 mg daily and Plavix 75 mg daily ), ARB, Imdur, Ranexa, B-Blocker, Statin Therapy  - Cardio f/u OP     # HTN  - now controlled  - losartan 100mg discontinued for SAMANTA   - c/w nifedipine 60mg xL q24h   - Need to keep BP in higher range given symptomatic orthostasis    #HLD  - continue with atorvastatin and fenofibrate     # CKD 3  # Urinary retention  - Cr 1.3 in 2017, 1.5 in 2019, 1.7 in June; Current Cr 1.9  - Renal bladder US showed simple cysts within both kidneys without hydronephrosis or nephrolithiasis   - Monitor Cr, IVF as needed   - Has been stable  - attempted TOV, patient failed, but now having increased spontaneous void.  Now receiving PVR q8hrs with strt cath prn.    - having increased urinary frequency with decreased PVRs. Monitor  - Will stand up and ambulate q2h to urinate    # DM type 2  - fair control on carb consistent diet alone.     # Depression   - venlafaxine     # Anxiety   - Valium PRN as above    # Maintenance   - Pain control: none   - GI/Bowel Mgmt: miralax ,senna   - Bladder management: Straight cath q8h for urinary retention   - Skin: No active issues at this time  - FEN: monitor electrolytes, replete as needed   - Diet: Diet, DASH/TLC:   Sodium & Cholesterol Restricted  Consistent Carbohydrate No Snacks (07-28-21 @ 15:46) [Active]      # Precautions / PROPHYLAXIS:    - Fall precaution   - Ortho: Weight bearing status: WBAT  - DVT prophylaxis: SC heparin

## 2021-08-10 NOTE — PROGRESS NOTE ADULT - ATTENDING COMMENTS
I reviewed the chart and examined the patient with the resident and we discussed the findings and treatment plan.  The patient is tolerating the rehab program well. I agree with the findings and treatment plan above, which I modified as indicated. The patient requires 3 hrs a day of acute inpatient rehab.    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 Rehab of acute lacunar infarcts in the deep white matter of the left frontal lobe at the level of the centrum semiovale and chronic left sided weakness from CVA of 2017.    # Rehab of chronic left hemiparesis (dominant), and dysarthria and dysphagia with decline in function and falls, found to have an acute left subcortical infarct.   - Doing well in therapy. Ambulates with RW with Min assistance.    - MRI Acute lacunar infarcts involving the deep white matter of the left frontal lobe at the level of the centrum semiovale. No acute hemorrhage.  - Neurology was consulted and no intervention was recommended; no intervention at this time. Patient can Follow up with neurology at the clinic, as per conversation with neuro PA. Was already on DAPT and high dose statin.  - PT/OT/SLT/Neuropsych therapies   - Loop recorder placement  8/4/21       # S/P Dizziness and fall 2/2 orthostatic hypotension   - Now controlled  - continue with HI stockings and abdominal binder  - continue midodrine 2x a day  - home medications of flomax, tizanidine were held by medicine team prior to rehab admission; valium was changed from 10 mg standing to 5 mg PRN by medicine per neurology recommendations for anxiety  - Became symptomatic again after 1 dose of Uroxatral, now discontinued.   -No further episodes of dizziness.     # LE pain likely secondary to PAD   - Significant atherosclerotic disease noted in bilateral common femoral arteries  - Patient's family and vascular team agreed to not proceed with intervention at this time   - no symptoms reported since on Rehab    # CAD s/p CABG 2009  - 6/2021: patent LIMA to LAD , patent SVG to OM , % , filled distally by collaterals from LAD.  - Continue DAPT ( Aspirin 81 mg daily and Plavix 75 mg daily ), ARB, Imdur, Ranexa, B-Blocker, Statin Therapy  - Cardio f/u OP     # HTN  - now controlled  - losartan 100mg discontinued for SAMANTA   - c/w nifedipine 60mg xL q24h   - Need to keep BP in higher range given symptomatic orthostasis    #HLD  - continue with atorvastatin and fenofibrate     # CKD 3  # Urinary retention  - Cr 1.3 in 2017, 1.5 in 2019, 1.7 in June; Current Cr 1.9  - Renal bladder US showed simple cysts within both kidneys without hydronephrosis or nephrolithiasis   - Monitor Cr, IVF as needed   - Has been stable  - attempted TOV, patient initially failed, but now having increased spontaneous void.  Now receiving PVR q8hrs with strt cath prn.    - having increased urinary frequency with decreased PVRs. Monitor  - Will stand up and ambulate q2h to urinate  - Nurses asked to toilet in bathroom q 2 hrs, day and night    # DM type 2  - fair control on carb consistent diet alone.     # Depression   - venlafaxine     # Anxiety   - Valium PRN as above    # Maintenance   - Pain control: none   - GI/Bowel Mgmt: miralax ,senna   - FEN: monitor electrolytes, replete as needed   - Diet: Diet, DASH/TLC:   Sodium & Cholesterol Restricted  Consistent Carbohydrate No Snacks (07-28-21 @ 15:46) [Active]      # Precautions / PROPHYLAXIS:    - Fall precaution   - Ortho: Weight bearing status: WBAT  - DVT prophylaxis: SC heparin

## 2021-08-10 NOTE — CDI QUERY NOTE - NSCDIOTHERTXTBX_GEN_ALL_CORE_HH
Documentation:  **  PN neurology:        Today the team was asked to assess the patient today  for new lethargy noted on exam.       Neurological Exam: AxOx3, able to follow commands, slurred speech but able to understand what patient is saying.        Assessment: On exam patient was still neurologically at his baseline with the exception of worsening dysarthria and subjective feeling of more tired than usual. During the exam patient did not require stimulation to stay alert. Per son at bedside and Dr. Frias's last neuro exam, slurred speech is worsened today compared to last week.        Plan: - HCT without contrast    **  PN Neurology:         Neurological Exam: AxOx3, able to follow commands        Assessment: Team was asked to see the patient for new lethargy noted by primary team and family at bedside yesterday. CTH completed yesterday was negative. Patient accessed today and is less lethargic. Per chart review patient is taking 10mg of Valium TID standing. Patient states that he only takes 5mg of Valium PRN when he needs it and he does not take the medication everyday.  dysarthria and lethargy due to benzodiazepine overmedication.    Imagin/24 CT head: INTERPRETATION:  Clinical History / Reason for exam: Altered mental status.                       IMPRESSION:  No acute/traumatic intracranial pathology.      Orders:    - : Diazepam 10 mg tablet oral three times a day.   - : Diazepam 5 mg tablet oral daily, PRN for anxiety.                                                        Query:  Based on your clinical judgment and consideration of these clinical indicators, please clarify if worsening dysarthria and lethargy can be further specified as:   • Encephalopathy associated with benzodiazepine overmedication.	  • Other (please specify).  • Unable to determine.

## 2021-08-10 NOTE — PROGRESS NOTE ADULT - SUBJECTIVE AND OBJECTIVE BOX
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 Patient says he has been feeling intermittently dizzy over three days  despite having adequate/normal PO intake. Pt stated that these episodes of dizziness occurs after getting up from a sitting or supine position. Patient fell at home after feeling dizzy hitting the back of his head, but with no LOC. Pt was not on AC prior to admission. Pt also got out of his car, felt dizzy and then fell straight down hitting his buttocks and R knee. Denies head trauma. Patient was admitted on 21 to internal medicine for falls and dizziness 2/2 orthostatic hypotension.   CT spine 21 showed no evidence of acute cervical spine fracture or subluxation. Multilevel severe degenerative changes as described, with severe spinal noted stenosis at C2-3 and C3-4 and multilevel severe neural foraminal stenosis.  CTAP 21 showed no definite evidence of acute traumatic injury within the chest, abdomen, or pelvis.   CTH 21 showed no evidence of acute intracranial hemorrhage. Probable chronic infarct within the right posterior frontal white matter. Lacunar infarcts within bilateral white matter and deep gray nuclei of indeterminate age. Mild/moderate chronic microvascular changes.  Chest X-Ray negative for pneumothorax, infiltrate, or effusion. XR Pelvis negative for fx's or dislocations.    Patient's hospital course was complicated by lower extremity claudication. Bilateral lower extremity ultrasounds were performed on 21, which revealed severely diminished flow noted in the right and left superficial femoral artery suggestive of a proximal superficial femoral or common femoral artery stenosis. Significant atherosclerotic disease noted in the bilateral common femoral arteries. Vascular surgery was consulted for LLE Angiogram with possible endovascular revascularization; cardiac clearance was obtained but the team in conjunction with the family had decided to not go through with intervention.     Patient's orthostatic hypotension improved with midodrine, compression stocking, abdominal binder.     MRA 21 showed the following: MRA of the neck is limited by motion. Evaluation of the distal common carotid proximal internal carotid arteries appear grossly unremarkable though better quality study is recommended or CTA of the neck can be done for further evaluation. Evaluation of the proximal external carotid arteries are limited by motion. MRA of the [place] demonstrates decreased caliber of the distal right internal carotid artery. Short segment of stenosis is suspected involving both posterior cerebral arteries.    CTH 21 showed the following: No acute/traumatic intracranial pathology. Microvascular ischemic changes and unchanged probable chronic infarct within the right posterior frontal lobe.  MRI Head 21 showed acute lacunar infarcts involving the deep white matter of the left frontal lobe at the level of the centrum semiovale. No acute hemorrhage.  Neurology followed the patient and did not recommend intervention; patient is to continue aspirin and plavix.    Patient's prior level of function included independence with ADLs and independence with ambulation with a RW.   Patient's level of function on admission is min assist with bed mobility and min assist with transfers, patient is able to ambulate 4 small steps with rolling walker min assist.     Patient was evaluated by Dr. Ingram, a physical medicine and rehabilitation specialist and was found to be an excellent candidate for acute rehabilitation. Patient would benefit from 3 hours of interdisciplinary therapy per day.     Patient was admitted to rehab for acute lacunar infarcts in the deep white matter of the left frontal lobe at the level of the centrum semiovale and chronic left sided weakness from CVA of 2017 with a significant decline in his baseline function of independent with a straight cane.      TODAY'S SUBJECTIVE & REVIEW OF SYMPTOMS:  Patient is doing well. Reporting frequent spontaneous urination. No longer complaining of dizziness.     Urinalysis obtained yesterday WNL. Post void residual bladder scan 450cc last night with 500cc output on straight cath. Continuing strt cath q8 hrs prn. He is willing to learn to strt cath himself at home if necessary.       CLOF: Kimberly for bed mobility and transf. Kimberly / CG for ambulation 20ft. Kimberly/supervision for ADLs       Constiutional:    [ x ] WNL           [   ] poor appetite   [   ] insomnia   [   ] tired   Cardio:                [ x ] WNL           [   ] CP   [   ] PRADO   [   ] palpitations               Resp:                   [ x ] WNL           [   ] SOB   [   ] cough   [   ] wheezing   GI:                        [ x ] WNL           [   ] constipation   [   ] diarrhea   [   ] abdominal pain   [   ] nausea   [   ] emesis                                :                      [  ] WNL           [   ] HOGUE  [   ] dysuria   [  x ] difficulty voiding   - c/o frequency - voiding q 30 mins      Endo:                   [ x ] WNL          [   ] polyuria   [   ] temperature intolerance                 Skin:                     [ x ] WNL          [   ] pain   [   ] wound   [   ] rash   MSK:                    [ x ] WNL          [   ] muscle pain   [   ] joint pain/ stiffness   [   ] muscle tenderness   [   ] swelling   Neuro:                 [  ] WNL           [ x ]  as per HPI            Cognitive:           [ x ] WNL           [   ]confusion      Psych:                  [ x ] WNL           [   ] hallucinations   [   ]agitation   [   ] delusion   [   ]depression      PHYSICAL EXAM    Vital Signs Last 24 Hrs  T(C): 35.8 (10 Aug 2021 05:00), Max: 36.6 (09 Aug 2021 20:48)  T(F): 96.5 (10 Aug 2021 05:00), Max: 97.9 (09 Aug 2021 20:48)  HR: 88 (10 Aug 2021 05:00) (87 - 91)  BP: 156/74 (10 Aug 2021 05:00) (126/68 - 156/74)  BP(mean): 106 (09 Aug 2021 12:30) (106 - 106)  ABP: --  ABP(mean): --  RR: 18 (10 Aug 2021 05:00) (18 - 18)  SpO2: 98% (09 Aug 2021 12:30) (98% - 98%)      Constitutional - [ x ] NAD, Comfortable        [   ] other:  Chest - [ x ] CTA     [   ] other:  Cardiovascular - [ x ] RRR, no murmer     [   ] other:  Abdomen - [ x ] Soft, NT/ND      [   ] other:        -  [ x  ] NO HOGUE CATHETER   [  ] YES  if yes: [  ] NO MEATAL TEAR OR DISCHARGE [   ] other:  Extremities - [ x ] No C/C/E, No calf tenderness       [   ] other:  ROM - [ x ] WFL     [   ] other:  Neurologic Exam -                 Cognitive - [ x ]Awake, Alert, AAO to self, place, date, year, situation         [    ] other:      Communication - [   ]Fluent, No dysarthria       [ x ] other: mildly dysarthric      Motor - No focal deficits                    Right UE -  [ x ] WNL      [    ] other:                    Left UE -     [   ] WNL      [ x ] other: LUE 4/5                    Right LE -   [ x ] WNL       [    ] other:                    Left LE -      [   ]WNL       [ x ] other: LLE 4/5     Sensory - [   ] Intact to LT      [ x ] other: L sided hemisensory loss      Reflexes - [ x ] wnl/ symmetric     [   ] other:     Psychiatric - [ x ]Mood stable, Affect WNL     [   ]other:     Skin - [ x ] intact      [   ] other    MEDICATIONS  (STANDING):  aspirin  chewable 81 milliGRAM(s) Oral daily  atorvastatin 80 milliGRAM(s) Oral at bedtime  clopidogrel Tablet 75 milliGRAM(s) Oral daily  fenofibrate Tablet 145 milliGRAM(s) Oral daily  gabapentin 200 milliGRAM(s) Oral at bedtime  gabapentin 100 milliGRAM(s) Oral <User Schedule>  heparin   Injectable 5000 Unit(s) SubCutaneous every 8 hours  isosorbide   mononitrate ER Tablet (IMDUR) 60 milliGRAM(s) Oral daily  metoprolol succinate ER 25 milliGRAM(s) Oral every 12 hours  midodrine. 2.5 milliGRAM(s) Oral <User Schedule>  NIFEdipine XL 60 milliGRAM(s) Oral daily  pentoxifylline 400 milliGRAM(s) Oral three times a day  ranolazine 500 milliGRAM(s) Oral two times a day  venlafaxine XR. 75 milliGRAM(s) Oral daily    MEDICATIONS  (PRN):  acetaminophen   Tablet .. 975 milliGRAM(s) Oral every 6 hours PRN Temp greater or equal to 38C (100.4F), Mild Pain (1 - 3), Moderate Pain (4 - 6)  aluminum hydroxide/magnesium hydroxide/simethicone Suspension 30 milliLiter(s) Oral every 4 hours PRN Dyspepsia  diazepam    Tablet 5 milliGRAM(s) Oral daily PRN anxiety  polyethylene glycol 3350 17 Gram(s) Oral daily PRN Constipation  senna 2 Tablet(s) Oral at bedtime PRN Constipation    LABS                        12.6   10.27 )-----------( 314      ( 09 Aug 2021 05:28 )             40.8     08-    142  |  107  |  31<H>  ----------------------------<  115<H>  5.3<H>   |  25  |  1.9<H>    Ca    10.2<H>      09 Aug 2021 05:28  Mg     2.3     08-    TPro  7.0  /  Alb  4.5  /  TBili  0.3  /  DBili  x   /  AST  22  /  ALT  18  /  AlkPhos  36  -      Urinalysis Basic - ( 09 Aug 2021 09:20 )    Color: Yellow / Appearance: Clear / S.019 / pH: x  Gluc: x / Ketone: Negative  / Bili: Negative / Urobili: <2 mg/dL   Blood: x / Protein: 100 mg/dL / Nitrite: Negative   Leuk Esterase: Negative / RBC: 1 /HPF / WBC 1 /HPF   Sq Epi: x / Non Sq Epi: 1 /HPF / Bacteria: Negative      Thyroid Stimulating Hormone, Serum: 2.56 uIU/mL (21 @ 07:19)                 HPI:  76 yo M with PMH of CAD s/p CABG x3 2017 ([place]), 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 Patient says he has been feeling intermittently dizzy over three days  despite having adequate/normal PO intake. Pt stated that these episodes of dizziness occurs after getting up from a sitting or supine position. Patient fell at home after feeling dizzy hitting the back of his head, but with no LOC. Pt was not on AC prior to admission. Pt also got out of his car, felt dizzy and then fell straight down hitting his buttocks and R knee. Denies head trauma. Patient was admitted on 21 to internal medicine for falls and dizziness 2/2 orthostatic hypotension.   CT spine 21 showed no evidence of acute cervical spine fracture or subluxation. Multilevel severe degenerative changes as described, with severe spinal noted stenosis at C2-3 and C3-4 and multilevel severe neural foraminal stenosis.  CTAP 21 showed no definite evidence of acute traumatic injury within the chest, abdomen, or pelvis.   CTH 21 showed no evidence of acute intracranial hemorrhage. Probable chronic infarct within the right posterior frontal white matter. Lacunar infarcts within bilateral white matter and deep gray nuclei of indeterminate age. Mild/moderate chronic microvascular changes.  Chest X-Ray negative for pneumothorax, infiltrate, or effusion. XR Pelvis negative for fx's or dislocations.    Patient's hospital course was complicated by lower extremity claudication. Bilateral lower extremity ultrasounds were performed on 21, which revealed severely diminished flow noted in the right and left superficial femoral artery suggestive of a proximal superficial femoral or common femoral artery stenosis. Significant atherosclerotic disease noted in the bilateral common femoral arteries. Vascular surgery was consulted for LLE Angiogram with possible endovascular revascularization; cardiac clearance was obtained but the team in conjunction with the family had decided to not go through with intervention.     Patient's orthostatic hypotension improved with midodrine, compression stocking, abdominal binder.     MRA 21 showed the following: MRA of the neck is limited by motion. Evaluation of the distal common carotid proximal internal carotid arteries appear grossly unremarkable though better quality study is recommended or CTA of the neck can be done for further evaluation. Evaluation of the proximal external carotid arteries are limited by motion. MRA of the [place] demonstrates decreased caliber of the distal right internal carotid artery. Short segment of stenosis is suspected involving both posterior cerebral arteries.    CTH 21 showed the following: No acute/traumatic intracranial pathology. Microvascular ischemic changes and unchanged probable chronic infarct within the right posterior frontal lobe.  MRI Head 21 showed acute lacunar infarcts involving the deep white matter of the left frontal lobe at the level of the centrum semiovale. No acute hemorrhage.  Neurology followed the patient and did not recommend intervention; patient is to continue aspirin and plavix.    Patient's prior level of function included independence with ADLs and independence with ambulation with a RW.   Patient's level of function on admission is min assist with bed mobility and min assist with transfers, patient is able to ambulate 4 small steps with rolling walker min assist.     Patient was evaluated by Dr. Ingram, a physical medicine and rehabilitation specialist and was found to be an excellent candidate for acute rehabilitation. Patient would benefit from 3 hours of interdisciplinary therapy per day.     Patient was admitted to rehab for acute lacunar infarcts in the deep white matter of the left frontal lobe at the level of the centrum semiovale and chronic left sided weakness from CVA of 2017 with a significant decline in his baseline function of independent with a straight cane.      TODAY'S SUBJECTIVE & REVIEW OF SYMPTOMS:  Patient is doing well. Reporting frequent spontaneous urination. No longer complaining of dizziness.     Urinalysis obtained yesterday WNL. Post void residual bladder scan 450cc last night with 500cc output on straight cath. Continuing strt cath q8 hrs prn. He is willing to learn to strt cath himself at home if necessary.       CLOF: Kimberly for bed mobility and transf. Kimberly / CG for ambulation 100ft. x2 Kimberly/supervision for ADLs       Constiutional:    [ x ] WNL           [   ] poor appetite   [   ] insomnia   [   ] tired   Cardio:                [ x ] WNL           [   ] CP   [   ] PRADO   [   ] palpitations               Resp:                   [ x ] WNL           [   ] SOB   [   ] cough   [   ] wheezing   GI:                        [ x ] WNL           [   ] constipation   [   ] diarrhea   [   ] abdominal pain   [   ] nausea   [   ] emesis                                :                      [  ] WNL           [   ] HOGUE  [   ] dysuria   [  x ] difficulty voiding   - c/o frequency - voiding q 30 mins      Endo:                   [ x ] WNL          [   ] polyuria   [   ] temperature intolerance                 Skin:                     [ x ] WNL          [   ] pain   [   ] wound   [   ] rash   MSK:                    [ x ] WNL          [   ] muscle pain   [   ] joint pain/ stiffness   [   ] muscle tenderness   [   ] swelling   Neuro:                 [  ] WNL           [ x ]  as per HPI            Cognitive:           [ x ] WNL           [   ]confusion      Psych:                  [ x ] WNL           [   ] hallucinations   [   ]agitation   [   ] delusion   [   ]depression      PHYSICAL EXAM    Vital Signs Last 24 Hrs  T(C): 35.8 (10 Aug 2021 05:00), Max: 36.6 (09 Aug 2021 20:48)  T(F): 96.5 (10 Aug 2021 05:00), Max: 97.9 (09 Aug 2021 20:48)  HR: 88 (10 Aug 2021 05:00) (87 - 91)  BP: 156/74 (10 Aug 2021 05:00) (126/68 - 156/74)  BP(mean): 106 (09 Aug 2021 12:30) (106 - 106)  ABP: --  ABP(mean): --  RR: 18 (10 Aug 2021 05:00) (18 - 18)  SpO2: 98% (09 Aug 2021 12:30) (98% - 98%)      Constitutional - [ x ] NAD, Comfortable        [   ] other:  Chest - [ x ] CTA     [   ] other:  Cardiovascular - [ x ] RRR, no murmer     [   ] other:  Abdomen - [ x ] Soft, NT/ND      [   ] other:        -  [ x  ] NO HOGUE CATHETER   [  ] YES  if yes: [  ] NO MEATAL TEAR OR DISCHARGE [   ] other:  Extremities - [ x ] No C/C/E, No calf tenderness       [   ] other:  ROM - [ x ] WFL     [   ] other:  Neurologic Exam -                 Cognitive - [ x ]Awake, Alert, AAO to self, place, date, year, situation         [    ] other:      Communication - [   ]Fluent, No dysarthria       [ x ] other: mildly dysarthric      Motor - No focal deficits                    Right UE -  [ x ] WNL      [    ] other:                    Left UE -     [   ] WNL      [ x ] other: LUE 4/5                    Right LE -   [ x ] WNL       [    ] other:                    Left LE -      [   ]WNL       [ x ] other: LLE 4/5     Sensory - [   ] Intact to LT      [ x ] other: L sided hemisensory loss      Reflexes - [ x ] wnl/ symmetric     [   ] other:     Psychiatric - [ x ]Mood stable, Affect WNL     [   ]other:     Skin - [ x ] intact      [   ] other    MEDICATIONS  (STANDING):  aspirin  chewable 81 milliGRAM(s) Oral daily  atorvastatin 80 milliGRAM(s) Oral at bedtime  clopidogrel Tablet 75 milliGRAM(s) Oral daily  fenofibrate Tablet 145 milliGRAM(s) Oral daily  gabapentin 200 milliGRAM(s) Oral at bedtime  gabapentin 100 milliGRAM(s) Oral <User Schedule>  heparin   Injectable 5000 Unit(s) SubCutaneous every 8 hours  isosorbide   mononitrate ER Tablet (IMDUR) 60 milliGRAM(s) Oral daily  metoprolol succinate ER 25 milliGRAM(s) Oral every 12 hours  midodrine. 2.5 milliGRAM(s) Oral <User Schedule>  NIFEdipine XL 60 milliGRAM(s) Oral daily  pentoxifylline 400 milliGRAM(s) Oral three times a day  ranolazine 500 milliGRAM(s) Oral two times a day  venlafaxine XR. 75 milliGRAM(s) Oral daily    MEDICATIONS  (PRN):  acetaminophen   Tablet .. 975 milliGRAM(s) Oral every 6 hours PRN Temp greater or equal to 38C (100.4F), Mild Pain (1 - 3), Moderate Pain (4 - 6)  aluminum hydroxide/magnesium hydroxide/simethicone Suspension 30 milliLiter(s) Oral every 4 hours PRN Dyspepsia  diazepam    Tablet 5 milliGRAM(s) Oral daily PRN anxiety  polyethylene glycol 3350 17 Gram(s) Oral daily PRN Constipation  senna 2 Tablet(s) Oral at bedtime PRN Constipation    LABS                        12.6   10.27 )-----------( 314      ( 09 Aug 2021 05:28 )             40.8     08-    142  |  107  |  31<H>  ----------------------------<  115<H>  5.3<H>   |  25  |  1.9<H>    Ca    10.2<H>      09 Aug 2021 05:28  Mg     2.3     08    TPro  7.0  /  Alb  4.5  /  TBili  0.3  /  DBili  x   /  AST  22  /  ALT  18  /  AlkPhos  36  -      Urinalysis Basic - ( 09 Aug 2021 09:20 )    Color: Yellow / Appearance: Clear / S.019 / pH: x  Gluc: x / Ketone: Negative  / Bili: Negative / Urobili: <2 mg/dL   Blood: x / Protein: 100 mg/dL / Nitrite: Negative   Leuk Esterase: Negative / RBC: 1 /HPF / WBC 1 /HPF   Sq Epi: x / Non Sq Epi: 1 /HPF / Bacteria: Negative      Thyroid Stimulating Hormone, Serum: 2.56 uIU/mL (21 @ 07:19)

## 2021-08-11 LAB
GLUCOSE BLDC GLUCOMTR-MCNC: 130 MG/DL — HIGH (ref 70–99)
GLUCOSE BLDC GLUCOMTR-MCNC: 133 MG/DL — HIGH (ref 70–99)

## 2021-08-11 RX ADMIN — RANOLAZINE 500 MILLIGRAM(S): 500 TABLET, FILM COATED, EXTENDED RELEASE ORAL at 06:03

## 2021-08-11 RX ADMIN — Medication 400 MILLIGRAM(S): at 14:50

## 2021-08-11 RX ADMIN — RANOLAZINE 500 MILLIGRAM(S): 500 TABLET, FILM COATED, EXTENDED RELEASE ORAL at 17:16

## 2021-08-11 RX ADMIN — HEPARIN SODIUM 5000 UNIT(S): 5000 INJECTION INTRAVENOUS; SUBCUTANEOUS at 21:20

## 2021-08-11 RX ADMIN — Medication 25 MILLIGRAM(S): at 17:16

## 2021-08-11 RX ADMIN — Medication 400 MILLIGRAM(S): at 21:21

## 2021-08-11 RX ADMIN — ATORVASTATIN CALCIUM 80 MILLIGRAM(S): 80 TABLET, FILM COATED ORAL at 21:20

## 2021-08-11 RX ADMIN — HEPARIN SODIUM 5000 UNIT(S): 5000 INJECTION INTRAVENOUS; SUBCUTANEOUS at 06:04

## 2021-08-11 RX ADMIN — ISOSORBIDE MONONITRATE 60 MILLIGRAM(S): 60 TABLET, EXTENDED RELEASE ORAL at 11:40

## 2021-08-11 RX ADMIN — Medication 75 MILLIGRAM(S): at 11:39

## 2021-08-11 RX ADMIN — MIDODRINE HYDROCHLORIDE 2.5 MILLIGRAM(S): 2.5 TABLET ORAL at 11:40

## 2021-08-11 RX ADMIN — MIDODRINE HYDROCHLORIDE 2.5 MILLIGRAM(S): 2.5 TABLET ORAL at 06:04

## 2021-08-11 RX ADMIN — Medication 81 MILLIGRAM(S): at 11:39

## 2021-08-11 RX ADMIN — Medication 145 MILLIGRAM(S): at 11:39

## 2021-08-11 RX ADMIN — Medication 60 MILLIGRAM(S): at 06:03

## 2021-08-11 RX ADMIN — Medication 25 MILLIGRAM(S): at 06:03

## 2021-08-11 RX ADMIN — GABAPENTIN 100 MILLIGRAM(S): 400 CAPSULE ORAL at 07:31

## 2021-08-11 RX ADMIN — CLOPIDOGREL BISULFATE 75 MILLIGRAM(S): 75 TABLET, FILM COATED ORAL at 11:39

## 2021-08-11 RX ADMIN — Medication 400 MILLIGRAM(S): at 06:03

## 2021-08-11 RX ADMIN — GABAPENTIN 200 MILLIGRAM(S): 400 CAPSULE ORAL at 21:20

## 2021-08-11 NOTE — PROGRESS NOTE ADULT - ATTENDING COMMENTS
I reviewed the chart and examined the patient with the resident and we discussed the findings and treatment plan.  The patient is tolerating the rehab program well. I agree with the findings and treatment plan above, which I modified as indicated. The patient requires 3 hrs a day of acute inpatient rehab. No new c/o. Denies dizziness. Urinating less frequently with improved control and improved PVRs.    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 Rehab of acute lacunar infarcts in the deep white matter of the left frontal lobe at the level of the centrum semiovale and chronic left sided weakness from CVA of 2017.    # Rehab of chronic left hemiparisis (dominant), and dysarthria and dysphagia with decline in function and falls, found to have an acute left subcortical infarct.   - MRI Acute lacunar infarcts involving the deep white matter of the left frontal lobe at the level of the centrum semiovale. No acute hemorrhage.  - Neurology was consulted and no intervention was recommended; no intervention at this time. Patient can Follow up with neurology at the clinic, as per conversation with neuro PA. Was already on DAPT and high dose statin.  - PT/OT/SLT/Neuropsych therapies   - Loop recorder placement  8/4/21   - Ambulates with min assist w/ device    # Dizziness and fall 2/2 orthostatic hypotension   - Now controlled  - continue with HI stockings and abdominal binder  - continue midodrine 2x a day  - home medications of flomax, tizanidine were held by medicine team prior to rehab admission; valium was changed from 10 mg standing to 5 mg PRN by medicine per neurology recommendations for anxiety  - Became symptomatic again after 1 dose of Uroxatral, now discontinued. No further episodes of dizziness.     # LE pain likely secondary to PAD   - Significant atherosclerotic disease noted in bilateral common femoral arteries  - Patient's family and vascular team agreed to not proceed with intervention at this time   - no symptoms reported since on Rehab    # CAD s/p CABG 2009  - 6/2021: patent LIMA to LAD , patent SVG to OM , % , filled distally by collaterals from LAD.  - Continue DAPT ( Aspirin 81 mg daily and Plavix 75 mg daily ), ARB, Imdur, Ranexa, B-Blocker, Statin Therapy  - Cardio f/u OP     # HTN  - now controlled  - losartan 100mg discontinued for SAMANTA   - c/w nifedipine 60mg xL q24h   - Need to keep BP in higher range given symptomatic orthostasis    #HLD  - continue with atorvastatin and fenofibrate     # CKD 3  # Urinary retention  - Cr 1.3 in 2017, 1.5 in 2019, 1.7 in June; Stable  - Renal bladder US showed simple cysts within both kidneys without hydronephrosis or nephrolithiasis   - Monitor Cr, IVF as needed   - Has been stable  - Having increased spontaneous void.  Now receiving PVR q8hrs with strt cath prn.   - PVRs have beem less than 200cc w/out straight caths  - having increased urinary frequency with decreased PVRs. Monitor  - Will stand up and ambulate q2h to urinate    # DM type 2  - fair control on carb consistent diet alone.     # Depression   - venlafaxine     # Anxiety   - Valium PRN as above    # Maintenance   - Pain control: none   - GI/Bowel Mgmt: miralax ,senna   - Bladder management: Straight cath q8h for urinary retention   - Skin: No active issues at this time  - FEN: monitor electrolytes, replete as needed   - Diet: Diet, DASH/TLC: Sodium & Cholesterol Restricted, Consistent Carbohydrate No Snacks (07-28-21 @ 15:46) [Active]    # Precautions / PROPHYLAXIS:    - Fall precaution   - Ortho: Weight bearing status: WBAT  - DVT prophylaxis: SC heparin

## 2021-08-11 NOTE — PROGRESS NOTE ADULT - ASSESSMENT
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 Rehab of acute lacunar infarcts in the deep white matter of the left frontal lobe at the level of the centrum semiovale and chronic left sided weakness from CVA of 2017.    # Rehab of chronic left hemiparisis (dominant), and dysarthria and dysphagia with decline in function and falls, found to have an acute left subcortical infarct.   - MRI Acute lacunar infarcts involving the deep white matter of the left frontal lobe at the level of the centrum semiovale. No acute hemorrhage.  - Neurology was consulted and no intervention was recommended; no intervention at this time. Patient can Follow up with neurology at the clinic, as per conversation with neuro PA. Was already on DAPT and high dose statin.  - PT/OT/SLT/Neuropsych therapies   - Loop recorder placement  8/4/21   - Ambulates with min assist w/ device    # Dizziness and fall 2/2 orthostatic hypotension   - Now controlled  - continue with HI stockings and abdominal binder  - continue midodrine 2x a day  - home medications of flomax, tizanidine were held by medicine team prior to rehab admission; valium was changed from 10 mg standing to 5 mg PRN by medicine per neurology recommendations for anxiety  - Became symptomatic again after 1 dose of Uroxatral, now discontinued. No further episodes of dizziness.     # LE pain likely secondary to PAD   - Significant atherosclerotic disease noted in bilateral common femoral arteries  - Patient's family and vascular team agreed to not proceed with intervention at this time   - no symptoms reported since on Rehab    # CAD s/p CABG 2009  - 6/2021: patent LIMA to LAD , patent SVG to OM , % , filled distally by collaterals from LAD.  - Continue DAPT ( Aspirin 81 mg daily and Plavix 75 mg daily ), ARB, Imdur, Ranexa, B-Blocker, Statin Therapy  - Cardio f/u OP     # HTN  - now controlled  - losartan 100mg discontinued for SAMANTA   - c/w nifedipine 60mg xL q24h   - Need to keep BP in higher range given symptomatic orthostasis    #HLD  - continue with atorvastatin and fenofibrate     # CKD 3  # Urinary retention  - Cr 1.3 in 2017, 1.5 in 2019, 1.7 in June; Current Cr 1.9  - Renal bladder US showed simple cysts within both kidneys without hydronephrosis or nephrolithiasis   - Monitor Cr, IVF as needed   - Has been stable  - Having increased spontaneous void.  Now receiving PVR q8hrs with strt cath prn.    - having increased urinary frequency with decreased PVRs. Monitor  - Will stand up and ambulate q2h to urinate    # DM type 2  - fair control on carb consistent diet alone.     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 Rehab of acute lacunar infarcts in the deep white matter of the left frontal lobe at the level of the centrum semiovale and chronic left sided weakness from CVA of 2017.    # Rehab of chronic left hemiparisis (dominant), and dysarthria and dysphagia with decline in function and falls, found to have an acute left subcortical infarct.   - MRI Acute lacunar infarcts involving the deep white matter of the left frontal lobe at the level of the centrum semiovale. No acute hemorrhage.  - Neurology was consulted and no intervention was recommended; no intervention at this time. Patient can Follow up with neurology at the clinic, as per conversation with neuro PA. Was already on DAPT and high dose statin.  - PT/OT/SLT/Neuropsych therapies   - Loop recorder placement  8/4/21   - Ambulates with min assist w/ device    # Dizziness and fall 2/2 orthostatic hypotension   - Now controlled  - continue with HI stockings and abdominal binder  - continue midodrine 2x a day  - home medications of flomax, tizanidine were held by medicine team prior to rehab admission; valium was changed from 10 mg standing to 5 mg PRN by medicine per neurology recommendations for anxiety  - Became symptomatic again after 1 dose of Uroxatral, now discontinued. No further episodes of dizziness.     # LE pain likely secondary to PAD   - Significant atherosclerotic disease noted in bilateral common femoral arteries  - Patient's family and vascular team agreed to not proceed with intervention at this time   - no symptoms reported since on Rehab    # CAD s/p CABG 2009  - 6/2021: patent LIMA to LAD , patent SVG to OM , % , filled distally by collaterals from LAD.  - Continue DAPT ( Aspirin 81 mg daily and Plavix 75 mg daily ), ARB, Imdur, Ranexa, B-Blocker, Statin Therapy  - Cardio f/u OP     # HTN  - now controlled  - losartan 100mg discontinued for SAMANTA   - c/w nifedipine 60mg xL q24h   - Need to keep BP in higher range given symptomatic orthostasis    #HLD  - continue with atorvastatin and fenofibrate     # CKD 3  # Urinary retention  - Cr 1.3 in 2017, 1.5 in 2019, 1.7 in June; Stable  - Renal bladder US showed simple cysts within both kidneys without hydronephrosis or nephrolithiasis   - Monitor Cr, IVF as needed   - Has been stable  - Having increased spontaneous void.  Now receiving PVR q8hrs with strt cath prn.   - PVRs have beem less than 200cc w/out straight caths  - having increased urinary frequency with decreased PVRs. Monitor  - Will stand up and ambulate q2h to urinate    # DM type 2  - fair control on carb consistent diet alone.     # Depression   - venlafaxine     # Anxiety   - Valium PRN as above    # Maintenance   - Pain control: none   - GI/Bowel Mgmt: miralax ,senna   - Bladder management: Straight cath q8h for urinary retention   - Skin: No active issues at this time  - FEN: monitor electrolytes, replete as needed   - Diet: Diet, DASH/TLC: Sodium & Cholesterol Restricted, Consistent Carbohydrate No Snacks (07-28-21 @ 15:46) [Active]    # Precautions / PROPHYLAXIS:    - Fall precaution   - Ortho: Weight bearing status: WBAT  - DVT prophylaxis: SC heparin

## 2021-08-11 NOTE — PROGRESS NOTE ADULT - SUBJECTIVE AND OBJECTIVE BOX
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 Patient says he has been feeling intermittently dizzy over three days  despite having adequate/normal PO intake. Pt stated that these episodes of dizziness occurs after getting up from a sitting or supine position. Patient fell at home after feeling dizzy hitting the back of his head, but with no LOC. Pt was not on AC prior to admission. Pt also got out of his car, felt dizzy and then fell straight down hitting his buttocks and R knee. Denies head trauma. Patient was admitted on 7/20/21 to internal medicine for falls and dizziness 2/2 orthostatic hypotension.   CT spine 7/20/21 showed no evidence of acute cervical spine fracture or subluxation. Multilevel severe degenerative changes as described, with severe spinal noted stenosis at C2-3 and C3-4 and multilevel severe neural foraminal stenosis.  CTAP 7/20/21 showed no definite evidence of acute traumatic injury within the chest, abdomen, or pelvis.   CTH 7/21/21 showed no evidence of acute intracranial hemorrhage. Probable chronic infarct within the right posterior frontal white matter. Lacunar infarcts within bilateral white matter and deep gray nuclei of indeterminate age. Mild/moderate chronic microvascular changes.  Chest X-Ray negative for pneumothorax, infiltrate, or effusion. XR Pelvis negative for fx's or dislocations.    Patient's hospital course was complicated by lower extremity claudication. Bilateral lower extremity ultrasounds were performed on 7/23/21, which revealed severely diminished flow noted in the right and left superficial femoral artery suggestive of a proximal superficial femoral or common femoral artery stenosis. Significant atherosclerotic disease noted in the bilateral common femoral arteries. Vascular surgery was consulted for LLE Angiogram with possible endovascular revascularization; cardiac clearance was obtained but the team in conjunction with the family had decided to not go through with intervention.     Patient's orthostatic hypotension improved with midodrine, compression stocking, abdominal binder.     MRA 7/23/21 showed the following: MRA of the neck is limited by motion. Evaluation of the distal common carotid proximal internal carotid arteries appear grossly unremarkable though better quality study is recommended or CTA of the neck can be done for further evaluation. Evaluation of the proximal external carotid arteries are limited by motion. MRA of the [place] demonstrates decreased caliber of the distal right internal carotid artery. Short segment of stenosis is suspected involving both posterior cerebral arteries.    CTH 7/24/21 showed the following: No acute/traumatic intracranial pathology. Microvascular ischemic changes and unchanged probable chronic infarct within the right posterior frontal lobe.  MRI Head 7/24/21 showed acute lacunar infarcts involving the deep white matter of the left frontal lobe at the level of the centrum semiovale. No acute hemorrhage.  Neurology followed the patient and did not recommend intervention; patient is to continue aspirin and plavix.    Patient's prior level of function included independence with ADLs and independence with ambulation with a RW.   Patient's level of function on admission is min assist with bed mobility and min assist with transfers, patient is able to ambulate 4 small steps with rolling walker min assist.     Patient was evaluated by Dr. Ingram, a physical medicine and rehabilitation specialist and was found to be an excellent candidate for acute rehabilitation. Patient would benefit from 3 hours of interdisciplinary therapy per day.     Patient was admitted to rehab for acute lacunar infarcts in the deep white matter of the left frontal lobe at the level of the centrum semiovale and chronic left sided weakness from CVA of 2017 with a significant decline in his baseline function of independent with a straight cane.    Interval history:   8/9: Complaining of increased urinary frequency. 500cc output on straight cath.  He is willing to learn to strt cath himself at home if necessary. UA negative.     TODAY'S SUBJECTIVE & REVIEW OF SYMPTOMS:  Patient is doing well. Reporting frequent spontaneous urination. No longer complaining of dizziness.   No acute events. Post void residual bladder scans <200 over past 24hr without straight cath.        Constiutional:    [ x ] WNL           [   ] poor appetite   [   ] insomnia   [   ] tired   Cardio:                [ x ] WNL           [   ] CP   [   ] PRADO   [   ] palpitations               Resp:                   [ x ] WNL           [   ] SOB   [   ] cough   [   ] wheezing   GI:                        [ x ] WNL           [   ] constipation   [   ] diarrhea   [   ] abdominal pain   [   ] nausea   [   ] emesis                                :                      [  ] WNL           [   ] HOGUE  [   ] dysuria   [  x ] difficulty voiding   - c/o frequency - voiding q 30 mins      Endo:                   [ x ] WNL          [   ] polyuria   [   ] temperature intolerance                 Skin:                     [ x ] WNL          [   ] pain   [   ] wound   [   ] rash   MSK:                    [ x ] WNL          [   ] muscle pain   [   ] joint pain/ stiffness   [   ] muscle tenderness   [   ] swelling   Neuro:                 [  ] WNL           [ x ]  as per HPI            Cognitive:           [ x ] WNL           [   ]confusion      Psych:                  [ x ] WNL           [   ] hallucinations   [   ]agitation   [   ] delusion   [   ]depression      PHYSICAL EXAM    Vital Signs Last 24 Hrs  T(C): 36.1 (11 Aug 2021 05:35), Max: 36.6 (10 Aug 2021 12:42)  T(F): 96.9 (11 Aug 2021 05:35), Max: 97.8 (10 Aug 2021 12:42)  HR: 86 (11 Aug 2021 05:35) (80 - 87)  BP: 149/70 (11 Aug 2021 05:35) (133/70 - 180/78)  BP(mean): 101 (10 Aug 2021 17:50) (91 - 101)  RR: 18 (11 Aug 2021 05:35) (18 - 19)  SpO2: 99% (10 Aug 2021 21:03) (96% - 99%)      Constitutional - [ x ] NAD, Comfortable        [   ] other:  Chest - [ x ] CTA     [   ] other:  Cardiovascular - [ x ] RRR, no murmer     [   ] other:  Abdomen - [ x ] Soft, NT/ND      [   ] other:        -  [ x  ] NO HOGUE CATHETER   [  ] YES  if yes: [  ] NO MEATAL TEAR OR DISCHARGE [   ] other:  Extremities - [ x ] No C/C/E, No calf tenderness       [   ] other:  ROM - [ x ] WFL     [   ] other:  Neurologic Exam -                 Cognitive - [ x ]Awake, Alert, AAO to self, place, date, year, situation         [    ] other:      Communication - [   ]Fluent, No dysarthria       [ x ] other: mildly dysarthric      Motor - No focal deficits                    Right UE -  [ x ] WNL      [    ] other:                    Left UE -     [   ] WNL      [ x ] other: LUE 4/5                    Right LE -   [ x ] WNL       [    ] other:                    Left LE -      [   ]WNL       [ x ] other: LLE 4/5     Sensory - [   ] Intact to LT      [ x ] other: L sided hemisensory loss      Reflexes - [ x ] wnl/ symmetric     [   ] other:     Psychiatric - [ x ]Mood stable, Affect WNL     [   ]other:     Skin - [ x ] intact      [   ] other  CLOF:   - Bed mobility: Kimberly   - Transfers: Kimberly   - Ambulation: CG for ambulation 100ft. x2 w RW  - ADLs: supervision       MEDICATIONS  (STANDING):  aspirin  chewable 81 milliGRAM(s) Oral daily  atorvastatin 80 milliGRAM(s) Oral at bedtime  clopidogrel Tablet 75 milliGRAM(s) Oral daily  fenofibrate Tablet 145 milliGRAM(s) Oral daily  gabapentin 200 milliGRAM(s) Oral at bedtime  gabapentin 100 milliGRAM(s) Oral <User Schedule>  heparin   Injectable 5000 Unit(s) SubCutaneous every 8 hours  isosorbide   mononitrate ER Tablet (IMDUR) 60 milliGRAM(s) Oral daily  metoprolol succinate ER 25 milliGRAM(s) Oral every 12 hours  midodrine. 2.5 milliGRAM(s) Oral <User Schedule>  NIFEdipine XL 60 milliGRAM(s) Oral daily  pentoxifylline 400 milliGRAM(s) Oral three times a day  ranolazine 500 milliGRAM(s) Oral two times a day  venlafaxine XR. 75 milliGRAM(s) Oral daily    MEDICATIONS  (PRN):  acetaminophen   Tablet .. 975 milliGRAM(s) Oral every 6 hours PRN Temp greater or equal to 38C (100.4F), Mild Pain (1 - 3), Moderate Pain (4 - 6)  aluminum hydroxide/magnesium hydroxide/simethicone Suspension 30 milliLiter(s) Oral every 4 hours PRN Dyspepsia  polyethylene glycol 3350 17 Gram(s) Oral daily PRN Constipation  senna 2 Tablet(s) Oral at bedtime PRN Constipation      LABS               12.6   10.27 )-----------( 314      ( 09 Aug 2021 05:28 )             40.8     08-10    140  |  105  |  33<H>  ----------------------------<  123<H>  4.5   |  23  |  1.9<H>    Ca    9.6      10 Aug 2021 06:39      POCT Blood Glucose.: 130 mg/dL (08-11-21 @ 07:14)  POCT Blood Glucose.: 126 mg/dL (08-10-21 @ 21:32)  POCT Blood Glucose.: 146 mg/dL (08-10-21 @ 16:26)  POCT Blood Glucose.: 119 mg/dL (08-10-21 @ 11:44)  POCT Blood Glucose.: 142 mg/dL (08-10-21 @ 07:24)  POCT Blood Glucose.: 123 mg/dL (08-09-21 @ 16:00)  POCT Blood Glucose.: 136 mg/dL (08-09-21 @ 11:27)  POCT Blood Glucose.: 119 mg/dL (08-09-21 @ 07:26)  POCT Blood Glucose.: 123 mg/dL (08-08-21 @ 22:19)  POCT Blood Glucose.: 134 mg/dL (08-08-21 @ 17:00)  POCT Blood Glucose.: 89 mg/dL (08-08-21 @ 11:55)  POCT Blood Glucose.: 127 mg/dL (08-08-21 @ 07:27)

## 2021-08-12 LAB
GLUCOSE BLDC GLUCOMTR-MCNC: 120 MG/DL — HIGH (ref 70–99)
SARS-COV-2 RNA SPEC QL NAA+PROBE: SIGNIFICANT CHANGE UP

## 2021-08-12 RX ADMIN — MIDODRINE HYDROCHLORIDE 2.5 MILLIGRAM(S): 2.5 TABLET ORAL at 12:13

## 2021-08-12 RX ADMIN — MIDODRINE HYDROCHLORIDE 2.5 MILLIGRAM(S): 2.5 TABLET ORAL at 06:34

## 2021-08-12 RX ADMIN — Medication 75 MILLIGRAM(S): at 12:12

## 2021-08-12 RX ADMIN — ISOSORBIDE MONONITRATE 60 MILLIGRAM(S): 60 TABLET, EXTENDED RELEASE ORAL at 12:12

## 2021-08-12 RX ADMIN — Medication 145 MILLIGRAM(S): at 12:11

## 2021-08-12 RX ADMIN — GABAPENTIN 200 MILLIGRAM(S): 400 CAPSULE ORAL at 21:32

## 2021-08-12 RX ADMIN — Medication 400 MILLIGRAM(S): at 06:34

## 2021-08-12 RX ADMIN — HEPARIN SODIUM 5000 UNIT(S): 5000 INJECTION INTRAVENOUS; SUBCUTANEOUS at 06:34

## 2021-08-12 RX ADMIN — Medication 400 MILLIGRAM(S): at 21:32

## 2021-08-12 RX ADMIN — CLOPIDOGREL BISULFATE 75 MILLIGRAM(S): 75 TABLET, FILM COATED ORAL at 12:12

## 2021-08-12 RX ADMIN — RANOLAZINE 500 MILLIGRAM(S): 500 TABLET, FILM COATED, EXTENDED RELEASE ORAL at 17:48

## 2021-08-12 RX ADMIN — RANOLAZINE 500 MILLIGRAM(S): 500 TABLET, FILM COATED, EXTENDED RELEASE ORAL at 06:34

## 2021-08-12 RX ADMIN — Medication 25 MILLIGRAM(S): at 17:48

## 2021-08-12 RX ADMIN — Medication 400 MILLIGRAM(S): at 13:13

## 2021-08-12 RX ADMIN — Medication 81 MILLIGRAM(S): at 12:11

## 2021-08-12 RX ADMIN — HEPARIN SODIUM 5000 UNIT(S): 5000 INJECTION INTRAVENOUS; SUBCUTANEOUS at 21:33

## 2021-08-12 RX ADMIN — HEPARIN SODIUM 5000 UNIT(S): 5000 INJECTION INTRAVENOUS; SUBCUTANEOUS at 13:12

## 2021-08-12 RX ADMIN — Medication 60 MILLIGRAM(S): at 06:33

## 2021-08-12 RX ADMIN — GABAPENTIN 100 MILLIGRAM(S): 400 CAPSULE ORAL at 09:49

## 2021-08-12 RX ADMIN — Medication 25 MILLIGRAM(S): at 06:33

## 2021-08-12 RX ADMIN — ATORVASTATIN CALCIUM 80 MILLIGRAM(S): 80 TABLET, FILM COATED ORAL at 21:32

## 2021-08-12 NOTE — CHART NOTE - NSCHARTNOTEFT_GEN_A_CORE
today pt voided 180 cc and pvr was 37 cc at 2 pm .. pt has been voiding with pvr  lessthan 100   most of the time

## 2021-08-12 NOTE — PROGRESS NOTE ADULT - ASSESSMENT
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 Rehab of acute lacunar infarcts in the deep white matter of the left frontal lobe at the level of the centrum semiovale and chronic left sided weakness from CVA of 2017.    # Rehab of chronic left hemiparisis (dominant), and dysarthria and dysphagia with decline in function and falls, found to have an acute left subcortical infarct.   - MRI Acute lacunar infarcts involving the deep white matter of the left frontal lobe at the level of the centrum semiovale. No acute hemorrhage.  - Neurology was consulted and no intervention was recommended; no intervention at this time. Patient can Follow up with neurology at the clinic, as per conversation with neuro PA. Was already on DAPT and high dose statin.  - PT/OT/SLT/Neuropsych therapies   - Loop recorder placement  8/4/21   - Ambulates with min assist w/ device    # Dizziness and fall 2/2 orthostatic hypotension   - Now controlled  - continue with HI stockings and abdominal binder  - continue midodrine 2x a day  - home medications of flomax, tizanidine were held by medicine team prior to rehab admission; valium was changed from 10 mg standing to 5 mg PRN by medicine per neurology recommendations for anxiety  - Became symptomatic again after 1 dose of Uroxatral, now discontinued. No further episodes of dizziness.     # LE pain likely secondary to PAD   - Significant atherosclerotic disease noted in bilateral common femoral arteries  - Patient's family and vascular team agreed to not proceed with intervention at this time   - no symptoms reported since on Rehab    # CAD s/p CABG 2009  - 6/2021: patent LIMA to LAD , patent SVG to OM , % , filled distally by collaterals from LAD.  - Continue DAPT ( Aspirin 81 mg daily and Plavix 75 mg daily ), ARB, Imdur, Ranexa, B-Blocker, Statin Therapy  - Cardio f/u OP     # HTN  - now controlled  - losartan 100mg discontinued for SAMANTA   - c/w nifedipine 60mg xL q24h   - Need to keep BP in higher range given symptomatic orthostasis    #HLD  - continue with atorvastatin and fenofibrate     # CKD 3  # Urinary retention  - Cr 1.3 in 2017, 1.5 in 2019, 1.7 in June; Stable  - Renal bladder US showed simple cysts within both kidneys without hydronephrosis or nephrolithiasis   - Monitor Cr, IVF as needed   - Has been stable  - Having increased spontaneous void.  Now receiving PVR q8hrs with strt cath prn.   - PVRs have beem less than 200cc w/out straight caths  - having increased urinary frequency with decreased PVRs. Monitor  - Will stand up and ambulate q2h to urinate    # DM type 2  - fair control on carb consistent diet alone.     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 Rehab of acute lacunar infarcts in the deep white matter of the left frontal lobe at the level of the centrum semiovale and chronic left sided weakness from CVA of 2017.    # Rehab of chronic left hemiparisis (dominant), and dysarthria and dysphagia with decline in function and falls, found to have an acute left subcortical infarct.   - MRI Acute lacunar infarcts involving the deep white matter of the left frontal lobe at the level of the centrum semiovale. No acute hemorrhage.  - Neurology was consulted and no intervention was recommended; no intervention at this time. Patient can Follow up with neurology at the clinic, as per conversation with neuro PA. Was already on DAPT and high dose statin.  - PT/OT/SLT/Neuropsych therapies   - Loop recorder placement  8/4/21   - Ambulates with CG assist w/ device - making gains. Continue rehab program.    # Dizziness and fall 2/2 orthostatic hypotension   - Now controlled  - continue with HI stockings and abdominal binder  - continue midodrine 2x a day  - home medications of flomax, tizanidine were held by medicine team prior to rehab admission; valium was changed from 10 mg standing to 5 mg PRN by medicine per neurology recommendations for anxiety  - Became symptomatic again after 1 dose of Uroxatral, now discontinued. No further episodes of dizziness.     # LE pain likely secondary to PAD   - Significant atherosclerotic disease noted in bilateral common femoral arteries  - Patient's family and vascular team agreed to not proceed with intervention at this time   - no symptoms reported since on Rehab    # CAD s/p CABG 2009  - 6/2021: patent LIMA to LAD , patent SVG to OM , % , filled distally by collaterals from LAD.  - Continue DAPT ( Aspirin 81 mg daily and Plavix 75 mg daily ), ARB, Imdur, Ranexa, B-Blocker, Statin Therapy  - Cardio f/u OP     # HTN  - now controlled  - losartan 100mg discontinued for SAMANTA   - c/w nifedipine 60mg xL q24h   - Need to keep BP in higher range given symptomatic orthostasis    #HLD  - continue with atorvastatin and fenofibrate     # CKD 3  # Urinary retention  - Cr 1.3 in 2017, 1.5 in 2019, 1.7 in June; Stable. Now 1.9 and stable  - Renal bladder US showed simple cysts within both kidneys without hydronephrosis or nephrolithiasis   - Monitor Cr, IVF as needed   - Has been stable  - Having increased spontaneous void.  Now receiving PVR q8hrs prn with strt cath prn- none needed the past few days  - Unable to tolerate alpha blocker secondary to orthostasis  - PVRs have been less than 200cc w/out straight caths  - having increased urinary frequency with decreased PVRs. Monitor  - Will stand up and ambulate q2h to urinate    # DM type 2  - fair control on carb consistent diet alone.     # Depression   - venlafaxine     # Anxiety   - Valium PRN as above    # Maintenance     - GI/Bowel Mgmt: miralax ,senna   - Bladder management: Straight cath q8h prn for urinary retention ( not needed past few days)  - Skin: No active issues at this time  - FEN: monitor electrolytes, replete as needed   - Diet: Diet, DASH/TLC: Sodium & Cholesterol Restricted, Consistent Carbohydrate No Snacks (07-28-21 @ 15:46) [Active]    # Precautions / PROPHYLAXIS:    - Fall precaution   - Ortho: Weight bearing status: WBAT  - DVT prophylaxis: SC heparin

## 2021-08-12 NOTE — PROGRESS NOTE ADULT - SUBJECTIVE AND OBJECTIVE BOX
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 Patient says he has been feeling intermittently dizzy over three days  despite having adequate/normal PO intake. Pt stated that these episodes of dizziness occurs after getting up from a sitting or supine position. Patient fell at home after feeling dizzy hitting the back of his head, but with no LOC. Pt was not on AC prior to admission. Pt also got out of his car, felt dizzy and then fell straight down hitting his buttocks and R knee. Denies head trauma. Patient was admitted on 7/20/21 to internal medicine for falls and dizziness 2/2 orthostatic hypotension.   CT spine 7/20/21 showed no evidence of acute cervical spine fracture or subluxation. Multilevel severe degenerative changes as described, with severe spinal noted stenosis at C2-3 and C3-4 and multilevel severe neural foraminal stenosis.  CTAP 7/20/21 showed no definite evidence of acute traumatic injury within the chest, abdomen, or pelvis.   CTH 7/21/21 showed no evidence of acute intracranial hemorrhage. Probable chronic infarct within the right posterior frontal white matter. Lacunar infarcts within bilateral white matter and deep gray nuclei of indeterminate age. Mild/moderate chronic microvascular changes.  Chest X-Ray negative for pneumothorax, infiltrate, or effusion. XR Pelvis negative for fx's or dislocations.    Patient's hospital course was complicated by lower extremity claudication. Bilateral lower extremity ultrasounds were performed on 7/23/21, which revealed severely diminished flow noted in the right and left superficial femoral artery suggestive of a proximal superficial femoral or common femoral artery stenosis. Significant atherosclerotic disease noted in the bilateral common femoral arteries. Vascular surgery was consulted for LLE Angiogram with possible endovascular revascularization; cardiac clearance was obtained but the team in conjunction with the family had decided to not go through with intervention.     Patient's orthostatic hypotension improved with midodrine, compression stocking, abdominal binder.     MRA 7/23/21 showed the following: MRA of the neck is limited by motion. Evaluation of the distal common carotid proximal internal carotid arteries appear grossly unremarkable though better quality study is recommended or CTA of the neck can be done for further evaluation. Evaluation of the proximal external carotid arteries are limited by motion. MRA of the Jamul Rodriguez demonstrates decreased caliber of the distal right internal carotid artery. Short segment of stenosis is suspected involving both posterior cerebral arteries.    CTH 7/24/21 showed the following: No acute/traumatic intracranial pathology. Microvascular ischemic changes and unchanged probable chronic infarct within the right posterior frontal lobe.  MRI Head 7/24/21 showed acute lacunar infarcts involving the deep white matter of the left frontal lobe at the level of the centrum semiovale. No acute hemorrhage.  Neurology followed the patient and did not recommend intervention; patient is to continue aspirin and plavix.    Patient's prior level of function included independence with ADLs and independence with ambulation with a RW.   Patient's level of function on admission is min assist with bed mobility and min assist with transfers, patient is able to ambulate 4 small steps with rolling walker min assist.     Patient was evaluated by Dr. Ingram, a physical medicine and rehabilitation specialist and was found to be an excellent candidate for acute rehabilitation. Patient would benefit from 3 hours of interdisciplinary therapy per day.     Patient was admitted to rehab for acute lacunar infarcts in the deep white matter of the left frontal lobe at the level of the centrum semiovale and chronic left sided weakness from CVA of 2017 with a significant decline in his baseline function of independent with a straight cane.      TODAY'S SUBJECTIVE & REVIEW OF SYMPTOMS:  Patient is doing well. Reporting frequent spontaneous urination. No longer complaining of dizziness.   No acute events. Received straight cath for urinary retention yesterday morning.        Constiutional:    [ x ] WNL           [   ] poor appetite   [   ] insomnia   [   ] tired   Cardio:                [ x ] WNL           [   ] CP   [   ] PRADO   [   ] palpitations               Resp:                   [ x ] WNL           [   ] SOB   [   ] cough   [   ] wheezing   GI:                        [ x ] WNL           [   ] constipation   [   ] diarrhea   [   ] abdominal pain   [   ] nausea   [   ] emesis                                :                      [  ] WNL           [   ] HOGUE  [   ] dysuria   [  x ] difficulty voiding   - c/o frequency - voiding q 30 mins      Endo:                   [ x ] WNL          [   ] polyuria   [   ] temperature intolerance                 Skin:                     [ x ] WNL          [   ] pain   [   ] wound   [   ] rash   MSK:                    [ x ] WNL          [   ] muscle pain   [   ] joint pain/ stiffness   [   ] muscle tenderness   [   ] swelling   Neuro:                 [  ] WNL           [ x ]  as per HPI            Cognitive:           [ x ] WNL           [   ]confusion      Psych:                  [ x ] WNL           [   ] hallucinations   [   ]agitation   [   ] delusion   [   ]depression      PHYSICAL EXAM    Vital Signs Last 24 Hrs  T(C): 35.8 (12 Aug 2021 05:30), Max: 36.7 (11 Aug 2021 20:00)  T(F): 96.5 (12 Aug 2021 05:30), Max: 98 (11 Aug 2021 20:00)  HR: 76 (12 Aug 2021 05:30) (76 - 86)  BP: 152/63 (12 Aug 2021 05:30) (125/72 - 152/63)  BP(mean): --  RR: 18 (12 Aug 2021 05:30) (18 - 18)  SpO2: --      Constitutional - [ x ] NAD, Comfortable        [   ] other:  Chest - [ x ] CTA     [   ] other:  Cardiovascular - [ x ] RRR, no murmer     [   ] other:  Abdomen - [ x ] Soft, NT/ND      [   ] other:        -  [ x  ] NO HOGUE CATHETER   [  ] YES  if yes: [  ] NO MEATAL TEAR OR DISCHARGE [   ] other:  Extremities - [ x ] No C/C/E, No calf tenderness       [   ] other:  ROM - [ x ] WFL     [   ] other:  Neurologic Exam -                 Cognitive - [ x ]Awake, Alert, AAO to self, place, date, year, situation         [    ] other:      Communication - [   ]Fluent, No dysarthria       [ x ] other: mildly dysarthric      Motor - No focal deficits                    Right UE -  [ x ] WNL      [    ] other:                    Left UE -     [   ] WNL      [ x ] other: LUE 4/5                    Right LE -   [ x ] WNL       [    ] other:                    Left LE -      [   ]WNL       [ x ] other: LLE 4/5     Sensory - [   ] Intact to LT      [ x ] other: L sided hemisensory loss      Reflexes - [ x ] wnl/ symmetric     [   ] other:     Psychiatric - [ x ]Mood stable, Affect WNL     [   ]other:     Skin - [ x ] intact      [   ] other  CLOF:   - Bed mobility: Kimberly   - Transfers: Kimberly   - Ambulation: CG for ambulation 100ft. x2 w RW  - ADLs: supervision     MEDICATIONS  (STANDING):  aspirin  chewable 81 milliGRAM(s) Oral daily  atorvastatin 80 milliGRAM(s) Oral at bedtime  clopidogrel Tablet 75 milliGRAM(s) Oral daily  fenofibrate Tablet 145 milliGRAM(s) Oral daily  gabapentin 200 milliGRAM(s) Oral at bedtime  gabapentin 100 milliGRAM(s) Oral <User Schedule>  heparin   Injectable 5000 Unit(s) SubCutaneous every 8 hours  isosorbide   mononitrate ER Tablet (IMDUR) 60 milliGRAM(s) Oral daily  metoprolol succinate ER 25 milliGRAM(s) Oral every 12 hours  midodrine. 2.5 milliGRAM(s) Oral <User Schedule>  NIFEdipine XL 60 milliGRAM(s) Oral daily  pentoxifylline 400 milliGRAM(s) Oral three times a day  ranolazine 500 milliGRAM(s) Oral two times a day  venlafaxine XR. 75 milliGRAM(s) Oral daily    MEDICATIONS  (PRN):  acetaminophen   Tablet .. 975 milliGRAM(s) Oral every 6 hours PRN Temp greater or equal to 38C (100.4F), Mild Pain (1 - 3), Moderate Pain (4 - 6)  aluminum hydroxide/magnesium hydroxide/simethicone Suspension 30 milliLiter(s) Oral every 4 hours PRN Dyspepsia  polyethylene glycol 3350 17 Gram(s) Oral daily PRN Constipation  senna 2 Tablet(s) Oral at bedtime PRN Constipation        LABS                     12.6   10.27 )-----------( 314      ( 09 Aug 2021 05:28 )             40.8     08-10    140  |  105  |  33<H>  ----------------------------<  123<H>  4.5   |  23  |  1.9<H>    Ca    9.6      10 Aug 2021 06:39            POCT Blood Glucose.: 120 mg/dL (08-12-21 @ 07:29)  POCT Blood Glucose.: 133 mg/dL (08-11-21 @ 11:17)  POCT Blood Glucose.: 130 mg/dL (08-11-21 @ 07:14)  POCT Blood Glucose.: 126 mg/dL (08-10-21 @ 21:32)  POCT Blood Glucose.: 146 mg/dL (08-10-21 @ 16:26)  POCT Blood Glucose.: 119 mg/dL (08-10-21 @ 11:44)  POCT Blood Glucose.: 142 mg/dL (08-10-21 @ 07:24)  POCT Blood Glucose.: 123 mg/dL (08-09-21 @ 16:00)  POCT Blood Glucose.: 136 mg/dL (08-09-21 @ 11:27)  POCT Blood Glucose.: 119 mg/dL (08-09-21 @ 07:26)  POCT Blood Glucose.: 123 mg/dL (08-08-21 @ 22:19)  POCT Blood Glucose.: 134 mg/dL (08-08-21 @ 17:00)

## 2021-08-12 NOTE — PROGRESS NOTE ADULT - ATTENDING COMMENTS
I reviewed the chart and examined the patient with the resident and we discussed the findings and treatment plan.  The patient is tolerating the rehab program well. I agree with the findings and treatment plan above, which I modified as indicated. The patient requires 3 hrs a day of acute inpatient rehab.    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 Rehab of acute lacunar infarcts in the deep white matter of the left frontal lobe at the level of the centrum semiovale and chronic left sided weakness from CVA of 2017.    # Rehab of chronic left hemiparisis (dominant), and dysarthria and dysphagia with decline in function and falls, found to have an acute left subcortical infarct.   - MRI Acute lacunar infarcts involving the deep white matter of the left frontal lobe at the level of the centrum semiovale. No acute hemorrhage.  - Neurology was consulted and no intervention was recommended; no intervention at this time. Patient can Follow up with neurology at the clinic, as per conversation with neuro PA. Was already on DAPT and high dose statin.  - PT/OT/SLT/Neuropsych therapies   - Loop recorder placement  8/4/21   - Ambulates with CG assist w/ device - making gains. Continue rehab program.    # Dizziness and fall 2/2 orthostatic hypotension   - Now controlled  - continue with HI stockings and abdominal binder  - continue midodrine 2x a day  - home medications of flomax, tizanidine were held by medicine team prior to rehab admission; valium was changed from 10 mg standing to 5 mg PRN by medicine per neurology recommendations for anxiety  - Became symptomatic again after 1 dose of Uroxatral, now discontinued. No further episodes of dizziness.     # LE pain likely secondary to PAD   - Significant atherosclerotic disease noted in bilateral common femoral arteries  - Patient's family and vascular team agreed to not proceed with intervention at this time   - no symptoms reported since on Rehab    # CAD s/p CABG 2009  - 6/2021: patent LIMA to LAD , patent SVG to OM , % , filled distally by collaterals from LAD.  - Continue DAPT ( Aspirin 81 mg daily and Plavix 75 mg daily ), ARB, Imdur, Ranexa, B-Blocker, Statin Therapy  - Cardio f/u OP     # HTN  - now controlled  - losartan 100mg discontinued for SAMANTA   - c/w nifedipine 60mg xL q24h   - Need to keep BP in higher range given symptomatic orthostasis    #HLD  - continue with atorvastatin and fenofibrate     # CKD 3  # Urinary retention  - Cr 1.3 in 2017, 1.5 in 2019, 1.7 in June; Stable. Now 1.9 and stable  - Renal bladder US showed simple cysts within both kidneys without hydronephrosis or nephrolithiasis   - Monitor Cr, IVF as needed   - Has been stable  - Having increased spontaneous void.  Now receiving PVR q8hrs prn with strt cath prn- none needed the past few days  - Unable to tolerate alpha blocker secondary to orthostasis  - PVRs have been less than 200cc w/out straight caths  - having increased urinary frequency with decreased PVRs. Monitor  - Will stand up and ambulate q2h to urinate    # DM type 2  - fair control on carb consistent diet alone.     # Depression   - venlafaxine     # Anxiety   - Valium PRN as above

## 2021-08-13 LAB
GLUCOSE BLDC GLUCOMTR-MCNC: 126 MG/DL — HIGH (ref 70–99)
GLUCOSE BLDC GLUCOMTR-MCNC: 146 MG/DL — HIGH (ref 70–99)

## 2021-08-13 RX ORDER — FINASTERIDE 5 MG/1
5 TABLET, FILM COATED ORAL DAILY
Refills: 0 | Status: DISCONTINUED | OUTPATIENT
Start: 2021-08-13 | End: 2021-08-13

## 2021-08-13 RX ORDER — MIDODRINE HYDROCHLORIDE 2.5 MG/1
2.5 TABLET ORAL
Refills: 0 | Status: DISCONTINUED | OUTPATIENT
Start: 2021-08-13 | End: 2021-08-18

## 2021-08-13 RX ADMIN — GABAPENTIN 100 MILLIGRAM(S): 400 CAPSULE ORAL at 08:12

## 2021-08-13 RX ADMIN — GABAPENTIN 200 MILLIGRAM(S): 400 CAPSULE ORAL at 22:15

## 2021-08-13 RX ADMIN — Medication 25 MILLIGRAM(S): at 18:49

## 2021-08-13 RX ADMIN — RANOLAZINE 500 MILLIGRAM(S): 500 TABLET, FILM COATED, EXTENDED RELEASE ORAL at 05:34

## 2021-08-13 RX ADMIN — ISOSORBIDE MONONITRATE 60 MILLIGRAM(S): 60 TABLET, EXTENDED RELEASE ORAL at 12:45

## 2021-08-13 RX ADMIN — Medication 400 MILLIGRAM(S): at 05:34

## 2021-08-13 RX ADMIN — ATORVASTATIN CALCIUM 80 MILLIGRAM(S): 80 TABLET, FILM COATED ORAL at 22:15

## 2021-08-13 RX ADMIN — Medication 400 MILLIGRAM(S): at 22:16

## 2021-08-13 RX ADMIN — Medication 145 MILLIGRAM(S): at 12:44

## 2021-08-13 RX ADMIN — CLOPIDOGREL BISULFATE 75 MILLIGRAM(S): 75 TABLET, FILM COATED ORAL at 12:44

## 2021-08-13 RX ADMIN — RANOLAZINE 500 MILLIGRAM(S): 500 TABLET, FILM COATED, EXTENDED RELEASE ORAL at 18:49

## 2021-08-13 RX ADMIN — Medication 25 MILLIGRAM(S): at 05:34

## 2021-08-13 RX ADMIN — Medication 75 MILLIGRAM(S): at 12:45

## 2021-08-13 RX ADMIN — HEPARIN SODIUM 5000 UNIT(S): 5000 INJECTION INTRAVENOUS; SUBCUTANEOUS at 12:49

## 2021-08-13 RX ADMIN — Medication 400 MILLIGRAM(S): at 12:46

## 2021-08-13 RX ADMIN — MIDODRINE HYDROCHLORIDE 2.5 MILLIGRAM(S): 2.5 TABLET ORAL at 12:45

## 2021-08-13 RX ADMIN — HEPARIN SODIUM 5000 UNIT(S): 5000 INJECTION INTRAVENOUS; SUBCUTANEOUS at 22:15

## 2021-08-13 RX ADMIN — HEPARIN SODIUM 5000 UNIT(S): 5000 INJECTION INTRAVENOUS; SUBCUTANEOUS at 05:34

## 2021-08-13 RX ADMIN — Medication 81 MILLIGRAM(S): at 12:44

## 2021-08-13 RX ADMIN — Medication 60 MILLIGRAM(S): at 05:34

## 2021-08-13 NOTE — PROGRESS NOTE ADULT - ATTENDING COMMENTS
I reviewed the chart and examined the patient with the resident and we discussed the findings and treatment plan.  The patient is tolerating the rehab program well. I agree with the findings and treatment plan above, which I modified as indicated. The patient requires 3 hrs a day of acute inpatient rehab.     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 Rehab of acute lacunar infarcts in the deep white matter of the left frontal lobe at the level of the centrum semiovale and chronic left sided weakness from CVA of 2017.    # Rehab of chronic left hemiparisis (dominant), and dysarthria and dysphagia with decline in function and falls, found to have an acute left subcortical infarct.   - MRI Acute lacunar infarcts involving the deep white matter of the left frontal lobe at the level of the centrum semiovale. No acute hemorrhage.  - Neurology was consulted and no intervention was recommended; no intervention at this time. Patient can Follow up with neurology at the clinic, as per conversation with neuro PA. Was already on DAPT and high dose statin.  - PT/OT/SLT/Neuropsych therapies   - Loop recorder placement  8/4/21   - Ambulates with CG assist w/ device - making gains. Continue rehab program.    # Dizziness and fall 2/2 orthostatic hypotension   - Now controlled  - continue with HI stockings and abdominal binder  - continue midodrine 2x a day  - home medications of flomax, tizanidine were held by medicine team prior to rehab admission; valium was changed from 10 mg standing to 5 mg PRN by medicine per neurology recommendations for anxiety  - Became symptomatic again after 1 dose of Uroxatral, now discontinued. No further episodes of dizziness.     # LE pain likely secondary to PAD   - Significant atherosclerotic disease noted in bilateral common femoral arteries  - Patient's family and vascular team agreed to not proceed with intervention at this time   - no symptoms reported since on Rehab    # CAD s/p CABG 2009  - 6/2021: patent LIMA to LAD , patent SVG to OM , % , filled distally by collaterals from LAD.  - Continue DAPT ( Aspirin 81 mg daily and Plavix 75 mg daily ), ARB, Imdur, Ranexa, B-Blocker, Statin Therapy  - Cardio f/u OP     # HTN  - now controlled  - losartan 100mg discontinued for SAMANTA   - c/w nifedipine 60mg xL q24h   - Need to keep BP in higher range given symptomatic orthostasis    #HLD  - continue with atorvastatin and fenofibrate     # CKD 3  # Urinary retention  - Cr 1.3 in 2017, 1.5 in 2019, 1.7 in June; Stable. Now 1.9 and stable  - Renal bladder US showed simple cysts within both kidneys without hydronephrosis or nephrolithiasis   - Monitor Cr, IVF as needed   - Has been stable  - Unable to tolerate alpha blocker secondary to orthostasis  - Having increased spontaneous void.  Now receiving PVR q8hrs prn with strt cath prn- none needed the past few days  - having increased urinary frequency with decreased PVRs. Monitor  - Will stand up to urinate q2h to urinate    # DM type 2  - good control on carb consistent diet alone.     # Depression   - venlafaxine     # Anxiety   - Valium PRN as above    # Maintenance     - GI/Bowel Mgmt: miralax ,senna   - Bladder management: Straight cath q8h prn for urinary retention (not needed past few days)  - Skin: No active issues at this time  - FEN: monitor electrolytes, replete as needed   - Diet: Diet, DASH/TLC: Sodium & Cholesterol Restricted, Consistent Carbohydrate No Snacks (07-28-21 @ 15:46) [Active]    # Precautions / PROPHYLAXIS:    - Fall precaution   - Ortho: Weight bearing status: WBAT  - DVT prophylaxis: SC heparin

## 2021-08-13 NOTE — PROGRESS NOTE ADULT - SUBJECTIVE AND OBJECTIVE BOX
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 Patient says he has been feeling intermittently dizzy over three days  despite having adequate/normal PO intake. Pt stated that these episodes of dizziness occurs after getting up from a sitting or supine position. Patient fell at home after feeling dizzy hitting the back of his head, but with no LOC. Pt was not on AC prior to admission. Pt also got out of his car, felt dizzy and then fell straight down hitting his buttocks and R knee. Denies head trauma. Patient was admitted on 7/20/21 to internal medicine for falls and dizziness 2/2 orthostatic hypotension.   CT spine 7/20/21 showed no evidence of acute cervical spine fracture or subluxation. Multilevel severe degenerative changes as described, with severe spinal noted stenosis at C2-3 and C3-4 and multilevel severe neural foraminal stenosis.  CTAP 7/20/21 showed no definite evidence of acute traumatic injury within the chest, abdomen, or pelvis.   CTH 7/21/21 showed no evidence of acute intracranial hemorrhage. Probable chronic infarct within the right posterior frontal white matter. Lacunar infarcts within bilateral white matter and deep gray nuclei of indeterminate age. Mild/moderate chronic microvascular changes.  Chest X-Ray negative for pneumothorax, infiltrate, or effusion. XR Pelvis negative for fx's or dislocations.    Patient's hospital course was complicated by lower extremity claudication. Bilateral lower extremity ultrasounds were performed on 7/23/21, which revealed severely diminished flow noted in the right and left superficial femoral artery suggestive of a proximal superficial femoral or common femoral artery stenosis. Significant atherosclerotic disease noted in the bilateral common femoral arteries. Vascular surgery was consulted for LLE Angiogram with possible endovascular revascularization; cardiac clearance was obtained but the team in conjunction with the family had decided to not go through with intervention.     Patient's orthostatic hypotension improved with midodrine, compression stocking, abdominal binder.     MRA 7/23/21 showed the following: MRA of the neck is limited by motion. Evaluation of the distal common carotid proximal internal carotid arteries appear grossly unremarkable though better quality study is recommended or CTA of the neck can be done for further evaluation. Evaluation of the proximal external carotid arteries are limited by motion. MRA of the [place] demonstrates decreased caliber of the distal right internal carotid artery. Short segment of stenosis is suspected involving both posterior cerebral arteries.    CTH 7/24/21 showed the following: No acute/traumatic intracranial pathology. Microvascular ischemic changes and unchanged probable chronic infarct within the right posterior frontal lobe.  MRI Head 7/24/21 showed acute lacunar infarcts involving the deep white matter of the left frontal lobe at the level of the centrum semiovale. No acute hemorrhage.  Neurology followed the patient and did not recommend intervention; patient is to continue aspirin and plavix.    Patient's prior level of function included independence with ADLs and independence with ambulation with a RW.   Patient's level of function on admission is min assist with bed mobility and min assist with transfers, patient is able to ambulate 4 small steps with rolling walker min assist.     Patient was evaluated by Dr. Ingram, a physical medicine and rehabilitation specialist and was found to be an excellent candidate for acute rehabilitation. Patient would benefit from 3 hours of interdisciplinary therapy per day.     Patient was admitted to rehab for acute lacunar infarcts in the deep white matter of the left frontal lobe at the level of the centrum semiovale and chronic left sided weakness from CVA of 2017 with a significant decline in his baseline function of independent with a straight cane.      TODAY'S SUBJECTIVE & REVIEW OF SYMPTOMS:  Patient is doing well. Reporting frequent spontaneous urination. No longer complaining of dizziness.   No acute events. Last 2 PVR scans ~40cc. Last straight cath for urinary retention performed 2 days ago. Midodrine held last night due to HTN, BP: 160's/70's       Constiutional:    [ x ] WNL           [   ] poor appetite   [   ] insomnia   [   ] tired   Cardio:                [ x ] WNL           [   ] CP   [   ] PRADO   [   ] palpitations               Resp:                   [ x ] WNL           [   ] SOB   [   ] cough   [   ] wheezing   GI:                        [ x ] WNL           [   ] constipation   [   ] diarrhea   [   ] abdominal pain   [   ] nausea   [   ] emesis                                :                      [  ] WNL           [   ] HOGUE  [   ] dysuria   [  x ] difficulty voiding   - c/o frequency - voiding q 30 mins      Endo:                   [ x ] WNL          [   ] polyuria   [   ] temperature intolerance                 Skin:                     [ x ] WNL          [   ] pain   [   ] wound   [   ] rash   MSK:                    [ x ] WNL          [   ] muscle pain   [   ] joint pain/ stiffness   [   ] muscle tenderness   [   ] swelling   Neuro:                 [  ] WNL           [ x ]  as per HPI            Cognitive:           [ x ] WNL           [   ]confusion      Psych:                  [ x ] WNL           [   ] hallucinations   [   ]agitation   [   ] delusion   [   ]depression      PHYSICAL EXAM    Vital Signs Last 24 Hrs  T(C): 35.8 (13 Aug 2021 05:32), Max: 36.3 (12 Aug 2021 21:37)  T(F): 96.4 (13 Aug 2021 05:32), Max: 97.4 (12 Aug 2021 21:37)  HR: 76 (13 Aug 2021 06:51) (76 - 87)  BP: 165/72 (13 Aug 2021 06:51) (124/64 - 174/79)  BP(mean): --  RR: 18 (13 Aug 2021 06:51) (18 - 19)  SpO2: 98% (12 Aug 2021 21:37) (98% - 98%)      Constitutional - [ x ] NAD, Comfortable        [   ] other:  Chest - [ x ] CTA     [   ] other:  Cardiovascular - [ x ] RRR, no murmer     [   ] other:  Abdomen - [ x ] Soft, NT/ND      [   ] other:        -  [ x  ] NO HOGUE CATHETER   [  ] YES  if yes: [  ] NO MEATAL TEAR OR DISCHARGE [   ] other:  Extremities - [ x ] No C/C/E, No calf tenderness       [   ] other:  ROM - [ x ] WFL     [   ] other:  Neurologic Exam -                 Cognitive - [ x ]Awake, Alert, AAO to self, place, date, year, situation         [    ] other:      Communication - [   ]Fluent, No dysarthria       [ x ] other: mildly dysarthric      Motor - No focal deficits                    Right UE -  [ x ] WNL      [    ] other:                    Left UE -     [   ] WNL      [ x ] other: LUE 4/5                    Right LE -   [ x ] WNL       [    ] other:                    Left LE -      [   ]WNL       [ x ] other: LLE 4/5     Sensory - [   ] Intact to LT      [ x ] other: L sided hemisensory loss      Reflexes - [ x ] wnl/ symmetric     [   ] other:     Psychiatric - [ x ]Mood stable, Affect WNL     [   ]other:     Skin - [ x ] intact      [   ] other  CLOF:   - Bed mobility: Kimberly   - Transfers: Kimberly   - Ambulation: CG for ambulation 100ft. x2 w RW  - ADLs: supervision     MEDICATIONS  (STANDING):  aspirin  chewable 81 milliGRAM(s) Oral daily  atorvastatin 80 milliGRAM(s) Oral at bedtime  clopidogrel Tablet 75 milliGRAM(s) Oral daily  fenofibrate Tablet 145 milliGRAM(s) Oral daily  gabapentin 200 milliGRAM(s) Oral at bedtime  gabapentin 100 milliGRAM(s) Oral <User Schedule>  heparin   Injectable 5000 Unit(s) SubCutaneous every 8 hours  isosorbide   mononitrate ER Tablet (IMDUR) 60 milliGRAM(s) Oral daily  metoprolol succinate ER 25 milliGRAM(s) Oral every 12 hours  midodrine. 2.5 milliGRAM(s) Oral <User Schedule>  NIFEdipine XL 60 milliGRAM(s) Oral daily  pentoxifylline 400 milliGRAM(s) Oral three times a day  ranolazine 500 milliGRAM(s) Oral two times a day  venlafaxine XR. 75 milliGRAM(s) Oral daily    MEDICATIONS  (PRN):  acetaminophen   Tablet .. 975 milliGRAM(s) Oral every 6 hours PRN Temp greater or equal to 38C (100.4F), Mild Pain (1 - 3), Moderate Pain (4 - 6)  aluminum hydroxide/magnesium hydroxide/simethicone Suspension 30 milliLiter(s) Oral every 4 hours PRN Dyspepsia  polyethylene glycol 3350 17 Gram(s) Oral daily PRN Constipation  senna 2 Tablet(s) Oral at bedtime PRN Constipation      LABS                     12.6   10.27 )-----------( 314      ( 09 Aug 2021 05:28 )             40.8     08-10    140  |  105  |  33<H>  ----------------------------<  123<H>  4.5   |  23  |  1.9<H>    Ca    9.6      10 Aug 2021 06:39            POCT Blood Glucose.: 120 mg/dL (08-12-21 @ 07:29)  POCT Blood Glucose.: 133 mg/dL (08-11-21 @ 11:17)  POCT Blood Glucose.: 130 mg/dL (08-11-21 @ 07:14)  POCT Blood Glucose.: 126 mg/dL (08-10-21 @ 21:32)  POCT Blood Glucose.: 146 mg/dL (08-10-21 @ 16:26)  POCT Blood Glucose.: 119 mg/dL (08-10-21 @ 11:44)  POCT Blood Glucose.: 142 mg/dL (08-10-21 @ 07:24)  POCT Blood Glucose.: 123 mg/dL (08-09-21 @ 16:00)  POCT Blood Glucose.: 136 mg/dL (08-09-21 @ 11:27)  POCT Blood Glucose.: 119 mg/dL (08-09-21 @ 07:26)  POCT Blood Glucose.: 123 mg/dL (08-08-21 @ 22:19)  POCT Blood Glucose.: 134 mg/dL (08-08-21 @ 17:00)                   HPI:  76 yo M with PMH of CAD s/p CABG x3 2017 ([place]), 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 Patient says he has been feeling intermittently dizzy over three days  despite having adequate/normal PO intake. Pt stated that these episodes of dizziness occurs after getting up from a sitting or supine position. Patient fell at home after feeling dizzy hitting the back of his head, but with no LOC. Pt was not on AC prior to admission. Pt also got out of his car, felt dizzy and then fell straight down hitting his buttocks and R knee. Denies head trauma. Patient was admitted on 7/20/21 to internal medicine for falls and dizziness 2/2 orthostatic hypotension.   CT spine 7/20/21 showed no evidence of acute cervical spine fracture or subluxation. Multilevel severe degenerative changes as described, with severe spinal noted stenosis at C2-3 and C3-4 and multilevel severe neural foraminal stenosis.  CTAP 7/20/21 showed no definite evidence of acute traumatic injury within the chest, abdomen, or pelvis.   CTH 7/21/21 showed no evidence of acute intracranial hemorrhage. Probable chronic infarct within the right posterior frontal white matter. Lacunar infarcts within bilateral white matter and deep gray nuclei of indeterminate age. Mild/moderate chronic microvascular changes.  Chest X-Ray negative for pneumothorax, infiltrate, or effusion. XR Pelvis negative for fx's or dislocations.    Patient's hospital course was complicated by lower extremity claudication. Bilateral lower extremity ultrasounds were performed on 7/23/21, which revealed severely diminished flow noted in the right and left superficial femoral artery suggestive of a proximal superficial femoral or common femoral artery stenosis. Significant atherosclerotic disease noted in the bilateral common femoral arteries. Vascular surgery was consulted for LLE Angiogram with possible endovascular revascularization; cardiac clearance was obtained but the team in conjunction with the family had decided to not go through with intervention. Patient's orthostatic hypotension improved with midodrine, compression stocking, abdominal binder.     MRA 7/23/21 showed the following: MRA of the neck is limited by motion. Evaluation of the distal common carotid proximal internal carotid arteries appear grossly unremarkable though better quality study is recommended or CTA of the neck can be done for further evaluation. Evaluation of the proximal external carotid arteries are limited by motion. MRA of the [place] demonstrates decreased caliber of the distal right internal carotid artery. Short segment of stenosis is suspected involving both posterior cerebral arteries.    CTH 7/24/21 showed the following: No acute/traumatic intracranial pathology. Microvascular ischemic changes and unchanged probable chronic infarct within the right posterior frontal lobe.  MRI Head 7/24/21 showed acute lacunar infarcts involving the deep white matter of the left frontal lobe at the level of the centrum semiovale. No acute hemorrhage.  Neurology followed the patient and did not recommend intervention; patient is to continue aspirin and plavix.    Patient's prior level of function included independence with ADLs and independence with ambulation with a RW. Patient's level of function on admission is min assist with bed mobility and min assist with transfers, patient is able to ambulate 4 small steps with rolling walker min assist. Patient was evaluated by Dr. Ingram, a physical medicine and rehabilitation specialist and was found to be an excellent candidate for acute rehabilitation. Patient would benefit from 3 hours of interdisciplinary therapy per day. Patient was admitted to rehab for acute lacunar infarcts in the deep white matter of the left frontal lobe at the level of the centrum semiovale and chronic left sided weakness from CVA of 2017 with a significant decline in his baseline function of independent with a straight cane.      TODAY'S SUBJECTIVE & REVIEW OF SYMPTOMS:  Patient is doing well. Reporting frequent spontaneous urination. No longer complaining of dizziness.   No acute events. Last 2 PVR scans ~40cc. Last straight cath for urinary retention performed 2 days ago. Midodrine held last night due to HTN, BP: 160's/70's       Constiutional:    [ x ] WNL           [   ] poor appetite   [   ] insomnia   [   ] tired   Cardio:                [ x ] WNL           [   ] CP   [   ] PRADO   [   ] palpitations               Resp:                   [ x ] WNL           [   ] SOB   [   ] cough   [   ] wheezing   GI:                        [ x ] WNL           [   ] constipation   [   ] diarrhea   [   ] abdominal pain   [   ] nausea   [   ] emesis                                :                      [  ] WNL           [   ] HOGUE  [   ] dysuria   [  x ] difficulty voiding   - c/o frequency - voiding q 30 mins      Endo:                   [ x ] WNL          [   ] polyuria   [   ] temperature intolerance                 Skin:                     [ x ] WNL          [   ] pain   [   ] wound   [   ] rash   MSK:                    [ x ] WNL          [   ] muscle pain   [   ] joint pain/ stiffness   [   ] muscle tenderness   [   ] swelling   Neuro:                 [  ] WNL           [ x ]  as per HPI            Cognitive:           [ x ] WNL           [   ]confusion      Psych:                  [ x ] WNL           [   ] hallucinations   [   ]agitation   [   ] delusion   [   ]depression      PHYSICAL EXAM    Vital Signs Last 24 Hrs  T(C): 35.8 (13 Aug 2021 05:32), Max: 36.3 (12 Aug 2021 21:37)  T(F): 96.4 (13 Aug 2021 05:32), Max: 97.4 (12 Aug 2021 21:37)  HR: 76 (13 Aug 2021 06:51) (76 - 87)  BP: 165/72 (13 Aug 2021 06:51) (124/64 - 174/79)  BP(mean): --  RR: 18 (13 Aug 2021 06:51) (18 - 19)  SpO2: 98% (12 Aug 2021 21:37) (98% - 98%)      Constitutional - [ x ] NAD, Comfortable        [   ] other:  Chest - [ x ] CTA     [   ] other:  Cardiovascular - [ x ] RRR, no murmer     [   ] other:  Abdomen - [ x ] Soft, NT/ND      [   ] other:        -  [ x  ] NO HOGUE CATHETER   [  ] YES  if yes: [  ] NO MEATAL TEAR OR DISCHARGE [   ] other:  Extremities - [ x ] No C/C/E, No calf tenderness       [   ] other:  ROM - [ x ] WFL     [   ] other:  Neurologic Exam -                 Cognitive - [ x ]Awake, Alert, AAO to self, place, date, year, situation         [    ] other:      Communication - [   ]Fluent, No dysarthria       [ x ] other: mildly dysarthric      Motor - No focal deficits                    Right UE -  [ x ] WNL      [    ] other:                    Left UE -     [   ] WNL      [ x ] other: LUE 4/5                    Right LE -   [ x ] WNL       [    ] other:                    Left LE -      [   ]WNL       [ x ] other: LLE 4/5     Sensory - [   ] Intact to LT      [ x ] other: L sided hemisensory loss      Reflexes - [ x ] wnl/ symmetric     [   ] other:     Psychiatric - [ x ]Mood stable, Affect WNL     [   ]other:     Skin - [ x ] intact      [   ] other  CLOF:   - Bed mobility: Kimberly   - Transfers: Kimberly   - Ambulation: CG for ambulation 100ft. x2 w RW  - ADLs: supervision     MEDICATIONS  (STANDING):  aspirin  chewable 81 milliGRAM(s) Oral daily  atorvastatin 80 milliGRAM(s) Oral at bedtime  clopidogrel Tablet 75 milliGRAM(s) Oral daily  fenofibrate Tablet 145 milliGRAM(s) Oral daily  gabapentin 200 milliGRAM(s) Oral at bedtime  gabapentin 100 milliGRAM(s) Oral <User Schedule>  heparin   Injectable 5000 Unit(s) SubCutaneous every 8 hours  isosorbide   mononitrate ER Tablet (IMDUR) 60 milliGRAM(s) Oral daily  metoprolol succinate ER 25 milliGRAM(s) Oral every 12 hours  midodrine. 2.5 milliGRAM(s) Oral <User Schedule>  NIFEdipine XL 60 milliGRAM(s) Oral daily  pentoxifylline 400 milliGRAM(s) Oral three times a day  ranolazine 500 milliGRAM(s) Oral two times a day  venlafaxine XR. 75 milliGRAM(s) Oral daily    MEDICATIONS  (PRN):  acetaminophen   Tablet .. 975 milliGRAM(s) Oral every 6 hours PRN Temp greater or equal to 38C (100.4F), Mild Pain (1 - 3), Moderate Pain (4 - 6)  aluminum hydroxide/magnesium hydroxide/simethicone Suspension 30 milliLiter(s) Oral every 4 hours PRN Dyspepsia  polyethylene glycol 3350 17 Gram(s) Oral daily PRN Constipation  senna 2 Tablet(s) Oral at bedtime PRN Constipation      LABS                 12.6   10.27 )-----------( 314      ( 09 Aug 2021 05:28 )             40.8     08-10    140  |  105  |  33<H>  ----------------------------<  123<H>  4.5   |  23  |  1.9<H>    Ca    9.6      10 Aug 2021 06:39            POCT Blood Glucose.: 120 mg/dL (08-12-21 @ 07:29)  POCT Blood Glucose.: 133 mg/dL (08-11-21 @ 11:17)  POCT Blood Glucose.: 130 mg/dL (08-11-21 @ 07:14)  POCT Blood Glucose.: 126 mg/dL (08-10-21 @ 21:32)  POCT Blood Glucose.: 146 mg/dL (08-10-21 @ 16:26)  POCT Blood Glucose.: 119 mg/dL (08-10-21 @ 11:44)  POCT Blood Glucose.: 142 mg/dL (08-10-21 @ 07:24)  POCT Blood Glucose.: 123 mg/dL (08-09-21 @ 16:00)  POCT Blood Glucose.: 136 mg/dL (08-09-21 @ 11:27)  POCT Blood Glucose.: 119 mg/dL (08-09-21 @ 07:26)  POCT Blood Glucose.: 123 mg/dL (08-08-21 @ 22:19)  POCT Blood Glucose.: 134 mg/dL (08-08-21 @ 17:00)                   HPI:  74 yo M with PMH of CAD s/p CABG x3 2017 ([place]), 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 Patient says he has been feeling intermittently dizzy over three days  despite having adequate/normal PO intake. Pt stated that these episodes of dizziness occurs after getting up from a sitting or supine position. Patient fell at home after feeling dizzy hitting the back of his head, but with no LOC. Pt was not on AC prior to admission. Pt also got out of his car, felt dizzy and then fell straight down hitting his buttocks and R knee. Denies head trauma. Patient was admitted on 7/20/21 to internal medicine for falls and dizziness 2/2 orthostatic hypotension.   CT spine 7/20/21 showed no evidence of acute cervical spine fracture or subluxation. Multilevel severe degenerative changes as described, with severe spinal noted stenosis at C2-3 and C3-4 and multilevel severe neural foraminal stenosis.  CTAP 7/20/21 showed no definite evidence of acute traumatic injury within the chest, abdomen, or pelvis.   CTH 7/21/21 showed no evidence of acute intracranial hemorrhage. Probable chronic infarct within the right posterior frontal white matter. Lacunar infarcts within bilateral white matter and deep gray nuclei of indeterminate age. Mild/moderate chronic microvascular changes.  Chest X-Ray negative for pneumothorax, infiltrate, or effusion. XR Pelvis negative for fx's or dislocations.    Patient's hospital course was complicated by lower extremity claudication. Bilateral lower extremity ultrasounds were performed on 7/23/21, which revealed severely diminished flow noted in the right and left superficial femoral artery suggestive of a proximal superficial femoral or common femoral artery stenosis. Significant atherosclerotic disease noted in the bilateral common femoral arteries. Vascular surgery was consulted for LLE Angiogram with possible endovascular revascularization; cardiac clearance was obtained but the team in conjunction with the family had decided to not go through with intervention. Patient's orthostatic hypotension improved with midodrine, compression stocking, abdominal binder.     MRA 7/23/21 showed the following: MRA of the neck is limited by motion. Evaluation of the distal common carotid proximal internal carotid arteries appear grossly unremarkable though better quality study is recommended or CTA of the neck can be done for further evaluation. Evaluation of the proximal external carotid arteries are limited by motion. MRA of the [place] demonstrates decreased caliber of the distal right internal carotid artery. Short segment of stenosis is suspected involving both posterior cerebral arteries.    CTH 7/24/21 showed the following: No acute/traumatic intracranial pathology. Microvascular ischemic changes and unchanged probable chronic infarct within the right posterior frontal lobe.  MRI Head 7/24/21 showed acute lacunar infarcts involving the deep white matter of the left frontal lobe at the level of the centrum semiovale. No acute hemorrhage.  Neurology followed the patient and did not recommend intervention; patient is to continue aspirin and plavix.    Patient's prior level of function included independence with ADLs and independence with ambulation with a RW. Patient's level of function on admission is min assist with bed mobility and min assist with transfers, patient is able to ambulate 4 small steps with rolling walker min assist. Patient was evaluated by Dr. Ingram, a physical medicine and rehabilitation specialist and was found to be an excellent candidate for acute rehabilitation. Patient would benefit from 3 hours of interdisciplinary therapy per day. Patient was admitted to rehab for acute lacunar infarcts in the deep white matter of the left frontal lobe at the level of the centrum semiovale and chronic left sided weakness from CVA of 2017 with a significant decline in his baseline function of independent with a straight cane.      TODAY'S SUBJECTIVE & REVIEW OF SYMPTOMS:  Patient is doing well. Reporting frequent spontaneous urination. No longer complaining of dizziness.   No acute events. Last 2 PVR scans ~40cc. Last straight cath for urinary retention performed 2-3 days ago. Midodrine held last night due to HTN, BP: 160's/70's       Constiutional:    [ x ] WNL           [   ] poor appetite   [   ] insomnia   [   ] tired   Cardio:                [ x ] WNL           [   ] CP   [   ] PRADO   [   ] palpitations               Resp:                   [ x ] WNL           [   ] SOB   [   ] cough   [   ] wheezing   GI:                        [ x ] WNL           [   ] constipation   [   ] diarrhea   [   ] abdominal pain   [   ] nausea   [   ] emesis                                :                      [  ] WNL           [   ] HOGUE  [   ] dysuria   [  x ] difficulty voiding   - c/o frequency - voiding q 30 mins      Endo:                   [ x ] WNL          [   ] polyuria   [   ] temperature intolerance                 Skin:                     [ x ] WNL          [   ] pain   [   ] wound   [   ] rash   MSK:                    [ x ] WNL          [   ] muscle pain   [   ] joint pain/ stiffness   [   ] muscle tenderness   [   ] swelling   Neuro:                 [  ] WNL           [ x ]  as per HPI            Cognitive:           [ x ] WNL           [   ]confusion      Psych:                  [ x ] WNL           [   ] hallucinations   [   ]agitation   [   ] delusion   [   ]depression      PHYSICAL EXAM    Vital Signs Last 24 Hrs  T(C): 35.8 (13 Aug 2021 05:32), Max: 36.3 (12 Aug 2021 21:37)  T(F): 96.4 (13 Aug 2021 05:32), Max: 97.4 (12 Aug 2021 21:37)  HR: 76 (13 Aug 2021 06:51) (76 - 87)  BP: 165/72 (13 Aug 2021 06:51) (124/64 - 174/79)  BP(mean): --  RR: 18 (13 Aug 2021 06:51) (18 - 19)  SpO2: 98% (12 Aug 2021 21:37) (98% - 98%)      Constitutional - [ x ] NAD, Comfortable        [   ] other:  Chest - [ x ] CTA     [   ] other:  Cardiovascular - [ x ] RRR, no murmer     [   ] other:  Abdomen - [ x ] Soft, NT/ND      [   ] other:        -  [ x  ] NO HOGUE CATHETER   [  ] YES  if yes: [  ] NO MEATAL TEAR OR DISCHARGE [   ] other:  Extremities - [ x ] No C/C/E, No calf tenderness       [   ] other:  ROM - [ x ] WFL     [   ] other:  Neurologic Exam -                 Cognitive - [ x ]Awake, Alert, AAO to self, place, date, year, situation         [    ] other:      Communication - [   ]Fluent, No dysarthria       [ x ] other: mildly dysarthric      Motor - No focal deficits                    Right UE -  [ x ] WNL      [    ] other:                    Left UE -     [   ] WNL      [ x ] other: LUE 4/5                    Right LE -   [ x ] WNL       [    ] other:                    Left LE -      [   ]WNL       [ x ] other: LLE 4/5     Sensory - [   ] Intact to LT      [ x ] other: L sided hemisensory loss      Reflexes - [ x ] wnl/ symmetric     [   ] other:     Psychiatric - [ x ]Mood stable, Affect WNL     [   ]other:     Skin - [ x ] intact      [   ] other  CLOF:   - Bed mobility: Kimberly   - Transfers: Kimberly   - Ambulation: CG for ambulation 100ft. x2 w RW  - ADLs: supervision     MEDICATIONS  (STANDING):  aspirin  chewable 81 milliGRAM(s) Oral daily  atorvastatin 80 milliGRAM(s) Oral at bedtime  clopidogrel Tablet 75 milliGRAM(s) Oral daily  fenofibrate Tablet 145 milliGRAM(s) Oral daily  gabapentin 200 milliGRAM(s) Oral at bedtime  gabapentin 100 milliGRAM(s) Oral <User Schedule>  heparin   Injectable 5000 Unit(s) SubCutaneous every 8 hours  isosorbide   mononitrate ER Tablet (IMDUR) 60 milliGRAM(s) Oral daily  metoprolol succinate ER 25 milliGRAM(s) Oral every 12 hours  midodrine. 2.5 milliGRAM(s) Oral <User Schedule>  NIFEdipine XL 60 milliGRAM(s) Oral daily  pentoxifylline 400 milliGRAM(s) Oral three times a day  ranolazine 500 milliGRAM(s) Oral two times a day  venlafaxine XR. 75 milliGRAM(s) Oral daily    MEDICATIONS  (PRN):  acetaminophen   Tablet .. 975 milliGRAM(s) Oral every 6 hours PRN Temp greater or equal to 38C (100.4F), Mild Pain (1 - 3), Moderate Pain (4 - 6)  aluminum hydroxide/magnesium hydroxide/simethicone Suspension 30 milliLiter(s) Oral every 4 hours PRN Dyspepsia  polyethylene glycol 3350 17 Gram(s) Oral daily PRN Constipation  senna 2 Tablet(s) Oral at bedtime PRN Constipation      LABS                 12.6   10.27 )-----------( 314      ( 09 Aug 2021 05:28 )             40.8     08-10    140  |  105  |  33<H>  ----------------------------<  123<H>  4.5   |  23  |  1.9<H>    Ca    9.6      10 Aug 2021 06:39            POCT Blood Glucose.: 120 mg/dL (08-12-21 @ 07:29)  POCT Blood Glucose.: 133 mg/dL (08-11-21 @ 11:17)  POCT Blood Glucose.: 130 mg/dL (08-11-21 @ 07:14)  POCT Blood Glucose.: 126 mg/dL (08-10-21 @ 21:32)  POCT Blood Glucose.: 146 mg/dL (08-10-21 @ 16:26)  POCT Blood Glucose.: 119 mg/dL (08-10-21 @ 11:44)  POCT Blood Glucose.: 142 mg/dL (08-10-21 @ 07:24)  POCT Blood Glucose.: 123 mg/dL (08-09-21 @ 16:00)  POCT Blood Glucose.: 136 mg/dL (08-09-21 @ 11:27)  POCT Blood Glucose.: 119 mg/dL (08-09-21 @ 07:26)  POCT Blood Glucose.: 123 mg/dL (08-08-21 @ 22:19)  POCT Blood Glucose.: 134 mg/dL (08-08-21 @ 17:00)

## 2021-08-13 NOTE — PROGRESS NOTE ADULT - ASSESSMENT
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 Rehab of acute lacunar infarcts in the deep white matter of the left frontal lobe at the level of the centrum semiovale and chronic left sided weakness from CVA of 2017.    # Rehab of chronic left hemiparisis (dominant), and dysarthria and dysphagia with decline in function and falls, found to have an acute left subcortical infarct.   - MRI Acute lacunar infarcts involving the deep white matter of the left frontal lobe at the level of the centrum semiovale. No acute hemorrhage.  - Neurology was consulted and no intervention was recommended; no intervention at this time. Patient can Follow up with neurology at the clinic, as per conversation with neuro PA. Was already on DAPT and high dose statin.  - PT/OT/SLT/Neuropsych therapies   - Loop recorder placement  8/4/21   - Ambulates with CG assist w/ device - making gains. Continue rehab program.    # Dizziness and fall 2/2 orthostatic hypotension   - Now controlled  - continue with HI stockings and abdominal binder  - continue midodrine 2x a day  - home medications of flomax, tizanidine were held by medicine team prior to rehab admission; valium was changed from 10 mg standing to 5 mg PRN by medicine per neurology recommendations for anxiety  - Became symptomatic again after 1 dose of Uroxatral, now discontinued. No further episodes of dizziness.     # LE pain likely secondary to PAD   - Significant atherosclerotic disease noted in bilateral common femoral arteries  - Patient's family and vascular team agreed to not proceed with intervention at this time   - no symptoms reported since on Rehab    # CAD s/p CABG 2009  - 6/2021: patent LIMA to LAD , patent SVG to OM , % , filled distally by collaterals from LAD.  - Continue DAPT ( Aspirin 81 mg daily and Plavix 75 mg daily ), ARB, Imdur, Ranexa, B-Blocker, Statin Therapy  - Cardio f/u OP     # HTN  - now controlled  - losartan 100mg discontinued for SAMANTA   - c/w nifedipine 60mg xL q24h   - Need to keep BP in higher range given symptomatic orthostasis    #HLD  - continue with atorvastatin and fenofibrate     # CKD 3  # Urinary retention  - Cr 1.3 in 2017, 1.5 in 2019, 1.7 in June; Stable. Now 1.9 and stable  - Renal bladder US showed simple cysts within both kidneys without hydronephrosis or nephrolithiasis   - Monitor Cr, IVF as needed   - Has been stable  - Unable to tolerate alpha blocker secondary to orthostasis  - Having increased spontaneous void.  Now receiving PVR q8hrs prn with strt cath prn- none needed the past few days  - having increased urinary frequency with decreased PVRs. Monitor  - Will stand up and ambulate q2h to urinate    # DM type 2  - fair control on carb consistent diet alone.     # Depression   - venlafaxine     # Anxiety   - Valium PRN as above    # Maintenance     - GI/Bowel Mgmt: miralax ,senna   - Bladder management: Straight cath q8h prn for urinary retention (not needed past few days)  - Skin: No active issues at this time  - FEN: monitor electrolytes, replete as needed   - Diet: Diet, DASH/TLC: Sodium & Cholesterol Restricted, Consistent Carbohydrate No Snacks (07-28-21 @ 15:46) [Active]    # Precautions / PROPHYLAXIS:    - Fall precaution   - Ortho: Weight bearing status: WBAT  - DVT prophylaxis: SC heparin        76 yo M with PMH of CAD s/p CABG x3 2017 (Yessi), 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 Rehab of acute lacunar infarcts in the deep white matter of the left frontal lobe at the level of the centrum semiovale and chronic left sided weakness from CVA of 2017.    # Rehab of chronic left hemiparisis (dominant), and dysarthria and dysphagia with decline in function and falls, found to have an acute left subcortical infarct.   - MRI Acute lacunar infarcts involving the deep white matter of the left frontal lobe at the level of the centrum semiovale. No acute hemorrhage.  - Neurology was consulted and no intervention was recommended; no intervention at this time. Patient can Follow up with neurology at the clinic, as per conversation with neuro PA. Was already on DAPT and high dose statin.  - PT/OT/SLT/Neuropsych therapies   - Loop recorder placement  8/4/21   - Ambulates with CG assist w/ device - making gains. Continue rehab program.    # Dizziness and fall 2/2 orthostatic hypotension   - Now controlled  - continue with HI stockings and abdominal binder  - continue midodrine 2x a day  - home medications of flomax, tizanidine were held by medicine team prior to rehab admission; valium was changed from 10 mg standing to 5 mg PRN by medicine per neurology recommendations for anxiety  - Became symptomatic again after 1 dose of Uroxatral, now discontinued. No further episodes of dizziness.     # LE pain likely secondary to PAD   - Significant atherosclerotic disease noted in bilateral common femoral arteries  - Patient's family and vascular team agreed to not proceed with intervention at this time   - no symptoms reported since on Rehab    # CAD s/p CABG 2009  - 6/2021: patent LIMA to LAD , patent SVG to OM , % , filled distally by collaterals from LAD.  - Continue DAPT ( Aspirin 81 mg daily and Plavix 75 mg daily ), ARB, Imdur, Ranexa, B-Blocker, Statin Therapy  - Cardio f/u OP     # HTN  - now controlled  - losartan 100mg discontinued for SAMANTA   - c/w nifedipine 60mg xL q24h   - Need to keep BP in higher range given symptomatic orthostasis    #HLD  - continue with atorvastatin and fenofibrate     # CKD 3  # Urinary retention  - Cr 1.3 in 2017, 1.5 in 2019, 1.7 in June; Stable. Now 1.9 and stable  - Renal bladder US showed simple cysts within both kidneys without hydronephrosis or nephrolithiasis   - Monitor Cr, IVF as needed   - Has been stable  - Unable to tolerate alpha blocker secondary to orthostasis  - Having increased spontaneous void.  Now receiving PVR q8hrs prn with strt cath prn- none needed the past few days  - having increased urinary frequency with decreased PVRs. Monitor  - Will stand up and ambulate q2h to urinate  - Will order start Finasteride     # DM type 2  - fair control on carb consistent diet alone.     # Depression   - venlafaxine     # Anxiety   - Valium PRN as above    # Maintenance     - GI/Bowel Mgmt: miralax ,senna   - Bladder management: Straight cath q8h prn for urinary retention (not needed past few days)  - Skin: No active issues at this time  - FEN: monitor electrolytes, replete as needed   - Diet: Diet, DASH/TLC: Sodium & Cholesterol Restricted, Consistent Carbohydrate No Snacks (07-28-21 @ 15:46) [Active]    # Precautions / PROPHYLAXIS:    - Fall precaution   - Ortho: Weight bearing status: WBAT  - DVT prophylaxis: SC heparin        74 yo M with PMH of CAD s/p CABG x3 2017 (Yessi), 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 Rehab of acute lacunar infarcts in the deep white matter of the left frontal lobe at the level of the centrum semiovale and chronic left sided weakness from CVA of 2017.    # Rehab of chronic left hemiparisis (dominant), and dysarthria and dysphagia with decline in function and falls, found to have an acute left subcortical infarct.   - MRI Acute lacunar infarcts involving the deep white matter of the left frontal lobe at the level of the centrum semiovale. No acute hemorrhage.  - Neurology was consulted and no intervention was recommended; no intervention at this time. Patient can Follow up with neurology at the clinic, as per conversation with neuro PA. Was already on DAPT and high dose statin.  - PT/OT/SLT/Neuropsych therapies   - Loop recorder placement  8/4/21   - Ambulates with CG assist w/ device - making gains. Continue rehab program.    # Dizziness and fall 2/2 orthostatic hypotension   - Now controlled  - continue with HI stockings and abdominal binder  - continue midodrine 2x a day  - home medications of flomax, tizanidine were held by medicine team prior to rehab admission; valium was changed from 10 mg standing to 5 mg PRN by medicine per neurology recommendations for anxiety  - Became symptomatic again after 1 dose of Uroxatral, now discontinued. No further episodes of dizziness.     # LE pain likely secondary to PAD   - Significant atherosclerotic disease noted in bilateral common femoral arteries  - Patient's family and vascular team agreed to not proceed with intervention at this time   - no symptoms reported since on Rehab    # CAD s/p CABG 2009  - 6/2021: patent LIMA to LAD , patent SVG to OM , % , filled distally by collaterals from LAD.  - Continue DAPT ( Aspirin 81 mg daily and Plavix 75 mg daily ), ARB, Imdur, Ranexa, B-Blocker, Statin Therapy  - Cardio f/u OP     # HTN  - now controlled  - losartan 100mg discontinued for SAMANTA   - c/w nifedipine 60mg xL q24h   - Need to keep BP in higher range given symptomatic orthostasis    #HLD  - continue with atorvastatin and fenofibrate     # CKD 3  # Urinary retention  - Cr 1.3 in 2017, 1.5 in 2019, 1.7 in June; Stable. Now 1.9 and stable  - Renal bladder US showed simple cysts within both kidneys without hydronephrosis or nephrolithiasis   - Monitor Cr, IVF as needed   - Has been stable  - Unable to tolerate alpha blocker secondary to orthostasis  - Having increased spontaneous void.  Now receiving PVR q8hrs prn with strt cath prn- none needed the past few days  - having increased urinary frequency with decreased PVRs. Monitor  - Will stand up and ambulate q2h to urinate    # DM type 2  - fair control on carb consistent diet alone.     # Depression   - venlafaxine     # Anxiety   - Valium PRN as above    # Maintenance     - GI/Bowel Mgmt: miralax ,senna   - Bladder management: Straight cath q8h prn for urinary retention (not needed past few days)  - Skin: No active issues at this time  - FEN: monitor electrolytes, replete as needed   - Diet: Diet, DASH/TLC: Sodium & Cholesterol Restricted, Consistent Carbohydrate No Snacks (07-28-21 @ 15:46) [Active]    # Precautions / PROPHYLAXIS:    - Fall precaution   - Ortho: Weight bearing status: WBAT  - DVT prophylaxis: SC heparin        74 yo M with PMH of CAD s/p CABG x3 2017 (Yessi), 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 Rehab of acute lacunar infarcts in the deep white matter of the left frontal lobe at the level of the centrum semiovale and chronic left sided weakness from CVA of 2017.    # Rehab of chronic left hemiparisis (dominant), and dysarthria and dysphagia with decline in function and falls, found to have an acute left subcortical infarct.   - MRI Acute lacunar infarcts involving the deep white matter of the left frontal lobe at the level of the centrum semiovale. No acute hemorrhage.  - Neurology was consulted and no intervention was recommended; no intervention at this time. Patient can Follow up with neurology at the clinic, as per conversation with neuro PA. Was already on DAPT and high dose statin.  - PT/OT/SLT/Neuropsych therapies   - Loop recorder placement  8/4/21   - Ambulates with CG assist w/ device - making gains. Continue rehab program.    # Dizziness and fall 2/2 orthostatic hypotension   - Now controlled  - continue with HI stockings and abdominal binder  - continue midodrine 2x a day  - home medications of flomax, tizanidine were held by medicine team prior to rehab admission; valium was changed from 10 mg standing to 5 mg PRN by medicine per neurology recommendations for anxiety  - Became symptomatic again after 1 dose of Uroxatral, now discontinued. No further episodes of dizziness.     # LE pain likely secondary to PAD   - Significant atherosclerotic disease noted in bilateral common femoral arteries  - Patient's family and vascular team agreed to not proceed with intervention at this time   - no symptoms reported since on Rehab    # CAD s/p CABG 2009  - 6/2021: patent LIMA to LAD , patent SVG to OM , % , filled distally by collaterals from LAD.  - Continue DAPT ( Aspirin 81 mg daily and Plavix 75 mg daily ), ARB, Imdur, Ranexa, B-Blocker, Statin Therapy  - Cardio f/u OP     # HTN  - now controlled  - losartan 100mg discontinued for SAMANTA   - c/w nifedipine 60mg xL q24h   - Need to keep BP in higher range given symptomatic orthostasis    #HLD  - continue with atorvastatin and fenofibrate     # CKD 3  # Urinary retention  - Cr 1.3 in 2017, 1.5 in 2019, 1.7 in June; Stable. Now 1.9 and stable  - Renal bladder US showed simple cysts within both kidneys without hydronephrosis or nephrolithiasis   - Monitor Cr, IVF as needed   - Has been stable  - Unable to tolerate alpha blocker secondary to orthostasis  - Having increased spontaneous void.  Now receiving PVR q8hrs prn with strt cath prn- none needed the past few days  - having increased urinary frequency with decreased PVRs. Monitor  - Will stand up to urinate q2h to urinate    # DM type 2  - good control on carb consistent diet alone.     # Depression   - venlafaxine     # Anxiety   - Valium PRN as above    # Maintenance     - GI/Bowel Mgmt: miralax ,senna   - Bladder management: Straight cath q8h prn for urinary retention (not needed past few days)  - Skin: No active issues at this time  - FEN: monitor electrolytes, replete as needed   - Diet: Diet, DASH/TLC: Sodium & Cholesterol Restricted, Consistent Carbohydrate No Snacks (07-28-21 @ 15:46) [Active]    # Precautions / PROPHYLAXIS:    - Fall precaution   - Ortho: Weight bearing status: WBAT  - DVT prophylaxis: SC heparin

## 2021-08-14 LAB
GLUCOSE BLDC GLUCOMTR-MCNC: 120 MG/DL — HIGH (ref 70–99)
GLUCOSE BLDC GLUCOMTR-MCNC: 178 MG/DL — HIGH (ref 70–99)

## 2021-08-14 RX ADMIN — MIDODRINE HYDROCHLORIDE 2.5 MILLIGRAM(S): 2.5 TABLET ORAL at 06:45

## 2021-08-14 RX ADMIN — GABAPENTIN 200 MILLIGRAM(S): 400 CAPSULE ORAL at 21:54

## 2021-08-14 RX ADMIN — Medication 75 MILLIGRAM(S): at 11:46

## 2021-08-14 RX ADMIN — HEPARIN SODIUM 5000 UNIT(S): 5000 INJECTION INTRAVENOUS; SUBCUTANEOUS at 21:54

## 2021-08-14 RX ADMIN — CLOPIDOGREL BISULFATE 75 MILLIGRAM(S): 75 TABLET, FILM COATED ORAL at 11:46

## 2021-08-14 RX ADMIN — MIDODRINE HYDROCHLORIDE 2.5 MILLIGRAM(S): 2.5 TABLET ORAL at 11:46

## 2021-08-14 RX ADMIN — Medication 400 MILLIGRAM(S): at 05:28

## 2021-08-14 RX ADMIN — HEPARIN SODIUM 5000 UNIT(S): 5000 INJECTION INTRAVENOUS; SUBCUTANEOUS at 15:47

## 2021-08-14 RX ADMIN — ISOSORBIDE MONONITRATE 60 MILLIGRAM(S): 60 TABLET, EXTENDED RELEASE ORAL at 11:46

## 2021-08-14 RX ADMIN — RANOLAZINE 500 MILLIGRAM(S): 500 TABLET, FILM COATED, EXTENDED RELEASE ORAL at 17:02

## 2021-08-14 RX ADMIN — Medication 400 MILLIGRAM(S): at 21:54

## 2021-08-14 RX ADMIN — Medication 25 MILLIGRAM(S): at 05:28

## 2021-08-14 RX ADMIN — Medication 81 MILLIGRAM(S): at 11:46

## 2021-08-14 RX ADMIN — Medication 400 MILLIGRAM(S): at 15:47

## 2021-08-14 RX ADMIN — RANOLAZINE 500 MILLIGRAM(S): 500 TABLET, FILM COATED, EXTENDED RELEASE ORAL at 05:28

## 2021-08-14 RX ADMIN — Medication 60 MILLIGRAM(S): at 05:28

## 2021-08-14 RX ADMIN — ATORVASTATIN CALCIUM 80 MILLIGRAM(S): 80 TABLET, FILM COATED ORAL at 21:54

## 2021-08-14 RX ADMIN — Medication 145 MILLIGRAM(S): at 11:46

## 2021-08-14 RX ADMIN — HEPARIN SODIUM 5000 UNIT(S): 5000 INJECTION INTRAVENOUS; SUBCUTANEOUS at 05:27

## 2021-08-14 RX ADMIN — GABAPENTIN 100 MILLIGRAM(S): 400 CAPSULE ORAL at 08:11

## 2021-08-14 RX ADMIN — Medication 25 MILLIGRAM(S): at 17:01

## 2021-08-14 NOTE — PROGRESS NOTE ADULT - SUBJECTIVE AND OBJECTIVE BOX
T(C): 36.2 (08-14-21 @ 06:23), Max: 36.7 (08-13-21 @ 20:20)  HR: 75 (08-14-21 @ 06:23) (75 - 100)  BP: 160/78 (08-14-21 @ 06:23) (132/72 - 188/81)  RR: 18 (08-14-21 @ 05:44) (17 - 18)  SpO2: --      Patient was stable overnight and expresses no new complaints     PE:    Alert   LUNGS- clear  COR- RRR  ABD- SOFT, NT  EXTR- w/o edema  NEURO- stable

## 2021-08-15 LAB
GLUCOSE BLDC GLUCOMTR-MCNC: 121 MG/DL — HIGH (ref 70–99)
GLUCOSE BLDC GLUCOMTR-MCNC: 125 MG/DL — HIGH (ref 70–99)
GLUCOSE BLDC GLUCOMTR-MCNC: 141 MG/DL — HIGH (ref 70–99)

## 2021-08-15 RX ADMIN — HEPARIN SODIUM 5000 UNIT(S): 5000 INJECTION INTRAVENOUS; SUBCUTANEOUS at 22:36

## 2021-08-15 RX ADMIN — HEPARIN SODIUM 5000 UNIT(S): 5000 INJECTION INTRAVENOUS; SUBCUTANEOUS at 05:01

## 2021-08-15 RX ADMIN — Medication 25 MILLIGRAM(S): at 05:01

## 2021-08-15 RX ADMIN — Medication 81 MILLIGRAM(S): at 13:30

## 2021-08-15 RX ADMIN — Medication 400 MILLIGRAM(S): at 05:01

## 2021-08-15 RX ADMIN — ISOSORBIDE MONONITRATE 60 MILLIGRAM(S): 60 TABLET, EXTENDED RELEASE ORAL at 13:30

## 2021-08-15 RX ADMIN — Medication 25 MILLIGRAM(S): at 17:23

## 2021-08-15 RX ADMIN — Medication 75 MILLIGRAM(S): at 13:40

## 2021-08-15 RX ADMIN — Medication 145 MILLIGRAM(S): at 13:30

## 2021-08-15 RX ADMIN — MIDODRINE HYDROCHLORIDE 2.5 MILLIGRAM(S): 2.5 TABLET ORAL at 13:30

## 2021-08-15 RX ADMIN — CLOPIDOGREL BISULFATE 75 MILLIGRAM(S): 75 TABLET, FILM COATED ORAL at 13:30

## 2021-08-15 RX ADMIN — Medication 400 MILLIGRAM(S): at 22:37

## 2021-08-15 RX ADMIN — GABAPENTIN 200 MILLIGRAM(S): 400 CAPSULE ORAL at 22:37

## 2021-08-15 RX ADMIN — Medication 400 MILLIGRAM(S): at 15:20

## 2021-08-15 RX ADMIN — HEPARIN SODIUM 5000 UNIT(S): 5000 INJECTION INTRAVENOUS; SUBCUTANEOUS at 13:32

## 2021-08-15 RX ADMIN — ATORVASTATIN CALCIUM 80 MILLIGRAM(S): 80 TABLET, FILM COATED ORAL at 22:37

## 2021-08-15 RX ADMIN — RANOLAZINE 500 MILLIGRAM(S): 500 TABLET, FILM COATED, EXTENDED RELEASE ORAL at 05:01

## 2021-08-15 RX ADMIN — GABAPENTIN 100 MILLIGRAM(S): 400 CAPSULE ORAL at 08:15

## 2021-08-15 RX ADMIN — RANOLAZINE 500 MILLIGRAM(S): 500 TABLET, FILM COATED, EXTENDED RELEASE ORAL at 17:23

## 2021-08-15 RX ADMIN — Medication 60 MILLIGRAM(S): at 05:01

## 2021-08-15 NOTE — PROGRESS NOTE ADULT - SUBJECTIVE AND OBJECTIVE BOX
T(C): 36.1 (08-15-21 @ 04:59), Max: 36.8 (08-14-21 @ 14:56)  HR: 77 (08-15-21 @ 06:57) (71 - 83)  BP: 156/70 (08-15-21 @ 06:57) (123/59 - 186/78)  RR: 18 (08-15-21 @ 04:59) (17 - 18)  SpO2: 98% (08-15-21 @ 04:59) (97% - 98%)      Patient was stable overnight and expresses no new complaints     PE:    Alert   LUNGS- clear  COR- RRR  ABD- SOFT, NT  EXTR- w/o edema  NEURO- stable

## 2021-08-16 LAB
ALBUMIN SERPL ELPH-MCNC: 4.5 G/DL — SIGNIFICANT CHANGE UP (ref 3.5–5.2)
ALP SERPL-CCNC: 35 U/L — SIGNIFICANT CHANGE UP (ref 30–115)
ALT FLD-CCNC: 17 U/L — SIGNIFICANT CHANGE UP (ref 0–41)
ANION GAP SERPL CALC-SCNC: 12 MMOL/L — SIGNIFICANT CHANGE UP (ref 7–14)
AST SERPL-CCNC: 20 U/L — SIGNIFICANT CHANGE UP (ref 0–41)
BASOPHILS # BLD AUTO: 0.05 K/UL — SIGNIFICANT CHANGE UP (ref 0–0.2)
BASOPHILS NFR BLD AUTO: 0.7 % — SIGNIFICANT CHANGE UP (ref 0–1)
BILIRUB SERPL-MCNC: 0.4 MG/DL — SIGNIFICANT CHANGE UP (ref 0.2–1.2)
BUN SERPL-MCNC: 26 MG/DL — HIGH (ref 10–20)
CALCIUM SERPL-MCNC: 9.9 MG/DL — SIGNIFICANT CHANGE UP (ref 8.5–10.1)
CHLORIDE SERPL-SCNC: 106 MMOL/L — SIGNIFICANT CHANGE UP (ref 98–110)
CO2 SERPL-SCNC: 21 MMOL/L — SIGNIFICANT CHANGE UP (ref 17–32)
CREAT SERPL-MCNC: 1.7 MG/DL — HIGH (ref 0.7–1.5)
EOSINOPHIL # BLD AUTO: 0.27 K/UL — SIGNIFICANT CHANGE UP (ref 0–0.7)
EOSINOPHIL NFR BLD AUTO: 3.5 % — SIGNIFICANT CHANGE UP (ref 0–8)
GLUCOSE BLDC GLUCOMTR-MCNC: 123 MG/DL — HIGH (ref 70–99)
GLUCOSE BLDC GLUCOMTR-MCNC: 129 MG/DL — HIGH (ref 70–99)
GLUCOSE BLDC GLUCOMTR-MCNC: 164 MG/DL — HIGH (ref 70–99)
GLUCOSE BLDC GLUCOMTR-MCNC: 98 MG/DL — SIGNIFICANT CHANGE UP (ref 70–99)
GLUCOSE SERPL-MCNC: 124 MG/DL — HIGH (ref 70–99)
HCT VFR BLD CALC: 40.3 % — LOW (ref 42–52)
HGB BLD-MCNC: 12.7 G/DL — LOW (ref 14–18)
IMM GRANULOCYTES NFR BLD AUTO: 0.4 % — HIGH (ref 0.1–0.3)
LYMPHOCYTES # BLD AUTO: 1.93 K/UL — SIGNIFICANT CHANGE UP (ref 1.2–3.4)
LYMPHOCYTES # BLD AUTO: 25.3 % — SIGNIFICANT CHANGE UP (ref 20.5–51.1)
MAGNESIUM SERPL-MCNC: 2.3 MG/DL — SIGNIFICANT CHANGE UP (ref 1.8–2.4)
MCHC RBC-ENTMCNC: 28.9 PG — SIGNIFICANT CHANGE UP (ref 27–31)
MCHC RBC-ENTMCNC: 31.5 G/DL — LOW (ref 32–37)
MCV RBC AUTO: 91.6 FL — SIGNIFICANT CHANGE UP (ref 80–94)
MONOCYTES # BLD AUTO: 0.57 K/UL — SIGNIFICANT CHANGE UP (ref 0.1–0.6)
MONOCYTES NFR BLD AUTO: 7.5 % — SIGNIFICANT CHANGE UP (ref 1.7–9.3)
NEUTROPHILS # BLD AUTO: 4.77 K/UL — SIGNIFICANT CHANGE UP (ref 1.4–6.5)
NEUTROPHILS NFR BLD AUTO: 62.6 % — SIGNIFICANT CHANGE UP (ref 42.2–75.2)
NRBC # BLD: 0 /100 WBCS — SIGNIFICANT CHANGE UP (ref 0–0)
PLATELET # BLD AUTO: 292 K/UL — SIGNIFICANT CHANGE UP (ref 130–400)
POTASSIUM SERPL-MCNC: 5.2 MMOL/L — HIGH (ref 3.5–5)
POTASSIUM SERPL-SCNC: 5.2 MMOL/L — HIGH (ref 3.5–5)
PROT SERPL-MCNC: 7.2 G/DL — SIGNIFICANT CHANGE UP (ref 6–8)
RBC # BLD: 4.4 M/UL — LOW (ref 4.7–6.1)
RBC # FLD: 14.9 % — HIGH (ref 11.5–14.5)
SARS-COV-2 RNA SPEC QL NAA+PROBE: SIGNIFICANT CHANGE UP
SODIUM SERPL-SCNC: 139 MMOL/L — SIGNIFICANT CHANGE UP (ref 135–146)
WBC # BLD: 7.62 K/UL — SIGNIFICANT CHANGE UP (ref 4.8–10.8)
WBC # FLD AUTO: 7.62 K/UL — SIGNIFICANT CHANGE UP (ref 4.8–10.8)

## 2021-08-16 RX ADMIN — HEPARIN SODIUM 5000 UNIT(S): 5000 INJECTION INTRAVENOUS; SUBCUTANEOUS at 06:05

## 2021-08-16 RX ADMIN — GABAPENTIN 200 MILLIGRAM(S): 400 CAPSULE ORAL at 21:29

## 2021-08-16 RX ADMIN — RANOLAZINE 500 MILLIGRAM(S): 500 TABLET, FILM COATED, EXTENDED RELEASE ORAL at 17:32

## 2021-08-16 RX ADMIN — Medication 60 MILLIGRAM(S): at 07:00

## 2021-08-16 RX ADMIN — CLOPIDOGREL BISULFATE 75 MILLIGRAM(S): 75 TABLET, FILM COATED ORAL at 12:34

## 2021-08-16 RX ADMIN — Medication 400 MILLIGRAM(S): at 21:30

## 2021-08-16 RX ADMIN — HEPARIN SODIUM 5000 UNIT(S): 5000 INJECTION INTRAVENOUS; SUBCUTANEOUS at 16:01

## 2021-08-16 RX ADMIN — Medication 81 MILLIGRAM(S): at 12:34

## 2021-08-16 RX ADMIN — Medication 400 MILLIGRAM(S): at 16:01

## 2021-08-16 RX ADMIN — Medication 145 MILLIGRAM(S): at 12:34

## 2021-08-16 RX ADMIN — ISOSORBIDE MONONITRATE 60 MILLIGRAM(S): 60 TABLET, EXTENDED RELEASE ORAL at 12:34

## 2021-08-16 RX ADMIN — Medication 400 MILLIGRAM(S): at 06:06

## 2021-08-16 RX ADMIN — Medication 25 MILLIGRAM(S): at 06:06

## 2021-08-16 RX ADMIN — GABAPENTIN 100 MILLIGRAM(S): 400 CAPSULE ORAL at 08:05

## 2021-08-16 RX ADMIN — RANOLAZINE 500 MILLIGRAM(S): 500 TABLET, FILM COATED, EXTENDED RELEASE ORAL at 06:06

## 2021-08-16 RX ADMIN — Medication 25 MILLIGRAM(S): at 17:32

## 2021-08-16 RX ADMIN — Medication 75 MILLIGRAM(S): at 12:34

## 2021-08-16 RX ADMIN — MIDODRINE HYDROCHLORIDE 2.5 MILLIGRAM(S): 2.5 TABLET ORAL at 06:06

## 2021-08-16 RX ADMIN — HEPARIN SODIUM 5000 UNIT(S): 5000 INJECTION INTRAVENOUS; SUBCUTANEOUS at 21:30

## 2021-08-16 RX ADMIN — MIDODRINE HYDROCHLORIDE 2.5 MILLIGRAM(S): 2.5 TABLET ORAL at 12:33

## 2021-08-16 RX ADMIN — ATORVASTATIN CALCIUM 80 MILLIGRAM(S): 80 TABLET, FILM COATED ORAL at 21:30

## 2021-08-16 NOTE — PROGRESS NOTE ADULT - ASSESSMENT
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 Rehab of acute lacunar infarcts in the deep white matter of the left frontal lobe at the level of the centrum semiovale and chronic left sided weakness from CVA of 2017.    # Rehab of chronic left hemiparisis (dominant), and dysarthria and dysphagia with decline in function and falls, found to have an acute left subcortical infarct.   - MRI Acute lacunar infarcts involving the deep white matter of the left frontal lobe at the level of the centrum semiovale. No acute hemorrhage.  - Neurology was consulted and no intervention was recommended; no intervention at this time. Patient can Follow up with neurology at the clinic, as per conversation with neuro PA. Was already on DAPT and high dose statin.  - PT/OT/SLT/Neuropsych therapies   - Loop recorder placement  8/4/21   - Ambulates with CG assist w/ device - making gains. Continue rehab program.    # Dizziness and fall 2/2 orthostatic hypotension   - Now controlled  - continue with HI stockings and abdominal binder  - continue midodrine 2x a day  - home medications of flomax, tizanidine were held by medicine team prior to rehab admission; valium was changed from 10 mg standing to 5 mg PRN by medicine per neurology recommendations for anxiety  - Became symptomatic again after 1 dose of Uroxatral, now discontinued. No further episodes of dizziness.     # LE pain likely secondary to PAD   - Significant atherosclerotic disease noted in bilateral common femoral arteries  - Patient's family and vascular team agreed to not proceed with intervention at this time   - no symptoms reported since on Rehab    # CAD s/p CABG 2009  - 6/2021: patent LIMA to LAD , patent SVG to OM , % , filled distally by collaterals from LAD.  - Continue DAPT ( Aspirin 81 mg daily and Plavix 75 mg daily ), ARB, Imdur, Ranexa, B-Blocker, Statin Therapy  - Cardio f/u OP     # HTN  - now controlled  - losartan 100mg discontinued for SAMANTA   - c/w nifedipine 60mg xL q24h   - Need to keep BP in higher range given symptomatic orthostasis    #HLD  - continue with atorvastatin and fenofibrate     # CKD 3  # Urinary retention  - Cr 1.3 in 2017, 1.5 in 2019, 1.7 in June; Stable. Now 1.9 and stable  - Renal bladder US showed simple cysts within both kidneys without hydronephrosis or nephrolithiasis   - Monitor Cr, IVF as needed   - Has been stable  - Unable to tolerate alpha blocker secondary to orthostasis  - Having increased spontaneous void.  Now receiving PVR q8hrs prn with strt cath prn- none needed the past few days  - having increased urinary frequency with decreased PVRs. Monitor  - Will stand up to urinate q2h to urinate    # DM type 2  - good control on carb consistent diet alone.     # Depression   - venlafaxine     # Anxiety   - Valium PRN as above    # Maintenance     - GI/Bowel Mgmt: miralax ,senna   - Bladder management: Straight cath q8h prn for urinary retention (not needed past few days)  - Skin: No active issues at this time  - FEN: monitor electrolytes, replete as needed   - Diet: Diet, DASH/TLC: Sodium & Cholesterol Restricted, Consistent Carbohydrate No Snacks (07-28-21 @ 15:46) [Active]    # Precautions / PROPHYLAXIS:    - Fall precaution   - Ortho: Weight bearing status: WBAT  - DVT prophylaxis: SC heparin        76 yo M with PMH of CAD s/p CABG x3 2017 (Yessi), 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 Rehab of acute lacunar infarcts in the deep white matter of the left frontal lobe at the level of the centrum semiovale and chronic left sided weakness from CVA of 2017.    # Rehab of chronic left hemiparesis (dominant), and dysarthria and dysphagia with decline in function and falls, found to have an acute left subcortical infarct.   - MRI Acute lacunar infarcts involving the deep white matter of the left frontal lobe at the level of the centrum semiovale. No acute hemorrhage.  - Neurology was consulted and no intervention was recommended; no intervention at this time. Patient can Follow up with neurology at the clinic, as per conversation with neuro PA. Was already on DAPT and high dose statin.  - PT/OT/SLT/Neuropsych therapies   - Loop recorder placement  8/4/21   - Ambulates with CG assist w/ device - making gains. Continue rehab program.    # Dizziness and fall 2/2 orthostatic hypotension   - Now controlled  - continue with HI stockings and abdominal binder  - continue midodrine 2x a day  - home medications of flomax, tizanidine were held by medicine team prior to rehab admission; valium was changed from 10 mg standing to 5 mg PRN by medicine per neurology recommendations for anxiety  - Became symptomatic again after 1 dose of Uroxatral, now discontinued. No further episodes of dizziness.     # LE pain likely secondary to PAD   - Significant atherosclerotic disease noted in bilateral common femoral arteries  - Patient's family and vascular team agreed to not proceed with intervention at this time   - no symptoms reported since on Rehab    # CAD s/p CABG 2009  - 6/2021: patent LIMA to LAD , patent SVG to OM , % , filled distally by collaterals from LAD.  - Continue DAPT ( Aspirin 81 mg daily and Plavix 75 mg daily ), ARB, Imdur, Ranexa, B-Blocker, Statin Therapy  - Cardio f/u OP     # HTN  - now controlled  - losartan 100mg discontinued for SAMANTA   - c/w nifedipine 60mg xL q24h   - Need to keep BP in higher range given symptomatic orthostasis    #HLD  - continue with atorvastatin and fenofibrate     # CKD 3  # Urinary retention  - Cr 1.3 in 2017, 1.5 in 2019, 1.7 in June; Stable. Now 1.9 and stable  - Renal bladder US showed simple cysts within both kidneys without hydronephrosis or nephrolithiasis   - Monitor Cr, IVF as needed   - Has been stable  - Unable to tolerate alpha blocker secondary to orthostasis  - Having increased spontaneous void.  Now receiving PVR q8hrs prn with strt cath prn- none needed the past few days  - having increased urinary frequency with decreased PVRs. Monitor  - Will stand up to urinate q2h to urinate    # DM type 2  - good control on carb consistent diet alone.     # Depression   - venlafaxine     # Anxiety   - Valium PRN as above    # Maintenance     - GI/Bowel Mgmt: miralax ,senna   - Bladder management: Straight cath q8h prn for urinary retention (not needed past few days)  - Skin: No active issues at this time  - FEN: monitor electrolytes, replete as needed   - Diet: Diet, DASH/TLC: Sodium & Cholesterol Restricted, Consistent Carbohydrate No Snacks (07-28-21 @ 15:46) [Active]    # Precautions / PROPHYLAXIS:    - Fall precaution   - Ortho: Weight bearing status: WBAT  - DVT prophylaxis: SC heparin

## 2021-08-16 NOTE — PROGRESS NOTE ADULT - ATTENDING COMMENTS
I reviewed the chart and examined the patient with the resident and we discussed the findings and treatment plan.  The patient is tolerating the rehab program well. I agree with the findings and treatment plan above, which I modified as indicated. The patient requires 3 hrs a day of acute inpatient rehab.     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 Rehab of acute lacunar infarcts in the deep white matter of the left frontal lobe at the level of the centrum semiovale and chronic left sided weakness from CVA of 2017.    # Rehab of chronic left hemiparisis (dominant), and dysarthria and dysphagia with decline in function and falls, found to have an acute left subcortical infarct.   - MRI Acute lacunar infarcts involving the deep white matter of the left frontal lobe at the level of the centrum semiovale. No acute hemorrhage.  - Neurology was consulted and no intervention was recommended; no intervention at this time. Patient can Follow up with neurology at the clinic, as per conversation with neuro PA. Was already on DAPT and high dose statin.  - PT/OT/SLT/Neuropsych therapies   - Loop recorder placement  8/4/21   - Ambulates with CG assist w/ device - making gains. Continue rehab program.    # Dizziness and fall 2/2 orthostatic hypotension   - Now controlled  - continue with HI stockings and abdominal binder  - continue midodrine 2x a day  - home medications of flomax, tizanidine were held by medicine team prior to rehab admission; valium was changed from 10 mg standing to 5 mg PRN by medicine per neurology recommendations for anxiety  - Became symptomatic again after 1 dose of Uroxatral, now discontinued. No further episodes of dizziness.     # LE pain likely secondary to PAD   - Significant atherosclerotic disease noted in bilateral common femoral arteries  - Patient's family and vascular team agreed to not proceed with intervention at this time   - no symptoms reported since on Rehab    # CAD s/p CABG 2009  - 6/2021: patent LIMA to LAD , patent SVG to OM , % , filled distally by collaterals from LAD.  - Continue DAPT ( Aspirin 81 mg daily and Plavix 75 mg daily ), ARB, Imdur, Ranexa, B-Blocker, Statin Therapy  - Cardio f/u OP     # HTN  - now controlled  - losartan 100mg discontinued for SAMANTA   - c/w nifedipine 60mg xL q24h   - Need to keep BP in higher range given symptomatic orthostasis    #HLD  - continue with atorvastatin and fenofibrate     # CKD 3  # Urinary retention  - Cr 1.3 in 2017, 1.5 in 2019, 1.7 in June; Stable. Now 1.9 and stable  - Renal bladder US showed simple cysts within both kidneys without hydronephrosis or nephrolithiasis   - Monitor Cr, IVF as needed   - Has been stable  - Unable to tolerate alpha blocker secondary to orthostasis  - Having increased spontaneous void.  Now receiving PVR q8hrs prn with strt cath prn- none needed the past few days  - having increased urinary frequency with decreased PVRs. Monitor  - Will stand up to urinate q2h to urinate    # DM type 2  - good control on carb consistent diet alone.     # Depression   - venlafaxine     # Anxiety   - Valium PRN as above    # Maintenance     - GI/Bowel Mgmt: miralax ,senna   - Bladder management: Straight cath q8h prn for urinary retention (not needed past few days)  - Skin: No active issues at this time  - FEN: monitor electrolytes, replete as needed   - Diet: Diet, DASH/TLC: Sodium & Cholesterol Restricted, Consistent Carbohydrate No Snacks (07-28-21 @ 15:46) [Active]    # Precautions / PROPHYLAXIS:    - Fall precaution   - Ortho: Weight bearing status: WBAT  - DVT prophylaxis: SC heparin I reviewed the chart and examined the patient with the resident and we discussed the findings and treatment plan.  The patient is tolerating the rehab program well. I agree with the findings and treatment plan above, which I modified as indicated. The patient requires 3 hrs a day of acute inpatient rehab.     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 Rehab of acute lacunar infarcts in the deep white matter of the left frontal lobe at the level of the centrum semiovale and chronic left sided weakness from CVA of 2017.    # Rehab of chronic left hemiparisis (dominant), and dysarthria and dysphagia with decline in function and falls, found to have an acute left subcortical infarct.   - MRI Acute lacunar infarcts involving the deep white matter of the left frontal lobe at the level of the centrum semiovale. No acute hemorrhage.  - Neurology was consulted and no intervention was recommended; no intervention at this time. Patient can Follow up with neurology at the clinic, as per conversation with neuro PA. Was already on DAPT and high dose statin.  - PT/OT/SLT/Neuropsych therapies   - Loop recorder placement  8/4/21   - Ambulates with CG assist w/ device - making gains. Continue rehab program.    # Dizziness and fall 2/2 orthostatic hypotension   - Now controlled  - continue with HI stockings and abdominal binder  - continue midodrine 2x a day  - home medications of flomax, tizanidine were held by medicine team prior to rehab admission; valium was changed from 10 mg standing to 5 mg PRN by medicine per neurology recommendations for anxiety  - Became symptomatic again after 1 dose of Uroxatral, now discontinued. No further episodes of dizziness.     # LE pain likely secondary to PAD   - Significant atherosclerotic disease noted in bilateral common femoral arteries  - Patient's family and vascular team agreed to not proceed with intervention at this time   - no symptoms reported since on Rehab    # CAD s/p CABG 2009  - 6/2021: patent LIMA to LAD , patent SVG to OM , % , filled distally by collaterals from LAD.  - Continue DAPT ( Aspirin 81 mg daily and Plavix 75 mg daily ), ARB, Imdur, Ranexa, B-Blocker, Statin Therapy  - Cardio f/u OP     # HTN  - now controlled  - losartan 100mg discontinued for SAMANTA   - c/w nifedipine 60mg xL q24h   - Need to keep BP in higher range given symptomatic orthostasis    #HLD  - continue with atorvastatin and fenofibrate     # CKD 3  # Urinary retention  - Cr 1.3 in 2017, 1.5 in 2019, 1.7 in June; Stable. Now 1.9 and stable  - Renal bladder US showed simple cysts within both kidneys without hydronephrosis or nephrolithiasis   - Monitor Cr, IVF as needed   - Has been stable  - Unable to tolerate alpha blocker secondary to orthostasis  - Having increased spontaneous void.    - PVRs for days have been acceptable. No need for further Bladder Scans.  - Will stand up to urinate q2h to urinate    # DM type 2  - good control on carb consistent diet alone.     # Depression   - venlafaxine     # Anxiety   - Valium PRN as above    # Maintenance     - GI/Bowel Mgmt: miralax ,senna   - Bladder management: Straight cath q8h prn for urinary retention (not needed past few days)  - Skin: No active issues at this time  - FEN: monitor electrolytes, replete as needed   - Diet: Diet, DASH/TLC: Sodium & Cholesterol Restricted, Consistent Carbohydrate No Snacks (07-28-21 @ 15:46) [Active]    # Precautions / PROPHYLAXIS:    - Fall precaution   - Ortho: Weight bearing status: WBAT  - DVT prophylaxis: SC heparin

## 2021-08-16 NOTE — PROGRESS NOTE ADULT - TIME BILLING
Patient Contact
Patient Contact
60 mins patient and family contact
Patient Contact
Patient Contact
NP Assessment

## 2021-08-16 NOTE — PROGRESS NOTE ADULT - SUBJECTIVE AND OBJECTIVE BOX
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 Patient says he has been feeling intermittently dizzy over three days  despite having adequate/normal PO intake. Pt stated that these episodes of dizziness occurs after getting up from a sitting or supine position. Patient fell at home after feeling dizzy hitting the back of his head, but with no LOC. Pt was not on AC prior to admission. Pt also got out of his car, felt dizzy and then fell straight down hitting his buttocks and R knee. Denies head trauma. Patient was admitted on 7/20/21 to internal medicine for falls and dizziness 2/2 orthostatic hypotension.   CT spine 7/20/21 showed no evidence of acute cervical spine fracture or subluxation. Multilevel severe degenerative changes as described, with severe spinal noted stenosis at C2-3 and C3-4 and multilevel severe neural foraminal stenosis.  CTAP 7/20/21 showed no definite evidence of acute traumatic injury within the chest, abdomen, or pelvis.   CTH 7/21/21 showed no evidence of acute intracranial hemorrhage. Probable chronic infarct within the right posterior frontal white matter. Lacunar infarcts within bilateral white matter and deep gray nuclei of indeterminate age. Mild/moderate chronic microvascular changes.  Chest X-Ray negative for pneumothorax, infiltrate, or effusion. XR Pelvis negative for fx's or dislocations.    Patient's hospital course was complicated by lower extremity claudication. Bilateral lower extremity ultrasounds were performed on 7/23/21, which revealed severely diminished flow noted in the right and left superficial femoral artery suggestive of a proximal superficial femoral or common femoral artery stenosis. Significant atherosclerotic disease noted in the bilateral common femoral arteries. Vascular surgery was consulted for LLE Angiogram with possible endovascular revascularization; cardiac clearance was obtained but the team in conjunction with the family had decided to not go through with intervention. Patient's orthostatic hypotension improved with midodrine, compression stocking, abdominal binder.     MRA 7/23/21 showed the following: MRA of the neck is limited by motion. Evaluation of the distal common carotid proximal internal carotid arteries appear grossly unremarkable though better quality study is recommended or CTA of the neck can be done for further evaluation. Evaluation of the proximal external carotid arteries are limited by motion. MRA of the [place] demonstrates decreased caliber of the distal right internal carotid artery. Short segment of stenosis is suspected involving both posterior cerebral arteries.    CTH 7/24/21 showed the following: No acute/traumatic intracranial pathology. Microvascular ischemic changes and unchanged probable chronic infarct within the right posterior frontal lobe.  MRI Head 7/24/21 showed acute lacunar infarcts involving the deep white matter of the left frontal lobe at the level of the centrum semiovale. No acute hemorrhage.  Neurology followed the patient and did not recommend intervention; patient is to continue aspirin and plavix.    Patient's prior level of function included independence with ADLs and independence with ambulation with a RW. Patient's level of function on admission is min assist with bed mobility and min assist with transfers, patient is able to ambulate 4 small steps with rolling walker min assist. Patient was evaluated by Dr. Ingram, a physical medicine and rehabilitation specialist and was found to be an excellent candidate for acute rehabilitation. Patient would benefit from 3 hours of interdisciplinary therapy per day. Patient was admitted to rehab for acute lacunar infarcts in the deep white matter of the left frontal lobe at the level of the centrum semiovale and chronic left sided weakness from CVA of 2017 with a significant decline in his baseline function of independent with a straight cane.      TODAY'S SUBJECTIVE & REVIEW OF SYMPTOMS:  Patient is doing well. Reporting frequent spontaneous urination. No longer complaining of dizziness.   No acute events. Last 2 PVR scans ~40cc. Last straight cath for urinary retention performed 2-3 days ago.   Patient was seen this morning during morning rounds with the rehab team on 8/16/2021. He endorsed that overnight he had an episode of diarrhea around 2-3AM and nothing since. He conveys no other complaints, concerns, or questions at this time.        Constiutional:    [ x ] WNL           [   ] poor appetite   [   ] insomnia   [   ] tired   Cardio:                [ x ] WNL           [   ] CP   [   ] PRADO   [   ] palpitations               Resp:                   [ x ] WNL           [   ] SOB   [   ] cough   [   ] wheezing   GI:                        [ x ] WNL           [   ] constipation   [   ] diarrhea   [   ] abdominal pain   [   ] nausea   [   ] emesis                                :                      [  ] WNL           [   ] HOGUE  [   ] dysuria   [  X ] difficulty voiding   - c/o frequency - voiding q 90 mins      Endo:                   [ x ] WNL          [   ] polyuria   [   ] temperature intolerance                 Skin:                     [ x ] WNL          [   ] pain   [   ] wound   [   ] rash   MSK:                    [ x ] WNL          [   ] muscle pain   [   ] joint pain/ stiffness   [   ] muscle tenderness   [   ] swelling   Neuro:                 [  ] WNL           [ x ]  as per HPI            Cognitive:           [ x ] WNL           [   ]confusion      Psych:                  [ x ] WNL           [   ] hallucinations   [   ]agitation   [   ] delusion   [   ]depression      PHYSICAL EXAM    Vital Signs Last 24 Hrs  T(C): 36.2 (16 Aug 2021 05:23), Max: 36.2 (15 Aug 2021 14:05)  T(F): 97.1 (16 Aug 2021 05:23), Max: 97.2 (15 Aug 2021 14:05)  HR: 74 (16 Aug 2021 05:23) (74 - 84)  BP: 124/85 (16 Aug 2021 05:23) (124/85 - 136/63)  BP(mean): 93 (15 Aug 2021 17:20) (93 - 93)  RR: 18 (16 Aug 2021 05:23) (18 - 20)  SpO2: --    Constitutional - [ x ] NAD, Comfortable        [   ] other:  Chest - [ x ] CTA     [   ] other:  Cardiovascular - [ x ] RRR, no murmer     [   ] other:  Abdomen - [ x ] Soft, NT/ND      [   ] other:        -  [ x  ] NO HOGUE CATHETER   [  ] YES  if yes: [  ] NO MEATAL TEAR OR DISCHARGE [   ] other:  Extremities - [ x ] No C/C/E, No calf tenderness       [   ] other:  ROM - [ x ] WFL     [   ] other:  Neurologic Exam -                 Cognitive - [ x ]Awake, Alert, AAO to self, place, date, year, situation         [    ] other:      Communication - [   ]Fluent, No dysarthria       [ x ] other: mildly dysarthric      Motor - No focal deficits                    Right UE -  [ x ] WNL      [    ] other:                    Left UE -     [   ] WNL      [ x ] other: LUE 4/5                    Right LE -   [ x ] WNL       [    ] other:                    Left LE -      [   ]WNL       [ x ] other: LLE 4/5     Sensory - [   ] Intact to LT      [ x ] other: L sided hemisensory loss 2/2 old CVA in 2017     Reflexes - [ x ] wnl/ symmetric     [   ] other:     Psychiatric - [ x ]Mood stable, Affect WNL     [   ]other:     Skin - [ x ] intact      [   ] other    CLOF:   - Bed mobility: Kimberly   - Transfers: Kimberly   - Ambulation: CG for ambulation 100ft. x2 w RW  - ADLs: supervision     MEDICATIONS  (STANDING):  aspirin  chewable 81 milliGRAM(s) Oral daily  atorvastatin 80 milliGRAM(s) Oral at bedtime  clopidogrel Tablet 75 milliGRAM(s) Oral daily  fenofibrate Tablet 145 milliGRAM(s) Oral daily  gabapentin 200 milliGRAM(s) Oral at bedtime  gabapentin 100 milliGRAM(s) Oral <User Schedule>  heparin   Injectable 5000 Unit(s) SubCutaneous every 8 hours  isosorbide   mononitrate ER Tablet (IMDUR) 60 milliGRAM(s) Oral daily  metoprolol succinate ER 25 milliGRAM(s) Oral every 12 hours  midodrine. 2.5 milliGRAM(s) Oral <User Schedule>  NIFEdipine XL 60 milliGRAM(s) Oral daily  pentoxifylline 400 milliGRAM(s) Oral three times a day  ranolazine 500 milliGRAM(s) Oral two times a day  venlafaxine XR. 75 milliGRAM(s) Oral daily    MEDICATIONS  (PRN):  acetaminophen   Tablet .. 975 milliGRAM(s) Oral every 6 hours PRN Temp greater or equal to 38C (100.4F), Mild Pain (1 - 3), Moderate Pain (4 - 6)  aluminum hydroxide/magnesium hydroxide/simethicone Suspension 30 milliLiter(s) Oral every 4 hours PRN Dyspepsia  polyethylene glycol 3350 17 Gram(s) Oral daily PRN Constipation  senna 2 Tablet(s) Oral at bedtime PRN Constipation                          12.7   7.62  )-----------( 292      ( 16 Aug 2021 04:30 )             40.3     08-16    139  |  106  |  26<H>  ----------------------------<  124<H>  5.2<H>   |  21  |  1.7<H>    Ca    9.9      16 Aug 2021 04:30  Mg     2.3     08-16    TPro  7.2  /  Alb  4.5  /  TBili  0.4  /  DBili  x   /  AST  20  /  ALT  17  /  AlkPhos  35  08-16        POCT Blood Glucose.: 123 mg/dL (08-16-21 @ 07:44)  POCT Blood Glucose.: 141 mg/dL (08-15-21 @ 17:06)  POCT Blood Glucose.: 121 mg/dL (08-15-21 @ 11:51)  POCT Blood Glucose.: 125 mg/dL (08-15-21 @ 07:45)  POCT Blood Glucose.: 178 mg/dL (08-14-21 @ 20:44)  POCT Blood Glucose.: 120 mg/dL (08-14-21 @ 16:16)  POCT Blood Glucose.: 146 mg/dL (08-13-21 @ 20:41)  POCT Blood Glucose.: 126 mg/dL (08-13-21 @ 16:27)

## 2021-08-16 NOTE — PROGRESS NOTE ADULT - ASSESSMENT
Pain: None            Location:  N/A                         Ratin/10     Intervention: N/A  Orientation: Self /Place/ Event /Month/Year/Day/ Date/ Time  Arousal Level: Alert  Behavior: Pleasant and cooperative  Affect Range:  WFL     Needed: ? No   #: N/A  Attention: ? WFL   Insight into illness/deficits: Good     ? Treatment Session Focused on Mood/ Coping     Patient was seen to support mood and positive coping. Patient reported mood at a 6-7/10 (10 being the happiest possible). He noted that he is continuing to cope well and has made significant progress in his therapies. Transitioning home this week was discussed. There are still some remaining concerns about his wife and son assisting at home with ADLs and IADLs. Problem-solving was conducted around the associated barriers. The most significant barrier is a concern that he won’t be able to drive. Consequences of driving without physician approval were discussed. He was encouraged to talk to his primary care physician and medical treatment team to determine driving status.    Goals: ? Facilitate Positive Coping   Plan: 1-2 times a week 30-60 minutes ? Individual Psychotherapy

## 2021-08-17 LAB
ANION GAP SERPL CALC-SCNC: 13 MMOL/L — SIGNIFICANT CHANGE UP (ref 7–14)
BUN SERPL-MCNC: 26 MG/DL — HIGH (ref 10–20)
CALCIUM SERPL-MCNC: 9.2 MG/DL — SIGNIFICANT CHANGE UP (ref 8.5–10.1)
CHLORIDE SERPL-SCNC: 106 MMOL/L — SIGNIFICANT CHANGE UP (ref 98–110)
CO2 SERPL-SCNC: 18 MMOL/L — SIGNIFICANT CHANGE UP (ref 17–32)
CREAT SERPL-MCNC: 1.6 MG/DL — HIGH (ref 0.7–1.5)
GLUCOSE BLDC GLUCOMTR-MCNC: 123 MG/DL — HIGH (ref 70–99)
GLUCOSE BLDC GLUCOMTR-MCNC: 125 MG/DL — HIGH (ref 70–99)
GLUCOSE BLDC GLUCOMTR-MCNC: 129 MG/DL — HIGH (ref 70–99)
GLUCOSE BLDC GLUCOMTR-MCNC: 135 MG/DL — HIGH (ref 70–99)
GLUCOSE SERPL-MCNC: 126 MG/DL — HIGH (ref 70–99)
POTASSIUM SERPL-MCNC: 4.1 MMOL/L — SIGNIFICANT CHANGE UP (ref 3.5–5)
POTASSIUM SERPL-SCNC: 4.1 MMOL/L — SIGNIFICANT CHANGE UP (ref 3.5–5)
SODIUM SERPL-SCNC: 137 MMOL/L — SIGNIFICANT CHANGE UP (ref 135–146)

## 2021-08-17 RX ORDER — SENNA PLUS 8.6 MG/1
2 TABLET ORAL
Qty: 0 | Refills: 0 | DISCHARGE
Start: 2021-08-17

## 2021-08-17 RX ORDER — METOPROLOL TARTRATE 50 MG
1 TABLET ORAL
Qty: 0 | Refills: 0 | DISCHARGE
Start: 2021-08-17

## 2021-08-17 RX ORDER — ACETAMINOPHEN 500 MG
2 TABLET ORAL
Qty: 0 | Refills: 0 | DISCHARGE
Start: 2021-08-17

## 2021-08-17 RX ORDER — DIAZEPAM 5 MG
1 TABLET ORAL
Qty: 0 | Refills: 0 | DISCHARGE

## 2021-08-17 RX ORDER — MIDODRINE HYDROCHLORIDE 2.5 MG/1
1 TABLET ORAL
Qty: 0 | Refills: 0 | DISCHARGE
Start: 2021-08-17

## 2021-08-17 RX ORDER — METOPROLOL TARTRATE 50 MG
0 TABLET ORAL
Qty: 0 | Refills: 0 | DISCHARGE

## 2021-08-17 RX ORDER — VENLAFAXINE HCL 75 MG
1 CAPSULE, EXT RELEASE 24 HR ORAL
Qty: 0 | Refills: 0 | DISCHARGE
Start: 2021-08-17

## 2021-08-17 RX ORDER — POLYETHYLENE GLYCOL 3350 17 G/17G
17 POWDER, FOR SOLUTION ORAL
Qty: 0 | Refills: 0 | DISCHARGE
Start: 2021-08-17

## 2021-08-17 RX ORDER — ACETAMINOPHEN 500 MG
3 TABLET ORAL
Qty: 0 | Refills: 0 | DISCHARGE
Start: 2021-08-17

## 2021-08-17 RX ADMIN — CLOPIDOGREL BISULFATE 75 MILLIGRAM(S): 75 TABLET, FILM COATED ORAL at 11:34

## 2021-08-17 RX ADMIN — Medication 975 MILLIGRAM(S): at 16:52

## 2021-08-17 RX ADMIN — HEPARIN SODIUM 5000 UNIT(S): 5000 INJECTION INTRAVENOUS; SUBCUTANEOUS at 12:54

## 2021-08-17 RX ADMIN — Medication 400 MILLIGRAM(S): at 21:48

## 2021-08-17 RX ADMIN — Medication 75 MILLIGRAM(S): at 11:34

## 2021-08-17 RX ADMIN — GABAPENTIN 100 MILLIGRAM(S): 400 CAPSULE ORAL at 07:36

## 2021-08-17 RX ADMIN — Medication 60 MILLIGRAM(S): at 06:01

## 2021-08-17 RX ADMIN — Medication 25 MILLIGRAM(S): at 06:00

## 2021-08-17 RX ADMIN — HEPARIN SODIUM 5000 UNIT(S): 5000 INJECTION INTRAVENOUS; SUBCUTANEOUS at 06:00

## 2021-08-17 RX ADMIN — Medication 145 MILLIGRAM(S): at 11:34

## 2021-08-17 RX ADMIN — MIDODRINE HYDROCHLORIDE 2.5 MILLIGRAM(S): 2.5 TABLET ORAL at 06:16

## 2021-08-17 RX ADMIN — RANOLAZINE 500 MILLIGRAM(S): 500 TABLET, FILM COATED, EXTENDED RELEASE ORAL at 17:00

## 2021-08-17 RX ADMIN — Medication 81 MILLIGRAM(S): at 11:34

## 2021-08-17 RX ADMIN — Medication 400 MILLIGRAM(S): at 14:51

## 2021-08-17 RX ADMIN — Medication 25 MILLIGRAM(S): at 17:00

## 2021-08-17 RX ADMIN — HEPARIN SODIUM 5000 UNIT(S): 5000 INJECTION INTRAVENOUS; SUBCUTANEOUS at 21:48

## 2021-08-17 RX ADMIN — MIDODRINE HYDROCHLORIDE 2.5 MILLIGRAM(S): 2.5 TABLET ORAL at 11:34

## 2021-08-17 RX ADMIN — Medication 400 MILLIGRAM(S): at 06:00

## 2021-08-17 RX ADMIN — GABAPENTIN 200 MILLIGRAM(S): 400 CAPSULE ORAL at 21:48

## 2021-08-17 RX ADMIN — ATORVASTATIN CALCIUM 80 MILLIGRAM(S): 80 TABLET, FILM COATED ORAL at 21:48

## 2021-08-17 RX ADMIN — RANOLAZINE 500 MILLIGRAM(S): 500 TABLET, FILM COATED, EXTENDED RELEASE ORAL at 06:01

## 2021-08-17 RX ADMIN — ISOSORBIDE MONONITRATE 60 MILLIGRAM(S): 60 TABLET, EXTENDED RELEASE ORAL at 11:34

## 2021-08-17 NOTE — PROGRESS NOTE ADULT - ASSESSMENT
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 Rehab of acute lacunar infarcts in the deep white matter of the left frontal lobe at the level of the centrum semiovale and chronic left sided weakness from CVA of 2017.    # Rehab of chronic left hemiparesis (dominant), and dysarthria and dysphagia with decline in function and falls, found to have an acute left subcortical infarct.   - MRI Acute lacunar infarcts involving the deep white matter of the left frontal lobe at the level of the centrum semiovale. No acute hemorrhage.  - Neurology was consulted and no intervention was recommended; no intervention at this time. Patient can Follow up with neurology at the clinic, as per conversation with neuro PA. Was already on DAPT and high dose statin.  - PT/OT/SLT/Neuropsych therapies   - Loop recorder placement  8/4/21   - Ambulates with CG assist w/ device - making gains. Continue rehab program.  - prepared for discharge home tomorrow     # Dizziness and fall 2/2 orthostatic hypotension   - Now controlled  - continue with HI stockings and abdominal binder  - continue midodrine 2x a day  - home medications of flomax, tizanidine were held by medicine team prior to rehab admission; valium was changed from 10 mg standing to 5 mg PRN by medicine per neurology recommendations for anxiety  - Became symptomatic again after 1 dose of Uroxatral, now discontinued. No further episodes of dizziness.     # LE pain likely secondary to PAD   - Significant atherosclerotic disease noted in bilateral common femoral arteries  - Patient's family and vascular team agreed to not proceed with intervention at this time   - no symptoms reported since on Rehab    # CAD s/p CABG 2009  - 6/2021: patent LIMA to LAD , patent SVG to OM , % , filled distally by collaterals from LAD.  - Continue DAPT ( Aspirin 81 mg daily and Plavix 75 mg daily ), ARB, Imdur, Ranexa, B-Blocker, Statin Therapy  - Cardio f/u OP     # HTN  - now controlled  - losartan 100mg discontinued for SAMANTA   - c/w nifedipine 60mg xL q24h   - Need to keep BP in higher range given symptomatic orthostasis    #HLD  - continue with atorvastatin and fenofibrate     # CKD 3  # Urinary retention  - Cr 1.3 in 2017, 1.5 in 2019, 1.7 in June; Stable. Now 1.9 and stable  - Renal bladder US showed simple cysts within both kidneys without hydronephrosis or nephrolithiasis   - Monitor Cr, IVF as needed   - Has been stable  - Unable to tolerate alpha blocker secondary to orthostasis  - Having increased spontaneous void.  Still receiving PVR q8hrs prn with strt cath prn   - having increased urinary frequency with decreased PVRs. Monitor  - Standing up to urinate q2h to urinate  - PVR scans / CIC discontinued 8/16    # DM type 2  - good control on carb consistent diet alone.     # Depression   - venlafaxine     # Anxiety   - Valium PRN as above    # Maintenance   - GI/Bowel Mgmt: miralax ,senna   - Bladder management: frequent urinations, standing up q2h to void. PVR scans and CIC dc'd 8/16.  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 Rehab of acute lacunar infarcts in the deep white matter of the left frontal lobe at the level of the centrum semiovale and chronic left sided weakness from CVA of 2017.    # Rehab of chronic left hemiparesis (dominant), and dysarthria and dysphagia with decline in function and falls, found to have an acute left subcortical infarct.   - MRI Acute lacunar infarcts involving the deep white matter of the left frontal lobe at the level of the centrum semiovale. No acute hemorrhage.  - Neurology was consulted and no intervention was recommended; no intervention at this time. Patient can Follow up with neurology at the clinic, as per conversation with neuro PA. Was already on DAPT and high dose statin.  - PT/OT/SLT/Neuropsych therapies   - Loop recorder placement  8/4/21   - Ambulates with CG assist w/ device - making gains. Continue rehab program.  - prepared for discharge home tomorrow     # Dizziness and fall 2/2 orthostatic hypotension   - Now controlled  - continue with HI stockings and abdominal binder  - continue midodrine 2x a day  - home medications of flomax, tizanidine were held by medicine team prior to rehab admission; valium was changed from 10 mg standing to 5 mg PRN by medicine per neurology recommendations for anxiety  - Became symptomatic again after 1 dose of Uroxatral, now discontinued. No further episodes of dizziness.     #S/P BLE pain likely secondary to PAD   - Significant atherosclerotic disease noted in bilateral common femoral arteries  - Patient's family and vascular team agreed to not proceed with intervention at this time   - no symptoms reported since on Rehab    # CAD s/p CABG 2009  - 6/2021: patent LIMA to LAD , patent SVG to OM , % , filled distally by collaterals from LAD.  - Continue DAPT ( Aspirin 81 mg daily and Plavix 75 mg daily ), ARB, Imdur, Ranexa, B-Blocker, Statin Therapy  - Cardio f/u OP     # HTN  - now controlled  - losartan 100mg discontinued for SAMANTA   - c/w nifedipine 60mg xL q24h   - Need to keep BP in higher range given symptomatic orthostasis    #HLD  - continue with atorvastatin and fenofibrate     # CKD 3  # Urinary retention  - Cr 1.3 in 2017, 1.5 in 2019, 1.7 in June; Stable. Now 1.9 and stable  - Renal bladder US showed simple cysts within both kidneys without hydronephrosis or nephrolithiasis   - Monitor Cr, IVF as needed   - Has been stable  - Unable to tolerate alpha blocker secondary to orthostasis  - Having increased spontaneous void.  Still receiving PVR q8hrs prn with strt cath prn   - having increased urinary frequency with decreased PVRs. Monitor  - Standing up to urinate q2h to urinate  - PVR scans / CIC discontinued 8/16    # DM type 2  - good control on carb consistent diet alone.     # Depression   - venlafaxine     # Anxiety   - Valium PRN as above    # Maintenance   - GI/Bowel Mgmt: miralax ,senna   - Bladder management: frequent urinations, standing up q2h to void. PVR scans and CIC dc'd 8/16.  - Skin: No active issues at this time  - FEN: monitor electrolytes, replete as needed   - Diet: Diet, DASH/TLC: Sodium & Cholesterol Restricted, Consistent Carbohydrate No Snacks (07-28-21 @ 15:46) [Active]    # Precautions / PROPHYLAXIS:    - Fall precaution   - Ortho: Weight bearing status: WBAT  - DVT prophylaxis: SC heparin

## 2021-08-17 NOTE — PROGRESS NOTE ADULT - SUBJECTIVE AND OBJECTIVE BOX
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 Patient says he has been feeling intermittently dizzy over three days  despite having adequate/normal PO intake. Pt stated that these episodes of dizziness occurs after getting up from a sitting or supine position. Patient fell at home after feeling dizzy hitting the back of his head, but with no LOC. Pt was not on AC prior to admission. Pt also got out of his car, felt dizzy and then fell straight down hitting his buttocks and R knee. Denies head trauma. Patient was admitted on 7/20/21 to internal medicine for falls and dizziness 2/2 orthostatic hypotension.   CT spine 7/20/21 showed no evidence of acute cervical spine fracture or subluxation. Multilevel severe degenerative changes as described, with severe spinal noted stenosis at C2-3 and C3-4 and multilevel severe neural foraminal stenosis.  CTAP 7/20/21 showed no definite evidence of acute traumatic injury within the chest, abdomen, or pelvis.   CTH 7/21/21 showed no evidence of acute intracranial hemorrhage. Probable chronic infarct within the right posterior frontal white matter. Lacunar infarcts within bilateral white matter and deep gray nuclei of indeterminate age. Mild/moderate chronic microvascular changes.  Chest X-Ray negative for pneumothorax, infiltrate, or effusion. XR Pelvis negative for fx's or dislocations.    Patient's hospital course was complicated by lower extremity claudication. Bilateral lower extremity ultrasounds were performed on 7/23/21, which revealed severely diminished flow noted in the right and left superficial femoral artery suggestive of a proximal superficial femoral or common femoral artery stenosis. Significant atherosclerotic disease noted in the bilateral common femoral arteries. Vascular surgery was consulted for LLE Angiogram with possible endovascular revascularization; cardiac clearance was obtained but the team in conjunction with the family had decided to not go through with intervention. Patient's orthostatic hypotension improved with midodrine, compression stocking, abdominal binder.     MRA 7/23/21 showed the following: MRA of the neck is limited by motion. Evaluation of the distal common carotid proximal internal carotid arteries appear grossly unremarkable though better quality study is recommended or CTA of the neck can be done for further evaluation. Evaluation of the proximal external carotid arteries are limited by motion. MRA of the [place] demonstrates decreased caliber of the distal right internal carotid artery. Short segment of stenosis is suspected involving both posterior cerebral arteries.    CTH 7/24/21 showed the following: No acute/traumatic intracranial pathology. Microvascular ischemic changes and unchanged probable chronic infarct within the right posterior frontal lobe.  MRI Head 7/24/21 showed acute lacunar infarcts involving the deep white matter of the left frontal lobe at the level of the centrum semiovale. No acute hemorrhage.  Neurology followed the patient and did not recommend intervention; patient is to continue aspirin and plavix.    Patient's prior level of function included independence with ADLs and independence with ambulation with a RW. Patient's level of function on admission is min assist with bed mobility and min assist with transfers, patient is able to ambulate 4 small steps with rolling walker min assist. Patient was evaluated by Dr. Ingram, a physical medicine and rehabilitation specialist and was found to be an excellent candidate for acute rehabilitation. Patient would benefit from 3 hours of interdisciplinary therapy per day. Patient was admitted to rehab for acute lacunar infarcts in the deep white matter of the left frontal lobe at the level of the centrum semiovale and chronic left sided weakness from CVA of 2017 with a significant decline in his baseline function of independent with a straight cane.      TODAY'S SUBJECTIVE & REVIEW OF SYMPTOMS:  Patient is doing well. Reporting frequent spontaneous urination. No longer complaining of dizziness. No further episodes of diarrhea   No acute events. No longer receiving PVR scans or straight cath.        Constiutional:    [ x ] WNL           [   ] poor appetite   [   ] insomnia   [   ] tired   Cardio:                [ x ] WNL           [   ] CP   [   ] PRADO   [   ] palpitations               Resp:                   [ x ] WNL           [   ] SOB   [   ] cough   [   ] wheezing   GI:                        [ x ] WNL           [   ] constipation   [   ] diarrhea   [   ] abdominal pain   [   ] nausea   [   ] emesis                                :                      [  ] WNL           [   ] HOGUE  [   ] dysuria   [  X ] difficulty voiding   - c/o frequency - voiding q 90 mins      Endo:                   [ x ] WNL          [   ] polyuria   [   ] temperature intolerance                 Skin:                     [ x ] WNL          [   ] pain   [   ] wound   [   ] rash   MSK:                    [ x ] WNL          [   ] muscle pain   [   ] joint pain/ stiffness   [   ] muscle tenderness   [   ] swelling   Neuro:                 [  ] WNL           [ x ]  as per HPI            Cognitive:           [ x ] WNL           [   ]confusion      Psych:                  [ x ] WNL           [   ] hallucinations   [   ]agitation   [   ] delusion   [   ]depression      PHYSICAL EXAM    Vital Signs Last 24 Hrs  T(C): 36.4 (17 Aug 2021 05:33), Max: 37.1 (16 Aug 2021 20:27)  T(F): 97.6 (17 Aug 2021 05:33), Max: 98.7 (16 Aug 2021 20:27)  HR: 71 (17 Aug 2021 05:33) (71 - 85)  BP: 146/74 (17 Aug 2021 05:33) (146/74 - 181/77)  BP(mean): --  RR: 18 (17 Aug 2021 05:33) (18 - 20)  SpO2: --    Constitutional - [ x ] NAD, Comfortable        [   ] other:  Chest - [ x ] CTA     [   ] other:  Cardiovascular - [ x ] RRR, no murmer     [   ] other:  Abdomen - [ x ] Soft, NT/ND      [   ] other:        -  [ x  ] NO HOGUE CATHETER   [  ] YES  if yes: [  ] NO MEATAL TEAR OR DISCHARGE [   ] other:  Extremities - [ x ] No C/C/E, No calf tenderness       [   ] other:  ROM - [ x ] WFL     [   ] other:  Neurologic Exam -                 Cognitive - [ x ]Awake, Alert, AAO to self, place, date, year, situation         [    ] other:      Communication - [   ]Fluent, No dysarthria       [ x ] other: mildly dysarthric      Motor - No focal deficits                    Right UE -  [ x ] WNL      [    ] other:                    Left UE -     [   ] WNL      [ x ] other: LUE 4/5                    Right LE -   [ x ] WNL       [    ] other:                    Left LE -      [   ]WNL       [ x ] other: LLE 4/5     Sensory - [   ] Intact to LT      [ x ] other: L sided hemisensory loss 2/2 old CVA in 2017     Reflexes - [ x ] wnl/ symmetric     [   ] other:     Psychiatric - [ x ]Mood stable, Affect WNL     [   ]other:     Skin - [ x ] intact      [   ] other    CLOF:   - Bed mobility: Kimberly   - Transfers: Kimberly   - Ambulation: CG for ambulation 100ft. x2 w RW  - ADLs: supervision     MEDICATIONS  (STANDING):  aspirin  chewable 81 milliGRAM(s) Oral daily  atorvastatin 80 milliGRAM(s) Oral at bedtime  clopidogrel Tablet 75 milliGRAM(s) Oral daily  fenofibrate Tablet 145 milliGRAM(s) Oral daily  gabapentin 200 milliGRAM(s) Oral at bedtime  gabapentin 100 milliGRAM(s) Oral <User Schedule>  heparin   Injectable 5000 Unit(s) SubCutaneous every 8 hours  isosorbide   mononitrate ER Tablet (IMDUR) 60 milliGRAM(s) Oral daily  metoprolol succinate ER 25 milliGRAM(s) Oral every 12 hours  midodrine. 2.5 milliGRAM(s) Oral <User Schedule>  NIFEdipine XL 60 milliGRAM(s) Oral daily  pentoxifylline 400 milliGRAM(s) Oral three times a day  ranolazine 500 milliGRAM(s) Oral two times a day  venlafaxine XR. 75 milliGRAM(s) Oral daily    MEDICATIONS  (PRN):  acetaminophen   Tablet .. 975 milliGRAM(s) Oral every 6 hours PRN Temp greater or equal to 38C (100.4F), Mild Pain (1 - 3), Moderate Pain (4 - 6)  aluminum hydroxide/magnesium hydroxide/simethicone Suspension 30 milliLiter(s) Oral every 4 hours PRN Dyspepsia  polyethylene glycol 3350 17 Gram(s) Oral daily PRN Constipation  senna 2 Tablet(s) Oral at bedtime PRN Constipation        LABS                        12.7   7.62  )-----------( 292      ( 16 Aug 2021 04:30 )             40.3     08-17    137  |  106  |  26<H>  ----------------------------<  126<H>  4.1   |  18  |  1.6<H>    Ca    9.2      17 Aug 2021 06:23  Mg     2.3     08-16    TPro  7.2  /  Alb  4.5  /  TBili  0.4  /  DBili  x   /  AST  20  /  ALT  17  /  AlkPhos  35  08-16        POCT Blood Glucose.: 123 mg/dL (08-17-21 @ 07:14)  POCT Blood Glucose.: 129 mg/dL (08-16-21 @ 20:56)  POCT Blood Glucose.: 164 mg/dL (08-16-21 @ 16:15)  POCT Blood Glucose.: 98 mg/dL (08-16-21 @ 12:06)  POCT Blood Glucose.: 123 mg/dL (08-16-21 @ 07:44)  POCT Blood Glucose.: 141 mg/dL (08-15-21 @ 17:06)  POCT Blood Glucose.: 121 mg/dL (08-15-21 @ 11:51)  POCT Blood Glucose.: 125 mg/dL (08-15-21 @ 07:45)  POCT Blood Glucose.: 178 mg/dL (08-14-21 @ 20:44)  POCT Blood Glucose.: 120 mg/dL (08-14-21 @ 16:16)  POCT Blood Glucose.: 146 mg/dL (08-13-21 @ 20:41)  POCT Blood Glucose.: 126 mg/dL (08-13-21 @ 16:27)             HPI:  74 yo M with PMH of CAD s/p CABG x3 2017 ([place]), 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 Patient says he has been feeling intermittently dizzy over three days  despite having adequate/normal PO intake. Pt stated that these episodes of dizziness occurs after getting up from a sitting or supine position. Patient fell at home after feeling dizzy hitting the back of his head, but with no LOC. Pt was not on AC prior to admission. Pt also got out of his car, felt dizzy and then fell straight down hitting his buttocks and R knee. Denies head trauma. Patient was admitted on 7/20/21 to internal medicine for falls and dizziness 2/2 orthostatic hypotension.   CT spine 7/20/21 showed no evidence of acute cervical spine fracture or subluxation. Multilevel severe degenerative changes as described, with severe spinal noted stenosis at C2-3 and C3-4 and multilevel severe neural foraminal stenosis.  CTAP 7/20/21 showed no definite evidence of acute traumatic injury within the chest, abdomen, or pelvis.   CTH 7/21/21 showed no evidence of acute intracranial hemorrhage. Probable chronic infarct within the right posterior frontal white matter. Lacunar infarcts within bilateral white matter and deep gray nuclei of indeterminate age. Mild/moderate chronic microvascular changes.  Chest X-Ray negative for pneumothorax, infiltrate, or effusion. XR Pelvis negative for fx's or dislocations.    Patient's hospital course was complicated by lower extremity claudication. Bilateral lower extremity ultrasounds were performed on 7/23/21, which revealed severely diminished flow noted in the right and left superficial femoral artery suggestive of a proximal superficial femoral or common femoral artery stenosis. Significant atherosclerotic disease noted in the bilateral common femoral arteries. Vascular surgery was consulted for LLE Angiogram with possible endovascular revascularization; cardiac clearance was obtained but the team in conjunction with the family had decided to not go through with intervention. Patient's orthostatic hypotension improved with midodrine, compression stocking, abdominal binder.     MRA 7/23/21 showed the following: MRA of the neck is limited by motion. Evaluation of the distal common carotid proximal internal carotid arteries appear grossly unremarkable though better quality study is recommended or CTA of the neck can be done for further evaluation. Evaluation of the proximal external carotid arteries are limited by motion. MRA of the [place] demonstrates decreased caliber of the distal right internal carotid artery. Short segment of stenosis is suspected involving both posterior cerebral arteries.    CTH 7/24/21 showed the following: No acute/traumatic intracranial pathology. Microvascular ischemic changes and unchanged probable chronic infarct within the right posterior frontal lobe.  MRI Head 7/24/21 showed acute lacunar infarcts involving the deep white matter of the left frontal lobe at the level of the centrum semiovale. No acute hemorrhage.  Neurology followed the patient and did not recommend intervention; patient is to continue aspirin and plavix.    Patient's prior level of function included independence with ADLs and independence with ambulation with a RW. Patient's level of function on admission is min assist with bed mobility and min assist with transfers, patient is able to ambulate 4 small steps with rolling walker min assist. Patient was evaluated by Dr. Ingram, a physical medicine and rehabilitation specialist and was found to be an excellent candidate for acute rehabilitation. Patient would benefit from 3 hours of interdisciplinary therapy per day. Patient was admitted to rehab for acute lacunar infarcts in the deep white matter of the left frontal lobe at the level of the centrum semiovale and chronic left sided weakness from CVA of 2017 with a significant decline in his baseline function of independent with a straight cane.      TODAY'S SUBJECTIVE & REVIEW OF SYMPTOMS:  Patient is doing well. Reporting frequent spontaneous urination. No longer complaining of dizziness. No further episodes of diarrhea   No acute events. No longer receiving PVR scans or straight cath.        Constiutional:    [ x ] WNL           [   ] poor appetite   [   ] insomnia   [   ] tired   Cardio:                [ x ] WNL           [   ] CP   [   ] PRADO   [   ] palpitations               Resp:                   [ x ] WNL           [   ] SOB   [   ] cough   [   ] wheezing   GI:                        [ x ] WNL           [   ] constipation   [   ] diarrhea   [   ] abdominal pain   [   ] nausea   [   ] emesis                                :                      [  ] WNL           [   ] HOGUE  [   ] dysuria   [  X ] difficulty voiding   - c/o frequency - voiding q 90 mins      Endo:                   [ x ] WNL          [   ] polyuria   [   ] temperature intolerance                 Skin:                     [ x ] WNL          [   ] pain   [   ] wound   [   ] rash   MSK:                    [ x ] WNL          [   ] muscle pain   [   ] joint pain/ stiffness   [   ] muscle tenderness   [   ] swelling   Neuro:                 [  ] WNL           [ x ]  as per HPI            Cognitive:           [ x ] WNL           [   ]confusion      Psych:                  [ x ] WNL           [   ] hallucinations   [   ]agitation   [   ] delusion   [   ]depression      PHYSICAL EXAM    Vital Signs Last 24 Hrs  T(C): 36.4 (17 Aug 2021 05:33), Max: 37.1 (16 Aug 2021 20:27)  T(F): 97.6 (17 Aug 2021 05:33), Max: 98.7 (16 Aug 2021 20:27)  HR: 71 (17 Aug 2021 05:33) (71 - 85)  BP: 146/74 (17 Aug 2021 05:33) (146/74 - 181/77)  BP(mean): --  RR: 18 (17 Aug 2021 05:33) (18 - 20)  SpO2: --    Constitutional - [ x ] NAD, Comfortable        [   ] other:  Chest - [ x ] CTA     [   ] other:  Cardiovascular - [ x ] RRR, no murmer     [   ] other:  Abdomen - [ x ] Soft, NT/ND      [   ] other:        -  [ x  ] NO HOGUE CATHETER   [  ] YES  if yes: [  ] NO MEATAL TEAR OR DISCHARGE [   ] other:  Extremities - [ x ] No C/C/E, No calf tenderness       [   ] other:  ROM - [ x ] WFL     [   ] other:  Neurologic Exam -                 Cognitive - [ x ]Awake, Alert, AAO to self, place, date, year, situation         [    ] other:      Communication - [   ]Fluent, No dysarthria       [ x ] other: mildly dysarthric      Motor - No focal deficits                    Right UE -  [ x ] WNL      [    ] other:                    Left UE -     [   ] WNL      [ x ] other: LUE 4/5                    Right LE -   [ x ] WNL       [    ] other:                    Left LE -      [   ]WNL       [ x ] other: LLE 4+/5     Sensory - [   ] Intact to LT      [ x ] other: L sided hemisensory loss (2/2 old CVA in 2017)     Reflexes - [ x ] wnl/ symmetric     [   ] other:     Psychiatric - [ x ]Mood stable, Affect WNL     [   ]other:     Skin - [ x ] intact      [   ] other    CLOF:   - Bed mobility: Kimberly   - Transfers: Kimberly   - Ambulation: CG for ambulation 100ft. x2 w RW  - ADLs: supervision     MEDICATIONS  (STANDING):  aspirin  chewable 81 milliGRAM(s) Oral daily  atorvastatin 80 milliGRAM(s) Oral at bedtime  clopidogrel Tablet 75 milliGRAM(s) Oral daily  fenofibrate Tablet 145 milliGRAM(s) Oral daily  gabapentin 200 milliGRAM(s) Oral at bedtime  gabapentin 100 milliGRAM(s) Oral <User Schedule>  heparin   Injectable 5000 Unit(s) SubCutaneous every 8 hours  isosorbide   mononitrate ER Tablet (IMDUR) 60 milliGRAM(s) Oral daily  metoprolol succinate ER 25 milliGRAM(s) Oral every 12 hours  midodrine. 2.5 milliGRAM(s) Oral <User Schedule>  NIFEdipine XL 60 milliGRAM(s) Oral daily  pentoxifylline 400 milliGRAM(s) Oral three times a day  ranolazine 500 milliGRAM(s) Oral two times a day  venlafaxine XR. 75 milliGRAM(s) Oral daily    MEDICATIONS  (PRN):  acetaminophen   Tablet .. 975 milliGRAM(s) Oral every 6 hours PRN Temp greater or equal to 38C (100.4F), Mild Pain (1 - 3), Moderate Pain (4 - 6)  aluminum hydroxide/magnesium hydroxide/simethicone Suspension 30 milliLiter(s) Oral every 4 hours PRN Dyspepsia  polyethylene glycol 3350 17 Gram(s) Oral daily PRN Constipation  senna 2 Tablet(s) Oral at bedtime PRN Constipation        LABS                        12.7   7.62  )-----------( 292      ( 16 Aug 2021 04:30 )             40.3     08-17    137  |  106  |  26<H>  ----------------------------<  126<H>  4.1   |  18  |  1.6<H>    Ca    9.2      17 Aug 2021 06:23  Mg     2.3     08-16    TPro  7.2  /  Alb  4.5  /  TBili  0.4  /  DBili  x   /  AST  20  /  ALT  17  /  AlkPhos  35  08-16        POCT Blood Glucose.: 123 mg/dL (08-17-21 @ 07:14)  POCT Blood Glucose.: 129 mg/dL (08-16-21 @ 20:56)  POCT Blood Glucose.: 164 mg/dL (08-16-21 @ 16:15)  POCT Blood Glucose.: 98 mg/dL (08-16-21 @ 12:06)  POCT Blood Glucose.: 123 mg/dL (08-16-21 @ 07:44)  POCT Blood Glucose.: 141 mg/dL (08-15-21 @ 17:06)  POCT Blood Glucose.: 121 mg/dL (08-15-21 @ 11:51)  POCT Blood Glucose.: 125 mg/dL (08-15-21 @ 07:45)  POCT Blood Glucose.: 178 mg/dL (08-14-21 @ 20:44)  POCT Blood Glucose.: 120 mg/dL (08-14-21 @ 16:16)  POCT Blood Glucose.: 146 mg/dL (08-13-21 @ 20:41)  POCT Blood Glucose.: 126 mg/dL (08-13-21 @ 16:27)

## 2021-08-18 ENCOUNTER — TRANSCRIPTION ENCOUNTER (OUTPATIENT)
Age: 76
End: 2021-08-18

## 2021-08-18 VITALS
SYSTOLIC BLOOD PRESSURE: 175 MMHG | DIASTOLIC BLOOD PRESSURE: 81 MMHG | HEART RATE: 86 BPM | TEMPERATURE: 97 F | RESPIRATION RATE: 18 BRPM

## 2021-08-18 LAB
GLUCOSE BLDC GLUCOMTR-MCNC: 112 MG/DL — HIGH (ref 70–99)
GLUCOSE BLDC GLUCOMTR-MCNC: 130 MG/DL — HIGH (ref 70–99)

## 2021-08-18 RX ORDER — VENLAFAXINE HCL 75 MG
1 CAPSULE, EXT RELEASE 24 HR ORAL
Qty: 0 | Refills: 0 | DISCHARGE

## 2021-08-18 RX ORDER — ATORVASTATIN CALCIUM 80 MG/1
1 TABLET, FILM COATED ORAL
Qty: 30 | Refills: 0
Start: 2021-08-18 | End: 2021-09-16

## 2021-08-18 RX ORDER — NIFEDIPINE 30 MG
1 TABLET, EXTENDED RELEASE 24 HR ORAL
Qty: 30 | Refills: 0
Start: 2021-08-18 | End: 2021-09-16

## 2021-08-18 RX ORDER — GABAPENTIN 400 MG/1
2 CAPSULE ORAL
Qty: 60 | Refills: 0
Start: 2021-08-18 | End: 2021-09-16

## 2021-08-18 RX ORDER — MIDODRINE HYDROCHLORIDE 2.5 MG/1
1 TABLET ORAL
Qty: 60 | Refills: 0
Start: 2021-08-18 | End: 2021-09-16

## 2021-08-18 RX ORDER — GABAPENTIN 400 MG/1
1 CAPSULE ORAL
Qty: 30 | Refills: 0
Start: 2021-08-18 | End: 2021-09-16

## 2021-08-18 RX ADMIN — RANOLAZINE 500 MILLIGRAM(S): 500 TABLET, FILM COATED, EXTENDED RELEASE ORAL at 06:10

## 2021-08-18 RX ADMIN — CLOPIDOGREL BISULFATE 75 MILLIGRAM(S): 75 TABLET, FILM COATED ORAL at 12:47

## 2021-08-18 RX ADMIN — Medication 400 MILLIGRAM(S): at 12:48

## 2021-08-18 RX ADMIN — Medication 75 MILLIGRAM(S): at 12:47

## 2021-08-18 RX ADMIN — Medication 81 MILLIGRAM(S): at 12:47

## 2021-08-18 RX ADMIN — Medication 25 MILLIGRAM(S): at 06:10

## 2021-08-18 RX ADMIN — Medication 400 MILLIGRAM(S): at 06:10

## 2021-08-18 RX ADMIN — Medication 145 MILLIGRAM(S): at 12:47

## 2021-08-18 RX ADMIN — ISOSORBIDE MONONITRATE 60 MILLIGRAM(S): 60 TABLET, EXTENDED RELEASE ORAL at 12:47

## 2021-08-18 RX ADMIN — HEPARIN SODIUM 5000 UNIT(S): 5000 INJECTION INTRAVENOUS; SUBCUTANEOUS at 06:10

## 2021-08-18 RX ADMIN — Medication 60 MILLIGRAM(S): at 06:10

## 2021-08-18 RX ADMIN — GABAPENTIN 100 MILLIGRAM(S): 400 CAPSULE ORAL at 07:47

## 2021-08-18 NOTE — PROGRESS NOTE ADULT - REASON FOR ADMISSION
Rehab of chronic left hemiparisis (dominant), and dysarthria and dysphagia with decline in function and falls, found to have an acute left subcortical infarct.
Rehab of chronic left hemiparisis (dominant), and dysarthria and dysphagia with decline in function and falls, found to have an acute left subcortical infarct.
chronic left hemiparesis (dominant), dysarthria, and dysphagia with decline in function and falls, found to have an acute left subcortical infarct
unsteady gait, new stroke
CVA
Rehab of chronic left hemiparisis (dominant), and dysarthria and dysphagia with decline in function and falls, found to have an acute left subcortical infarct.
CVA
Rehab for chronic left hemiparesis (dominant), dysarthria, and dysphagia with decline in function and falls, found to have an acute left subcortical infarct
Rehab of chronic left hemiparisis (dominant), and dysarthria and dysphagia with decline in function and falls, found to have an acute left subcortical infarct.

## 2021-08-18 NOTE — DISCHARGE NOTE PROVIDER - NSTOBACCOUSAGEY/N_GEN_A_CS
Spoke with pt's mother (on consent) and went over results/instructions. Verbalized understanding. No

## 2021-08-18 NOTE — PROGRESS NOTE ADULT - PROVIDER SPECIALTY LIST ADULT
Neuropsychology
Neuropsychology
Physiatry
Rehab Medicine
Neurology
Neurology
Neuropsychology
Neuropsychology
Rehab Medicine
Electrophysiology
Neuropsychology
Physiatry
Rehab Medicine
Physiatry
Rehab Medicine
Neuropsychology
Rehab Medicine

## 2021-08-18 NOTE — DISCHARGE NOTE PROVIDER - NSDCQMSTROKERISK_NEU_ALL_CORE
High blood pressure/History of a stroke or TIA Diabetes/High blood pressure/High cholesterol/History of a stroke or TIA

## 2021-08-18 NOTE — DISCHARGE NOTE NURSING/CASE MANAGEMENT/SOCIAL WORK - PATIENT PORTAL LINK FT
You can access the FollowMyHealth Patient Portal offered by [facility] by registering at the following website: [email or web address]. By joining [employer]’s FollowMyHealth portal, you will also be able to view your health information using other applications (apps) compatible with our system.

## 2021-08-18 NOTE — DISCHARGE NOTE PROVIDER - NSDCMRMEDTOKEN_GEN_ALL_CORE_FT
acetaminophen 325 mg oral tablet: 2 or 3 tab(s) orally every 6 hours, As needed, for pain or fever  aspirin 81 mg oral tablet: 1 tab(s) orally once a day  atorvastatin 80 mg oral tablet: 1 tab(s) orally once a day (at bedtime)  fenofibrate 120 mg oral tablet: 1 tab(s) orally once a day  gabapentin 100 mg oral capsule: 2 cap(s) orally once a day (at bedtime)  gabapentin 100 mg oral capsule: 1 cap(s) orally once a day  in am   isosorbide mononitrate 60 mg oral tablet, extended release: 1 tab(s) orally once a day MDD:1  metoprolol succinate 25 mg oral tablet, extended release: 1 tab(s) orally every 12 hours  midodrine 2.5 mg oral tablet: 1 tab(s) orally 2 times a day: take 1st tab 20 to 30 minutes before getting out of bed in the morning and 2nd pill before lunch; must remain upright for at least one hour after taking medication  NIFEdipine 60 mg oral tablet, extended release: 1 tab(s) orally once a day  Pentoxil 400 mg oral tablet, extended release: 1 tab(s) orally 3 times a day  Plavix 75 mg oral tablet: 1 tab(s) orally once a day  polyethylene glycol 3350 oral powder for reconstitution: 17 gram(s) orally once a day, As needed, Constipation  Prevacid 30 mg oral delayed release capsule: 1 cap(s) orally once a day  Ranexa 500 mg oral tablet, extended release: 1 tab(s) orally 2 times a day  senna oral tablet: 1 or 2 tab(s) orally once a day (at bedtime), As needed, Constipation  venlafaxine 75 mg oral capsule, extended release: 1 cap(s) orally once a day

## 2021-08-18 NOTE — DISCHARGE NOTE NURSING/CASE MANAGEMENT/SOCIAL WORK - NSDCVIVACCINE_GEN_ALL_CORE_FT
Tdap; 20-Jul-2021 15:17; Dejuan Fry (RN); [employer]; [id number] (Exp. Date: 28-Jan-2023); IntraMuscular; Deltoid Left.; 0.5 milliLiter(s); VIS (VIS Published: 09-May-2013, VIS Presented: 20-Jul-2021);

## 2021-08-18 NOTE — DISCHARGE NOTE PROVIDER - PROVIDER TOKENS
PROVIDER:[TOKEN:[[id number]]],PROVIDER:[TOKEN:[[id number]]],PROVIDER:[TOKEN:[[id number]]],PROVIDER:[TOKEN:[[id number]]],PROVIDER:[TOKEN:[[id number]]]

## 2021-08-18 NOTE — PROGRESS NOTE ADULT - SUBJECTIVE AND OBJECTIVE BOX
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 Patient says he has been feeling intermittently dizzy over three days  despite having adequate/normal PO intake. Pt stated that these episodes of dizziness occurs after getting up from a sitting or supine position. Patient fell at home after feeling dizzy hitting the back of his head, but with no LOC. Pt was not on AC prior to admission. Pt also got out of his car, felt dizzy and then fell straight down hitting his buttocks and R knee. Denies head trauma. Patient was admitted on 7/20/21 to internal medicine for falls and dizziness 2/2 orthostatic hypotension.   CT spine 7/20/21 showed no evidence of acute cervical spine fracture or subluxation. Multilevel severe degenerative changes as described, with severe spinal noted stenosis at C2-3 and C3-4 and multilevel severe neural foraminal stenosis.  CTAP 7/20/21 showed no definite evidence of acute traumatic injury within the chest, abdomen, or pelvis.   CTH 7/21/21 showed no evidence of acute intracranial hemorrhage. Probable chronic infarct within the right posterior frontal white matter. Lacunar infarcts within bilateral white matter and deep gray nuclei of indeterminate age. Mild/moderate chronic microvascular changes.  Chest X-Ray negative for pneumothorax, infiltrate, or effusion. XR Pelvis negative for fx's or dislocations.    Patient's hospital course was complicated by lower extremity claudication. Bilateral lower extremity ultrasounds were performed on 7/23/21, which revealed severely diminished flow noted in the right and left superficial femoral artery suggestive of a proximal superficial femoral or common femoral artery stenosis. Significant atherosclerotic disease noted in the bilateral common femoral arteries. Vascular surgery was consulted for LLE Angiogram with possible endovascular revascularization; cardiac clearance was obtained but the team in conjunction with the family had decided to not go through with intervention. Patient's orthostatic hypotension improved with midodrine, compression stocking, abdominal binder.     MRA 7/23/21 showed the following: MRA of the neck is limited by motion. Evaluation of the distal common carotid proximal internal carotid arteries appear grossly unremarkable though better quality study is recommended or CTA of the neck can be done for further evaluation. Evaluation of the proximal external carotid arteries are limited by motion. MRA of the [place] demonstrates decreased caliber of the distal right internal carotid artery. Short segment of stenosis is suspected involving both posterior cerebral arteries.    CTH 7/24/21 showed the following: No acute/traumatic intracranial pathology. Microvascular ischemic changes and unchanged probable chronic infarct within the right posterior frontal lobe.  MRI Head 7/24/21 showed acute lacunar infarcts involving the deep white matter of the left frontal lobe at the level of the centrum semiovale. No acute hemorrhage.  Neurology followed the patient and did not recommend intervention; patient is to continue aspirin and plavix.    Patient's prior level of function included independence with ADLs and independence with ambulation with a RW. Patient's level of function on admission is min assist with bed mobility and min assist with transfers, patient is able to ambulate 4 small steps with rolling walker min assist. Patient was evaluated by Dr. Ingram, a physical medicine and rehabilitation specialist and was found to be an excellent candidate for acute rehabilitation. Patient would benefit from 3 hours of interdisciplinary therapy per day. Patient was admitted to rehab for acute lacunar infarcts in the deep white matter of the left frontal lobe at the level of the centrum semiovale and chronic left sided weakness from CVA of 2017 with a significant decline in his baseline function of independent with a straight cane.      TODAY'S SUBJECTIVE & REVIEW OF SYMPTOMS:  Patient is doing well. Reporting frequent spontaneous urination. No longer complaining of dizziness.  No acute events. No longer receiving PVR scans or straight cath.        Constiutional:    [ x ] WNL           [   ] poor appetite   [   ] insomnia   [   ] tired   Cardio:                [ x ] WNL           [   ] CP   [   ] PRADO   [   ] palpitations               Resp:                   [ x ] WNL           [   ] SOB   [   ] cough   [   ] wheezing   GI:                        [ x ] WNL           [   ] constipation   [   ] diarrhea   [   ] abdominal pain   [   ] nausea   [   ] emesis                                :                      [  ] WNL           [   ] HOGUE  [   ] dysuria   [  X ] difficulty voiding   - c/o frequency - voiding q 90 mins      Endo:                   [ x ] WNL          [   ] polyuria   [   ] temperature intolerance                 Skin:                     [ x ] WNL          [   ] pain   [   ] wound   [   ] rash   MSK:                    [ x ] WNL          [   ] muscle pain   [   ] joint pain/ stiffness   [   ] muscle tenderness   [   ] swelling   Neuro:                 [  ] WNL           [ x ]  as per HPI            Cognitive:           [ x ] WNL           [   ]confusion      Psych:                  [ x ] WNL           [   ] hallucinations   [   ]agitation   [   ] delusion   [   ]depression      PHYSICAL EXAM    Vital Signs Last 24 Hrs  T(C): 35.8 (18 Aug 2021 05:37), Max: 37.1 (17 Aug 2021 13:35)  T(F): 96.4 (18 Aug 2021 05:37), Max: 98.8 (17 Aug 2021 13:35)  HR: 77 (18 Aug 2021 07:46) (77 - 94)  BP: 186/77 (18 Aug 2021 07:46) (123/63 - 191/84)  BP(mean): --  RR: 18 (18 Aug 2021 05:37) (18 - 19)  SpO2: --    Constitutional - [ x ] NAD, Comfortable        [   ] other:  Chest - [ x ] CTA     [   ] other:  Cardiovascular - [ x ] RRR, no murmer     [   ] other:  Abdomen - [ x ] Soft, NT/ND      [   ] other:        -  [ x  ] NO HOGUE CATHETER   [  ] YES  if yes: [  ] NO MEATAL TEAR OR DISCHARGE [   ] other:  Extremities - [ x ] No C/C/E, No calf tenderness       [   ] other:  ROM - [ x ] WFL     [   ] other:  Neurologic Exam -                 Cognitive - [ x ]Awake, Alert, AAO to self, place, date, year, situation         [    ] other:      Communication - [   ]Fluent, No dysarthria       [ x ] other: mildly dysarthric      Motor - No focal deficits                    Right UE -  [ x ] WNL      [    ] other:                    Left UE -     [   ] WNL      [ x ] other: LUE 4/5                    Right LE -   [ x ] WNL       [    ] other:                    Left LE -      [   ]WNL       [ x ] other: LLE 4+/5     Sensory - [   ] Intact to LT      [ x ] other: L sided hemisensory loss (2/2 old CVA in 2017)     Reflexes - [ x ] wnl/ symmetric     [   ] other:     Psychiatric - [ x ]Mood stable, Affect WNL     [   ]other:     Skin - [ x ] intact      [   ] other    CLOF:   - Bed mobility: Kimberly   - Transfers: Kimberly   - Ambulation: CG for ambulation 100ft. x2 w RW  - ADLs: supervision       MEDICATIONS  (STANDING):  aspirin  chewable 81 milliGRAM(s) Oral daily  atorvastatin 80 milliGRAM(s) Oral at bedtime  clopidogrel Tablet 75 milliGRAM(s) Oral daily  fenofibrate Tablet 145 milliGRAM(s) Oral daily  gabapentin 200 milliGRAM(s) Oral at bedtime  gabapentin 100 milliGRAM(s) Oral <User Schedule>  heparin   Injectable 5000 Unit(s) SubCutaneous every 8 hours  isosorbide   mononitrate ER Tablet (IMDUR) 60 milliGRAM(s) Oral daily  metoprolol succinate ER 25 milliGRAM(s) Oral every 12 hours  midodrine. 2.5 milliGRAM(s) Oral <User Schedule>  NIFEdipine XL 60 milliGRAM(s) Oral daily  pentoxifylline 400 milliGRAM(s) Oral three times a day  ranolazine 500 milliGRAM(s) Oral two times a day  venlafaxine XR. 75 milliGRAM(s) Oral daily    MEDICATIONS  (PRN):  acetaminophen   Tablet .. 975 milliGRAM(s) Oral every 6 hours PRN Temp greater or equal to 38C (100.4F), Mild Pain (1 - 3), Moderate Pain (4 - 6)  aluminum hydroxide/magnesium hydroxide/simethicone Suspension 30 milliLiter(s) Oral every 4 hours PRN Dyspepsia  polyethylene glycol 3350 17 Gram(s) Oral daily PRN Constipation  senna 2 Tablet(s) Oral at bedtime PRN Constipation      LABS                   12.7   7.62  )-----------( 292      ( 16 Aug 2021 04:30 )             40.3     08-17    137  |  106  |  26<H>  ----------------------------<  126<H>  4.1   |  18  |  1.6<H>        Ca    9.2      17 Aug 2021 06:23  Mg     2.3     08-16    TPro  7.2  /  Alb  4.5  /  TBili  0.4  /  DBili  x   /  AST  20  /  ALT  17  /  AlkPhos  35  08-16        POCT Blood Glucose.: 130 mg/dL (08-18-21 @ 08:06)  POCT Blood Glucose.: 129 mg/dL (08-17-21 @ 21:12)  POCT Blood Glucose.: 135 mg/dL (08-17-21 @ 16:08)  POCT Blood Glucose.: 125 mg/dL (08-17-21 @ 12:33)  POCT Blood Glucose.: 123 mg/dL (08-17-21 @ 07:14)  POCT Blood Glucose.: 129 mg/dL (08-16-21 @ 20:56)  POCT Blood Glucose.: 164 mg/dL (08-16-21 @ 16:15)  POCT Blood Glucose.: 98 mg/dL (08-16-21 @ 12:06)  POCT Blood Glucose.: 123 mg/dL (08-16-21 @ 07:44)  POCT Blood Glucose.: 141 mg/dL (08-15-21 @ 17:06)  POCT Blood Glucose.: 121 mg/dL (08-15-21 @ 11:51)  POCT Blood Glucose.: 125 mg/dL (08-15-21 @ 07:45)   HPI:  74 yo M with PMH of CAD s/p CABG x3 2017 ([place]), 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 Patient says he has been feeling intermittently dizzy over three days  despite having adequate/normal PO intake. Pt stated that these episodes of dizziness occurs after getting up from a sitting or supine position. Patient fell at home after feeling dizzy hitting the back of his head, but with no LOC. Pt was not on AC prior to admission. Pt also got out of his car, felt dizzy and then fell straight down hitting his buttocks and R knee. Denies head trauma. Patient was admitted on 7/20/21 to internal medicine for falls and dizziness 2/2 orthostatic hypotension.   CT spine 7/20/21 showed no evidence of acute cervical spine fracture or subluxation. Multilevel severe degenerative changes as described, with severe spinal noted stenosis at C2-3 and C3-4 and multilevel severe neural foraminal stenosis.  CTAP 7/20/21 showed no definite evidence of acute traumatic injury within the chest, abdomen, or pelvis.   CTH 7/21/21 showed no evidence of acute intracranial hemorrhage. Probable chronic infarct within the right posterior frontal white matter. Lacunar infarcts within bilateral white matter and deep gray nuclei of indeterminate age. Mild/moderate chronic microvascular changes.  Chest X-Ray negative for pneumothorax, infiltrate, or effusion. XR Pelvis negative for fx's or dislocations.    Patient's hospital course was complicated by lower extremity claudication. Bilateral lower extremity ultrasounds were performed on 7/23/21, which revealed severely diminished flow noted in the right and left superficial femoral artery suggestive of a proximal superficial femoral or common femoral artery stenosis. Significant atherosclerotic disease noted in the bilateral common femoral arteries. Vascular surgery was consulted for LLE Angiogram with possible endovascular revascularization; cardiac clearance was obtained but the team in conjunction with the family had decided to not go through with intervention. Patient's orthostatic hypotension improved with midodrine, compression stocking, abdominal binder.     MRA 7/23/21 showed the following: MRA of the neck is limited by motion. Evaluation of the distal common carotid proximal internal carotid arteries appear grossly unremarkable though better quality study is recommended or CTA of the neck can be done for further evaluation. Evaluation of the proximal external carotid arteries are limited by motion. MRA of the [place] demonstrates decreased caliber of the distal right internal carotid artery. Short segment of stenosis is suspected involving both posterior cerebral arteries.    CTH 7/24/21 showed the following: No acute/traumatic intracranial pathology. Microvascular ischemic changes and unchanged probable chronic infarct within the right posterior frontal lobe.  MRI Head 7/24/21 showed acute lacunar infarcts involving the deep white matter of the left frontal lobe at the level of the centrum semiovale. No acute hemorrhage.  Neurology followed the patient and did not recommend intervention; patient is to continue aspirin and plavix.    Patient's prior level of function included independence with ADLs and independence with ambulation with a RW. Patient's level of function on admission is min assist with bed mobility and min assist with transfers, patient is able to ambulate 4 small steps with rolling walker min assist. Patient was evaluated by Dr. Ingram, a physical medicine and rehabilitation specialist and was found to be an excellent candidate for acute rehabilitation. Patient would benefit from 3 hours of interdisciplinary therapy per day. Patient was admitted to rehab for acute lacunar infarcts in the deep white matter of the left frontal lobe at the level of the centrum semiovale and chronic left sided weakness from CVA of 2017 with a significant decline in his baseline function of independent with a straight cane.      TODAY'S SUBJECTIVE & REVIEW OF SYMPTOMS:  Patient is doing well. Reporting frequent spontaneous urination. No longer complaining of dizziness.  No acute events. No longer receiving PVR scans or straight cath.        Constiutional:    [ x ] WNL           [   ] poor appetite   [   ] insomnia   [   ] tired   Cardio:                [ x ] WNL           [   ] CP   [   ] PRADO   [   ] palpitations               Resp:                   [ x ] WNL           [   ] SOB   [   ] cough   [   ] wheezing   GI:                        [ x ] WNL           [   ] constipation   [   ] diarrhea   [   ] abdominal pain   [   ] nausea   [   ] emesis                                :                      [  ] WNL           [   ] HOGUE  [   ] dysuria   [  X ] difficulty voiding   - c/o frequency - voiding q 90 mins      Endo:                   [ x ] WNL          [   ] polyuria   [   ] temperature intolerance                 Skin:                     [ x ] WNL          [   ] pain   [   ] wound   [   ] rash   MSK:                    [ x ] WNL          [   ] muscle pain   [   ] joint pain/ stiffness   [   ] muscle tenderness   [   ] swelling   Neuro:                 [  ] WNL           [ x ]  as per HPI            Cognitive:           [ x ] WNL           [   ]confusion      Psych:                  [ x ] WNL           [   ] hallucinations   [   ]agitation   [   ] delusion   [   ]depression      PHYSICAL EXAM    Vital Signs Last 24 Hrs  T(C): 35.8 (18 Aug 2021 05:37), Max: 37.1 (17 Aug 2021 13:35)  T(F): 96.4 (18 Aug 2021 05:37), Max: 98.8 (17 Aug 2021 13:35)  HR: 77 (18 Aug 2021 07:46) (77 - 94)  BP: 186/77 (18 Aug 2021 07:46) (123/63 - 191/84)  BP(mean): --  RR: 18 (18 Aug 2021 05:37) (18 - 19)  SpO2: --    Constitutional - [ x ] NAD, Comfortable        [   ] other:  Chest - [ x ] CTA     [   ] other:  Cardiovascular - [ x ] RRR, no murmer     [   ] other:  Abdomen - [ x ] Soft, NT/ND      [   ] other:        -  [ x  ] NO HOGUE CATHETER   [  ] YES  if yes: [  ] NO MEATAL TEAR OR DISCHARGE [   ] other:  Extremities - [ x ] No C/C/E, No calf tenderness       [   ] other:  ROM - [ x ] WFL     [   ] other:  Neurologic Exam -                 Cognitive - [ x ]Awake, Alert, AAO to self, place, date, year, situation         [    ] other:      Communication - [   ]Fluent, No dysarthria       [ x ] other: mildly dysarthric      Motor - No focal deficits                    Right UE -  [ x ] WNL      [    ] other:                    Left UE -     [   ] WNL      [ x ] other: LUE 4/5                    Right LE -   [ x ] WNL       [    ] other:                    Left LE -      [   ]WNL       [ x ] other: LLE 4+/5     Sensory - [   ] Intact to LT      [ x ] other: L sided hemisensory loss (2/2 old CVA in 2017)     Reflexes - [ x ] wnl/ symmetric     [   ] other:     Psychiatric - [ x ]Mood stable, Affect WNL     [   ]other:     Skin - [ x ] intact      [   ] other    CLOF:   - Bed mobility: supervision/ CG  - Transfers: supervision  - Ambulation: CG/ supervision for ambulation 100ft. x2 w RW  - ADLs: supervision       MEDICATIONS  (STANDING):  aspirin  chewable 81 milliGRAM(s) Oral daily  atorvastatin 80 milliGRAM(s) Oral at bedtime  clopidogrel Tablet 75 milliGRAM(s) Oral daily  fenofibrate Tablet 145 milliGRAM(s) Oral daily  gabapentin 200 milliGRAM(s) Oral at bedtime  gabapentin 100 milliGRAM(s) Oral <User Schedule>  heparin   Injectable 5000 Unit(s) SubCutaneous every 8 hours  isosorbide   mononitrate ER Tablet (IMDUR) 60 milliGRAM(s) Oral daily  metoprolol succinate ER 25 milliGRAM(s) Oral every 12 hours  midodrine. 2.5 milliGRAM(s) Oral <User Schedule>  NIFEdipine XL 60 milliGRAM(s) Oral daily  pentoxifylline 400 milliGRAM(s) Oral three times a day  ranolazine 500 milliGRAM(s) Oral two times a day  venlafaxine XR. 75 milliGRAM(s) Oral daily    MEDICATIONS  (PRN):  acetaminophen   Tablet .. 975 milliGRAM(s) Oral every 6 hours PRN Temp greater or equal to 38C (100.4F), Mild Pain (1 - 3), Moderate Pain (4 - 6)  aluminum hydroxide/magnesium hydroxide/simethicone Suspension 30 milliLiter(s) Oral every 4 hours PRN Dyspepsia  polyethylene glycol 3350 17 Gram(s) Oral daily PRN Constipation  senna 2 Tablet(s) Oral at bedtime PRN Constipation      LABS                   12.7   7.62  )-----------( 292      ( 16 Aug 2021 04:30 )             40.3     08-17    137  |  106  |  26<H>  ----------------------------<  126<H>  4.1   |  18  |  1.6<H>        Ca    9.2      17 Aug 2021 06:23  Mg     2.3     08-16    TPro  7.2  /  Alb  4.5  /  TBili  0.4  /  DBili  x   /  AST  20  /  ALT  17  /  AlkPhos  35  08-16        POCT Blood Glucose.: 130 mg/dL (08-18-21 @ 08:06)  POCT Blood Glucose.: 129 mg/dL (08-17-21 @ 21:12)  POCT Blood Glucose.: 135 mg/dL (08-17-21 @ 16:08)  POCT Blood Glucose.: 125 mg/dL (08-17-21 @ 12:33)  POCT Blood Glucose.: 123 mg/dL (08-17-21 @ 07:14)  POCT Blood Glucose.: 129 mg/dL (08-16-21 @ 20:56)  POCT Blood Glucose.: 164 mg/dL (08-16-21 @ 16:15)  POCT Blood Glucose.: 98 mg/dL (08-16-21 @ 12:06)  POCT Blood Glucose.: 123 mg/dL (08-16-21 @ 07:44)  POCT Blood Glucose.: 141 mg/dL (08-15-21 @ 17:06)  POCT Blood Glucose.: 121 mg/dL (08-15-21 @ 11:51)  POCT Blood Glucose.: 125 mg/dL (08-15-21 @ 07:45)

## 2021-08-18 NOTE — DISCHARGE NOTE PROVIDER - CARE PROVIDER_API CALL
TANISHA CORREIA  [id number]  [address] [place] [address]  Phone: [phone]  Fax: [phone])-  Follow Up Time:     Charlotte Carrizales)  Neurology; Vascular Neurology  [address] [address]  Phone: [phone]  Fax: [phone]  Follow Up Time:     Charlie Stewart  CARDIOVASCULAR DISEASE  [address] [address]  Phone: [phone]  Fax: [phone]  Follow Up Time:     Christopher Diggs  Cardiology  [address] [place] [address]  Phone: [phone]  Fax: [phone]  Follow Up Time:     Dariusz Winkler  NEUROLOGY  [address] [place] [address]  Phone: [phone]  Fax: [phone]  Follow Up Time:

## 2021-08-18 NOTE — DISCHARGE NOTE PROVIDER - HOSPITAL COURSE
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 Patient says he has been feeling intermittently dizzy over three days  despite having adequate/normal PO intake. Pt stated that these episodes of dizziness occurs after getting up from a sitting or supine position. Patient fell at home after feeling dizzy hitting the back of his head, but with no LOC. Pt was not on AC prior to admission. Pt also got out of his car, felt dizzy and then fell straight down hitting his buttocks and R knee. Denies head trauma. Patient was admitted on 7/20/21 to internal medicine for falls and dizziness 2/2 orthostatic hypotension. He was found to have Rehab of acute lacunar infarcts in the deep white matter of the left frontal lobe at the level of the centrum semiovale and chronic left sided weakness from CVA of 2017.He was evaluated by Physiatrist and  admitted to rehab  on 7/28/21  Patient's hospital course was complicated by lower extremity claudication. Bilateral lower extremity ultrasounds were performed on 7/23/21, which revealed severely diminished flow noted in the right and left superficial femoral artery suggestive of a proximal superficial femoral or common femoral artery stenosis. Significant atherosclerotic disease noted in the bilateral common femoral arteries. Vascular surgery was consulted for LLE Angiogram with possible endovascular revascularization; cardiac clearance was obtained but the team in conjunction with the family had decided to not go through with intervention. Patient's orthostatic hypotension improved with midodrine, compression stocking, abdominal binder.     Neurology followed the patient and did not recommend intervention; patient is to continue aspirin and plavix.    Patient's prior level of function included independence with ADLs and independence with ambulation with a RW. Patient's level of function on admission is min assist with bed mobility and min assist with transfers, patient is able to ambulate 4 small steps with rolling walker min assist. Patient was evaluated by Dr. Ingram, a physical medicine and rehabilitation specialist and was found to be an excellent candidate for acute rehabilitation.       He is in rehab for Rehab of chronic left hemiparesis (dominant), and dysarthria and dysphagia with decline in function and falls, found to have an acute left subcortical infarct.   - MRI Acute lacunar infarcts involving the deep white matter of the left frontal lobe at the level of the centrum semiovale. No acute hemorrhage.  - Neurology was consulted and no intervention was recommended; no intervention at this time. Patient can Follow up with neurology at the clinic, as per conversation with neuro PA. Was already on DAPT and high dose statin.  - PT/OT/SLT/Neuropsych therapies   - Loop recorder placement  8/4/21   - Ambulates with CG assist w/ device - making gains.   - prepared for discharge home today    # Dizziness and fall 2/2 orthostatic hypotension   - Now controlled  - continue with HI stockings and abdominal binder  - continue midodrine 2x a day  - home medications of flomax, tizanidine were held by medicine team prior to rehab admission; valium was changed from 10 mg standing to 5 mg PRN by medicine per neurology recommendations for anxiety  - Became symptomatic again after 1 dose of Uroxatral, now discontinued. No further episodes of dizziness.     #S/P BLE pain likely secondary to PAD   - Significant atherosclerotic disease noted in bilateral common femoral arteries  - Patient's family and vascular team agreed to not proceed with intervention at this time   - no symptoms reported since on Rehab    # CAD s/p CABG 2009  - 6/2021: patent LIMA to LAD , patent SVG to OM , % , filled distally by collaterals from LAD.  - Continue DAPT ( Aspirin 81 mg daily and Plavix 75 mg daily ), ARB, Imdur, Ranexa, B-Blocker, Statin Therapy  - Cardio f/u OP     # HTN  - losartan 100mg discontinued for SAMANTA   - c/w nifedipine 60mg xL q24h   - Need to keep BP in higher range given symptomatic orthostasis    #HLD  - continue with atorvastatin and fenofibrate     # CKD 3  # Urinary retention  - Cr 1.3 in 2017, 1.5 in 2019, 1.7 in June; Stable. Now 1.9 and stable  - Renal bladder US showed simple cysts within both kidneys without hydronephrosis or nephrolithiasis   - Monitor Cr, IVF as needed   - Has been stable  - Unable to tolerate alpha blocker secondary to orthostasis  - Having increased spontaneous void.  Still receiving PVR q8hrs prn with strt cath prn   - having increased urinary frequency with decreased PVRs. Monitor..  - Standing up to urinate q2h to urinate  - PVR scans / CIC discontinued 8/16   # DM type 2  - good control on carb consistent diet alone.     # Depression   - venlafaxine     # Anxiety   - Valium PRN as above    # Maintenance   - GI/Bowel Mgmt: miralax ,senna   - Bladder management: frequent urinations, standing up q2h to void. PVR scans and CIC dc'd 8/16.  - Skin: No active issues at this time  - FEN: monitor electrolytes, replete as needed   - Diet: Diet, DASH/TLC: Sodium & Cholesterol Restricted, Consistent Carbohydrate No Snacks (07-28-21 @ 15:46) [Active]    # Precautions / PROPHYLAXIS:    - Fall precaution   - Ortho: Weight bearing status: WBAT  - DVT prophylaxis: SC heparin ..    CLOF:   - Bed mobility: Kimberly   - Transfers: Kimberly   - Ambulation: CG for ambulation 100ft. x2 w RW  - ADLs: supervision   today he is being discharged on  above meds , He is advised to follow up with neurologist in 2 weeks and  eps doctor  in 2 to 4 weeks

## 2021-08-18 NOTE — PROGRESS NOTE ADULT - ASSESSMENT
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 Rehab of acute lacunar infarcts in the deep white matter of the left frontal lobe at the level of the centrum semiovale and chronic left sided weakness from CVA of 2017.    # Rehab of chronic left hemiparesis (dominant), and dysarthria and dysphagia with decline in function and falls, found to have an acute left subcortical infarct.   - MRI Acute lacunar infarcts involving the deep white matter of the left frontal lobe at the level of the centrum semiovale. No acute hemorrhage.  - Neurology was consulted and no intervention was recommended; no intervention at this time. Patient can Follow up with neurology at the clinic, as per conversation with neuro PA. Was already on DAPT and high dose statin.  - PT/OT/SLT/Neuropsych therapies   - Loop recorder placement  8/4/21   - Ambulates with CG assist w/ device - making gains.   - prepared for discharge home today    # Dizziness and fall 2/2 orthostatic hypotension   - Now controlled  - continue with HI stockings and abdominal binder  - continue midodrine 2x a day  - home medications of flomax, tizanidine were held by medicine team prior to rehab admission; valium was changed from 10 mg standing to 5 mg PRN by medicine per neurology recommendations for anxiety  - Became symptomatic again after 1 dose of Uroxatral, now discontinued. No further episodes of dizziness.     #S/P BLE pain likely secondary to PAD   - Significant atherosclerotic disease noted in bilateral common femoral arteries  - Patient's family and vascular team agreed to not proceed with intervention at this time   - no symptoms reported since on Rehab    # CAD s/p CABG 2009  - 6/2021: patent LIMA to LAD , patent SVG to OM , % , filled distally by collaterals from LAD.  - Continue DAPT ( Aspirin 81 mg daily and Plavix 75 mg daily ), ARB, Imdur, Ranexa, B-Blocker, Statin Therapy  - Cardio f/u OP     # HTN  - now controlled  - losartan 100mg discontinued for SAMANTA   - c/w nifedipine 60mg xL q24h   - Need to keep BP in higher range given symptomatic orthostasis    #HLD  - continue with atorvastatin and fenofibrate     # CKD 3  # Urinary retention  - Cr 1.3 in 2017, 1.5 in 2019, 1.7 in June; Stable. Now 1.9 and stable  - Renal bladder US showed simple cysts within both kidneys without hydronephrosis or nephrolithiasis   - Monitor Cr, IVF as needed   - Has been stable  - Unable to tolerate alpha blocker secondary to orthostasis  - Having increased spontaneous void.  Still receiving PVR q8hrs prn with strt cath prn   - having increased urinary frequency with decreased PVRs. Monitor  - Standing up to urinate q2h to urinate  - PVR scans / CIC discontinued 8/16    # DM type 2  - good control on carb consistent diet alone.     # Depression   - venlafaxine     # Anxiety   - Valium PRN as above    # Maintenance   - GI/Bowel Mgmt: miralax ,senna   - Bladder management: frequent urinations, standing up q2h to void. PVR scans and CIC dc'd 8/16.  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 Rehab of acute lacunar infarcts in the deep white matter of the left frontal lobe at the level of the centrum semiovale and chronic left sided weakness from CVA of 2017.    # Rehab of chronic left hemiparesis (dominant), and dysarthria and dysphagia with decline in function and falls, found to have an acute left subcortical infarct.   - MRI Acute lacunar infarcts involving the deep white matter of the left frontal lobe at the level of the centrum semiovale. No acute hemorrhage.  - Neurology was consulted and no intervention was recommended; no intervention at this time. Patient can Follow up with neurology at the clinic, as per conversation with neuro PA. Was already on DAPT and high dose statin.  - PT/OT/SLT/Neuropsych therapies   - Loop recorder placement  8/4/21   - Ambulates with CG assist w/ device - making gains.   - prepared for discharge home today    # Dizziness and fall 2/2 orthostatic hypotension   - Now controlled  - continue with HI stockings and abdominal binder  - continue midodrine 2x a day  - home medications of flomax, tizanidine were held by medicine team prior to rehab admission; valium was changed from 10 mg standing to 5 mg PRN by medicine per neurology recommendations for anxiety  - Became symptomatic again after 1 dose of Uroxatral, now discontinued. No further episodes of dizziness.     #S/P BLE pain likely secondary to PAD   - Significant atherosclerotic disease noted in bilateral common femoral arteries  - Patient's family and vascular team agreed to not proceed with intervention at this time   - no symptoms reported since on Rehab    # CAD s/p CABG 2009  - 6/2021: patent LIMA to LAD , patent SVG to OM , % , filled distally by collaterals from LAD.  - Continue DAPT ( Aspirin 81 mg daily and Plavix 75 mg daily ), ARB, Imdur, Ranexa, B-Blocker, Statin Therapy  - Cardio f/u OP     # HTN  - losartan 100mg discontinued for SAMANTA   - c/w nifedipine 60mg xL q24h   - Need to keep BP in higher range given symptomatic orthostasis    #HLD  - continue with atorvastatin and fenofibrate     # CKD 3  # Urinary retention  - Cr 1.3 in 2017, 1.5 in 2019, 1.7 in June; Stable. Now 1.9 and stable  - Renal bladder US showed simple cysts within both kidneys without hydronephrosis or nephrolithiasis   - Monitor Cr, IVF as needed   - Has been stable  - Unable to tolerate alpha blocker secondary to orthostasis  - Having increased spontaneous void.  Still receiving PVR q8hrs prn with strt cath prn   - having increased urinary frequency with decreased PVRs. Monitor  - Standing up to urinate q2h to urinate  - PVR scans / CIC discontinued 8/16    # DM type 2  - good control on carb consistent diet alone.     # Depression   - venlafaxine     # Anxiety   - Valium PRN as above    # Maintenance   - GI/Bowel Mgmt: miralax ,senna   - Bladder management: frequent urinations, standing up q2h to void. PVR scans and CIC dc'd 8/16.  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 Rehab of acute lacunar infarcts in the deep white matter of the left frontal lobe at the level of the centrum semiovale and chronic left sided weakness from CVA of 2017.    # Rehab of chronic left hemiparesis (dominant), and dysarthria and dysphagia with decline in function and falls, found to have an acute left subcortical infarct.   - MRI Acute lacunar infarcts involving the deep white matter of the left frontal lobe at the level of the centrum semiovale. No acute hemorrhage.  - Neurology was consulted and no intervention was recommended; no intervention at this time. Patient can Follow up with neurology at the clinic, as per conversation with neuro PA. Was already on DAPT and high dose statin.  - PT/OT/SLT/Neuropsych therapies   - Loop recorder placement  8/4/21   - Ambulates with CG/ supervision assist w/ device - making gains.   - discharge home today with family and  home care PT/OT/ST    # Dizziness and fall 2/2 orthostatic hypotension   - Now controlled  - continue with HI stockings and abdominal binder  - continue midodrine   - home medications of flomax, tizanidine were held by medicine team prior to rehab admission; valium was changed from 10 mg standing to 5 mg PRN by medicine per neurology recommendations for anxiety  - Became symptomatic again after 1 dose of Uroxatral, now discontinued. No further episodes of dizziness.   F/U with PMD 1-2 weeks    #S/P BLE pain likely secondary to PAD   - Significant atherosclerotic disease noted in bilateral common femoral arteries  - Patient's family and vascular team agreed to not proceed with intervention at this time   - no symptoms reported since on Rehab    # CAD s/p CABG 2009  - 6/2021: patent LIMA to LAD , patent SVG to OM , % , filled distally by collaterals from LAD.  - Continue DAPT ( Aspirin 81 mg daily and Plavix 75 mg daily ), ARB, Imdur, Ranexa, B-Blocker, Statin Therapy  - Cardio f/u OP     # HTN  - losartan 100mg discontinued for SAMANTA   - c/w nifedipine 60mg xL q24h   - Need to keep BP in higher range given symptomatic orthostasis  - f/u PMD 1-2 weeks    #HLD  - continue with atorvastatin and fenofibrate     # CKD 3  # Urinary retention  - Cr 1.3 in 2017, 1.5 in 2019, 1.7 in June; Stable. Now 1.9 and stable  - Renal bladder US showed simple cysts within both kidneys without hydronephrosis or nephrolithiasis   - Monitor Cr, IVF as needed   - Has been stable  - Unable to tolerate alpha blocker secondary to orthostasis  - Having increased spontaneous void.  Still receiving PVR q8hrs prn with strt cath prn   - having increased urinary frequency with decreased PVRs. Monitor  - Standing up to urinate q2h to urinate  - PVR scans / CIC discontinued 8/16  - To schedule an appointment with  Urologist upon dischrge    # DM type 2  - good control on carb consistent diet alone.     # Depression   - venlafaxine     # Anxiety   - Valium PRN as above    # Maintenance   - GI/Bowel Mgmt: miralax ,senna   - Skin: No active issues at this time  - Diet: Diet, DASH/TLC: Sodium & Cholesterol Restricted, Consistent Carbohydrate No Snacks (07-28-21 @ 15:46) [Active]    Activities as tolerated.  Neuro F/U as outpatient   Rehab f/u prn    NIHSS- 0    0 No symptoms at all  1 No significant disability despite symptoms; able to carry out all usual duties and activities without assistance  2 Slight disability; unable to carry out all previous activities, but able to look after own affairs  3 Moderate disability; requiring some help, but able to walk without assistance  4 Moderately severe disability; unable to walk without assistance and unable to attend to own bodily needs without assistance  5 Severe disability; bedridden, incontinent and requiring constant nursing care and attention  6 Dead

## 2021-08-18 NOTE — PROGRESS NOTE ADULT - ATTENDING SUPERVISION STATEMENT
Resident
ACP
ACP
Resident
Student
Resident
Resident

## 2021-08-18 NOTE — PROGRESS NOTE ADULT - ATTENDING COMMENTS
I reviewed the chart and examined the patient with the resident and we discussed the findings and treatment plan.  The patient is tolerating the rehab program well. I agree with the findings and treatment plan above, which I modified as indicated.   Made good functional gains and is appropriate for discharge home with family today at CG to supervision level with RW.    # Rehab of chronic left hemiparesis (dominant), and dysarthria and dysphagia with decline in function and falls, found to have an acute left subcortical infarct.   - MRI Acute lacunar infarcts involving the deep white matter of the left frontal lobe at the level of the centrum semiovale. No acute hemorrhage.  - Neurology was consulted and no intervention was recommended; no intervention at this time. Patient can Follow up with neurology at the clinic, as per conversation with neuro PA. Was already on DAPT and high dose statin.  - PT/OT/SLT/Neuropsych therapies   - Loop recorder placement  8/4/21   - Ambulates with CG/ supervision assist w/ device - making gains.   - discharge home today with family and  home care PT/OT/ST    # Dizziness and fall 2/2 orthostatic hypotension   - Now controlled  - continue with HI stockings and abdominal binder  - continue midodrine   - home medications of flomax, tizanidine were held by medicine team prior to rehab admission; valium was changed from 10 mg standing to 5 mg PRN by medicine per neurology recommendations for anxiety  - Became symptomatic again after 1 dose of Uroxatral, now discontinued. No further episodes of dizziness.   F/U with PMD 1-2 weeks    #S/P BLE pain likely secondary to PAD   - Significant atherosclerotic disease noted in bilateral common femoral arteries  - Patient's family and vascular team agreed to not proceed with intervention at this time   - no symptoms reported since on Rehab    # CAD s/p CABG 2009  - 6/2021: patent LIMA to LAD , patent SVG to OM , % , filled distally by collaterals from LAD.  - Continue DAPT ( Aspirin 81 mg daily and Plavix 75 mg daily ), ARB, Imdur, Ranexa, B-Blocker, Statin Therapy  - Cardio f/u OP     # HTN  - losartan 100mg discontinued for SAMANTA   - c/w nifedipine 60mg xL q24h   - Need to keep BP in higher range given symptomatic orthostasis  - f/u PMD 1-2 weeks    #HLD  - continue with atorvastatin and fenofibrate     # CKD 3  # Urinary retention  - Cr 1.3 in 2017, 1.5 in 2019, 1.7 in June; Stable. Now 1.9 and stable  - Renal bladder US showed simple cysts within both kidneys without hydronephrosis or nephrolithiasis   - Monitor Cr, IVF as needed   - Has been stable  - Unable to tolerate alpha blocker secondary to orthostasis  - Having increased spontaneous void.  Still receiving PVR q8hrs prn with strt cath prn   - having increased urinary frequency with decreased PVRs. Monitor  - Standing up to urinate q2h to urinate  - PVR scans / CIC discontinued 8/16  - To schedule an appointment with  Urologist upon dischrge    # DM type 2  - good control on carb consistent diet alone.     # Depression   - venlafaxine     # Anxiety   - Valium PRN as above    # Maintenance   - GI/Bowel Mgmt: miralax ,senna   - Skin: No active issues at this time  - Diet: Diet, DASH/TLC: Sodium & Cholesterol Restricted, Consistent Carbohydrate No Snacks (07-28-21 @ 15:46) [Active]    Activities as tolerated.  Neuro F/U as outpatient   Rehab f/u prn    NIHSS- 0    0 No symptoms at all  1 No significant disability despite symptoms; able to carry out all usual duties and activities without assistance  2 Slight disability; unable to carry out all previous activities, but able to look after own affairs  3 Moderate disability; requiring some help, but able to walk without assistance  4 Moderately severe disability; unable to walk without assistance and unable to attend to own bodily needs without assistance  5 Severe disability; bedridden, incontinent and requiring constant nursing care and attention  6 Dead

## 2021-08-18 NOTE — DISCHARGE NOTE PROVIDER - NSDCCPCAREPLAN_GEN_ALL_CORE_FT
PRINCIPAL DISCHARGE DIAGNOSIS  Diagnosis: Stroke  Assessment and Plan of Treatment: you had a small stroke , you were seen by neurologist , please continue all meds advised by the doctor and make follow up appointment in 2 weeks , please inform your doctor if any new changes in health .   eat a balanced diet low sodium , low fat and low cholesterol ,  continue physical therapy  as advised      SECONDARY DISCHARGE DIAGNOSES  Diagnosis: HTN (hypertension)  Assessment and Plan of Treatment: You also have coronary artery diease , continue  all meds you are prescribed on discharge instructions , please note some changes are yamilex , losartan stopped .  please follow up with your cardiologist/ pmd in 2 to 4 weeks , please show the discharge  papers to all your  health care providers ..    Diagnosis: Orthostatic hypotension  Assessment and Plan of Treatment: You have positional drop in blood pressure causing dizziness  . you were strated on medicine called midodrine. take that two times  a day 20 to 30 minutes  before geting  out  of bed  and second dose before lunch .   Please note the changes made in your medications ..(  Fomax , losartan and valium/ tizanidine  stopped ).    Diagnosis: Status post placement of implantable loop recorder  Assessment and Plan of Treatment: loop recorder placed to monitor any irregular heart beats. jett efollow up with your EP cardiologist and have the loop recorder checked out .     PRINCIPAL DISCHARGE DIAGNOSIS  Diagnosis: Stroke  Assessment and Plan of Treatment: you had a small stroke , you were seen by neurologist , please continue all meds advised by the doctor and make follow up appointment in 2 weeks , please inform your doctor if any new changes in health .   eat a balanced diet low sodium , low fat and low cholesterol ,  continue physical therapy  as advised      SECONDARY DISCHARGE DIAGNOSES  Diagnosis: HTN (hypertension)  Assessment and Plan of Treatment: You also have coronary artery diease , continue  all meds you are prescribed on discharge instructions , please note some changes are yamilex , losartan stopped .  please follow up with your cardiologist/ pmd in 2 to 4 weeks , please show the discharge  papers to all your  health care providers ..    Diagnosis: Orthostatic hypotension  Assessment and Plan of Treatment: You have positional drop in blood pressure causing dizziness  . you were strated on medicine called midodrine. take that two times  a day 20 to 30 minutes  before geting  out  of bed  and second dose before lunch .   Please note the changes made in your medications ..(  Fomax , losartan and valium/ tizanidine  stopped ).    Diagnosis: Status post placement of implantable loop recorder  Assessment and Plan of Treatment: loop recorder placed to monitor any irregular heart beats. jett efollow up with your EP cardiologist and have the loop recorder checked out .    Diagnosis: Diabetes mellitus  Assessment and Plan of Treatment: diet controlled now, januvia stopped, please continue carb consisrtent , heart healty diet, please follow up with your pmd for more advise ,    Diagnosis: Urinary retention with incomplete bladder emptying  Assessment and Plan of Treatment: unable to tolerate flomax or uroxatrol due to orthostatic hypotension, emy were on straight cath schedule for some time, now you are brayan to urinate when stand up . please keep a regimen to go to bath room and empty your bladder every 3 to 4 hours , please follow up with a urologist in 2 to  4 weeks if  difficulty with emptying   bladder fully  or any associate dsymptoms .     PRINCIPAL DISCHARGE DIAGNOSIS  Diagnosis: Stroke  Assessment and Plan of Treatment: you had a small stroke , you were seen by neurologist , please continue all meds advised by the doctor and make follow up appointment in 2 weeks , please inform your doctor if any new changes in health .   eat a balanced diet low sodium , low fat and low cholesterol ,  continue physical therapy  as advised      SECONDARY DISCHARGE DIAGNOSES  Diagnosis: HTN (hypertension)  Assessment and Plan of Treatment: You also have coronary artery diease , continue  all meds you are prescribed on discharge instructions , please note some changes are yamilex , losartan stopped .  please follow up with your cardiologist/ pmd in 2 to 4 weeks , please show the discharge  papers to all your  health care providers ..    Diagnosis: Orthostatic hypotension  Assessment and Plan of Treatment: your blood pressure tends to be high but position change You have been dropping   blood pressure causing dizziness  . you were strated on medicine called midodrine. take that two times  a day 20 to 30 minutes  before geting  out  of bed  and second dose before lunch .   Please note the changes made in your medications ..(  Fomax , losartan and valium/ tizanidine  stopped ).    Diagnosis: Status post placement of implantable loop recorder  Assessment and Plan of Treatment: loop recorder placed to monitor any irregular heart beats. jett efollow up with your EP cardiologist and have the loop recorder checked out .    Diagnosis: Diabetes mellitus  Assessment and Plan of Treatment: diet controlled now, januvia stopped, please continue carb consisrtent , heart healty diet, please follow up with your pmd for more advise ,    Diagnosis: Urinary retention with incomplete bladder emptying  Assessment and Plan of Treatment: unable to tolerate flomax or uroxatrol due to orthostatic hypotension, emy were on straight cath schedule for some time, now you are brayan to urinate when stand up . please keep a regimen to go to bath room and empty your bladder every 3 to 4 hours , please follow up with a urologist in 2 to  4 weeks if  difficulty with emptying   bladder fully  or any associate dsymptoms .    Diagnosis: Claudication  Assessment and Plan of Treatment: please continue taking med pentoxillin for this pain  and follow up with your medical doctor/ cardiologist

## 2021-08-18 NOTE — DISCHARGE NOTE PROVIDER - CARE PROVIDERS DIRECT ADDRESSES
,DirectAddress_Unknown,[email or web address],DirectAddress_Unknown,[email or web address],DirectAddress_Unknown

## 2021-08-18 NOTE — DISCHARGE NOTE PROVIDER - NSDCFUADDINST_GEN_ALL_CORE_FT
please carry these papers with you for all your doctors appointments and show to all health care providers

## 2021-08-24 DIAGNOSIS — Z79.84 LONG TERM (CURRENT) USE OF ORAL HYPOGLYCEMIC DRUGS: ICD-10-CM

## 2021-08-24 DIAGNOSIS — N40.0 BENIGN PROSTATIC HYPERPLASIA WITHOUT LOWER URINARY TRACT SYMPTOMS: ICD-10-CM

## 2021-08-24 DIAGNOSIS — K21.9 GASTRO-ESOPHAGEAL REFLUX DISEASE WITHOUT ESOPHAGITIS: ICD-10-CM

## 2021-08-24 DIAGNOSIS — I69.352 HEMIPLEGIA AND HEMIPARESIS FOLLOWING CEREBRAL INFARCTION AFFECTING LEFT DOMINANT SIDE: ICD-10-CM

## 2021-08-24 DIAGNOSIS — I69.391 DYSPHAGIA FOLLOWING CEREBRAL INFARCTION: ICD-10-CM

## 2021-08-24 DIAGNOSIS — I70.219 ATHEROSCLEROSIS OF NATIVE ARTERIES OF EXTREMITIES WITH INTERMITTENT CLAUDICATION, UNSPECIFIED EXTREMITY: ICD-10-CM

## 2021-08-24 DIAGNOSIS — R33.9 RETENTION OF URINE, UNSPECIFIED: ICD-10-CM

## 2021-08-24 DIAGNOSIS — R13.10 DYSPHAGIA, UNSPECIFIED: ICD-10-CM

## 2021-08-24 DIAGNOSIS — N17.9 ACUTE KIDNEY FAILURE, UNSPECIFIED: ICD-10-CM

## 2021-08-24 DIAGNOSIS — I95.1 ORTHOSTATIC HYPOTENSION: ICD-10-CM

## 2021-08-24 DIAGNOSIS — I25.10 ATHEROSCLEROTIC HEART DISEASE OF NATIVE CORONARY ARTERY WITHOUT ANGINA PECTORIS: ICD-10-CM

## 2021-08-24 DIAGNOSIS — M48.02 SPINAL STENOSIS, CERVICAL REGION: ICD-10-CM

## 2021-08-24 DIAGNOSIS — N18.30 CHRONIC KIDNEY DISEASE, STAGE 3 UNSPECIFIED: ICD-10-CM

## 2021-08-24 DIAGNOSIS — I16.0 HYPERTENSIVE URGENCY: ICD-10-CM

## 2021-08-24 DIAGNOSIS — I12.9 HYPERTENSIVE CHRONIC KIDNEY DISEASE WITH STAGE 1 THROUGH STAGE 4 CHRONIC KIDNEY DISEASE, OR UNSPECIFIED CHRONIC KIDNEY DISEASE: ICD-10-CM

## 2021-08-24 DIAGNOSIS — E78.5 HYPERLIPIDEMIA, UNSPECIFIED: ICD-10-CM

## 2021-08-24 DIAGNOSIS — Z46.6 ENCOUNTER FOR FITTING AND ADJUSTMENT OF URINARY DEVICE: ICD-10-CM

## 2021-08-24 DIAGNOSIS — I69.322 DYSARTHRIA FOLLOWING CEREBRAL INFARCTION: ICD-10-CM

## 2021-08-24 DIAGNOSIS — E11.22 TYPE 2 DIABETES MELLITUS WITH DIABETIC CHRONIC KIDNEY DISEASE: ICD-10-CM

## 2021-08-24 DIAGNOSIS — F41.9 ANXIETY DISORDER, UNSPECIFIED: ICD-10-CM

## 2021-08-24 DIAGNOSIS — Z95.1 PRESENCE OF AORTOCORONARY BYPASS GRAFT: ICD-10-CM

## 2021-08-24 DIAGNOSIS — E11.51 TYPE 2 DIABETES MELLITUS WITH DIABETIC PERIPHERAL ANGIOPATHY WITHOUT GANGRENE: ICD-10-CM

## 2021-08-24 DIAGNOSIS — F32.9 MAJOR DEPRESSIVE DISORDER, SINGLE EPISODE, UNSPECIFIED: ICD-10-CM

## 2021-08-26 ENCOUNTER — APPOINTMENT (OUTPATIENT)
Dept: UROLOGY | Facility: CLINIC | Age: 76
End: 2021-08-26
Payer: MEDICARE

## 2021-08-26 VITALS
HEIGHT: 61 IN | WEIGHT: 149 LBS | DIASTOLIC BLOOD PRESSURE: 67 MMHG | BODY MASS INDEX: 28.13 KG/M2 | HEART RATE: 82 BPM | SYSTOLIC BLOOD PRESSURE: 127 MMHG

## 2021-08-26 DIAGNOSIS — Z86.73 PERSONAL HISTORY OF TRANSIENT ISCHEMIC ATTACK (TIA), AND CEREBRAL INFARCTION W/OUT RESIDUAL DEFICITS: ICD-10-CM

## 2021-08-26 DIAGNOSIS — I25.10 ATHEROSCLEROTIC HEART DISEASE OF NATIVE CORONARY ARTERY W/OUT ANGINA PECTORIS: ICD-10-CM

## 2021-08-26 DIAGNOSIS — Z78.9 OTHER SPECIFIED HEALTH STATUS: ICD-10-CM

## 2021-08-26 DIAGNOSIS — Z87.898 PERSONAL HISTORY OF OTHER SPECIFIED CONDITIONS: ICD-10-CM

## 2021-08-26 PROCEDURE — 99204 OFFICE O/P NEW MOD 45 MIN: CPT

## 2021-08-26 NOTE — HISTORY OF PRESENT ILLNESS
[FreeTextEntry1] : Jeff is a 75-year-old male, with a history of CVA with residual left sided weakness, CAD status post CABG x3, who was sent for consultation regarding urinary retention and worsening urinary symptoms post CVA.\par \par On 7/20/2021 patient presented to the emergency room complaining of fall and hitting his head.  Patient was found to have syncope and orthostasis.  At that time his tamsulosin was discontinued secondary to his symptoms.\par \par There is question of whether he had a CVA episode was being followed by neurology.  \par \par He had difficulty with urination having wet his pants several times, without awareness.  PVR suggested increased residual and Abbott catheter was placed.\par \par Patient underwent trial of void and well was voiding intermittently underwent CIC every 6 hours for elevated PVR.\par \par He was discharged to rehab where he continued CIC.  He was able to void on his own and CIC was discontinued.\par \par He is now voiding every 1-2 hours, electively, as he is afraid to go back into urinary retention.  He is no longer taking tamsulosin however, he stated he has been on it for 10 years without any issues.\par \par He is voiding 1-2 times per night but denies any pushing/straining to void.\par \par At baseline he was voiding well on tamsulosin with 1 episode of nocturia and denied any issues with the medication. [Urinary Incontinence] : urinary incontinence [Urinary Urgency] : urinary urgency [Urinary Frequency] : urinary frequency [Nocturia] : nocturia [Weak Stream] : weak stream

## 2021-08-26 NOTE — LETTER HEADER
[FreeTextEntry3] : Yunier Chanel M.D.\par Director of Urology\par [facility]\par [address] [place] [address], [address]\[place] [address]

## 2021-08-26 NOTE — ASSESSMENT
[FreeTextEntry1] : He is currently emptying his bladder, at 83 cc postvoid, today.  He has some frequency and urgency with rare urinary incontinence.\par \par We will have him keep a voiding diary and follow-up for review and a probable uroflow study OFF FLOMAX.  In the meanwhile he will follow-up with his neurologist to see if it is acceptable for him to restart tamsulosin as he had good response to this medication in the past without any issues

## 2021-08-26 NOTE — PHYSICAL EXAM
[General Appearance - Well Developed] : well developed [General Appearance - Well Nourished] : well nourished [Normal Appearance] : normal appearance [Well Groomed] : well groomed [General Appearance - In No Acute Distress] : no acute distress [Heart Rate And Rhythm] : Heart rate and rhythm were normal [Respiration, Rhythm And Depth] : normal respiratory rhythm and effort [Exaggerated Use Of Accessory Muscles For Inspiration] : no accessory muscle use [Abdomen Soft] : soft [Abdomen Tenderness] : non-tender [Costovertebral Angle Tenderness] : no ~M costovertebral angle tenderness [Urethral Meatus] : meatus normal [Urinary Bladder Findings] : the bladder was normal on palpation [Scrotum] : the scrotum was normal [Testes Tenderness] : no tenderness of the testes [Testes Mass (___cm)] : there were no testicular masses [] : no rash [Oriented To Time, Place, And Person] : oriented to person, place, and time [Affect] : the affect was normal [Mood] : the mood was normal [Not Anxious] : not anxious [Femoral Lymph Nodes Enlarged Bilaterally] : femoral [Inguinal Lymph Nodes Enlarged Bilaterally] : inguinal [FreeTextEntry1] : Slightly slurred speech, left-sided weakness

## 2021-08-26 NOTE — LETTER BODY
[Dear  ___] : Dear  [unfilled], [Consult Letter:] : I had the pleasure of evaluating your patient, [unfilled]. [Please see my note below.] : Please see my note below. [Consult Closing:] : Thank you very much for allowing me to participate in the care of this patient.  If you have any questions, please do not hesitate to contact me. [Sincerely,] : Sincerely, [FreeTextEntry2] : Elli Garcia MD\par [address]\[place] [address]

## 2021-08-30 ENCOUNTER — APPOINTMENT (OUTPATIENT)
Dept: CARDIOLOGY | Facility: CLINIC | Age: 76
End: 2021-08-30
Payer: MEDICARE

## 2021-08-30 VITALS
DIASTOLIC BLOOD PRESSURE: 77 MMHG | SYSTOLIC BLOOD PRESSURE: 137 MMHG | HEART RATE: 73 BPM | BODY MASS INDEX: 28.13 KG/M2 | WEIGHT: 149 LBS | TEMPERATURE: 97.9 F | HEIGHT: 61 IN

## 2021-08-30 PROCEDURE — 93000 ELECTROCARDIOGRAM COMPLETE: CPT

## 2021-08-30 PROCEDURE — 99212 OFFICE O/P EST SF 10 MIN: CPT

## 2021-08-30 NOTE — HISTORY OF PRESENT ILLNESS
[de-identified] : 74 y/o male with pmh of CAD, s/p CABG x3 in 2017, CVA in 2017 with residual L sided weakness, HTN, DM type II, HLD, GERD, BPH. Patient presented to [facility] on 7/28/2021 for multiple falls and dizziness.\par MRI of the brain revealed:  Acute lacunar infarcts involving the deep white matter of the left frontal lobe\par at the level of the centrum semiovale. No acute hemorrhage.\par He underwent an ILR on 8/4/2021 for long term arrhythmia monitoring. \par \par Presents for follow up/ wound check \par \par Denies chest pain, shortness of breath or palpitations. Reports occasional dizziness, denies near syncope, syncope or recurrent falls. Ambulates with a walker. \par \par Cardiac tests:\par \par ECG ( 8/30/2021); NSR at 73 bpm, non-specific T wave abnormalities, QTc 415 ms \par Cardiac cath (6/9/2021): Significant 3VD, Patent grafts to LAD and OM, SVG to RCA occluded. \par CD (5/25/2021): No significant stenosis. \par   \par \par \par

## 2021-08-30 NOTE — PHYSICAL EXAM
[General Appearance - In No Acute Distress] : no acute distress [Heart Sounds] : normal S1 and S2 [Respiration, Rhythm And Depth] : normal respiratory rhythm and effort [Bowel Sounds] : normal bowel sounds [FreeTextEntry2] : small opening at the incision site, no redness, no drainage. Cleansed with antiseptic, steri strips applied, Bacitracin applied and covered with tegaderm

## 2021-08-30 NOTE — ASSESSMENT
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 \par \par \par Loop recorder interrogation: No events, no arrhythmias. \par \par Patient is enrolled in remote monitoring services and transmitting appropriately. \par Loop recorder function, transmission time and frequency discussed in details. Importance to keep home monitor connected at all times reinforced. \par \par Loop recorder incision site: see device site section for details. \par \par He reports c/w meds \par \par \par Plan \par \par -Monitor incision site for s/s infection. Keep area clean and dry. \par -Return for follow up in 1 week/wound reassessment \par -Continue remote loop recorder monitoring\par -Continue current medications

## 2021-08-30 NOTE — PROCEDURE
[No] : not [NSR] : normal sinus rhythm [Sensing Amplitude ___mv] : sensing amplitude was [unfilled] mv [Programmed for Longevity] : output reprogrammed for improved battery longevity [See Scanned Paceart Report] : See scanned paceart report [See Device Printout] : See device printout [Voltage: ___ volts] : Voltage was [unfilled] volts [de-identified] : Medtronic [de-identified] : LNQ11 [de-identified] : [id number] [de-identified] : 8/4/21 [de-identified] : GOOD [de-identified] : No events.

## 2021-09-07 ENCOUNTER — APPOINTMENT (OUTPATIENT)
Dept: CARDIOLOGY | Facility: CLINIC | Age: 76
End: 2021-09-07
Payer: MEDICARE

## 2021-09-07 VITALS
WEIGHT: 145 LBS | SYSTOLIC BLOOD PRESSURE: 105 MMHG | DIASTOLIC BLOOD PRESSURE: 64 MMHG | RESPIRATION RATE: 16 BRPM | HEART RATE: 81 BPM | BODY MASS INDEX: 27.38 KG/M2 | HEIGHT: 61 IN | TEMPERATURE: 97.6 F | OXYGEN SATURATION: 98 %

## 2021-09-07 DIAGNOSIS — Z95.818 PRESENCE OF OTHER CARDIAC IMPLANTS AND GRAFTS: ICD-10-CM

## 2021-09-07 PROCEDURE — 93000 ELECTROCARDIOGRAM COMPLETE: CPT | Mod: 59

## 2021-09-07 PROCEDURE — 99212 OFFICE O/P EST SF 10 MIN: CPT

## 2021-09-07 RX ORDER — LOSARTAN POTASSIUM 100 MG/1
100 TABLET, FILM COATED ORAL
Qty: 90 | Refills: 0 | Status: DISCONTINUED | COMMUNITY
Start: 2021-06-04

## 2021-09-07 NOTE — HISTORY OF PRESENT ILLNESS
[de-identified] : 74 y/o male with pmh of CAD, s/p CABG x3 in 2017, CVA in 2017 with residual L sided weakness, HTN, DM type II, HLD, GERD, BPH. Patient presented to [facility] on 7/28/2021 for multiple falls and dizziness.\par MRI of the brain revealed:  Acute lacunar infarcts involving the deep white matter of the left frontal lobe\par at the level of the centrum semiovale. No acute hemorrhage.\par He underwent an ILR on 8/4/2021 for long term arrhythmia monitoring. \par \par Returns for wound reassessment. \par \par Denies chest pain, shortness of breath or palpitations. Reports occasional dizziness, denies near syncope, syncope or recurrent falls. Ambulates with a walker. No fever or chills. \par \par Cardiac tests:\par ECG ( 9/7/2021); NSR at 70 bpm, normal axis, non-specific T wave abnormalities\par ECG ( 8/30/2021); NSR at 73 bpm, non-specific T wave abnormalities, QTc 415 ms \par Cardiac cath (6/9/2021): Significant 3VD, Patent grafts to LAD and OM, SVG to RCA occluded. \par CD (5/25/2021): No significant stenosis. \par   \par \par \par

## 2021-09-07 NOTE — PROCEDURE
[No] : not [NSR] : normal sinus rhythm [See Scanned Paceart Report] : See scanned paceart report [See Device Printout] : See device printout [Voltage: ___ volts] : Voltage was [unfilled] volts [Sensing Amplitude ___mv] : sensing amplitude was [unfilled] mv [Programmed for Longevity] : output reprogrammed for improved battery longevity [de-identified] : Medtronic [de-identified] : LNQ11 [de-identified] : [id number] [de-identified] : 8/4/21 [de-identified] : GOOD [de-identified] : No events.

## 2021-09-07 NOTE — PHYSICAL EXAM
[General Appearance - In No Acute Distress] : no acute distress [Heart Sounds] : normal S1 and S2 [Respiration, Rhythm And Depth] : normal respiratory rhythm and effort [Bowel Sounds] : normal bowel sounds [Nail Clubbing] : no clubbing of the fingernails

## 2021-09-07 NOTE — ASSESSMENT
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 \par \par \par Loop recorder interrogation: No events, no arrhythmias. \par \par Patient is enrolled in remote monitoring services and transmitting appropriately. \par \par \par Loop recorder incision site: healing, small scab is still present, no erythema, no drainage. \par \par He reports c/w meds \par \par \par Plan \par -Continue current medications  \par -Continue remote loop recorder monitoring\par -Return for follow up in 4 months. Patient knows to call the office for any questions/concerns regarding the loop recorder or incision site. \par

## 2021-09-08 ENCOUNTER — APPOINTMENT (OUTPATIENT)
Dept: CARDIOLOGY | Facility: CLINIC | Age: 76
End: 2021-09-08
Payer: MEDICARE

## 2021-09-08 ENCOUNTER — NON-APPOINTMENT (OUTPATIENT)
Age: 76
End: 2021-09-08

## 2021-09-08 PROCEDURE — 93298 REM INTERROG DEV EVAL SCRMS: CPT | Mod: NC

## 2021-09-08 PROCEDURE — G2066: CPT

## 2021-10-13 ENCOUNTER — NON-APPOINTMENT (OUTPATIENT)
Age: 76
End: 2021-10-13

## 2021-10-13 ENCOUNTER — APPOINTMENT (OUTPATIENT)
Dept: CARDIOLOGY | Facility: CLINIC | Age: 76
End: 2021-10-13
Payer: MEDICARE

## 2021-10-13 PROCEDURE — 93298 REM INTERROG DEV EVAL SCRMS: CPT

## 2021-10-13 PROCEDURE — G2066: CPT | Mod: NC

## 2021-10-14 ENCOUNTER — APPOINTMENT (OUTPATIENT)
Dept: UROLOGY | Facility: CLINIC | Age: 76
End: 2021-10-14
Payer: MEDICARE

## 2021-10-14 PROCEDURE — 51798 US URINE CAPACITY MEASURE: CPT

## 2021-10-14 PROCEDURE — 99214 OFFICE O/P EST MOD 30 MIN: CPT

## 2021-10-14 NOTE — ASSESSMENT
[FreeTextEntry1] : We tend to treat patients not numbers and though he is not voiding well he has numerous comorbidities and if it is good enough for him it is good enough for me. He is not leaving behind a lot what I would do is have him stay on the Flomax get an ultrasound perhaps 3 to 4 months with some urine testing to make sure there is no development of obstructive uropathy or signs of infection. That the situation changes he will contact me

## 2021-10-14 NOTE — LETTER BODY
[Dear  ___] : Dear  [unfilled], [Courtesy Letter:] : I had the pleasure of seeing your patient, [unfilled], in my office today. [Please see my note below.] : Please see my note below. [Sincerely,] : Sincerely, [FreeTextEntry2] : Elli Garcia MD\par [address]\[place] [address]

## 2021-10-14 NOTE — LETTER HEADER
[FreeTextEntry3] : Yunier Chanel M.D.\par Director of Urology\par [facility]\par [address] [place] [address], [address]\[place] [address]

## 2021-10-14 NOTE — PHYSICAL EXAM
[General Appearance - Well Developed] : well developed [General Appearance - Well Nourished] : well nourished [Normal Appearance] : normal appearance [Well Groomed] : well groomed [General Appearance - In No Acute Distress] : no acute distress [] : no respiratory distress [Respiration, Rhythm And Depth] : normal respiratory rhythm and effort [Exaggerated Use Of Accessory Muscles For Inspiration] : no accessory muscle use [Oriented To Time, Place, And Person] : oriented to person, place, and time [Affect] : the affect was normal [Mood] : the mood was normal [Not Anxious] : not anxious [FreeTextEntry1] : He uses a walker

## 2021-10-14 NOTE — HISTORY OF PRESENT ILLNESS
[FreeTextEntry1] : Jeff is a 75-year-old male born October 17, 1945 seen August 26, 2021 for severe voiding symptoms. He had been told to go off the Flomax we will going to do a work-up while waiting to see if he could get back on it. In the interim he went to the neurologist and he tells me the neurologist told him he could take it again. The record of his intake and output that he did was done with him on Flomax. He tells me is totality before his quality of life is a 6 now his quality of life is a 3.\par \par Though he has nocturia, please see the record of his intake and output given all his other comorbidities he would not be interested in any more medication and definitely not on the procedure. [Urinary Incontinence] : urinary incontinence [Urinary Urgency] : urinary urgency [Urinary Frequency] : urinary frequency [Nocturia] : nocturia [Weak Stream] : weak stream

## 2021-11-17 ENCOUNTER — NON-APPOINTMENT (OUTPATIENT)
Age: 76
End: 2021-11-17

## 2021-11-17 ENCOUNTER — APPOINTMENT (OUTPATIENT)
Dept: CARDIOLOGY | Facility: CLINIC | Age: 76
End: 2021-11-17
Payer: MEDICARE

## 2021-11-17 PROCEDURE — 93298 REM INTERROG DEV EVAL SCRMS: CPT | Mod: NC

## 2021-11-17 PROCEDURE — G2066: CPT | Mod: NC

## 2021-12-08 NOTE — ED ADULT NURSE NOTE - ALCOHOL PRE SCREEN (AUDIT - C)
Please call patient back.  She would like her appointment on 12/20 to be changed to the afternoon.  She has a lab, nurse only AMANDA and a long with [facility].   Statement Selected

## 2021-12-22 ENCOUNTER — NON-APPOINTMENT (OUTPATIENT)
Age: 76
End: 2021-12-22

## 2021-12-22 ENCOUNTER — APPOINTMENT (OUTPATIENT)
Dept: CARDIOLOGY | Facility: CLINIC | Age: 76
End: 2021-12-22
Payer: MEDICARE

## 2021-12-22 PROCEDURE — G2066: CPT

## 2021-12-22 PROCEDURE — 93298 REM INTERROG DEV EVAL SCRMS: CPT

## 2021-12-29 ENCOUNTER — NON-APPOINTMENT (OUTPATIENT)
Age: 76
End: 2021-12-29

## 2022-01-10 ENCOUNTER — APPOINTMENT (OUTPATIENT)
Dept: CARDIOLOGY | Facility: CLINIC | Age: 77
End: 2022-01-10
Payer: MEDICARE

## 2022-01-10 PROCEDURE — 99212 OFFICE O/P EST SF 10 MIN: CPT

## 2022-02-10 ENCOUNTER — APPOINTMENT (OUTPATIENT)
Dept: UROLOGY | Facility: CLINIC | Age: 77
End: 2022-02-10

## 2022-02-11 ENCOUNTER — APPOINTMENT (OUTPATIENT)
Dept: CARDIOLOGY | Facility: CLINIC | Age: 77
End: 2022-02-11
Payer: MEDICARE

## 2022-02-11 ENCOUNTER — NON-APPOINTMENT (OUTPATIENT)
Age: 77
End: 2022-02-11

## 2022-02-11 PROCEDURE — 93298 REM INTERROG DEV EVAL SCRMS: CPT

## 2022-02-11 PROCEDURE — G2066: CPT

## 2022-03-18 ENCOUNTER — NON-APPOINTMENT (OUTPATIENT)
Age: 77
End: 2022-03-18

## 2022-03-18 ENCOUNTER — APPOINTMENT (OUTPATIENT)
Dept: CARDIOLOGY | Facility: CLINIC | Age: 77
End: 2022-03-18
Payer: MEDICARE

## 2022-03-18 PROCEDURE — G2066: CPT

## 2022-03-18 PROCEDURE — 93298 REM INTERROG DEV EVAL SCRMS: CPT

## 2022-03-24 NOTE — PHYSICAL THERAPY INITIAL EVALUATION ADULT - LEVEL OF INDEPENDENCE: STAIR NEGOTIATION, REHAB EVAL
Repeat CT head scan in 1 week.  No blood thinning medications or anti inflammatories until follow up CT head is completed and reviewed by Neurosurgery.  Follow up with Neurosurgery in 1-2 weeks.  Please see changes to blood pressure and heart medications on After Visit Summary.  Follow up with PCP for hospital follow up and medication management.    
unsafe at this time/unable to perform

## 2022-04-04 ENCOUNTER — TRANSCRIPTION ENCOUNTER (OUTPATIENT)
Age: 77
End: 2022-04-04

## 2022-04-04 NOTE — REASON FOR VISIT
[Follow-up Device Check] : is here today for a follow-up device check visit for [Home] : at home, [unfilled] , at the time of the visit. [Medical Office: ([facility])___] : at the medical office located in  [Verbal consent obtained from patient] : the patient, [unfilled]

## 2022-04-04 NOTE — HISTORY OF PRESENT ILLNESS
[de-identified] : 75 y/o male with pmh of CAD, s/p CABG x3 in 2017, CVA in 2017 with residual L sided weakness, HTN, DM type II, HLD, GERD, BPH. Patient presented to [facility] on 7/28/2021 for multiple falls and dizziness.\par MRI of the brain revealed:  Acute lacunar infarcts involving the deep white matter of the left frontal lobe\par at the level of the centrum semiovale. No acute hemorrhage.\par He underwent an ILR on 8/4/2021 for long term arrhythmia monitoring. \par \par Returns for device interrogation via telehealth. \par \par Denies chest pain, shortness of breath or palpitations. Reports occasional dizziness, denies near syncope, syncope or recurrent falls. Ambulates with a walker. No fever or chills. He was started on midodrine recently. \par \par Cardiac tests:\par ECG ( 9/7/2021); NSR at 70 bpm, normal axis, non-specific T wave abnormalities\par ECG ( 8/30/2021); NSR at 73 bpm, non-specific T wave abnormalities, QTc 415 ms \par Cardiac cath (6/9/2021): Significant 3VD, Patent grafts to LAD and OM, SVG to RCA occluded. \par CD (5/25/2021): No significant stenosis. \par   \par \par \par

## 2022-04-04 NOTE — ASSESSMENT
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 \par \par Loop recorder interrogation: 1 SVT event as described above. \par \par Patient is enrolled in remote monitoring services and transmitting appropriately. \par \par \par Loop recorder incision site: well healed\par \par He reports c/w meds \par \par \par Plan \par -Continue current medications  \par -Continue remote loop recorder monitoring\par -Return for follow up in 9 months. Patient knows to call the office for any questions/concerns regarding the loop recorder or incision site. \par

## 2022-04-04 NOTE — PROCEDURE
[No] : not [NSR] : normal sinus rhythm [See Scanned Paceart Report] : See scanned paceart report [See Device Printout] : See device printout [Voltage: ___ volts] : Voltage was [unfilled] volts [Sensing Amplitude ___mv] : sensing amplitude was [unfilled] mv [Programmed for Longevity] : output reprogrammed for improved battery longevity [de-identified] : Medtronic [de-identified] : LNQ11 [de-identified] : [id number] [de-identified] : 8/4/21 [de-identified] : GOOD [de-identified] : 12/28/2021 ~10 seconds SVT, mav V 171 bpm

## 2022-04-22 ENCOUNTER — APPOINTMENT (OUTPATIENT)
Dept: CARDIOLOGY | Facility: CLINIC | Age: 77
End: 2022-04-22
Payer: MEDICARE

## 2022-04-22 ENCOUNTER — NON-APPOINTMENT (OUTPATIENT)
Age: 77
End: 2022-04-22

## 2022-04-22 PROCEDURE — 93298 REM INTERROG DEV EVAL SCRMS: CPT

## 2022-04-22 PROCEDURE — G2066: CPT

## 2022-05-23 ENCOUNTER — APPOINTMENT (OUTPATIENT)
Dept: CARDIOLOGY | Facility: CLINIC | Age: 77
End: 2022-05-23
Payer: MEDICARE

## 2022-05-23 ENCOUNTER — NON-APPOINTMENT (OUTPATIENT)
Age: 77
End: 2022-05-23

## 2022-05-23 PROCEDURE — 93298 REM INTERROG DEV EVAL SCRMS: CPT

## 2022-05-23 PROCEDURE — G2066: CPT

## 2022-06-09 NOTE — H&P ADULT - HISTORY OF PRESENT ILLNESS
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 Patient says he has been feeling intermittently dizzy over the past 3 days despite having adequate/normal PO intake. Patient fell yesterday at home after feeling dizzy hitting the back of his head, but with no LOC. No AC. This morning around 10 patient was walking outside, felt his legs get wobbly, and then fell straight down hitting his buttocks and R knee. Denies head trauma, but endorses LOC for a few seconds. Patient denies any pain, headache, vision changes, new numbness, weakness, tingling, chest pain, shortness of breath, nausea, vomiting, diarrhea, dysuria, hematuria, melena, hematochezia, fever, cold/flu symptoms, leg pain/swelling.     In the ED, Temp 98.5, HR 83, /86 and saturating >97% on RA. Labs wnl except for Cr of 1.6. Ferdinand<10, , Trop <0.01. EKG- NSR.     CT spine: No evidence of acute cervical spine fracture or subluxation.Multilevel severe degenerative changes as described, with severe spinal noted stenosis at C2-3 and C3-4 and multilevel severe neural foraminal stenosis.  CTAP:No definite evidence of acute traumatic injury within the chest, abdomen, or pelvis.   CTH:  No evidence of acute intracranial hemorrhage. Probable chronic infarct within the right posterior frontal white matter. Lacunar infarcts within bilateral white matter and deep gray nuclei of indeterminate age. Mild/moderate chronic microvascular changes.  Chest X-Ray negative for pneumothorax, infiltrate, or effusion. 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 Patient says he has been feeling intermittently dizzy over the past 3 days despite having adequate/normal PO intake. Pt first felt this on Friday right after getting out of his car. Pt stated that these episodes of dizziness occurs after getting up from a sitting or supine position. Patient fell yesterday at home after feeling dizzy hitting the back of his head, but with no LOC. pt is not on AC. This morning around 10 patient got out of his car, felt dizzy again and then fell straight down hitting his buttocks and R knee. Denies head trauma. Patient denies any pain, headache, vision changes, new numbness, weakness, tingling, chest pain, shortness of breath, palpitations, nausea, vomiting, diarrhea, dysuria, hematuria, melena, hematochezia, fever, cold/flu symptoms, leg pain/swelling.     In the ED, Temp 98.5, HR 83, /86 and saturating >97% on RA. Labs wnl except for Cr of 1.6. Ferdinand<10, , Trop <0.01. EKG- NSR.     CT spine: No evidence of acute cervical spine fracture or subluxation.Multilevel severe degenerative changes as described, with severe spinal noted stenosis at C2-3 and C3-4 and multilevel severe neural foraminal stenosis.  CTAP:No definite evidence of acute traumatic injury within the chest, abdomen, or pelvis.   CTH:  No evidence of acute intracranial hemorrhage. Probable chronic infarct within the right posterior frontal white matter. Lacunar infarcts within bilateral white matter and deep gray nuclei of indeterminate age. Mild/moderate chronic microvascular changes.  Chest X-Ray negative for pneumothorax, infiltrate, or effusion. XR Pelvis negative for fx's or dislocations

## 2022-06-23 ENCOUNTER — APPOINTMENT (OUTPATIENT)
Dept: UROLOGY | Facility: CLINIC | Age: 77
End: 2022-06-23
Payer: MEDICARE

## 2022-06-23 VITALS
HEART RATE: 95 BPM | BODY MASS INDEX: 27.38 KG/M2 | WEIGHT: 145 LBS | DIASTOLIC BLOOD PRESSURE: 76 MMHG | HEIGHT: 61 IN | SYSTOLIC BLOOD PRESSURE: 137 MMHG

## 2022-06-23 DIAGNOSIS — R39.15 URGENCY OF URINATION: ICD-10-CM

## 2022-06-23 DIAGNOSIS — R32 UNSPECIFIED URINARY INCONTINENCE: ICD-10-CM

## 2022-06-23 DIAGNOSIS — N28.89 OTHER SPECIFIED DISORDERS OF KIDNEY AND URETER: ICD-10-CM

## 2022-06-23 DIAGNOSIS — R35.1 NOCTURIA: ICD-10-CM

## 2022-06-23 DIAGNOSIS — R35.0 FREQUENCY OF MICTURITION: ICD-10-CM

## 2022-06-23 PROCEDURE — 99214 OFFICE O/P EST MOD 30 MIN: CPT

## 2022-06-23 NOTE — ASSESSMENT
[FreeTextEntry1] : The ultrasound shows a new lesion in the left lower pole and in the region of the former cyst it now looks complex.  His creatinine clearance is 36 mL/min and the radiologist recommended an MRI free and post gadolinium using the newer gadolinium agents which do not cause the NSF.  As far as his voiding dysfunction he is not willing to change anything not having signs of infection in the urine from March 18 other than showing some skin leonor was of no concern.\par \par I reviewed the ultrasound report with Dr. Samano he agrees with the need for the MRI if the patient is willing we will order it and have him see Dr. Samano in follow-up.  In the meantime I will renew the Flomax

## 2022-06-23 NOTE — PHYSICAL EXAM
[General Appearance - Well Developed] : well developed [General Appearance - Well Nourished] : well nourished [Normal Appearance] : normal appearance [Well Groomed] : well groomed [General Appearance - In No Acute Distress] : no acute distress [Abdomen Soft] : soft [Abdomen Tenderness] : non-tender [Costovertebral Angle Tenderness] : no ~M costovertebral angle tenderness [] : no respiratory distress [Respiration, Rhythm And Depth] : normal respiratory rhythm and effort [Exaggerated Use Of Accessory Muscles For Inspiration] : no accessory muscle use [Oriented To Time, Place, And Person] : oriented to person, place, and time [Affect] : the affect was normal [Mood] : the mood was normal [Not Anxious] : not anxious [FreeTextEntry1] : He uses a walker

## 2022-06-23 NOTE — LETTER HEADER
[FreeTextEntry3] : Yunier Chanel M.D.\par Director Emeritus of Urology\par [facility]\par [address] [place] [address], [address]\[place] [address]

## 2022-06-23 NOTE — LETTER BODY
[Dear  ___] : Dear  [unfilled], [Courtesy Letter:] : I had the pleasure of seeing your patient, [unfilled], in my office today. [Please see my note below.] : Please see my note below. [Sincerely,] : Sincerely, [FreeTextEntry2] : Elli Garcia MD\par [address]\[place] [address]

## 2022-06-23 NOTE — HISTORY OF PRESENT ILLNESS
[FreeTextEntry1] : Jeff is a 76-year-old male born October 17, 1945 last seen October 14, 2021.  When last seen he told me someone had told him to stop the Flomax but the neurologist told him to go back on it kept a record of his intake and output on the Flomax and told me that his quality of life had improved from a 5 out of 5 down to a 3 of 5.  He still had occasional urge incontinence he had urinary urgency and frequency he would wake up at night and when he did go with a slow stream.  His symptoms are severe enough that he needs a diaper.  However he told us that he was comfortable enough and really did not want to consider anything more than the Flomax he was not having a high residual and if he was willing to live with the diaper I would not consider any other therapy there was not a lot I could do.  We elected to get an ultrasound 3 to 4 months later some urine testing to make sure there is not developing signs of infection obstructive uropathy in the situation became worse he will contact us and we will bring him in.  He tells me that his situation has not changed he is not comfortable with this but it is enough that he is not willing to choose or accept any other therapy especially not any procedures.  I did the ultrasound March 17, he had blood test through his primary care physician on March 18 and he is here for review and discussion.  He still taking the Flomax at 1/day [Urinary Incontinence] : urinary incontinence [Urinary Urgency] : urinary urgency [Urinary Frequency] : urinary frequency [Nocturia] : nocturia [Weak Stream] : weak stream

## 2022-06-27 ENCOUNTER — NON-APPOINTMENT (OUTPATIENT)
Age: 77
End: 2022-06-27

## 2022-06-27 ENCOUNTER — APPOINTMENT (OUTPATIENT)
Dept: CARDIOLOGY | Facility: CLINIC | Age: 77
End: 2022-06-27

## 2022-06-27 PROCEDURE — 93298 REM INTERROG DEV EVAL SCRMS: CPT

## 2022-06-27 PROCEDURE — G2066: CPT

## 2022-07-21 ENCOUNTER — RESULT REVIEW (OUTPATIENT)
Age: 77
End: 2022-07-21

## 2022-07-21 ENCOUNTER — OUTPATIENT (OUTPATIENT)
Dept: OUTPATIENT SERVICES | Facility: HOSPITAL | Age: 77
LOS: 1 days | Discharge: HOME | End: 2022-07-21

## 2022-07-21 DIAGNOSIS — Z95.1 PRESENCE OF AORTOCORONARY BYPASS GRAFT: Chronic | ICD-10-CM

## 2022-07-21 DIAGNOSIS — Z90.49 ACQUIRED ABSENCE OF OTHER SPECIFIED PARTS OF DIGESTIVE TRACT: Chronic | ICD-10-CM

## 2022-07-21 DIAGNOSIS — H26.9 UNSPECIFIED CATARACT: Chronic | ICD-10-CM

## 2022-07-21 DIAGNOSIS — N28.89 OTHER SPECIFIED DISORDERS OF KIDNEY AND URETER: ICD-10-CM

## 2022-07-21 PROCEDURE — 74183 MRI ABD W/O CNTR FLWD CNTR: CPT | Mod: 26,MH

## 2022-07-23 ENCOUNTER — NON-APPOINTMENT (OUTPATIENT)
Age: 77
End: 2022-07-23

## 2022-08-01 ENCOUNTER — APPOINTMENT (OUTPATIENT)
Dept: CARDIOLOGY | Facility: CLINIC | Age: 77
End: 2022-08-01

## 2022-08-01 ENCOUNTER — NON-APPOINTMENT (OUTPATIENT)
Age: 77
End: 2022-08-01

## 2022-08-01 PROCEDURE — G2066: CPT

## 2022-08-01 PROCEDURE — 93298 REM INTERROG DEV EVAL SCRMS: CPT

## 2022-09-07 ENCOUNTER — NON-APPOINTMENT (OUTPATIENT)
Age: 77
End: 2022-09-07

## 2022-09-07 ENCOUNTER — APPOINTMENT (OUTPATIENT)
Dept: CARDIOLOGY | Facility: CLINIC | Age: 77
End: 2022-09-07

## 2022-09-07 PROCEDURE — G2066: CPT

## 2022-09-07 PROCEDURE — 93298 REM INTERROG DEV EVAL SCRMS: CPT

## 2022-09-21 NOTE — PROGRESS NOTE ADULT - REASON FOR ADMISSION
RECORDS RECEIVED FROM: Internal   REASON FOR VISIT: MS   Date of Appt: 10.21.22   NOTES (FOR ALL VISITS) STATUS DETAILS   OFFICE NOTE from referring provider Internal 9.16.22 [facility]   OFFICE NOTE from other specialist     DISCHARGE SUMMARY from hospital     DISCHARGE REPORT from the ER     OPERATIVE REPORT     MEDICATION LIST Internal    IMAGING  (FOR ALL VISITS)     EMG     EEG     LUMBAR PUNCTURE     KATIA SCAN     ULTRASOUND (CAROTID BILAT) *VASCULAR*     MRI (HEAD, NECK, SPINE)     CT (HEAD, NECK, SPINE) Received 3.12.14 CT head brain, [place]         s/p LHC

## 2022-09-26 ENCOUNTER — APPOINTMENT (OUTPATIENT)
Dept: UROLOGY | Facility: CLINIC | Age: 77
End: 2022-09-26

## 2022-10-03 ENCOUNTER — APPOINTMENT (OUTPATIENT)
Dept: CARDIOLOGY | Facility: CLINIC | Age: 77
End: 2022-10-03

## 2022-10-03 VITALS
TEMPERATURE: 96.9 F | HEIGHT: 60 IN | HEART RATE: 64 BPM | WEIGHT: 147 LBS | DIASTOLIC BLOOD PRESSURE: 66 MMHG | BODY MASS INDEX: 28.86 KG/M2 | SYSTOLIC BLOOD PRESSURE: 121 MMHG

## 2022-10-03 DIAGNOSIS — G45.9 TRANSIENT CEREBRAL ISCHEMIC ATTACK, UNSPECIFIED: ICD-10-CM

## 2022-10-03 DIAGNOSIS — I63.9 CEREBRAL INFARCTION, UNSPECIFIED: ICD-10-CM

## 2022-10-03 DIAGNOSIS — I47.1 SUPRAVENTRICULAR TACHYCARDIA: ICD-10-CM

## 2022-10-03 DIAGNOSIS — Z45.09 ENCOUNTER FOR ADJUSTMENT AND MANAGEMENT OF OTHER CARDIAC DEVICE: ICD-10-CM

## 2022-10-03 PROCEDURE — 93000 ELECTROCARDIOGRAM COMPLETE: CPT

## 2022-10-03 PROCEDURE — 99213 OFFICE O/P EST LOW 20 MIN: CPT

## 2022-10-03 RX ORDER — GABAPENTIN 100 MG/1
100 CAPSULE ORAL 3 TIMES DAILY
Qty: 90 | Refills: 0 | Status: ACTIVE | COMMUNITY

## 2022-10-03 RX ORDER — LINAGLIPTIN 5 MG/1
5 TABLET, FILM COATED ORAL DAILY
Refills: 0 | Status: ACTIVE | COMMUNITY

## 2022-10-03 RX ORDER — CLOPIDOGREL 75 MG/1
75 TABLET, FILM COATED ORAL DAILY
Refills: 0 | Status: ACTIVE | COMMUNITY

## 2022-10-03 RX ORDER — FENOFIBRATE 120 MG/1
120 TABLET ORAL DAILY
Refills: 0 | Status: ACTIVE | COMMUNITY

## 2022-10-03 RX ORDER — LANSOPRAZOLE 30 MG/1
30 CAPSULE, DELAYED RELEASE ORAL DAILY
Refills: 0 | Status: ACTIVE | COMMUNITY
Start: 2021-06-29

## 2022-10-03 RX ORDER — NIFEDIPINE 60 MG/1
60 TABLET, EXTENDED RELEASE ORAL
Refills: 0 | Status: ACTIVE | COMMUNITY

## 2022-10-03 RX ORDER — ROSUVASTATIN CALCIUM 40 MG/1
40 TABLET, FILM COATED ORAL DAILY
Refills: 0 | Status: ACTIVE | COMMUNITY

## 2022-10-03 RX ORDER — MIDODRINE HYDROCHLORIDE 5 MG/1
5 TABLET ORAL
Qty: 90 | Refills: 0 | Status: COMPLETED | COMMUNITY
Start: 2021-12-23 | End: 2022-10-03

## 2022-10-03 RX ORDER — METOPROLOL TARTRATE 25 MG/1
25 TABLET, FILM COATED ORAL TWICE DAILY
Qty: 60 | Refills: 3 | Status: ACTIVE | COMMUNITY

## 2022-10-03 RX ORDER — PENTOXIFYLLINE 400 MG/1
400 TABLET, EXTENDED RELEASE ORAL DAILY
Refills: 0 | Status: ACTIVE | COMMUNITY
Start: 2021-03-31

## 2022-10-03 RX ORDER — RANOLAZINE 500 MG/1
500 TABLET, EXTENDED RELEASE ORAL
Qty: 180 | Refills: 0 | Status: ACTIVE | COMMUNITY
Start: 2021-04-08

## 2022-10-03 RX ORDER — ISOSORBIDE MONONITRATE 60 MG/1
60 TABLET, EXTENDED RELEASE ORAL DAILY
Refills: 0 | Status: ACTIVE | COMMUNITY

## 2022-10-03 RX ORDER — TAMSULOSIN HYDROCHLORIDE 0.4 MG/1
0.4 CAPSULE ORAL
Qty: 90 | Refills: 3 | Status: ACTIVE | COMMUNITY
Start: 2021-06-29

## 2022-10-03 RX ORDER — VENLAFAXINE HYDROCHLORIDE 75 MG/1
75 CAPSULE, EXTENDED RELEASE ORAL DAILY
Refills: 0 | Status: ACTIVE | COMMUNITY

## 2022-10-03 NOTE — CARDIOLOGY SUMMARY
[de-identified] : 10/3/2022: SR 64 bpm. non-sp T wave abnormality\par ECG ( 9/7/2021); NSR at 70 bpm, normal axis, non-specific T wave abnormalities\par ECG ( 8/30/2021); NSR at 73 bpm, non-specific T wave abnormalities, QTc 415 ms  [de-identified] : Cardiac cath (6/9/2021): Significant 3VD, Patent grafts to LAD and OM, SVG to RCA occluded [de-identified] : CD (5/25/2021): No significant stenosis.

## 2022-10-03 NOTE — PHYSICAL EXAM
[General Appearance - Well Developed] : well developed [Normal Appearance] : normal appearance [Well Groomed] : well groomed [General Appearance - Well Nourished] : well nourished [No Deformities] : no deformities [General Appearance - In No Acute Distress] : no acute distress [Heart Rate And Rhythm] : heart rate and rhythm were normal [Heart Sounds] : normal S1 and S2 [Murmurs] : no murmurs present [] : no respiratory distress [Respiration, Rhythm And Depth] : normal respiratory rhythm and effort [Exaggerated Use Of Accessory Muscles For Inspiration] : no accessory muscle use [Auscultation Breath Sounds / Voice Sounds] : lungs were clear to auscultation bilaterally [Well-Healed] : well-healed [Abdomen Soft] : soft [Nail Clubbing] : no clubbing of the fingernails [Cyanosis, Localized] : no localized cyanosis [FreeTextEntry1] : Parasternal

## 2022-10-03 NOTE — ASSESSMENT
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 \par \par Loop recorder interrogation: no events\par \par Patient is enrolled in remote monitoring services and transmitting appropriately. \par \par # Acute CVA (seen at [facility])\par - no episodes to report on ILR\par - follow up neuro, cardio, need statin on board? He says he will see cardio this month. \par - On ASA 81mg\par \par # Dizziness\par - Likely postural hypotension, was on midodrine but has stopped. Resolved once seated.\par - BP WNL\par - Only drinks Gatorade (sugar free). Advised he should drink AT LEAST 1-2 L water daily\par \par He reports c/w meds \par \par \par Plan \par - Continue current medications  \par - Continue remote loop recorder monitoring\par - Return for follow up in 9 months. Patient knows to call the office for any questions/concerns \par

## 2022-10-03 NOTE — PROCEDURE
[No] : not [NSR] : normal sinus rhythm [See Scanned Paceart Report] : See scanned paceart report [See Device Printout] : See device printout [Voltage: ___ volts] : Voltage was [unfilled] volts [Sensing Amplitude ___mv] : sensing amplitude was [unfilled] mv [Programmed for Longevity] : output reprogrammed for improved battery longevity [de-identified] : Medtronic [de-identified] : [id number] [de-identified] : [id number] [de-identified] : 8/4/21 [de-identified] : GOOD [de-identified] : No events

## 2022-10-03 NOTE — HISTORY OF PRESENT ILLNESS
[de-identified] : \par Neurologist: Dr. Winkler\par Cardiologist: Dr. Stewart\par \par 75 y/o male with pmh of CAD, s/p CABG x3 in 2017, CVA in 2017 with residual L sided weakness, HTN, DM type II, HLD, GERD, BPH. Patient presented to [facility] on 7/28/2021 for multiple falls and dizziness.\par MRI of the brain revealed:  Acute lacunar infarcts involving the deep white matter of the left frontal lobe\par at the level of the centrum semiovale. No acute hemorrhage.\par He underwent an ILR on 8/4/2021 for long term arrhythmia monitoring. \par \par Denies chest pain, shortness of breath or palpitations. Reports occasional dizziness, denies near syncope, syncope or recurrent falls. Ambulates with a walker. He was dizzy coming into the office today but resolved when sitting.\par \par Was in the hospital last week at [facility] for TIA. Has chronic left sided weakness from previous stroke that is now exacerbated. ILR was interrogated there. \par \par \par \par   \par \par \par

## 2022-10-13 ENCOUNTER — TRANSCRIPTION ENCOUNTER (OUTPATIENT)
Age: 77
End: 2022-10-13

## 2022-10-13 ENCOUNTER — INPATIENT (INPATIENT)
Facility: HOSPITAL | Age: 77
LOS: 1 days | Discharge: HOME | End: 2022-10-15
Attending: STUDENT IN AN ORGANIZED HEALTH CARE EDUCATION/TRAINING PROGRAM | Admitting: STUDENT IN AN ORGANIZED HEALTH CARE EDUCATION/TRAINING PROGRAM

## 2022-10-13 VITALS
HEART RATE: 62 BPM | DIASTOLIC BLOOD PRESSURE: 47 MMHG | WEIGHT: 152.12 LBS | OXYGEN SATURATION: 98 % | SYSTOLIC BLOOD PRESSURE: 82 MMHG | RESPIRATION RATE: 18 BRPM

## 2022-10-13 DIAGNOSIS — Z90.49 ACQUIRED ABSENCE OF OTHER SPECIFIED PARTS OF DIGESTIVE TRACT: Chronic | ICD-10-CM

## 2022-10-13 DIAGNOSIS — Z95.1 PRESENCE OF AORTOCORONARY BYPASS GRAFT: Chronic | ICD-10-CM

## 2022-10-13 DIAGNOSIS — H26.9 UNSPECIFIED CATARACT: Chronic | ICD-10-CM

## 2022-10-13 LAB
ALBUMIN SERPL ELPH-MCNC: 4.1 G/DL — SIGNIFICANT CHANGE UP (ref 3.5–5.2)
ALP SERPL-CCNC: 41 U/L — SIGNIFICANT CHANGE UP (ref 30–115)
ALT FLD-CCNC: 14 U/L — SIGNIFICANT CHANGE UP (ref 0–41)
ANION GAP SERPL CALC-SCNC: 15 MMOL/L — HIGH (ref 7–14)
APTT BLD: 27.2 SEC — SIGNIFICANT CHANGE UP (ref 27–39.2)
AST SERPL-CCNC: 22 U/L — SIGNIFICANT CHANGE UP (ref 0–41)
BASOPHILS # BLD AUTO: 0.07 K/UL — SIGNIFICANT CHANGE UP (ref 0–0.2)
BASOPHILS NFR BLD AUTO: 0.7 % — SIGNIFICANT CHANGE UP (ref 0–1)
BILIRUB SERPL-MCNC: 0.4 MG/DL — SIGNIFICANT CHANGE UP (ref 0.2–1.2)
BUN SERPL-MCNC: 39 MG/DL — HIGH (ref 10–20)
CALCIUM SERPL-MCNC: 9.5 MG/DL — SIGNIFICANT CHANGE UP (ref 8.4–10.5)
CHLORIDE SERPL-SCNC: 102 MMOL/L — SIGNIFICANT CHANGE UP (ref 98–110)
CO2 SERPL-SCNC: 21 MMOL/L — SIGNIFICANT CHANGE UP (ref 17–32)
CREAT SERPL-MCNC: 2.4 MG/DL — HIGH (ref 0.7–1.5)
EGFR: 27 ML/MIN/1.73M2 — LOW
EOSINOPHIL # BLD AUTO: 0.32 K/UL — SIGNIFICANT CHANGE UP (ref 0–0.7)
EOSINOPHIL NFR BLD AUTO: 3 % — SIGNIFICANT CHANGE UP (ref 0–8)
GLUCOSE BLDC GLUCOMTR-MCNC: 120 MG/DL — HIGH (ref 70–99)
GLUCOSE SERPL-MCNC: 179 MG/DL — HIGH (ref 70–99)
HCT VFR BLD CALC: 40.5 % — LOW (ref 42–52)
HGB BLD-MCNC: 12.9 G/DL — LOW (ref 14–18)
IMM GRANULOCYTES NFR BLD AUTO: 0.6 % — HIGH (ref 0.1–0.3)
INR BLD: 0.94 RATIO — SIGNIFICANT CHANGE UP (ref 0.65–1.3)
LYMPHOCYTES # BLD AUTO: 1.6 K/UL — SIGNIFICANT CHANGE UP (ref 1.2–3.4)
LYMPHOCYTES # BLD AUTO: 15.1 % — LOW (ref 20.5–51.1)
MCHC RBC-ENTMCNC: 28.9 PG — SIGNIFICANT CHANGE UP (ref 27–31)
MCHC RBC-ENTMCNC: 31.9 G/DL — LOW (ref 32–37)
MCV RBC AUTO: 90.8 FL — SIGNIFICANT CHANGE UP (ref 80–94)
MONOCYTES # BLD AUTO: 0.66 K/UL — HIGH (ref 0.1–0.6)
MONOCYTES NFR BLD AUTO: 6.2 % — SIGNIFICANT CHANGE UP (ref 1.7–9.3)
NEUTROPHILS # BLD AUTO: 7.86 K/UL — HIGH (ref 1.4–6.5)
NEUTROPHILS NFR BLD AUTO: 74.4 % — SIGNIFICANT CHANGE UP (ref 42.2–75.2)
NRBC # BLD: 0 /100 WBCS — SIGNIFICANT CHANGE UP (ref 0–0)
PLATELET # BLD AUTO: 275 K/UL — SIGNIFICANT CHANGE UP (ref 130–400)
POTASSIUM SERPL-MCNC: 4.9 MMOL/L — SIGNIFICANT CHANGE UP (ref 3.5–5)
POTASSIUM SERPL-SCNC: 4.9 MMOL/L — SIGNIFICANT CHANGE UP (ref 3.5–5)
PROT SERPL-MCNC: 6.5 G/DL — SIGNIFICANT CHANGE UP (ref 6–8)
PROTHROM AB SERPL-ACNC: 10.7 SEC — SIGNIFICANT CHANGE UP (ref 9.95–12.87)
RBC # BLD: 4.46 M/UL — LOW (ref 4.7–6.1)
RBC # FLD: 14.6 % — HIGH (ref 11.5–14.5)
SARS-COV-2 RNA SPEC QL NAA+PROBE: SIGNIFICANT CHANGE UP
SODIUM SERPL-SCNC: 138 MMOL/L — SIGNIFICANT CHANGE UP (ref 135–146)
TROPONIN T SERPL-MCNC: <0.01 NG/ML — SIGNIFICANT CHANGE UP
WBC # BLD: 10.57 K/UL — SIGNIFICANT CHANGE UP (ref 4.8–10.8)
WBC # FLD AUTO: 10.57 K/UL — SIGNIFICANT CHANGE UP (ref 4.8–10.8)

## 2022-10-13 PROCEDURE — 93010 ELECTROCARDIOGRAM REPORT: CPT

## 2022-10-13 PROCEDURE — 99285 EMERGENCY DEPT VISIT HI MDM: CPT | Mod: GC

## 2022-10-13 PROCEDURE — 70498 CT ANGIOGRAPHY NECK: CPT | Mod: 26,MA

## 2022-10-13 PROCEDURE — 71045 X-RAY EXAM CHEST 1 VIEW: CPT | Mod: 26

## 2022-10-13 PROCEDURE — 70551 MRI BRAIN STEM W/O DYE: CPT | Mod: 26

## 2022-10-13 PROCEDURE — 99223 1ST HOSP IP/OBS HIGH 75: CPT

## 2022-10-13 PROCEDURE — 70496 CT ANGIOGRAPHY HEAD: CPT | Mod: 26,MA

## 2022-10-13 PROCEDURE — 0042T: CPT | Mod: MA

## 2022-10-13 RX ORDER — VENLAFAXINE HCL 75 MG
75 CAPSULE, EXT RELEASE 24 HR ORAL DAILY
Refills: 0 | Status: DISCONTINUED | OUTPATIENT
Start: 2022-10-13 | End: 2022-10-15

## 2022-10-13 RX ORDER — ACETAMINOPHEN 500 MG
650 TABLET ORAL EVERY 6 HOURS
Refills: 0 | Status: DISCONTINUED | OUTPATIENT
Start: 2022-10-13 | End: 2022-10-15

## 2022-10-13 RX ORDER — HEPARIN SODIUM 5000 [USP'U]/ML
5000 INJECTION INTRAVENOUS; SUBCUTANEOUS EVERY 8 HOURS
Refills: 0 | Status: DISCONTINUED | OUTPATIENT
Start: 2022-10-14 | End: 2022-10-15

## 2022-10-13 RX ORDER — CLOPIDOGREL BISULFATE 75 MG/1
300 TABLET, FILM COATED ORAL ONCE
Refills: 0 | Status: DISCONTINUED | OUTPATIENT
Start: 2022-10-13 | End: 2022-10-13

## 2022-10-13 RX ORDER — ATORVASTATIN CALCIUM 80 MG/1
80 TABLET, FILM COATED ORAL AT BEDTIME
Refills: 0 | Status: DISCONTINUED | OUTPATIENT
Start: 2022-10-13 | End: 2022-10-15

## 2022-10-13 RX ORDER — ASPIRIN/CALCIUM CARB/MAGNESIUM 324 MG
81 TABLET ORAL DAILY
Refills: 0 | Status: DISCONTINUED | OUTPATIENT
Start: 2022-10-14 | End: 2022-10-15

## 2022-10-13 RX ORDER — GABAPENTIN 400 MG/1
100 CAPSULE ORAL EVERY 12 HOURS
Refills: 0 | Status: DISCONTINUED | OUTPATIENT
Start: 2022-10-13 | End: 2022-10-15

## 2022-10-13 RX ORDER — PANTOPRAZOLE SODIUM 20 MG/1
40 TABLET, DELAYED RELEASE ORAL
Refills: 0 | Status: DISCONTINUED | OUTPATIENT
Start: 2022-10-13 | End: 2022-10-15

## 2022-10-13 RX ORDER — TICAGRELOR 90 MG/1
90 TABLET ORAL EVERY 12 HOURS
Refills: 0 | Status: DISCONTINUED | OUTPATIENT
Start: 2022-10-13 | End: 2022-10-15

## 2022-10-13 NOTE — STROKE CODE NOTE - NIH STROKE SCALE: 5B. MOTOR ARM, RIGHT, QM
(2) Some effort against gravity; limb cannot get to or maintain (if cued) 90 (or 45) degrees, drifts down to bed, but has some effort against gravity (4) No movement

## 2022-10-13 NOTE — H&P ADULT - ATTENDING COMMENTS
Pt is a 75 yo M with PMhx of CAD s/p CABG, HTN, DM, HLD, prior ischemic stroke, recent L SUSANNE stroke (treated at [facility]) with residual right hemiplegia, who presents from [facility] due to unresponsiveness. Per EMS report, pt was speaking this morning. On exam pt is hypophonic, slow to respond. Initially had left gaze preference, however was later able to attend to the right without difficulty, blinks to visual threat b/l, right hemiplegia. NIHSS ~21 (limited cooperation and paucity of speech). CT head shows the recent left SUSANNE territory infarct. CTA head/neck with left A2-3 occlusion, no other ELVO. Pt is not a tPA candidate given recent stroke and no target ELVO for EVT consideration. Per NH, mRS 3-4. Suspect waxing/waning mentation 2/2 recent stroke, more abulia than aphasia, however given that this is reportedly a new deficit, will admit to stroke unit for further evaluation/management. MRI brain without contrast, TTE, lipid profile/A1c, DAPT/statin, and will try to obtain records from [facility]. PT/OT/ST, tele, -180, q4 neurochecks.

## 2022-10-13 NOTE — ED PROVIDER NOTE - ATTENDING CONTRIBUTION TO CARE
76-year-old male to ED with acute confusion.  Patient found at nursing home with acute right facial droop and right-sided weakness today.   History of CAD hypertension diabetes and prior stroke.  Stroke code called on arrival NIH stroke scale equals 21 and low blood pressure.  Exam as noted stroke team at bedside and NI alert activated.

## 2022-10-13 NOTE — CONSULT NOTE ADULT - ASSESSMENT
75 y/o male with pmh of CAD, s/p CABG x3 in 2017, HTN, DM type II, HLD, GERD, BPH. CVA in 2017 with residual L sided weakness, acute CVA with Right sided hemiparesis was discharged from [facility] around 2 weeks ago, the discharge papers cannot be found at this point. Per chart, recent MRI of the brain revealed: Acute lacunar infarcts involving the deep white matter of the left frontal lobe and at the level of the centrum semiovale. No acute hemorrhage. CT PERFUSION: Scattered regions of hypoperfusion in the bilateral PCA and left SUSANNE territories with evidence of core infarct within the left SUSANNE territory. CTA HEAD: Left SUSANNE occlusion involving the proximal A2 segment. Severe stenosis of the proximal P2 segment of the right PCA as well as moderate stenosis on the left. CTA NECK: No evidence of carotid or vertebral artery stenosis. He is not a candidate for tPA and NI    Recommendations:   - Admit to Stroke unit  - Plavix load  - C/w current tx  - Stroke w up      The case was discussed w Dr. Elizabeth 75 y/o male with pmh of CAD, s/p CABG x3 in 2017, HTN, DM type II, HLD, GERD, BPH. CVA in 2017 with residual L sided weakness, acute CVA with Right sided hemiparesis was discharged from [facility] around 2 weeks ago, the discharge papers cannot be found at this point. Per chart, recent MRI of the brain revealed: Acute lacunar infarcts involving the deep white matter of the left frontal lobe and at the level of the centrum semiovale. No acute hemorrhage. CT PERFUSION: Scattered regions of hypoperfusion in the bilateral PCA and left SUSANNE territories with evidence of core infarct within the left SUSANNE territory. CTA HEAD: Left SUSANNE occlusion involving the proximal A2 segment. Severe stenosis of the proximal P2 segment of the right PCA as well as moderate stenosis on the left. CTA NECK: No evidence of carotid or vertebral artery stenosis. He is not a candidate for tPA and NI    Recommendations:   - Admit to Stroke unit  - Plavix load  - rEEG  - C/w current tx  - Stroke w up      The case was discussed w Dr. Elizabeth

## 2022-10-13 NOTE — H&P ADULT - HISTORY OF PRESENT ILLNESS
76M with PMH of CAD (s/p CABG x3 in 2017), HTN, DM type II, HLD, GERD, BPH. stroke in 2017 (residual L sided weakness), and recent stroke (evaluated at [facility] 2 wks ago) with residual right sided hemiparesis (discharge papers unable to be found), presents with report of increased right facial droop and right-sided weakness today. Per chart, MRI Head at [facility] revealed acute lacunar infarcts involving the deep white matter of the left frontal lobe at the level of the centrum semiovale. Stroke code called on ED arrival at [facility], NIHSS 21, BP 82/47. CTH showed left SUSANNE territory acute/subacute infarct with suggestion of trace petechial hemorrhagic transformation. Also showed bilateral basal ganglialacunar infarcts, age indeterminate involving the right caudate head. CTP showed scattered regions of hypoperfusion in the bilateral PCA and left SUSANNE territories with evidence of core infarct within the left SUSANNE territory. CTA H/N showed; 1. left SUSANNE occlusion involving the proximal A2 segment; 2. Severe stenosis of the proximal P2 segment of the right PCA as well as moderate stenosis on the left. He is not a candidate for tPA and NI.

## 2022-10-13 NOTE — H&P ADULT - NSHPPHYSICALEXAM_GEN_ALL_CORE
Neurological Examination:  General:  Appearance is consistent with chronologic age.  No abnormal facies.  Gross skin survey within normal limits.    Cognitive/Language:  Awake, alert, and oriented to person, place, time and date.  Recent and remote memory intact.  Fund of knowledge is appropriate.  Naming, repetition and comprehension intact.   Nondysarthric.    Cranial Nerves  - Eyes: Visual acuity intact, Visual fields full.  EOMI w/o nystagmus, skew or reported double vision.  PERRL.  No ptosis/weakness of eyelid closure.    - Face:  Facial sensation normal V1 - 3, no facial asymmetry.    - Ears/Nose/Throat:  Hearing grossly intact b/l to finger rub.  Palate elevates midline.  Tongue and uvula midline.   Motor examination:  Upper Extremities: L 5/5, R 5/5; Lower extremities: L 5/5, R 5/5.  No observable drift. Normal tone and bulk. No tenderness, twitching, tremors or involuntary movements.  Sensory examination:   Intact to light touch and pinprick, pain, temperature and proprioception and vibration in all extremities.  Reflexes:   2+ b/l biceps, triceps, brachioradialis, patella and achilles.  Plantar response downgoing b/l.  Jaw jerk, Masoud, clonus absent.  Cerebellum:   FTN/HKS intact.  No dysmetria or dysdiadokinesia.  Gait narrow based and normal. Neurological Examination:  General:  Appearance is consistent with chronologic age.  No abnormal facies.  Gross skin survey within normal limits.    Cognitive/Language:  Awake, alert, and oriented to person, place, time, but not date.  Could not recall events of the day (did not know what happened this morning or why he was here). Naming, repetition and comprehension intact.  Speech slow and intentional. Mildly dysarthric.    Cranial Nerves  - Eyes: Visual acuity intact, Visual fields full.  EOMI w/o nystagmus, skew or reported double vision.  PERRL.  No ptosis/weakness of eyelid closure.    - Face:  Decreased sensation to light touch on left side of face in  V1 - 3 distribution, left lower facial droop.    - Ears/Nose/Throat:  Subjective decrease in hearing on left side compared to right.   Motor examination:  Upper Extremities: LUE drift without hitting bed, no RUE drift; Lower extremities: LLE drift without hitting bed, no RLL drift.  Sensory examination:   Decreased sensation to light touch along left side compared to right  Cerebellum:   FTN/HKS intact.  No dysmetria.  Gait deferred

## 2022-10-13 NOTE — STROKE CODE NOTE - NIH STROKE SCALE: 10. DYSARTHRIA, QM
(0) Normal (2) Severe dysarthria; patients speech is so slurred as to be unintelligible in the absence of or out of proportion to any dysphasia, or is mute/anarthric

## 2022-10-13 NOTE — PROVIDER CONTACT NOTE (OTHER) - SITUATION
Pt admitted from ED, failed speech/swallow in ED, pt has NG tube in place, CXray done in ED. Need confirmation of NG placement, B/P parameters.

## 2022-10-13 NOTE — H&P ADULT - NSHPLABSRESULTS_GEN_ALL_CORE
12.9   10.57 )-----------( 275      ( 13 Oct 2022 12:38 )             40.5     Hemoglobin: 12.9 g/dL (10-13 @ 12:38)    CBC Full  -  ( 13 Oct 2022 12:38 )  WBC Count : 10.57 K/uL  RBC Count : 4.46 M/uL  Hemoglobin : 12.9 g/dL  Hematocrit : 40.5 %  Platelet Count - Automated : 275 K/uL  Mean Cell Volume : 90.8 fL  Mean Cell Hemoglobin : 28.9 pg  Mean Cell Hemoglobin Concentration : 31.9 g/dL  Auto Neutrophil # : 7.86 K/uL  Auto Lymphocyte # : 1.60 K/uL  Auto Monocyte # : 0.66 K/uL  Auto Eosinophil # : 0.32 K/uL  Auto Basophil # : 0.07 K/uL  Auto Neutrophil % : 74.4 %  Auto Lymphocyte % : 15.1 %  Auto Monocyte % : 6.2 %  Auto Eosinophil % : 3.0 %  Auto Basophil % : 0.7 %          Creatinine Trend:     PT/INR - ( 13 Oct 2022 12:38 )   PT: 10.70 sec;   INR: 0.94 ratio         PTT - ( 13 Oct 2022 12:38 )  PTT:27.2 sec  CARDIAC MARKERS ( 13 Oct 2022 12:38 )  x     / <0.01 ng/mL / x     / x     / x

## 2022-10-13 NOTE — DISCHARGE NOTE NURSING/CASE MANAGEMENT/SOCIAL WORK - NSDCVIVACCINE_GEN_ALL_CORE_FT
Tdap; 20-Jul-2021 15:17; Dejuan Fry (RN); [employer]; [id number] (Exp. Date: 28-Jan-2023); IntraMuscular; Deltoid Left.; 0.5 milliLiter(s); VIS (VIS Published: 09-May-2013, VIS Presented: 20-Jul-2021);

## 2022-10-13 NOTE — H&P ADULT - ASSESSMENT
76M with PMH of CAD (s/p CABG x3 in 2017), HTN, DM type II, HLD, GERD, BPH. stroke in 2017 (residual L sided weakness), and recent stroke (evaluated at [facility] 2 wks ago) with residual right sided hemiparesis (discharge papers unable to be found), presents with report of increased right facial droop and right-sided weakness today. Per chart, MRI Head at [facility] revealed acute lacunar infarcts involving the deep white matter of the left frontal lobe at the level of the centrum semiovale. Stroke code called on ED arrival at [facility], NIHSS 21, BP 82/47. CTH showed left SUSANNE territory acute/subacute infarct with suggestion of trace petechial hemorrhagic transformation. Also showed bilateral basal ganglialacunar infarcts, age indeterminate involving the right caudate head. CTP showed scattered regions of hypoperfusion in the bilateral PCA and left SUSANNE territories with evidence of core infarct within the left SUSANNE territory. CTA H/N showed; 1. left SUSANNE occlusion involving the proximal A2 segment; 2. Severe stenosis of the proximal P2 segment of the right PCA as well as moderate stenosis on the left. He is not a candidate for tPA and NI.    Impression  - Patient presented with b/l weakness, although documented as R>L on initial presentation. On re-evaluation, R-sided weakness appeared resolved (or at least back to baseline), left-sided weakness is more apparent. Patient was fully cooperative with exam, A&Ox3, but complained of frontal headache. Due to initial report of worsening baseline exam, acute stroke must be ruled out.    Plan    Neuro  - Please load with Plavix 300mg x1  - Then continue home aspirin 81mg and plavix 75mg daily  - Continue home atorvastatin 80mg daily  - Q4hr stroke neuro checks and vitals  - CTH showed left SUSANNE territory acute/subacute infarct with suggestion of trace petechial hemorrhagic transformation. Also showed bilateral basal ganglialacunar infarcts, age indeterminate involving the right caudate head.   - CTP showed scattered regions of hypoperfusion in the bilateral PCA and left SUSANNE territories with evidence of core infarct within the left SUSANNE territory.   - CTA H/N showed; 1. left SUSANNE occlusion involving the proximal A2 segment; 2. Severe stenosis of the proximal P2 segment of the right PCA as well as moderate stenosis on the left  - Obtain MRI Brain without contrast  - Stroke education    Cardiovascular  - SBP goal: permissive htn for now < 220  - TTE: pending  - High dose statin as above in CVA  - LDL results: pending    Pulm  - Call provider if SPO2 < 94%    GI  - Replete K<4 and Mg <2  - Diet as stated below    Renal  - Daily BMP    Infectious Disease  - WBC: 10.57    Endocrine  - A1C results: pending  - TSH results: pending    Misc  - Diet: pending swallow eval  - Activity: IAT  - DVT Prophylaxis: SQH  - GI Prophylaxis: PPI  - Code Status: full code  - Disposition: pending PT/OT/Rehab eval   76M with PMH of CAD (s/p CABG x3 in 2017), HTN, DM type II, HLD, GERD, BPH. stroke in 2017 (residual L sided weakness), and recent stroke (evaluated at [facility] 2 wks ago) with residual right sided hemiparesis (discharge papers unable to be found), presents with report of increased right facial droop and right-sided weakness today. Per chart, MRI Head at [facility] revealed acute lacunar infarcts involving the deep white matter of the left frontal lobe at the level of the centrum semiovale. Stroke code called on ED arrival at [facility], NIHSS 21, BP 82/47. CTH showed left SUSANNE territory acute/subacute infarct with suggestion of trace petechial hemorrhagic transformation. Also showed bilateral basal ganglialacunar infarcts, age indeterminate involving the right caudate head. CTP showed scattered regions of hypoperfusion in the bilateral PCA and left SUSANNE territories with evidence of core infarct within the left SUSANNE territory. CTA H/N showed; 1. left SUSANNE occlusion involving the proximal A2 segment; 2. Severe stenosis of the proximal P2 segment of the right PCA as well as moderate stenosis on the left. He is not a candidate for tPA and NI.    Impression  - Patient presented with b/l weakness, although documented as R>L on initial presentation. On re-evaluation, R-sided weakness appeared resolved (or at least back to baseline), left-sided weakness is more apparent. Patient was fully cooperative with exam, A&Ox3, but complained of frontal headache. Due to initial report of worsening baseline exam, acute stroke and seizure must be ruled out. Of note, per patient's pharmacy, he was prescribed Brilinta on 10/10 but never picked it up    Plan    Neuro  - Then continue home Aspirin 81mg and start Brilinta 90mg BID  - Continue home Atorvastatin 80mg daily  - Q4hr stroke neuro checks and vitals  - CTH showed left SUSANNE territory acute/subacute infarct with suggestion of trace petechial hemorrhagic transformation. Also showed bilateral basal ganglialacunar infarcts, age indeterminate involving the right caudate head.   - CTP showed scattered regions of hypoperfusion in the bilateral PCA and left SUSANNE territories with evidence of core infarct within the left SUSANNE territory.   - CTA H/N showed; 1. left SUSANNE occlusion involving the proximal A2 segment; 2. Severe stenosis of the proximal P2 segment of the right PCA as well as moderate stenosis on the left  - Obtain MRI Brain without contrast  - Stroke education    Cardiovascular  - SBP goal: permissive htn for now < 220  - TTE: pending  - High dose statin as above in CVA  - LDL results: pending    Pulm  - Call provider if SPO2 < 94%    GI  - Replete K<4 and Mg <2  - Diet as stated below    Renal  - Daily BMP    Infectious Disease  - WBC: 10.57    Endocrine  - A1C results: pending  - TSH results: pending    Misc  - Diet: pending swallow eval  - Activity: IAT  - DVT Prophylaxis: SQH  - GI Prophylaxis: PPI  - Code Status: full code  - Disposition: pending PT/OT/Rehab eval   76M with PMH of CAD (s/p CABG x3 in 2017), HTN, DM type II, HLD, GERD, BPH. stroke in 2017 (residual L sided weakness), and recent stroke (evaluated at [facility] 2 wks ago) with residual right sided hemiparesis (discharge papers unable to be found), presents with report of increased right facial droop and right-sided weakness today. Per chart, MRI Head at [facility] revealed acute lacunar infarcts involving the deep white matter of the left frontal lobe at the level of the centrum semiovale. Stroke code called on ED arrival at [facility], NIHSS 21, BP 82/47. CTH showed left SUSANNE territory acute/subacute infarct with suggestion of trace petechial hemorrhagic transformation. Also showed bilateral basal ganglialacunar infarcts, age indeterminate involving the right caudate head. CTP showed scattered regions of hypoperfusion in the bilateral PCA and left SUSANNE territories with evidence of core infarct within the left SUSANNE territory. CTA H/N showed; 1. left SUSANNE occlusion involving the proximal A2 segment; 2. Severe stenosis of the proximal P2 segment of the right PCA as well as moderate stenosis on the left. He is not a candidate for tPA due to recent stroke and no NI as there is no LVO.    Impression  - Patient presented with b/l weakness, documented as R>L on initial presentation. On re-evaluation, R-sided weakness appeared resolved (or at least back to baseline from recent stroke), left-sided weakness is more apparent (consistent with baseline residual deficit from chronic stroke). Patient was fully cooperative with exam, A&Ox3, but complained of frontal headache. Due to initial report of worsening baseline exam, acute stroke and seizure must be ruled out. Of note, per patient's pharmacy, he was prescribed Brilinta on 10/10 but never picked it up      Neuro  - Then continue home Aspirin 81mg and start Brilinta 90mg BID  - Continue home Atorvastatin 80mg daily  - Q4hr stroke neuro checks and vitals  - CTH showed left SUSANNE territory acute/subacute infarct with suggestion of trace petechial hemorrhagic transformation. Also showed bilateral basal ganglialacunar infarcts, age indeterminate involving the right caudate head.   - CTP showed scattered regions of hypoperfusion in the bilateral PCA and left SUSANNE territories with evidence of core infarct within the left SUSANNE territory.   - CTA H/N showed; 1. left SUSANNE occlusion involving the proximal A2 segment; 2. Severe stenosis of the proximal P2 segment of the right PCA as well as moderate stenosis on the left  - Obtain MRI Brain without contrast  - Stroke education    Cardiovascular  - SBP goal: permissive htn for now 120-180  - TTE: pending  - High dose statin as above in CVA  - LDL results: pending    Pulm  - Call provider if SPO2 < 94%    GI  - Replete K<4 and Mg <2  - Diet as stated below    Renal  - Daily BMP    Infectious Disease  - WBC: 10.57    Endocrine  - A1C results: pending  - TSH results: pending    Misc  - Diet: pending swallow eval  - Activity: IAT  - DVT Prophylaxis: SQH  - GI Prophylaxis: PPI  - Code Status: full code  - Disposition: pending PT/OT/Rehab eval

## 2022-10-13 NOTE — ED PROVIDER NOTE - PHYSICAL EXAMINATION
SKIN: warm, dry  HEAD: Normocephalic  EYES: no conjunctival erythema  ENT: no nasal discharge, airway clear left lower facial droop  NECK: full ROM,   CARD: regular rate and rhythm  RESP: normal respiratory effort, no wheezes, rales or rhonchi  ABD: soft, non-distended, non-tender  EXT: moving all extremities spontaneously  NEURO: alert and oriented, 3/5 strength left ue and le  PSYCH: cooperative, appropriate

## 2022-10-13 NOTE — DISCHARGE NOTE NURSING/CASE MANAGEMENT/SOCIAL WORK - PATIENT PORTAL LINK FT
You can access the FollowMyHealth Patient Portal offered by [facility] by registering at the following website: [email or web address]. By joining [facility]’s FollowMyHealth portal, you will also be able to view your health information using other applications (apps) compatible with our system.

## 2022-10-13 NOTE — ED PROVIDER NOTE - CLINICAL SUMMARY MEDICAL DECISION MAKING FREE TEXT BOX
Patient evaluated by neurology team not a candidate for acute intervention.  Will admit to stroke unit    Due to a high probability of clinically significant, life threatening deterioration, the patient required my highest level of preparedness to intervene emergently and I personally spent this critical care time directly and personally managing the patient. This critical care time included obtaining a history; examining the patient; pulse oximetry; ordering and review of studies; arranging urgent treatment with development of a management plan; evaluation of patient's response to treatment; frequent reassessment; and, discussions with other providers and the patient/patients family. This critical care time was performed to assess and manage the high probability of imminent, life-threatening deterioration that could result in multi-organ failure.

## 2022-10-13 NOTE — ED PROVIDER NOTE - NS ED ROS FT
Constitutional: No fever   Eyes:  No visual changes  Ears:  No hearing changes  Neck: No neck pain  Cardiac:  No chest pain  Respiratory:  No SOB   GI:  No abdominal pain, nausea, or vomiting  :  No dysuria  MS:  No back pain  Neuro:  No headache No LOC +weakness  Skin:  No skin rash

## 2022-10-13 NOTE — STROKE CODE NOTE - NSSTROKETPAEXCLABS_HEADCT
Head CT suggesting an established acute cerebral infarction as evidenced by janeth hypodensity, regardless of size

## 2022-10-13 NOTE — CONSULT NOTE ADULT - SUBJECTIVE AND OBJECTIVE BOX
**STROKE CODE CONSULT NOTE**    Last known well time/Time of onset of symptoms: Today AM per NH and EMS    HPI: The Hx was obtained from chart.   75 y/o male with pmh of CAD, s/p CABG x3 in 2017, HTN, DM type II, HLD, GERD, BPH. CVA in 2017 with residual L sided weakness, acute CVA with Right sided hemiparesis was discharged from [facility] around 2 weeks ago, the discharge papers cannot be found at this point. Per chart, recent MRI of the brain revealed: Acute lacunar infarcts involving the deep white matter of the left frontal lobe and at the level of the centrum semiovale. No acute hemorrhage.    Today the NH staff noticed that he was more weak on R side with R facial droop, called EMS brought to ED with Stroke code pre-notification.       PAST MEDICAL & SURGICAL HISTORY:  CAD (coronary artery disease)  HTN (hypertension)  Depression  Anxiety  Diabetes  niddm  Angina pectoris  BPH (benign prostatic hyperplasia)  GERD (gastroesophageal reflux disease)  CVA (cerebral vascular accident)  History of appendectomy  Bilateral cataracts  History of heart bypass surgery    FAMILY HISTORY:      SOCIAL HISTORY:  Smoking Cessation recently    MEDICATIONS  (STANDING):    MEDICATIONS  (PRN):      Allergies    clindamycin (Rash)  PC Pen VK (Rash)  penicillin (Rash)    Intolerances        Vital Signs Last 24 Hrs  T(C): --  T(F): --  HR: 62 (13 Oct 2022 12:00) (62 - 62)  BP: 82/47 (13 Oct 2022 12:00) (82/47 - 82/47)  BP(mean): --  RR: 18 (13 Oct 2022 12:00) (18 - 18)  SpO2: 98% (13 Oct 2022 12:00) (98% - 98%)    Parameters below as of 13 Oct 2022 12:00  Patient On (Oxygen Delivery Method): room air        Neurological Examination:  General:  Appearance is consistent with chronologic age, NAD.   Cognitive/Language:  Drowsy, alert, and oriented to person, place, date, signs of hypophonia and aphasia. Not completely following commands, was not able to obtain naming, repetition; but seems his comprehension intact, bradyphrenia. Nondysarthric.    Cranial Nerves  - Eyes: Visual acuity intact, Visual fields full on confrontaion.  EOMI w/o nystagmus, skew or reported double vision.  PERRL.  No ptosis/weakness of eyelid closure.    - Face:  Facial sensation normal V1 - 3, slight R n/l fold flattening.     - Ears/Nose/Throat:  Hearing grossly intact b/l to finger rub.  Palate elevates midline.  Tongue and uvula midline.   Motor examination:  RUE 3/5, RLE - 2/5, LUE and LLE - 5/5. some hypertonus on L UE and LE. and bulk. No tenderness, twitching, tremors or involuntary movements.  Sensory examination:  No withdrawal mvmnts from R side of his extremities. .  Reflexes:   2+ b/l biceps, triceps, patella and achilles.  Plantar response downgoing b/l.  Jaw jerk, Masoud, clonus absent.  Cerebellum:   FTN/HKS unable to assess  Gait deferred     NIHSS: 11    Fingerstick Blood Glucose: CAPILLARY BLOOD GLUCOSE      POCT Blood Glucose.: 218 mg/dL (13 Oct 2022 12:23)       LABS:      RADIOLOGY & ADDITIONAL STUDIES:  CT PERFUSION:  Scattered regions of hypoperfusion in the bilateral PCA and left SUSANNE territories with evidence of core infarct within the left SUSANNE territory.    CTA HEAD:  Left SUSANNE occlusion involving the proximal A2 segment.    Severe stenosis of the proximal P2 segment of the right PCA as well as moderate stenosis on the left.    CTA NECK:  No evidence of carotid or vertebral artery stenosis.        IV-tPA (Y/N):    N                               Bolus time: N  Reason IV-tPA not given: out of window

## 2022-10-13 NOTE — STROKE CODE NOTE - NSSTROKETPAINCL_ONSET
unclear time of onset wake-up with stroke symptoms clear defined time onset within 4.5 hours of treatment

## 2022-10-13 NOTE — PATIENT PROFILE ADULT - FALL HARM RISK - HARM RISK INTERVENTIONS

## 2022-10-13 NOTE — ED PROVIDER NOTE - OBJECTIVE STATEMENT
76M with PMH of CAD (s/p CABG x3 in 2017), HTN, DM type II, HLD, GERD, BPH. stroke in 2017 (residual L sided weakness), and recent stroke (evaluated at [facility] 2 wks ago) with residual right sided hemiparesis (discharge papers unable to be found), presents with report of increased right facial droop and right-sided weakness today. Per chart, MRI Head at [facility] revealed acute lacunar infarcts involving the deep white matter of the left frontal lobe at the level of the centrum semiovale. Stroke code called on ED arrival at [facility], NIHSS 21, BP 82/47. CTH showed left SUSANNE territory acute/subacute infarct with suggestion of trace petechial hemorrhagic transformation. Also showed bilateral basal ganglialacunar infarcts, age indeterminate involving the right caudate head. CTP showed scattered regions of hypoperfusion in the bilateral PCA and left SUSANNE territories with evidence of core infarct within the left SUSANNE territory. CTA H/N showed; 1. left SUSANNE occlusion involving the proximal A2 segment; 2. Severe stenosis of the proximal P2 segment of the right PCA as well as moderate stenosis on the left. He is not a candidate for tPA and NI. spoke with Dr. Elizabeth, neurology attending, who recommended pt to be admitted to stroke unit.

## 2022-10-13 NOTE — DISCHARGE NOTE NURSING/CASE MANAGEMENT/SOCIAL WORK - NSDCPEFALRISK_GEN_ALL_CORE
For information on Fall & Injury Prevention, visit: [email or web address] OR  [email or web address] OR  https://www.cdc.gov/steadi/patient.html

## 2022-10-14 LAB
A1C WITH ESTIMATED AVERAGE GLUCOSE RESULT: 7.2 % — HIGH (ref 4–5.6)
ANION GAP SERPL CALC-SCNC: 10 MMOL/L — SIGNIFICANT CHANGE UP (ref 7–14)
ANION GAP SERPL CALC-SCNC: 12 MMOL/L — SIGNIFICANT CHANGE UP (ref 7–14)
BUN SERPL-MCNC: 30 MG/DL — HIGH (ref 10–20)
BUN SERPL-MCNC: 33 MG/DL — HIGH (ref 10–20)
CALCIUM SERPL-MCNC: 9.4 MG/DL — SIGNIFICANT CHANGE UP (ref 8.4–10.5)
CALCIUM SERPL-MCNC: 9.5 MG/DL — SIGNIFICANT CHANGE UP (ref 8.4–10.5)
CHLORIDE SERPL-SCNC: 103 MMOL/L — SIGNIFICANT CHANGE UP (ref 98–110)
CHLORIDE SERPL-SCNC: 105 MMOL/L — SIGNIFICANT CHANGE UP (ref 98–110)
CHOLEST SERPL-MCNC: 210 MG/DL — HIGH
CO2 SERPL-SCNC: 25 MMOL/L — SIGNIFICANT CHANGE UP (ref 17–32)
CO2 SERPL-SCNC: 25 MMOL/L — SIGNIFICANT CHANGE UP (ref 17–32)
CREAT SERPL-MCNC: 2 MG/DL — HIGH (ref 0.7–1.5)
CREAT SERPL-MCNC: 2 MG/DL — HIGH (ref 0.7–1.5)
EGFR: 34 ML/MIN/1.73M2 — LOW
EGFR: 34 ML/MIN/1.73M2 — LOW
ESTIMATED AVERAGE GLUCOSE: 160 MG/DL — HIGH (ref 68–114)
GLUCOSE BLDC GLUCOMTR-MCNC: 107 MG/DL — HIGH (ref 70–99)
GLUCOSE BLDC GLUCOMTR-MCNC: 109 MG/DL — HIGH (ref 70–99)
GLUCOSE BLDC GLUCOMTR-MCNC: 120 MG/DL — HIGH (ref 70–99)
GLUCOSE BLDC GLUCOMTR-MCNC: 159 MG/DL — HIGH (ref 70–99)
GLUCOSE SERPL-MCNC: 106 MG/DL — HIGH (ref 70–99)
GLUCOSE SERPL-MCNC: 149 MG/DL — HIGH (ref 70–99)
HDLC SERPL-MCNC: 53 MG/DL — SIGNIFICANT CHANGE UP
LIPID PNL WITH DIRECT LDL SERPL: 107 MG/DL — HIGH
NON HDL CHOLESTEROL: 157 MG/DL — HIGH
POTASSIUM SERPL-MCNC: 4 MMOL/L — SIGNIFICANT CHANGE UP (ref 3.5–5)
POTASSIUM SERPL-MCNC: 5.2 MMOL/L — HIGH (ref 3.5–5)
POTASSIUM SERPL-SCNC: 4 MMOL/L — SIGNIFICANT CHANGE UP (ref 3.5–5)
POTASSIUM SERPL-SCNC: 5.2 MMOL/L — HIGH (ref 3.5–5)
SODIUM SERPL-SCNC: 140 MMOL/L — SIGNIFICANT CHANGE UP (ref 135–146)
SODIUM SERPL-SCNC: 140 MMOL/L — SIGNIFICANT CHANGE UP (ref 135–146)
TRIGL SERPL-MCNC: 248 MG/DL — HIGH

## 2022-10-14 PROCEDURE — 99223 1ST HOSP IP/OBS HIGH 75: CPT

## 2022-10-14 PROCEDURE — 99232 SBSQ HOSP IP/OBS MODERATE 35: CPT

## 2022-10-14 PROCEDURE — 95819 EEG AWAKE AND ASLEEP: CPT | Mod: 26

## 2022-10-14 RX ORDER — METOPROLOL TARTRATE 50 MG
25 TABLET ORAL DAILY
Refills: 0 | Status: DISCONTINUED | OUTPATIENT
Start: 2022-10-14 | End: 2022-10-15

## 2022-10-14 RX ORDER — LABETALOL HCL 100 MG
5 TABLET ORAL ONCE
Refills: 0 | Status: COMPLETED | OUTPATIENT
Start: 2022-10-14 | End: 2022-10-14

## 2022-10-14 RX ORDER — LEVETIRACETAM 250 MG/1
500 TABLET, FILM COATED ORAL EVERY 12 HOURS
Refills: 0 | Status: DISCONTINUED | OUTPATIENT
Start: 2022-10-14 | End: 2022-10-15

## 2022-10-14 RX ORDER — NIFEDIPINE 30 MG
30 TABLET, EXTENDED RELEASE 24 HR ORAL DAILY
Refills: 0 | Status: DISCONTINUED | OUTPATIENT
Start: 2022-10-14 | End: 2022-10-15

## 2022-10-14 RX ADMIN — HEPARIN SODIUM 5000 UNIT(S): 5000 INJECTION INTRAVENOUS; SUBCUTANEOUS at 21:53

## 2022-10-14 RX ADMIN — Medication 75 MILLIGRAM(S): at 11:25

## 2022-10-14 RX ADMIN — GABAPENTIN 100 MILLIGRAM(S): 400 CAPSULE ORAL at 02:10

## 2022-10-14 RX ADMIN — Medication 25 MILLIGRAM(S): at 11:24

## 2022-10-14 RX ADMIN — LEVETIRACETAM 400 MILLIGRAM(S): 250 TABLET, FILM COATED ORAL at 12:52

## 2022-10-14 RX ADMIN — HEPARIN SODIUM 5000 UNIT(S): 5000 INJECTION INTRAVENOUS; SUBCUTANEOUS at 13:27

## 2022-10-14 RX ADMIN — GABAPENTIN 100 MILLIGRAM(S): 400 CAPSULE ORAL at 17:24

## 2022-10-14 RX ADMIN — ATORVASTATIN CALCIUM 80 MILLIGRAM(S): 80 TABLET, FILM COATED ORAL at 21:53

## 2022-10-14 RX ADMIN — TICAGRELOR 90 MILLIGRAM(S): 90 TABLET ORAL at 11:27

## 2022-10-14 RX ADMIN — HEPARIN SODIUM 5000 UNIT(S): 5000 INJECTION INTRAVENOUS; SUBCUTANEOUS at 05:28

## 2022-10-14 RX ADMIN — LEVETIRACETAM 400 MILLIGRAM(S): 250 TABLET, FILM COATED ORAL at 23:31

## 2022-10-14 RX ADMIN — TICAGRELOR 90 MILLIGRAM(S): 90 TABLET ORAL at 23:30

## 2022-10-14 RX ADMIN — Medication 30 MILLIGRAM(S): at 11:24

## 2022-10-14 RX ADMIN — TICAGRELOR 90 MILLIGRAM(S): 90 TABLET ORAL at 02:11

## 2022-10-14 RX ADMIN — ATORVASTATIN CALCIUM 80 MILLIGRAM(S): 80 TABLET, FILM COATED ORAL at 02:10

## 2022-10-14 RX ADMIN — Medication 81 MILLIGRAM(S): at 11:24

## 2022-10-14 NOTE — PROGRESS NOTE ADULT - ATTENDING COMMENTS
Pt is a 75 yo M with PMhx of CAD s/p CABG, HTN, DM, HLD, prior ischemic stroke, recent L SUSANNE stroke (treated at [facility]) with residual right hemiplegia, who presents from [facility] due to unresponsiveness.     Pt back to baseline today. event was most likely a seizure. start LEV 500mg BID. No evidence of new acute ischemic stroke on MRI brain. Will try to obtain records from [facility]. Continue DAPT/statin.

## 2022-10-14 NOTE — SWALLOW BEDSIDE ASSESSMENT ADULT - SLP PERTINENT HISTORY OF CURRENT PROBLEM
76 y.O M adm w/ worsening R side weakness & facial droop. MRI Head at [facility] revealed acute lacunar infarcts involving the deep white matter of  L frontal lobe at the level of the centrum semiovale. Stroke code called on ED arrival at [facility]. PMH of CAD (s/p CABG x3 in 2017), HTN, DM type II, HLD, GERD, BPH. stroke in 2017 (residual L sided weakness), and recent stroke (evaluated at [facility] 2 wks ago) with residual right sided hemiparesis. 76 y.O M adm w/ worsening R side weakness & facial droop from [facility]. MRI Head at [facility] revealed acute lacunar infarcts involving the deep white matter of  L frontal lobe at the level of the centrum semiovale. Stroke code called on ED arrival at [facility]. PMH of CAD (s/p CABG x3 in 2017), HTN, DM type II, HLD, GERD, BPH. stroke in 2017 (residual L sided weakness), & recent stroke (evaluated at [facility] 2 wks ago) w/ residual R sided rakesh. CTH + L SUSANNE acute/subacute infarct w/ suggestion of trace petechial hemorrhagic transoformation. B/L BG lacunar infarcts, age indeterminate involv. R caudate head. CTP - scattered areas of hypoperfusion in b/l PCA and L SUSANNE territories w/ evidence of core infarct w/i L SUSANNE territory. CTA H/N  L SUSANNE occlusion involv. A2 segment , severe stenosis of proximal P2 segment of R PCA as well as moderate stenosis on the L . No tPA or Ni intervention.

## 2022-10-14 NOTE — OCCUPATIONAL THERAPY INITIAL EVALUATION ADULT - ADL RETRAINING, OT EVAL
Patient will perform upper body dressing with supervision by discharge. ; Patient will perform lower body dressing with CGA with use of appropriate adaptive equipment as needed by discharge.

## 2022-10-14 NOTE — PROGRESS NOTE ADULT - SUBJECTIVE AND OBJECTIVE BOX
NEUROLOGY CONSULT PROGRESS NOTE    INTERVAL HISTORY:      REVIEW OF SYSTEMS:  As per HPI, otherwise negative for Constitutional, Eyes, Ears/Nose/Mouth/Throat, Neck, Cardiovascular, Respiratory, Gastrointestinal, Genitourinary, Skin, Endocrine, Musculoskeletal, Psychiatric, and Hematologic/Lymphatic.    MEDICATIONS:  acetaminophen     Tablet .. 650 milliGRAM(s) Oral every 6 hours PRN  aspirin  chewable 81 milliGRAM(s) Enteral Tube daily  atorvastatin 80 milliGRAM(s) Oral at bedtime  gabapentin 100 milliGRAM(s) Oral every 12 hours  heparin   Injectable 5000 Unit(s) SubCutaneous every 8 hours  levETIRAcetam  IVPB 500 milliGRAM(s) IV Intermittent every 12 hours  metoprolol succinate ER 25 milliGRAM(s) Oral daily  NIFEdipine XL 30 milliGRAM(s) Oral daily  pantoprazole    Tablet 40 milliGRAM(s) Oral before breakfast  ticagrelor 90 milliGRAM(s) Oral every 12 hours  venlafaxine XR. 75 milliGRAM(s) Oral daily    VITAL SIGNS:  Vital Signs Last 24 Hrs  T(C): 36.6 (14 Oct 2022 08:00), Max: 36.6 (14 Oct 2022 08:00)  T(F): 97.8 (14 Oct 2022 08:00), Max: 97.8 (14 Oct 2022 08:00)  HR: 80 (14 Oct 2022 12:00) (62 - 80)  BP: 178/79 (14 Oct 2022 12:00) (110/68 - 199/93)  BP(mean): 103 (14 Oct 2022 12:00) (103 - 150)  RR: 18 (14 Oct 2022 12:00) (16 - 19)  SpO2: 100% (14 Oct 2022 12:00) (97% - 100%)    Parameters below as of 14 Oct 2022 12:00  Patient On (Oxygen Delivery Method): room air        PHYSICAL EXAMINATION:  Constitutional: WDWN; NAD  Eyes: anicteric sclera  Cardiovascular: +S1/S2, RRR; no M/R/G  Neurologic:  - Mental Status:  Awake, alert, and oriented to person, place, time, but not date.  Could not recall events of the day (did not know what happened this morning or why he was here). Naming, repetition and comprehension intact.  Speech slow and intentional, mildly dysarthric.   - Cranial Nerves II-XII:    II:  PERRLA; visual fields are full to confrontation  III, IV, VI:  EOMI, no nystagmus  V:  facial sensation is intact in the V1-V3 distribution bilaterally.  VII:  face is asymmetric with mild left facial supranuclear palsy  VIII:  hearing is intact to finger rub  IX, X:  uvula is midline and soft palate rises symmetrically with residual nasal dysarthria  XI:  head turning and shoulder shrug are intact bilaterally  XII:  tongue protrudes in the midline  - Motor:  strength is 5/5 throughout; normal muscle bulk and tone throughout; no pronator drift  - Reflexes:  2+L/3+R at the biceps, triceps, brachioradialis, knees, and ankles;  plantar reflexes upgoing left side  - Sensory:  Decreased sensation to light touch along left side compared to right  - Coordination:  finger-nose-finger and heel-knee-shin intact without dysmetria; rapid alternating hand movements intact  - Gait: untested    LABS:                          12.9   10.57 )-----------( 275      ( 13 Oct 2022 12:38 )             40.5     10-14    140  |  105  |  33<H>  ----------------------------<  106<H>  5.2<H>   |  25  |  2.0<H>    Ca    9.5      14 Oct 2022 05:51    TPro  6.5  /  Alb  4.1  /  TBili  0.4  /  DBili  x   /  AST  22  /  ALT  14  /  AlkPhos  41  10-13    PT/INR - ( 13 Oct 2022 12:38 )   PT: 10.70 sec;   INR: 0.94 ratio         PTT - ( 13 Oct 2022 12:38 )  PTT:27.2 sec      RADIOLOGY & ADDITIONAL STUDIES:      IMPRESSION & RECOMMENDATIONS:   NEUROLOGY CONSULT PROGRESS NOTE  HPI: 77 y/o M w/ PMH of stroke in 2017 within the right posterior frontal lobe w/ residual left sided weakness, CAD, HTN, CKD, DMII, and HLD who presented to ED yesterday due to increased right sided facial droop and right sided weakness. Based on chart, pt had a recent stroke that was evaluated at [facility] 2 weeks ago that left the patient with residual right sided hemiparesis. MRI head from 7/24/21 showed acute lacunar infarct involving the deep white matter of the left frontal lobe at level of centrum semiovale. Yesterday, pt NIHSS was 21 and BP was 82/42. CTH from yesterday showed L. SUSANNE territory acute/subacute infarct w/ possible trace petechial hemorrhagic transformation. There were also age indeterminate bilateral basal ganglia lacunar infarcts involving the right caudate head. CTP showed scattered regions of hypoperfusion in bilateral PCA and L. SUSANNE territories with evidence of core infarct within the L.SUSANNE territory. CTA of head showed L. SUSANNE occlusion involving the proximal A2 segment and severe stenosis of proximal P2 segment of R. PCA with moderate stenosis on the left. He did not receive tPA due to a recent stroke. Pt takes aspirin 81 mg and plavix 75 mg at home. He was precribed Brilinta on 10/10/22 according to his pharmacy, but never picked it up.     INTERVAL HISTORY:    10/14/22- pt seen and examined at bedside. He complains of a left frontal headache and dizziness with head movement. He is alert and oriented to self, place, and month. NIHSS today is 6. Pt denies nausea, CP, SOB, and numbness/tingling.     REVIEW OF SYSTEMS:  As per HPI, otherwise negative for Constitutional, Eyes, Ears/Nose/Mouth/Throat, Neck, Cardiovascular, Respiratory, Gastrointestinal, Genitourinary, Skin, Endocrine, Musculoskeletal, Psychiatric, and Hematologic/Lymphatic.    MEDICATIONS:  acetaminophen     Tablet .. 650 milliGRAM(s) Oral every 6 hours PRN  aspirin  chewable 81 milliGRAM(s) Enteral Tube daily  atorvastatin 80 milliGRAM(s) Oral at bedtime  gabapentin 100 milliGRAM(s) Oral every 12 hours  heparin   Injectable 5000 Unit(s) SubCutaneous every 8 hours  levETIRAcetam  IVPB 500 milliGRAM(s) IV Intermittent every 12 hours  metoprolol succinate ER 25 milliGRAM(s) Oral daily  NIFEdipine XL 30 milliGRAM(s) Oral daily  pantoprazole    Tablet 40 milliGRAM(s) Oral before breakfast  ticagrelor 90 milliGRAM(s) Oral every 12 hours  venlafaxine XR. 75 milliGRAM(s) Oral daily    VITAL SIGNS:  Vital Signs Last 24 Hrs  T(C): 36.6 (14 Oct 2022 08:00), Max: 36.6 (14 Oct 2022 08:00)  T(F): 97.8 (14 Oct 2022 08:00), Max: 97.8 (14 Oct 2022 08:00)  HR: 80 (14 Oct 2022 12:00) (62 - 80)  BP: 178/79 (14 Oct 2022 12:00) (110/68 - 199/93)  BP(mean): 103 (14 Oct 2022 12:00) (103 - 150)  RR: 18 (14 Oct 2022 12:00) (16 - 19)  SpO2: 100% (14 Oct 2022 12:00) (97% - 100%)    Parameters below as of 14 Oct 2022 12:00  Patient On (Oxygen Delivery Method): room air        PHYSICAL EXAMINATION:  Constitutional: WDWN; NAD  Eyes: anicteric sclera  Cardiovascular: +S1/S2, RRR; no M/R/G  Neurologic:  - Mental Status:  Awake, alert, and oriented to person, place, time, but not date.  Could not recall events of the day (did not know what happened this morning or why he was here). Naming, repetition and comprehension intact.  Speech slow and intentional, mildly dysarthric.   - Cranial Nerves II-XII:    II:  PERRLA; visual fields are full to confrontation  III, IV, VI:  EOMI, no nystagmus  V:  facial sensation is intact in the V1-V3 distribution bilaterally.  VII:  face is asymmetric with mild left facial supranuclear palsy  VIII:  hearing is intact to finger rub  IX, X:  uvula is midline and soft palate rises symmetrically with residual nasal dysarthria  XI:  head turning and shoulder shrug are intact bilaterally  XII:  tongue protrudes in the midline  - Motor:  strength is 5/5 throughout; normal muscle bulk and tone throughout; no pronator drift  - Reflexes:  2+L/3+R at the biceps, triceps, brachioradialis, knees, and ankles;  plantar reflexes upgoing left side  - Sensory:  Decreased sensation to light touch along left side compared to right  - Coordination:  finger-nose-finger and heel-knee-shin intact without dysmetria; rapid alternating hand movements intact  - Gait: untested    LABS:                          12.9   10.57 )-----------( 275      ( 13 Oct 2022 12:38 )             40.5     10-14    140  |  105  |  33<H>  ----------------------------<  106<H>  5.2<H>   |  25  |  2.0<H>    Ca    9.5      14 Oct 2022 05:51    TPro  6.5  /  Alb  4.1  /  TBili  0.4  /  DBili  x   /  AST  22  /  ALT  14  /  AlkPhos  41  10-13    PT/INR - ( 13 Oct 2022 12:38 )   PT: 10.70 sec;   INR: 0.94 ratio         PTT - ( 13 Oct 2022 12:38 )  PTT:27.2 sec      RADIOLOGY & ADDITIONAL STUDIES:      IMPRESSION & RECOMMENDATIONS:

## 2022-10-14 NOTE — CONSULT NOTE ADULT - CONSULT REASON
Stroke code activation, LAMS score - 4
stroke-like Sx, medical mgmt
right side weakness and right facial droop

## 2022-10-14 NOTE — SWALLOW BEDSIDE ASSESSMENT ADULT - SWALLOW EVAL: RECOMMENDED FEEDING/EATING TECHNIQUES
swallow twice , gives meds 1 at time in puree with liquid wash/maintain upright posture during/after eating for 30 mins

## 2022-10-14 NOTE — CONSULT NOTE ADULT - SUBJECTIVE AND OBJECTIVE BOX
HPI:  76M with PMH of CAD (s/p CABG x3 in 2017), HTN, DM type II, HLD, GERD, BPH. stroke in 2017 (residual L sided weakness), and recent stroke (evaluated at [facility] 2 wks ago) with residual right sided hemiparesis (discharge papers unable to be found), presents with report of increased right facial droop and right-sided weakness today. Per chart, MRI Head at [facility] revealed acute lacunar infarcts involving the deep white matter of the left frontal lobe at the level of the centrum semiovale. Stroke code called on ED arrival at [facility], NIHSS 21, BP 82/47. CTH showed left SUSANNE territory acute/subacute infarct with suggestion of trace petechial hemorrhagic transformation. Also showed bilateral basal ganglialacunar infarcts, age indeterminate involving the right caudate head. CTP showed scattered regions of hypoperfusion in the bilateral PCA and left SUSANNE territories with evidence of core infarct within the left SUSANNE territory. CTA H/N showed; 1. left SUSANNE occlusion involving the proximal A2 segment; 2. Severe stenosis of the proximal P2 segment of the right PCA as well as moderate stenosis on the left. He is not a candidate for tPA and NI.        PAST MEDICAL & SURGICAL HISTORY:  CAD (coronary artery disease)      HTN (hypertension)      Depression      Anxiety      Diabetes  niddm      Angina pectoris      BPH (benign prostatic hyperplasia)      GERD (gastroesophageal reflux disease)      CVA (cerebral vascular accident)      History of appendectomy      Bilateral cataracts      History of heart bypass surgery          Hospital Course:    TODAY'S SUBJECTIVE & REVIEW OF SYMPTOMS:     Constitutional WNL   Cardio WNL   Resp WNL   GI WNL  Heme WNL  Endo WNL  Skin WNL  MSK WNL  Neuro Weakness  Cognitive WNL  Psych WNL      MEDICATIONS  (STANDING):  aspirin  chewable 81 milliGRAM(s) Enteral Tube daily  atorvastatin 80 milliGRAM(s) Oral at bedtime  gabapentin 100 milliGRAM(s) Oral every 12 hours  heparin   Injectable 5000 Unit(s) SubCutaneous every 8 hours  levETIRAcetam  IVPB 500 milliGRAM(s) IV Intermittent every 12 hours  metoprolol succinate ER 25 milliGRAM(s) Oral daily  NIFEdipine XL 30 milliGRAM(s) Oral daily  pantoprazole    Tablet 40 milliGRAM(s) Oral before breakfast  ticagrelor 90 milliGRAM(s) Oral every 12 hours  venlafaxine XR. 75 milliGRAM(s) Oral daily    MEDICATIONS  (PRN):  acetaminophen     Tablet .. 650 milliGRAM(s) Oral every 6 hours PRN Temp greater or equal to 38C (100.4F), Mild Pain (1 - 3)      FAMILY HISTORY:      Allergies    clindamycin (Rash)  PC Pen VK (Rash)  penicillin (Rash)    Intolerances        SOCIAL HISTORY:    [  ] Etoh  [  ] Smoking  [  ] Substance abuse     Home Environment:  [   ] Home Alone  [   ] Lives with Family  [   ] Home Health Aid    Dwelling:  [   ] Apartment  [   ] Private House  [   ] Adult Home  [   ] Skilled Nursing Facility      [ x  ] Short Term  [   ] Long Term  [   ] Stairs       Elevator [   ]    FUNCTIONAL STATUS PTA: (Check all that apply)  Ambulation: [    ]Independent    [ x  ] Dependent     [   ] Non-Ambulatory  Assistive Device: [   ] SA Cane  [   ]  Q Cane  [  x ] Walker  [   ]  Wheelchair  ADL : [   ] Independent  [   x ]  Dependent       Vital Signs Last 24 Hrs  T(C): 36.6 (14 Oct 2022 08:00), Max: 36.6 (14 Oct 2022 08:00)  T(F): 97.8 (14 Oct 2022 08:00), Max: 97.8 (14 Oct 2022 08:00)  HR: 74 (14 Oct 2022 08:00) (60 - 75)  BP: 198/99 (14 Oct 2022 08:00) (82/47 - 199/93)  BP(mean): 150 (14 Oct 2022 08:00) (109 - 150)  RR: 18 (14 Oct 2022 08:00) (16 - 19)  SpO2: 100% (14 Oct 2022 08:00) (97% - 100%)    Parameters below as of 14 Oct 2022 08:00  Patient On (Oxygen Delivery Method): room air          PHYSICAL EXAM: Awake & Alert  GENERAL: NAD  HEAD:  Normocephalic  CHEST/LUNG: Clear   HEART: S1S2+  ABDOMEN: Soft, Nontender  EXTREMITIES:  no calf tenderness    NERVOUS SYSTEM:  Cranial Nerves 2-12 intact [   ] Abnormal  [ x  ]left facial droop  ROM: WFL all extremities [   ]  Abnormal [ x  ]limited LUE  Motor Strength: WFL all extremities  [   ]  Abnormal [  x ] 3-4/5 left side   Sensation: intact to light touch [ x  ] Abnormal [   ]    FUNCTIONAL STATUS:  Bed Mobility: Independent [   ]  Supervision [   ]  Needs Assistance [ x  ]  N/A [   ]  Transfers: Independent [   ]  Supervision [   ]  Needs Assistance [ x  ]  N/A [   ]   Ambulation: Independent [   ]  Supervision [   ]  Needs Assistance [x   ]  N/A [   ]  ADL: Independent [   ] Requires Assistance [   ] N/A [   ]      LABS:                        12.9   10.57 )-----------( 275      ( 13 Oct 2022 12:38 )             40.5     10-14    140  |  105  |  33<H>  ----------------------------<  106<H>  5.2<H>   |  25  |  2.0<H>    Ca    9.5      14 Oct 2022 05:51    TPro  6.5  /  Alb  4.1  /  TBili  0.4  /  DBili  x   /  AST  22  /  ALT  14  /  AlkPhos  41  10-13    PT/INR - ( 13 Oct 2022 12:38 )   PT: 10.70 sec;   INR: 0.94 ratio         PTT - ( 13 Oct 2022 12:38 )  PTT:27.2 sec      RADIOLOGY & ADDITIONAL STUDIES:

## 2022-10-14 NOTE — PHYSICAL THERAPY INITIAL EVALUATION ADULT - GAIT DISTANCE, PT EVAL
declined further ambulation 2* to fatigue . BP in b/s chair : 198/82 HR 80 bpm. Dr. Eleuterio Villafuerte aware./bed to chair/5 feet

## 2022-10-14 NOTE — CONSULT NOTE ADULT - ASSESSMENT
IMPRESSION: Rehab of L CVA with old R CVA with L HP / left facial droop    PRECAUTIONS: [   ] Cardiac  [   ] Respiratory  [   ] Seizures [   ] Contact Isolation  [   ] Droplet Isolation  [   ] Other    Weight Bearing Status:     RECOMMENDATION:    Out of Bed to Chair     DVT/Decubiti Prophylaxis    REHAB PLAN:     [  x  ] Bedside P/T 3-5 times a week   [ x   ]   Bedside O/T  2-3 times a week             [  x  ] Speech Therapy               [    ]  No Rehab Therapy Indicated   Conditioning/ROM                                    ADL  Bed Mobility                                               Conditioning/ROM  Transfers                                                     Bed Mobility  Sitting /Standing Balance                         Transfers                                        Gait Training                                               Sitting/Standing Balance  Stair Training [   ]Applicable                    Home equipment Eval                                                                        Splinting  [   ] Only      GOALS:   ADL   [  x  ]   Independent                    Transfers  [ x   ] Independent                          Ambulation  [ x   ] Independent     [   x  ] With device                            [    ]  CG                                                         [    ]  CG                                                                  [    ] CG                            [    ] Min A                                                   [    ] Min A                                                              [    ] Min  A          DISCHARGE PLAN:   [    ]  Good candidate for Intensive Rehabilitation/Hospital based                                             Will tolerate 3hrs Intensive Rehab Daily                                       [  x   ]  Short Term Rehab in Skilled Nursing Facility / SNF                                       [     ]  Home with Outpatient or VN services                                         [     ]  Possible Candidate for Intensive Hospital based Rehab

## 2022-10-14 NOTE — SWALLOW BEDSIDE ASSESSMENT ADULT - PHARYNGEAL PHASE
Within functional limits mild change in vocal qulaity after intake, cleared with secondary swallow./Wet vocal quality post oral intake

## 2022-10-14 NOTE — PHYSICAL THERAPY INITIAL EVALUATION ADULT - PERTINENT HX OF CURRENT PROBLEM, REHAB EVAL
pt is a left handed 76M admitted from [facility] with PMH of CAD (s/p CABG x3 in 2017), HTN, DM type II, HLD, GERD, BPH. stroke in 2017 (residual L sided weakness), and recent stroke (evaluated at [facility] 2 wks ago) with residual right sided hemiparesis (discharge papers unable to be found), presents with report of increased right facial droop and right-sided weakness today. Per chart, MRI Head at [facility] revealed acute lacunar infarcts involving the deep white matter of the left frontal lobe at the level of the centrum semiovale. Stroke code called on ED arrival at [facility], NIHSS 21, BP 82/47. CTH showed left SUSANNE territory acute/subacute infarct with suggestion of trace petechial hemorrhagic transformation. Also showed bilateral basal ganglialacunar infarcts, age indeterminate involving the right caudate head. CTP showed scattered regions of hypoperfusion in the bilateral PCA and left SUSANNE territories with evidence of core infarct within the left SUSANNE territory. CTA H/N showed; 1. left SUSANNE occlusion involving the proximal A2 segment; 2. Severe stenosis of the proximal P2 segment of the right PCA as well as moderate stenosis on the left. He is not a candidate for tPA and NI.

## 2022-10-14 NOTE — PHYSICAL THERAPY INITIAL EVALUATION ADULT - GENERAL OBSERVATIONS, REHAB EVAL
9:45 Pt encountered semifowler in bed in NAD. + NGT. Agreeable for PT. BP supine: 199/98 HR 82 bpm, Seated: 171/72 HR 84 bpm, Standin/50 HR 83 bpm.  Pt asymptomatic.

## 2022-10-14 NOTE — SWALLOW BEDSIDE ASSESSMENT ADULT - SWALLOW EVAL: DIAGNOSIS
+ toleration of puree, minced & moist, and easy to chew textures . Mild change in vocal quality with thin liquids, no change in respiratory status, voice clears with secondary swallow.

## 2022-10-14 NOTE — CONSULT NOTE ADULT - SUBJECTIVE AND OBJECTIVE BOX
ERICK MARTINEZ  76y  Male    Patient is a 76y old  Male who presents with a chief complaint of Right facial droop, right-sided weakness (13 Oct 2022 14:02)      HPI:  76M with PMH of CAD (s/p CABG x3 in 2017), HTN, DM type II, HLD, GERD, BPH. stroke in 2017 (residual L sided weakness), and recent stroke (evaluated at [facility] 2 wks ago) with residual right sided hemiparesis (discharge papers unable to be found), presents with report of increased right facial droop and right-sided weakness today. Per chart, MRI Head at [facility] revealed acute lacunar infarcts involving the deep white matter of the left frontal lobe at the level of the centrum semiovale. Stroke code called on ED arrival at [facility], NIHSS 21, BP 82/47. CTH showed left SUSANNE territory acute/subacute infarct with suggestion of trace petechial hemorrhagic transformation. Also showed bilateral basal ganglialacunar infarcts, age indeterminate involving the right caudate head. CTP showed scattered regions of hypoperfusion in the bilateral PCA and left SUSANNE territories with evidence of core infarct within the left SUSANNE territory. CTA H/N showed; 1. left SUSANNE occlusion involving the proximal A2 segment; 2. Severe stenosis of the proximal P2 segment of the right PCA as well as moderate stenosis on the left. He is not a candidate for tPA and NI.   (13 Oct 2022 14:02)    S: Patient was examined and seen at bedside. This morning pt is resting comfortably in bed and reports no new issues or overnight events. No complaints, feels better  Denies CP, SOB, N/V/D/C/AP, cough, F, chills, dizziness, new focal weakness, HA, vision changes, dysuria, or urinary symptoms, blood in stool.  ROS: all other systems reviewed and are negative    PAST MEDICAL & SURGICAL HISTORY:  CAD (coronary artery disease)      HTN (hypertension)      Depression      Anxiety      Diabetes  niddm      Angina pectoris      BPH (benign prostatic hyperplasia)      GERD (gastroesophageal reflux disease)      CVA (cerebral vascular accident)      History of appendectomy      Bilateral cataracts      History of heart bypass surgery        SOCIAL HISTORY:  Tobacco: denies  Illicit drugs: denies  Alcohol: social  Family history reviewed and otherwise non-contributory No clotting disorders, CVAs at early age.  ALLERGIES: PCN, Clinda    MEDICATIONS  (STANDING):  aspirin  chewable 81 milliGRAM(s) Enteral Tube daily  atorvastatin 80 milliGRAM(s) Oral at bedtime  gabapentin 100 milliGRAM(s) Oral every 12 hours  heparin   Injectable 5000 Unit(s) SubCutaneous every 8 hours  pantoprazole    Tablet 40 milliGRAM(s) Oral before breakfast  ticagrelor 90 milliGRAM(s) Oral every 12 hours  venlafaxine XR. 75 milliGRAM(s) Oral daily    MEDICATIONS  (PRN):  acetaminophen     Tablet .. 650 milliGRAM(s) Oral every 6 hours PRN Temp greater or equal to 38C (100.4F), Mild Pain (1 - 3)    Home Medications:  acetaminophen 325 mg oral tablet: 2 or 3 tab(s) orally every 6 hours, As needed, for pain or fever (13 Oct 2022 15:31)  aspirin 81 mg oral tablet: 1 tab(s) orally once a day (13 Oct 2022 15:31)  fenofibrate 120 mg oral tablet: 1 tab(s) orally once a day (13 Oct 2022 15:31)  linagliptin 5 mg oral tablet: 1 tab(s) orally once a day (13 Oct 2022 15:31)  losartan 25 mg oral tablet: 1 tab(s) orally once a day (13 Oct 2022 15:31)  metoprolol succinate 25 mg oral tablet, extended release: 1 tab(s) orally every 12 hours (13 Oct 2022 15:31)  Pentoxil 400 mg oral tablet, extended release: 1 tab(s) orally 3 times a day (13 Oct 2022 15:31)  Plavix 75 mg oral tablet: 1 tab(s) orally once a day (13 Oct 2022 15:31)  polyethylene glycol 3350 oral powder for reconstitution: 17 gram(s) orally once a day, As needed, Constipation (13 Oct 2022 15:31)  Prevacid 30 mg oral delayed release capsule: 1 cap(s) orally once a day (13 Oct 2022 15:31)  Ranexa 500 mg oral tablet, extended release: 1 tab(s) orally 2 times a day (13 Oct 2022 15:31)  senna oral tablet: 1 or 2 tab(s) orally once a day (at bedtime), As needed, Constipation (13 Oct 2022 15:31)  tamsulosin 0.4 mg oral capsule: 1 cap(s) orally once a day (13 Oct 2022 15:31)  venlafaxine 75 mg oral capsule, extended release: 1 cap(s) orally once a day (13 Oct 2022 15:31)          Vital Signs Last 24 Hrs  T(C): 36.6 (14 Oct 2022 08:00), Max: 36.6 (14 Oct 2022 08:00)  T(F): 97.8 (14 Oct 2022 08:00), Max: 97.8 (14 Oct 2022 08:00)  HR: 74 (14 Oct 2022 08:00) (60 - 75)  BP: 198/99 (14 Oct 2022 08:00) (82/47 - 199/93)  BP(mean): 150 (14 Oct 2022 08:00) (109 - 150)  RR: 18 (14 Oct 2022 08:00) (16 - 19)  SpO2: 100% (14 Oct 2022 08:00) (97% - 100%)    Parameters below as of 14 Oct 2022 08:00  Patient On (Oxygen Delivery Method): room air      CAPILLARY BLOOD GLUCOSE  218 (13 Oct 2022 12:27)      POCT Blood Glucose.: 109 mg/dL (14 Oct 2022 07:57)  POCT Blood Glucose.: 120 mg/dL (13 Oct 2022 22:54)  POCT Blood Glucose.: 218 mg/dL (13 Oct 2022 12:23)      General: NAD. Looks stated age.  HEENT: clean oropharynx, EOMI, no LAD  Neck: trachea midline, no thyromegaly  CV: nl S1 S2; no m/r/g  Resp: decreased breath sounds at base  GI: NT/ND/S +BS  MS: no clubbing/cyanosis/edema, +pulses  Neuro: motor, sensory intact; + reflexes  Skin: no rashes, nl turgor  Psychiatric: AA0x3 w/ intact insight and judgement    tele: SR, nonspecific changes (on my own evaluation of tele monitor)        LABS:                        12.9   10.57 )-----------( 275      ( 13 Oct 2022 12:38 )             40.5     10-14    140  |  105  |  33<H>  ----------------------------<  106<H>  5.2<H>   |  25  |  2.0<H>    Ca    9.5      14 Oct 2022 05:51    TPro  6.5  /  Alb  4.1  /  TBili  0.4  /  DBili  x   /  AST  22  /  ALT  14  /  AlkPhos  41  10-13    LIVER FUNCTIONS - ( 13 Oct 2022 12:38 )  Alb: 4.1 g/dL / Pro: 6.5 g/dL / ALK PHOS: 41 U/L / ALT: 14 U/L / AST: 22 U/L / GGT: x           CARDIAC MARKERS ( 13 Oct 2022 12:38 )  x     / <0.01 ng/mL / x     / x     / x          PT/INR - ( 13 Oct 2022 12:38 )   PT: 10.70 sec;   INR: 0.94 ratio         PTT - ( 13 Oct 2022 12:38 )  PTT:27.2 sec          EKG - SR, nonspecific changes (my read)  Chart and consultant noted personally reviewed.  Care Discussed with Consultants/Other Providers/ Housestaff [ x] YES [ ] NO   Radiology, labs, old records personally reviewed.           ERICK MARTINEZ  76y  Male    Patient is a 76y old  Male who presents with a chief complaint of Right facial droop, right-sided weakness (13 Oct 2022 14:02)      HPI:  76M with PMH of CAD (s/p CABG x3 in 2017), HTN, DM type II, HLD, GERD, BPH. stroke in 2017 (residual L sided weakness), and recent stroke (evaluated at [facility] 2 wks ago) with residual right sided hemiparesis (discharge papers unable to be found), presents with report of increased right facial droop and right-sided weakness today. Per chart, MRI Head at [facility] revealed acute lacunar infarcts involving the deep white matter of the left frontal lobe at the level of the centrum semiovale. Stroke code called on ED arrival at [facility], NIHSS 21, BP 82/47. CTH showed left SUSANNE territory acute/subacute infarct with suggestion of trace petechial hemorrhagic transformation. Also showed bilateral basal ganglialacunar infarcts, age indeterminate involving the right caudate head. CTP showed scattered regions of hypoperfusion in the bilateral PCA and left SUSANNE territories with evidence of core infarct within the left SUSANNE territory. CTA H/N showed; 1. left SUSANNE occlusion involving the proximal A2 segment; 2. Severe stenosis of the proximal P2 segment of the right PCA as well as moderate stenosis on the left. He is not a candidate for tPA and NI.   (13 Oct 2022 14:02)    S: Patient was examined and seen at bedside. This morning pt is resting comfortably in bed and reports no new issues or overnight events. No complaints, feels better  Denies CP, SOB, N/V/D/C/AP, cough, F, chills, dizziness, new focal weakness, HA, vision changes, dysuria, or urinary symptoms, blood in stool.  ROS: all other systems reviewed and are negative    PAST MEDICAL & SURGICAL HISTORY:  CAD (coronary artery disease)      HTN (hypertension)      Depression      Anxiety      Diabetes  niddm      Angina pectoris      BPH (benign prostatic hyperplasia)      GERD (gastroesophageal reflux disease)      CVA (cerebral vascular accident)      History of appendectomy      Bilateral cataracts      History of heart bypass surgery        SOCIAL HISTORY:  Tobacco: former smoker  Illicit drugs: denies  Alcohol: social  Family history reviewed and otherwise non-contributory No clotting disorders, CVAs at early age.  ALLERGIES: PCN, Clinda    MEDICATIONS  (STANDING):  aspirin  chewable 81 milliGRAM(s) Enteral Tube daily  atorvastatin 80 milliGRAM(s) Oral at bedtime  gabapentin 100 milliGRAM(s) Oral every 12 hours  heparin   Injectable 5000 Unit(s) SubCutaneous every 8 hours  pantoprazole    Tablet 40 milliGRAM(s) Oral before breakfast  ticagrelor 90 milliGRAM(s) Oral every 12 hours  venlafaxine XR. 75 milliGRAM(s) Oral daily    MEDICATIONS  (PRN):  acetaminophen     Tablet .. 650 milliGRAM(s) Oral every 6 hours PRN Temp greater or equal to 38C (100.4F), Mild Pain (1 - 3)    Home Medications:  acetaminophen 325 mg oral tablet: 2 or 3 tab(s) orally every 6 hours, As needed, for pain or fever (13 Oct 2022 15:31)  aspirin 81 mg oral tablet: 1 tab(s) orally once a day (13 Oct 2022 15:31)  fenofibrate 120 mg oral tablet: 1 tab(s) orally once a day (13 Oct 2022 15:31)  linagliptin 5 mg oral tablet: 1 tab(s) orally once a day (13 Oct 2022 15:31)  losartan 25 mg oral tablet: 1 tab(s) orally once a day (13 Oct 2022 15:31)  metoprolol succinate 25 mg oral tablet, extended release: 1 tab(s) orally every 12 hours (13 Oct 2022 15:31)  Pentoxil 400 mg oral tablet, extended release: 1 tab(s) orally 3 times a day (13 Oct 2022 15:31)  Plavix 75 mg oral tablet: 1 tab(s) orally once a day (13 Oct 2022 15:31)  polyethylene glycol 3350 oral powder for reconstitution: 17 gram(s) orally once a day, As needed, Constipation (13 Oct 2022 15:31)  Prevacid 30 mg oral delayed release capsule: 1 cap(s) orally once a day (13 Oct 2022 15:31)  Ranexa 500 mg oral tablet, extended release: 1 tab(s) orally 2 times a day (13 Oct 2022 15:31)  senna oral tablet: 1 or 2 tab(s) orally once a day (at bedtime), As needed, Constipation (13 Oct 2022 15:31)  tamsulosin 0.4 mg oral capsule: 1 cap(s) orally once a day (13 Oct 2022 15:31)  venlafaxine 75 mg oral capsule, extended release: 1 cap(s) orally once a day (13 Oct 2022 15:31)          Vital Signs Last 24 Hrs  T(C): 36.6 (14 Oct 2022 08:00), Max: 36.6 (14 Oct 2022 08:00)  T(F): 97.8 (14 Oct 2022 08:00), Max: 97.8 (14 Oct 2022 08:00)  HR: 74 (14 Oct 2022 08:00) (60 - 75)  BP: 198/99 (14 Oct 2022 08:00) (82/47 - 199/93)  BP(mean): 150 (14 Oct 2022 08:00) (109 - 150)  RR: 18 (14 Oct 2022 08:00) (16 - 19)  SpO2: 100% (14 Oct 2022 08:00) (97% - 100%)    Parameters below as of 14 Oct 2022 08:00  Patient On (Oxygen Delivery Method): room air      CAPILLARY BLOOD GLUCOSE  218 (13 Oct 2022 12:27)      POCT Blood Glucose.: 109 mg/dL (14 Oct 2022 07:57)  POCT Blood Glucose.: 120 mg/dL (13 Oct 2022 22:54)  POCT Blood Glucose.: 218 mg/dL (13 Oct 2022 12:23)      General: NAD. Looks stated age. Slow speech, some bradykinesia  HEENT: clean oropharynx, EOMI, no LAD  Neck: trachea midline, no thyromegaly  CV: nl S1 S2; no m/r/g  Resp: decreased breath sounds at base  GI: NT/ND/S +BS  MS: no clubbing/cyanosis/edema, +pulses  Neuro: Lt 5-/5, rest 5/5  Skin: no rashes, nl turgor  Psychiatric: AA0x2+ w/ fair insight and judgement    tele: SR, nonspecific changes (on my own evaluation of tele monitor)        LABS:                        12.9   10.57 )-----------( 275      ( 13 Oct 2022 12:38 )             40.5     10-14    140  |  105  |  33<H>  ----------------------------<  106<H>  5.2<H>   |  25  |  2.0<H>    Ca    9.5      14 Oct 2022 05:51    TPro  6.5  /  Alb  4.1  /  TBili  0.4  /  DBili  x   /  AST  22  /  ALT  14  /  AlkPhos  41  10-13    LIVER FUNCTIONS - ( 13 Oct 2022 12:38 )  Alb: 4.1 g/dL / Pro: 6.5 g/dL / ALK PHOS: 41 U/L / ALT: 14 U/L / AST: 22 U/L / GGT: x           CARDIAC MARKERS ( 13 Oct 2022 12:38 )  x     / <0.01 ng/mL / x     / x     / x          PT/INR - ( 13 Oct 2022 12:38 )   PT: 10.70 sec;   INR: 0.94 ratio         PTT - ( 13 Oct 2022 12:38 )  PTT:27.2 sec          EKG - SR, nonspecific changes (my read)  Chart and consultant noted personally reviewed.  Care Discussed with Consultants/Other Providers/ Housestaff [ x] YES [ ] NO   Radiology, labs, old records personally reviewed.

## 2022-10-14 NOTE — CONSULT NOTE ADULT - ASSESSMENT
76M with PMH of CAD (s/p CABG x3 in 2017), HTN, DM type II, HLD, GERD, BPH. stroke in 2017 (residual L sided weakness), and recent stroke (evaluated at [facility] 2 wks ago) with residual right sided hemiparesis (discharge papers unable to be found), presents with report of increased right facial droop and right-sided weakness. Per chart, MRI Head at [facility] revealed acute lacunar infarcts involving the deep white matter of the left frontal lobe at the level of the centrum semiovale. Stroke code called on ED arrival at [facility], NIHSS 21, BP 82/47. CTH showed left SUSANNE territory acute/subacute infarct with suggestion of trace petechial hemorrhagic transformation. Also showed bilateral basal ganglialacunar infarcts, age indeterminate involving the right caudate head. CTP showed scattered regions of hypoperfusion in the bilateral PCA and left SUSANNE territories with evidence of core infarct within the left SUSANNE territory. CTA H/N showed; 1. left SUSANNE occlusion involving the proximal A2 segment; 2. Severe stenosis of the proximal P2 segment of the right PCA as well as moderate stenosis on the left. He is not a candidate for tPA due to recent stroke and no NI as there is no LVO.    R/o acute stroke vs seizure / Subacute ischemic stroke  - Patient presented with b/l weakness, documented as R>L on initial presentation. On re-evaluation, R-sided weakness appeared resolved (or at least back to baseline from recent stroke), left-sided weakness is more apparent (consistent with baseline residual deficit from chronic stroke). Patient was fully cooperative with exam, but complained of frontal headache.  -per patient's pharmacy, he was prescribed Brilinta on 10/10 but never picked it up  - continue home Aspirin 81mg and start Brilinta 90mg BID  - Continue home Atorvastatin 80mg daily  - Q4hr stroke neuro checks and vitals  - CTH showed left SUSANNE territory acute/subacute infarct with suggestion of trace petechial hemorrhagic transformation. Also showed bilateral basal ganglialacunar infarcts, age indeterminate involving the right caudate head.   - CTP showed scattered regions of hypoperfusion in the bilateral PCA and left SUSANNE territories with evidence of core infarct within the left SUSANNE territory.   - CTA H/N showed; 1. left SUSANNE occlusion involving the proximal A2 segment; 2. Severe stenosis of the proximal P2 segment of the right PCA as well as moderate stenosis on the left  - Obtain MRI Brain without contrast, TTE, EEG  - Stroke education    HTN  gradual BP lowering    HLD  - High dose statin as above in CVA  - LDL results: 107    FEN  - Replete K<4 and Mg <2  - Diet as stated below    DM  - A1C results: 7.2  - TSH results: pending    Parkinsonian features  outpt neuro f/u    ?Orthostatic hypotension  -thigh high TEDs    Misc  - Diet: pending swallow eval  - Activity: IAT  - DVT Prophylaxis: SQH, SCDs  - GI Prophylaxis: PPI  - Code Status: full code    #Progress Note Handoff  Pending: Consults____Clinical improvement and stability__x___Tests__MRI, TTE, EEG______PT____x____  Pt/Family discussion: Pt informed and agrees with the current plan  Disposition: Home______/SNF__X_____/4A______/To be determined____x____    My note supersedes the residents note should a discrepancy arise.    Chart and notes personally reviewed.  Care Discussed with Consultants/Other Providers/ Housestaff [ x] YES [ ] NO   Radiology, labs, old records personally reviewed.    discussed w/ housestaff, nursing, case management, neuro team

## 2022-10-14 NOTE — SWALLOW BEDSIDE ASSESSMENT ADULT - COMMENTS
Pt dysphonic , hoarse, low intensity - Pt states it is a change- Pt attributed it to NGT in nose. min overts clears w/ second swallow

## 2022-10-14 NOTE — SWALLOW BEDSIDE ASSESSMENT ADULT - SWALLOW EVAL: CURRENT DIET
NPO with NGT in situ ( placed By ER doctors to give meds0 NPO with NGT in situ ( originally placed By ER doctors to give meds)

## 2022-10-14 NOTE — OCCUPATIONAL THERAPY INITIAL EVALUATION ADULT - LIVES WITH, PROFILE
bennett, 5STE, chair lift in home, +bathtub - however pt admitted from [facility] for rehab/spouse

## 2022-10-14 NOTE — OCCUPATIONAL THERAPY INITIAL EVALUATION ADULT - PERTINENT HX OF CURRENT PROBLEM, REHAB EVAL
pt is a left handed 76M admitted from [facility] with PMH of CAD (s/p CABG x3 in 2017), HTN, DM type II, HLD, GERD, BPH. stroke in 2017 (residual L sided weakness), and recent stroke (evaluated at [facility] 2 wks ago) with residual right sided hemiparesis (discharge papers unable to be found), presents with report of increased right facial droop and right-sided weakness today. Per chart, MRI Head at [facility] revealed acute lacunar infarcts involving the deep white matter of the left frontal lobe at the level of the centrum semiovale. Stroke code called on ED arrival at [facility], NIHSS 21, BP 82/47. CTH showed left SUSANNE territory acute/subacute infarct with suggestion of trace petechial hemorrhagic transformation. Also showed bilateral basal ganglialacunar infarcts, age indeterminate involving the right caudate head. CTP showed scattered regions of hypoperfusion in the bilateral PCA and left SUSANNE territories with evidence of core infarct within the left SUSANNE territory. CTA H/N showed; 1. left SUSANNE occlusion involving the proximal A2 segment; 2. Severe stenosis of the proximal P2 segment of the right PCA as well as moderate stenosis on the left. He is not a candidate for tPA and NI.

## 2022-10-14 NOTE — PROGRESS NOTE ADULT - ASSESSMENT
76M with PMH of CAD (s/p CABG x3 in 2017), HTN, DM type II, HLD, GERD, BPH. stroke in 2017 (residual L sided weakness), and recent stroke (evaluated at [facility] 2 wks ago) with residual right sided hemiparesis (discharge papers unable to be found), presents with report of increased right facial droop and right-sided weakness today. Per chart, MRI Head at [facility] revealed acute lacunar infarcts involving the deep white matter of the left frontal lobe at the level of the centrum semiovale. Stroke code called on ED arrival at [facility], NIHSS 21, BP 82/47. CTH showed left SUSANNE territory acute/subacute infarct with suggestion of trace petechial hemorrhagic transformation. Also showed bilateral basal ganglialacunar infarcts, age indeterminate involving the right caudate head. CTP showed scattered regions of hypoperfusion in the bilateral PCA and left SUSANNE territories with evidence of core infarct within the left SUSANNE territory. CTA H/N showed; 1. left SUSANNE occlusion involving the proximal A2 segment; 2. Severe stenosis of the proximal P2 segment of the right PCA as well as moderate stenosis on the left. He is not a candidate for tPA due to recent stroke and no NI as there is no LVO.    Impression  - Patient presented with b/l weakness, documented as R>L on initial presentation. On re-evaluation, R-sided weakness appeared resolved (or at least back to baseline from recent stroke), left-sided weakness is more apparent (consistent with baseline residual deficit from chronic stroke). Patient was fully cooperative with exam, A&Ox3, but complained of frontal headache. Due to initial report of worsening baseline exam, acute stroke and seizure must be ruled out. Of note, per patient's pharmacy, he was prescribed Brilinta on 10/10 but never picked it up    Neuro  - Then continue home Aspirin 81mg and start Brilinta 90mg BID  - Continue home Atorvastatin 80mg daily  - Q4hr stroke neuro checks and vitals  - CTH showed left SUSANNE territory acute/subacute infarct with suggestion of trace petechial hemorrhagic transformation. Also showed bilateral basal ganglialacunar infarcts, age indeterminate involving the right caudate head.   - CTP showed scattered regions of hypoperfusion in the bilateral PCA and left SUSANNE territories with evidence of core infarct within the left SUSANNE territory.   - CTA H/N showed; 1. left SUSANNE occlusion involving the proximal A2 segment; 2. Severe stenosis of the proximal P2 segment of the right PCA as well as moderate stenosis on the left  - Obtain MRI Brain without contrast  - Stroke education    Cardiovascular  - SBP goal: permissive htn for now 120-180  - TTE: pending  - High dose statin as above in CVA  - LDL results: pending    Pulm  - Call provider if SPO2 < 94%    GI  - Replete K<4 and Mg <2  - Diet as stated below    Renal  - Daily BMP    Infectious Disease  - WBC: 10.57    Endocrine  - A1C results: pending  - TSH results: pending    Misc  - Diet: pending swallow eval  - Activity: IAT  - DVT Prophylaxis: SQH  - GI Prophylaxis: PPI  - Code Status: full code  - Disposition: pending PT/OT/Rehab eval   76M with PMH of CAD (s/p CABG x3 in 2017), HTN, DM type II, HLD, GERD, BPH. stroke in 2017 (residual L sided weakness), and recent stroke (evaluated at [facility] 2 wks ago) with residual right sided hemiparesis (discharge papers unable to be found), presents with report of increased right facial droop and right-sided weakness today. Per chart, MRI Head at [facility] revealed acute lacunar infarcts involving the deep white matter of the left frontal lobe at the level of the centrum semiovale. Stroke code called on ED arrival at [facility], NIHSS 21, BP 82/47. CTH showed left SUSANNE territory acute/subacute infarct with suggestion of trace petechial hemorrhagic transformation. Also showed bilateral basal ganglialacunar infarcts, age indeterminate involving the right caudate head. CTP showed scattered regions of hypoperfusion in the bilateral PCA and left SUSANNE territories with evidence of core infarct within the left SUSANNE territory. CTA H/N showed; 1. left SUSANNE occlusion involving the proximal A2 segment; 2. Severe stenosis of the proximal P2 segment of the right PCA as well as moderate stenosis on the left. He is not a candidate for tPA due to recent stroke and no NI as there is no LVO.    Impression  - Patient presented with b/l weakness, documented as R>L on initial presentation. On re-evaluation, R-sided weakness appeared resolved (or at least back to baseline from recent stroke), left-sided weakness is more apparent (consistent with baseline residual deficit from chronic stroke). Patient was fully cooperative with exam, A&Ox3, but complained of frontal headache. Due to initial report of worsening baseline exam, acute stroke and seizure must be ruled out. Of note, per patient's pharmacy, he was prescribed Brilinta on 10/10 but never picked it up.    Neuro  - Then continue home Aspirin 81mg and start Brilinta 90mg BID  - Continue home Atorvastatin 80mg daily  - Q4hr stroke neuro checks and vitals  - obtain swallow study and remove NGT based on results   - CTH showed left SUSANNE territory acute/subacute infarct with suggestion of trace petechial hemorrhagic transformation. Also showed bilateral basal ganglialacunar infarcts, age indeterminate involving the right caudate head.   - CTP showed scattered regions of hypoperfusion in the bilateral PCA and left SUSANNE territories with evidence of core infarct within the left SUSANNE territory.   - CTA H/N showed; 1. left SUSANNE occlusion involving the proximal A2 segment; 2. Severe stenosis of the proximal P2 segment of the right PCA as well as moderate stenosis on the left  - MRI brain showed acute left SUSANNE territory infarct with a mild rightward midline shift, unchanged since previous CT head dated 10/13/22 and stable chronic microvascular ischemic changes as well as unchanged bilateral lacunar infarcts   - Stroke education  - continue with PT/OT/ST  - rehab placement  - start keppra 500 mg IV BID for seizure prophylaxis     Cardiovascular  - SBP goal: 120-160  - TTE: pending  - High dose statin as above in CVA  - LDL results: 107  - Orthostatic hypotension likely, consider thigh high stockings   - hold losartan for now as pt K+ is elevated, recheck K+ levels  - resume other BP medications    Pulm  - Call provider if SPO2 < 94%    GI  - Replete K<4 and Mg <2  - Diet as stated below    Renal  - Daily BMP    Infectious Disease  - WBC: 10.57    Endocrine  - A1C results: 7.2  - TSH results: pending  - f/u outpatient with PCP to manage DM    Misc  - Diet: pending swallow eval  - Activity: IAT  - DVT Prophylaxis: SQH  - GI Prophylaxis: PPI  - Code Status: full code  - Disposition: pending PT/OT/Rehab eval

## 2022-10-15 ENCOUNTER — TRANSCRIPTION ENCOUNTER (OUTPATIENT)
Age: 77
End: 2022-10-15

## 2022-10-15 VITALS
SYSTOLIC BLOOD PRESSURE: 142 MMHG | RESPIRATION RATE: 18 BRPM | HEART RATE: 80 BPM | TEMPERATURE: 98 F | OXYGEN SATURATION: 98 % | DIASTOLIC BLOOD PRESSURE: 95 MMHG

## 2022-10-15 LAB
GLUCOSE BLDC GLUCOMTR-MCNC: 132 MG/DL — HIGH (ref 70–99)
GLUCOSE BLDC GLUCOMTR-MCNC: 138 MG/DL — HIGH (ref 70–99)
GLUCOSE BLDC GLUCOMTR-MCNC: 157 MG/DL — HIGH (ref 70–99)

## 2022-10-15 PROCEDURE — 99233 SBSQ HOSP IP/OBS HIGH 50: CPT

## 2022-10-15 PROCEDURE — 99238 HOSP IP/OBS DSCHRG MGMT 30/<: CPT

## 2022-10-15 RX ORDER — PANTOPRAZOLE SODIUM 20 MG/1
1 TABLET, DELAYED RELEASE ORAL
Qty: 0 | Refills: 0 | DISCHARGE
Start: 2022-10-15

## 2022-10-15 RX ORDER — LEVETIRACETAM 250 MG/1
1 TABLET, FILM COATED ORAL
Qty: 2 | Refills: 2
Start: 2022-10-15 | End: 2023-01-12

## 2022-10-15 RX ORDER — LOSARTAN POTASSIUM 100 MG/1
25 TABLET, FILM COATED ORAL DAILY
Refills: 0 | Status: DISCONTINUED | OUTPATIENT
Start: 2022-10-15 | End: 2022-10-15

## 2022-10-15 RX ORDER — CLOPIDOGREL BISULFATE 75 MG/1
1 TABLET, FILM COATED ORAL
Qty: 0 | Refills: 0 | DISCHARGE

## 2022-10-15 RX ORDER — NIFEDIPINE 30 MG
30 TABLET, EXTENDED RELEASE 24 HR ORAL ONCE
Refills: 0 | Status: COMPLETED | OUTPATIENT
Start: 2022-10-15 | End: 2022-10-15

## 2022-10-15 RX ORDER — NIFEDIPINE 30 MG
1 TABLET, EXTENDED RELEASE 24 HR ORAL
Qty: 30 | Refills: 2
Start: 2022-10-15 | End: 2023-01-12

## 2022-10-15 RX ORDER — TICAGRELOR 90 MG/1
1 TABLET ORAL
Qty: 60 | Refills: 2
Start: 2022-10-15 | End: 2023-01-12

## 2022-10-15 RX ORDER — NIFEDIPINE 30 MG
60 TABLET, EXTENDED RELEASE 24 HR ORAL DAILY
Refills: 0 | Status: DISCONTINUED | OUTPATIENT
Start: 2022-10-16 | End: 2022-10-15

## 2022-10-15 RX ORDER — NIFEDIPINE 30 MG
30 TABLET, EXTENDED RELEASE 24 HR ORAL ONCE
Refills: 0 | Status: DISCONTINUED | OUTPATIENT
Start: 2022-10-15 | End: 2022-10-15

## 2022-10-15 RX ADMIN — Medication 30 MILLIGRAM(S): at 12:55

## 2022-10-15 RX ADMIN — TICAGRELOR 90 MILLIGRAM(S): 90 TABLET ORAL at 12:56

## 2022-10-15 RX ADMIN — GABAPENTIN 100 MILLIGRAM(S): 400 CAPSULE ORAL at 17:12

## 2022-10-15 RX ADMIN — HEPARIN SODIUM 5000 UNIT(S): 5000 INJECTION INTRAVENOUS; SUBCUTANEOUS at 13:13

## 2022-10-15 RX ADMIN — GABAPENTIN 100 MILLIGRAM(S): 400 CAPSULE ORAL at 05:05

## 2022-10-15 RX ADMIN — LEVETIRACETAM 400 MILLIGRAM(S): 250 TABLET, FILM COATED ORAL at 12:56

## 2022-10-15 RX ADMIN — Medication 25 MILLIGRAM(S): at 05:05

## 2022-10-15 RX ADMIN — Medication 30 MILLIGRAM(S): at 05:05

## 2022-10-15 RX ADMIN — LOSARTAN POTASSIUM 25 MILLIGRAM(S): 100 TABLET, FILM COATED ORAL at 10:47

## 2022-10-15 RX ADMIN — Medication 30 MILLIGRAM(S): at 13:12

## 2022-10-15 RX ADMIN — HEPARIN SODIUM 5000 UNIT(S): 5000 INJECTION INTRAVENOUS; SUBCUTANEOUS at 05:05

## 2022-10-15 RX ADMIN — Medication 75 MILLIGRAM(S): at 12:56

## 2022-10-15 RX ADMIN — PANTOPRAZOLE SODIUM 40 MILLIGRAM(S): 20 TABLET, DELAYED RELEASE ORAL at 06:09

## 2022-10-15 RX ADMIN — Medication 81 MILLIGRAM(S): at 12:59

## 2022-10-15 NOTE — PROGRESS NOTE ADULT - ASSESSMENT
76M with PMH of CAD (s/p CABG x3 in 2017), HTN, DM type II, HLD, GERD, BPH. stroke in 2017 (residual L sided weakness), and recent stroke (evaluated at [facility] 2 wks ago) with residual right sided hemiparesis (discharge papers unable to be found), presents with report of increased right facial droop and right-sided weakness. Per chart, MRI Head at [facility] revealed acute lacunar infarcts involving the deep white matter of the left frontal lobe at the level of the centrum semiovale. Stroke code called on ED arrival at [facility], NIHSS 21, BP 82/47. CTH showed left SUSANNE territory acute/subacute infarct with suggestion of trace petechial hemorrhagic transformation. Also showed bilateral basal ganglialacunar infarcts, age indeterminate involving the right caudate head. CTP showed scattered regions of hypoperfusion in the bilateral PCA and left SUSANNE territories with evidence of core infarct within the left SUSANNE territory. CTA H/N showed; 1. left SUSANNE occlusion involving the proximal A2 segment; 2. Severe stenosis of the proximal P2 segment of the right PCA as well as moderate stenosis on the left. He is not a candidate for tPA due to recent stroke and no NI as there is no LVO.    Suspected seizure on admission / Subacute ischemic stroke  - Patient presented with b/l weakness, documented as R>L on initial presentation. On re-evaluation, R-sided weakness appeared resolved (or at least back to baseline from recent stroke), left-sided weakness is more apparent (consistent with baseline residual deficit from chronic stroke). Patient was fully cooperative with exam, but complained of frontal headache.  -per patient's pharmacy, he was prescribed Brilinta on 10/10 but never picked it up  - continue home Aspirin 81mg and start Brilinta 90mg BID  - Continue home Atorvastatin 80mg daily  - Q4hr stroke neuro checks and vitals  - CTH showed left SUSANNE territory acute/subacute infarct with suggestion of trace petechial hemorrhagic transformation. Also showed bilateral basal ganglialacunar infarcts, age indeterminate involving the right caudate head.   - CTP showed scattered regions of hypoperfusion in the bilateral PCA and left SUSANNE territories with evidence of core infarct within the left SUSANNE territory.   - CTA H/N showed; 1. left SUSANNE occlusion involving the proximal A2 segment; 2. Severe stenosis of the proximal P2 segment of the right PCA as well as moderate stenosis on the left  - MRI redemonstrated recent stroke  - EEG: nonspecific changes  - now on AEDs    HTN  recommend resuming home meds    HLD  - High dose statin as above in CVA  - LDL results: 107    FEN  - Replete K<4 and Mg <2  - Diet as stated below    DM  - A1C results: 7.2  - TSH results: nl    ?Parkinsonian features  outpt neuro f/u    ?Orthostatic hypotension  thigh high TEDs  PT      - Activity: IAT  - DVT Prophylaxis: SQH, SCDs  - GI Prophylaxis: PPI  - Code Status: full code      Chart and notes personally reviewed.  Care Discussed with Consultants/Other Providers/ Housestaff [ x] YES [ ] NO   Radiology, labs, old records personally reviewed.    discussed w/ housestaff, nursing, case management, neuro team

## 2022-10-15 NOTE — PROGRESS NOTE ADULT - ATTENDING COMMENTS
Pt is a 75 yo M with PMhx of CAD s/p CABG, HTN, DM, HLD, prior ischemic stroke, recent L SUSANNE stroke (treated at [facility]) with residual right hemiplegia, who presents from [facility] due to unresponsiveness.   Pt back to baseline shortly after presentation. Event was most likely a seizure. Started LEV 500mg BID. No evidence of new acute ischemic stroke on MRI brain. Continue DAPT/statin. Seizure precautions. D/c to [facility], F/u in neurology clinic in 2-3 weeks.

## 2022-10-15 NOTE — DISCHARGE NOTE PROVIDER - NSDCFUSCHEDAPPT_GEN_ALL_CORE_FT
[facility] Physician [facility]  CARDIOLOGY [address]  Scheduled Appointment: 11/07/2022

## 2022-10-15 NOTE — DISCHARGE NOTE PROVIDER - HOSPITAL COURSE
Hospital course:  76y Male with PMH     During this hospital course, patient had a (ischemic/hemorrhagic) stroke located in (left/right.....) as seen on (MRI/CT).   The stroke etiology is likely secondary to:  []atrial fibrillation  []small vessel disease from atherosclerotic risk factors  []other:  []etiology workup still in progress    Patient had the following workup done in house:  CT Head:   MR Head Non Contrast:  CT Angio Head:  CT Angio Neck:  []echo  []labs  []other    Physical exam at discharge:    New medications on discharge:  Labs to be followed up:  Imaging to be done as outpatient:  Further outpatient workup:   Hospital course:  76y Male with PMH of stroke in 2017 in the right posterior frontal lobe, CAD, Htn, CKD, DM2, HLD presented with increased right facial droop and right-sided weakness. His initial NIHSS was 21 and he did not receive tPA due to recent stroke.    During this hospital course, patient had an acute/subacute ischemic stroke with hemorrhagic transformation located in left SUSANNE territory as seen on CT.   The stroke etiology is likely secondary to:  []atrial fibrillation  [x]small vessel disease from atherosclerotic risk factors  [x]other: subtherapeutic secondary prevention leading to exacerbation of stroke that was treated at last hospital visit  []etiology workup still in progress    Patient had the following workup done in house:  - CTH: left SUSANNE territory acute/subacute infarct with suggestion of trace petechial hemorrhagic transformation. Also showed bilateral basal ganglialacunar infarcts, age indeterminate involving the right caudate head.   - CTP: scattered regions of hypoperfusion in the bilateral PCA and left SUSANNE territories with evidence of core infarct within the left SUSANNE territory.   - CTA H/N: 1. left SUSANNE occlusion involving the proximal A2 segment; 2. Severe stenosis of the proximal P2 segment of the right PCA as well as moderate stenosis on the left  - MRI Head: Acute left SUSANNE territory infarct with a mild rightward midline shift, unchanged since previous CT head dated 10/13/2022. Stable chronic microvascular ischemic changes as well as unchanged   bilateral lacunar infarcts.  [ ] TTE: pending  [x]labs:  LDL: 107   A1c: 7.2  Hgb 12.5   Gluc 149   Cr/BUN 2/30  []other    Physical exam at discharge:  Awake and oriented to person. Mildly dysarthric. Decreased sensation to light touch on left side of face in  V1 - 3 distribution, left lower facial droop. Subjective decrease in hearing on left side compared to right. LUE drift without hitting bed, no RUE drift; Lower extremities: LLE drift without hitting bed, no RLL drift. Decreased sensation to light touch along left side compared to right. NIHSS = 3    New medications on discharge:  Outpatient AC/AP: ASA and Plavix --> ASA and Brillinta  Labs to be followed up:  LDL: 107   A1c: 7.2  Hgb 12.5   Gluc 149   Cr/BUN 2/30  Imaging to be done as outpatient:  None  Further outpatient workup:  None           Hospital course:  76y Male with PMH of stroke in 2017 in the right posterior frontal lobe, CAD, Htn, CKD, DM2, HLD presented with increased right facial droop and right-sided weakness. His initial NIHSS was 21 and he did not receive tPA due to recent stroke.    During this hospital course, patient had an acute/subacute ischemic stroke with hemorrhagic transformation located in left SUSANNE territory as seen on CT.   The stroke etiology is likely secondary to:  []atrial fibrillation  [x]small vessel disease from atherosclerotic risk factors  [x]other: subtherapeutic secondary prevention leading to exacerbation of stroke that was treated at last hospital visit  []etiology workup still in progress    Patient had the following workup done in house:  - CTH: left SUSANNE territory acute/subacute infarct with suggestion of trace petechial hemorrhagic transformation. Also showed bilateral basal ganglialacunar infarcts, age indeterminate involving the right caudate head.   - CTP: scattered regions of hypoperfusion in the bilateral PCA and left SUSANNE territories with evidence of core infarct within the left SUSANNE territory.   - CTA H/N: 1. left SUSANNE occlusion involving the proximal A2 segment; 2. Severe stenosis of the proximal P2 segment of the right PCA as well as moderate stenosis on the left  - MRI Head: Acute left SUSANNE territory infarct with a mild rightward midline shift, unchanged since previous CT head dated 10/13/2022. Stable chronic microvascular ischemic changes as well as unchanged   bilateral lacunar infarcts.  [ ] TTE: pending  [x]labs:  LDL: 107   A1c: 7.2  Hgb 12.5   Gluc 149   Cr/BUN 2/30  []other    Physical exam at discharge:  Awake and oriented to person. Mildly dysarthric. Decreased sensation to light touch on left side of face in  V1 - 3 distribution, left lower facial droop. Subjective decrease in hearing on left side compared to right. LUE drift without hitting bed, no RUE drift; Lower extremities: LLE drift without hitting bed, no RLL drift. Decreased sensation to light touch along left side compared to right. NIHSS = 3    New medications on discharge:  Outpatient AC/AP: ASA and Plavix --> ASA and Brillinta  Labs to be followed up:  LDL: 107   A1c: 7.2  Hgb 12.5   Gluc 149   Cr/BUN 2/30  Imaging to be done as outpatient:  None  Further outpatient workup:  None        Stroke attending attestation:  Pt is a 77 yo M with PMhx of CAD s/p CABG, HTN, DM, HLD, prior ischemic stroke, recent L SUSANNE stroke (treated at [facility]) with residual right hemiplegia, who presents from [facility] due to unresponsiveness.   Pt back to baseline shortly after presentation. Event was most likely a seizure. Started LEV 500mg BID. No evidence of new acute ischemic stroke on MRI brain. Continue DAPT/statin. Seizure precautions. D/c to [facility], F/u in neurology clinic in 2-3 weeks.    Hospital course:  76M with PMH of CAD (s/p CABG x3 in 2017), HTN, DM type II, HLD, GERD, BPH. stroke in 2017 (residual L sided weakness), and recent stroke (evaluated at [facility] 2 wks ago) with residual right sided hemiparesis (discharge papers unable to be found), presents with report of increased right facial droop and right-sided weakness today. Per chart, MRI Head at [facility] revealed acute lacunar infarcts involving the deep white matter of the left frontal lobe at the level of the centrum semiovale. Stroke code called on ED arrival at [facility], NIHSS 21, BP 82/47. CTH showed left SUSANNE territory acute/subacute infarct with suggestion of trace petechial hemorrhagic transformation. Also showed bilateral basal ganglialacunar infarcts, age indeterminate involving the right caudate head. CTP showed scattered regions of hypoperfusion in the bilateral PCA and left SUSANNE territories with evidence of core infarct within the left SUSANNE territory. CTA H/N showed; 1. left SUSANNE occlusion involving the proximal A2 segment; 2. Severe stenosis of the proximal P2 segment of the right PCA as well as moderate stenosis on the left. He is not a candidate for tPA and NI. During his admission in the stroke unit, patient improved neurologically and blood pressure has been better controlled reintroducing his previous antihypertensive meds.     During this hospital course, patient had an acute/subacute ischemic stroke with petechial hemorrhagic transformation located in left SUSANNE territory as seen on CT.   The stroke etiology is likely secondary to:  []atrial fibrillation  [x]small vessel disease from atherosclerotic risk factors  [x]other: subtherapeutic secondary prevention leading to exacerbation of stroke that was treated at last hospital visit  []etiology workup still in progress    Patient had the following workup done in house:  - CTH: left SUSANNE territory acute/subacute infarct with suggestion of trace petechial hemorrhagic transformation. Also showed bilateral basal ganglialacunar infarcts, age indeterminate involving the right caudate head.   - CTP: scattered regions of hypoperfusion in the bilateral PCA and left SUSANNE territories with evidence of core infarct within the left SUSANNE territory.   - CTA H/N: 1. left SUSANNE occlusion involving the proximal A2 segment; 2. Severe stenosis of the proximal P2 segment of the right PCA as well as moderate stenosis on the left  - MRI Head: Acute left SUSANNE territory infarct with a mild rightward midline shift, unchanged since previous CT head dated 10/13/2022. Stable chronic microvascular ischemic changes as well as unchanged   bilateral lacunar infarcts.  - TTE recently in [facility] hospital: pending  - Labs: LDL: 107 A1c: 7.2 Hgb 12.5 Gluc 149 Cr/BUN 2/30    Physical exam at discharge:  Constitutional: WDWN; NAD  Eyes: anicteric sclera  Cardiovascular: +S1/S2, RRR; no M/R/G  Neurologic:  - Mental Status:  Awake, alert, and oriented to person, place, time, but not date.  Could not recall events of the day (did not know what happened this morning or why he was here). Naming, repetition and comprehension intact.  Speech slow and intentional, mildly dysarthric.   - Cranial Nerves II-XII:    II:  PERRLA; visual fields are full to confrontation  III, IV, VI:  EOMI, no nystagmus  V:  facial sensation is intact in the V1-V3 distribution bilaterally.  VII:  face is asymmetric with mild left facial supranuclear palsy  VIII:  hearing is intact to finger rub  IX, X:  uvula is midline and soft palate rises symmetrically with residual nasal dysarthria  XI:  head turning and shoulder shrug are intact bilaterally  XII:  tongue protrudes in the midline  - Motor:  strength is 5/5 throughout; normal muscle bulk and tone throughout; no pronator drift  - Reflexes:  2+L/3+R at the biceps, triceps, brachioradialis, knees, and ankles;  plantar reflexes upgoing left side  - Sensory:  Decreased sensation to light touch along left side compared to right  - Coordination:  finger-nose-finger and heel-knee-shin intact without dysmetria; rapid alternating hand movements intact  - Gait: untestedecreased sensation to light touch along left side compared to right.   NIHSS = 3    New medications on discharge: Brilinta 90 mg BID. Keppra 500 mg BID.  Labs to be followed up:Hgb, Gluc, Cr/BUN   Imaging to be done as outpatient: None  Further outpatient workup: None  Further outpatient follow up: Neurology    Stroke attending attestation:  Pt is a 75 yo M with PMhx of CAD s/p CABG, HTN, DM, HLD, prior ischemic stroke, recent L SUSANNE stroke (treated at [facility]) with residual right hemiplegia, who presents from [facility] due to unresponsiveness.   Pt back to baseline shortly after presentation. Event was most likely a seizure. Started LEV 500mg BID. No evidence of new acute ischemic stroke on MRI brain. Continue DAPT/statin. Seizure precautions. D/c to [facility], F/u in neurology clinic in 2-3 weeks.

## 2022-10-15 NOTE — DISCHARGE NOTE PROVIDER - NSDCCPCAREPLAN_GEN_ALL_CORE_FT
PRINCIPAL DISCHARGE DIAGNOSIS  Diagnosis: Stroke due to occlusion of left anterior cerebral artery  Assessment and Plan of Treatment: An ischemic stroke occurs when the blood supply to part of the brain is interrupted or reduced, preventing brain tissue from getting oxygen and nutrients. Brain cells begin to die in minutes.  A stroke is a medical emergency, and prompt treatment is crucial. Early action can reduce brain damage and other complications.  The good news is that many fewer Americans die of stroke now than in the past. Effective treatments can also help prevent disability from          PRINCIPAL DISCHARGE DIAGNOSIS  Diagnosis: Stroke due to occlusion of left anterior cerebral artery  Assessment and Plan of Treatment: During this hospital admission, you had an ischemic stroke. During an ischemic stroke, blood stops flowing to part of your brain because of a blockage in the blood vessel. This can damage areas in the brain that control other parts of the body.  Please take your [aspirin and brilinta] for blood thinning and Atorvastatin for cholesterol medication/blood vessel protection as prescribed to prevent further strokes. Do not skip doses and do not run low on your medication. If you run low on your medication, please contact your doctor.  You will follow up outpatient with the stroke Nurse Practitioner as scheduled below.  Doing your regular tasks may be difficult after you've had a stroke, but you can learn new ways to manage your daily activities. In fact, doing daily activities may help you to regain muscle strength. Be patient, give yourself time to adjust, and appreciate the progress you make. For example, when showering or bathing, test the water temperature with a hand or foot that was not affected by the stroke, use grab bars, a shower seat, a hand-held showerhead, etc. It is normal to feel fatigue after a stroke, while some days may be worse than others, you will continue to improve.  Call 911 right away if you have any of the following symptoms of another stroke:  B: Balance: Sudden: Dizziness, loss of balance, or a sense of falling, difficulty with coordinating movement  E: Eyes: Sudden double vision or trouble seeing in one or both eyes  F: Face: Sudden uneven face  A: Arms (Legs): Sudden weakness, tingling, or loss of feeling on one side of your face or body  S: Speech: Sudden trouble talking or slurred speech, sudden difficulty understanding others  T: Time: Please call 911 right away and go to the emergency room  •Sudden, severe headache  •Blackouts or seizures

## 2022-10-15 NOTE — PROGRESS NOTE ADULT - REASON FOR ADMISSION
Right facial droop, right-sided weakness

## 2022-10-15 NOTE — DISCHARGE NOTE PROVIDER - NSDCMRMEDTOKEN_GEN_ALL_CORE_FT
acetaminophen 325 mg oral tablet: 2 or 3 tab(s) orally every 6 hours, As needed, for pain or fever  aspirin 81 mg oral tablet: 1 tab(s) orally once a day  atorvastatin 80 mg oral tablet: 1 tab(s) orally once a day (at bedtime)  fenofibrate 120 mg oral tablet: 1 tab(s) orally once a day  gabapentin 100 mg oral capsule: 2 cap(s) orally once a day (at bedtime)  gabapentin 100 mg oral capsule: 1 cap(s) orally once a day  in am   isosorbide mononitrate 60 mg oral tablet, extended release: 1 tab(s) orally once a day MDD:1  linagliptin 5 mg oral tablet: 1 tab(s) orally once a day  losartan 25 mg oral tablet: 1 tab(s) orally once a day  metoprolol succinate 25 mg oral tablet, extended release: 1 tab(s) orally every 12 hours  midodrine 2.5 mg oral tablet: 1 tab(s) orally 2 times a day: take 1st tab 20 to 30 minutes before getting out of bed in the morning and 2nd pill before lunch; must remain upright for at least one hour after taking medication  NIFEdipine 60 mg oral tablet, extended release: 1 tab(s) orally once a day  Pentoxil 400 mg oral tablet, extended release: 1 tab(s) orally 3 times a day  Plavix 75 mg oral tablet: 1 tab(s) orally once a day  polyethylene glycol 3350 oral powder for reconstitution: 17 gram(s) orally once a day, As needed, Constipation  Prevacid 30 mg oral delayed release capsule: 1 cap(s) orally once a day  Ranexa 500 mg oral tablet, extended release: 1 tab(s) orally 2 times a day  senna oral tablet: 1 or 2 tab(s) orally once a day (at bedtime), As needed, Constipation  tamsulosin 0.4 mg oral capsule: 1 cap(s) orally once a day  venlafaxine 75 mg oral capsule, extended release: 1 cap(s) orally once a day   acetaminophen 325 mg oral tablet: 2 or 3 tab(s) orally every 6 hours, As needed, for pain or fever  aspirin 81 mg oral tablet: 1 tab(s) orally once a day  atorvastatin 80 mg oral tablet: 1 tab(s) orally once a day (at bedtime)  fenofibrate 120 mg oral tablet: 1 tab(s) orally once a day  gabapentin 100 mg oral capsule: 2 cap(s) orally once a day (at bedtime)  gabapentin 100 mg oral capsule: 1 cap(s) orally once a day  in am   isosorbide mononitrate 60 mg oral tablet, extended release: 1 tab(s) orally once a day MDD:1  levETIRAcetam 500 mg oral tablet: 1 tab(s) orally 2 times a day  linagliptin 5 mg oral tablet: 1 tab(s) orally once a day  losartan 25 mg oral tablet: 1 tab(s) orally once a day  metoprolol succinate 25 mg oral tablet, extended release: 1 tab(s) orally every 12 hours  midodrine 2.5 mg oral tablet: 1 tab(s) orally 2 times a day: take 1st tab 20 to 30 minutes before getting out of bed in the morning and 2nd pill before lunch; must remain upright for at least one hour after taking medication  NIFEdipine 30 mg oral tablet, extended release: 1 tab(s) orally once a day   pantoprazole 40 mg oral delayed release tablet: 1 tab(s) orally once a day (before a meal)  Pentoxil 400 mg oral tablet, extended release: 1 tab(s) orally 3 times a day  polyethylene glycol 3350 oral powder for reconstitution: 17 gram(s) orally once a day, As needed, Constipation  Prevacid 30 mg oral delayed release capsule: 1 cap(s) orally once a day  Ranexa 500 mg oral tablet, extended release: 1 tab(s) orally 2 times a day  senna oral tablet: 1 or 2 tab(s) orally once a day (at bedtime), As needed, Constipation  tamsulosin 0.4 mg oral capsule: 1 cap(s) orally once a day  ticagrelor 90 mg oral tablet: 1 tab(s) orally every 12 hours  venlafaxine 75 mg oral capsule, extended release: 1 cap(s) orally once a day

## 2022-10-15 NOTE — PROGRESS NOTE ADULT - SUBJECTIVE AND OBJECTIVE BOX
ERICK MARTINEZ  76y  Male    Patient is a 76y old  Male who presents with a chief complaint of Right facial droop, right-sided weakness (13 Oct 2022 14:02)      HPI:  76M with PMH of CAD (s/p CABG x3 in 2017), HTN, DM type II, HLD, GERD, BPH. stroke in 2017 (residual L sided weakness), and recent stroke (evaluated at [facility] 2 wks ago) with residual right sided hemiparesis (discharge papers unable to be found), presents with report of increased right facial droop and right-sided weakness today. Per chart, MRI Head at [facility] revealed acute lacunar infarcts involving the deep white matter of the left frontal lobe at the level of the centrum semiovale. Stroke code called on ED arrival at [facility], NIHSS 21, BP 82/47. CTH showed left SUSANNE territory acute/subacute infarct with suggestion of trace petechial hemorrhagic transformation. Also showed bilateral basal ganglialacunar infarcts, age indeterminate involving the right caudate head. CTP showed scattered regions of hypoperfusion in the bilateral PCA and left SUSANNE territories with evidence of core infarct within the left SUSANNE territory. CTA H/N showed; 1. left SUSANNE occlusion involving the proximal A2 segment; 2. Severe stenosis of the proximal P2 segment of the right PCA as well as moderate stenosis on the left. He is not a candidate for tPA and NI.   (13 Oct 2022 14:02)    S: Patient was examined and seen at bedside. This morning pt is resting comfortably in bed and reports no new issues or overnight events. No complaints, feels better  Denies CP, SOB, N/V/D/C/AP, cough, F, chills, dizziness, new focal weakness, HA, vision changes, dysuria, or urinary symptoms, blood in stool.  ROS: all other systems reviewed and are negative    PAST MEDICAL & SURGICAL HISTORY:  CAD (coronary artery disease)      HTN (hypertension)      Depression      Anxiety      Diabetes  niddm      Angina pectoris      BPH (benign prostatic hyperplasia)      GERD (gastroesophageal reflux disease)      CVA (cerebral vascular accident)      History of appendectomy      Bilateral cataracts      History of heart bypass surgery        SOCIAL HISTORY:  Tobacco: former smoker  Illicit drugs: denies  Alcohol: social  Family history reviewed and otherwise non-contributory No clotting disorders, CVAs at early age.  ALLERGIES: PCN, Clinda    General: NAD. Looks stated age. Slow speech, some bradykinesia  HEENT: clean oropharynx, EOMI, no LAD  Neck: trachea midline, no thyromegaly  CV: nl S1 S2; no m/r/g  Resp: decreased breath sounds at base  GI: NT/ND/S +BS  MS: no clubbing/cyanosis/edema, +pulses  Neuro: Lt 5-/5, rest 5/5  Skin: no rashes, nl turgor  Psychiatric: AA0x2+ w/ fair insight and judgement    tele: SR, nonspecific changes (on my own evaluation of tele monitor)    MEDICATIONS  (STANDING):  aspirin  chewable 81 milliGRAM(s) Enteral Tube daily  atorvastatin 80 milliGRAM(s) Oral at bedtime  gabapentin 100 milliGRAM(s) Oral every 12 hours  heparin   Injectable 5000 Unit(s) SubCutaneous every 8 hours  levETIRAcetam  IVPB 500 milliGRAM(s) IV Intermittent every 12 hours  losartan 25 milliGRAM(s) Oral daily  metoprolol succinate ER 25 milliGRAM(s) Oral daily  NIFEdipine XL 30 milliGRAM(s) Oral once  pantoprazole    Tablet 40 milliGRAM(s) Oral before breakfast  ticagrelor 90 milliGRAM(s) Oral every 12 hours  venlafaxine XR. 75 milliGRAM(s) Oral daily    MEDICATIONS  (PRN):  acetaminophen     Tablet .. 650 milliGRAM(s) Oral every 6 hours PRN Temp greater or equal to 38C (100.4F), Mild Pain (1 - 3)    Home Medications:  acetaminophen 325 mg oral tablet: 2 or 3 tab(s) orally every 6 hours, As needed, for pain or fever (13 Oct 2022 15:31)  aspirin 81 mg oral tablet: 1 tab(s) orally once a day (13 Oct 2022 15:31)  fenofibrate 120 mg oral tablet: 1 tab(s) orally once a day (13 Oct 2022 15:31)  linagliptin 5 mg oral tablet: 1 tab(s) orally once a day (13 Oct 2022 15:31)  losartan 25 mg oral tablet: 1 tab(s) orally once a day (13 Oct 2022 15:31)  metoprolol succinate 25 mg oral tablet, extended release: 1 tab(s) orally every 12 hours (13 Oct 2022 15:31)  Pentoxil 400 mg oral tablet, extended release: 1 tab(s) orally 3 times a day (13 Oct 2022 15:31)  Plavix 75 mg oral tablet: 1 tab(s) orally once a day (13 Oct 2022 15:31)  polyethylene glycol 3350 oral powder for reconstitution: 17 gram(s) orally once a day, As needed, Constipation (13 Oct 2022 15:31)  Prevacid 30 mg oral delayed release capsule: 1 cap(s) orally once a day (13 Oct 2022 15:31)  Ranexa 500 mg oral tablet, extended release: 1 tab(s) orally 2 times a day (13 Oct 2022 15:31)  senna oral tablet: 1 or 2 tab(s) orally once a day (at bedtime), As needed, Constipation (13 Oct 2022 15:31)  tamsulosin 0.4 mg oral capsule: 1 cap(s) orally once a day (13 Oct 2022 15:31)  venlafaxine 75 mg oral capsule, extended release: 1 cap(s) orally once a day (13 Oct 2022 15:31)    Vital Signs Last 24 Hrs  T(C): 36.6 (15 Oct 2022 08:00), Max: 36.7 (14 Oct 2022 20:00)  T(F): 97.8 (15 Oct 2022 08:00), Max: 98.1 (14 Oct 2022 20:00)  HR: 77 (15 Oct 2022 08:00) (68 - 82)  BP: 180/96 (15 Oct 2022 08:00) (150/79 - 196/83)  BP(mean): 138 (15 Oct 2022 08:00) (98 - 138)  RR: 18 (15 Oct 2022 08:00) (17 - 18)  SpO2: 98% (15 Oct 2022 08:00) (98% - 98%)    Parameters below as of 15 Oct 2022 08:00  Patient On (Oxygen Delivery Method): room air      CAPILLARY BLOOD GLUCOSE      POCT Blood Glucose.: 157 mg/dL (15 Oct 2022 11:50)  POCT Blood Glucose.: 138 mg/dL (15 Oct 2022 07:42)  POCT Blood Glucose.: 159 mg/dL (14 Oct 2022 22:05)  POCT Blood Glucose.: 107 mg/dL (14 Oct 2022 16:34)    LABS:                        12.9   10.57 )-----------( 275      ( 13 Oct 2022 12:38 )             40.5     10-14    140  |  103  |  30<H>  ----------------------------<  149<H>  4.0   |  25  |  2.0<H>    Ca    9.4      14 Oct 2022 18:27    TPro  6.5  /  Alb  4.1  /  TBili  0.4  /  DBili  x   /  AST  22  /  ALT  14  /  AlkPhos  41  10-13    LIVER FUNCTIONS - ( 13 Oct 2022 12:38 )  Alb: 4.1 g/dL / Pro: 6.5 g/dL / ALK PHOS: 41 U/L / ALT: 14 U/L / AST: 22 U/L / GGT: x           CARDIAC MARKERS ( 13 Oct 2022 12:38 )  x     / <0.01 ng/mL / x     / x     / x          PT/INR - ( 13 Oct 2022 12:38 )   PT: 10.70 sec;   INR: 0.94 ratio         PTT - ( 13 Oct 2022 12:38 )  PTT:27.2 sec            Consultant Notes Reviewed:  [x ] YES  [ ] NO  Care Discussed with Consultants/Other Providers/ Housestaff [ x] YES  [ ] NO  Radiology, labs, new studies personally reviewed.

## 2022-10-15 NOTE — DISCHARGE NOTE PROVIDER - CARE PROVIDER_API CALL
Alice Elizabeth)  Neurology  [address] [place] [address]  Phone: [phone]  Fax: [phone]  Follow Up Time: 2 weeks  
Simple: Patient demonstrates quick and easy understanding

## 2022-10-15 NOTE — PROGRESS NOTE ADULT - TIME BILLING
time spent on review of labs, imaging studies, old records, obtaining history, personally examining patient, counselling and communicating with patient/ family, entering orders for medications/tests/etc, discussions with other health care providers, documentation in electronic health records, independent interpretation of labs, imaging/procedure results and care coordination.
Review of imaging and chart; obtaining history; examination of pt; discussion and coordination of care.
Review of imaging and chart; obtaining history; examination of pt; discussion and coordination of care.

## 2022-10-15 NOTE — PROGRESS NOTE ADULT - ASSESSMENT
76M with PMH of CAD (s/p CABG x3 in 2017), HTN, DM type II, HLD, GERD, BPH. stroke in 2017 (residual L sided weakness), and recent stroke (evaluated at [facility] 2 wks ago) with residual right sided hemiparesis (discharge papers unable to be found), presents with report of increased right facial droop and right-sided weakness today. Per chart, MRI Head at [facility] revealed acute lacunar infarcts involving the deep white matter of the left frontal lobe at the level of the centrum semiovale. Stroke code called on ED arrival at [facility], NIHSS 21, BP 82/47. CTH showed left SUSANNE territory acute/subacute infarct with suggestion of trace petechial hemorrhagic transformation. Also showed bilateral basal ganglialacunar infarcts, age indeterminate involving the right caudate head. CTP showed scattered regions of hypoperfusion in the bilateral PCA and left SUSANNE territories with evidence of core infarct within the left SUSANNE territory. CTA H/N showed; 1. left SUSANNE occlusion involving the proximal A2 segment; 2. Severe stenosis of the proximal P2 segment of the right PCA as well as moderate stenosis on the left. He is not a candidate for tPA due to recent stroke and no NI as there is no LVO.    Impression  - Patient presented with b/l weakness, documented as R>L on initial presentation. On re-evaluation, R-sided weakness appeared resolved (or at least back to baseline from recent stroke), left-sided weakness is more apparent (consistent with baseline residual deficit from chronic stroke). Patient was fully cooperative with exam, A&Ox3, but complained of frontal headache. Due to initial report of worsening baseline exam, acute stroke and seizure must be ruled out. Of note, per patient's pharmacy, he was prescribed Brilinta on 10/10 but never picked it up.    Neuro  - Then continue home Aspirin 81mg and start Brilinta 90mg BID  - Continue home Atorvastatin 80mg daily  - Q4hr stroke neuro checks and vitals  - obtain swallow study and remove NGT based on results   - CTH showed left SUSANNE territory acute/subacute infarct with suggestion of trace petechial hemorrhagic transformation. Also showed bilateral basal ganglialacunar infarcts, age indeterminate involving the right caudate head.   - CTP showed scattered regions of hypoperfusion in the bilateral PCA and left SUSANNE territories with evidence of core infarct within the left SUSANNE territory.   - CTA H/N showed; 1. left SUSANNE occlusion involving the proximal A2 segment; 2. Severe stenosis of the proximal P2 segment of the right PCA as well as moderate stenosis on the left  - MRI brain showed acute left SUSANNE territory infarct with a mild rightward midline shift, unchanged since previous CT head dated 10/13/22 and stable chronic microvascular ischemic changes as well as unchanged bilateral lacunar infarcts   - Stroke education  - continue with PT/OT/ST  - rehab placement  - start keppra 500 mg IV BID for seizure prophylaxis     Cardiovascular  - SBP goal: 120-160  - TTE: pending  - High dose statin as above in CVA  - LDL results: 107  - Orthostatic hypotension likely, consider thigh high stockings   - hold losartan for now as pt K+ is elevated, recheck K+ levels  - resume other BP medications    Pulm  - Call provider if SPO2 < 94%    GI  - Replete K<4 and Mg <2  - Diet as stated below    Renal  - Daily BMP    Infectious Disease  - WBC: 10.57    Endocrine  - A1C results: 7.2  - TSH results: pending  - f/u outpatient with PCP to manage DM    Misc  - Diet: pending swallow eval  - Activity: IAT  - DVT Prophylaxis: SQH  - GI Prophylaxis: PPI  - Code Status: full code  - Disposition: pending PT/OT/Rehab eval

## 2022-10-15 NOTE — PROGRESS NOTE ADULT - SUBJECTIVE AND OBJECTIVE BOX
PHYSICAL EXAMINATION:  Constitutional: WDWN; NAD  Eyes: anicteric sclera  Cardiovascular: +S1/S2, RRR; no M/R/G  Neurologic:  - Mental Status:  Awake, alert, and oriented to person, place, time, but not date.  Could not recall events of the day (did not know what happened this morning or why he was here). Naming, repetition and comprehension intact.  Speech slow and intentional, mildly dysarthric.   - Cranial Nerves II-XII:    II:  PERRLA; visual fields are full to confrontation  III, IV, VI:  EOMI, no nystagmus  V:  facial sensation is intact in the V1-V3 distribution bilaterally.  VII:  face is asymmetric with mild left facial supranuclear palsy  VIII:  hearing is intact to finger rub  IX, X:  uvula is midline and soft palate rises symmetrically with residual nasal dysarthria  XI:  head turning and shoulder shrug are intact bilaterally  XII:  tongue protrudes in the midline  - Motor:  strength is 5/5 throughout; normal muscle bulk and tone throughout; no pronator drift  - Reflexes:  2+L/3+R at the biceps, triceps, brachioradialis, knees, and ankles;  plantar reflexes upgoing left side  - Sensory:  Decreased sensation to light touch along left side compared to right  - Coordination:  finger-nose-finger and heel-knee-shin intact without dysmetria; rapid alternating hand movements intact  - Gait: untested

## 2022-10-15 NOTE — DISCHARGE NOTE PROVIDER - NSDCHOSPICE_GEN_A_CORE
Results and recommendations given per Dr. Morales.  Verbalized understanding.  No questions at this time.   No

## 2022-10-23 ENCOUNTER — INPATIENT (INPATIENT)
Facility: HOSPITAL | Age: 77
LOS: 3 days | Discharge: NURSING, REHAB OR LONG-TERM CARE | End: 2022-10-27
Attending: INTERNAL MEDICINE | Admitting: INTERNAL MEDICINE

## 2022-10-23 VITALS
DIASTOLIC BLOOD PRESSURE: 71 MMHG | HEART RATE: 87 BPM | RESPIRATION RATE: 14 BRPM | SYSTOLIC BLOOD PRESSURE: 111 MMHG | OXYGEN SATURATION: 99 % | TEMPERATURE: 98 F

## 2022-10-23 DIAGNOSIS — Z95.1 PRESENCE OF AORTOCORONARY BYPASS GRAFT: Chronic | ICD-10-CM

## 2022-10-23 DIAGNOSIS — Z90.49 ACQUIRED ABSENCE OF OTHER SPECIFIED PARTS OF DIGESTIVE TRACT: Chronic | ICD-10-CM

## 2022-10-23 DIAGNOSIS — H26.9 UNSPECIFIED CATARACT: Chronic | ICD-10-CM

## 2022-10-23 LAB
ALBUMIN SERPL ELPH-MCNC: 4.4 G/DL — SIGNIFICANT CHANGE UP (ref 3.5–5.2)
ALP SERPL-CCNC: 33 U/L — SIGNIFICANT CHANGE UP (ref 30–115)
ALT FLD-CCNC: 17 U/L — SIGNIFICANT CHANGE UP (ref 0–41)
ANION GAP SERPL CALC-SCNC: 16 MMOL/L — HIGH (ref 7–14)
APTT BLD: 26 SEC — LOW (ref 27–39.2)
AST SERPL-CCNC: 43 U/L — HIGH (ref 0–41)
BASE EXCESS BLDV CALC-SCNC: -1.5 MMOL/L — SIGNIFICANT CHANGE UP (ref -2–3)
BASOPHILS # BLD AUTO: 0.06 K/UL — SIGNIFICANT CHANGE UP (ref 0–0.2)
BASOPHILS NFR BLD AUTO: 0.5 % — SIGNIFICANT CHANGE UP (ref 0–1)
BILIRUB SERPL-MCNC: 0.4 MG/DL — SIGNIFICANT CHANGE UP (ref 0.2–1.2)
BUN SERPL-MCNC: 38 MG/DL — HIGH (ref 10–20)
CA-I SERPL-SCNC: 1.26 MMOL/L — SIGNIFICANT CHANGE UP (ref 1.15–1.33)
CALCIUM SERPL-MCNC: 9.6 MG/DL — SIGNIFICANT CHANGE UP (ref 8.4–10.4)
CHLORIDE SERPL-SCNC: 103 MMOL/L — SIGNIFICANT CHANGE UP (ref 98–110)
CO2 SERPL-SCNC: 18 MMOL/L — SIGNIFICANT CHANGE UP (ref 17–32)
CREAT SERPL-MCNC: 2.3 MG/DL — HIGH (ref 0.7–1.5)
EGFR: 28 ML/MIN/1.73M2 — LOW
EOSINOPHIL # BLD AUTO: 0.1 K/UL — SIGNIFICANT CHANGE UP (ref 0–0.7)
EOSINOPHIL NFR BLD AUTO: 0.8 % — SIGNIFICANT CHANGE UP (ref 0–8)
GAS PNL BLDV: 136 MMOL/L — SIGNIFICANT CHANGE UP (ref 136–145)
GAS PNL BLDV: SIGNIFICANT CHANGE UP
GLUCOSE SERPL-MCNC: 144 MG/DL — HIGH (ref 70–99)
HCO3 BLDV-SCNC: 25 MMOL/L — SIGNIFICANT CHANGE UP (ref 22–29)
HCT VFR BLD CALC: 43.1 % — SIGNIFICANT CHANGE UP (ref 42–52)
HCT VFR BLDA CALC: 37 % — LOW (ref 39–51)
HGB BLD CALC-MCNC: 12.3 G/DL — LOW (ref 12.6–17.4)
HGB BLD-MCNC: 13.9 G/DL — LOW (ref 14–18)
IMM GRANULOCYTES NFR BLD AUTO: 1 % — HIGH (ref 0.1–0.3)
INR BLD: 0.92 RATIO — SIGNIFICANT CHANGE UP (ref 0.65–1.3)
LACTATE BLDV-MCNC: 1.2 MMOL/L — SIGNIFICANT CHANGE UP (ref 0.5–2)
LYMPHOCYTES # BLD AUTO: 1.34 K/UL — SIGNIFICANT CHANGE UP (ref 1.2–3.4)
LYMPHOCYTES # BLD AUTO: 10.5 % — LOW (ref 20.5–51.1)
MCHC RBC-ENTMCNC: 28.9 PG — SIGNIFICANT CHANGE UP (ref 27–31)
MCHC RBC-ENTMCNC: 32.3 G/DL — SIGNIFICANT CHANGE UP (ref 32–37)
MCV RBC AUTO: 89.6 FL — SIGNIFICANT CHANGE UP (ref 80–94)
MONOCYTES # BLD AUTO: 0.57 K/UL — SIGNIFICANT CHANGE UP (ref 0.1–0.6)
MONOCYTES NFR BLD AUTO: 4.5 % — SIGNIFICANT CHANGE UP (ref 1.7–9.3)
NEUTROPHILS # BLD AUTO: 10.57 K/UL — HIGH (ref 1.4–6.5)
NEUTROPHILS NFR BLD AUTO: 82.7 % — HIGH (ref 42.2–75.2)
NRBC # BLD: 0 /100 WBCS — SIGNIFICANT CHANGE UP (ref 0–0)
PCO2 BLDV: 47 MMHG — SIGNIFICANT CHANGE UP (ref 42–55)
PH BLDV: 7.33 — SIGNIFICANT CHANGE UP (ref 7.32–7.43)
PLATELET # BLD AUTO: 283 K/UL — SIGNIFICANT CHANGE UP (ref 130–400)
PO2 BLDV: 32 MMHG — SIGNIFICANT CHANGE UP
POTASSIUM BLDV-SCNC: 4.6 MMOL/L — SIGNIFICANT CHANGE UP (ref 3.5–5.1)
POTASSIUM SERPL-MCNC: 6.7 MMOL/L — CRITICAL HIGH (ref 3.5–5)
POTASSIUM SERPL-SCNC: 6.7 MMOL/L — CRITICAL HIGH (ref 3.5–5)
PROT SERPL-MCNC: 7.2 G/DL — SIGNIFICANT CHANGE UP (ref 6–8)
PROTHROM AB SERPL-ACNC: 10.5 SEC — SIGNIFICANT CHANGE UP (ref 9.95–12.87)
RBC # BLD: 4.81 M/UL — SIGNIFICANT CHANGE UP (ref 4.7–6.1)
RBC # FLD: 14.7 % — HIGH (ref 11.5–14.5)
SAO2 % BLDV: 42.2 % — SIGNIFICANT CHANGE UP
SARS-COV-2 RNA SPEC QL NAA+PROBE: SIGNIFICANT CHANGE UP
SODIUM SERPL-SCNC: 137 MMOL/L — SIGNIFICANT CHANGE UP (ref 135–146)
TROPONIN T SERPL-MCNC: <0.01 NG/ML — SIGNIFICANT CHANGE UP
WBC # BLD: 12.77 K/UL — HIGH (ref 4.8–10.8)
WBC # FLD AUTO: 12.77 K/UL — HIGH (ref 4.8–10.8)

## 2022-10-23 PROCEDURE — 70450 CT HEAD/BRAIN W/O DYE: CPT | Mod: 26,MA,59

## 2022-10-23 PROCEDURE — 0042T: CPT | Mod: MA

## 2022-10-23 PROCEDURE — 93010 ELECTROCARDIOGRAM REPORT: CPT

## 2022-10-23 PROCEDURE — 71045 X-RAY EXAM CHEST 1 VIEW: CPT | Mod: 26

## 2022-10-23 PROCEDURE — 70498 CT ANGIOGRAPHY NECK: CPT | Mod: 26,MA

## 2022-10-23 PROCEDURE — 70496 CT ANGIOGRAPHY HEAD: CPT | Mod: 26,MA

## 2022-10-23 PROCEDURE — 99291 CRITICAL CARE FIRST HOUR: CPT | Mod: GC

## 2022-10-23 RX ORDER — LEVETIRACETAM 250 MG/1
750 TABLET, FILM COATED ORAL
Refills: 0 | Status: DISCONTINUED | OUTPATIENT
Start: 2022-10-23 | End: 2022-10-27

## 2022-10-23 NOTE — ED PROVIDER NOTE - CLINICAL SUMMARY MEDICAL DECISION MAKING FREE TEXT BOX
Patient presented status post being found unresponsive by son prior to arrival.  At 11 AM today which was approximately 4 hours prior to arrival, son visited the patient at the nursing home at which time the patient seen normal.  After an initial conversation, patient sat down and then was unresponsive per son with his eyes closed and not moving.  Reportedly also became diaphoretic during the episode.  On arrival to ED patient is somnolent, winces to sternal rub but is not following commands, normal O2 saturation on room air.  Code stroke activated due to recent onset of symptoms.  CTs as per stroke protocol obtained and negative for evidence of hemorrhage, stroke aside from chronic 1 in the past, or signs of MVO.  Obtained labs which were grossly unremarkable including no significant leukocytosis, anemia, signs of dehydration/SAMANTA, transaminitis or significant electrolyte abnormalities.  Patient began returning to normal mental status during ED course, but had left-sided weakness which is reportedly not new for him.  Episode could be explained by possible seizure versus syncope although this is unclear at this time.  Per neurology, recommended admission for further work-up, EEG and telemetry monitoring.  Family agreeable with plan.  Hemodynamically stable at time of admission.

## 2022-10-23 NOTE — ED PROVIDER NOTE - PROGRESS NOTE DETAILS
Resident AO: --- DELAYED ENTRY --- Patient HD stable, neurologically back to baseline; resting in stretcher. Pending repeat potassium levels and then he will be admitted to [facility] for syncope workup.

## 2022-10-23 NOTE — CONSULT NOTE ADULT - ASSESSMENT
Assessment:76M with PMH of CAD (s/p CABG x3 in 2017), HTN, DM type II, HLD, GERD, BPH. stroke in 2017 (residual L sided weakness ), and recent stroke (evaluated at [facility] 2 wks ago) with residual right sided hemiparesis (discharge papers unable to be found), BIBEMS for unresponsiveness. Patient is from [facility]. As per son, patient was talked to his dad and had a full conversation around 11AM. Son visited his father around 1:40PM, said hello and asked how he was doing "fine". He continued to watch TV. Around ~10 minutes later, patient was profusely sweating, was in a daze and became unresponsive. Stroke code on arrival. Patient was not responding to pain, following any commands intially. After CT scan, patient was coming back to baseline, noted the left sided weakness (residual ;however patient is able to move more of his left side, it has worsen as per patient). Son states his left arm was off on Friday when he had to sign something. Patient is on ASA/brilinta as per nursing home chart. Patient states he does not refuse medications. Patient is also on Keppra 500 mg BID.CTH-negative for any acute findings. CTA (prelim)- stable Stable occlusion of the left A2 segment with decreased arborization of the left anterior cerebral artery circulation. CTP-, no evidence of core infarct. 10 cc hypoperfusion detected in the left anterior frontal lobe,   correlating to region of known left SUSANNE territory infarct. Discussed with CTC attending, Miladis Hatfield, patient is not tpa candidate as recent stroke in past 3 months. MRI brain 10/13/22-Acute left SUSANNE territory infarct with a mild rightward midline shift. Patient is not a IA candidate as no LVO.    #Syncope vs seizures vs underlying infectious etiology     Suggestions:  Admit to medicine tele  Syncope workup   Check orthostatics  rEEG  Increase Keppra 750 mg BID  Infectious workup, UA, chest xray,   Keep Mg>2  Seizure precautions  Fall precautions  **Discussed with Dr. Phan on call. Pending neurology attending note to follow BELOW**   Assessment:76M with PMH of CAD (s/p CABG x3 in 2017), HTN, DM type II, HLD, GERD, BPH. stroke in 2017 (residual L sided weakness ), and recent stroke (evaluated at [facility] 2 wks ago) with residual right sided hemiparesis (discharge papers unable to be found), BIBEMS for unresponsiveness. Patient is from [facility]. As per son, patient was talked to his dad and had a full conversation around 11AM. Son visited his father around 1:40PM, said hello and asked how he was doing "fine". He continued to watch TV. Around ~10 minutes later, patient was profusely sweating, was in a daze and became unresponsive. Stroke code on arrival. Patient was not responding to pain, following any commands intially. After CT scan, patient was coming back to baseline, noted the left sided weakness (residual ;however patient is able to move more of his left side, it has worsen as per patient). Son states his left arm was off on Friday when he had to sign something. Patient is on ASA/brilinta as per nursing home chart. Patient states he does not refuse medications. Patient is also on Keppra 500 mg BID.CTH-negative for any acute findings. CTA (prelim)- stable Stable occlusion of the left A2 segment with decreased arborization of the left anterior cerebral artery circulation. CTP-, no evidence of core infarct. 10 cc hypoperfusion detected in the left anterior frontal lobe,   correlating to region of known left SUSANNE territory infarct. Discussed with CTC attending, Miladis Hatfield, patient is not tpa candidate as recent stroke in past 3 months. MRI brain 10/13/22-Acute left SUSANNE territory infarct with a mild rightward midline shift. Patient is not a IA candidate as no LVO.    #Syncope vs seizures vs underlying infectious etiology     Suggestions:  Admit to medicine tele  Syncope workup   Check orthostatics  Avoid hypotension, hypovolemia  rEEG  Increase Keppra 750 mg BID  Infectious workup, UA, chest xray,   Keep Mg>2  Seizure precautions  Fall precautions  **Discussed with Dr. Phan on call. Pending neurology attending note to follow BELOW**   Assessment:76M with PMH of CAD (s/p CABG x3 in 2017), HTN, DM type II, HLD, GERD, BPH. stroke in 2017 (residual L sided weakness ), and recent stroke (evaluated at [facility] 2 wks ago) with residual right sided hemiparesis (discharge papers unable to be found), BIBEMS for unresponsiveness. Patient is from [facility]. As per son, patient was talked to his dad and had a full conversation around 11AM. Son visited his father around 1:40PM, said hello and asked how he was doing "fine". He continued to watch TV. Around ~10 minutes later, patient was profusely sweating, was in a daze and became unresponsive. Stroke code on arrival. Patient was not responding to pain, following any commands intially. After CT scan, patient was coming back to baseline, noted the left sided weakness (residual ;however patient is able to move more of his left side, it has worsen as per patient). Son states his left arm was off on Friday when he had to sign something. Patient is on ASA/brilinta as per nursing home chart. Patient states he does not refuse medications. Patient is also on Keppra 500 mg BID.CTH-negative for any acute findings. CTA (prelim)- stable Stable occlusion of the left A2 segment with decreased arborization of the left anterior cerebral artery circulation. CTP-, no evidence of core infarct. 10 cc hypoperfusion detected in the left anterior frontal lobe,   correlating to region of known left SUSANNE territory infarct. Discussed with CTC attending, Miladis Hatfield, patient is not tpa candidate as recent stroke in past 3 months. MRI brain 10/13/22-Acute left SUSANNE territory infarct with a mild rightward midline shift. Patient is not a IA candidate as no LVO.    #Syncope vs seizures vs underlying infectious etiology     Suggestions:  Admit to medicine tele  Syncope workup   Check orthostatics  Avoid hypotension, hypovolemia  rEEG  Increase Keppra 750 mg BID  Infectious workup, UA, chest xray,   Keep Mg>2  Seizure precautions  Fall precautions

## 2022-10-23 NOTE — ED PROVIDER NOTE - OBJECTIVE STATEMENT
Pt is a 78 yo M, h/o CVA in 2017 (residual L sided weakness) and 2 weeks ago (residual R sided hemiparesis), CAD (s/p CABG x3 in 2017), HTN, T2DM, HLD, GERD, BPH. Pt was BIBEMS from [facility] for being unresponsive. Per the son, patient and the son were speaking (full conversation) around 1100 today. Then when the son came to visit around 1330, father said he was doing "fine", but then started profusely sweating and became unresponsive. Code Stroke activated in Triage. The patient was not responding to questions, not following commands, sitting with eyes closed. When his eyes were opened manually for examination, he blinked, but stared straight ahead. Taken straight to CT. Afterwards, he returned to baseline. Was noted to have left sided weakness. No other complaints - no fever/chills, CP, SOB, cough, abd pain, NVD, hematuria, rash, trauma.

## 2022-10-23 NOTE — ED PROVIDER NOTE - PHYSICAL EXAMINATION
_  CONSTITUTIONAL: NAD  SKIN: Warm, dry  HEAD: NCAT  EYES: Clear conjunctiva   ENT: MMM  NECK: Supple  CARD: RRR, S1, S2; no M/R/G  RESP: Speaking in full sentences; CTAB  ABD: S/NT, no R/G  EXT: Pulses present distally  NEURO: AAOx3. Left sided facial droop present; LUE and LLE weakness; sensation decreased on the left side  PSYCH: Cooperative, appropriate

## 2022-10-23 NOTE — ED PROVIDER NOTE - CARE PLAN
1 Principal Discharge DX:	Transient LOC (loss of consciousness)   Principal Discharge DX:	Altered mental status

## 2022-10-23 NOTE — ED ADULT NURSE NOTE - NSIMPLEMENTINTERV_GEN_ALL_ED
Implemented All Fall with Harm Risk Interventions:  [place] to call system. Call bell, personal items and telephone within reach. Instruct patient to call for assistance. Room bathroom lighting operational. Non-slip footwear when patient is off stretcher. Physically safe environment: no spills, clutter or unnecessary equipment. Stretcher in lowest position, wheels locked, appropriate side rails in place. Provide visual cue, wrist band, yellow gown, etc. Monitor gait and stability. Monitor for mental status changes and reorient to person, place, and time. Review medications for side effects contributing to fall risk. Reinforce activity limits and safety measures with patient and family. Provide visual clues: red socks.

## 2022-10-23 NOTE — CONSULT NOTE ADULT - ATTENDING COMMENTS
I have personally seen and examined this patient on 10/24.   I have fully participated in the care of this patient.  I have reviewed all pertinent clinical information, including history, physical exam, plan and note. 76M with PMH of CAD (s/p CABG x3 in 2017), HTN, DM type II, HLD, GERD, BPH. stroke in 2017 (residual L sided weakness ), and recent stroke (evaluated at [facility] 2 wks ago) with residual right sided hemiparesis   I have reviewed all pertinent clinical information and reviewed all relevant imaging and diagnostic studies personally.  Recommendations as above.  Agree with above assessment except as noted. I have personally seen and examined this patient on 10/24.   I have fully participated in the care of this patient.  I have reviewed all pertinent clinical information, including history, physical exam, plan and note. 76M with PMH of CAD (s/p CABG x3 in 2017), HTN, DM type II, HLD, GERD, BPH. stroke in 2017 (residual L sided weakness ), and recent stroke (evaluated at [facility] 2 wks ago) with residual right sided hemiparesis presented with ams. Unlikely stroke but seizure is possibility, Keppra dose was increased and REEG was ordered.  Brain MRI ordered. Recommend to follow up on the results   I have reviewed all pertinent clinical information and reviewed all relevant imaging and diagnostic studies personally.  Recommendations as above.  Agree with above assessment except as noted.

## 2022-10-23 NOTE — ED ADULT TRIAGE NOTE - HEIGHT IN CM
History  HPI     Here for evaluation of possible discomfort in the right eye when waking up in the morning. Dad notes that upon awaking he will keep his right eye closed for 25-30 minutes and then will go back to normal. His eyes also tend to water a lot when out in the sunlight. No redness or other discharge seen by dad.    Last edited by Anthony Lala COT on 3/15/2022  8:26 AM. (History)          Assessment/Plan  (H10.13) Allergic conjunctivitis of both eyes  (primary encounter diagnosis)  Comment: Closing right eye upon awakening, papillary conjunctivitis both eyes   Plan: olopatadine (PATADAY) 0.2 % ophthalmic solution         Educated patient's father on clinical findings. Explained that eye closing could be a result of discomfort (related to allergic conjunctivitis), or secondary to diplopia due to XT. Prescribed Pataday once every day both eyes. If symptoms do not improve over the next month, return for follow-up.     (H50.331) Intermittent monocular exotropia of right eye  Comment: See above; stable  Plan:  Monitor at next exam.    Return to clinic in 9 months for comprehensive eye exam, or sooner if eye closing does not improve.    Complete documentation of historical and exam elements from today's encounter can  be found in the full encounter summary report (not reduplicated in this progress  note). I personally obtained the chief complaint(s) and history of present illness. I  confirmed and edited as necessary the review of systems, past medical/surgical  history, family history, social history, and examination findings as documented by  others; and I examined the patient myself. I personally reviewed the relevant tests,  images, and reports as documented above. I formulated and edited as necessary the  assessment and plan and discussed the findings and management plan with the  patient and family.    Micheal March, KELSEY, FAAO     [phone]

## 2022-10-24 LAB
ALBUMIN SERPL ELPH-MCNC: 4.8 G/DL — SIGNIFICANT CHANGE UP (ref 3.5–5.2)
ALP SERPL-CCNC: 42 U/L — SIGNIFICANT CHANGE UP (ref 30–115)
ALT FLD-CCNC: 16 U/L — SIGNIFICANT CHANGE UP (ref 0–41)
ANION GAP SERPL CALC-SCNC: 14 MMOL/L — SIGNIFICANT CHANGE UP (ref 7–14)
AST SERPL-CCNC: 20 U/L — SIGNIFICANT CHANGE UP (ref 0–41)
BASOPHILS # BLD AUTO: 0.05 K/UL — SIGNIFICANT CHANGE UP (ref 0–0.2)
BASOPHILS NFR BLD AUTO: 0.5 % — SIGNIFICANT CHANGE UP (ref 0–1)
BILIRUB SERPL-MCNC: 0.5 MG/DL — SIGNIFICANT CHANGE UP (ref 0.2–1.2)
BUN SERPL-MCNC: 32 MG/DL — HIGH (ref 10–20)
CALCIUM SERPL-MCNC: 9.9 MG/DL — SIGNIFICANT CHANGE UP (ref 8.4–10.5)
CHLORIDE SERPL-SCNC: 104 MMOL/L — SIGNIFICANT CHANGE UP (ref 98–110)
CO2 SERPL-SCNC: 23 MMOL/L — SIGNIFICANT CHANGE UP (ref 17–32)
CREAT SERPL-MCNC: 1.9 MG/DL — HIGH (ref 0.7–1.5)
EGFR: 36 ML/MIN/1.73M2 — LOW
EOSINOPHIL # BLD AUTO: 0.18 K/UL — SIGNIFICANT CHANGE UP (ref 0–0.7)
EOSINOPHIL NFR BLD AUTO: 2 % — SIGNIFICANT CHANGE UP (ref 0–8)
GLUCOSE BLDC GLUCOMTR-MCNC: 134 MG/DL — HIGH (ref 70–99)
GLUCOSE BLDC GLUCOMTR-MCNC: 141 MG/DL — HIGH (ref 70–99)
GLUCOSE BLDC GLUCOMTR-MCNC: 156 MG/DL — HIGH (ref 70–99)
GLUCOSE SERPL-MCNC: 111 MG/DL — HIGH (ref 70–99)
HCT VFR BLD CALC: 43.3 % — SIGNIFICANT CHANGE UP (ref 42–52)
HGB BLD-MCNC: 14 G/DL — SIGNIFICANT CHANGE UP (ref 14–18)
IMM GRANULOCYTES NFR BLD AUTO: 0.7 % — HIGH (ref 0.1–0.3)
LYMPHOCYTES # BLD AUTO: 1.57 K/UL — SIGNIFICANT CHANGE UP (ref 1.2–3.4)
LYMPHOCYTES # BLD AUTO: 17.1 % — LOW (ref 20.5–51.1)
MAGNESIUM SERPL-MCNC: 2.4 MG/DL — SIGNIFICANT CHANGE UP (ref 1.8–2.4)
MCHC RBC-ENTMCNC: 28.9 PG — SIGNIFICANT CHANGE UP (ref 27–31)
MCHC RBC-ENTMCNC: 32.3 G/DL — SIGNIFICANT CHANGE UP (ref 32–37)
MCV RBC AUTO: 89.3 FL — SIGNIFICANT CHANGE UP (ref 80–94)
MONOCYTES # BLD AUTO: 0.49 K/UL — SIGNIFICANT CHANGE UP (ref 0.1–0.6)
MONOCYTES NFR BLD AUTO: 5.3 % — SIGNIFICANT CHANGE UP (ref 1.7–9.3)
NEUTROPHILS # BLD AUTO: 6.84 K/UL — HIGH (ref 1.4–6.5)
NEUTROPHILS NFR BLD AUTO: 74.4 % — SIGNIFICANT CHANGE UP (ref 42.2–75.2)
NRBC # BLD: 0 /100 WBCS — SIGNIFICANT CHANGE UP (ref 0–0)
PHOSPHATE SERPL-MCNC: 3.9 MG/DL — SIGNIFICANT CHANGE UP (ref 2.1–4.9)
PLATELET # BLD AUTO: 268 K/UL — SIGNIFICANT CHANGE UP (ref 130–400)
POTASSIUM SERPL-MCNC: 4.4 MMOL/L — SIGNIFICANT CHANGE UP (ref 3.5–5)
POTASSIUM SERPL-SCNC: 4.4 MMOL/L — SIGNIFICANT CHANGE UP (ref 3.5–5)
PROT SERPL-MCNC: 7.3 G/DL — SIGNIFICANT CHANGE UP (ref 6–8)
RBC # BLD: 4.85 M/UL — SIGNIFICANT CHANGE UP (ref 4.7–6.1)
RBC # FLD: 14.6 % — HIGH (ref 11.5–14.5)
SODIUM SERPL-SCNC: 141 MMOL/L — SIGNIFICANT CHANGE UP (ref 135–146)
WBC # BLD: 9.19 K/UL — SIGNIFICANT CHANGE UP (ref 4.8–10.8)
WBC # FLD AUTO: 9.19 K/UL — SIGNIFICANT CHANGE UP (ref 4.8–10.8)

## 2022-10-24 PROCEDURE — 99223 1ST HOSP IP/OBS HIGH 75: CPT | Mod: GC

## 2022-10-24 PROCEDURE — 99221 1ST HOSP IP/OBS SF/LOW 40: CPT

## 2022-10-24 RX ORDER — FENOFIBRATE,MICRONIZED 130 MG
145 CAPSULE ORAL DAILY
Refills: 0 | Status: DISCONTINUED | OUTPATIENT
Start: 2022-10-24 | End: 2022-10-24

## 2022-10-24 RX ORDER — INSULIN GLARGINE 100 [IU]/ML
5 INJECTION, SOLUTION SUBCUTANEOUS AT BEDTIME
Refills: 0 | Status: DISCONTINUED | OUTPATIENT
Start: 2022-10-24 | End: 2022-10-27

## 2022-10-24 RX ORDER — HEPARIN SODIUM 5000 [USP'U]/ML
5000 INJECTION INTRAVENOUS; SUBCUTANEOUS EVERY 8 HOURS
Refills: 0 | Status: DISCONTINUED | OUTPATIENT
Start: 2022-10-24 | End: 2022-10-27

## 2022-10-24 RX ORDER — GABAPENTIN 400 MG/1
200 CAPSULE ORAL AT BEDTIME
Refills: 0 | Status: DISCONTINUED | OUTPATIENT
Start: 2022-10-24 | End: 2022-10-27

## 2022-10-24 RX ORDER — SODIUM CHLORIDE 9 MG/ML
1000 INJECTION, SOLUTION INTRAVENOUS
Refills: 0 | Status: DISCONTINUED | OUTPATIENT
Start: 2022-10-24 | End: 2022-10-27

## 2022-10-24 RX ORDER — RANOLAZINE 500 MG/1
500 TABLET, FILM COATED, EXTENDED RELEASE ORAL
Refills: 0 | Status: DISCONTINUED | OUTPATIENT
Start: 2022-10-24 | End: 2022-10-27

## 2022-10-24 RX ORDER — DEXTROSE 50 % IN WATER 50 %
25 SYRINGE (ML) INTRAVENOUS ONCE
Refills: 0 | Status: DISCONTINUED | OUTPATIENT
Start: 2022-10-24 | End: 2022-10-27

## 2022-10-24 RX ORDER — METOPROLOL TARTRATE 50 MG
25 TABLET ORAL DAILY
Refills: 0 | Status: DISCONTINUED | OUTPATIENT
Start: 2022-10-24 | End: 2022-10-27

## 2022-10-24 RX ORDER — MIDODRINE HYDROCHLORIDE 2.5 MG/1
2.5 TABLET ORAL
Refills: 0 | Status: DISCONTINUED | OUTPATIENT
Start: 2022-10-24 | End: 2022-10-24

## 2022-10-24 RX ORDER — INSULIN LISPRO 100/ML
VIAL (ML) SUBCUTANEOUS
Refills: 0 | Status: DISCONTINUED | OUTPATIENT
Start: 2022-10-24 | End: 2022-10-27

## 2022-10-24 RX ORDER — PANTOPRAZOLE SODIUM 20 MG/1
40 TABLET, DELAYED RELEASE ORAL
Refills: 0 | Status: DISCONTINUED | OUTPATIENT
Start: 2022-10-24 | End: 2022-10-27

## 2022-10-24 RX ORDER — PENTOXIFYLLINE 400 MG
400 TABLET, EXTENDED RELEASE ORAL THREE TIMES A DAY
Refills: 0 | Status: DISCONTINUED | OUTPATIENT
Start: 2022-10-24 | End: 2022-10-27

## 2022-10-24 RX ORDER — ATORVASTATIN CALCIUM 80 MG/1
80 TABLET, FILM COATED ORAL AT BEDTIME
Refills: 0 | Status: DISCONTINUED | OUTPATIENT
Start: 2022-10-24 | End: 2022-10-27

## 2022-10-24 RX ORDER — LOSARTAN POTASSIUM 100 MG/1
25 TABLET, FILM COATED ORAL DAILY
Refills: 0 | Status: DISCONTINUED | OUTPATIENT
Start: 2022-10-24 | End: 2022-10-24

## 2022-10-24 RX ORDER — TICAGRELOR 90 MG/1
90 TABLET ORAL DAILY
Refills: 0 | Status: DISCONTINUED | OUTPATIENT
Start: 2022-10-24 | End: 2022-10-27

## 2022-10-24 RX ORDER — SENNA PLUS 8.6 MG/1
2 TABLET ORAL AT BEDTIME
Refills: 0 | Status: DISCONTINUED | OUTPATIENT
Start: 2022-10-24 | End: 2022-10-27

## 2022-10-24 RX ORDER — METOPROLOL TARTRATE 50 MG
25 TABLET ORAL ONCE
Refills: 0 | Status: COMPLETED | OUTPATIENT
Start: 2022-10-24 | End: 2022-10-24

## 2022-10-24 RX ORDER — DEXTROSE 50 % IN WATER 50 %
15 SYRINGE (ML) INTRAVENOUS ONCE
Refills: 0 | Status: DISCONTINUED | OUTPATIENT
Start: 2022-10-24 | End: 2022-10-27

## 2022-10-24 RX ORDER — INSULIN LISPRO 100/ML
3 VIAL (ML) SUBCUTANEOUS
Refills: 0 | Status: DISCONTINUED | OUTPATIENT
Start: 2022-10-24 | End: 2022-10-27

## 2022-10-24 RX ORDER — DEXTROSE 50 % IN WATER 50 %
12.5 SYRINGE (ML) INTRAVENOUS ONCE
Refills: 0 | Status: DISCONTINUED | OUTPATIENT
Start: 2022-10-24 | End: 2022-10-27

## 2022-10-24 RX ORDER — GLUCAGON INJECTION, SOLUTION 0.5 MG/.1ML
1 INJECTION, SOLUTION SUBCUTANEOUS ONCE
Refills: 0 | Status: DISCONTINUED | OUTPATIENT
Start: 2022-10-24 | End: 2022-10-27

## 2022-10-24 RX ORDER — GABAPENTIN 400 MG/1
100 CAPSULE ORAL DAILY
Refills: 0 | Status: DISCONTINUED | OUTPATIENT
Start: 2022-10-24 | End: 2022-10-27

## 2022-10-24 RX ORDER — NIFEDIPINE 30 MG
30 TABLET, EXTENDED RELEASE 24 HR ORAL DAILY
Refills: 0 | Status: DISCONTINUED | OUTPATIENT
Start: 2022-10-24 | End: 2022-10-24

## 2022-10-24 RX ORDER — ASPIRIN/CALCIUM CARB/MAGNESIUM 324 MG
81 TABLET ORAL DAILY
Refills: 0 | Status: DISCONTINUED | OUTPATIENT
Start: 2022-10-24 | End: 2022-10-27

## 2022-10-24 RX ORDER — ISOSORBIDE MONONITRATE 60 MG/1
60 TABLET, EXTENDED RELEASE ORAL DAILY
Refills: 0 | Status: DISCONTINUED | OUTPATIENT
Start: 2022-10-24 | End: 2022-10-27

## 2022-10-24 RX ORDER — TAMSULOSIN HYDROCHLORIDE 0.4 MG/1
0.4 CAPSULE ORAL AT BEDTIME
Refills: 0 | Status: DISCONTINUED | OUTPATIENT
Start: 2022-10-24 | End: 2022-10-27

## 2022-10-24 RX ORDER — VENLAFAXINE HCL 75 MG
75 CAPSULE, EXT RELEASE 24 HR ORAL DAILY
Refills: 0 | Status: DISCONTINUED | OUTPATIENT
Start: 2022-10-24 | End: 2022-10-27

## 2022-10-24 RX ADMIN — RANOLAZINE 500 MILLIGRAM(S): 500 TABLET, FILM COATED, EXTENDED RELEASE ORAL at 17:00

## 2022-10-24 RX ADMIN — RANOLAZINE 500 MILLIGRAM(S): 500 TABLET, FILM COATED, EXTENDED RELEASE ORAL at 05:59

## 2022-10-24 RX ADMIN — Medication 25 MILLIGRAM(S): at 03:13

## 2022-10-24 RX ADMIN — LOSARTAN POTASSIUM 25 MILLIGRAM(S): 100 TABLET, FILM COATED ORAL at 00:22

## 2022-10-24 RX ADMIN — GABAPENTIN 200 MILLIGRAM(S): 400 CAPSULE ORAL at 22:21

## 2022-10-24 RX ADMIN — ISOSORBIDE MONONITRATE 60 MILLIGRAM(S): 60 TABLET, EXTENDED RELEASE ORAL at 12:55

## 2022-10-24 RX ADMIN — LEVETIRACETAM 750 MILLIGRAM(S): 250 TABLET, FILM COATED ORAL at 05:59

## 2022-10-24 RX ADMIN — LEVETIRACETAM 750 MILLIGRAM(S): 250 TABLET, FILM COATED ORAL at 17:00

## 2022-10-24 RX ADMIN — Medication 400 MILLIGRAM(S): at 22:21

## 2022-10-24 RX ADMIN — TICAGRELOR 90 MILLIGRAM(S): 90 TABLET ORAL at 12:56

## 2022-10-24 RX ADMIN — Medication 3 UNIT(S): at 17:37

## 2022-10-24 RX ADMIN — SENNA PLUS 2 TABLET(S): 8.6 TABLET ORAL at 22:21

## 2022-10-24 RX ADMIN — ATORVASTATIN CALCIUM 80 MILLIGRAM(S): 80 TABLET, FILM COATED ORAL at 22:21

## 2022-10-24 RX ADMIN — GABAPENTIN 100 MILLIGRAM(S): 400 CAPSULE ORAL at 12:54

## 2022-10-24 RX ADMIN — TAMSULOSIN HYDROCHLORIDE 0.4 MILLIGRAM(S): 0.4 CAPSULE ORAL at 22:21

## 2022-10-24 RX ADMIN — Medication 400 MILLIGRAM(S): at 15:37

## 2022-10-24 RX ADMIN — Medication 25 MILLIGRAM(S): at 00:22

## 2022-10-24 RX ADMIN — HEPARIN SODIUM 5000 UNIT(S): 5000 INJECTION INTRAVENOUS; SUBCUTANEOUS at 22:21

## 2022-10-24 RX ADMIN — HEPARIN SODIUM 5000 UNIT(S): 5000 INJECTION INTRAVENOUS; SUBCUTANEOUS at 05:59

## 2022-10-24 RX ADMIN — Medication 400 MILLIGRAM(S): at 06:00

## 2022-10-24 RX ADMIN — Medication 2: at 17:01

## 2022-10-24 RX ADMIN — INSULIN GLARGINE 5 UNIT(S): 100 INJECTION, SOLUTION SUBCUTANEOUS at 22:35

## 2022-10-24 RX ADMIN — Medication 3 UNIT(S): at 12:24

## 2022-10-24 RX ADMIN — Medication 81 MILLIGRAM(S): at 12:54

## 2022-10-24 RX ADMIN — HEPARIN SODIUM 5000 UNIT(S): 5000 INJECTION INTRAVENOUS; SUBCUTANEOUS at 12:56

## 2022-10-24 RX ADMIN — PANTOPRAZOLE SODIUM 40 MILLIGRAM(S): 20 TABLET, DELAYED RELEASE ORAL at 07:08

## 2022-10-24 RX ADMIN — Medication 75 MILLIGRAM(S): at 12:55

## 2022-10-24 RX ADMIN — Medication 30 MILLIGRAM(S): at 05:59

## 2022-10-24 NOTE — SWALLOW BEDSIDE ASSESSMENT ADULT - SLP GENERAL OBSERVATIONS
pt awake alert without c/o pain. Some word finding deficits noted. pt with baseline aphasia since 2017. Mild dysarthria which is also baseline

## 2022-10-24 NOTE — H&P ADULT - HISTORY OF PRESENT ILLNESS
76M with PMH of CAD (s/p CABG x3 in 2017), HTN, DM type II, HLD, GERD, BPH. stroke in 2017 (residual L sided weakness ), and recent stroke (evaluated at [facility] 2 wks ago) with residual right sided hemiparesis (discharge papers unable to be found), BIBEMS for unresponsiveness. Patient is from [facility]. As per son, patient was talked to his dad and had a full conversation around 11AM. Son visited his father around 1:40PM, said hello and asked how he was doing "fine". He continued to watch TV. Around ~10 minutes later, patient was profusely sweating, was in a daze and became unresponsive. Stroke code on arrival. Patient was not responding to pain, following any commands intially. After CT scan, patient was coming back to baseline, noted the left sided weakness (residual ;however patient is able to move more of his left side, it has worsen as per patient). Son states his left arm was off on Friday when he had to sign something. Patient is on ASA/brilinta as per nursing home chart. Patient states he does not refuse medications. Patient is also on Keppra 500 mg BID.CTH-negative for any acute findings. CTA (prelim)- stable Stable occlusion of the left A2 segment with decreased arborization of the left anterior cerebral artery circulation. CTP-, no evidence of core infarct. 10 cc hypoperfusion detected in the left anterior frontal lobe,   correlating to region of known left SUSANNE territory infarct. Discussed with CTC attending, Miladis Hatfield, patient is not tpa candidate as recent stroke in past 3 months. MRI brain 10/13/22-Acute left SUSANNE territory infarct with a mild rightward midline shift. Patient is not a IA candidate as no LVO.       78yo Man with PMH of CAD (s/p CABG x3 in 2017), HTN, DM type II, HLD, GERD, BPH. stroke in 2017 (residual L sided weakness ), and recent stroke (evaluated at [facility] 2 wks ago) with residual right sided hemiparesis (discharge papers unable to be found), BIBEMS for unresponsiveness. Patient is from [facility]. As per son, patient was talked to his dad and had a full conversation around 11AM. Son visited his father around 1:40PM, said hello and asked how he was doing "fine". He continued to watch TV. Around ~10 minutes later, patient was profusely sweating, was in a daze and became unresponsive. Stroke code on arrival. Patient was not responding to pain, following any commands intially. After CT scan, patient was coming back to baseline, noted the left sided weakness (residual ;however patient is able to move more of his left side, it has worsen as per patient). Son states his left arm was off on Friday when he had to sign something. Patient is on ASA/brilinta as per nursing home chart. Patient states he does not refuse medications. Patient is also on Keppra 500 mg BID.CTH-negative for any acute findings. CTA (prelim)- stable Stable occlusion of the left A2 segment with decreased arborization of the left anterior cerebral artery circulation. CTP-, no evidence of core infarct. 10 cc hypoperfusion detected in the left anterior frontal lobe,   correlating to region of known left SUSANNE territory infarct. Discussed with CTC attending, Miladis Hatfield, patient is not tpa candidate as recent stroke in past 3 months. MRI brain 10/13/22-Acute left SUSANNE territory infarct with a mild rightward midline shift. Patient is not a IA candidate as no LVO.       78yo Man with PMH of CAD (s/p CABG in 2017), HTN, DM type II, HLD, GERD, BPH, stroke in 2017 (residual L sided weakness ), and recent stroke (evaluated at [facility] 2 wks ago) with residual right sided hemiparesis, brought from [facility] to the ED for unresponsives. Patient is from [facility]  As per son, patient was talked to his dad and had a full conversation around 11AM. Son visited his father around 1:40PM, said hello and asked how he was doing "fine". He continued to watch TV. Around ~10 minutes later, patient was profusely sweating, was in a daze and became unresponsive. Stroke code on arrival. Patient was not responding to pain, following any commands intially. After CT scan, patient was coming back to baseline, noted the left sided weakness (residual ;however patient is able to move more of his left side, it has worsen as per patient). Son states his left arm was off on Friday when he had to sign something. Patient is on ASA/brilinta as per nursing home chart. Patient states he does not refuse medications. Patient is also on Keppra 500 mg BID.CTH-negative for any acute findings. CTA (prelim)- stable Stable occlusion of the left A2 segment with decreased arborization of the left anterior cerebral artery circulation. CTP-, no evidence of core infarct. 10 cc hypoperfusion detected in the left anterior frontal lobe,   correlating to region of known left SUSANNE territory infarct. Discussed with CTC attending, Miladis Hatfield, patient is not tpa candidate as recent stroke in past 3 months. MRI brain 10/13/22-Acute left SUSANNE territory infarct with a mild rightward midline shift. Patient is not a IA candidate as no LVO.       78yo Man with PMH of CAD (s/p CABG in 2017), HTN, DM type II, HLD, GERD, BPH, stroke in 2017 (residual L sided weakness ), and recent stroke (evaluated at [facility] 2 wks ago) with residual right sided hemiparesis, brought from [facility] to the ED for unresponsives. Son visited his father around 1:40PM but 10 min after patient was profusely sweating, was in a daze and became unresponsive. PAtient is at baseline AOx4 and able to have a full conversation, EMS called and Code stroke called on arrival. Patient was not responding to pain, following any commands initially but came back to his baseline mentation. Patient is on ASA/brilinta and Keppra 500 mg BID. Patient does not recall events at the NH. Denies any chest pain SOB, head trauma, LOC, GI or  symptoms    CTA head: no acute occlusion with stable occlusion of A2 segment  Vital Signs Last 24 Hrs  T(C): 36.9 (23 Oct 2022 16:50), Max: 36.9 (23 Oct 2022 16:50)  T(F): 98.4 (23 Oct 2022 16:50), Max: 98.4 (23 Oct 2022 16:50)  HR: 72 (23 Oct 2022 23:40) (66 - 87)  BP: 200/95 (23 Oct 2022 23:40) (111/71 - 200/95)  BP(mean): --  RR: 17 (23 Oct 2022 16:50) (14 - 20)  SpO2: 99% (23 Oct 2022 16:50) (98% - 99%)    Parameters below as of 23 Oct 2022 16:50  Patient On (Oxygen Delivery Method): room air          76yo Man with PMH of CAD (s/p CABG in 2017), HTN, DM type II, HLD, GERD, BPH, stroke in 2017 (residual L sided weakness ), and recent stroke (evaluated at [facility] 2 wks ago) with residual right sided hemiparesis, brought from [facility] to the ED for unresponsives. Son visited his father around 1:40PM but 10 min after patient was profusely sweating, was in a daze and became unresponsive. PAtient is at baseline AOx4 and able to have a full conversation, EMS called and Code stroke called on arrival. Patient was not responding to pain, following any commands initially but came back to his baseline mentation. Patient is on ASA/brilinta and Keppra 500 mg BID. Patient does not recall events at the NH. Denies any chest pain SOB, head trauma,  GI or  symptoms    CTA head: no acute occlusion with stable occlusion of A2 segment  Vital Signs Last 24 Hrs  T(C): 36.9 (23 Oct 2022 16:50), Max: 36.9 (23 Oct 2022 16:50)  T(F): 98.4 (23 Oct 2022 16:50), Max: 98.4 (23 Oct 2022 16:50)  HR: 72 (23 Oct 2022 23:40) (66 - 87)  BP: 200/95 (23 Oct 2022 23:40) (111/71 - 200/95)  BP(mean): --  RR: 17 (23 Oct 2022 16:50) (14 - 20)  SpO2: 99% (23 Oct 2022 16:50) (98% - 99%)    Parameters below as of 23 Oct 2022 16:50  Patient On (Oxygen Delivery Method): room air

## 2022-10-24 NOTE — H&P ADULT - NSHPPHYSICALEXAM_GEN_ALL_CORE
T(C): 36.9 (10-23-22 @ 16:50), Max: 36.9 (10-23-22 @ 16:50)  HR: 72 (10-23-22 @ 23:40) (66 - 87)  BP: 200/95 (10-23-22 @ 23:40) (111/71 - 200/95)  RR: 17 (10-23-22 @ 16:50) (14 - 20)  SpO2: 99% (10-23-22 @ 16:50) (98% - 99%)    CONSTITUTIONAL: Well groomed, no apparent distress   RESP: No respiratory distress, no use of accessory muscles; CTA b/l, no WRR  CV: RRR, +S1S2, no MRG; no JVD; no peripheral edema  GI: Soft, NT, ND, no rebound, no guarding; no palpable masses; no hepatosplenomegaly; no hernia palpated  NEURO: CN II-XII intact; normal reflexes in upper and lower extremities, sensation intact in upper and lower extremities b/l to light touch   PSYCH: Appropriate insight/judgment; A+O x 3, mood and affect appropriate, recent/remote memory intact T(C): 36.9 (10-23-22 @ 16:50), Max: 36.9 (10-23-22 @ 16:50)  HR: 72 (10-23-22 @ 23:40) (66 - 87)  BP: 200/95 (10-23-22 @ 23:40) (111/71 - 200/95)  RR: 17 (10-23-22 @ 16:50) (14 - 20)  SpO2: 99% (10-23-22 @ 16:50) (98% - 99%)    CONSTITUTIONAL: Well groomed, no apparent distress   RESP: No respiratory distress, no use of accessory muscles; CTA b/l, no WRR  CV: RRR, +S1S2, no MRG; no JVD; no peripheral edema  GI: Soft, NT, ND, no rebound, no guarding; no palpable masses; no hepatosplenomegaly; no hernia palpated  NEURO: CN II-XII intact; Weakness on the left side LUE 4/5 and LLE 3/5 with 5/5 Righ upper and lower extremity   PSYCH: Appropriate insight/judgment; A+O x 3, recent memory impaired

## 2022-10-24 NOTE — H&P ADULT - ASSESSMENT
Neuro  - Then continue home Aspirin 81mg and start Brilinta 90mg BID  - Continue home Atorvastatin 80mg daily  - Q4hr stroke neuro checks and vitals  - CTH showed left SUSANNE territory acute/subacute infarct with suggestion of trace petechial hemorrhagic transformation. Also showed bilateral basal ganglialacunar infarcts, age indeterminate involving the right caudate head.   - CTP showed scattered regions of hypoperfusion in the bilateral PCA and left SUSANNE territories with evidence of core infarct within the left SUSANNE territory.   - CTA H/N showed; 1. left SUSANNE occlusion involving the proximal A2 segment; 2. Severe stenosis of the proximal P2 segment of the right PCA as well as moderate stenosis on the left  - Obtain MRI Brain without contrast  - Stroke education    Cardiovascular  - SBP goal: permissive htn for now 120-180  - TTE: pending  - High dose statin as above in CVA  - LDL results: pending    Pulm  - Call provider if SPO2 < 94%    GI  - Replete K<4 and Mg <2  - Diet as stated below    Renal  - Daily BMP    Infectious Disease  - WBC: 10.57    Endocrine  - A1C results: pending  - TSH results: pending    Misc  - Diet: pending swallow eval  - Activity: IAT  - DVT Prophylaxis: SQH  - GI Prophylaxis: PPI  - Code Status: full code  - Disposition: pending PT/OT/Rehab eval   76yo Man with PMH of CAD (s/p CABG in 2017), HTN, DM type II, HLD, GERD, BPH, stroke in 2017 (residual L sided weakness ), and recent stroke (evaluated at [facility] 2 wks ago) with residual right sided hemiparesis, brought from [facility] to the ED for unresponsives.     #Unresponsiveness   - Then continue home Aspirin 81mg and start Brilinta 90mg BID  - Continue home Atorvastatin 80mg daily  - Q4hr stroke neuro checks and vitals  - CTH showed left SUSANNE territory acute/subacute infarct with suggestion of trace petechial hemorrhagic transformation. Also showed bilateral basal ganglialacunar infarcts, age indeterminate involving the right caudate head.   - CTP showed scattered regions of hypoperfusion in the bilateral PCA and left SUSANNE territories with evidence of core infarct within the left SUSANNE territory.   - CTA H/N showed; 1. left SUSANNE occlusion involving the proximal A2 segment; 2. Severe stenosis of the proximal P2 segment of the right PCA as well as moderate stenosis on the left  - Obtain MRI Brain without contrast  - Stroke education    Cardiovascular  - SBP goal: permissive htn for now 120-180  - TTE: pending  - High dose statin as above in CVA  - LDL results: pending    Pulm  - Call provider if SPO2 < 94%    GI  - Replete K<4 and Mg <2  - Diet as stated below    Renal  - Daily BMP    Infectious Disease  - WBC: 10.57    Endocrine  - A1C results: pending  - TSH results: pending      - Diet: pending swallow eval  - Activity: IAT  - DVT Prophylaxis: SQH  - GI Prophylaxis: PPI  - Code Status: full code    Disposition: Acute. From [facility]  Med/Rec: completed from medication list  78yo Man with PMH of CAD (s/p CABG in 2017), HTN, DM type II, HLD, GERD, BPH, stroke in 2017 (residual L sided weakness ), and recent stroke (evaluated at [facility] 2 wks ago) with residual right sided hemiparesis, brought from [facility] to the ED for unresponsives.     # Unresponsiveness   # suspected Seizure   #Stroke with sequale   - Stroke code neg   - CTAH:   - Neurology following up  - Telemetry monitoring   - continue Aspirin 81mg and Brilinta 90mg BID  - CTA head: no acute occlusion with stable occlusion of A2 segment  - Increase keppra to 750mg   - c/w gabapentin and venlafaxine for neuropathic pain   - Routing EEG  - orthostatic vitals and TTE    #CAD s/p CABG  - c/w ASA and Brillinta   - c/w statins     #HTN  - Monitor BP   - c/w losartan and Imdur     #DM  - Start insulin base and premeals   - monitor fingersticks     #HLD   -  c/w statin and fenofibrate    #GERD  - protonix    #BPH  - c/w Tamsulosin     - Diet: pending swallow eval  - Activity: IAT  - DVT Prophylaxis: SQH  - GI Prophylaxis: PPI  - Code Status: full code    Disposition: Acute. From [facility]  Med/Rec: completed from medication list  78yo Man with PMH of CAD (s/p CABG in 2017), HTN, DM type II, HLD, GERD, BPH, stroke in 2017 (residual L sided weakness ), and recent stroke (evaluated at [facility] 2 wks ago) with residual right sided hemiparesis, brought from [facility] to the ED for unresponsives.     # Unresponsiveness   # suspected Seizure   #Stroke with sequale   - Stroke code neg   - CTAH:   - Neurology following up  - Telemetry monitoring   - continue Aspirin 81mg and Brilinta 90mg BID  - CTA head: no acute occlusion with stable occlusion of A2 segment  - Increase keppra to 750mg   - c/w gabapentin and venlafaxine for neuropathic pain   - Routing EEG  - orthostatic vitals and TTE    #CAD s/p CABG  - c/w ASA and Brillinta   - c/w statins     #HTN  - Monitor BP   - c/w losartan and Imdur     #DM  - Start insulin base and premeals   - monitor fingersticks     #HLD   -  c/w statin and fenofibrate    #GERD  - protonix    #BPH  - c/w Tamsulosin     - Diet: pureed diet (as per prev swallowing assessment)  - Activity: IAT  - DVT Prophylaxis: SQH  - GI Prophylaxis: PPI  - Code Status: full code    Disposition: Acute. From [facility]  Med/Rec: completed from medication list  78yo Man with PMH of CAD (s/p CABG in 2017), HTN, DM type II, HLD, GERD, BPH, stroke in 2017 (residual L sided weakness ), and recent stroke (evaluated at [facility] 2 wks ago) with residual right sided hemiparesis, brought from [facility] to the ED for unresponsives.     # Unresponsiveness   # suspected Seizure   #Stroke with sequale   - Stroke code neg   - CTAH:   - Neurology following up  - Telemetry monitoring   - continue Aspirin 81mg and Brilinta 90mg BID  - CTA head: no acute occlusion with stable occlusion of A2 segment  - Increase keppra to 750mg   - c/w gabapentin and venlafaxine for neuropathic pain   - Routing EEG  - orthostatic vitals and TTE    #SAMATNA on CKD  - Baseline Cr ~1.4 today Cr 2.3  - hold ARBs  - monitor   - Renal US     #CAD s/p CABG  - c/w ASA and Brillinta   - c/w statins     #HTN  - Monitor BP   - c/w losartan and Imdur     #DM  - Start insulin base and premeals   - monitor fingersticks     #HLD   -  c/w statin and fenofibrate    #GERD  - protonix    #BPH  - c/w Tamsulosin     - Diet: pureed diet (as per prev swallowing assessment)  - Activity: IAT  - DVT Prophylaxis: SQH  - GI Prophylaxis: PPI  - Code Status: full code    Disposition: Acute. From [facility]  Med/Rec: completed from medication list  76yo Man with PMH of CAD (s/p CABG in 2017), HTN, DM type II, HLD, GERD, BPH, stroke in 2017 (residual L sided weakness ), and recent stroke (evaluated at [facility] 2 wks ago) with residual right sided hemiparesis, brought from [facility] to the ED for unresponsives.     # Unresponsiveness   # suspected Seizure   #Stroke with sequale   - Stroke code neg   - CTAH:   - Neurology following up  - Telemetry monitoring   - continue Aspirin 81mg and Brilinta 90mg BID  - CTA head: no acute occlusion with stable occlusion of A2 segment  - Increase keppra to 750mg   - c/w gabapentin and venlafaxine for neuropathic pain   - Routing EEG  - orthostatic vitals and TTE    #SAMANTA on CKD  - Baseline Cr ~1.4 today Cr 2.3  - hold ARBs  - monitor   - Renal US     #CAD s/p CABG  - c/w ASA and Brillinta   - c/w statins     #HTN  - Monitor BP   - hold ARB  - c/w Imdur     #DM  - Start insulin base and premeals   - monitor fingersticks     #HLD   -  c/w statin and fenofibrate    #GERD  - protonix    #BPH  - c/w Tamsulosin     - Diet: pureed diet (as per prev swallowing assessment)  - Activity: IAT  - DVT Prophylaxis: SQH  - GI Prophylaxis: PPI  - Code Status: full code    Disposition: Acute. From [facility]  Med/Rec: completed from medication list

## 2022-10-24 NOTE — H&P ADULT - NSHPLABSRESULTS_GEN_ALL_CORE
13.9   12.77 )-----------( 283      ( 23 Oct 2022 16:09 )             43.1     10-23    137  |  103  |  38<H>  ----------------------------<  144<H>  6.7<HH>   |  18  |  2.3<H>    Ca    9.6      23 Oct 2022 16:09    TPro  7.2  /  Alb  4.4  /  TBili  0.4  /  DBili  x   /  AST  43<H>  /  ALT  17  /  AlkPhos  33  10-23    < from: CT Angio Brain Stroke Protocol  w/ IV Cont (10.23.22 @ 16:03) >      IMPRESSION:    1.  Utilizing a CBF < 30% and Tmax > 6 seconds, no evidence of core   infarct. 10 cc hypoperfusion detected in the left anterior frontal lobe,   correlating to region of known left SUSANNE territory infarct.  2.  Stable occlusion of the left A2 segment with decreased arborization   of the left anterior cerebral artery circulation.  3.  Additional stable stenoses of the bilateral internal carotid arteries   and bilateral posterior cerebral arteries described above.        ******PRELIMINARY REPORT******      < end of copied text >

## 2022-10-24 NOTE — SWALLOW BEDSIDE ASSESSMENT ADULT - SLP PERTINENT HISTORY OF CURRENT PROBLEM
76yo Man with PMH of CAD (s/p CABG in 2017) stroke in 2017 w res L weakness and recent stroke 2w ago w residual R rakesh. rodrigo from snf for unresponsiveness. Pt was not responding to pain or following commands but came back to his baseline mentation.  CTH-negative for any acute findings. CTA (prelim)- stable Stable occlusion of the left A2 segment with decreased arborization of the left anterior cerebral artery circulation. CTP-, no evidence of core infarct. 10 cc hypoperfusion detected in the left anterior frontal lobe,

## 2022-10-25 LAB
ALBUMIN SERPL ELPH-MCNC: 4.1 G/DL — SIGNIFICANT CHANGE UP (ref 3.5–5.2)
ALP SERPL-CCNC: 38 U/L — SIGNIFICANT CHANGE UP (ref 30–115)
ALT FLD-CCNC: 14 U/L — SIGNIFICANT CHANGE UP (ref 0–41)
ANION GAP SERPL CALC-SCNC: 11 MMOL/L — SIGNIFICANT CHANGE UP (ref 7–14)
AST SERPL-CCNC: 19 U/L — SIGNIFICANT CHANGE UP (ref 0–41)
BILIRUB SERPL-MCNC: 0.3 MG/DL — SIGNIFICANT CHANGE UP (ref 0.2–1.2)
BUN SERPL-MCNC: 30 MG/DL — HIGH (ref 10–20)
CALCIUM SERPL-MCNC: 9.4 MG/DL — SIGNIFICANT CHANGE UP (ref 8.4–10.5)
CHLORIDE SERPL-SCNC: 105 MMOL/L — SIGNIFICANT CHANGE UP (ref 98–110)
CO2 SERPL-SCNC: 21 MMOL/L — SIGNIFICANT CHANGE UP (ref 17–32)
CREAT SERPL-MCNC: 2 MG/DL — HIGH (ref 0.7–1.5)
EGFR: 34 ML/MIN/1.73M2 — LOW
GLUCOSE BLDC GLUCOMTR-MCNC: 115 MG/DL — HIGH (ref 70–99)
GLUCOSE BLDC GLUCOMTR-MCNC: 119 MG/DL — HIGH (ref 70–99)
GLUCOSE BLDC GLUCOMTR-MCNC: 121 MG/DL — HIGH (ref 70–99)
GLUCOSE BLDC GLUCOMTR-MCNC: 148 MG/DL — HIGH (ref 70–99)
GLUCOSE BLDC GLUCOMTR-MCNC: 199 MG/DL — HIGH (ref 70–99)
GLUCOSE SERPL-MCNC: 125 MG/DL — HIGH (ref 70–99)
HCT VFR BLD CALC: 38.1 % — LOW (ref 42–52)
HGB BLD-MCNC: 12.8 G/DL — LOW (ref 14–18)
MAGNESIUM SERPL-MCNC: 2.3 MG/DL — SIGNIFICANT CHANGE UP (ref 1.8–2.4)
MCHC RBC-ENTMCNC: 29.7 PG — SIGNIFICANT CHANGE UP (ref 27–31)
MCHC RBC-ENTMCNC: 33.6 G/DL — SIGNIFICANT CHANGE UP (ref 32–37)
MCV RBC AUTO: 88.4 FL — SIGNIFICANT CHANGE UP (ref 80–94)
NRBC # BLD: 0 /100 WBCS — SIGNIFICANT CHANGE UP (ref 0–0)
PLATELET # BLD AUTO: 253 K/UL — SIGNIFICANT CHANGE UP (ref 130–400)
POTASSIUM SERPL-MCNC: 4.3 MMOL/L — SIGNIFICANT CHANGE UP (ref 3.5–5)
POTASSIUM SERPL-SCNC: 4.3 MMOL/L — SIGNIFICANT CHANGE UP (ref 3.5–5)
PROT SERPL-MCNC: 6.2 G/DL — SIGNIFICANT CHANGE UP (ref 6–8)
RBC # BLD: 4.31 M/UL — LOW (ref 4.7–6.1)
RBC # FLD: 14.6 % — HIGH (ref 11.5–14.5)
SODIUM SERPL-SCNC: 137 MMOL/L — SIGNIFICANT CHANGE UP (ref 135–146)
WBC # BLD: 11.36 K/UL — HIGH (ref 4.8–10.8)
WBC # FLD AUTO: 11.36 K/UL — HIGH (ref 4.8–10.8)

## 2022-10-25 PROCEDURE — 95816 EEG AWAKE AND DROWSY: CPT | Mod: 26

## 2022-10-25 PROCEDURE — 76770 US EXAM ABDO BACK WALL COMP: CPT | Mod: 26

## 2022-10-25 PROCEDURE — 99232 SBSQ HOSP IP/OBS MODERATE 35: CPT

## 2022-10-25 PROCEDURE — 70553 MRI BRAIN STEM W/O & W/DYE: CPT | Mod: 26

## 2022-10-25 RX ORDER — INFLUENZA VIRUS VACCINE 15; 15; 15; 15 UG/.5ML; UG/.5ML; UG/.5ML; UG/.5ML
0.7 SUSPENSION INTRAMUSCULAR ONCE
Refills: 0 | Status: DISCONTINUED | OUTPATIENT
Start: 2022-10-25 | End: 2022-10-27

## 2022-10-25 RX ADMIN — Medication 3 UNIT(S): at 09:43

## 2022-10-25 RX ADMIN — GABAPENTIN 200 MILLIGRAM(S): 400 CAPSULE ORAL at 21:46

## 2022-10-25 RX ADMIN — Medication 400 MILLIGRAM(S): at 21:55

## 2022-10-25 RX ADMIN — HEPARIN SODIUM 5000 UNIT(S): 5000 INJECTION INTRAVENOUS; SUBCUTANEOUS at 05:35

## 2022-10-25 RX ADMIN — HEPARIN SODIUM 5000 UNIT(S): 5000 INJECTION INTRAVENOUS; SUBCUTANEOUS at 21:47

## 2022-10-25 RX ADMIN — Medication 81 MILLIGRAM(S): at 11:23

## 2022-10-25 RX ADMIN — GABAPENTIN 100 MILLIGRAM(S): 400 CAPSULE ORAL at 11:23

## 2022-10-25 RX ADMIN — INSULIN GLARGINE 5 UNIT(S): 100 INJECTION, SOLUTION SUBCUTANEOUS at 21:46

## 2022-10-25 RX ADMIN — ATORVASTATIN CALCIUM 80 MILLIGRAM(S): 80 TABLET, FILM COATED ORAL at 21:46

## 2022-10-25 RX ADMIN — RANOLAZINE 500 MILLIGRAM(S): 500 TABLET, FILM COATED, EXTENDED RELEASE ORAL at 05:35

## 2022-10-25 RX ADMIN — TAMSULOSIN HYDROCHLORIDE 0.4 MILLIGRAM(S): 0.4 CAPSULE ORAL at 21:46

## 2022-10-25 RX ADMIN — LEVETIRACETAM 750 MILLIGRAM(S): 250 TABLET, FILM COATED ORAL at 05:35

## 2022-10-25 RX ADMIN — Medication 3 UNIT(S): at 18:53

## 2022-10-25 RX ADMIN — ISOSORBIDE MONONITRATE 60 MILLIGRAM(S): 60 TABLET, EXTENDED RELEASE ORAL at 11:23

## 2022-10-25 RX ADMIN — Medication 25 MILLIGRAM(S): at 05:35

## 2022-10-25 RX ADMIN — Medication 400 MILLIGRAM(S): at 05:35

## 2022-10-25 RX ADMIN — PANTOPRAZOLE SODIUM 40 MILLIGRAM(S): 20 TABLET, DELAYED RELEASE ORAL at 06:03

## 2022-10-25 RX ADMIN — TICAGRELOR 90 MILLIGRAM(S): 90 TABLET ORAL at 11:23

## 2022-10-25 RX ADMIN — Medication 75 MILLIGRAM(S): at 11:23

## 2022-10-25 RX ADMIN — LEVETIRACETAM 750 MILLIGRAM(S): 250 TABLET, FILM COATED ORAL at 19:04

## 2022-10-25 RX ADMIN — RANOLAZINE 500 MILLIGRAM(S): 500 TABLET, FILM COATED, EXTENDED RELEASE ORAL at 18:53

## 2022-10-25 NOTE — PATIENT PROFILE ADULT - FALL HARM RISK - HARM RISK INTERVENTIONS

## 2022-10-25 NOTE — PROGRESS NOTE ADULT - ASSESSMENT
78yo Man with PMH of CAD s/p CABG, HTN, DM type II, HLD, GERD, BPH, stroke in 2017 w/ residual L sided weakness , and recent stroke with residual right sided hemiparesis, brought from [facility] to the ED for unresponsives.     # Unresponsiveness   - pt denies    #?Stroke with sequale   - EEG showing focal nonspecific slowing  - on DAPT  - CTA head - CHRONIC occlusion of A2 segment  -  keppra 750mg   - Pending Echo and MRI      #SAMANTA on CKD  - hold ARBs  - Renal US done     #CAD s/p CABG  - c/w ASA and Brillinta   - c/w statins     #HTN  - hold ARB  - c/w Imdur     #DM  - goal <180    #HLD   -  c/w statin and fenofibrate    Pending MRI and EEG from yesterday. Otherwise d/c planning back to [place]

## 2022-10-25 NOTE — PROGRESS NOTE ADULT - SUBJECTIVE AND OBJECTIVE BOX
Pt seen and examined. Pt states he thinks he had a stroke which made him fall. States that he never had LOC and doesnt think he had a seizure    T(F): , Max: 98.8 (10-25-22 @ 20:00)  HR: 79 (10-25-22 @ 20:00) (74 - 79)  BP: 183/80 (10-25-22 @ 20:00)  RR: 18 (10-25-22 @ 13:53)  SpO2: 99% (10-25-22 @ 13:53)65.8  General: No apparent distress  Cardiovascular: S1, S2  Gastrointestinal: Soft, Non-tender, Non-distended  Respiratory: Good air entry bilaterally  Musculoskeletal: Moves all extremities  Lymphatic: No edema  Neurologic: No gross motor deficit  Dermatologic: Skin dry                          12.8   11.36 )-----------( 253      ( 25 Oct 2022 06:40 )             38.1     10-25    137  |  105  |  30<H>  ----------------------------<  125<H>  4.3   |  21  |  2.0<H>    Ca    9.4      25 Oct 2022 06:40  Phos  3.9     10-24  Mg     2.3     10-25    TPro  6.2  /  Alb  4.1  /  TBili  0.3  /  DBili  x   /  AST  19  /  ALT  14  /  AlkPhos  38  10-25      Culture - Blood (collected 24 Oct 2022 08:39)  Source: .Blood Blood-Venous  Preliminary Report (25 Oct 2022 14:02):    No growth to date.

## 2022-10-26 LAB
ALBUMIN SERPL ELPH-MCNC: 4.3 G/DL — SIGNIFICANT CHANGE UP (ref 3.5–5.2)
ALP SERPL-CCNC: 39 U/L — SIGNIFICANT CHANGE UP (ref 30–115)
ALT FLD-CCNC: 12 U/L — SIGNIFICANT CHANGE UP (ref 0–41)
ANION GAP SERPL CALC-SCNC: 13 MMOL/L — SIGNIFICANT CHANGE UP (ref 7–14)
AST SERPL-CCNC: 16 U/L — SIGNIFICANT CHANGE UP (ref 0–41)
BILIRUB SERPL-MCNC: 0.4 MG/DL — SIGNIFICANT CHANGE UP (ref 0.2–1.2)
BUN SERPL-MCNC: 27 MG/DL — HIGH (ref 10–20)
CALCIUM SERPL-MCNC: 9.6 MG/DL — SIGNIFICANT CHANGE UP (ref 8.4–10.5)
CHLORIDE SERPL-SCNC: 107 MMOL/L — SIGNIFICANT CHANGE UP (ref 98–110)
CO2 SERPL-SCNC: 24 MMOL/L — SIGNIFICANT CHANGE UP (ref 17–32)
CREAT SERPL-MCNC: 2 MG/DL — HIGH (ref 0.7–1.5)
EGFR: 34 ML/MIN/1.73M2 — LOW
GLUCOSE BLDC GLUCOMTR-MCNC: 102 MG/DL — HIGH (ref 70–99)
GLUCOSE BLDC GLUCOMTR-MCNC: 134 MG/DL — HIGH (ref 70–99)
GLUCOSE BLDC GLUCOMTR-MCNC: 202 MG/DL — HIGH (ref 70–99)
GLUCOSE BLDC GLUCOMTR-MCNC: 97 MG/DL — SIGNIFICANT CHANGE UP (ref 70–99)
GLUCOSE SERPL-MCNC: 119 MG/DL — HIGH (ref 70–99)
HCT VFR BLD CALC: 41.7 % — LOW (ref 42–52)
HGB BLD-MCNC: 13.3 G/DL — LOW (ref 14–18)
MAGNESIUM SERPL-MCNC: 2.4 MG/DL — SIGNIFICANT CHANGE UP (ref 1.8–2.4)
MCHC RBC-ENTMCNC: 28.8 PG — SIGNIFICANT CHANGE UP (ref 27–31)
MCHC RBC-ENTMCNC: 31.9 G/DL — LOW (ref 32–37)
MCV RBC AUTO: 90.3 FL — SIGNIFICANT CHANGE UP (ref 80–94)
NRBC # BLD: 0 /100 WBCS — SIGNIFICANT CHANGE UP (ref 0–0)
PLATELET # BLD AUTO: 242 K/UL — SIGNIFICANT CHANGE UP (ref 130–400)
POTASSIUM SERPL-MCNC: 4.6 MMOL/L — SIGNIFICANT CHANGE UP (ref 3.5–5)
POTASSIUM SERPL-SCNC: 4.6 MMOL/L — SIGNIFICANT CHANGE UP (ref 3.5–5)
PROT SERPL-MCNC: 6.5 G/DL — SIGNIFICANT CHANGE UP (ref 6–8)
RBC # BLD: 4.62 M/UL — LOW (ref 4.7–6.1)
RBC # FLD: 14.8 % — HIGH (ref 11.5–14.5)
SARS-COV-2 RNA SPEC QL NAA+PROBE: SIGNIFICANT CHANGE UP
SODIUM SERPL-SCNC: 144 MMOL/L — SIGNIFICANT CHANGE UP (ref 135–146)
WBC # BLD: 12.16 K/UL — HIGH (ref 4.8–10.8)
WBC # FLD AUTO: 12.16 K/UL — HIGH (ref 4.8–10.8)

## 2022-10-26 PROCEDURE — 99232 SBSQ HOSP IP/OBS MODERATE 35: CPT

## 2022-10-26 PROCEDURE — 74018 RADEX ABDOMEN 1 VIEW: CPT | Mod: 26

## 2022-10-26 RX ORDER — POLYETHYLENE GLYCOL 3350 17 G/17G
17 POWDER, FOR SOLUTION ORAL
Refills: 0 | Status: DISCONTINUED | OUTPATIENT
Start: 2022-10-26 | End: 2022-10-27

## 2022-10-26 RX ADMIN — Medication 3 UNIT(S): at 12:12

## 2022-10-26 RX ADMIN — LEVETIRACETAM 750 MILLIGRAM(S): 250 TABLET, FILM COATED ORAL at 06:27

## 2022-10-26 RX ADMIN — GABAPENTIN 100 MILLIGRAM(S): 400 CAPSULE ORAL at 12:14

## 2022-10-26 RX ADMIN — RANOLAZINE 500 MILLIGRAM(S): 500 TABLET, FILM COATED, EXTENDED RELEASE ORAL at 06:09

## 2022-10-26 RX ADMIN — SENNA PLUS 2 TABLET(S): 8.6 TABLET ORAL at 22:21

## 2022-10-26 RX ADMIN — Medication 3 UNIT(S): at 17:24

## 2022-10-26 RX ADMIN — Medication 4: at 12:13

## 2022-10-26 RX ADMIN — Medication 400 MILLIGRAM(S): at 13:01

## 2022-10-26 RX ADMIN — TICAGRELOR 90 MILLIGRAM(S): 90 TABLET ORAL at 12:13

## 2022-10-26 RX ADMIN — Medication 3 UNIT(S): at 08:29

## 2022-10-26 RX ADMIN — HEPARIN SODIUM 5000 UNIT(S): 5000 INJECTION INTRAVENOUS; SUBCUTANEOUS at 13:01

## 2022-10-26 RX ADMIN — RANOLAZINE 500 MILLIGRAM(S): 500 TABLET, FILM COATED, EXTENDED RELEASE ORAL at 17:15

## 2022-10-26 RX ADMIN — POLYETHYLENE GLYCOL 3350 17 GRAM(S): 17 POWDER, FOR SOLUTION ORAL at 17:15

## 2022-10-26 RX ADMIN — HEPARIN SODIUM 5000 UNIT(S): 5000 INJECTION INTRAVENOUS; SUBCUTANEOUS at 22:22

## 2022-10-26 RX ADMIN — TAMSULOSIN HYDROCHLORIDE 0.4 MILLIGRAM(S): 0.4 CAPSULE ORAL at 22:21

## 2022-10-26 RX ADMIN — Medication 400 MILLIGRAM(S): at 23:39

## 2022-10-26 RX ADMIN — Medication 75 MILLIGRAM(S): at 12:14

## 2022-10-26 RX ADMIN — HEPARIN SODIUM 5000 UNIT(S): 5000 INJECTION INTRAVENOUS; SUBCUTANEOUS at 06:26

## 2022-10-26 RX ADMIN — Medication 81 MILLIGRAM(S): at 12:13

## 2022-10-26 RX ADMIN — Medication 400 MILLIGRAM(S): at 06:36

## 2022-10-26 RX ADMIN — ISOSORBIDE MONONITRATE 60 MILLIGRAM(S): 60 TABLET, EXTENDED RELEASE ORAL at 12:14

## 2022-10-26 RX ADMIN — ATORVASTATIN CALCIUM 80 MILLIGRAM(S): 80 TABLET, FILM COATED ORAL at 22:21

## 2022-10-26 RX ADMIN — LEVETIRACETAM 750 MILLIGRAM(S): 250 TABLET, FILM COATED ORAL at 17:15

## 2022-10-26 RX ADMIN — Medication 25 MILLIGRAM(S): at 06:26

## 2022-10-26 RX ADMIN — GABAPENTIN 200 MILLIGRAM(S): 400 CAPSULE ORAL at 22:21

## 2022-10-26 RX ADMIN — PANTOPRAZOLE SODIUM 40 MILLIGRAM(S): 20 TABLET, DELAYED RELEASE ORAL at 06:26

## 2022-10-26 RX ADMIN — INSULIN GLARGINE 5 UNIT(S): 100 INJECTION, SOLUTION SUBCUTANEOUS at 22:21

## 2022-10-26 NOTE — PROGRESS NOTE ADULT - SUBJECTIVE AND OBJECTIVE BOX
----------Daily Progress Note----------    HISTORY OF PRESENT ILLNESS:  Patient is a 77y old Male who presents with a chief complaint of Unresponsiveness (25 Oct 2022 21:53)    Currently admitted to medicine with the primary diagnosis of Altered mental status       Today is hospital day 3d.     INTERVAL HOSPITAL COURSE / OVERNIGHT EVENTS:    No overnight events, no complaints. Denies chest pain, SOB, nausea, vomiting.     Review of Systems: Otherwise unremarkable     <<<<<PAST MEDICAL & SURGICAL HISTORY>>>>>  CAD (coronary artery disease)    HTN (hypertension)    Depression    Anxiety    Diabetes  niddm    Angina pectoris    BPH (benign prostatic hyperplasia)    GERD (gastroesophageal reflux disease)    CVA (cerebral vascular accident)    History of appendectomy    Bilateral cataracts    History of heart bypass surgery      ALLERGIES  clindamycin (Rash)  PC Pen VK (Rash)  penicillin (Rash)      Home Medications:  acetaminophen 325 mg oral tablet: 2 or 3 tab(s) orally every 6 hours, As needed, for pain or fever (24 Oct 2022 00:01)  aspirin 81 mg oral tablet: 1 tab(s) orally once a day (24 Oct 2022 00:01)  fenofibrate 120 mg oral tablet: 1 tab(s) orally once a day (24 Oct 2022 00:01)  linagliptin 5 mg oral tablet: 1 tab(s) orally once a day (24 Oct 2022 00:01)  losartan 25 mg oral tablet: 1 tab(s) orally once a day (24 Oct 2022 00:01)  metoprolol succinate 25 mg oral tablet, extended release: 1 tab(s) orally every 12 hours (24 Oct 2022 00:01)  pantoprazole 40 mg oral delayed release tablet: 1 tab(s) orally once a day (before a meal) (24 Oct 2022 00:01)  Pentoxil 400 mg oral tablet, extended release: 1 tab(s) orally 3 times a day (24 Oct 2022 00:01)  polyethylene glycol 3350 oral powder for reconstitution: 17 gram(s) orally once a day, As needed, Constipation (24 Oct 2022 00:01)  Prevacid 30 mg oral delayed release capsule: 1 cap(s) orally once a day (24 Oct 2022 00:01)  Ranexa 500 mg oral tablet, extended release: 1 tab(s) orally 2 times a day (24 Oct 2022 00:01)  senna oral tablet: 1 or 2 tab(s) orally once a day (at bedtime), As needed, Constipation (24 Oct 2022 00:01)  tamsulosin 0.4 mg oral capsule: 1 cap(s) orally once a day (24 Oct 2022 00:01)  venlafaxine 75 mg oral capsule, extended release: 1 cap(s) orally once a day (24 Oct 2022 00:01)        MEDICATIONS  STANDING MEDICATIONS  aspirin  chewable 81 milliGRAM(s) Oral daily  atorvastatin 80 milliGRAM(s) Oral at bedtime  dextrose 5%. 1000 milliLiter(s) IV Continuous <Continuous>  dextrose 5%. 1000 milliLiter(s) IV Continuous <Continuous>  dextrose 50% Injectable 25 Gram(s) IV Push once  dextrose 50% Injectable 12.5 Gram(s) IV Push once  dextrose 50% Injectable 25 Gram(s) IV Push once  gabapentin 100 milliGRAM(s) Oral daily  gabapentin Oral Tab/Cap - Peds 200 milliGRAM(s) Oral at bedtime  glucagon  Injectable 1 milliGRAM(s) IntraMuscular once  heparin   Injectable 5000 Unit(s) SubCutaneous every 8 hours  influenza  Vaccine (HIGH DOSE) 0.7 milliLiter(s) IntraMuscular once  insulin glargine Injectable (LANTUS) 5 Unit(s) SubCutaneous at bedtime  insulin lispro (ADMELOG) corrective regimen sliding scale   SubCutaneous three times a day before meals  insulin lispro Injectable (ADMELOG) 3 Unit(s) SubCutaneous three times a day before meals  isosorbide   mononitrate ER Tablet (IMDUR) 60 milliGRAM(s) Oral daily  levETIRAcetam 750 milliGRAM(s) Oral two times a day  metoprolol succinate ER 25 milliGRAM(s) Oral daily  pantoprazole    Tablet 40 milliGRAM(s) Oral before breakfast  pentoxifylline 400 milliGRAM(s) Oral three times a day  ranolazine 500 milliGRAM(s) Oral two times a day  senna 2 Tablet(s) Oral at bedtime  tamsulosin 0.4 milliGRAM(s) Oral at bedtime  ticagrelor 90 milliGRAM(s) Oral daily  venlafaxine XR. 75 milliGRAM(s) Oral daily    PRN MEDICATIONS  dextrose Oral Gel 15 Gram(s) Oral once PRN    VITALS:  T(F): 97  HR: 73  BP: 135/68  RR: 18  SpO2: 99%    <<<<<LABS>>>>>                        13.3   12.16 )-----------( 242      ( 26 Oct 2022 06:43 )             41.7     10-26    144  |  107  |  27<H>  ----------------------------<  119<H>  4.6   |  24  |  2.0<H>    Ca    9.6      26 Oct 2022 06:43  Mg     2.4     10-26    TPro  6.5  /  Alb  4.3  /  TBili  0.4  /  DBili  x   /  AST  16  /  ALT  12  /  AlkPhos  39  10-26              Culture - Blood (collected 24 Oct 2022 08:39)  Source: .Blood Blood-Venous  Preliminary Report (25 Oct 2022 14:02):    No growth to date.    [id number]        <<<<<RADIOLOGY>>>>>    < from: MR Head w/wo IV Cont (10.25.22 @ 18:12) >  PROCEDURE DATE:  10/25/2022          INTERPRETATION:  Clinical History / Reason for exam: Syncope vs seizure   Stroke workup. Change in mental status. Left SUSANNE infarct.    TECHNIQUE: MRIbrain with and without contrast. Multiplanar   multisequential MRI of the brain was performed before and following the   intravenous administration of 8 cc Gadavist (2 cc discarded) on the 1.5   Maribell magnet.    COMPARISON: CT head dated 10/23/2022. MRI brain dated 5/13/2022.    FINDINGS:    Again seen is restricted diffusion within the parasagittal left frontal   lobe compatible with infarct in the left SUSANNE territory. There has been   slight decrease in the mass effect. Gyriform enhancement is noted,   reflecting appropriate subacute evolution.    No new infarct is demonstrated. There is no evidence of acute   intracranial hemorrhage.    No additional abnormal intracranial enhancement is demonstrated.    There is no midline shift or hydrocephalus.    Stable chronic microvascular changes and scattered chronic lacunar   infarcts.    Again seen is a partially empty sella turcica.    Polypoid mucosal thickening is seen within the maxillary and sphenoid   sinuses. The patient is status post bilateral cataract surgery.    IMPRESSION:    1.  Appropriately-evolving acute/subacute infarct within the left SUSANNE   territory.    2.  No new infarct. No acute intracranial hemorrhage.    3.  Stable chronic microvascular changes and scattered chronic lacunar   infarcts.    < end of copied text >      <<<<<PHYSICAL EXAM>>>>>  GENERAL: NAD, resting comfortably in bed  PULMONARY: Clear to auscultation bilaterally. No rales, rhonchi, or wheezing.  CARDIOVASCULAR: Regular rate and rhythm, S1-S2, no murmurs  GASTROINTESTINAL: Soft, non-tender, non-distended, no guarding.  SKIN/EXTREMITIES: No LE edema b/l  NEUROLOGIC: AAOX3      -----------------------------------------------------------------------------------------------------------------------------------------------------------------------------------------------

## 2022-10-26 NOTE — PROGRESS NOTE ADULT - SUBJECTIVE AND OBJECTIVE BOX
SUBJECTIVE:    Patient is a 77y old Male who presents with a chief complaint of Unresponsiveness (26 Oct 2022 10:54)      HPI:  76yo Man with PMH of CAD (s/p CABG in 2017), HTN, DM type II, HLD, GERD, BPH, stroke in 2017 (residual L sided weakness ), and recent stroke (evaluated at [facility] 2 wks ago) with residual right sided hemiparesis, [facility]ives. Son visited his father around 1:40PM but 10 min after patient was profusely sweating, was in a daze and became unresponsive. PAtient is at baseline AOx4 and able to have a full conversation, EMS called and Code stroke called on arrival. Patient was not responding to pain, following any commands initially but came back to his baseline mentation. Patient is on ASA/brilinta and Keppra 500 mg BID. Patient does not recall events at the NH. Denies any chest pain SOB, head trauma,  GI or  symptoms    CTA head: no acute occlusion with stable occlusion of A2 segment  Vital Signs Last 24 Hrs  T(C): 36.9 (23 Oct 2022 16:50), Max: 36.9 (23 Oct 2022 16:50)  T(F): 98.4 (23 Oct 2022 16:50), Max: 98.4 (23 Oct 2022 16:50)  HR: 72 (23 Oct 2022 23:40) (66 - 87)  BP: 200/95 (23 Oct 2022 23:40) (111/71 - 200/95)  BP(mean): --  RR: 17 (23 Oct 2022 16:50) (14 - 20)  SpO2: 99% (23 Oct 2022 16:50) (98% - 99%)    Parameters below as of 23 Oct 2022 16:50  Patient On (Oxygen Delivery Method): room air          (24 Oct 2022 00:24)      Currently admitted to medicine with the primary diagnosis of Altered mental status     stated he needed to have a bowel movement, no other ocmlpaints, discussed plan with son at bedside    Besides the pertinent positives and negatives described above, the ROS was within normal limits.    PAST MEDICAL & SURGICAL HISTORY  CAD (coronary artery disease)    HTN (hypertension)    Depression    Anxiety    Diabetes  niddm    Angina pectoris    BPH (benign prostatic hyperplasia)    GERD (gastroesophageal reflux disease)    CVA (cerebral vascular accident)    History of appendectomy    Bilateral cataracts    History of heart bypass surgery      SOCIAL HISTORY:    ALLERGIES:  clindamycin (Rash)  PC Pen VK (Rash)  penicillin (Rash)    MEDICATIONS:  STANDING MEDICATIONS  aspirin  chewable 81 milliGRAM(s) Oral daily  atorvastatin 80 milliGRAM(s) Oral at bedtime  dextrose 5%. 1000 milliLiter(s) IV Continuous <Continuous>  dextrose 5%. 1000 milliLiter(s) IV Continuous <Continuous>  dextrose 50% Injectable 25 Gram(s) IV Push once  dextrose 50% Injectable 12.5 Gram(s) IV Push once  dextrose 50% Injectable 25 Gram(s) IV Push once  gabapentin 100 milliGRAM(s) Oral daily  gabapentin Oral Tab/Cap - Peds 200 milliGRAM(s) Oral at bedtime  glucagon  Injectable 1 milliGRAM(s) IntraMuscular once  heparin   Injectable 5000 Unit(s) SubCutaneous every 8 hours  influenza  Vaccine (HIGH DOSE) 0.7 milliLiter(s) IntraMuscular once  insulin glargine Injectable (LANTUS) 5 Unit(s) SubCutaneous at bedtime  insulin lispro (ADMELOG) corrective regimen sliding scale   SubCutaneous three times a day before meals  insulin lispro Injectable (ADMELOG) 3 Unit(s) SubCutaneous three times a day before meals  isosorbide   mononitrate ER Tablet (IMDUR) 60 milliGRAM(s) Oral daily  levETIRAcetam 750 milliGRAM(s) Oral two times a day  metoprolol succinate ER 25 milliGRAM(s) Oral daily  pantoprazole    Tablet 40 milliGRAM(s) Oral before breakfast  pentoxifylline 400 milliGRAM(s) Oral three times a day  polyethylene glycol 3350 17 Gram(s) Oral two times a day  ranolazine 500 milliGRAM(s) Oral two times a day  senna 2 Tablet(s) Oral at bedtime  tamsulosin 0.4 milliGRAM(s) Oral at bedtime  ticagrelor 90 milliGRAM(s) Oral daily  venlafaxine XR. 75 milliGRAM(s) Oral daily    PRN MEDICATIONS  dextrose Oral Gel 15 Gram(s) Oral once PRN    VITALS:   T(F): 98.6  HR: 81  BP: 126/62  RR: 18  SpO2: --    LABS:                        13.3   12.16 )-----------( 242      ( 26 Oct 2022 06:43 )             41.7     10-26    144  |  107  |  27<H>  ----------------------------<  119<H>  4.6   |  24  |  2.0<H>    Ca    9.6      26 Oct 2022 06:43  Mg     2.4     10-26    TPro  6.5  /  Alb  4.3  /  TBili  0.4  /  DBili  x   /  AST  16  /  ALT  12  /  AlkPhos  39  10-26              Culture - Blood (collected 24 Oct 2022 08:39)  Source: .Blood Blood-Venous  Preliminary Report (25 Oct 2022 14:02):    No growth to date.          RADIOLOGY:    PHYSICAL EXAM:  General: WN/WD NAD  Neurology: A&Ox3, nonfocal, LANTIGUA x 4  Head:  Normocephalic, atraumatic  ENT:  Mucosa moist, no ulcerations  Neck:  Supple, no sinuses or palpable masses  Lymphatic:  No palpable cervical, supraclavicular, axillary or inguinal adenopathy  Respiratory: CTA B/L  CV: RRR, S1S2, no murmur  Abdominal: Soft, mild tenderness (needed to have a bowel movement), ND no palpable mass  MSK: No edema  Incisions: intact, no erythema or drainage      Intravenous access: yes  NG tube: no  Abbott Catheter: no

## 2022-10-26 NOTE — PROGRESS NOTE ADULT - ASSESSMENT
78yo Man with PMH of CAD s/p CABG, HTN, DM type II, HLD, GERD, BPH, stroke in 2017 w/ residual L sided weakness , and recent stroke with residual right sided hemiparesis, [facility] for unresponsives.       # Possible Stroke with sequale   - EEG showing focal nonspecific slowing  - on DAPT  - CTA head - CHRONIC occlusion of A2 segment  - MRI brain showing appropriate acute/subacute infarct in left SUSANNE territory  -  keppra 750mg  BID      #SAMANTA on CKD  - Cr on admission 2.3 (baseline~1.8); 2.0 this morning  - hold ARBs  - RBUS normal    #CAD s/p CABG  - c/w ASA and Brillinta   - c/w statins     #HTN  - hold ARB  - c/w Imdur     #DM  -sugars well controlled  - -180  - A1C 7.2      #HLD   -  c/w statin and fenofibrate    DVT ppx: heparin subq  GI ppx: protonix  Full code

## 2022-10-26 NOTE — PROGRESS NOTE ADULT - ASSESSMENT
78 yo M with PMH of CAD s/p CABG, HTN, DM type II, HLD, GERD, BPH, stroke in 2017 w/ residual L sided weakness , and recent stroke with residual right sided hemiparesis, brought from [facility] to the ED for unresponsiveness    # Unresponsiveness possibly secondary to seizure from recent stroke  - EEG showing focal nonspecific slowing  - on DAPT  - CTA head - CHRONIC occlusion of A2 segment  - MRI brain showing appropriate acute/subacute infarct in left SUSANNE territory  -  keppra 750mg  BID  - as per son, robin was staring off and unresponsive in the nursing home and did not return to baseline until reaching the hospital  - follow up neuro    #SAMANTA on CKD  - Cr on admission 2.3 (baseline~1.8); 2.0 this morning at baseline  - holding ARB for now, SBP < 130  - RBUS normal    #CAD s/p CABG  - c/w ASA and Brillinta   - c/w statins     #HTN  - hold ARB  - c/w Imdur     #DM  -sugars well controlled  - -180  - A1C 7.2    #HLD   -  c/w statin and fenofibrate    DVT ppx: heparin subq  GI ppx: protonix  Full code  dispo: neurology consulted for possible discharge tomorrow 78 yo M with PMH of CAD s/p CABG, HTN, DM type II, HLD, GERD, BPH, stroke in 2017 w/ residual L sided weakness , and recent stroke with residual right sided hemiparesis, brought from [facility] to the ED for unresponsiveness    # Unresponsiveness possibly secondary to seizure from recent stroke  - EEG showing focal nonspecific slowing  - on DAPT  - CTA head - CHRONIC occlusion of A2 segment  - MRI brain showing appropriate acute/subacute infarct in left SUSANNE territory  -  keppra 750mg  BID  - as per son, robin was staring off and unresponsive in the nursing home and did not return to baseline until reaching the hospital  - follow up neuro    #SAMANTA on CKD  - Cr on admission 2.3 (baseline~1.8); 2.0 this morning at baseline  - holding ARB for now, SBP < 130  - RBUS normal    # Mild abdominal tenderness  - last BM 2 days ago, said he went a little bit while I was doing my abdominal exam, started on miralax and senna  - kub showed moderate stool burden    #CAD s/p CABG  - c/w ASA and Brillinta   - c/w statins     #HTN  - hold ARB  - c/w Imdur     #DM  -sugars well controlled  - -180  - A1C 7.2    #HLD   -  c/w statin and fenofibrate    DVT ppx: heparin subq  GI ppx: protonix  Full code  dispo: neurology consulted for possible discharge tomorrow 76 yo M with PMH of CAD s/p CABG, HTN, DM type II, HLD, GERD, BPH, stroke in 2017 w/ residual L sided weakness , and recent stroke with residual right sided hemiparesis, brought from [facility] to the ED for unresponsiveness    # Unresponsiveness possibly secondary to seizure from recent stroke  - EEG showing focal nonspecific slowing  - on DAPT  - CTA head - CHRONIC occlusion of A2 segment  - MRI brain showing appropriate acute/subacute infarct in left SUSANNE territory  -  keppra 750mg  BID  - as per son, robin was staring off and unresponsive in the nursing home and did not return to baseline until reaching the hospital  - follow up neuro    #SAMANTA on CKD  - Cr on admission 2.3 (baseline~1.8); 2.0 this morning at baseline  - holding ARB for now, SBP < 130  - RBUS normal    # Mild abdominal tenderness  - last BM 2 days ago, said he went a little bit while I was doing my abdominal exam, started on miralax and senna  - kub showed moderate stool burden    #CAD s/p CABG  - c/w ASA and Brillinta   - c/w statins     #HTN  - hold ARB  - c/w Imdur     #DM  -sugars well controlled  - -180  - A1C 7.2    #HLD   -  c/w statin and fenofibrate    DVT ppx: heparin subq  GI ppx: protonix  Full code  dispo: neurology consulted and PT for possible discharge tomorrow

## 2022-10-26 NOTE — CONSULT NOTE ADULT - SUBJECTIVE AND OBJECTIVE BOX
Stroke CODE consult Note:    ERICK MARTINEZ    1. Chief Complaint:    HPI: 76M with PMH of CAD (s/p CABG x3 in 2017), HTN, DM type II, HLD, GERD, BPH. stroke in 2017 (residual L sided weakness), and recent stroke (evaluated at [facility] 2 wks ago) with residual right sided hemiparesis (discharge papers unable to be found), BIBEMS for unresponsiveness. Patient is from [facility]. As per son, patient was talked to his dad and had a full conversation around 11AM. Son visited his father around 1:40PM, said hello and asked how he was doing "fine". He continued to watch TV. Around ~10 minutes later, patient was profusely sweating and became unresponsive. Stroke code on arrival. Patient was not responding to pain, following any commands intially. After CT scan, patient was coming back to baseline, noted the left sided weakness (residual ;however patient is able to move more of his left side, it has worsen as per patient). Son states his left arm was off on Friday when he had to sign something.       2. Relevant PMH:   Prior ischemic stroke/TIA[ ], Afib [ ], CAD [ ], HTN [ ], DLD [ ], DM [ ], PVD [ ], Obesity [ ],   Sedentary lifestyle [ ], CHF [ ], SANDY [ ], Cancer Hx [ ].    3. Social History: Smoking [ ], Drug Use [ ], Alcohol Use [ ], Other [ ]    4. Possible Location of Stroke:    5. Relevant Brain Tissue Imaging:  < from: CT Brain Stroke Protocol (10.23.22 @ 15:31) >  IMPRESSION:    In comparison with the prior CT scan of the brain dated 2022:    Expected evolution of the left frontal lobe infarct.    No acute intracranial hemorrhage or acute large territorial infarct.    Spoke with EUNICE HUFFMAN DO; Attending Em on 10/23/2022 3:38 PM with   readback.    < end of copied text >    6. Relevant Cerebrovascular Imagin. Relevant blood tests:    < from: CT Angio Brain Stroke Protocol  w/ IV Cont (10.23.22 @ 16:03) >      IMPRESSION:    1.  Utilizing a CBF < 30% and Tmax > 6 seconds, no evidence of core   infarct. 10 cc hypoperfusion detected in the left anterior frontal lobe,   correlating to region of known left SUSANNE territory infarct.  2.  Stable occlusion of the left A2 segment with decreased arborization   of the left anterior cerebral artery circulation.  3.  Additional stable stenoses of the bilateral internal carotid arteries   and bilateral posterior cerebral arteries described above.    < end of copied text >    8. Relevant cardiac rhythm monitorin. Relevant Cardiac Structure: (TTE/MISSY +/-):[ ]No intracardiac thrombus/[ ] no vegetation/[ ]no [place]/EF:    Home Medications:  acetaminophen 325 mg oral tablet: 2 or 3 tab(s) orally every 6 hours, As needed, for pain or fever (13 Oct 2022 15:31)  aspirin 81 mg oral tablet: 1 tab(s) orally once a day (13 Oct 2022 15:31)  fenofibrate 120 mg oral tablet: 1 tab(s) orally once a day (13 Oct 2022 15:31)  linagliptin 5 mg oral tablet: 1 tab(s) orally once a day (13 Oct 2022 15:31)  losartan 25 mg oral tablet: 1 tab(s) orally once a day (13 Oct 2022 15:31)  metoprolol succinate 25 mg oral tablet, extended release: 1 tab(s) orally every 12 hours (13 Oct 2022 15:31)  pantoprazole 40 mg oral delayed release tablet: 1 tab(s) orally once a day (before a meal) (15 Oct 2022 12:54)  Pentoxil 400 mg oral tablet, extended release: 1 tab(s) orally 3 times a day (13 Oct 2022 15:31)  polyethylene glycol 3350 oral powder for reconstitution: 17 gram(s) orally once a day, As needed, Constipation (13 Oct 2022 15:31)  Prevacid 30 mg oral delayed release capsule: 1 cap(s) orally once a day (13 Oct 2022 15:31)  Ranexa 500 mg oral tablet, extended release: 1 tab(s) orally 2 times a day (13 Oct 2022 15:31)  senna oral tablet: 1 or 2 tab(s) orally once a day (at bedtime), As needed, Constipation (13 Oct 2022 15:31)  tamsulosin 0.4 mg oral capsule: 1 cap(s) orally once a day (13 Oct 2022 15:31)  venlafaxine 75 mg oral capsule, extended release: 1 cap(s) orally once a day (13 Oct 2022 15:31)      MEDICATIONS  (STANDING):      10. PT/OT/Speech/Rehab/S&Sw/ Cognitive eval results and recommendations:    11. Exam:    Vital Signs Last 24 Hrs  T(C): 36.9 (23 Oct 2022 16:50), Max: 36.9 (23 Oct 2022 16:50)  T(F): 98.4 (23 Oct 2022 16:50), Max: 98.4 (23 Oct 2022 16:50)  HR: 67 (23 Oct 2022 16:50) (66 - 87)  BP: 127/60 (23 Oct 2022 16:50) (111/71 - 127/60)  BP(mean): --  RR: 17 (23 Oct 2022 16:50) (14 - 20)  SpO2: 99% (23 Oct 2022 16:50) (98% - 99%)    Parameters below as of 23 Oct 2022 16:50  Patient On (Oxygen Delivery Method): room air      Neuro Exam:  Neurologic Exam:  Mental status: Awake, alert and oriented to person, place, and time. Attention and concentration intact. Fund of knowledge appropriate  Language: Naming, repetition, fluency, and comprehension intact. No dysarthria, no aphasia  Cranial nerves:  Visual fields full. No nystagmus. Extraocular muscles intact, left facial asymmetry   Motor:  Normal bulk and tone. Strength:    5/5 in RUE  1/5 in LUE  5/5 in RLE  0/5 in LLE   strength 5/5  Sensation: Decreased sensation on the left   Gait: Deferred         < from: MR Head No Cont (10.13.22 @ 21:27) >    IMPRESSION:  Acute left SUSANNE territory infarct with a mild rightward midline shift,   unchanged since previous CT head dated 10/13/2022.    Stable chronic microvascular ischemic changes as well as unchanged   bilateral lacunar infarcts.    --- End of Report ---    < end of copied text >        
  CHIEF COMPLAINT:Patient is a 77y old  Male who presents with a chief complaint of Unresponsiveness (26 Oct 2022 10:54)      HISTORY OF PRESENT ILLNESS:   HPI:  78yo Man with PMH of CAD (s/p CABG in 2017), HTN, DM type II, HLD, GERD, BPH, stroke in 2017 (residual L sided weakness ), and recent stroke (evaluated at [facility] 2 wks ago) with residual right sided hemiparesis, [facility] for unresponsives. Son visited his father around 1:40PM but 10 min after patient was profusely sweating, was in a daze and became unresponsive. PAtient is at baseline AOx4 and able to have a full conversation, EMS called and Code stroke called on arrival. Patient was not responding to pain, following any commands initially but came back to his baseline mentation. Patient is on ASA/brilinta and Keppra 500 mg BID. Patient does not recall events at the NH. Denies any chest pain SOB, head trauma,  GI or  symptoms    From a cardiac standpoint he has been stable.  He is here for cva.     CTA head: no acute occlusion with stable occlusion of A2 segment  Vital Signs Last 24 Hrs  T(C): 36.9 (23 Oct 2022 16:50), Max: 36.9 (23 Oct 2022 16:50)  T(F): 98.4 (23 Oct 2022 16:50), Max: 98.4 (23 Oct 2022 16:50)  HR: 72 (23 Oct 2022 23:40) (66 - 87)  BP: 200/95 (23 Oct 2022 23:40) (111/71 - 200/95)  BP(mean): --  RR: 17 (23 Oct 2022 16:50) (14 - 20)  SpO2: 99% (23 Oct 2022 16:50) (98% - 99%)    Parameters below as of 23 Oct 2022 16:50  Patient On (Oxygen Delivery Method): room air          (24 Oct 2022 00:24)    PAST MEDICAL & SURGICAL HISTORY:  CAD (coronary artery disease)      HTN (hypertension)      Depression      Anxiety      Diabetes  niddm      Angina pectoris      BPH (benign prostatic hyperplasia)      GERD (gastroesophageal reflux disease)      CVA (cerebral vascular accident)      History of appendectomy      Bilateral cataracts      History of heart bypass surgery        FAMILY HISTORY:    Allergies    clindamycin (Rash)  PC Pen VK (Rash)  penicillin (Rash)    Intolerances    	  Home Medications:  acetaminophen 325 mg oral tablet: 2 or 3 tab(s) orally every 6 hours, As needed, for pain or fever (24 Oct 2022 00:01)  aspirin 81 mg oral tablet: 1 tab(s) orally once a day (24 Oct 2022 00:01)  fenofibrate 120 mg oral tablet: 1 tab(s) orally once a day (24 Oct 2022 00:01)  linagliptin 5 mg oral tablet: 1 tab(s) orally once a day (24 Oct 2022 00:01)  losartan 25 mg oral tablet: 1 tab(s) orally once a day (24 Oct 2022 00:01)  metoprolol succinate 25 mg oral tablet, extended release: 1 tab(s) orally every 12 hours (24 Oct 2022 00:01)  pantoprazole 40 mg oral delayed release tablet: 1 tab(s) orally once a day (before a meal) (24 Oct 2022 00:01)  Pentoxil 400 mg oral tablet, extended release: 1 tab(s) orally 3 times a day (24 Oct 2022 00:01)  polyethylene glycol 3350 oral powder for reconstitution: 17 gram(s) orally once a day, As needed, Constipation (24 Oct 2022 00:01)  Prevacid 30 mg oral delayed release capsule: 1 cap(s) orally once a day (24 Oct 2022 00:01)  Ranexa 500 mg oral tablet, extended release: 1 tab(s) orally 2 times a day (24 Oct 2022 00:01)  senna oral tablet: 1 or 2 tab(s) orally once a day (at bedtime), As needed, Constipation (24 Oct 2022 00:01)  tamsulosin 0.4 mg oral capsule: 1 cap(s) orally once a day (24 Oct 2022 00:01)  venlafaxine 75 mg oral capsule, extended release: 1 cap(s) orally once a day (24 Oct 2022 00:01)    MEDICATIONS  (STANDING):  aspirin  chewable 81 milliGRAM(s) Oral daily  atorvastatin 80 milliGRAM(s) Oral at bedtime  dextrose 5%. 1000 milliLiter(s) (100 mL/Hr) IV Continuous <Continuous>  dextrose 5%. 1000 milliLiter(s) (50 mL/Hr) IV Continuous <Continuous>  dextrose 50% Injectable 25 Gram(s) IV Push once  dextrose 50% Injectable 12.5 Gram(s) IV Push once  dextrose 50% Injectable 25 Gram(s) IV Push once  gabapentin 100 milliGRAM(s) Oral daily  gabapentin Oral Tab/Cap - Peds 200 milliGRAM(s) Oral at bedtime  glucagon  Injectable 1 milliGRAM(s) IntraMuscular once  heparin   Injectable 5000 Unit(s) SubCutaneous every 8 hours  influenza  Vaccine (HIGH DOSE) 0.7 milliLiter(s) IntraMuscular once  insulin glargine Injectable (LANTUS) 5 Unit(s) SubCutaneous at bedtime  insulin lispro (ADMELOG) corrective regimen sliding scale   SubCutaneous three times a day before meals  insulin lispro Injectable (ADMELOG) 3 Unit(s) SubCutaneous three times a day before meals  isosorbide   mononitrate ER Tablet (IMDUR) 60 milliGRAM(s) Oral daily  levETIRAcetam 750 milliGRAM(s) Oral two times a day  metoprolol succinate ER 25 milliGRAM(s) Oral daily  pantoprazole    Tablet 40 milliGRAM(s) Oral before breakfast  pentoxifylline 400 milliGRAM(s) Oral three times a day  polyethylene glycol 3350 17 Gram(s) Oral two times a day  ranolazine 500 milliGRAM(s) Oral two times a day  senna 2 Tablet(s) Oral at bedtime  tamsulosin 0.4 milliGRAM(s) Oral at bedtime  ticagrelor 90 milliGRAM(s) Oral daily  venlafaxine XR. 75 milliGRAM(s) Oral daily    MEDICATIONS  (PRN):  dextrose Oral Gel 15 Gram(s) Oral once PRN Blood Glucose LESS THAN 70 milliGRAM(s)/deciliter        SOCIAL HISTORY:    [ ] Non-smoker  [ ] Smoker  [ ] Alcohol      REVIEW OF SYSTEMS:    PHYSICAL EXAM:  T(C): 37 (10-26-22 @ 14:07), Max: 37.1 (10-25-22 @ 20:00)  HR: 81 (10-26-22 @ 14:07) (73 - 81)  BP: 126/62 (10-26-22 @ 14:07) (126/62 - 183/80)  RR: 18 (10-26-22 @ 14:07) (18 - 18)  SpO2: --  Wt(kg): --  I&O's Summary    26 Oct 2022 07:01  -  26 Oct 2022 18:07  --------------------------------------------------------  IN: 1163 mL / OUT: 375 mL / NET: 788 mL      Daily     Daily     General Appearance: Normal	  Cardiovascular: Normal S1 S2, No JVD, No murmurs, No edema  Respiratory: Lungs clear to auscultation	  Psychiatry: A & O x 3, Mood & affect appropriate  Gastrointestinal:  Soft, Non-tender  Skin: No rashes, No ecchymoses, No cyanosis	  Neurologic: Non-focal  Extremities: Normal range of motion, No clubbing, cyanosis or edema  Vascular: Peripheral pulses palpable 2+ bilaterally        LABS:	 	                        13.3   12.16 )-----------( 242      ( 26 Oct 2022 06:43 )             41.7     10-26    144  |  107  |  27<H>  ----------------------------<  119<H>  4.6   |  24  |  2.0<H>  10-25    137  |  105  |  30<H>  ----------------------------<  125<H>  4.3   |  21  |  2.0<H>    Ca    9.6      26 Oct 2022 06:43  Ca    9.4      25 Oct 2022 06:40  Mg     2.4     10-26  Mg     2.3     10-25    TPro  6.5  /  Alb  4.3  /  TBili  0.4  /  DBili  x   /  AST  16  /  ALT  12  /  AlkPhos  39  10-26  TPro  6.2  /  Alb  4.1  /  TBili  0.3  /  DBili  x   /  AST  19  /  ALT  14  /  AlkPhos  38  10-25      proBNP:   Lipid Profile:   HgA1c:   TSH:       CARDIAC MARKERS:            TELEMETRY EVENTS: 	    ECG:  	  RADIOLOGY:< from: MR Head w/wo IV Cont (10.25.22 @ 18:12) >  MPRESSION:    1.  Appropriately-evolving acute/subacute infarct within the left SUSANNE   territory.    2.  No new infarct. No acute intracranial hemorrhage.    3.  Stable chronic microvascular changes and scattered chronic lacunar     < end of copied text >    	    PREVIOUS DIAGNOSTIC TESTING:    [ ] Echocardiogram:  [ ]  Catheterization:  [ ] Stress Test:

## 2022-10-26 NOTE — CONSULT NOTE ADULT - ASSESSMENT
PT IS HERE FOR RECENT CVA.     WE NOTED AT THIS POINT STABLE FROM CV STANDPOINT WILL DO NOTHING INVASIVE FOR NOW.     IF ANY ISSUES ARISE FROM CARDIAC STANDPOINT PLEASE CALL.

## 2022-10-27 ENCOUNTER — TRANSCRIPTION ENCOUNTER (OUTPATIENT)
Age: 77
End: 2022-10-27

## 2022-10-27 VITALS — SYSTOLIC BLOOD PRESSURE: 145 MMHG | DIASTOLIC BLOOD PRESSURE: 75 MMHG | HEART RATE: 70 BPM

## 2022-10-27 DIAGNOSIS — I69.354 HEMIPLEGIA AND HEMIPARESIS FOLLOWING CEREBRAL INFARCTION AFFECTING LEFT NON-DOMINANT SIDE: ICD-10-CM

## 2022-10-27 DIAGNOSIS — Z79.82 LONG TERM (CURRENT) USE OF ASPIRIN: ICD-10-CM

## 2022-10-27 DIAGNOSIS — Z79.02 LONG TERM (CURRENT) USE OF ANTITHROMBOTICS/ANTIPLATELETS: ICD-10-CM

## 2022-10-27 DIAGNOSIS — Z95.1 PRESENCE OF AORTOCORONARY BYPASS GRAFT: ICD-10-CM

## 2022-10-27 DIAGNOSIS — K21.9 GASTRO-ESOPHAGEAL REFLUX DISEASE WITHOUT ESOPHAGITIS: ICD-10-CM

## 2022-10-27 DIAGNOSIS — I10 ESSENTIAL (PRIMARY) HYPERTENSION: ICD-10-CM

## 2022-10-27 DIAGNOSIS — F32.A DEPRESSION, UNSPECIFIED: ICD-10-CM

## 2022-10-27 DIAGNOSIS — I25.10 ATHEROSCLEROTIC HEART DISEASE OF NATIVE CORONARY ARTERY WITHOUT ANGINA PECTORIS: ICD-10-CM

## 2022-10-27 DIAGNOSIS — I95.1 ORTHOSTATIC HYPOTENSION: ICD-10-CM

## 2022-10-27 DIAGNOSIS — R29.721 NIHSS SCORE 21: ICD-10-CM

## 2022-10-27 DIAGNOSIS — R29.810 FACIAL WEAKNESS: ICD-10-CM

## 2022-10-27 DIAGNOSIS — I63.522 CEREBRAL INFARCTION DUE TO UNSPECIFIED OCCLUSION OR STENOSIS OF LEFT ANTERIOR CEREBRAL ARTERY: ICD-10-CM

## 2022-10-27 DIAGNOSIS — E87.5 HYPERKALEMIA: ICD-10-CM

## 2022-10-27 DIAGNOSIS — E78.5 HYPERLIPIDEMIA, UNSPECIFIED: ICD-10-CM

## 2022-10-27 DIAGNOSIS — N40.0 BENIGN PROSTATIC HYPERPLASIA WITHOUT LOWER URINARY TRACT SYMPTOMS: ICD-10-CM

## 2022-10-27 DIAGNOSIS — Z98.41 CATARACT EXTRACTION STATUS, RIGHT EYE: ICD-10-CM

## 2022-10-27 DIAGNOSIS — Z79.899 OTHER LONG TERM (CURRENT) DRUG THERAPY: ICD-10-CM

## 2022-10-27 DIAGNOSIS — E11.9 TYPE 2 DIABETES MELLITUS WITHOUT COMPLICATIONS: ICD-10-CM

## 2022-10-27 DIAGNOSIS — Z98.42 CATARACT EXTRACTION STATUS, LEFT EYE: ICD-10-CM

## 2022-10-27 DIAGNOSIS — F41.9 ANXIETY DISORDER, UNSPECIFIED: ICD-10-CM

## 2022-10-27 LAB
ALBUMIN SERPL ELPH-MCNC: 4 G/DL — SIGNIFICANT CHANGE UP (ref 3.5–5.2)
ALP SERPL-CCNC: 45 U/L — SIGNIFICANT CHANGE UP (ref 30–115)
ALT FLD-CCNC: 12 U/L — SIGNIFICANT CHANGE UP (ref 0–41)
ANION GAP SERPL CALC-SCNC: 14 MMOL/L — SIGNIFICANT CHANGE UP (ref 7–14)
AST SERPL-CCNC: 18 U/L — SIGNIFICANT CHANGE UP (ref 0–41)
BILIRUB SERPL-MCNC: 0.4 MG/DL — SIGNIFICANT CHANGE UP (ref 0.2–1.2)
BUN SERPL-MCNC: 27 MG/DL — HIGH (ref 10–20)
CALCIUM SERPL-MCNC: 9.6 MG/DL — SIGNIFICANT CHANGE UP (ref 8.4–10.5)
CHLORIDE SERPL-SCNC: 108 MMOL/L — SIGNIFICANT CHANGE UP (ref 98–110)
CO2 SERPL-SCNC: 25 MMOL/L — SIGNIFICANT CHANGE UP (ref 17–32)
CREAT SERPL-MCNC: 1.9 MG/DL — HIGH (ref 0.7–1.5)
EGFR: 36 ML/MIN/1.73M2 — LOW
GLUCOSE BLDC GLUCOMTR-MCNC: 131 MG/DL — HIGH (ref 70–99)
GLUCOSE BLDC GLUCOMTR-MCNC: 145 MG/DL — HIGH (ref 70–99)
GLUCOSE SERPL-MCNC: 121 MG/DL — HIGH (ref 70–99)
HCT VFR BLD CALC: 40.8 % — LOW (ref 42–52)
HGB BLD-MCNC: 13.3 G/DL — LOW (ref 14–18)
MAGNESIUM SERPL-MCNC: 2.2 MG/DL — SIGNIFICANT CHANGE UP (ref 1.8–2.4)
MCHC RBC-ENTMCNC: 29.2 PG — SIGNIFICANT CHANGE UP (ref 27–31)
MCHC RBC-ENTMCNC: 32.6 G/DL — SIGNIFICANT CHANGE UP (ref 32–37)
MCV RBC AUTO: 89.5 FL — SIGNIFICANT CHANGE UP (ref 80–94)
NRBC # BLD: 0 /100 WBCS — SIGNIFICANT CHANGE UP (ref 0–0)
PLATELET # BLD AUTO: 243 K/UL — SIGNIFICANT CHANGE UP (ref 130–400)
POTASSIUM SERPL-MCNC: 4.8 MMOL/L — SIGNIFICANT CHANGE UP (ref 3.5–5)
POTASSIUM SERPL-SCNC: 4.8 MMOL/L — SIGNIFICANT CHANGE UP (ref 3.5–5)
PROT SERPL-MCNC: 6.4 G/DL — SIGNIFICANT CHANGE UP (ref 6–8)
RBC # BLD: 4.56 M/UL — LOW (ref 4.7–6.1)
RBC # FLD: 14.6 % — HIGH (ref 11.5–14.5)
SODIUM SERPL-SCNC: 147 MMOL/L — HIGH (ref 135–146)
WBC # BLD: 11.31 K/UL — HIGH (ref 4.8–10.8)
WBC # FLD AUTO: 11.31 K/UL — HIGH (ref 4.8–10.8)

## 2022-10-27 PROCEDURE — 93306 TTE W/DOPPLER COMPLETE: CPT | Mod: 26

## 2022-10-27 PROCEDURE — 99232 SBSQ HOSP IP/OBS MODERATE 35: CPT

## 2022-10-27 RX ORDER — LANSOPRAZOLE 15 MG/1
1 CAPSULE, DELAYED RELEASE ORAL
Qty: 0 | Refills: 0 | DISCHARGE

## 2022-10-27 RX ORDER — LEVETIRACETAM 250 MG/1
1 TABLET, FILM COATED ORAL
Qty: 0 | Refills: 0 | DISCHARGE
Start: 2022-10-27

## 2022-10-27 RX ADMIN — GABAPENTIN 100 MILLIGRAM(S): 400 CAPSULE ORAL at 12:31

## 2022-10-27 RX ADMIN — HEPARIN SODIUM 5000 UNIT(S): 5000 INJECTION INTRAVENOUS; SUBCUTANEOUS at 14:10

## 2022-10-27 RX ADMIN — LEVETIRACETAM 750 MILLIGRAM(S): 250 TABLET, FILM COATED ORAL at 06:27

## 2022-10-27 RX ADMIN — LEVETIRACETAM 750 MILLIGRAM(S): 250 TABLET, FILM COATED ORAL at 17:25

## 2022-10-27 RX ADMIN — Medication 400 MILLIGRAM(S): at 14:11

## 2022-10-27 RX ADMIN — Medication 25 MILLIGRAM(S): at 06:28

## 2022-10-27 RX ADMIN — Medication 400 MILLIGRAM(S): at 06:55

## 2022-10-27 RX ADMIN — Medication 75 MILLIGRAM(S): at 12:32

## 2022-10-27 RX ADMIN — POLYETHYLENE GLYCOL 3350 17 GRAM(S): 17 POWDER, FOR SOLUTION ORAL at 06:28

## 2022-10-27 RX ADMIN — Medication 81 MILLIGRAM(S): at 12:31

## 2022-10-27 RX ADMIN — POLYETHYLENE GLYCOL 3350 17 GRAM(S): 17 POWDER, FOR SOLUTION ORAL at 17:26

## 2022-10-27 RX ADMIN — RANOLAZINE 500 MILLIGRAM(S): 500 TABLET, FILM COATED, EXTENDED RELEASE ORAL at 17:26

## 2022-10-27 RX ADMIN — PANTOPRAZOLE SODIUM 40 MILLIGRAM(S): 20 TABLET, DELAYED RELEASE ORAL at 06:28

## 2022-10-27 RX ADMIN — HEPARIN SODIUM 5000 UNIT(S): 5000 INJECTION INTRAVENOUS; SUBCUTANEOUS at 06:28

## 2022-10-27 RX ADMIN — ISOSORBIDE MONONITRATE 60 MILLIGRAM(S): 60 TABLET, EXTENDED RELEASE ORAL at 12:31

## 2022-10-27 RX ADMIN — TICAGRELOR 90 MILLIGRAM(S): 90 TABLET ORAL at 12:32

## 2022-10-27 RX ADMIN — RANOLAZINE 500 MILLIGRAM(S): 500 TABLET, FILM COATED, EXTENDED RELEASE ORAL at 06:28

## 2022-10-27 NOTE — DISCHARGE NOTE PROVIDER - NSDCMRMEDTOKEN_GEN_ALL_CORE_FT
acetaminophen 325 mg oral tablet: 2 or 3 tab(s) orally every 6 hours, As needed, for pain or fever  aspirin 81 mg oral tablet: 1 tab(s) orally once a day  atorvastatin 80 mg oral tablet: 1 tab(s) orally once a day (at bedtime)  fenofibrate 120 mg oral tablet: 1 tab(s) orally once a day  gabapentin 100 mg oral capsule: 2 cap(s) orally once a day (at bedtime)  gabapentin 100 mg oral capsule: 1 cap(s) orally once a day  in am   isosorbide mononitrate 60 mg oral tablet, extended release: 1 tab(s) orally once a day MDD:1  levETIRAcetam 750 mg oral tablet: 1 tab(s) orally 2 times a day  linagliptin 5 mg oral tablet: 1 tab(s) orally once a day  losartan 25 mg oral tablet: 1 tab(s) orally once a day  metoprolol succinate 25 mg oral tablet, extended release: 1 tab(s) orally every 12 hours  midodrine 2.5 mg oral tablet: 1 tab(s) orally 2 times a day: take 1st tab 20 to 30 minutes before getting out of bed in the morning and 2nd pill before lunch; must remain upright for at least one hour after taking medication  NIFEdipine 30 mg oral tablet, extended release: 1 tab(s) orally once a day   pantoprazole 40 mg oral delayed release tablet: 1 tab(s) orally once a day (before a meal)  Pentoxil 400 mg oral tablet, extended release: 1 tab(s) orally 3 times a day  polyethylene glycol 3350 oral powder for reconstitution: 17 gram(s) orally once a day, As needed, Constipation  Ranexa 500 mg oral tablet, extended release: 1 tab(s) orally 2 times a day  senna oral tablet: 1 or 2 tab(s) orally once a day (at bedtime), As needed, Constipation  tamsulosin 0.4 mg oral capsule: 1 cap(s) orally once a day  ticagrelor 90 mg oral tablet: 1 tab(s) orally every 12 hours  venlafaxine 75 mg oral capsule, extended release: 1 cap(s) orally once a day

## 2022-10-27 NOTE — DISCHARGE NOTE PROVIDER - HOSPITAL COURSE
76yo Man with PMH of CAD (s/p CABG in 2017), HTN, DM type II, HLD, GERD, BPH, stroke in 2017 (residual L sided weakness ), and recent stroke (evaluated at [facility] 2 wks ago) with residual right sided hemiparesis, [facility] for unresponsives. Son visited his father around 1:40PM but 10 min after patient was profusely sweating, was in a daze and became unresponsive. PAtient is at baseline AOx4 and able to have a full conversation, EMS called and Code stroke called on arrival. Patient was not responding to pain, following any commands initially but came back to his baseline mentation. Patient is on ASA/brilinta and Keppra 500 mg BID. Patient does not recall events at the NH. Denies any chest pain SOB, head trauma,  GI or  symptoms    CTA head: no acute occlusion with stable occlusion of A2 segment  Vital Signs Last 24 Hrs  T(C): 36.9 (23 Oct 2022 16:50), Max: 36.9 (23 Oct 2022 16:50)  T(F): 98.4 (23 Oct 2022 16:50), Max: 98.4 (23 Oct 2022 16:50)  HR: 72 (23 Oct 2022 23:40) (66 - 87)  BP: 200/95 (23 Oct 2022 23:40) (111/71 - 200/95)  BP(mean): --  RR: 17 (23 Oct 2022 16:50) (14 - 20)  SpO2: 99% (23 Oct 2022 16:50) (98% - 99%)    Parameters below as of 23 Oct 2022 16:50  Patient On (Oxygen Delivery Method): room air      # Recent stroke with sequale   - stroke 2 weeks ago at [facility]  - EEG showing focal nonspecific slowing  - on DAPT  - CTA head - CHRONIC occlusion of A2 segment  - MRI brain showing appropriate acute/subacute infarct in left SUSANNE territory  -  keppra 750mg  BID      #SAMANTA on CKD- improving  - Cr on admission 2.3 (baseline~1.8); 1.9 this morning  - hold ARBs  - RBUS normal    #CAD s/p CABG  - c/w ASA and Brillinta   - c/w statins     #HTN  - hold ARB  - c/w Imdur     #DM  -sugars well controlled  - -180  - A1C 7.2      #HLD   -  c/w statin and fenofibrate

## 2022-10-27 NOTE — PROGRESS NOTE ADULT - SUBJECTIVE AND OBJECTIVE BOX
ERICK MARTINEZ  77y Male    CHIEF COMPLAINT:    Patient is a 77y old  Male who presents with a chief complaint of Unresponsiveness (27 Oct 2022 10:39)      INTERVAL HPI/OVERNIGHT EVENTS:    Patient seen and examined.    ROS: All other systems are negative.    Vital Signs:    T(F): 97.7 (10-27-22 @ 13:00), Max: 98.1 (10-26-22 @ 20:45)  HR: 79 (10-27-22 @ 13:00) (79 - 85)  BP: 187/74 (10-27-22 @ 13:00) (120/65 - 187/74)  RR: 20 (10-27-22 @ 13:00) (18 - 20)  SpO2: 98% (10-27-22 @ 08:22) (98% - 98%)  I&O's Summary    26 Oct 2022 07:01  -  27 Oct 2022 07:00  --------------------------------------------------------  IN: 1163 mL / OUT: 375 mL / NET: 788 mL      Daily     Daily   CAPILLARY BLOOD GLUCOSE      POCT Blood Glucose.: 145 mg/dL (27 Oct 2022 11:50)  POCT Blood Glucose.: 134 mg/dL (26 Oct 2022 21:54)  POCT Blood Glucose.: 97 mg/dL (26 Oct 2022 16:56)      PHYSICAL EXAM:    GENERAL:  NAD  SKIN: No rashes or lesions  HENT: Atraumatic. Normocephalic. PERRL. Moist membranes.  NECK: Supple, No JVD. No lymphadenopathy.  PULMONARY: CTA B/L. No wheezing. No rales  CVS: Normal S1, S2. Rate and Rhythm are regular. No murmurs.  ABDOMEN/GI: Soft, Nontender, Nondistended; BS present  EXTREMITIES: Peripheral pulses intact. No edema B/L LE.  NEUROLOGIC:  No motor or sensory deficit.  PSYCH: Alert & oriented x 3    Consultant(s) Notes Reviewed:  [x ] YES  [ ] NO  Care Discussed with Consultants/Other Providers [ x] YES  [ ] NO    EKG reviewed  Telemetry reviewed    LABS:                        13.3   11.31 )-----------( 243      ( 27 Oct 2022 07:56 )             40.8     10-27    147<H>  |  108  |  27<H>  ----------------------------<  121<H>  4.8   |  25  |  1.9<H>    Ca    9.6      27 Oct 2022 07:56  Mg     2.2     10-27    TPro  6.4  /  Alb  4.0  /  TBili  0.4  /  DBili  x   /  AST  18  /  ALT  12  /  AlkPhos  45  10-27              RADIOLOGY & ADDITIONAL TESTS:    < from: EEG (10.24.22 @ 13:00) >    Impression:  Abnormal due to the presence of: focal slowing as above    < end of copied text >  < from: Xray Kidney Ureter Bladder (10.26.22 @ 15:00) >    IMPRESSION:    Nonobstructive bowel gas pattern. There is a moderate stoolburden   throughout the colon.    < end of copied text >  < from: MR Head w/wo IV Cont (10.25.22 @ 18:12) >    IMPRESSION:    1.  Appropriately-evolving acute/subacute infarct within the left SUSANNE   territory.    2.  No new infarct. No acute intracranial hemorrhage.    3.  Stable chronic microvascular changes and scattered chronic lacunar   infarcts.    < end of copied text >  < from: US Kidney and Bladder (10.25.22 @ 04:14) >    IMPRESSION:    No hydronephrosis or calculi.      < end of copied text >    Imaging or report Personally Reviewed:  [ ] YES  [ ] NO    Medications:  Standing  aspirin  chewable 81 milliGRAM(s) Oral daily  atorvastatin 80 milliGRAM(s) Oral at bedtime  dextrose 5%. 1000 milliLiter(s) IV Continuous <Continuous>  dextrose 5%. 1000 milliLiter(s) IV Continuous <Continuous>  dextrose 50% Injectable 25 Gram(s) IV Push once  dextrose 50% Injectable 12.5 Gram(s) IV Push once  dextrose 50% Injectable 25 Gram(s) IV Push once  gabapentin 100 milliGRAM(s) Oral daily  gabapentin Oral Tab/Cap - Peds 200 milliGRAM(s) Oral at bedtime  glucagon  Injectable 1 milliGRAM(s) IntraMuscular once  heparin   Injectable 5000 Unit(s) SubCutaneous every 8 hours  influenza  Vaccine (HIGH DOSE) 0.7 milliLiter(s) IntraMuscular once  insulin glargine Injectable (LANTUS) 5 Unit(s) SubCutaneous at bedtime  insulin lispro (ADMELOG) corrective regimen sliding scale   SubCutaneous three times a day before meals  insulin lispro Injectable (ADMELOG) 3 Unit(s) SubCutaneous three times a day before meals  isosorbide   mononitrate ER Tablet (IMDUR) 60 milliGRAM(s) Oral daily  levETIRAcetam 750 milliGRAM(s) Oral two times a day  metoprolol succinate ER 25 milliGRAM(s) Oral daily  pantoprazole    Tablet 40 milliGRAM(s) Oral before breakfast  pentoxifylline 400 milliGRAM(s) Oral three times a day  polyethylene glycol 3350 17 Gram(s) Oral two times a day  ranolazine 500 milliGRAM(s) Oral two times a day  senna 2 Tablet(s) Oral at bedtime  tamsulosin 0.4 milliGRAM(s) Oral at bedtime  ticagrelor 90 milliGRAM(s) Oral daily  venlafaxine XR. 75 milliGRAM(s) Oral daily    PRN Meds  dextrose Oral Gel 15 Gram(s) Oral once PRN      Case discussed with resident    Care discussed with pt/family           ERICK MARTINEZ  77y Male    CHIEF COMPLAINT:    Patient is a 77y old  Male who presents with a chief complaint of Unresponsiveness (27 Oct 2022 10:39)      INTERVAL HPI/OVERNIGHT EVENTS:    Patient seen and examined. Awake and alert. Answering questions. No new complaint    ROS: All other systems are negative.    Vital Signs:    T(F): 97.7 (10-27-22 @ 13:00), Max: 98.1 (10-26-22 @ 20:45)  HR: 79 (10-27-22 @ 13:00) (79 - 85)  BP: 187/74 (10-27-22 @ 13:00) (120/65 - 187/74)  RR: 20 (10-27-22 @ 13:00) (18 - 20)  SpO2: 98% (10-27-22 @ 08:22) (98% - 98%)  I&O's Summary    26 Oct 2022 07:01  -  27 Oct 2022 07:00  --------------------------------------------------------  IN: 1163 mL / OUT: 375 mL / NET: 788 mL      Daily     Daily   CAPILLARY BLOOD GLUCOSE      POCT Blood Glucose.: 145 mg/dL (27 Oct 2022 11:50)  POCT Blood Glucose.: 134 mg/dL (26 Oct 2022 21:54)  POCT Blood Glucose.: 97 mg/dL (26 Oct 2022 16:56)      PHYSICAL EXAM:    GENERAL:  NAD  SKIN: No rashes or lesions  HENT: Atraumatic. Normocephalic. PERRL. Moist membranes.  NECK: Supple, No JVD. No lymphadenopathy.  PULMONARY: CTA B/L. No wheezing. No rales  CVS: Normal S1, S2. Rate and Rhythm are regular. No murmurs.  ABDOMEN/GI: Soft, Nontender, Nondistended; BS present  EXTREMITIES: Peripheral pulses intact. No edema B/L LE.  NEUROLOGIC:  L sided hemiparesis  PSYCH: Alert & oriented x 3    Consultant(s) Notes Reviewed:  [x ] YES  [ ] NO  Care Discussed with Consultants/Other Providers [ x] YES  [ ] NO    EKG reviewed  Telemetry reviewed    LABS:                        13.3   11.31 )-----------( 243      ( 27 Oct 2022 07:56 )             40.8     10-27    147<H>  |  108  |  27<H>  ----------------------------<  121<H>    Creatinine Trend: 1.9<--, 2.0<--, 2.0<--, 1.9<--, 2.3<--, 2.0<--  4.8   |  25  |  1.9<H>    Ca    9.6      27 Oct 2022 07:56  Mg     2.2     10-27    TPro  6.4  /  Alb  4.0  /  TBili  0.4  /  DBili  x   /  AST  18  /  ALT  12  /  AlkPhos  45  10-27              RADIOLOGY & ADDITIONAL TESTS:    < from: EEG (10.24.22 @ 13:00) >    Impression:  Abnormal due to the presence of: focal slowing as above    < end of copied text >  < from: Xray Kidney Ureter Bladder (10.26.22 @ 15:00) >    IMPRESSION:    Nonobstructive bowel gas pattern. There is a moderate stoolburden   throughout the colon.    < end of copied text >  < from: MR Head w/wo IV Cont (10.25.22 @ 18:12) >    IMPRESSION:    1.  Appropriately-evolving acute/subacute infarct within the left SUSANNE   territory.    2.  No new infarct. No acute intracranial hemorrhage.    3.  Stable chronic microvascular changes and scattered chronic lacunar   infarcts.    < end of copied text >  < from: US Kidney and Bladder (10.25.22 @ 04:14) >    IMPRESSION:    No hydronephrosis or calculi.      < end of copied text >    Imaging or report Personally Reviewed:  [x ] YES  [ ] NO    Medications:  Standing  aspirin  chewable 81 milliGRAM(s) Oral daily  atorvastatin 80 milliGRAM(s) Oral at bedtime  dextrose 5%. 1000 milliLiter(s) IV Continuous <Continuous>  dextrose 5%. 1000 milliLiter(s) IV Continuous <Continuous>  dextrose 50% Injectable 25 Gram(s) IV Push once  dextrose 50% Injectable 12.5 Gram(s) IV Push once  dextrose 50% Injectable 25 Gram(s) IV Push once  gabapentin 100 milliGRAM(s) Oral daily  gabapentin Oral Tab/Cap - Peds 200 milliGRAM(s) Oral at bedtime  glucagon  Injectable 1 milliGRAM(s) IntraMuscular once  heparin   Injectable 5000 Unit(s) SubCutaneous every 8 hours  influenza  Vaccine (HIGH DOSE) 0.7 milliLiter(s) IntraMuscular once  insulin glargine Injectable (LANTUS) 5 Unit(s) SubCutaneous at bedtime  insulin lispro (ADMELOG) corrective regimen sliding scale   SubCutaneous three times a day before meals  insulin lispro Injectable (ADMELOG) 3 Unit(s) SubCutaneous three times a day before meals  isosorbide   mononitrate ER Tablet (IMDUR) 60 milliGRAM(s) Oral daily  levETIRAcetam 750 milliGRAM(s) Oral two times a day  metoprolol succinate ER 25 milliGRAM(s) Oral daily  pantoprazole    Tablet 40 milliGRAM(s) Oral before breakfast  pentoxifylline 400 milliGRAM(s) Oral three times a day  polyethylene glycol 3350 17 Gram(s) Oral two times a day  ranolazine 500 milliGRAM(s) Oral two times a day  senna 2 Tablet(s) Oral at bedtime  tamsulosin 0.4 milliGRAM(s) Oral at bedtime  ticagrelor 90 milliGRAM(s) Oral daily  venlafaxine XR. 75 milliGRAM(s) Oral daily    PRN Meds  dextrose Oral Gel 15 Gram(s) Oral once PRN      Case discussed with resident    Care discussed with pt/family

## 2022-10-27 NOTE — PROGRESS NOTE ADULT - ASSESSMENT
78yo Man with PMH of CAD s/p CABG, HTN, DM type II, HLD, GERD, BPH, stroke in 2017 w/ residual L sided weakness , and recent stroke with residual right sided hemiparesis, [facility]responsives.       # ?Recent stroke with sequale   - stroke 2 weeks ago at [facility]  - EEG showing focal nonspecific slowing  - on DAPT  - CTA head - CHRONIC occlusion of A2 segment  - MRI brain showing appropriate acute/subacute infarct in left SUSANNE territory  -  keppra 750mg  BID      #SAMANTA on CKD- improving  - Cr on admission 2.3 (baseline~1.8); 1.9 this morning  - hold ARBs  - RBUS normal    #CAD s/p CABG  - c/w ASA and Brillinta   - c/w statins     #HTN  - hold ARB  - c/w Imdur     #DM  -sugars well controlled  - -180  - A1C 7.2      #HLD   -  c/w statin and fenofibrate    DVT ppx: heparin subq  GI ppx: protonix  Full code

## 2022-10-27 NOTE — DISCHARGE NOTE PROVIDER - CARE PROVIDERS DIRECT ADDRESSES
,DirectAddress_Unknown,[email or web address]

## 2022-10-27 NOTE — DISCHARGE NOTE NURSING/CASE MANAGEMENT/SOCIAL WORK - PATIENT PORTAL LINK FT
You can access the FollowMyHealth Patient Portal offered by [facility] by registering at the following website: [email or web address]. By joining [email or web address] FollowMyHealth portal, you will also be able to view your health information using other applications (apps) compatible with our system.

## 2022-10-27 NOTE — PHYSICAL THERAPY INITIAL EVALUATION ADULT - NSACTIVITYREC_GEN_A_PT
Patient educated on heel slides x30 and encouraged to do daily to improve sit<->stand transfers and ambulation.

## 2022-10-27 NOTE — CHART NOTE - NSCHARTNOTEFT_GEN_A_CORE
Imaging reviewed by Dr Valenzuela. MR brain negative for new strokes and stable old infarct. Continue keppra 750 BID. Follow up outpatient

## 2022-10-27 NOTE — PROGRESS NOTE ADULT - SUBJECTIVE AND OBJECTIVE BOX
----------Daily Progress Note----------    HISTORY OF PRESENT ILLNESS:  Patient is a 77y old Male who presents with a chief complaint of Unresponsiveness (26 Oct 2022 18:10)    Currently admitted to medicine with the primary diagnosis of Altered mental status       Today is hospital day 4d.     INTERVAL HOSPITAL COURSE / OVERNIGHT EVENTS:    No overnight events, no complaints.     Review of Systems: Otherwise unremarkable     <<<<<PAST MEDICAL & SURGICAL HISTORY>>>>>  CAD (coronary artery disease)    HTN (hypertension)    Depression    Anxiety    Diabetes  niddm    Angina pectoris    BPH (benign prostatic hyperplasia)    GERD (gastroesophageal reflux disease)    CVA (cerebral vascular accident)    History of appendectomy    Bilateral cataracts    History of heart bypass surgery      ALLERGIES  clindamycin (Rash)  PC Pen VK (Rash)  penicillin (Rash)      Home Medications:  acetaminophen 325 mg oral tablet: 2 or 3 tab(s) orally every 6 hours, As needed, for pain or fever (24 Oct 2022 00:01)  aspirin 81 mg oral tablet: 1 tab(s) orally once a day (24 Oct 2022 00:01)  fenofibrate 120 mg oral tablet: 1 tab(s) orally once a day (24 Oct 2022 00:01)  linagliptin 5 mg oral tablet: 1 tab(s) orally once a day (24 Oct 2022 00:01)  losartan 25 mg oral tablet: 1 tab(s) orally once a day (24 Oct 2022 00:01)  metoprolol succinate 25 mg oral tablet, extended release: 1 tab(s) orally every 12 hours (24 Oct 2022 00:01)  pantoprazole 40 mg oral delayed release tablet: 1 tab(s) orally once a day (before a meal) (24 Oct 2022 00:01)  Pentoxil 400 mg oral tablet, extended release: 1 tab(s) orally 3 times a day (24 Oct 2022 00:01)  polyethylene glycol 3350 oral powder for reconstitution: 17 gram(s) orally once a day, As needed, Constipation (24 Oct 2022 00:01)  Prevacid 30 mg oral delayed release capsule: 1 cap(s) orally once a day (24 Oct 2022 00:01)  Ranexa 500 mg oral tablet, extended release: 1 tab(s) orally 2 times a day (24 Oct 2022 00:01)  senna oral tablet: 1 or 2 tab(s) orally once a day (at bedtime), As needed, Constipation (24 Oct 2022 00:01)  tamsulosin 0.4 mg oral capsule: 1 cap(s) orally once a day (24 Oct 2022 00:01)  venlafaxine 75 mg oral capsule, extended release: 1 cap(s) orally once a day (24 Oct 2022 00:01)        MEDICATIONS  STANDING MEDICATIONS  aspirin  chewable 81 milliGRAM(s) Oral daily  atorvastatin 80 milliGRAM(s) Oral at bedtime  dextrose 5%. 1000 milliLiter(s) IV Continuous <Continuous>  dextrose 5%. 1000 milliLiter(s) IV Continuous <Continuous>  dextrose 50% Injectable 25 Gram(s) IV Push once  dextrose 50% Injectable 12.5 Gram(s) IV Push once  dextrose 50% Injectable 25 Gram(s) IV Push once  gabapentin 100 milliGRAM(s) Oral daily  gabapentin Oral Tab/Cap - Peds 200 milliGRAM(s) Oral at bedtime  glucagon  Injectable 1 milliGRAM(s) IntraMuscular once  heparin   Injectable 5000 Unit(s) SubCutaneous every 8 hours  influenza  Vaccine (HIGH DOSE) 0.7 milliLiter(s) IntraMuscular once  insulin glargine Injectable (LANTUS) 5 Unit(s) SubCutaneous at bedtime  insulin lispro (ADMELOG) corrective regimen sliding scale   SubCutaneous three times a day before meals  insulin lispro Injectable (ADMELOG) 3 Unit(s) SubCutaneous three times a day before meals  isosorbide   mononitrate ER Tablet (IMDUR) 60 milliGRAM(s) Oral daily  levETIRAcetam 750 milliGRAM(s) Oral two times a day  metoprolol succinate ER 25 milliGRAM(s) Oral daily  pantoprazole    Tablet 40 milliGRAM(s) Oral before breakfast  pentoxifylline 400 milliGRAM(s) Oral three times a day  polyethylene glycol 3350 17 Gram(s) Oral two times a day  ranolazine 500 milliGRAM(s) Oral two times a day  senna 2 Tablet(s) Oral at bedtime  tamsulosin 0.4 milliGRAM(s) Oral at bedtime  ticagrelor 90 milliGRAM(s) Oral daily  venlafaxine XR. 75 milliGRAM(s) Oral daily    PRN MEDICATIONS  dextrose Oral Gel 15 Gram(s) Oral once PRN    VITALS:  T(F): 97.2  HR: 85  BP: 120/65  RR: 18  SpO2: 98%    <<<<<LABS>>>>>                        13.3   11.31 )-----------( 243      ( 27 Oct 2022 07:56 )             40.8     10-27    147<H>  |  108  |  27<H>  ----------------------------<  121<H>  4.8   |  25  |  1.9<H>    Ca    9.6      27 Oct 2022 07:56  Mg     2.2     10-27    TPro  6.4  /  Alb  4.0  /  TBili  0.4  /  DBili  x   /  AST  18  /  ALT  12  /  AlkPhos  45  10-27            [id number]        <<<<<RADIOLOGY>>>>>    < from: MR Head w/wo IV Cont (10.25.22 @ 18:12) >  PROCEDURE DATE:  10/25/2022          INTERPRETATION:  Clinical History / Reason for exam: Syncope vs seizure   Stroke workup. Change in mental status. Left SUSANNE infarct.    TECHNIQUE: MRIbrain with and without contrast. Multiplanar   multisequential MRI of the brain was performed before and following the   intravenous administration of 8 cc Gadavist (2 cc discarded) on the 1.5   Maribell magnet.    COMPARISON: CT head dated 10/23/2022. MRI brain dated 5/13/2022.    FINDINGS:    Again seen is restricted diffusion within the parasagittal left frontal   lobe compatible with infarct in the left SUSANNE territory. There has been   slight decrease in the mass effect. Gyriform enhancement is noted,   reflecting appropriate subacute evolution.    No new infarct is demonstrated. There is no evidence of acute   intracranial hemorrhage.    No additional abnormal intracranial enhancement is demonstrated.    There is no midline shift or hydrocephalus.    Stable chronic microvascular changes and scattered chronic lacunar   infarcts.    Again seen is a partially empty sella turcica.    Polypoid mucosal thickening is seen within the maxillary and sphenoid   sinuses. The patient is status post bilateral cataract surgery.    IMPRESSION:    1.  Appropriately-evolving acute/subacute infarct within the left SUSANNE   territory.    2.  No new infarct. No acute intracranial hemorrhage.    3.  Stable chronic microvascular changes and scattered chronic lacunar   infarcts.    < end of copied text >      <<<<<PHYSICAL EXAM>>>>>  GENERAL: NAD, resting comfortably in bed  PULMONARY: Clear to auscultation bilaterally. No rales, rhonchi, or wheezing.  CARDIOVASCULAR: Regular rate and rhythm, S1-S2, no murmurs  GASTROINTESTINAL: Soft, non-tender, non-distended, no guarding.  SKIN/EXTREMITIES: No LE edema b/l  NEUROLOGIC: AAOX3    -----------------------------------------------------------------------------------------------------------------------------------------------------------------------------------------------

## 2022-10-27 NOTE — DISCHARGE NOTE PROVIDER - NSDCFUSCHEDAPPT_GEN_ALL_CORE_FT
[facility] Physician [facility]  CARDIOLOGY [address] [facility]  Scheduled Appointment: 11/07/2022

## 2022-10-27 NOTE — DISCHARGE NOTE NURSING/CASE MANAGEMENT/SOCIAL WORK - NSDCVIVACCINE_GEN_ALL_CORE_FT
Tdap; 20-Jul-2021 15:17; Dejuan Fry (RN); [employer]; [id number] (Exp. Date: 28-Jan-2023); IntraMuscular; Deltoid Left.; 0.5 milliLiter(s); VIS (VIS Published: 09-May-2013, VIS Presented: 20-Jul-2021);

## 2022-10-27 NOTE — PHYSICAL THERAPY INITIAL EVALUATION ADULT - GENERAL OBSERVATIONS, REHAB EVAL
PT IE 9-9:30am. Patient left in supine in NAD. +call bell, +bed alarm, +telemetry. Moderate verbal cues and minimum tactile cues to sequence tasks safe.

## 2022-10-27 NOTE — DISCHARGE NOTE PROVIDER - CARE PROVIDER_API CALL
Kevin Valenzuela)  Neurology  [address] [place] [id number]  Phone: [phone]  Fax: [phone]  Follow Up Time: 1 week    Charlie Michael)  Internal Medicine  [address] [place] [address]  Phone: [phone]  Fax: [phone]  Follow Up Time: 1 week

## 2022-10-27 NOTE — DISCHARGE NOTE PROVIDER - NSDCCPCAREPLAN_GEN_ALL_CORE_FT
PRINCIPAL DISCHARGE DIAGNOSIS  Diagnosis: Altered mental status  Assessment and Plan of Treatment: Your altered mental status may have been an after effect of the stroke you had 2 weeks ago. We repeated an MRI on this admission, which did not show any new changes or any bleeding from your prior MRI. It is unclear why you had a change in mental status before coming to the hospital, however you are now back to your normal mental status.   SEEK IMMEDIATE MEDICAL CARE IF YOU HAVE ANY OF THE FOLLOWING SYMPTOMS: chest pain, shortness of breath, abdominal pain, sweating, vomiting, blood in vomit/bowel movements/urine, dizziness/lightheadedness, weakness or numbness to face/arm/leg, trouble speaking or understanding, facial droop.

## 2022-10-27 NOTE — DISCHARGE NOTE PROVIDER - PROVIDER TOKENS
PROVIDER:[TOKEN:[[id number]],FOLLOWUP:[1 week]],PROVIDER:[TOKEN:[[id number]],FOLLOWUP:[1 week]]

## 2022-10-27 NOTE — PROGRESS NOTE ADULT - ASSESSMENT
78yo Man with PMH of CAD (s/p CABG in 2017), HTN, DM type II, HLD, GERD, BPH, stroke in 2017 (residual L sided weakness ), and recent stroke (evaluated at [facility] wks ago) with residual right sided hemiparesis, [facility]responsives. Son visited his father around 1:40PM but 10 min after patient was profusely sweating, was in a daze and became unresponsive. Patient does not recall events at the NH.    Unresponsiveness possibly secondary to seizure from recent stroke  H/O recent stroke with residual R sided hemiparesis.  SAMANTA on CKD  CAD s/p CABG  DM-2 / HTN / DL              PLAN: 76yo Man with PMH of CAD (s/p CABG in 2017), HTN, DM type II, HLD, GERD, BPH, stroke in 2017 (residual L sided weakness ), and recent stroke (evaluated at [facility] 2 wks ago) with residual right sided hemiparesis, [facility]. Son visited his father around 1:40PM but 10 min after patient was profusely sweating, was in a daze and became unresponsive. Patient does not recall events at the NH.    Unresponsiveness possibly secondary to seizure from recent stroke  H/O recent stroke with residual R sided hemiparesis.  CKD-3  CAD s/p CABG  DM-2 / HTN / DL              PLAN:    ·	No events on tele  ·	Brain MRI reviewed. Evolving old acute/subacute infarct within the L SUSANNE. No new infarct.   ·	Routine EEG showed focal slowing.   ·	Neuro f/u noted. No acute stroke. Pt likely had SZ episode. Recommended to increase Keppra to 750 mg po q 12h  ·	Cont ASA and Lipitor 80 mg po qhs  ·	Cont his home meds and d/c him back to his facility  ·	No need to do ECHO. Can be done as an out pt    * Med rec reviewed. Plan of care d/w the pt. Time spent 37 minutes.

## 2022-10-29 LAB
CULTURE RESULTS: SIGNIFICANT CHANGE UP
SPECIMEN SOURCE: SIGNIFICANT CHANGE UP

## 2022-11-03 ENCOUNTER — APPOINTMENT (OUTPATIENT)
Dept: NEUROLOGY | Facility: CLINIC | Age: 77
End: 2022-11-03

## 2022-11-03 DIAGNOSIS — I69.351 HEMIPLEGIA AND HEMIPARESIS FOLLOWING CEREBRAL INFARCTION AFFECTING RIGHT DOMINANT SIDE: ICD-10-CM

## 2022-11-03 DIAGNOSIS — I69.398 OTHER SEQUELAE OF CEREBRAL INFARCTION: ICD-10-CM

## 2022-11-03 DIAGNOSIS — Z88.1 ALLERGY STATUS TO OTHER ANTIBIOTIC AGENTS STATUS: ICD-10-CM

## 2022-11-03 DIAGNOSIS — Z20.822 CONTACT WITH AND (SUSPECTED) EXPOSURE TO COVID-19: ICD-10-CM

## 2022-11-03 DIAGNOSIS — Z88.0 ALLERGY STATUS TO PENICILLIN: ICD-10-CM

## 2022-11-03 DIAGNOSIS — F41.9 ANXIETY DISORDER, UNSPECIFIED: ICD-10-CM

## 2022-11-03 DIAGNOSIS — R55 SYNCOPE AND COLLAPSE: ICD-10-CM

## 2022-11-03 DIAGNOSIS — F32.A DEPRESSION, UNSPECIFIED: ICD-10-CM

## 2022-11-03 DIAGNOSIS — Z95.1 PRESENCE OF AORTOCORONARY BYPASS GRAFT: ICD-10-CM

## 2022-11-03 DIAGNOSIS — E78.5 HYPERLIPIDEMIA, UNSPECIFIED: ICD-10-CM

## 2022-11-03 DIAGNOSIS — N18.9 CHRONIC KIDNEY DISEASE, UNSPECIFIED: ICD-10-CM

## 2022-11-03 DIAGNOSIS — N17.9 ACUTE KIDNEY FAILURE, UNSPECIFIED: ICD-10-CM

## 2022-11-03 DIAGNOSIS — I25.10 ATHEROSCLEROTIC HEART DISEASE OF NATIVE CORONARY ARTERY WITHOUT ANGINA PECTORIS: ICD-10-CM

## 2022-11-03 DIAGNOSIS — N40.0 BENIGN PROSTATIC HYPERPLASIA WITHOUT LOWER URINARY TRACT SYMPTOMS: ICD-10-CM

## 2022-11-03 DIAGNOSIS — K21.9 GASTRO-ESOPHAGEAL REFLUX DISEASE WITHOUT ESOPHAGITIS: ICD-10-CM

## 2022-11-03 DIAGNOSIS — I12.9 HYPERTENSIVE CHRONIC KIDNEY DISEASE WITH STAGE 1 THROUGH STAGE 4 CHRONIC KIDNEY DISEASE, OR UNSPECIFIED CHRONIC KIDNEY DISEASE: ICD-10-CM

## 2022-11-03 DIAGNOSIS — E11.22 TYPE 2 DIABETES MELLITUS WITH DIABETIC CHRONIC KIDNEY DISEASE: ICD-10-CM

## 2022-11-03 DIAGNOSIS — R56.9 UNSPECIFIED CONVULSIONS: ICD-10-CM

## 2022-11-07 ENCOUNTER — APPOINTMENT (OUTPATIENT)
Dept: CARDIOLOGY | Facility: CLINIC | Age: 77
End: 2022-11-07
Payer: COMMERCIAL

## 2022-11-07 ENCOUNTER — APPOINTMENT (OUTPATIENT)
Dept: NEUROLOGY | Facility: CLINIC | Age: 77
End: 2022-11-07

## 2022-11-07 ENCOUNTER — NON-APPOINTMENT (OUTPATIENT)
Age: 77
End: 2022-11-07

## 2022-11-07 VITALS
BODY MASS INDEX: 28.47 KG/M2 | TEMPERATURE: 97.8 F | HEIGHT: 60 IN | WEIGHT: 145 LBS | DIASTOLIC BLOOD PRESSURE: 80 MMHG | OXYGEN SATURATION: 98 % | SYSTOLIC BLOOD PRESSURE: 122 MMHG | HEART RATE: 87 BPM

## 2022-11-07 DIAGNOSIS — I63.9 CEREBRAL INFARCTION, UNSPECIFIED: ICD-10-CM

## 2022-11-07 PROCEDURE — G2066: CPT

## 2022-11-07 PROCEDURE — 99215 OFFICE O/P EST HI 40 MIN: CPT

## 2022-11-07 PROCEDURE — 93298 REM INTERROG DEV EVAL SCRMS: CPT

## 2022-11-07 RX ORDER — LEVETIRACETAM 750 MG/1
750 TABLET, FILM COATED ORAL TWICE DAILY
Qty: 60 | Refills: 5 | Status: ACTIVE | COMMUNITY

## 2022-11-07 RX ORDER — LOSARTAN POTASSIUM 25 MG/1
25 TABLET, FILM COATED ORAL DAILY
Refills: 0 | Status: ACTIVE | COMMUNITY

## 2022-11-07 RX ORDER — ATORVASTATIN CALCIUM 80 MG/1
80 TABLET, FILM COATED ORAL DAILY
Refills: 0 | Status: ACTIVE | COMMUNITY

## 2022-11-07 NOTE — HISTORY OF PRESENT ILLNESS
[FreeTextEntry1] : Patient is 78 yo LH man with PMHx of CAD, s/p CABG x3 vessels in 2017, HTN, DM type II, HLD, GERD, BPH, hx of ischemic stroke in 2017 w/ residual L sided weakness (unsure of hospital), acute CVA with Right SUSANNE hemiparesis discharged from [facility] in early 10/2022 who presented to [facility] 10/13/22 w/co of AMS from [facility], initial NIHSS 21 and with quick return to baseline. He had a repeat episode with return to ED on 10/23. For both visits, NO new infarct was found.  2 inpatient EEGs w/ focal L hemispheric slowing. After second visit to the ED, he was increased to  BID. Etiology of sx attributed to seizure. \par \par This visit, MRI H w/ APPROPRIATE evolving acute/subacute L SUSANNE infarct. NO new infarct. Stable chronic lacunar infarcts. CTA H/N w/ Stable OCCLUSION of L A2, SEVERE stenosis of proximal R P2 and MOD stenosis of L P2. NO cervical ICA/VA stenosis.  \par \par TTE: EF 63%, mild LVH, mild enlarged LA  \par \par Labs: LDL: 107 A1c: 7.2 \par \par Sent home on ASA 81/Brilinta 90 BID/Lipitor 80 \par --------------------\par Spoke with Son Mauricio on phone during visit \par He had TIA at end of Sept, then 2 visits for stroke at [facility] followed by 2 visits to [facility] for AMS from [facility]\par Since 10/27 discharge, no more episodes \par Found to have PNA last week\par Son agrees R sided sx resolved, more L-sided residual deficits from previous stroke \par Can walk w/ walker independently at times, but other times says legs give out \par Quit smoking 30 years ago \par SHx: Former  \par Denies dysphagia \par S/P ILR\par -Midodrine for getting out of bed in AM \par -Venlafaxine for depression since 2019\par \par \par

## 2022-11-07 NOTE — ASSESSMENT
[FreeTextEntry1] : Patient is 76 yo LH man with PMHx of CAD, s/p CABG x3 vessels in 2017, HTN, DM type II, HLD, GERD, BPH, hx of ischemic stroke in 2017 w/ residual L sided weakness (unsure of hospital), acute CVA with Right SUSANNE hemiparesis discharged from [facility] in early 10/2022 who presented to [facility] 10/13/22 w/co of AMS from [facility], initial NIHSS 21 and with quick return to baseline. He had a repeat episode with return to ED on 10/23. For both visits, NO new infarct was found.  2 inpatient EEGs w/ focal L hemispheric slowing. After second visit to the ED, he was increased to  BID. Etiology of sx attributed to seizure. Today, L hemiparesis remains and no significant R sided sx, also with some mild expressive aphasia/dysarthria. Denies any further episodes of altered awareness. \par \par PLAN: \par -Obtain d/c summaries from [facility] for 2 recent hospitalizations \par -Send my note to [facility] \par -F/u results from [facility] \par -C/w  BID \par -C/w ASA 81 and Brilinta 90 BID \par -Please notify our office if experiencing spontaneous bleeding or bruising\par -C/w Lipitor 80, Repeat lipid panel/A1c/CMP, LDL goal 40-70\par -Keep hydrated bc of multifocal intracranial stenosis, joann in posterior circulation \par -C/w aggressive ST/PT/OT \par -F/u in 3 mo \par \par Aggressive risk factor modification should be continued for secondary stroke prevention, consisting of intensive blood pressure control and cholesterol monitoring. I encouraged the patient to discuss these important issues with her primary care doctor.  \par \par I have reviewed the goals of stroke risk factor modification. I counseled the patient on measures to reduce stroke risk, including the importance of medication compliance, risk factor control, exercise, healthy diet and avoidance of smoking. I reviewed stroke warning signs and symptoms and appropriate actions to take if such occur.\par \par Instructed patient to call 911 or report to ED if any acute neurological changes occur, such as having severe, sudden, unusual headache or BEFAST symptoms\par

## 2022-11-07 NOTE — PHYSICAL EXAM
[FreeTextEntry1] : NIHSS\par \par Item score\par 1a. Level of Consciousness    0\par 1b. LOC Questions           0\par 1c. LOC Commands          0\par 2. Best Gaze                     0\par 3. Visual                           0\par 4. Facial Palsy                  1\par 5a. Motor Arm - Left         0\par 5b. Motor Arm - Right       0\par 6a. Motor Leg - Left          0\par 6b. Motor Leg - Right        0\par 7. Limb Ataxia                   0\par 8. Sensory                          1\par 9. Language                       1\par 10. Dysarthria                    1\par 11. Extinction and Inattention    0\par -------------------------------------------------------\par Total: 4\par \par [place] Scale: 3 to 4 \par \par Focal neurological exam:\par \par MS: Awake, alert, oriented to person, place, situation and time. Normal affect. Follows commands. \par \par Language: Mild dysarthria and expressive aphasia, adding extra words with repetition. \par \par CNs 2 - 12 intact. EOMI no nystagmus, no diplopia. L facial. No facial asymmetry b/l, full eye closure strength b/l. Hearing grossly normal. Head turning & shoulder shrug intact b/l. Tongue midline, normal movements, no atrophy.\par \par Motor: Normal muscle bulk & tone. No noticeable tremor or seizure. No pronator drift. Muscle strength of LUE 4+/5, LH decr dexterity, LH  4+/5. and b/l LE 5-/5. \par \par Reflexes: LUE +2, LLE +2\par \par Sensation: Decr to LT of L face \par \par Cortical: No extinction\par \par Coordination: No dysmetria to FTN.\par \par Gait: Deferred. \par \par \par \par \par \par \par \par \par

## 2022-12-07 ENCOUNTER — INPATIENT (INPATIENT)
Facility: HOSPITAL | Age: 77
LOS: 3 days | Discharge: HOME | End: 2022-12-11
Attending: HOSPITALIST | Admitting: HOSPITALIST

## 2022-12-07 VITALS
WEIGHT: 142.2 LBS | OXYGEN SATURATION: 100 % | RESPIRATION RATE: 18 BRPM | SYSTOLIC BLOOD PRESSURE: 123 MMHG | TEMPERATURE: 98 F | DIASTOLIC BLOOD PRESSURE: 57 MMHG | HEART RATE: 80 BPM

## 2022-12-07 DIAGNOSIS — Z90.49 ACQUIRED ABSENCE OF OTHER SPECIFIED PARTS OF DIGESTIVE TRACT: Chronic | ICD-10-CM

## 2022-12-07 DIAGNOSIS — Z95.1 PRESENCE OF AORTOCORONARY BYPASS GRAFT: Chronic | ICD-10-CM

## 2022-12-07 DIAGNOSIS — H26.9 UNSPECIFIED CATARACT: Chronic | ICD-10-CM

## 2022-12-07 LAB
ALBUMIN SERPL ELPH-MCNC: 4.6 G/DL — SIGNIFICANT CHANGE UP (ref 3.5–5.2)
ALP SERPL-CCNC: 41 U/L — SIGNIFICANT CHANGE UP (ref 30–115)
ALT FLD-CCNC: 18 U/L — SIGNIFICANT CHANGE UP (ref 0–41)
ANION GAP SERPL CALC-SCNC: 12 MMOL/L — SIGNIFICANT CHANGE UP (ref 7–14)
APPEARANCE UR: CLEAR — SIGNIFICANT CHANGE UP
APTT BLD: 31 SEC — SIGNIFICANT CHANGE UP (ref 27–39.2)
AST SERPL-CCNC: 27 U/L — SIGNIFICANT CHANGE UP (ref 0–41)
BASOPHILS # BLD AUTO: 0.04 K/UL — SIGNIFICANT CHANGE UP (ref 0–0.2)
BASOPHILS NFR BLD AUTO: 0.3 % — SIGNIFICANT CHANGE UP (ref 0–1)
BILIRUB SERPL-MCNC: 0.5 MG/DL — SIGNIFICANT CHANGE UP (ref 0.2–1.2)
BILIRUB UR-MCNC: NEGATIVE — SIGNIFICANT CHANGE UP
BUN SERPL-MCNC: 26 MG/DL — HIGH (ref 10–20)
CALCIUM SERPL-MCNC: 9.6 MG/DL — SIGNIFICANT CHANGE UP (ref 8.4–10.4)
CHLORIDE SERPL-SCNC: 102 MMOL/L — SIGNIFICANT CHANGE UP (ref 98–110)
CO2 SERPL-SCNC: 21 MMOL/L — SIGNIFICANT CHANGE UP (ref 17–32)
COLOR SPEC: YELLOW — SIGNIFICANT CHANGE UP
CREAT SERPL-MCNC: 1.9 MG/DL — HIGH (ref 0.7–1.5)
DIFF PNL FLD: ABNORMAL
EGFR: 36 ML/MIN/1.73M2 — LOW
EOSINOPHIL # BLD AUTO: 0.04 K/UL — SIGNIFICANT CHANGE UP (ref 0–0.7)
EOSINOPHIL NFR BLD AUTO: 0.3 % — SIGNIFICANT CHANGE UP (ref 0–8)
GLUCOSE SERPL-MCNC: 136 MG/DL — HIGH (ref 70–99)
GLUCOSE UR QL: SIGNIFICANT CHANGE UP
HCT VFR BLD CALC: 39.3 % — LOW (ref 42–52)
HGB BLD-MCNC: 12.3 G/DL — LOW (ref 14–18)
IMM GRANULOCYTES NFR BLD AUTO: 0.7 % — HIGH (ref 0.1–0.3)
INR BLD: 0.95 RATIO — SIGNIFICANT CHANGE UP (ref 0.65–1.3)
KETONES UR-MCNC: NEGATIVE — SIGNIFICANT CHANGE UP
LEUKOCYTE ESTERASE UR-ACNC: ABNORMAL
LYMPHOCYTES # BLD AUTO: 0.63 K/UL — LOW (ref 1.2–3.4)
LYMPHOCYTES # BLD AUTO: 5 % — LOW (ref 20.5–51.1)
MCHC RBC-ENTMCNC: 27.9 PG — SIGNIFICANT CHANGE UP (ref 27–31)
MCHC RBC-ENTMCNC: 31.3 G/DL — LOW (ref 32–37)
MCV RBC AUTO: 89.1 FL — SIGNIFICANT CHANGE UP (ref 80–94)
MONOCYTES # BLD AUTO: 0.72 K/UL — HIGH (ref 0.1–0.6)
MONOCYTES NFR BLD AUTO: 5.7 % — SIGNIFICANT CHANGE UP (ref 1.7–9.3)
NEUTROPHILS # BLD AUTO: 11.11 K/UL — HIGH (ref 1.4–6.5)
NEUTROPHILS NFR BLD AUTO: 88 % — HIGH (ref 42.2–75.2)
NITRITE UR-MCNC: NEGATIVE — SIGNIFICANT CHANGE UP
NRBC # BLD: 0 /100 WBCS — SIGNIFICANT CHANGE UP (ref 0–0)
PH UR: 6.5 — SIGNIFICANT CHANGE UP (ref 5–8)
PLATELET # BLD AUTO: 307 K/UL — SIGNIFICANT CHANGE UP (ref 130–400)
POTASSIUM SERPL-MCNC: 4.8 MMOL/L — SIGNIFICANT CHANGE UP (ref 3.5–5)
POTASSIUM SERPL-SCNC: 4.8 MMOL/L — SIGNIFICANT CHANGE UP (ref 3.5–5)
PROT SERPL-MCNC: 7.1 G/DL — SIGNIFICANT CHANGE UP (ref 6–8)
PROT UR-MCNC: ABNORMAL
PROTHROM AB SERPL-ACNC: 10.8 SEC — SIGNIFICANT CHANGE UP (ref 9.95–12.87)
RBC # BLD: 4.41 M/UL — LOW (ref 4.7–6.1)
RBC # FLD: 15.9 % — HIGH (ref 11.5–14.5)
SODIUM SERPL-SCNC: 135 MMOL/L — SIGNIFICANT CHANGE UP (ref 135–146)
SP GR SPEC: 1.03 — HIGH (ref 1.01–1.03)
TROPONIN T SERPL-MCNC: <0.01 NG/ML — SIGNIFICANT CHANGE UP
UROBILINOGEN FLD QL: SIGNIFICANT CHANGE UP
WBC # BLD: 12.63 K/UL — HIGH (ref 4.8–10.8)
WBC # FLD AUTO: 12.63 K/UL — HIGH (ref 4.8–10.8)

## 2022-12-07 PROCEDURE — 70450 CT HEAD/BRAIN W/O DYE: CPT | Mod: 26,MA

## 2022-12-07 PROCEDURE — 0042T: CPT | Mod: MA

## 2022-12-07 PROCEDURE — 70496 CT ANGIOGRAPHY HEAD: CPT | Mod: 26,MA

## 2022-12-07 PROCEDURE — 93010 ELECTROCARDIOGRAM REPORT: CPT

## 2022-12-07 PROCEDURE — 99222 1ST HOSP IP/OBS MODERATE 55: CPT

## 2022-12-07 PROCEDURE — 70498 CT ANGIOGRAPHY NECK: CPT | Mod: 26,MA

## 2022-12-07 PROCEDURE — 99285 EMERGENCY DEPT VISIT HI MDM: CPT | Mod: FS

## 2022-12-07 RX ORDER — SODIUM CHLORIDE 9 MG/ML
500 INJECTION INTRAMUSCULAR; INTRAVENOUS; SUBCUTANEOUS ONCE
Refills: 0 | Status: COMPLETED | OUTPATIENT
Start: 2022-12-07 | End: 2022-12-07

## 2022-12-07 RX ADMIN — SODIUM CHLORIDE 500 MILLILITER(S): 9 INJECTION INTRAMUSCULAR; INTRAVENOUS; SUBCUTANEOUS at 17:22

## 2022-12-07 NOTE — ED PROVIDER NOTE - CARE PLAN
Principal Discharge DX:	Altered mental status  Secondary Diagnosis:	History of stroke  Secondary Diagnosis:	CAD (coronary artery disease)  Secondary Diagnosis:	Diabetes   1

## 2022-12-07 NOTE — ED PROVIDER NOTE - NS ED ATTENDING STATEMENT MOD
This was a shared visit with the HENRIETTA. I reviewed and verified the documentation and independently performed the documented:

## 2022-12-07 NOTE — STROKE CODE NOTE - NIH STROKE SCALE DATE
Chief Complaint   Patient presents with     Diabetes     patienht here to follow up for his diabetes. No questions or concerns for appointment today.  FOOD SECURITY SCREENING QUESTIONS  Hunger Vital Signs:  Within the past 12 months we worried whether our food would run out before we got money to buy more. Never  Within the past 12 months the food we bought just didn't last and we didn't have money to get more. Never  Kayy Calix LPN 7/14/2021 11:18 AM        Medication Reconciliation: complete         07-Dec-2022 16:04

## 2022-12-07 NOTE — ED PROVIDER NOTE - PR
The Service to Behavioral Health order in workqueue [id number] requested on 4/29/2022 has been cancelled as, unable to contact. Ordering provider has been notified.    If your patient is interested in Behavioral Health services, they may call Central Scheduling at [phone] and we would be happy to assist them. The order is valid for one year from date placed.   134

## 2022-12-07 NOTE — ED PROVIDER NOTE - OBJECTIVE STATEMENT
77 year old male, past medical history CAD s/p CABG 2017, HTN, DM, HLD, GERD, BPH, CVA x2 with r/l sided residual weakness, bib ems from [facility] with ams. patient with minimal responsiveness that began ~230pm today prompting ed eval. patient also L pinpoint pupil. no falls/trauma. no fever, shortness of breath, vomiting, syncope.

## 2022-12-07 NOTE — CONSULT NOTE ADULT - SUBJECTIVE AND OBJECTIVE BOX
NEUROLOGY CONSULT    HPI:     MEDICATIONS  Home Medications:  acetaminophen 325 mg oral tablet: 2 or 3 tab(s) orally every 6 hours, As needed, for pain or fever (24 Oct 2022 00:01)  aspirin 81 mg oral tablet: 1 tab(s) orally once a day (24 Oct 2022 00:01)  fenofibrate 120 mg oral tablet: 1 tab(s) orally once a day (24 Oct 2022 00:01)  levETIRAcetam 750 mg oral tablet: 1 tab(s) orally 2 times a day (27 Oct 2022 14:56)  linagliptin 5 mg oral tablet: 1 tab(s) orally once a day (24 Oct 2022 00:01)  losartan 25 mg oral tablet: 1 tab(s) orally once a day (24 Oct 2022 00:01)  metoprolol succinate 25 mg oral tablet, extended release: 1 tab(s) orally every 12 hours (24 Oct 2022 00:01)  pantoprazole 40 mg oral delayed release tablet: 1 tab(s) orally once a day (before a meal) (24 Oct 2022 00:01)  Pentoxil 400 mg oral tablet, extended release: 1 tab(s) orally 3 times a day (24 Oct 2022 00:01)  polyethylene glycol 3350 oral powder for reconstitution: 17 gram(s) orally once a day, As needed, Constipation (24 Oct 2022 00:01)  Ranexa 500 mg oral tablet, extended release: 1 tab(s) orally 2 times a day (24 Oct 2022 00:01)  senna oral tablet: 1 or 2 tab(s) orally once a day (at bedtime), As needed, Constipation (24 Oct 2022 00:01)  tamsulosin 0.4 mg oral capsule: 1 cap(s) orally once a day (24 Oct 2022 00:01)  venlafaxine 75 mg oral capsule, extended release: 1 cap(s) orally once a day (24 Oct 2022 00:01)    MEDICATIONS  (STANDING):  sodium chloride 0.9% Bolus 500 milliLiter(s) IV Bolus once    MEDICATIONS  (PRN):      FAMILY HISTORY:    SOCIAL HISTORY: negative for tobacco, alcohol, or ilicit drug use.    Allergies    clindamycin (Rash)  PC Pen VK (Rash)  penicillin (Rash)    Intolerances        GEN: NAD, pleasant, cooperative    NEURO:   MENTAL STATUS: AAOx3  LANG/SPEECH: Fluent, intact naming, repetition & comprehension  CRANIAL NERVES:  II: Pupils equal and reactive, no RAPD, normal visual field and fundus  III, IV, VI: EOM intact, no gaze preference or deviation  V: normal  VII: no facial asymmetry  VIII: normal hearing to speech  MOTOR: 5/5 in both upper and lower extremities  REFLEXES: 2/4 throughout, bilateral flexor plantars  SENSORY: Normal to touch, temperature & pin prick in all extremiteis  COORD: Normal finger to nose and heel to shin, no tremor, no dysmetria  Gait:   NIHSS: **** ASPECT Score: ***** ICH Score: ****** (GCS)    LABS:          Hemoglobin A1C:   Vitamin B12     CAPILLARY BLOOD GLUCOSE                  Microbiology:      RADIOLOGY, EKG AND ADDITIONAL TESTS: Reviewed.           NEUROLOGY CONSULT    HPI:   76 yo M pmh of CAD, HTN, DM, HLD, CVA with residual bilateral weakness presents with stroke like symptoms. Patient currently at [facility] for rehab. Son who is bedside states that he spoke with him last night and he was his normal self. Today when he went to see him at 3pm he was altered and not acting his normal self. Was not communicating with him and moving less than normal. EMS called as pre-notification, stroke code called prior to arrival.       MEDICATIONS  Home Medications:  acetaminophen 325 mg oral tablet: 2 or 3 tab(s) orally every 6 hours, As needed, for pain or fever (24 Oct 2022 00:01)  aspirin 81 mg oral tablet: 1 tab(s) orally once a day (24 Oct 2022 00:01)  fenofibrate 120 mg oral tablet: 1 tab(s) orally once a day (24 Oct 2022 00:01)  levETIRAcetam 750 mg oral tablet: 1 tab(s) orally 2 times a day (27 Oct 2022 14:56)  linagliptin 5 mg oral tablet: 1 tab(s) orally once a day (24 Oct 2022 00:01)  losartan 25 mg oral tablet: 1 tab(s) orally once a day (24 Oct 2022 00:01)  metoprolol succinate 25 mg oral tablet, extended release: 1 tab(s) orally every 12 hours (24 Oct 2022 00:01)  pantoprazole 40 mg oral delayed release tablet: 1 tab(s) orally once a day (before a meal) (24 Oct 2022 00:01)  Pentoxil 400 mg oral tablet, extended release: 1 tab(s) orally 3 times a day (24 Oct 2022 00:01)  polyethylene glycol 3350 oral powder for reconstitution: 17 gram(s) orally once a day, As needed, Constipation (24 Oct 2022 00:01)  Ranexa 500 mg oral tablet, extended release: 1 tab(s) orally 2 times a day (24 Oct 2022 00:01)  senna oral tablet: 1 or 2 tab(s) orally once a day (at bedtime), As needed, Constipation (24 Oct 2022 00:01)  tamsulosin 0.4 mg oral capsule: 1 cap(s) orally once a day (24 Oct 2022 00:01)  venlafaxine 75 mg oral capsule, extended release: 1 cap(s) orally once a day (24 Oct 2022 00:01)    MEDICATIONS  (STANDING):  sodium chloride 0.9% Bolus 500 milliLiter(s) IV Bolus once    MEDICATIONS  (PRN):      FAMILY HISTORY:    SOCIAL HISTORY: negative for tobacco, alcohol, or ilicit drug use.    Allergies    clindamycin (Rash)  PC Pen VK (Rash)  penicillin (Rash)    Intolerances        GEN: NAD, pleasant, cooperative    NEURO:   MENTAL STATUS: AAOx3  LANG/SPEECH: Fluent, intact naming, hypophonia, slight dysarthria, slow speech and repetition & intact comprehension, bradyphrenia  CRANIAL NERVES:  II: Pupils equal and reactive, no RAPD, normal visual field   III, IV, VI: EOM intact, Right gaze preference, can overcome trough midline  V: normal  VII: L sided facial assymmetry  VIII: normal hearing to speech  MOTOR: 4/5 in both upper extremities, 0/5 in lower extremities  REFLEXES: 3/4 throughout, Plantar reflexes: slightly bilateral toes up  SENSORY: Normal to touch in all extremities  COORD: Normal finger to nose   Gait: Non-ambulatory  NIHSS:  13    LABS:          Hemoglobin A1C:   Vitamin B12     CAPILLARY BLOOD GLUCOSE                  Microbiology:      RADIOLOGY, EKG AND ADDITIONAL TESTS: Reviewed.

## 2022-12-07 NOTE — STROKE CODE NOTE - NIH STROKE SCALE: 1C. LOC COMMANDS, QM
Patient : Kvng Beard Age: 31 Results for orders placed or performed during the hospital encounter of 08/04/21   Comprehensive Metabolic Panel   Result Value Ref Range    Fasting Status      Sodium 139 135 - 145 mmol/L    Potassium 4.1 3.4 - 5.1 mmol/L    Chloride 102 98 - 107 mmol/L    Carbon Dioxide 31 21 - 32 mmol/L    Anion Gap 10 10 - 20 mmol/L    Glucose 120 (H) 65 - 99 mg/dL    BUN 10 6 - 20 mg/dL    Creatinine 0.76 0.67 - 1.17 mg/dL    Glomerular Filtration Rate >90 >90 mL/min/1.73m2    BUN/ Creatinine Ratio 13 7 - 25    Calcium 8.8 8.4 - 10.2 mg/dL    Bilirubin, Total 0.4 0.2 - 1.0 mg/dL    GOT/AST 20 <=37 Units/L    GPT/ALT 27 <64 Units/L    Alkaline Phosphatase 45 45 - 117 Units/L    Albumin 3.4 (L) 3.6 - 5.1 g/dL    Protein, Total 8.8 (H) 6.4 - 8.2 g/dL    Globulin 5.4 (H) 2.0 - 4.0 g/dL    A/G Ratio 0.6 (L) 1.0 - 2.4   Lactic Acid Venous With Reflex   Result Value Ref Range    Lactate, Venous 1.3 0.0 - 2.0 mmol/L   CBC with Automated Differential (performable only)   Result Value Ref Range    WBC 5.7 4.2 - 11.0 K/mcL    RBC 3.84 (L) 4.50 - 5.90 mil/mcL    HGB 11.0 (L) 13.0 - 17.0 g/dL    HCT 33.4 (L) 39.0 - 51.0 %    MCV 87.0 78.0 - 100.0 fl    MCH 28.6 26.0 - 34.0 pg    MCHC 32.9 32.0 - 36.5 g/dL    RDW-CV 12.5 11.0 - 15.0 %    RDW-SD 39.6 39.0 - 50.0 fL     140 - 450 K/mcL    NRBC 0 <=0 /100 WBC    Neutrophil, Percent 58 %    Lymphocytes, Percent 29 %    Mono, Percent 8 %    Eosinophils, Percent 5 %    Basophils, Percent 0 %    Immature Granulocytes 0 %    Absolute Neutrophils 3.3 1.8 - 7.7 K/mcL    Absolute Lymphocytes 1.6 1.0 - 4.8 K/mcL    Absolute Monocytes 0.4 0.3 - 0.9 K/mcL    Absolute Eosinophils  0.3 0.0 - 0.5 K/mcL    Absolute Basophils 0.0 0.0 - 0.3 K/mcL    Absolute Immmature Granulocytes 0.0 0.0 - 0.2 K/mcL         Radiology Results     Imaging Results          CT ABDOMEN PELVIS W CONTRAST - IV contrast only (Final result)  Result time 08/04/21 14:15:23    Final result                 Impression:    1.    JENNIFER VO DO.    Electronically Signed by: MARISELA STERLING M[facility] [place]       [facility]  Nisha Cerna MD  08/04/21 [id number]       Nisha Cerna MD  08/04/21 [id number]     (0) Performs both tasks correctly

## 2022-12-07 NOTE — CONSULT NOTE ADULT - ASSESSMENT
76 yo M pmh of CAD, HTN, DM, HLD, CVA with residual bilateral weakness presents with stroke like symptoms. Patient currently at [facility] for rehab. Son who is bedside states that he spoke with him last night and he was his normal self. Todays neuroexam is stable comparing to prior neurostatus in Oct 2022 where he was presented with very similar transitory symptoms and put on ASM (Keppra). Initial neuroimaging w CTH and CTP was stable and w/o acute changes. His transient episodic AMA changes may be related to seizure disorder (he is on Keppra 750 bid per previous chart review), TIA or infection/metabolic derangement Needs to do routine w/up and possible rEEG. Considering MRI brain w/o.     Recommendations:     1. CBC, CMP, P, Mg, U/A, A1C, Lipids  2. Considering REEG and possible MRI brain if he admitted            76 yo M pmh of CAD, HTN, DM, HLD, stroke 2017 with L sided weakness, and recent stroke 2022 with R sided weakness, recent admission for AMS, currently with similar presentation, was sent from [facility] for transient episode of AMS. Patient seems to be at baseline, with neuro exam that is stable as per documentation 11/2022. CTH/ CTP/ CTA was stable and w/o acute changes.     #Recurrent episodes of AMS, no witnessed seizures  #Chronic stable occlusion A2 segment  -TME workup including UA/ urine cx  -Admit to telemetry  -REEG with no seizures of epileptic discharges but L sided focal slowing  -Continue with Keppra 750 mg q12h  -Check Keppra level  -Seizure precautions  -Repeat REEG, would consider VEEG  -Neurology team to follow as consult    This is an incomplete note, pending attending attestation          78 yo M pmh of CAD, HTN, DM, HLD, stroke 2017 with L sided weakness, and recent stroke 2022 with R sided weakness, recent admission for AMS, currently with similar presentation, was sent from [place] for transient episode of AMS. Patient seems to be at baseline, with neuro exam that is stable as per documentation 11/2022. CTH/ CTP/ CTA was stable and w/o acute changes.     #Recurrent episodes of AMS, no witnessed seizures  #Chronic stable occlusion A2 segment  -TME workup including UA/ urine cx  -Admit to telemetry  -REEG with no seizures of epileptic discharges but L sided focal slowing  -Continue with Keppra 750 mg q12h  -Check Keppra level  -Seizure precautions  -Repeat REEG, would consider VEEG  -Neurology team to follow as consult

## 2022-12-07 NOTE — ED PROVIDER NOTE - CLINICAL SUMMARY MEDICAL DECISION MAKING FREE TEXT BOX
Patient presents from nursing home with difficulty speaking. Stroke code called on arrival. Symptoms improved while in the ED. no tpa given. labs, imaging done. Hx of old cva with chronic findings on imaging. Cleared by neurology team. Patient admitted to medicine for further management.

## 2022-12-07 NOTE — CONSULT NOTE ADULT - ATTENDING COMMENTS
Patient seen and examined and agree with above except as noted.  Patients history, notes ,labs, imaging, vitals and meds reviewed personally.  Patient presenting similar ot presentation in October 2022  Patient significantly improved since being sent from NH and able ot answer questions and follow commands  Has baseline left spastic hemiparesis form stroke in 2017 and significant weakness in RLE from stroke in October 2022  Currently close to his baseline  Likely seizure or TME    Plan as above

## 2022-12-07 NOTE — ED PROVIDER NOTE - PHYSICAL EXAMINATION
CONSTITUTIONAL: Well-developed; well-nourished; in no acute distress, nontoxic appearing  SKIN: skin exam is warm and dry  ENT: MMM   NECK: ROM intact  CARD: S1, S2 normal, no murmur  RESP:  Good air movement bilaterally  ABD: soft; non-distended; non-tender.    EXT: Normal ROM.   NEURO: awake, weakness to UE/LE and LE bilaterally. +L sided facial droop.   PSYCH: Cooperative, appropriate.

## 2022-12-08 LAB
A1C WITH ESTIMATED AVERAGE GLUCOSE RESULT: 6.4 % — HIGH (ref 4–5.6)
ALBUMIN SERPL ELPH-MCNC: 3.9 G/DL — SIGNIFICANT CHANGE UP (ref 3.5–5.2)
ALP SERPL-CCNC: 40 U/L — SIGNIFICANT CHANGE UP (ref 30–115)
ALT FLD-CCNC: 18 U/L — SIGNIFICANT CHANGE UP (ref 0–41)
ANION GAP SERPL CALC-SCNC: 13 MMOL/L — SIGNIFICANT CHANGE UP (ref 7–14)
AST SERPL-CCNC: 28 U/L — SIGNIFICANT CHANGE UP (ref 0–41)
BACTERIA # UR AUTO: NEGATIVE — SIGNIFICANT CHANGE UP
BASOPHILS # BLD AUTO: 0.04 K/UL — SIGNIFICANT CHANGE UP (ref 0–0.2)
BASOPHILS NFR BLD AUTO: 0.5 % — SIGNIFICANT CHANGE UP (ref 0–1)
BILIRUB SERPL-MCNC: 0.6 MG/DL — SIGNIFICANT CHANGE UP (ref 0.2–1.2)
BUN SERPL-MCNC: 19 MG/DL — SIGNIFICANT CHANGE UP (ref 10–20)
CALCIUM SERPL-MCNC: 9.3 MG/DL — SIGNIFICANT CHANGE UP (ref 8.4–10.5)
CHLORIDE SERPL-SCNC: 102 MMOL/L — SIGNIFICANT CHANGE UP (ref 98–110)
CHOLEST SERPL-MCNC: 183 MG/DL — SIGNIFICANT CHANGE UP
CO2 SERPL-SCNC: 23 MMOL/L — SIGNIFICANT CHANGE UP (ref 17–32)
CREAT SERPL-MCNC: 1.7 MG/DL — HIGH (ref 0.7–1.5)
EGFR: 41 ML/MIN/1.73M2 — LOW
EOSINOPHIL # BLD AUTO: 0.04 K/UL — SIGNIFICANT CHANGE UP (ref 0–0.7)
EOSINOPHIL NFR BLD AUTO: 0.5 % — SIGNIFICANT CHANGE UP (ref 0–8)
EPI CELLS # UR: 1 /HPF — SIGNIFICANT CHANGE UP (ref 0–5)
ESTIMATED AVERAGE GLUCOSE: 137 MG/DL — HIGH (ref 68–114)
FLUAV AG NPH QL: SIGNIFICANT CHANGE UP
FLUBV AG NPH QL: SIGNIFICANT CHANGE UP
FOLATE SERPL-MCNC: 10.7 NG/ML — SIGNIFICANT CHANGE UP
GLUCOSE SERPL-MCNC: 104 MG/DL — HIGH (ref 70–99)
HCT VFR BLD CALC: 36 % — LOW (ref 42–52)
HDLC SERPL-MCNC: 41 MG/DL — SIGNIFICANT CHANGE UP
HGB BLD-MCNC: 11.3 G/DL — LOW (ref 14–18)
HYALINE CASTS # UR AUTO: 0 /LPF — SIGNIFICANT CHANGE UP (ref 0–7)
IMM GRANULOCYTES NFR BLD AUTO: 0.5 % — HIGH (ref 0.1–0.3)
LIPID PNL WITH DIRECT LDL SERPL: 99 MG/DL — SIGNIFICANT CHANGE UP
LYMPHOCYTES # BLD AUTO: 0.62 K/UL — LOW (ref 1.2–3.4)
LYMPHOCYTES # BLD AUTO: 7.2 % — LOW (ref 20.5–51.1)
MAGNESIUM SERPL-MCNC: 1.9 MG/DL — SIGNIFICANT CHANGE UP (ref 1.8–2.4)
MCHC RBC-ENTMCNC: 27.7 PG — SIGNIFICANT CHANGE UP (ref 27–31)
MCHC RBC-ENTMCNC: 31.4 G/DL — LOW (ref 32–37)
MCV RBC AUTO: 88.2 FL — SIGNIFICANT CHANGE UP (ref 80–94)
MONOCYTES # BLD AUTO: 0.74 K/UL — HIGH (ref 0.1–0.6)
MONOCYTES NFR BLD AUTO: 8.6 % — SIGNIFICANT CHANGE UP (ref 1.7–9.3)
NEUTROPHILS # BLD AUTO: 7.13 K/UL — HIGH (ref 1.4–6.5)
NEUTROPHILS NFR BLD AUTO: 82.7 % — HIGH (ref 42.2–75.2)
NON HDL CHOLESTEROL: 142 MG/DL — HIGH
NRBC # BLD: 0 /100 WBCS — SIGNIFICANT CHANGE UP (ref 0–0)
PLATELET # BLD AUTO: 313 K/UL — SIGNIFICANT CHANGE UP (ref 130–400)
POTASSIUM SERPL-MCNC: 3.7 MMOL/L — SIGNIFICANT CHANGE UP (ref 3.5–5)
POTASSIUM SERPL-SCNC: 3.7 MMOL/L — SIGNIFICANT CHANGE UP (ref 3.5–5)
PROT SERPL-MCNC: 6.4 G/DL — SIGNIFICANT CHANGE UP (ref 6–8)
RBC # BLD: 4.08 M/UL — LOW (ref 4.7–6.1)
RBC # FLD: 15.9 % — HIGH (ref 11.5–14.5)
RBC CASTS # UR COMP ASSIST: 4 /HPF — SIGNIFICANT CHANGE UP (ref 0–4)
RSV RNA NPH QL NAA+NON-PROBE: SIGNIFICANT CHANGE UP
SARS-COV-2 RNA SPEC QL NAA+PROBE: SIGNIFICANT CHANGE UP
SODIUM SERPL-SCNC: 138 MMOL/L — SIGNIFICANT CHANGE UP (ref 135–146)
TRIGL SERPL-MCNC: 219 MG/DL — HIGH
TSH SERPL-MCNC: 0.72 UIU/ML — SIGNIFICANT CHANGE UP (ref 0.27–4.2)
VIT B12 SERPL-MCNC: 235 PG/ML — SIGNIFICANT CHANGE UP (ref 232–1245)
WBC # BLD: 8.61 K/UL — SIGNIFICANT CHANGE UP (ref 4.8–10.8)
WBC # FLD AUTO: 8.61 K/UL — SIGNIFICANT CHANGE UP (ref 4.8–10.8)
WBC UR QL: 10 /HPF — HIGH (ref 0–5)

## 2022-12-08 PROCEDURE — 99223 1ST HOSP IP/OBS HIGH 75: CPT

## 2022-12-08 RX ORDER — GABAPENTIN 400 MG/1
100 CAPSULE ORAL DAILY
Refills: 0 | Status: DISCONTINUED | OUTPATIENT
Start: 2022-12-08 | End: 2022-12-11

## 2022-12-08 RX ORDER — LEVETIRACETAM 250 MG/1
750 TABLET, FILM COATED ORAL
Refills: 0 | Status: DISCONTINUED | OUTPATIENT
Start: 2022-12-08 | End: 2022-12-11

## 2022-12-08 RX ORDER — LOSARTAN POTASSIUM 100 MG/1
1 TABLET, FILM COATED ORAL
Qty: 0 | Refills: 0 | DISCHARGE

## 2022-12-08 RX ORDER — CEFTRIAXONE 500 MG/1
1000 INJECTION, POWDER, FOR SOLUTION INTRAMUSCULAR; INTRAVENOUS EVERY 24 HOURS
Refills: 0 | Status: COMPLETED | OUTPATIENT
Start: 2022-12-08 | End: 2022-12-10

## 2022-12-08 RX ORDER — GABAPENTIN 400 MG/1
200 CAPSULE ORAL AT BEDTIME
Refills: 0 | Status: DISCONTINUED | OUTPATIENT
Start: 2022-12-08 | End: 2022-12-11

## 2022-12-08 RX ORDER — ACETAMINOPHEN 500 MG
650 TABLET ORAL EVERY 6 HOURS
Refills: 0 | Status: DISCONTINUED | OUTPATIENT
Start: 2022-12-08 | End: 2022-12-11

## 2022-12-08 RX ORDER — HEPARIN SODIUM 5000 [USP'U]/ML
5000 INJECTION INTRAVENOUS; SUBCUTANEOUS EVERY 8 HOURS
Refills: 0 | Status: DISCONTINUED | OUTPATIENT
Start: 2022-12-08 | End: 2022-12-11

## 2022-12-08 RX ORDER — METOPROLOL TARTRATE 50 MG
25 TABLET ORAL DAILY
Refills: 0 | Status: DISCONTINUED | OUTPATIENT
Start: 2022-12-08 | End: 2022-12-11

## 2022-12-08 RX ORDER — VENLAFAXINE HCL 75 MG
75 CAPSULE, EXT RELEASE 24 HR ORAL DAILY
Refills: 0 | Status: DISCONTINUED | OUTPATIENT
Start: 2022-12-08 | End: 2022-12-11

## 2022-12-08 RX ORDER — RANOLAZINE 500 MG/1
500 TABLET, FILM COATED, EXTENDED RELEASE ORAL
Refills: 0 | Status: DISCONTINUED | OUTPATIENT
Start: 2022-12-08 | End: 2022-12-11

## 2022-12-08 RX ORDER — TICAGRELOR 90 MG/1
90 TABLET ORAL
Refills: 0 | Status: DISCONTINUED | OUTPATIENT
Start: 2022-12-08 | End: 2022-12-11

## 2022-12-08 RX ORDER — PANTOPRAZOLE SODIUM 20 MG/1
40 TABLET, DELAYED RELEASE ORAL
Refills: 0 | Status: DISCONTINUED | OUTPATIENT
Start: 2022-12-08 | End: 2022-12-11

## 2022-12-08 RX ORDER — SENNA PLUS 8.6 MG/1
2 TABLET ORAL AT BEDTIME
Refills: 0 | Status: DISCONTINUED | OUTPATIENT
Start: 2022-12-08 | End: 2022-12-11

## 2022-12-08 RX ORDER — ISOSORBIDE MONONITRATE 60 MG/1
60 TABLET, EXTENDED RELEASE ORAL DAILY
Refills: 0 | Status: DISCONTINUED | OUTPATIENT
Start: 2022-12-08 | End: 2022-12-11

## 2022-12-08 RX ORDER — TAMSULOSIN HYDROCHLORIDE 0.4 MG/1
0.4 CAPSULE ORAL AT BEDTIME
Refills: 0 | Status: DISCONTINUED | OUTPATIENT
Start: 2022-12-08 | End: 2022-12-11

## 2022-12-08 RX ORDER — FENOFIBRATE,MICRONIZED 130 MG
145 CAPSULE ORAL DAILY
Refills: 0 | Status: DISCONTINUED | OUTPATIENT
Start: 2022-12-08 | End: 2022-12-11

## 2022-12-08 RX ORDER — ASPIRIN/CALCIUM CARB/MAGNESIUM 324 MG
81 TABLET ORAL DAILY
Refills: 0 | Status: DISCONTINUED | OUTPATIENT
Start: 2022-12-08 | End: 2022-12-11

## 2022-12-08 RX ORDER — PENTOXIFYLLINE 400 MG
400 TABLET, EXTENDED RELEASE ORAL EVERY 8 HOURS
Refills: 0 | Status: DISCONTINUED | OUTPATIENT
Start: 2022-12-08 | End: 2022-12-11

## 2022-12-08 RX ORDER — ATORVASTATIN CALCIUM 80 MG/1
80 TABLET, FILM COATED ORAL AT BEDTIME
Refills: 0 | Status: DISCONTINUED | OUTPATIENT
Start: 2022-12-08 | End: 2022-12-11

## 2022-12-08 RX ORDER — POLYETHYLENE GLYCOL 3350 17 G/17G
17 POWDER, FOR SOLUTION ORAL
Refills: 0 | Status: DISCONTINUED | OUTPATIENT
Start: 2022-12-08 | End: 2022-12-11

## 2022-12-08 RX ADMIN — HEPARIN SODIUM 5000 UNIT(S): 5000 INJECTION INTRAVENOUS; SUBCUTANEOUS at 22:51

## 2022-12-08 RX ADMIN — RANOLAZINE 500 MILLIGRAM(S): 500 TABLET, FILM COATED, EXTENDED RELEASE ORAL at 06:39

## 2022-12-08 RX ADMIN — CEFTRIAXONE 100 MILLIGRAM(S): 500 INJECTION, POWDER, FOR SOLUTION INTRAMUSCULAR; INTRAVENOUS at 11:09

## 2022-12-08 RX ADMIN — GABAPENTIN 200 MILLIGRAM(S): 400 CAPSULE ORAL at 22:51

## 2022-12-08 RX ADMIN — SENNA PLUS 2 TABLET(S): 8.6 TABLET ORAL at 22:51

## 2022-12-08 RX ADMIN — PANTOPRAZOLE SODIUM 40 MILLIGRAM(S): 20 TABLET, DELAYED RELEASE ORAL at 11:23

## 2022-12-08 RX ADMIN — TAMSULOSIN HYDROCHLORIDE 0.4 MILLIGRAM(S): 0.4 CAPSULE ORAL at 22:51

## 2022-12-08 RX ADMIN — Medication 400 MILLIGRAM(S): at 14:39

## 2022-12-08 RX ADMIN — Medication 400 MILLIGRAM(S): at 06:35

## 2022-12-08 RX ADMIN — TICAGRELOR 90 MILLIGRAM(S): 90 TABLET ORAL at 06:39

## 2022-12-08 RX ADMIN — HEPARIN SODIUM 5000 UNIT(S): 5000 INJECTION INTRAVENOUS; SUBCUTANEOUS at 14:39

## 2022-12-08 RX ADMIN — Medication 145 MILLIGRAM(S): at 11:23

## 2022-12-08 RX ADMIN — Medication 25 MILLIGRAM(S): at 06:39

## 2022-12-08 RX ADMIN — GABAPENTIN 100 MILLIGRAM(S): 400 CAPSULE ORAL at 11:23

## 2022-12-08 RX ADMIN — ISOSORBIDE MONONITRATE 60 MILLIGRAM(S): 60 TABLET, EXTENDED RELEASE ORAL at 11:23

## 2022-12-08 RX ADMIN — Medication 400 MILLIGRAM(S): at 22:51

## 2022-12-08 RX ADMIN — HEPARIN SODIUM 5000 UNIT(S): 5000 INJECTION INTRAVENOUS; SUBCUTANEOUS at 06:35

## 2022-12-08 RX ADMIN — LEVETIRACETAM 750 MILLIGRAM(S): 250 TABLET, FILM COATED ORAL at 19:03

## 2022-12-08 RX ADMIN — RANOLAZINE 500 MILLIGRAM(S): 500 TABLET, FILM COATED, EXTENDED RELEASE ORAL at 19:03

## 2022-12-08 RX ADMIN — Medication 81 MILLIGRAM(S): at 11:23

## 2022-12-08 RX ADMIN — ATORVASTATIN CALCIUM 80 MILLIGRAM(S): 80 TABLET, FILM COATED ORAL at 22:51

## 2022-12-08 RX ADMIN — LEVETIRACETAM 750 MILLIGRAM(S): 250 TABLET, FILM COATED ORAL at 06:35

## 2022-12-08 RX ADMIN — TICAGRELOR 90 MILLIGRAM(S): 90 TABLET ORAL at 19:03

## 2022-12-08 RX ADMIN — Medication 75 MILLIGRAM(S): at 11:23

## 2022-12-08 NOTE — H&P ADULT - ATTENDING COMMENTS
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    #Metabolic Encephalopathy, resolved  #Suspected UTI  #Hx of seizures  r/o breakthrough seizure  CTH negative for acute infarct  CTA and perfusion showing stable changes from prior infarct  - Cont IV ceftriaxone 1g q24h  - repeat rEEG per neuro  - Cont keppra 750 BID  - Likely DC 24h if rEEG is unremarkable    #CAD s/p CABG in 2017  #HTN/HLD  #h/o CVA x 2 with bilateral residual weakness  - continue with home aspirin, brilinta, atorvastatin, metoprolol, isosorbide mononitrate, ranexa  - check a1c, lipid panel, tsh  - PT Eval    #DM  - hold home PO meds  - check a1c  - monitor FS. if >180, start S/S    #CKD III  - stable, monitor    #GERD  - continue with protonix    #BPH  - continue with flomax    DVT PPX, heparin    #Progress Note Handoff  Pending (specify): Cont IV ceftriaxone, rEEG  Family discussion: d/w pt regarding eval for confusion  Disposition: NH

## 2022-12-08 NOTE — H&P ADULT - NSHPPHYSICALEXAM_GEN_ALL_CORE
T(C): 36.7 (12-07-22 @ 16:26), Max: 36.7 (12-07-22 @ 16:26)  HR: 95 (12-08-22 @ 02:58) (78 - 95)  BP: 175/80 (12-08-22 @ 02:58) (120/58 - 175/80)  RR: 18 (12-08-22 @ 02:58) (18 - 18)  SpO2: 99% (12-08-22 @ 02:58) (97% - 100%)    PHYSICAL EXAM:  GENERAL: patient appears well, no acute distress, appropriate, pleasant  EYES: sclera clear, no exudates  ENMT: oropharynx clear without erythema, no exudates, moist mucous membranes  NECK: supple, soft, no thyromegaly noted  LUNGS: good air entry bilaterally, clear to auscultation, symmetric breath sounds, no wheezing or rhonchi appreciated  HEART: soft S1/S2, regular rate and rhythm, systolic ejection murmur noted, trace RLE edema  GASTROINTESTINAL: abdomen is soft, nontender, nondistended, normoactive bowel sounds, no palpable masses  INTEGUMENT: good skin turgor, no lesions noted  MUSCULOSKELETAL: no clubbing or cyanosis, no obvious deformity  NEUROLOGIC: awake, alert, oriented x3, slight dysarthria, 4/5 in bl UE, 3/5 in bl LE  HEME/LYMPH: no palpable supraclavicular nodules, no obvious ecchymosis or petechiae

## 2022-12-08 NOTE — H&P ADULT - NSHPLABSRESULTS_GEN_ALL_CORE
12.3   12.63 )-----------( 307      ( 07 Dec 2022 16:39 )             39.3       12    135  |  102  |  26<H>  ----------------------------<  136<H>  4.8   |  21  |  1.9<H>    Ca    9.6      07 Dec 2022 16:39    TPro  7.1  /  Alb  4.6  /  TBili  0.5  /  DBili  x   /  AST  27  /  ALT  18  /  AlkPhos  41                Urinalysis Basic - ( 07 Dec 2022 23:18 )    Color: Yellow / Appearance: Clear / S.033 / pH: x  Gluc: x / Ketone: Negative  / Bili: Negative / Urobili: <2 mg/dL   Blood: x / Protein: 100 mg/dL / Nitrite: Negative   Leuk Esterase: Small / RBC: 4 /HPF / WBC 10 /HPF   Sq Epi: x / Non Sq Epi: 1 /HPF / Bacteria: Negative        PT/INR - ( 07 Dec 2022 16:39 )   PT: 10.80 sec;   INR: 0.95 ratio         PTT - ( 07 Dec 2022 16:39 )  PTT:31.0 sec    Lactate Trend      CARDIAC MARKERS ( 07 Dec 2022 16:39 )  x     / <0.01 ng/mL / x     / x     / x            CAPILLARY BLOOD GLUCOSE          < from: CT Brain Stroke Protocol (22 @ 16:27) >    IMPRESSION:  No CT evidence of large acute territorial infarct.    No evidence of acute intracranial pathology. No mass effect, midline   shift or intracranial hemorrhage.    Chronic infarcts as above.    These findings were discussed with KALEB JEAN [phone] at   2022 4:38 PM by Dr. Ware with read back confirmation.    --- End of Report ---      < end of copied text >    < from: CT Brain Perfusion Maps Stroke (22 @ 16:36) >      IMPRESSION:    CT PERFUSION:  Left frontal lobe hypoperfusion totaling 20 mL corresponding to the area   of prior infarction.    CTA HEAD/NECK:  Redemonstration of left anterior cerebral artery A2 segment occlusion.    Redemonstration of bilateral internal carotid and posterior cerebral   artery stenosis, unchanged since prior CTA.    --- End of Report ---    < end of copied text >

## 2022-12-08 NOTE — H&P ADULT - ASSESSMENT
Pt is a 77 year old male, past medical history CAD s/p CABG 2017, HTN, DM, HLD, GERD, BPH, CVA x2 with r/l sided residual weakness, bib ems from [facility] with ams. patient with minimal responsiveness that began ~230pm today prompting ed eval. patient also L pinpoint pupil. no falls/trauma. no fever, shortness of breath, vomiting, syncope.    #AMS  #s/p code stroke in ED  - NIHSS 13  - CTH negative for acute infarct  - CTA and perfusion showing stable changes from prior infarct  - U/A only mild positive, cannot assess for symptoms, send UCx  - blood cultures  - troponin negative x1, ECG showing no acute changes  - obtain rEEG  - Follow up b12, folate, TSH  - check keppra levels  - follow up official neurology note    #CAD s/p CABG in 2017  #h/o CVA x 2 with residual R sided weakness  #HTN  #DM  #HLD  #CKD III  #GERD  #BPH    #Misc:  #DVT PPX: heparin  #GI PPX: protonix  #Diet: DASH/CC  #Activity: bedrest  #Dispo: admit to medicine     Pt is a 77 year old male, past medical history CAD s/p CABG 2017, HTN, DM, HLD, GERD, BPH, CVA x2 with bilateral residual weakness (L>R) was brought in by EMS from [facility] for altered mental status.    #AMS secondary to UTI vs seizure  #s/p code stroke in ED  - NIHSS 13  - CTH negative for acute infarct  - CTA and perfusion showing stable changes from prior infarct  - troponin negative x1, ECG showing no acute changes  - U/A only mild positive, but pt is endorsing dysuria and frequency so will treat ceftriaxone  - blood cultures  - obtain rEEG  - Follow up b12, folate  - check keppra levels  - check a1c, lipid panel  - continue with home keppra 750mg BID  - follow up official neurology note    #CAD s/p CABG in 2017  #HTN  #h/o CVA x 2 with bilateral residual weakness  - continue with home aspirin, brilinta, atorvastatin, metoprolol, isosorbide mononitrate, ranexa  - check a1c, lipid panel, tsh    #DM  - hold home PO meds  - check a1c  - monitor FS. if >180, start S/S    #HLD  #CKD III  #GERD  #BPH    #Misc:  #DVT PPX: heparin  #GI PPX: protonix  #Diet: DASH/CC  #Activity: bedrest  #Dispo: admit to medicine     Pt is a 77 year old male, past medical history CAD s/p CABG 2017, HTN, DM, HLD, GERD, BPH, CVA x2 with bilateral residual weakness (L>R) was brought in by EMS from [facility] for altered mental status.    #AMS secondary to UTI vs seizure  #s/p code stroke in ED  - NIHSS 13  - CTH negative for acute infarct  - CTA and perfusion showing stable changes from prior infarct  - troponin negative x1, ECG showing no acute changes  - U/A only mild positive, but pt is endorsing dysuria and frequency so will treat ceftriaxone  - blood cultures  - obtain rEEG  - Follow up b12, folate  - check keppra levels  - check a1c, lipid panel  - continue with home keppra 750mg BID  - follow up official neurology note    #CAD s/p CABG in 2017  #HTN  #h/o CVA x 2 with bilateral residual weakness  - continue with home aspirin, brilinta, atorvastatin, metoprolol, isosorbide mononitrate, ranexa  - check a1c, lipid panel, tsh    #DM  - hold home PO meds  - check a1c  - monitor FS. if >180, start S/S    #HLD  - continue with home statin    #CKD III  - stable, monitor    #GERD  - continue with protonix    #BPH  - continue with flomax    #Misc:  #DVT PPX: heparin  #GI PPX: protonix  #Diet: DASH/CC  #Activity: bedrest  #Dispo: admit to tele

## 2022-12-08 NOTE — H&P ADULT - NSHPREVIEWOFSYSTEMS_GEN_ALL_CORE
REVIEW OF SYSTEMS:    CONSTITUTIONAL: + weakness. No fevers or chills  EYES/ENT: No visual changes;  No vertigo or throat pain   NECK: No pain or stiffness  RESPIRATORY: No cough, wheezing, hemoptysis; No shortness of breath  CARDIOVASCULAR: No chest pain or palpitations  GASTROINTESTINAL: No abdominal or epigastric pain. No nausea, vomiting, or hematemesis; No diarrhea or constipation. No melena or hematochezia.  GENITOURINARY: + dysuria, and increased urinary frequency. denies hematuria  NEUROLOGICAL: No numbness or weakness  SKIN: No itching, rashes

## 2022-12-08 NOTE — H&P ADULT - HISTORY OF PRESENT ILLNESS
Pt is a 77 year old male, past medical history CAD s/p CABG 2017, HTN, DM, HLD, GERD, BPH, CVA x2 with r/l sided residual weakness, bib ems from [facility] with ams. patient with minimal responsiveness that began ~230pm today prompting ed eval. patient also L pinpoint pupil. no falls/trauma. no fever, shortness of breath, vomiting, syncope.    In the ED:  Labs notable for: WBC 12k, Hgb 12.3, Cr 1.9 (at baseline), troponin negative  U/A showing small leuks, 10 WBCs  ECG showing qtc 502  NIHSS of 13  Pt was a code stroke in the ED:  CTH negative for acute infarct  CTA and perfusion showing: Left frontal lobe hypoperfusion totaling 20 mL corresponding to the area of prior infarction. Redemonstration of left anterior cerebral artery A2 segment occlusion. Redemonstration of bilateral internal carotid and posterior cerebral artery stenosis, unchanged since prior CTA.       Pt is a 77 year old male, past medical history CAD s/p CABG 2017, HTN, DM, HLD, GERD, BPH, CVA x2 with bilateral residual weakness (L>R) was brought in by EMS from [facility] for altered mental status. As per the notes, son stated that patient was his normal self last night and then today around 3pm, he became altered and not acting like his normal self. He was not communicating with him and moving less. On my interview, pt is now AAOx3 and when asking him why he's in the hospital, he states seizure. He doesn't remember the prior events. He does endorse that he has been having dysuria, increased urinary frequency and generalized weakness, and headaches and lightheadedness. He denies any fevers, chills, nausea, vomiting, chest pain, palpitations, SOB, abdominal pain, hematuria, numbness, or tinging.     In the ED:  Pt was hemodynamically stable, afebrile  Labs notable for: WBC 12k, Hgb 12.3, Cr 1.9 (at baseline), troponin negative  U/A showing small leuks, 10 WBCs  ECG showing qtc 502  NIHSS of 13  Pt was a code stroke in the ED:  CTH negative for acute infarct  CTA and perfusion showing: Left frontal lobe hypoperfusion totaling 20 mL corresponding to the area of prior infarction. Redemonstration of left anterior cerebral artery A2 segment occlusion. Redemonstration of bilateral internal carotid and posterior cerebral artery stenosis, unchanged since prior CTA.

## 2022-12-09 LAB
ALBUMIN SERPL ELPH-MCNC: 3.9 G/DL — SIGNIFICANT CHANGE UP (ref 3.5–5.2)
ALP SERPL-CCNC: 37 U/L — SIGNIFICANT CHANGE UP (ref 30–115)
ALT FLD-CCNC: 19 U/L — SIGNIFICANT CHANGE UP (ref 0–41)
ANION GAP SERPL CALC-SCNC: 16 MMOL/L — HIGH (ref 7–14)
AST SERPL-CCNC: 31 U/L — SIGNIFICANT CHANGE UP (ref 0–41)
BASOPHILS # BLD AUTO: 0.04 K/UL — SIGNIFICANT CHANGE UP (ref 0–0.2)
BASOPHILS NFR BLD AUTO: 0.5 % — SIGNIFICANT CHANGE UP (ref 0–1)
BILIRUB SERPL-MCNC: 0.5 MG/DL — SIGNIFICANT CHANGE UP (ref 0.2–1.2)
BUN SERPL-MCNC: 19 MG/DL — SIGNIFICANT CHANGE UP (ref 10–20)
CALCIUM SERPL-MCNC: 9.2 MG/DL — SIGNIFICANT CHANGE UP (ref 8.4–10.5)
CHLORIDE SERPL-SCNC: 102 MMOL/L — SIGNIFICANT CHANGE UP (ref 98–110)
CO2 SERPL-SCNC: 19 MMOL/L — SIGNIFICANT CHANGE UP (ref 17–32)
CREAT SERPL-MCNC: 1.6 MG/DL — HIGH (ref 0.7–1.5)
EGFR: 44 ML/MIN/1.73M2 — LOW
EOSINOPHIL # BLD AUTO: 0.01 K/UL — SIGNIFICANT CHANGE UP (ref 0–0.7)
EOSINOPHIL NFR BLD AUTO: 0.1 % — SIGNIFICANT CHANGE UP (ref 0–8)
GLUCOSE SERPL-MCNC: 110 MG/DL — HIGH (ref 70–99)
HCT VFR BLD CALC: 34.7 % — LOW (ref 42–52)
HGB BLD-MCNC: 11.9 G/DL — LOW (ref 14–18)
IMM GRANULOCYTES NFR BLD AUTO: 0.6 % — HIGH (ref 0.1–0.3)
LYMPHOCYTES # BLD AUTO: 0.61 K/UL — LOW (ref 1.2–3.4)
LYMPHOCYTES # BLD AUTO: 7 % — LOW (ref 20.5–51.1)
MAGNESIUM SERPL-MCNC: 1.9 MG/DL — SIGNIFICANT CHANGE UP (ref 1.8–2.4)
MCHC RBC-ENTMCNC: 29.1 PG — SIGNIFICANT CHANGE UP (ref 27–31)
MCHC RBC-ENTMCNC: 34.3 G/DL — SIGNIFICANT CHANGE UP (ref 32–37)
MCV RBC AUTO: 84.8 FL — SIGNIFICANT CHANGE UP (ref 80–94)
MONOCYTES # BLD AUTO: 0.77 K/UL — HIGH (ref 0.1–0.6)
MONOCYTES NFR BLD AUTO: 8.8 % — SIGNIFICANT CHANGE UP (ref 1.7–9.3)
NEUTROPHILS # BLD AUTO: 7.24 K/UL — HIGH (ref 1.4–6.5)
NEUTROPHILS NFR BLD AUTO: 83 % — HIGH (ref 42.2–75.2)
NRBC # BLD: 0 /100 WBCS — SIGNIFICANT CHANGE UP (ref 0–0)
PLATELET # BLD AUTO: 293 K/UL — SIGNIFICANT CHANGE UP (ref 130–400)
POTASSIUM SERPL-MCNC: 3.7 MMOL/L — SIGNIFICANT CHANGE UP (ref 3.5–5)
POTASSIUM SERPL-SCNC: 3.7 MMOL/L — SIGNIFICANT CHANGE UP (ref 3.5–5)
PROT SERPL-MCNC: 6.3 G/DL — SIGNIFICANT CHANGE UP (ref 6–8)
RBC # BLD: 4.09 M/UL — LOW (ref 4.7–6.1)
RBC # FLD: 15.8 % — HIGH (ref 11.5–14.5)
SODIUM SERPL-SCNC: 137 MMOL/L — SIGNIFICANT CHANGE UP (ref 135–146)
WBC # BLD: 8.72 K/UL — SIGNIFICANT CHANGE UP (ref 4.8–10.8)
WBC # FLD AUTO: 8.72 K/UL — SIGNIFICANT CHANGE UP (ref 4.8–10.8)

## 2022-12-09 PROCEDURE — 93010 ELECTROCARDIOGRAM REPORT: CPT

## 2022-12-09 PROCEDURE — 99232 SBSQ HOSP IP/OBS MODERATE 35: CPT

## 2022-12-09 PROCEDURE — 95819 EEG AWAKE AND ASLEEP: CPT | Mod: 26

## 2022-12-09 RX ORDER — ISOSORBIDE MONONITRATE 60 MG/1
60 TABLET, EXTENDED RELEASE ORAL ONCE
Refills: 0 | Status: COMPLETED | OUTPATIENT
Start: 2022-12-09 | End: 2022-12-09

## 2022-12-09 RX ORDER — ONDANSETRON 8 MG/1
4 TABLET, FILM COATED ORAL ONCE
Refills: 0 | Status: COMPLETED | OUTPATIENT
Start: 2022-12-09 | End: 2022-12-09

## 2022-12-09 RX ADMIN — LEVETIRACETAM 750 MILLIGRAM(S): 250 TABLET, FILM COATED ORAL at 06:27

## 2022-12-09 RX ADMIN — GABAPENTIN 100 MILLIGRAM(S): 400 CAPSULE ORAL at 11:22

## 2022-12-09 RX ADMIN — GABAPENTIN 200 MILLIGRAM(S): 400 CAPSULE ORAL at 21:58

## 2022-12-09 RX ADMIN — Medication 25 MILLIGRAM(S): at 06:27

## 2022-12-09 RX ADMIN — TICAGRELOR 90 MILLIGRAM(S): 90 TABLET ORAL at 17:30

## 2022-12-09 RX ADMIN — RANOLAZINE 500 MILLIGRAM(S): 500 TABLET, FILM COATED, EXTENDED RELEASE ORAL at 06:27

## 2022-12-09 RX ADMIN — Medication 75 MILLIGRAM(S): at 11:22

## 2022-12-09 RX ADMIN — TICAGRELOR 90 MILLIGRAM(S): 90 TABLET ORAL at 06:26

## 2022-12-09 RX ADMIN — Medication 400 MILLIGRAM(S): at 14:06

## 2022-12-09 RX ADMIN — HEPARIN SODIUM 5000 UNIT(S): 5000 INJECTION INTRAVENOUS; SUBCUTANEOUS at 21:59

## 2022-12-09 RX ADMIN — LEVETIRACETAM 750 MILLIGRAM(S): 250 TABLET, FILM COATED ORAL at 17:30

## 2022-12-09 RX ADMIN — HEPARIN SODIUM 5000 UNIT(S): 5000 INJECTION INTRAVENOUS; SUBCUTANEOUS at 06:27

## 2022-12-09 RX ADMIN — SENNA PLUS 2 TABLET(S): 8.6 TABLET ORAL at 21:58

## 2022-12-09 RX ADMIN — Medication 400 MILLIGRAM(S): at 06:27

## 2022-12-09 RX ADMIN — HEPARIN SODIUM 5000 UNIT(S): 5000 INJECTION INTRAVENOUS; SUBCUTANEOUS at 14:06

## 2022-12-09 RX ADMIN — Medication 81 MILLIGRAM(S): at 11:22

## 2022-12-09 RX ADMIN — ISOSORBIDE MONONITRATE 60 MILLIGRAM(S): 60 TABLET, EXTENDED RELEASE ORAL at 10:59

## 2022-12-09 RX ADMIN — PANTOPRAZOLE SODIUM 40 MILLIGRAM(S): 20 TABLET, DELAYED RELEASE ORAL at 06:27

## 2022-12-09 RX ADMIN — ATORVASTATIN CALCIUM 80 MILLIGRAM(S): 80 TABLET, FILM COATED ORAL at 21:58

## 2022-12-09 RX ADMIN — TAMSULOSIN HYDROCHLORIDE 0.4 MILLIGRAM(S): 0.4 CAPSULE ORAL at 21:58

## 2022-12-09 RX ADMIN — RANOLAZINE 500 MILLIGRAM(S): 500 TABLET, FILM COATED, EXTENDED RELEASE ORAL at 17:30

## 2022-12-09 RX ADMIN — Medication 145 MILLIGRAM(S): at 11:22

## 2022-12-09 RX ADMIN — ONDANSETRON 4 MILLIGRAM(S): 8 TABLET, FILM COATED ORAL at 14:20

## 2022-12-09 RX ADMIN — CEFTRIAXONE 100 MILLIGRAM(S): 500 INJECTION, POWDER, FOR SOLUTION INTRAMUSCULAR; INTRAVENOUS at 11:22

## 2022-12-09 NOTE — PHYSICAL THERAPY INITIAL EVALUATION ADULT - PERTINENT HX OF CURRENT PROBLEM, REHAB EVAL
77 year old male, past medical history CAD s/p CABG 2017, HTN, DM, HLD, GERD, BPH, CVA x2 with bilateral residual weakness (L>R) was brought in by EMS from [facility] for altered mental status. As per the notes, son stated that patient was his normal self last night and then today around 3pm, he became altered and not acting like his normal self. He was not communicating with him and moving less. On my interview, pt is now AAOx3 and when asking him why he's in the hospital, he states seizure. He doesn't remember the prior events. He does endorse that he has been having dysuria, increased urinary frequency and generalized weakness, and headaches and lightheadedness. He denies any fevers, chills, nausea, vomiting, chest pain, palpitations, SOB, abdominal pain, hematuria, numbness, or tinging.

## 2022-12-09 NOTE — PROGRESS NOTE ADULT - SUBJECTIVE AND OBJECTIVE BOX
ERICK MARTINEZ  77y  [facility] ER Hold 056 A      Patient is a 77y old  Male who presents with a chief complaint of AMS (08 Dec 2022 03:18)      INTERVAL HPI/OVERNIGHT EVENTS:  Patient is awake. he has no complains  he was told that he had seizure. he doesn't recall the episode  no overnight events               FAMILY HISTORY:    T(C): 36.8 (12-09-22 @ 08:03), Max: 37 (12-09-22 @ 00:34)  HR: 100 (12-09-22 @ 08:03) (84 - 100)  BP: 188/86 (12-09-22 @ 11:44) (168/72 - 188/86)  RR: 18 (12-09-22 @ 08:03) (18 - 18)  SpO2: 95% (12-09-22 @ 08:03) (95% - 97%)  Wt(kg): --Vital Signs Last 24 Hrs  T(C): 36.8 (09 Dec 2022 08:03), Max: 37 (09 Dec 2022 00:34)  T(F): 98.2 (09 Dec 2022 08:03), Max: 98.6 (09 Dec 2022 00:34)  HR: 100 (09 Dec 2022 08:03) (84 - 100)  BP: 188/86 (09 Dec 2022 11:44) (168/72 - 188/86)  BP(mean): 124 (09 Dec 2022 08:03) (124 - 124)  RR: 18 (09 Dec 2022 08:03) (18 - 18)  SpO2: 95% (09 Dec 2022 08:03) (95% - 97%)    Parameters below as of 09 Dec 2022 08:03  Patient On (Oxygen Delivery Method): room air        PHYSICAL EXAM:  GENERAL: NAD, well-groomed, well-developed  NERVOUS SYSTEM:  Alert & Oriented X3  PULM: Clear to auscultation bilaterally  CARDIAC: Regular rate and rhythm;  GI: Soft, Nontender, Nondistended; Bowel sounds present  EXTREMITIES:  2+ Peripheral Pulses    Consultant(s) Notes Reviewed:  [x ] YES  [ ] NO  Care Discussed with Consultants/Other Providers [ x] YES  [ ] NO    LABS:                            11.9   8.72  )-----------( 293      ( 09 Dec 2022 07:51 )             34.7   12-09    137  |  102  |  19  ----------------------------<  110<H>  3.7   |  19  |  1.6<H>    Ca    9.2      09 Dec 2022 07:51  Mg     1.9     12-09    TPro  6.3  /  Alb  3.9  /  TBili  0.5  /  DBili  x   /  AST  31  /  ALT  19  /  AlkPhos  37  12-09            acetaminophen     Tablet .. 650 milliGRAM(s) Oral every 6 hours PRN  aspirin  chewable 81 milliGRAM(s) Oral daily  atorvastatin 80 milliGRAM(s) Oral at bedtime  cefTRIAXone   IVPB 1000 milliGRAM(s) IV Intermittent every 24 hours  fenofibrate Tablet 145 milliGRAM(s) Oral daily  gabapentin 100 milliGRAM(s) Oral daily  gabapentin 200 milliGRAM(s) Oral at bedtime  heparin   Injectable 5000 Unit(s) SubCutaneous every 8 hours  isosorbide   mononitrate ER Tablet (IMDUR) 60 milliGRAM(s) Oral daily  levETIRAcetam 750 milliGRAM(s) Oral two times a day  metoprolol succinate ER 25 milliGRAM(s) Oral daily  pantoprazole    Tablet 40 milliGRAM(s) Oral before breakfast  pentoxifylline 400 milliGRAM(s) Oral every 8 hours  polyethylene glycol 3350 17 Gram(s) Oral two times a day PRN  ranolazine 500 milliGRAM(s) Oral two times a day  senna 2 Tablet(s) Oral at bedtime  tamsulosin 0.4 milliGRAM(s) Oral at bedtime  ticagrelor 90 milliGRAM(s) Oral two times a day  venlafaxine XR. 75 milliGRAM(s) Oral daily      Pt is a 77 year old male, past medical history CAD s/p CABG 2017, HTN, DM, HLD, GERD, BPH, CVA x2 with bilateral residual weakness (L>R) was brought in by EMS from [facility] for altered mental status.    1. Metabolic encephalopathy secondary to UTI vs seizure  * Code stroke was called in the ED   - Admit to medicine                  - NIHSS 13      -u/a:positive   - CTH negative for acute infarct  - CTA and perfusion showing stable changes from prior infarct  - troponin negative x1, ECG showing no acute changes  - Start rocephin   - Blood cx:pending          - Urine cx:pending   - Routine EEG:  a) Consistent with diffuse cerebral electrophysiological dysfunction.    Secondary to none specific cause., Consistent with focal   electrophysiological dysfunction.  Secondary to structural/metabolic   cause.   - Cont Keppra 750mg po bid   - Keppra levels:pending   - Neuro consult appreciated. Awaiting follow up :pending     2.CAD s/p CABG in 2017/HTN/h/o CVA x 2 with bilateral residual weakness  - continue with home aspirin, brilinta, atorvastatin, metoprolol, isosorbide mononitrate, ranexa  - check a1c, lipid panel, tsh    3. DM-II   - hold home PO meds  - Hgba1c:6.4   - monitor FS. if >180, start S/S    4. HLD  - continue with home statin    5. CKD III  - stable, monitor    6. GERD  - continue with protonix    7. BPH  - continue with flomax    #Misc:  #DVT PPX: heparin  #GI PPX: protonix  #Diet: DASH/CC      Case Discussed with House Staff   39  minutes spent on total encounter; more than 50% of the visit was spent counseling and/or coordinating care by the attending physician.   Spectra [phone]

## 2022-12-09 NOTE — PHYSICAL THERAPY INITIAL EVALUATION ADULT - SPECIFY REASON(S)
Pt with active bedrest orders, Dr Beltran contacted via teams to update activity orders for PT IE. PT to eval when appropriate.

## 2022-12-09 NOTE — PHYSICAL THERAPY INITIAL EVALUATION ADULT - ADDITIONAL COMMENTS
Pt admitted from SNF, ambulated with RW, required assistance with ADLs. Prior to SNF, pt lived with wife and son in home with 5 CARLOS, 1 FOS inside.

## 2022-12-09 NOTE — PHYSICAL THERAPY INITIAL EVALUATION ADULT - GAIT DISTANCE, PT EVAL
10 steps in place, pt reported dizziness, returned to seated position, /88, continued to c/o dizziness, returned to semifowlers /103 RN Crista to bedside

## 2022-12-10 LAB
ALBUMIN SERPL ELPH-MCNC: 3.7 G/DL — SIGNIFICANT CHANGE UP (ref 3.5–5.2)
ALP SERPL-CCNC: 35 U/L — SIGNIFICANT CHANGE UP (ref 30–115)
ALT FLD-CCNC: 17 U/L — SIGNIFICANT CHANGE UP (ref 0–41)
ANION GAP SERPL CALC-SCNC: 15 MMOL/L — HIGH (ref 7–14)
AST SERPL-CCNC: 26 U/L — SIGNIFICANT CHANGE UP (ref 0–41)
BASOPHILS # BLD AUTO: 0.03 K/UL — SIGNIFICANT CHANGE UP (ref 0–0.2)
BASOPHILS NFR BLD AUTO: 0.4 % — SIGNIFICANT CHANGE UP (ref 0–1)
BILIRUB SERPL-MCNC: 0.4 MG/DL — SIGNIFICANT CHANGE UP (ref 0.2–1.2)
BUN SERPL-MCNC: 21 MG/DL — HIGH (ref 10–20)
CALCIUM SERPL-MCNC: 9.1 MG/DL — SIGNIFICANT CHANGE UP (ref 8.4–10.5)
CHLORIDE SERPL-SCNC: 106 MMOL/L — SIGNIFICANT CHANGE UP (ref 98–110)
CO2 SERPL-SCNC: 21 MMOL/L — SIGNIFICANT CHANGE UP (ref 17–32)
CREAT SERPL-MCNC: 1.7 MG/DL — HIGH (ref 0.7–1.5)
EGFR: 41 ML/MIN/1.73M2 — LOW
EOSINOPHIL # BLD AUTO: 0.04 K/UL — SIGNIFICANT CHANGE UP (ref 0–0.7)
EOSINOPHIL NFR BLD AUTO: 0.5 % — SIGNIFICANT CHANGE UP (ref 0–8)
GLUCOSE BLDC GLUCOMTR-MCNC: 109 MG/DL — HIGH (ref 70–99)
GLUCOSE BLDC GLUCOMTR-MCNC: 121 MG/DL — HIGH (ref 70–99)
GLUCOSE BLDC GLUCOMTR-MCNC: 130 MG/DL — HIGH (ref 70–99)
GLUCOSE BLDC GLUCOMTR-MCNC: 95 MG/DL — SIGNIFICANT CHANGE UP (ref 70–99)
GLUCOSE SERPL-MCNC: 91 MG/DL — SIGNIFICANT CHANGE UP (ref 70–99)
HCT VFR BLD CALC: 33.5 % — LOW (ref 42–52)
HGB BLD-MCNC: 11.1 G/DL — LOW (ref 14–18)
IMM GRANULOCYTES NFR BLD AUTO: 0.4 % — HIGH (ref 0.1–0.3)
LYMPHOCYTES # BLD AUTO: 1.08 K/UL — LOW (ref 1.2–3.4)
LYMPHOCYTES # BLD AUTO: 13.6 % — LOW (ref 20.5–51.1)
MAGNESIUM SERPL-MCNC: 2.1 MG/DL — SIGNIFICANT CHANGE UP (ref 1.8–2.4)
MCHC RBC-ENTMCNC: 28.4 PG — SIGNIFICANT CHANGE UP (ref 27–31)
MCHC RBC-ENTMCNC: 33.1 G/DL — SIGNIFICANT CHANGE UP (ref 32–37)
MCV RBC AUTO: 85.7 FL — SIGNIFICANT CHANGE UP (ref 80–94)
MONOCYTES # BLD AUTO: 0.7 K/UL — HIGH (ref 0.1–0.6)
MONOCYTES NFR BLD AUTO: 8.8 % — SIGNIFICANT CHANGE UP (ref 1.7–9.3)
NEUTROPHILS # BLD AUTO: 6.05 K/UL — SIGNIFICANT CHANGE UP (ref 1.4–6.5)
NEUTROPHILS NFR BLD AUTO: 76.3 % — HIGH (ref 42.2–75.2)
NRBC # BLD: 0 /100 WBCS — SIGNIFICANT CHANGE UP (ref 0–0)
PLATELET # BLD AUTO: 286 K/UL — SIGNIFICANT CHANGE UP (ref 130–400)
POTASSIUM SERPL-MCNC: 3.9 MMOL/L — SIGNIFICANT CHANGE UP (ref 3.5–5)
POTASSIUM SERPL-SCNC: 3.9 MMOL/L — SIGNIFICANT CHANGE UP (ref 3.5–5)
PROT SERPL-MCNC: 6.1 G/DL — SIGNIFICANT CHANGE UP (ref 6–8)
RBC # BLD: 3.91 M/UL — LOW (ref 4.7–6.1)
RBC # FLD: 15.9 % — HIGH (ref 11.5–14.5)
SODIUM SERPL-SCNC: 142 MMOL/L — SIGNIFICANT CHANGE UP (ref 135–146)
WBC # BLD: 7.93 K/UL — SIGNIFICANT CHANGE UP (ref 4.8–10.8)
WBC # FLD AUTO: 7.93 K/UL — SIGNIFICANT CHANGE UP (ref 4.8–10.8)

## 2022-12-10 PROCEDURE — 93010 ELECTROCARDIOGRAM REPORT: CPT

## 2022-12-10 PROCEDURE — 99232 SBSQ HOSP IP/OBS MODERATE 35: CPT

## 2022-12-10 RX ORDER — NIFEDIPINE 30 MG
30 TABLET, EXTENDED RELEASE 24 HR ORAL DAILY
Refills: 0 | Status: DISCONTINUED | OUTPATIENT
Start: 2022-12-10 | End: 2022-12-11

## 2022-12-10 RX ADMIN — ISOSORBIDE MONONITRATE 60 MILLIGRAM(S): 60 TABLET, EXTENDED RELEASE ORAL at 11:34

## 2022-12-10 RX ADMIN — GABAPENTIN 200 MILLIGRAM(S): 400 CAPSULE ORAL at 21:56

## 2022-12-10 RX ADMIN — HEPARIN SODIUM 5000 UNIT(S): 5000 INJECTION INTRAVENOUS; SUBCUTANEOUS at 21:54

## 2022-12-10 RX ADMIN — LEVETIRACETAM 750 MILLIGRAM(S): 250 TABLET, FILM COATED ORAL at 18:34

## 2022-12-10 RX ADMIN — ATORVASTATIN CALCIUM 80 MILLIGRAM(S): 80 TABLET, FILM COATED ORAL at 21:57

## 2022-12-10 RX ADMIN — Medication 145 MILLIGRAM(S): at 11:34

## 2022-12-10 RX ADMIN — HEPARIN SODIUM 5000 UNIT(S): 5000 INJECTION INTRAVENOUS; SUBCUTANEOUS at 05:30

## 2022-12-10 RX ADMIN — SENNA PLUS 2 TABLET(S): 8.6 TABLET ORAL at 21:53

## 2022-12-10 RX ADMIN — RANOLAZINE 500 MILLIGRAM(S): 500 TABLET, FILM COATED, EXTENDED RELEASE ORAL at 05:29

## 2022-12-10 RX ADMIN — TAMSULOSIN HYDROCHLORIDE 0.4 MILLIGRAM(S): 0.4 CAPSULE ORAL at 21:55

## 2022-12-10 RX ADMIN — Medication 25 MILLIGRAM(S): at 05:29

## 2022-12-10 RX ADMIN — RANOLAZINE 500 MILLIGRAM(S): 500 TABLET, FILM COATED, EXTENDED RELEASE ORAL at 18:34

## 2022-12-10 RX ADMIN — TICAGRELOR 90 MILLIGRAM(S): 90 TABLET ORAL at 18:35

## 2022-12-10 RX ADMIN — Medication 400 MILLIGRAM(S): at 14:51

## 2022-12-10 RX ADMIN — Medication 30 MILLIGRAM(S): at 09:50

## 2022-12-10 RX ADMIN — GABAPENTIN 100 MILLIGRAM(S): 400 CAPSULE ORAL at 11:34

## 2022-12-10 RX ADMIN — Medication 75 MILLIGRAM(S): at 14:51

## 2022-12-10 RX ADMIN — HEPARIN SODIUM 5000 UNIT(S): 5000 INJECTION INTRAVENOUS; SUBCUTANEOUS at 14:51

## 2022-12-10 RX ADMIN — CEFTRIAXONE 100 MILLIGRAM(S): 500 INJECTION, POWDER, FOR SOLUTION INTRAMUSCULAR; INTRAVENOUS at 11:35

## 2022-12-10 RX ADMIN — TICAGRELOR 90 MILLIGRAM(S): 90 TABLET ORAL at 05:35

## 2022-12-10 RX ADMIN — PANTOPRAZOLE SODIUM 40 MILLIGRAM(S): 20 TABLET, DELAYED RELEASE ORAL at 05:30

## 2022-12-10 RX ADMIN — Medication 400 MILLIGRAM(S): at 05:28

## 2022-12-10 RX ADMIN — Medication 81 MILLIGRAM(S): at 11:34

## 2022-12-10 RX ADMIN — LEVETIRACETAM 750 MILLIGRAM(S): 250 TABLET, FILM COATED ORAL at 05:28

## 2022-12-10 NOTE — PROGRESS NOTE ADULT - SUBJECTIVE AND OBJECTIVE BOX
ERICK MARTINEZ  77y  [facility]-N ER Hold 056 A      Patient is a 77y old  Male who presents with a chief complaint of AMS (08 Dec 2022 03:18)      INTERVAL HPI/OVERNIGHT EVENTS:  Patient is awake and more interactive   no overnight events                 FAMILY HISTORY:    T(C): 36.8 (12-09-22 @ 08:03), Max: 37 (12-09-22 @ 00:34)  HR: 100 (12-09-22 @ 08:03) (84 - 100)  BP: 188/86 (12-09-22 @ 11:44) (168/72 - 188/86)  RR: 18 (12-09-22 @ 08:03) (18 - 18)  SpO2: 95% (12-09-22 @ 08:03) (95% - 97%)  Wt(kg): --Vital Signs Last 24 Hrs  T(C): 36.8 (09 Dec 2022 08:03), Max: 37 (09 Dec 2022 00:34)  T(F): 98.2 (09 Dec 2022 08:03), Max: 98.6 (09 Dec 2022 00:34)  HR: 100 (09 Dec 2022 08:03) (84 - 100)  BP: 188/86 (09 Dec 2022 11:44) (168/72 - 188/86)  BP(mean): 124 (09 Dec 2022 08:03) (124 - 124)  RR: 18 (09 Dec 2022 08:03) (18 - 18)  SpO2: 95% (09 Dec 2022 08:03) (95% - 97%)    Parameters below as of 09 Dec 2022 08:03  Patient On (Oxygen Delivery Method): room air        PHYSICAL EXAM:  GENERAL: NAD, well-groomed, well-developed  NERVOUS SYSTEM:  Alert & Oriented X3  PULM: Clear to auscultation bilaterally  CARDIAC: Regular rate and rhythm;  GI: Soft, Nontender, Nondistended; Bowel sounds present  EXTREMITIES:  2+ Peripheral Pulses    Consultant(s) Notes Reviewed:  [x ] YES  [ ] NO  Care Discussed with Consultants/Other Providers [ x] YES  [ ] NO    LABS:                            11.9   8.72  )-----------( 293      ( 09 Dec 2022 07:51 )             34.7   12-09    137  |  102  |  19  ----------------------------<  110<H>  3.7   |  19  |  1.6<H>    Ca    9.2      09 Dec 2022 07:51  Mg     1.9     12-09    TPro  6.3  /  Alb  3.9  /  TBili  0.5  /  DBili  x   /  AST  31  /  ALT  19  /  AlkPhos  37  12-09            acetaminophen     Tablet .. 650 milliGRAM(s) Oral every 6 hours PRN  aspirin  chewable 81 milliGRAM(s) Oral daily  atorvastatin 80 milliGRAM(s) Oral at bedtime  cefTRIAXone   IVPB 1000 milliGRAM(s) IV Intermittent every 24 hours  fenofibrate Tablet 145 milliGRAM(s) Oral daily  gabapentin 100 milliGRAM(s) Oral daily  gabapentin 200 milliGRAM(s) Oral at bedtime  heparin   Injectable 5000 Unit(s) SubCutaneous every 8 hours  isosorbide   mononitrate ER Tablet (IMDUR) 60 milliGRAM(s) Oral daily  levETIRAcetam 750 milliGRAM(s) Oral two times a day  metoprolol succinate ER 25 milliGRAM(s) Oral daily  pantoprazole    Tablet 40 milliGRAM(s) Oral before breakfast  pentoxifylline 400 milliGRAM(s) Oral every 8 hours  polyethylene glycol 3350 17 Gram(s) Oral two times a day PRN  ranolazine 500 milliGRAM(s) Oral two times a day  senna 2 Tablet(s) Oral at bedtime  tamsulosin 0.4 milliGRAM(s) Oral at bedtime  ticagrelor 90 milliGRAM(s) Oral two times a day  venlafaxine XR. 75 milliGRAM(s) Oral daily      Pt is a 77 year old male, past medical history CAD s/p CABG 2017, HTN, DM, HLD, GERD, BPH, CVA x2 with bilateral residual weakness (L>R) was brought in by EMS from [facility] for altered mental status.    1. Metabolic encephalopathy secondary to UTI vs seizure reoslving   * Code stroke was called in the ED   - Admit to medicine                  - NIHSS 13      -u/a:positive   - CTH negative for acute infarct  - CTA and perfusion showing stable changes from prior infarct  - troponin negative x1, ECG showing no acute changes  - Cont  rocephin   - Blood cx:NTD         - Urine cx:pending   - Routine EEG:  a) Consistent with diffuse cerebral electrophysiological dysfunction.    Secondary to none specific cause., Consistent with focal   electrophysiological dysfunction.  Secondary to structural/metabolic   cause.   - Cont Keppra 750mg po bid   - Keppra levels:pending   - Neuro consult appreciated. Awaiting follow up :pending   - PT eval:  a) Need rehab     2.CAD s/p CABG in 2017/HTN/h/o CVA x 2 with bilateral residual weakness  - continue with home aspirin, brilinta, atorvastatin, metoprolol, isosorbide mononitrate, ranexa  - check a1c, lipid panel, tsh    3. DM-II   - hold home PO meds  - Hgba1c:6.4   - monitor FS. if >180, start S/S    4. HLD  - continue with home statin    5. CKD III  - stable, monitor    6. GERD  - continue with protonix    7. BPH  - continue with flomax    #Misc:  #DVT PPX: heparin  #GI PPX: protonix  #Diet: DASH/CC    Patient can be dc once STR bed is available. Also need follow up from neuro    Case Discussed with House Staff   26 minutes spent on total encounter; more than 50% of the visit was spent counseling and/or coordinating care by the attending physician.   Spectra [phone]

## 2022-12-11 ENCOUNTER — TRANSCRIPTION ENCOUNTER (OUTPATIENT)
Age: 77
End: 2022-12-11

## 2022-12-11 VITALS — DIASTOLIC BLOOD PRESSURE: 60 MMHG | SYSTOLIC BLOOD PRESSURE: 123 MMHG

## 2022-12-11 LAB
ALBUMIN SERPL ELPH-MCNC: 3.7 G/DL — SIGNIFICANT CHANGE UP (ref 3.5–5.2)
ALP SERPL-CCNC: 36 U/L — SIGNIFICANT CHANGE UP (ref 30–115)
ALT FLD-CCNC: 17 U/L — SIGNIFICANT CHANGE UP (ref 0–41)
ANION GAP SERPL CALC-SCNC: 14 MMOL/L — SIGNIFICANT CHANGE UP (ref 7–14)
AST SERPL-CCNC: 27 U/L — SIGNIFICANT CHANGE UP (ref 0–41)
BASOPHILS # BLD AUTO: 0.04 K/UL — SIGNIFICANT CHANGE UP (ref 0–0.2)
BASOPHILS NFR BLD AUTO: 0.7 % — SIGNIFICANT CHANGE UP (ref 0–1)
BILIRUB SERPL-MCNC: 0.4 MG/DL — SIGNIFICANT CHANGE UP (ref 0.2–1.2)
BUN SERPL-MCNC: 20 MG/DL — SIGNIFICANT CHANGE UP (ref 10–20)
CALCIUM SERPL-MCNC: 9.2 MG/DL — SIGNIFICANT CHANGE UP (ref 8.4–10.5)
CHLORIDE SERPL-SCNC: 102 MMOL/L — SIGNIFICANT CHANGE UP (ref 98–110)
CO2 SERPL-SCNC: 25 MMOL/L — SIGNIFICANT CHANGE UP (ref 17–32)
CREAT SERPL-MCNC: 1.7 MG/DL — HIGH (ref 0.7–1.5)
EGFR: 41 ML/MIN/1.73M2 — LOW
EOSINOPHIL # BLD AUTO: 0.15 K/UL — SIGNIFICANT CHANGE UP (ref 0–0.7)
EOSINOPHIL NFR BLD AUTO: 2.7 % — SIGNIFICANT CHANGE UP (ref 0–8)
GLUCOSE BLDC GLUCOMTR-MCNC: 106 MG/DL — HIGH (ref 70–99)
GLUCOSE BLDC GLUCOMTR-MCNC: 152 MG/DL — HIGH (ref 70–99)
GLUCOSE SERPL-MCNC: 104 MG/DL — HIGH (ref 70–99)
HCT VFR BLD CALC: 34.7 % — LOW (ref 42–52)
HGB BLD-MCNC: 11.4 G/DL — LOW (ref 14–18)
IMM GRANULOCYTES NFR BLD AUTO: 0.7 % — HIGH (ref 0.1–0.3)
LYMPHOCYTES # BLD AUTO: 1.42 K/UL — SIGNIFICANT CHANGE UP (ref 1.2–3.4)
LYMPHOCYTES # BLD AUTO: 25.6 % — SIGNIFICANT CHANGE UP (ref 20.5–51.1)
MAGNESIUM SERPL-MCNC: 2.1 MG/DL — SIGNIFICANT CHANGE UP (ref 1.8–2.4)
MCHC RBC-ENTMCNC: 28.3 PG — SIGNIFICANT CHANGE UP (ref 27–31)
MCHC RBC-ENTMCNC: 32.9 G/DL — SIGNIFICANT CHANGE UP (ref 32–37)
MCV RBC AUTO: 86.1 FL — SIGNIFICANT CHANGE UP (ref 80–94)
MONOCYTES # BLD AUTO: 0.53 K/UL — SIGNIFICANT CHANGE UP (ref 0.1–0.6)
MONOCYTES NFR BLD AUTO: 9.5 % — HIGH (ref 1.7–9.3)
NEUTROPHILS # BLD AUTO: 3.37 K/UL — SIGNIFICANT CHANGE UP (ref 1.4–6.5)
NEUTROPHILS NFR BLD AUTO: 60.8 % — SIGNIFICANT CHANGE UP (ref 42.2–75.2)
NRBC # BLD: 0 /100 WBCS — SIGNIFICANT CHANGE UP (ref 0–0)
PLATELET # BLD AUTO: 308 K/UL — SIGNIFICANT CHANGE UP (ref 130–400)
POTASSIUM SERPL-MCNC: 4 MMOL/L — SIGNIFICANT CHANGE UP (ref 3.5–5)
POTASSIUM SERPL-SCNC: 4 MMOL/L — SIGNIFICANT CHANGE UP (ref 3.5–5)
PROT SERPL-MCNC: 6.3 G/DL — SIGNIFICANT CHANGE UP (ref 6–8)
RBC # BLD: 4.03 M/UL — LOW (ref 4.7–6.1)
RBC # FLD: 15.9 % — HIGH (ref 11.5–14.5)
SODIUM SERPL-SCNC: 141 MMOL/L — SIGNIFICANT CHANGE UP (ref 135–146)
WBC # BLD: 5.55 K/UL — SIGNIFICANT CHANGE UP (ref 4.8–10.8)
WBC # FLD AUTO: 5.55 K/UL — SIGNIFICANT CHANGE UP (ref 4.8–10.8)

## 2022-12-11 PROCEDURE — 99239 HOSP IP/OBS DSCHRG MGMT >30: CPT

## 2022-12-11 RX ORDER — METOPROLOL TARTRATE 50 MG
1 TABLET ORAL
Qty: 0 | Refills: 0 | DISCHARGE
Start: 2022-12-11

## 2022-12-11 RX ADMIN — Medication 81 MILLIGRAM(S): at 12:11

## 2022-12-11 RX ADMIN — ISOSORBIDE MONONITRATE 60 MILLIGRAM(S): 60 TABLET, EXTENDED RELEASE ORAL at 12:11

## 2022-12-11 RX ADMIN — Medication 400 MILLIGRAM(S): at 14:11

## 2022-12-11 RX ADMIN — TICAGRELOR 90 MILLIGRAM(S): 90 TABLET ORAL at 07:47

## 2022-12-11 RX ADMIN — Medication 30 MILLIGRAM(S): at 05:46

## 2022-12-11 RX ADMIN — RANOLAZINE 500 MILLIGRAM(S): 500 TABLET, FILM COATED, EXTENDED RELEASE ORAL at 05:44

## 2022-12-11 RX ADMIN — HEPARIN SODIUM 5000 UNIT(S): 5000 INJECTION INTRAVENOUS; SUBCUTANEOUS at 05:46

## 2022-12-11 RX ADMIN — Medication 400 MILLIGRAM(S): at 05:48

## 2022-12-11 RX ADMIN — Medication 75 MILLIGRAM(S): at 12:11

## 2022-12-11 RX ADMIN — Medication 25 MILLIGRAM(S): at 05:44

## 2022-12-11 RX ADMIN — LEVETIRACETAM 750 MILLIGRAM(S): 250 TABLET, FILM COATED ORAL at 05:45

## 2022-12-11 RX ADMIN — HEPARIN SODIUM 5000 UNIT(S): 5000 INJECTION INTRAVENOUS; SUBCUTANEOUS at 14:11

## 2022-12-11 RX ADMIN — Medication 145 MILLIGRAM(S): at 12:11

## 2022-12-11 RX ADMIN — PANTOPRAZOLE SODIUM 40 MILLIGRAM(S): 20 TABLET, DELAYED RELEASE ORAL at 05:47

## 2022-12-11 RX ADMIN — GABAPENTIN 100 MILLIGRAM(S): 400 CAPSULE ORAL at 12:12

## 2022-12-11 NOTE — DISCHARGE NOTE PROVIDER - HOSPITAL COURSE
77 year old male, past medical history CAD s/p CABG 2017, HTN, DM, HLD, GERD, BPH, CVA x2 with bilateral residual weakness (L>R) was brought in by EMS from [facility] for altered mental status.    1. Metabolic encephalopathy secondary to UTI - resolved   * Code stroke was called in the ED   - CTH negative for acute infarct  - CTA and perfusion showing stable changes from prior infarct  - completed course of Rocephin   - Blood cx:NTD         - Urine cx:pending   - Routine EEG: Consistent with diffuse cerebral electrophysiological dysfunction.          2. CAD s/p CABG in 2017/HTN/h/o CVA x 2 with bilateral residual weakness  - continue with home aspirin, brilinta, atorvastatin, metoprolol, isosorbide mononitrate, Ranexa    3. DM-II   - hold home PO meds  - Hgba1c:6.4   - monitor FS. if >180, start S/S    4. HLD  - continue with home statin    5. CKD III  - stable, monitor    6. GERD  - continue with protonix    7. BPH  - continue with flomax

## 2022-12-11 NOTE — DISCHARGE NOTE NURSING/CASE MANAGEMENT/SOCIAL WORK - PATIENT PORTAL LINK FT
You can access the FollowMyHealth Patient Portal offered by [facility] by registering at the following website: [email or web address]. By joining [facility]’s FollowMyHealth portal, you will also be able to view your health information using other applications (apps) compatible with our system.

## 2022-12-11 NOTE — DISCHARGE NOTE NURSING/CASE MANAGEMENT/SOCIAL WORK - NSDCPEFALRISK_GEN_ALL_CORE
For information on Fall & Injury Prevention, visit: [email or web address] OR  [email or web address] OR  https://www.cdc.gov/steadi/patient.html

## 2022-12-11 NOTE — DISCHARGE NOTE PROVIDER - NSDCFUSCHEDAPPT_GEN_ALL_CORE_FT
[facility]  CARDIOLOGY [address]  Scheduled Appointment: 12/12/2022    [facility]  NEUROLOGY [address]  Scheduled Appointment: 02/13/2023    [facility]  NEUROLOGY [address]  Scheduled Appointment: 02/13/2023

## 2022-12-11 NOTE — DISCHARGE NOTE PROVIDER - NSDCCPCAREPLAN_GEN_ALL_CORE_FT
PRINCIPAL DISCHARGE DIAGNOSIS  Diagnosis: Altered mental status  Assessment and Plan of Treatment: This was due to UTI, you completed your course of antibiotics. Neurological evaluation was performed. CT head was negative. EEG was negative.      SECONDARY DISCHARGE DIAGNOSES  Diagnosis: History of stroke  Assessment and Plan of Treatment:     Diagnosis: CAD (coronary artery disease)  Assessment and Plan of Treatment:     Diagnosis: Diabetes  Assessment and Plan of Treatment:

## 2022-12-11 NOTE — DISCHARGE NOTE PROVIDER - NSDCMRMEDTOKEN_GEN_ALL_CORE_FT
acetaminophen 325 mg oral tablet: 2 tab(s) orally every 6 hours, As Needed  aspirin 81 mg oral tablet: 1 tab(s) orally once a day  atorvastatin 80 mg oral tablet: 1 tab(s) orally once a day (at bedtime)  fenofibrate 120 mg oral tablet: 1 tab(s) orally once a day  gabapentin 100 mg oral capsule: 2 cap(s) orally once a day (at bedtime)  gabapentin 100 mg oral capsule: 1 cap(s) orally once a day  in am   isosorbide mononitrate 60 mg oral tablet, extended release: 1 tab(s) orally once a day MDD:1  levETIRAcetam 750 mg oral tablet: 1 tab(s) orally 2 times a day  linagliptin 5 mg oral tablet: 1 tab(s) orally once a day  metoprolol succinate 25 mg oral tablet, extended release: 1 tab(s) orally once a day  midodrine 2.5 mg oral tablet: 1 tab(s) orally 2 times a day: take 1st tab 20 to 30 minutes before getting out of bed in the morning and 2nd pill before lunch; must remain upright for at least one hour after taking medication  pantoprazole 40 mg oral delayed release tablet: 1 tab(s) orally once a day (before a meal)  Pentoxil 400 mg oral tablet, extended release: 1 tab(s) orally 3 times a day  polyethylene glycol 3350 oral powder for reconstitution: 17 gram(s) orally once a day, As needed, Constipation  Ranexa 500 mg oral tablet, extended release: 1 tab(s) orally 2 times a day  senna oral tablet: 1 or 2 tab(s) orally once a day (at bedtime), As needed, Constipation  tamsulosin 0.4 mg oral capsule: 1 cap(s) orally once a day  ticagrelor 90 mg oral tablet: 1 tab(s) orally every 12 hours  venlafaxine 75 mg oral capsule, extended release: 1 cap(s) orally once a day

## 2022-12-11 NOTE — DISCHARGE NOTE PROVIDER - CARE PROVIDERS DIRECT ADDRESSES
[email or web address], [email or web address]

## 2022-12-11 NOTE — DISCHARGE NOTE PROVIDER - CARE PROVIDER_API CALL
Charlie Michael)  Internal Medicine  [address] [place] [address]  Phone: [phone]  Fax: [phone]  Established Patient  Follow Up Time: 2 weeks

## 2022-12-11 NOTE — DISCHARGE NOTE NURSING/CASE MANAGEMENT/SOCIAL WORK - NSDCVIVACCINE_GEN_ALL_CORE_FT
Tdap; 20-Jul-2021 15:17; Dejuan Fry (RN); [employer]; [id number] (Exp. Date: 28-Jan-2023); IntraMuscular; Deltoid Left.; 0.5 milliLiter(s); VIS (VIS Published: 09-May-2013, VIS Presented: 20-Jul-2021);

## 2022-12-12 ENCOUNTER — NON-APPOINTMENT (OUTPATIENT)
Age: 77
End: 2022-12-12

## 2022-12-12 ENCOUNTER — APPOINTMENT (OUTPATIENT)
Dept: CARDIOLOGY | Facility: CLINIC | Age: 77
End: 2022-12-12
Payer: MEDICARE

## 2022-12-12 LAB
GLUCOSE BLDC GLUCOMTR-MCNC: 136 MG/DL — HIGH (ref 70–99)
LEVETIRACETAM SERPL-MCNC: 35.2 UG/ML — SIGNIFICANT CHANGE UP (ref 10–40)

## 2022-12-12 PROCEDURE — G2066: CPT

## 2022-12-12 PROCEDURE — 93298 REM INTERROG DEV EVAL SCRMS: CPT

## 2022-12-13 LAB
CULTURE RESULTS: SIGNIFICANT CHANGE UP
CULTURE RESULTS: SIGNIFICANT CHANGE UP
SPECIMEN SOURCE: SIGNIFICANT CHANGE UP
SPECIMEN SOURCE: SIGNIFICANT CHANGE UP

## 2022-12-15 DIAGNOSIS — I69.398 OTHER SEQUELAE OF CEREBRAL INFARCTION: ICD-10-CM

## 2022-12-15 DIAGNOSIS — N18.30 CHRONIC KIDNEY DISEASE, STAGE 3 UNSPECIFIED: ICD-10-CM

## 2022-12-15 DIAGNOSIS — G40.909 EPILEPSY, UNSPECIFIED, NOT INTRACTABLE, WITHOUT STATUS EPILEPTICUS: ICD-10-CM

## 2022-12-15 DIAGNOSIS — N39.0 URINARY TRACT INFECTION, SITE NOT SPECIFIED: ICD-10-CM

## 2022-12-15 DIAGNOSIS — E78.5 HYPERLIPIDEMIA, UNSPECIFIED: ICD-10-CM

## 2022-12-15 DIAGNOSIS — E11.22 TYPE 2 DIABETES MELLITUS WITH DIABETIC CHRONIC KIDNEY DISEASE: ICD-10-CM

## 2022-12-15 DIAGNOSIS — I25.10 ATHEROSCLEROTIC HEART DISEASE OF NATIVE CORONARY ARTERY WITHOUT ANGINA PECTORIS: ICD-10-CM

## 2022-12-15 DIAGNOSIS — N40.0 BENIGN PROSTATIC HYPERPLASIA WITHOUT LOWER URINARY TRACT SYMPTOMS: ICD-10-CM

## 2022-12-15 DIAGNOSIS — Z95.1 PRESENCE OF AORTOCORONARY BYPASS GRAFT: ICD-10-CM

## 2022-12-15 DIAGNOSIS — I12.9 HYPERTENSIVE CHRONIC KIDNEY DISEASE WITH STAGE 1 THROUGH STAGE 4 CHRONIC KIDNEY DISEASE, OR UNSPECIFIED CHRONIC KIDNEY DISEASE: ICD-10-CM

## 2022-12-15 DIAGNOSIS — K21.9 GASTRO-ESOPHAGEAL REFLUX DISEASE WITHOUT ESOPHAGITIS: ICD-10-CM

## 2022-12-15 DIAGNOSIS — G93.41 METABOLIC ENCEPHALOPATHY: ICD-10-CM

## 2022-12-22 ENCOUNTER — NON-APPOINTMENT (OUTPATIENT)
Age: 77
End: 2022-12-22

## 2023-01-13 ENCOUNTER — APPOINTMENT (OUTPATIENT)
Dept: CARDIOLOGY | Facility: CLINIC | Age: 78
End: 2023-01-13
Payer: COMMERCIAL

## 2023-01-13 ENCOUNTER — NON-APPOINTMENT (OUTPATIENT)
Age: 78
End: 2023-01-13

## 2023-01-13 PROCEDURE — 93298 REM INTERROG DEV EVAL SCRMS: CPT

## 2023-01-13 PROCEDURE — G2066: CPT

## 2023-02-13 ENCOUNTER — APPOINTMENT (OUTPATIENT)
Dept: NEUROLOGY | Facility: CLINIC | Age: 78
End: 2023-02-13

## 2023-02-17 ENCOUNTER — APPOINTMENT (OUTPATIENT)
Dept: CARDIOLOGY | Facility: CLINIC | Age: 78
End: 2023-02-17
Payer: MEDICARE

## 2023-02-17 ENCOUNTER — NON-APPOINTMENT (OUTPATIENT)
Age: 78
End: 2023-02-17

## 2023-02-17 PROCEDURE — 93298 REM INTERROG DEV EVAL SCRMS: CPT

## 2023-02-17 PROCEDURE — G2066: CPT

## 2023-03-22 ENCOUNTER — NON-APPOINTMENT (OUTPATIENT)
Age: 78
End: 2023-03-22

## 2023-03-22 ENCOUNTER — APPOINTMENT (OUTPATIENT)
Dept: CARDIOLOGY | Facility: CLINIC | Age: 78
End: 2023-03-22
Payer: MEDICARE

## 2023-03-22 PROCEDURE — 93298 REM INTERROG DEV EVAL SCRMS: CPT

## 2023-03-22 PROCEDURE — G2066: CPT

## 2023-03-27 ENCOUNTER — APPOINTMENT (OUTPATIENT)
Dept: ELECTROPHYSIOLOGY | Facility: CLINIC | Age: 78
End: 2023-03-27

## 2023-04-12 ENCOUNTER — NON-APPOINTMENT (OUTPATIENT)
Age: 78
End: 2023-04-12

## 2023-04-13 ENCOUNTER — INPATIENT (INPATIENT)
Facility: HOSPITAL | Age: 78
LOS: 3 days | Discharge: HOME HEALTH | DRG: 872 | End: 2023-04-17
Attending: HOSPITALIST | Admitting: HOSPITALIST
Payer: MEDICARE

## 2023-04-13 VITALS
SYSTOLIC BLOOD PRESSURE: 158 MMHG | RESPIRATION RATE: 18 BRPM | OXYGEN SATURATION: 94 % | TEMPERATURE: 100 F | HEART RATE: 84 BPM | DIASTOLIC BLOOD PRESSURE: 63 MMHG | HEIGHT: 61 IN | WEIGHT: 126.1 LBS

## 2023-04-13 DIAGNOSIS — H26.9 UNSPECIFIED CATARACT: Chronic | ICD-10-CM

## 2023-04-13 DIAGNOSIS — Z95.1 PRESENCE OF AORTOCORONARY BYPASS GRAFT: Chronic | ICD-10-CM

## 2023-04-13 DIAGNOSIS — Z90.49 ACQUIRED ABSENCE OF OTHER SPECIFIED PARTS OF DIGESTIVE TRACT: Chronic | ICD-10-CM

## 2023-04-13 LAB
ALBUMIN SERPL ELPH-MCNC: 4.2 G/DL — SIGNIFICANT CHANGE UP (ref 3.5–5.2)
ALP SERPL-CCNC: 44 U/L — SIGNIFICANT CHANGE UP (ref 30–115)
ALT FLD-CCNC: 10 U/L — SIGNIFICANT CHANGE UP (ref 0–41)
ANION GAP SERPL CALC-SCNC: 14 MMOL/L — SIGNIFICANT CHANGE UP (ref 7–14)
AST SERPL-CCNC: 30 U/L — SIGNIFICANT CHANGE UP (ref 0–41)
BASOPHILS # BLD AUTO: 0.03 K/UL — SIGNIFICANT CHANGE UP (ref 0–0.2)
BASOPHILS NFR BLD AUTO: 0.2 % — SIGNIFICANT CHANGE UP (ref 0–1)
BILIRUB SERPL-MCNC: 0.3 MG/DL — SIGNIFICANT CHANGE UP (ref 0.2–1.2)
BUN SERPL-MCNC: 29 MG/DL — HIGH (ref 10–20)
CALCIUM SERPL-MCNC: 9.5 MG/DL — SIGNIFICANT CHANGE UP (ref 8.4–10.5)
CHLORIDE SERPL-SCNC: 100 MMOL/L — SIGNIFICANT CHANGE UP (ref 98–110)
CO2 SERPL-SCNC: 22 MMOL/L — SIGNIFICANT CHANGE UP (ref 17–32)
CREAT SERPL-MCNC: 1.8 MG/DL — HIGH (ref 0.7–1.5)
EGFR: 38 ML/MIN/1.73M2 — LOW
EOSINOPHIL # BLD AUTO: 0.03 K/UL — SIGNIFICANT CHANGE UP (ref 0–0.7)
EOSINOPHIL NFR BLD AUTO: 0.2 % — SIGNIFICANT CHANGE UP (ref 0–8)
GLUCOSE SERPL-MCNC: 105 MG/DL — HIGH (ref 70–99)
HCT VFR BLD CALC: 39.1 % — LOW (ref 42–52)
HGB BLD-MCNC: 12.3 G/DL — LOW (ref 14–18)
IMM GRANULOCYTES NFR BLD AUTO: 0.4 % — HIGH (ref 0.1–0.3)
LIDOCAIN IGE QN: 73 U/L — HIGH (ref 7–60)
LYMPHOCYTES # BLD AUTO: 0.85 K/UL — LOW (ref 1.2–3.4)
LYMPHOCYTES # BLD AUTO: 6.3 % — LOW (ref 20.5–51.1)
MCHC RBC-ENTMCNC: 27.7 PG — SIGNIFICANT CHANGE UP (ref 27–31)
MCHC RBC-ENTMCNC: 31.5 G/DL — LOW (ref 32–37)
MCV RBC AUTO: 88.1 FL — SIGNIFICANT CHANGE UP (ref 80–94)
MONOCYTES # BLD AUTO: 0.42 K/UL — SIGNIFICANT CHANGE UP (ref 0.1–0.6)
MONOCYTES NFR BLD AUTO: 3.1 % — SIGNIFICANT CHANGE UP (ref 1.7–9.3)
NEUTROPHILS # BLD AUTO: 12.03 K/UL — HIGH (ref 1.4–6.5)
NEUTROPHILS NFR BLD AUTO: 89.8 % — HIGH (ref 42.2–75.2)
NRBC # BLD: 0 /100 WBCS — SIGNIFICANT CHANGE UP (ref 0–0)
PLATELET # BLD AUTO: 394 K/UL — SIGNIFICANT CHANGE UP (ref 130–400)
PMV BLD: 10.8 FL — HIGH (ref 7.4–10.4)
POTASSIUM SERPL-MCNC: 5.6 MMOL/L — HIGH (ref 3.5–5)
POTASSIUM SERPL-SCNC: 5.6 MMOL/L — HIGH (ref 3.5–5)
PROT SERPL-MCNC: 7.1 G/DL — SIGNIFICANT CHANGE UP (ref 6–8)
RBC # BLD: 4.44 M/UL — LOW (ref 4.7–6.1)
RBC # FLD: 15.4 % — HIGH (ref 11.5–14.5)
SODIUM SERPL-SCNC: 136 MMOL/L — SIGNIFICANT CHANGE UP (ref 135–146)
WBC # BLD: 13.41 K/UL — HIGH (ref 4.8–10.8)
WBC # FLD AUTO: 13.41 K/UL — HIGH (ref 4.8–10.8)

## 2023-04-13 PROCEDURE — 93010 ELECTROCARDIOGRAM REPORT: CPT

## 2023-04-13 PROCEDURE — 99285 EMERGENCY DEPT VISIT HI MDM: CPT | Mod: FS

## 2023-04-13 PROCEDURE — 71045 X-RAY EXAM CHEST 1 VIEW: CPT | Mod: 26

## 2023-04-13 RX ORDER — SODIUM CHLORIDE 9 MG/ML
1000 INJECTION INTRAMUSCULAR; INTRAVENOUS; SUBCUTANEOUS ONCE
Refills: 0 | Status: COMPLETED | OUTPATIENT
Start: 2023-04-13 | End: 2023-04-13

## 2023-04-13 RX ORDER — ACETAMINOPHEN 500 MG
650 TABLET ORAL ONCE
Refills: 0 | Status: COMPLETED | OUTPATIENT
Start: 2023-04-13 | End: 2023-04-13

## 2023-04-13 RX ORDER — AZTREONAM 2 G
2000 VIAL (EA) INJECTION ONCE
Refills: 0 | Status: COMPLETED | OUTPATIENT
Start: 2023-04-13 | End: 2023-04-13

## 2023-04-13 RX ADMIN — Medication 100 MILLIGRAM(S): at 21:44

## 2023-04-13 RX ADMIN — Medication 650 MILLIGRAM(S): at 21:13

## 2023-04-13 RX ADMIN — SODIUM CHLORIDE 1000 MILLILITER(S): 9 INJECTION INTRAMUSCULAR; INTRAVENOUS; SUBCUTANEOUS at 21:44

## 2023-04-13 RX ADMIN — Medication 650 MILLIGRAM(S): at 23:00

## 2023-04-13 NOTE — ED ADULT NURSE NOTE - CHIEF COMPLAINT QUOTE
[place] [facility] [place]. pt c/o abdominal pain for last 3-4 days

## 2023-04-13 NOTE — ED ADULT TRIAGE NOTE - CHIEF COMPLAINT QUOTE
pt [place] [facility] [place] on urine dip. pt c/o abdominal pain for last 3-4 days

## 2023-04-13 NOTE — ED PROVIDER NOTE - CARE PLAN
Principal Discharge DX:	Acute cystitis  Secondary Diagnosis:	Chronic kidney disease (CKD)  Secondary Diagnosis:	Fever   1

## 2023-04-13 NOTE — ED PROVIDER NOTE - OBJECTIVE STATEMENT
77 yold male to ED [facility] Cad/Cabg 2017, Htn, Hld, Dm, Gerd, Bph, Cva with left side weakness; pt presents to ED c/o abdominal pain x 4 days lower abdomen with nausea, chills; pain constant also associated with dysuria, frequency, urgency; pt went to local UCC and UA + uti; pt also noted to have fever 100.7; no prior hx recurrent uti;

## 2023-04-13 NOTE — ED PROVIDER NOTE - PHYSICAL EXAMINATION
Constitutional: Well developed, well nourished. NAD  Head: Normocephalic, atraumatic.  Eyes: PERRL, EOMI.  ENT: No nasal discharge. Mucous membranes dry.  Neck: Supple. Painless ROM.  Cardiovascular:  Regular rate and rhythm.    Pulmonary:  Lungs clear to auscultation bilaterally.    Abdominal: Soft + tenderness diffusely lower abdomen without rebound, guarding or flank pain  Extremities. Pelvis stable. No lower extremity edema, symmetric calves.  Skin: No rashes, cyanosis.  Neuro: AAOx3 + residual left rakesh plegia s/p prior cva  Psych: Normal mood. Normal affect.

## 2023-04-13 NOTE — ED PROVIDER NOTE - ATTENDING APP SHARED VISIT CONTRIBUTION OF CARE
77-year-old male with PMH CAD s/p CABG, HTN, HLD, DM, BPH, CVA with previous left hemiplegia, HLD, GERD presents for evaluation of abdominal discomfort x 4 days. Pt reports dysuria, urgency, nausea and chills.  + fever today 100.7.  Patient denies any diarrhea.  No chest pain, shortness of breath, cough, headache or weakness.    VITAL SIGNS: noted  CONSTITUTIONAL: Well-developed; well-nourished; in no acute distress  HEAD: Normocephalic; atraumatic  EYES: PERRL, EOM intact; conjunctiva and sclera clear  ENT: No nasal discharge; airway clear. MMM  NECK: Supple; non tender.    CARD: S1, S2 normal; no murmurs, gallops, or rubs. Regular rate and rhythm  RESP: CTAB/L, no wheezes, rales or rhonchi  ABD: Normal bowel sounds; soft; non-distended; + lower abdominal tenderness bilateral, no rebound or guarding, no CVA tenderness  EXT: Normal ROM. No calf tenderness or edema. Distal pulses intact  NEURO: Alert, oriented. Grossly unremarkable. No focal deficits  SKIN: Skin exam is warm and dry

## 2023-04-13 NOTE — ED PROVIDER NOTE - CLINICAL SUMMARY MEDICAL DECISION MAKING FREE TEXT BOX
Patient evaluated for abdominal discomfort, dysuria and frequency, found to have UTI, treated with aztreonam due to allergy to penicillin.  Laboratory reviewed, patient with CKD.  CT abdomen pelvis results noted, consistent with cystitis.  Patient's admitted for IV antibiotics for UTI.

## 2023-04-13 NOTE — ED ADULT NURSE NOTE - OBJECTIVE STATEMENT
[place] [facility] [place]. pt c/o abdominal pain for last 3-4 days

## 2023-04-14 ENCOUNTER — TRANSCRIPTION ENCOUNTER (OUTPATIENT)
Age: 78
End: 2023-04-14

## 2023-04-14 DIAGNOSIS — N30.00 ACUTE CYSTITIS WITHOUT HEMATURIA: ICD-10-CM

## 2023-04-14 LAB
A1C WITH ESTIMATED AVERAGE GLUCOSE RESULT: 6.6 % — HIGH (ref 4–5.6)
ALBUMIN SERPL ELPH-MCNC: 3.7 G/DL — SIGNIFICANT CHANGE UP (ref 3.5–5.2)
ALP SERPL-CCNC: 37 U/L — SIGNIFICANT CHANGE UP (ref 30–115)
ALT FLD-CCNC: 9 U/L — SIGNIFICANT CHANGE UP (ref 0–41)
ANION GAP SERPL CALC-SCNC: 12 MMOL/L — SIGNIFICANT CHANGE UP (ref 7–14)
APPEARANCE UR: ABNORMAL
AST SERPL-CCNC: 19 U/L — SIGNIFICANT CHANGE UP (ref 0–41)
BACTERIA # UR AUTO: ABNORMAL
BASOPHILS # BLD AUTO: 0.04 K/UL — SIGNIFICANT CHANGE UP (ref 0–0.2)
BASOPHILS NFR BLD AUTO: 0.3 % — SIGNIFICANT CHANGE UP (ref 0–1)
BILIRUB SERPL-MCNC: 0.5 MG/DL — SIGNIFICANT CHANGE UP (ref 0.2–1.2)
BILIRUB UR-MCNC: NEGATIVE — SIGNIFICANT CHANGE UP
BUN SERPL-MCNC: 25 MG/DL — HIGH (ref 10–20)
CALCIUM SERPL-MCNC: 8.7 MG/DL — SIGNIFICANT CHANGE UP (ref 8.4–10.5)
CHLORIDE SERPL-SCNC: 105 MMOL/L — SIGNIFICANT CHANGE UP (ref 98–110)
CHOLEST SERPL-MCNC: 130 MG/DL — SIGNIFICANT CHANGE UP
CO2 SERPL-SCNC: 22 MMOL/L — SIGNIFICANT CHANGE UP (ref 17–32)
COLOR SPEC: SIGNIFICANT CHANGE UP
CREAT SERPL-MCNC: 1.7 MG/DL — HIGH (ref 0.7–1.5)
DIFF PNL FLD: ABNORMAL
EGFR: 41 ML/MIN/1.73M2 — LOW
EOSINOPHIL # BLD AUTO: 0.01 K/UL — SIGNIFICANT CHANGE UP (ref 0–0.7)
EOSINOPHIL NFR BLD AUTO: 0.1 % — SIGNIFICANT CHANGE UP (ref 0–8)
EPI CELLS # UR: 0 /HPF — SIGNIFICANT CHANGE UP (ref 0–5)
ESTIMATED AVERAGE GLUCOSE: 143 MG/DL — HIGH (ref 68–114)
FLUAV AG NPH QL: SIGNIFICANT CHANGE UP
FLUBV AG NPH QL: SIGNIFICANT CHANGE UP
GLUCOSE BLDC GLUCOMTR-MCNC: 113 MG/DL — HIGH (ref 70–99)
GLUCOSE BLDC GLUCOMTR-MCNC: 125 MG/DL — HIGH (ref 70–99)
GLUCOSE BLDC GLUCOMTR-MCNC: 136 MG/DL — HIGH (ref 70–99)
GLUCOSE BLDC GLUCOMTR-MCNC: 152 MG/DL — HIGH (ref 70–99)
GLUCOSE SERPL-MCNC: 101 MG/DL — HIGH (ref 70–99)
GLUCOSE UR QL: NEGATIVE — SIGNIFICANT CHANGE UP
HCT VFR BLD CALC: 33.8 % — LOW (ref 42–52)
HDLC SERPL-MCNC: 48 MG/DL — SIGNIFICANT CHANGE UP
HGB BLD-MCNC: 10.6 G/DL — LOW (ref 14–18)
HYALINE CASTS # UR AUTO: 2 /LPF — SIGNIFICANT CHANGE UP (ref 0–7)
IMM GRANULOCYTES NFR BLD AUTO: 0.7 % — HIGH (ref 0.1–0.3)
KETONES UR-MCNC: NEGATIVE — SIGNIFICANT CHANGE UP
LACTATE SERPL-SCNC: 2.6 MMOL/L — HIGH (ref 0.7–2)
LEUKOCYTE ESTERASE UR-ACNC: ABNORMAL
LIPID PNL WITH DIRECT LDL SERPL: 58 MG/DL — SIGNIFICANT CHANGE UP
LYMPHOCYTES # BLD AUTO: 0.87 K/UL — LOW (ref 1.2–3.4)
LYMPHOCYTES # BLD AUTO: 6.7 % — LOW (ref 20.5–51.1)
MAGNESIUM SERPL-MCNC: 2.1 MG/DL — SIGNIFICANT CHANGE UP (ref 1.8–2.4)
MCHC RBC-ENTMCNC: 27.6 PG — SIGNIFICANT CHANGE UP (ref 27–31)
MCHC RBC-ENTMCNC: 31.4 G/DL — LOW (ref 32–37)
MCV RBC AUTO: 88 FL — SIGNIFICANT CHANGE UP (ref 80–94)
MONOCYTES # BLD AUTO: 0.54 K/UL — SIGNIFICANT CHANGE UP (ref 0.1–0.6)
MONOCYTES NFR BLD AUTO: 4.2 % — SIGNIFICANT CHANGE UP (ref 1.7–9.3)
NEUTROPHILS # BLD AUTO: 11.39 K/UL — HIGH (ref 1.4–6.5)
NEUTROPHILS NFR BLD AUTO: 88 % — HIGH (ref 42.2–75.2)
NITRITE UR-MCNC: POSITIVE
NON HDL CHOLESTEROL: 82 MG/DL — SIGNIFICANT CHANGE UP
NRBC # BLD: 0 /100 WBCS — SIGNIFICANT CHANGE UP (ref 0–0)
PH UR: 7 — SIGNIFICANT CHANGE UP (ref 5–8)
PLATELET # BLD AUTO: 289 K/UL — SIGNIFICANT CHANGE UP (ref 130–400)
PMV BLD: 10.6 FL — HIGH (ref 7.4–10.4)
POTASSIUM SERPL-MCNC: 4.1 MMOL/L — SIGNIFICANT CHANGE UP (ref 3.5–5)
POTASSIUM SERPL-SCNC: 4.1 MMOL/L — SIGNIFICANT CHANGE UP (ref 3.5–5)
PROT SERPL-MCNC: 6 G/DL — SIGNIFICANT CHANGE UP (ref 6–8)
PROT UR-MCNC: ABNORMAL
RBC # BLD: 3.84 M/UL — LOW (ref 4.7–6.1)
RBC # FLD: 15.3 % — HIGH (ref 11.5–14.5)
RBC CASTS # UR COMP ASSIST: 0 /HPF — SIGNIFICANT CHANGE UP (ref 0–4)
RSV RNA NPH QL NAA+NON-PROBE: SIGNIFICANT CHANGE UP
SARS-COV-2 RNA SPEC QL NAA+PROBE: SIGNIFICANT CHANGE UP
SODIUM SERPL-SCNC: 139 MMOL/L — SIGNIFICANT CHANGE UP (ref 135–146)
SP GR SPEC: 1.02 — SIGNIFICANT CHANGE UP (ref 1.01–1.03)
TRIGL SERPL-MCNC: 120 MG/DL — SIGNIFICANT CHANGE UP
TSH SERPL-MCNC: 0.89 UIU/ML — SIGNIFICANT CHANGE UP (ref 0.27–4.2)
UROBILINOGEN FLD QL: SIGNIFICANT CHANGE UP
WBC # BLD: 12.94 K/UL — HIGH (ref 4.8–10.8)
WBC # FLD AUTO: 12.94 K/UL — HIGH (ref 4.8–10.8)
WBC UR QL: 278 /HPF — HIGH (ref 0–5)

## 2023-04-14 PROCEDURE — 83036 HEMOGLOBIN GLYCOSYLATED A1C: CPT

## 2023-04-14 PROCEDURE — 84443 ASSAY THYROID STIM HORMONE: CPT

## 2023-04-14 PROCEDURE — 99223 1ST HOSP IP/OBS HIGH 75: CPT

## 2023-04-14 PROCEDURE — 87040 BLOOD CULTURE FOR BACTERIA: CPT

## 2023-04-14 PROCEDURE — 82962 GLUCOSE BLOOD TEST: CPT

## 2023-04-14 PROCEDURE — 74176 CT ABD & PELVIS W/O CONTRAST: CPT | Mod: 26,MA

## 2023-04-14 PROCEDURE — 85025 COMPLETE CBC W/AUTO DIFF WBC: CPT

## 2023-04-14 PROCEDURE — 36415 COLL VENOUS BLD VENIPUNCTURE: CPT

## 2023-04-14 PROCEDURE — 97162 PT EVAL MOD COMPLEX 30 MIN: CPT | Mod: GP

## 2023-04-14 PROCEDURE — 87086 URINE CULTURE/COLONY COUNT: CPT

## 2023-04-14 PROCEDURE — 80061 LIPID PANEL: CPT

## 2023-04-14 PROCEDURE — 93010 ELECTROCARDIOGRAM REPORT: CPT

## 2023-04-14 PROCEDURE — 80048 BASIC METABOLIC PNL TOTAL CA: CPT

## 2023-04-14 PROCEDURE — 83605 ASSAY OF LACTIC ACID: CPT

## 2023-04-14 PROCEDURE — 84145 PROCALCITONIN (PCT): CPT

## 2023-04-14 PROCEDURE — 83735 ASSAY OF MAGNESIUM: CPT

## 2023-04-14 PROCEDURE — 80053 COMPREHEN METABOLIC PANEL: CPT

## 2023-04-14 PROCEDURE — 93005 ELECTROCARDIOGRAM TRACING: CPT

## 2023-04-14 RX ORDER — DEXTROSE 50 % IN WATER 50 %
25 SYRINGE (ML) INTRAVENOUS ONCE
Refills: 0 | Status: DISCONTINUED | OUTPATIENT
Start: 2023-04-14 | End: 2023-04-17

## 2023-04-14 RX ORDER — SODIUM CHLORIDE 9 MG/ML
1000 INJECTION, SOLUTION INTRAVENOUS
Refills: 0 | Status: DISCONTINUED | OUTPATIENT
Start: 2023-04-14 | End: 2023-04-17

## 2023-04-14 RX ORDER — INSULIN LISPRO 100/ML
VIAL (ML) SUBCUTANEOUS
Refills: 0 | Status: DISCONTINUED | OUTPATIENT
Start: 2023-04-14 | End: 2023-04-17

## 2023-04-14 RX ORDER — DEXTROSE 50 % IN WATER 50 %
12.5 SYRINGE (ML) INTRAVENOUS ONCE
Refills: 0 | Status: DISCONTINUED | OUTPATIENT
Start: 2023-04-14 | End: 2023-04-17

## 2023-04-14 RX ORDER — SODIUM CHLORIDE 9 MG/ML
1000 INJECTION, SOLUTION INTRAVENOUS
Refills: 0 | Status: DISCONTINUED | OUTPATIENT
Start: 2023-04-14 | End: 2023-04-14

## 2023-04-14 RX ORDER — ACETAMINOPHEN 500 MG
650 TABLET ORAL ONCE
Refills: 0 | Status: COMPLETED | OUTPATIENT
Start: 2023-04-14 | End: 2023-04-14

## 2023-04-14 RX ORDER — GLUCAGON INJECTION, SOLUTION 0.5 MG/.1ML
1 INJECTION, SOLUTION SUBCUTANEOUS ONCE
Refills: 0 | Status: DISCONTINUED | OUTPATIENT
Start: 2023-04-14 | End: 2023-04-17

## 2023-04-14 RX ORDER — ONDANSETRON 8 MG/1
4 TABLET, FILM COATED ORAL EVERY 8 HOURS
Refills: 0 | Status: DISCONTINUED | OUTPATIENT
Start: 2023-04-14 | End: 2023-04-17

## 2023-04-14 RX ORDER — ATORVASTATIN CALCIUM 80 MG/1
40 TABLET, FILM COATED ORAL AT BEDTIME
Refills: 0 | Status: DISCONTINUED | OUTPATIENT
Start: 2023-04-14 | End: 2023-04-17

## 2023-04-14 RX ORDER — ACETAMINOPHEN 500 MG
650 TABLET ORAL EVERY 6 HOURS
Refills: 0 | Status: DISCONTINUED | OUTPATIENT
Start: 2023-04-14 | End: 2023-04-17

## 2023-04-14 RX ORDER — CEFTRIAXONE 500 MG/1
1000 INJECTION, POWDER, FOR SOLUTION INTRAMUSCULAR; INTRAVENOUS EVERY 24 HOURS
Refills: 0 | Status: DISCONTINUED | OUTPATIENT
Start: 2023-04-14 | End: 2023-04-17

## 2023-04-14 RX ORDER — LANOLIN ALCOHOL/MO/W.PET/CERES
3 CREAM (GRAM) TOPICAL AT BEDTIME
Refills: 0 | Status: DISCONTINUED | OUTPATIENT
Start: 2023-04-14 | End: 2023-04-17

## 2023-04-14 RX ORDER — DEXTROSE 50 % IN WATER 50 %
15 SYRINGE (ML) INTRAVENOUS ONCE
Refills: 0 | Status: DISCONTINUED | OUTPATIENT
Start: 2023-04-14 | End: 2023-04-17

## 2023-04-14 RX ORDER — ASPIRIN/CALCIUM CARB/MAGNESIUM 324 MG
81 TABLET ORAL DAILY
Refills: 0 | Status: DISCONTINUED | OUTPATIENT
Start: 2023-04-14 | End: 2023-04-17

## 2023-04-14 RX ORDER — HEPARIN SODIUM 5000 [USP'U]/ML
5000 INJECTION INTRAVENOUS; SUBCUTANEOUS EVERY 8 HOURS
Refills: 0 | Status: DISCONTINUED | OUTPATIENT
Start: 2023-04-14 | End: 2023-04-17

## 2023-04-14 RX ADMIN — Medication 650 MILLIGRAM(S): at 21:38

## 2023-04-14 RX ADMIN — Medication 650 MILLIGRAM(S): at 12:43

## 2023-04-14 RX ADMIN — Medication 81 MILLIGRAM(S): at 12:13

## 2023-04-14 RX ADMIN — HEPARIN SODIUM 5000 UNIT(S): 5000 INJECTION INTRAVENOUS; SUBCUTANEOUS at 14:53

## 2023-04-14 RX ADMIN — ATORVASTATIN CALCIUM 40 MILLIGRAM(S): 80 TABLET, FILM COATED ORAL at 21:15

## 2023-04-14 RX ADMIN — HEPARIN SODIUM 5000 UNIT(S): 5000 INJECTION INTRAVENOUS; SUBCUTANEOUS at 21:15

## 2023-04-14 RX ADMIN — SODIUM CHLORIDE 75 MILLILITER(S): 9 INJECTION, SOLUTION INTRAVENOUS at 15:45

## 2023-04-14 RX ADMIN — Medication 650 MILLIGRAM(S): at 22:38

## 2023-04-14 RX ADMIN — Medication 650 MILLIGRAM(S): at 12:13

## 2023-04-14 RX ADMIN — Medication 650 MILLIGRAM(S): at 03:20

## 2023-04-14 RX ADMIN — SODIUM CHLORIDE 75 MILLILITER(S): 9 INJECTION, SOLUTION INTRAVENOUS at 17:18

## 2023-04-14 RX ADMIN — HEPARIN SODIUM 5000 UNIT(S): 5000 INJECTION INTRAVENOUS; SUBCUTANEOUS at 07:08

## 2023-04-14 RX ADMIN — Medication 2: at 12:44

## 2023-04-14 RX ADMIN — CEFTRIAXONE 100 MILLIGRAM(S): 500 INJECTION, POWDER, FOR SOLUTION INTRAMUSCULAR; INTRAVENOUS at 06:23

## 2023-04-14 NOTE — DISCHARGE NOTE PROVIDER - CARE PROVIDER_API CALL
TANISHA CORREIA  Internal Medicine  [address] [place] [address]  Phone: [phone]  Fax: [phone])-  Follow Up Time: 2 weeks   Kevin Hernandez)  Internal Medicine  [address] [place] [address]  Phone: [phone]  Fax: [phone]  Follow Up Time: 1 week    Charlie Stewart)  Cardiovascular Disease; Interventional Cardiology  [address]  [place] [address]  Phone: [phone]  Fax: [phone]  Follow Up Time: 1 week

## 2023-04-14 NOTE — PATIENT PROFILE ADULT - FALL HARM RISK - HARM RISK INTERVENTIONS

## 2023-04-14 NOTE — DISCHARGE NOTE NURSING/CASE MANAGEMENT/SOCIAL WORK - PATIENT PORTAL LINK FT
You can access the FollowMyHealth Patient Portal offered by [facility] by registering at the following website: [email or web address]. By joining [facility] [employer] [facility]’s FollowMyHealth portal, you will also be able to view your health information using other applications (apps) compatible with our system.

## 2023-04-14 NOTE — PATIENT PROFILE ADULT - FALL HARM RISK - FALL HARM RISK
Other Wartpeel Counseling:  I discussed with the patient the risks of Wartpeel including but not limited to erythema, scaling, itching, weeping, crusting, and pain.

## 2023-04-14 NOTE — H&P ADULT - NSHPPHYSICALEXAM_GEN_ALL_CORE
GENERAL: NAD, speaks in full sentences, no signs of respiratory distress  HEAD:  Atraumatic, Normocephalic  EYES: EOMI, PERRLA, anicteric sclera  NECK: Supple, No JVD  CHEST/LUNG: Clear to auscultation bilaterally; No wheeze; No crackles; No accessory muscles used  HEART: Regular rate and rhythm; No murmurs;   ABDOMEN: Soft, Nontender, Nondistended; Bowel sounds present; No guarding  EXTREMITIES:  2+ Peripheral Pulses, No cyanosis or edema  PSYCH: AAOx3  NEUROLOGY: non-focal  SKIN: No rashes or lesions GENERAL: NAD, speaks in full sentences, no signs of respiratory distress  HEAD:  Atraumatic, Normocephalic  EYES: EOMI, PERRLA, anicteric sclera  NECK: Supple, No JVD  CHEST/LUNG: Clear to auscultation bilaterally; No wheeze; No crackles; No accessory muscles used  HEART: Regular rate and rhythm; No murmurs;   ABDOMEN: Soft, Nontender, TENDER, voluntary guarding  EXTREMITIES:  2+ Peripheral Pulses, No cyanosis or edema  PSYCH: AAOx3  NEUROLOGY: bilateral upper extremity weakness( R>L)  SKIN: No rashes or lesions

## 2023-04-14 NOTE — H&P ADULT - NSHPLABSRESULTS_GEN_ALL_CORE
LABS:                        12.3   13.41 )-----------( 394      ( 2023 21:38 )             39.1     Hb Trend: 12.3<--  WBC Trend: 13.41<--  Plt Trend: 394<--              136  |  100  |  29<H>  ----------------------------<  105<H>  5.6<H>   |  22  |  1.8<H>    Ca    9.5      2023 21:38    TPro  7.1  /  Alb  4.2  /  TBili  0.3  /  DBili  x   /  AST  30  /  ALT  10  /  AlkPhos  44  0413        Urinalysis Basic - ( 2023 01:00 )    Color: Light Yellow / Appearance: Slightly Turbid / S.016 / pH: x  Gluc: x / Ketone: Negative  / Bili: Negative / Urobili: <2 mg/dL   Blood: x / Protein: 100 mg/dL / Nitrite: Positive   Leuk Esterase: Large / RBC: 0 /HPF /  /HPF   Sq Epi: x / Non Sq Epi: x / Bacteria: Many            IMAGING:  reviewed

## 2023-04-14 NOTE — H&P ADULT - HISTORY OF PRESENT ILLNESS
77 year old male with past medical history CAD s/p CABG 2017, HTN, DM, HLD, GERD, BPH, CVA x2 with bilateral residual weakness (L>R) presents to ED with complaint of  abdominal pain. She states that the pain started 4 days ago lower abdomen with nausea, chills; pain constant also associated with dysuria, frequency, urgency; pt went to local UCC and UA + uti; pt also noted to have fever 100.7; no prior hx recurrent uti    In the ED, vitals were /63, HR 84, RR 18, T 100.1, Spo2 94% on RA. EKG NSR. CTAP showed Urinary bladder wall thickening with pericystic inflammatory stranding, compatible with cystitis. CXR unremarkable.  Labs were significant for WBC 13K, Hgb 12.3, K 5.6,, BUN 29, Cr 1.8, Lipase 73, UA positive for LE, nitrites, pyuria and bacteria. Patient received Aztreonam x 1 and LR bolus x1     Patient is being admitted for further monitoring and management   77 year old male with past medical history CAD s/p CABG 2017, HTN, DM, HLD, GERD, BPH, CVA x2 with bilateral residual weakness (L>R) presents to ED with complaint of  abdominal pain. He states that the pain started 4 days ago . The describes that pain as a diffuse, constant, 7/10 pain worse in his  lower abdomen. His symptoms are associated with nausea, chills. He also endorses dysuria, increased  frequency and urgency. Patient states he has been incontinent.  He went to [facility] and UA + uti; pt was also noted to have fever 100.7. This prompted his presentation to the ED.     In the ED, vitals were /63, HR 84, RR 18, T 100.1, Spo2 94% on RA. EKG NSR. CTAP showed Urinary bladder wall thickening with pericystic inflammatory stranding, compatible with cystitis. CXR unremarkable.  Labs were significant for WBC 13K, Hgb 12.3, K 5.6,, BUN 29, Cr 1.8, Lipase 73, UA positive for LE, nitrites, pyuria and bacteria. Patient received Aztreonam x 1 and LR bolus x1     Patient is being admitted for further monitoring and management

## 2023-04-14 NOTE — DISCHARGE NOTE PROVIDER - NSDCCPCAREPLAN_GEN_ALL_CORE_FT
PRINCIPAL DISCHARGE DIAGNOSIS  Diagnosis: Acute cystitis  Assessment and Plan of Treatment: You were noted either on arrival or during this hospitalization to have a Urinary Tract Infection. You may have already been treated and completed the antibiotics, please refer to the list of medications present on your discharge paperwork. If you notice that there are antibiotics listed, these may be to treat your infection, be sure to complete taking the full course, whether you have symptoms or not, as prescribed.  While taking antibiotics, you may benefit from taking a probiotic such as florastore to help to try and prevent an infectious type of diarrhea known as C Diff. If you notice that you begin having severe watery diarrhea, more than 4-5 episodes a day, please see your Primary Care Doctor or come to the ER to have your stool tested for this infection.   It is not necessary to repeat a urine test to see if the infection is gone, it is assumed that after treatment it should have resolved. However, if you continue to have symptoms, please see your Primary Care doctor or return to the ER.        SECONDARY DISCHARGE DIAGNOSES  Diagnosis: Chronic kidney disease (CKD)  Assessment and Plan of Treatment:     Diagnosis: Fever  Assessment and Plan of Treatment:      PRINCIPAL DISCHARGE DIAGNOSIS  Diagnosis: Acute cystitis  Assessment and Plan of Treatment: You were noted  to have a Urinary Tract Infection.   Please continue the antibiotics as indicated.  If you notice that you begin having severe watery diarrhea, more than 4-5 episodes a day, please see your Primary Care Doctor or come to the ER to have your stool tested for this infection.   It is not necessary to repeat a urine test to see if the infection is gone, it is assumed that after treatment it should have resolved. However, if you continue to have symptoms, please see your Primary Care doctor or return to the ER.        SECONDARY DISCHARGE DIAGNOSES  Diagnosis: Chronic kidney disease (CKD)  Assessment and Plan of Treatment:     Diagnosis: Fever  Assessment and Plan of Treatment:

## 2023-04-14 NOTE — DISCHARGE NOTE PROVIDER - NSDCACTIVITY_GEN_ALL_CORE
No restrictions Return to Work/School allowed/Do not make important decisions/No heavy lifting/straining

## 2023-04-14 NOTE — DISCHARGE NOTE PROVIDER - NSDCMRMEDTOKEN_GEN_ALL_CORE_FT
acetaminophen 325 mg oral tablet: 2 tab(s) orally every 6 hours, As Needed  aspirin 81 mg oral tablet: 1 tab(s) orally once a day  atorvastatin 80 mg oral tablet: 1 tab(s) orally once a day (at bedtime)  fenofibrate 120 mg oral tablet: 1 tab(s) orally once a day  gabapentin 100 mg oral capsule: 2 cap(s) orally once a day (at bedtime)  gabapentin 100 mg oral capsule: 1 cap(s) orally once a day  in am   isosorbide mononitrate 60 mg oral tablet, extended release: 1 tab(s) orally once a day MDD:1  levETIRAcetam 750 mg oral tablet: 1 tab(s) orally 2 times a day  linagliptin 5 mg oral tablet: 1 tab(s) orally once a day  metoprolol succinate 25 mg oral tablet, extended release: 1 tab(s) orally once a day  midodrine 2.5 mg oral tablet: 1 tab(s) orally 2 times a day: take 1st tab 20 to 30 minutes before getting out of bed in the morning and 2nd pill before lunch; must remain upright for at least one hour after taking medication  pantoprazole 40 mg oral delayed release tablet: 1 tab(s) orally once a day (before a meal)  Pentoxil 400 mg oral tablet, extended release: 1 tab(s) orally 3 times a day  polyethylene glycol 3350 oral powder for reconstitution: 17 gram(s) orally once a day, As needed, Constipation  Ranexa 500 mg oral tablet, extended release: 1 tab(s) orally 2 times a day  senna oral tablet: 1 or 2 tab(s) orally once a day (at bedtime), As needed, Constipation  tamsulosin 0.4 mg oral capsule: 1 cap(s) orally once a day  ticagrelor 90 mg oral tablet: 1 tab(s) orally every 12 hours  venlafaxine 75 mg oral capsule, extended release: 1 cap(s) orally once a day   atorvastatin 80 mg oral tablet: 1 tab(s) orally once a day (at bedtime)  isosorbide mononitrate 60 mg oral tablet, extended release: 1 tab(s) orally once a day MDD:1  Keppra 500 mg oral tablet: 1 orally 2 times a day  linagliptin 5 mg oral tablet: 1 tab(s) orally once a day  metoprolol tartrate 25 mg oral tablet: 1 tab(s) orally 2 times a day  NIFEdipine 30 mg oral tablet, extended release: 1 tab(s) orally once a day  pantoprazole 40 mg oral delayed release tablet: 1 tab(s) orally once a day (before a meal)  polyethylene glycol 3350 oral powder for reconstitution: 17 gram(s) orally once a day, As needed, Constipation  senna oral tablet: 1 or 2 tab(s) orally once a day (at bedtime), As needed, Constipation  tamsulosin 0.4 mg oral capsule: 1 cap(s) orally once a day  ticagrelor 90 mg oral tablet: 1 tab(s) orally every 12 hours   aspirin 81 mg oral tablet, chewable: 1 tab(s) orally once a day  cefpodoxime 200 mg oral tablet: 1 tab(s) orally 2 times a day  fenofibrate 120 mg oral tablet: 1 tab(s) orally once a day  isosorbide mononitrate 60 mg oral tablet, extended release: 1 tab(s) orally once a day MDD:1  linagliptin 5 mg oral tablet: 1 tab(s) orally once a day  metoprolol tartrate 25 mg oral tablet: 1 tab(s) orally 2 times a day  NIFEdipine 30 mg oral tablet, extended release: 1 tab(s) orally once a day  pantoprazole 40 mg oral delayed release tablet: 1 tab(s) orally once a day (before a meal)  Pentoxil 400 mg oral tablet, extended release: 1 tab(s) orally 3 times a day  polyethylene glycol 3350 oral powder for reconstitution: 17 gram(s) orally once a day, As needed, Constipation  Ranexa 500 mg oral tablet, extended release: 1 tab(s) orally 2 times a day  rosuvastatin 40 mg oral capsule: 1 tab(s) orally once a day (at bedtime)  senna oral tablet: 1 or 2 tab(s) orally once a day (at bedtime), As needed, Constipation  tamsulosin 0.4 mg oral capsule: 1 cap(s) orally once a day  venlafaxine 75 mg oral tablet: 1 orally once a day

## 2023-04-14 NOTE — H&P ADULT - ASSESSMENT
a 77 year old male, past medical history CAD s/p CABG 2017, HTN, DM, HLD, GERD, BPH, CVA x2 with bilateral residual weakness (L>R) was brought in by EMS from [facility] for altered mental status.    #Acute Cystitis  - CTAP (04/14):  showed Urinary bladder wall thickening with pericystic inflammatory stranding, compatible with cystitis.   - UA positive for LE, nitrites, pyuria and bacteria.  - No sepsis on admission  - Start IV Abx   No risk factors for infection with a MDR gram-negative organism  – For these patients, we favor ceftriaxone (1 g IV q24 or piperacillin-tazobactam (3.375 g IV q6h  At least one risk factor for infection with a MDR – For these patients, we favor empiric treatment with piperacillin-tazobactam 3.375 mg IV q6h or an antipseudomonal carbapenem (imipenem 500 mg IV q6, meropenem 1 g IV q8, or doripenem 500 mg IV q8  Continue Flomax, Oxybutynin prn for bladder spasms  - f/u blood cultures  - Follow up b12, folate      #CAD s/p CABG in 2017  #HTN  #h/o CVA x 2 with bilateral residual weakness  - continue with home aspirin, brilinta, atorvastatin, metoprolol, isosorbide mononitrate, ranexa  - continue with home keppra 750mg BID  - check a1c, lipid panel, tsh    #DM  - check a1c  - monitor FS. if >180, start S/S    #HLD  - continue with home statin    #CKD III  - stable, monitor    #GERD  - continue with protonix    #BPH  - continue with flomax    #Misc:  #DVT PPX: heparin  #GI PPX: protonix  #Diet: DASH/CC  #Activity: bedrest  #Dispo: admit to tele     77 year old male with past medical history CAD s/p CABG 2017, HTN, DM, HLD, GERD, BPH, CVA x2 with bilateral residual weakness (L>R) presents to ED with complaint of  abdominal pain. Patient with positve UA     #Acute Complicated UTI   - CTAP (04/14):  showed Urinary bladder wall thickening with pericystic inflammatory stranding, compatible with cystitis.   - UA positive for LE, nitrites, pyuria and bacteria.  - No sepsis on admission  - Start IV Abx: ceftriaxone  1 g IV q24   - f/u blood cultures and urine culture     #CAD s/p CABG in 2017  #HTN  *per pharmacy; pt is no longer only on Lopressor, and Imdur. pt not taking Brillinta, ASA and Statin   - continue Imdur and Metoprolol. Will start ASA     ##h/o CVA x 2 with bilateral residual weakness  - start aspirin and statin  - check lipid panel, tsh    #DM  - check a1c  - monitor FS. if >180, start S/S    #HLD  - continue with  statin  - Check lipid profile    #CKD III  - stable, monitor    #Hyperkalemia   - K 5.6 (hemolyzed)  - repeat BMP      #Misc:  #DVT PPX: heparin  #GI PPX: protonix  #Diet: DASH/CC  #Activity: bedrest  #Dispo: admit to tele

## 2023-04-14 NOTE — DISCHARGE NOTE NURSING/CASE MANAGEMENT/SOCIAL WORK - NSDCVIVACCINE_GEN_ALL_CORE_FT
Tdap; 20-Jul-2021 15:17; Dejuan Fry (RN); [employer]; [id number] (Exp. Date: 28-Jan-2023); IntraMuscular; Deltoid Left.; 0.5 milliLiter(s); VIS (VIS Published: 09-May-2013, VIS Presented: 20-Jul-2021);

## 2023-04-14 NOTE — DISCHARGE NOTE PROVIDER - PROVIDER TOKENS
PROVIDER:[TOKEN:[[id number]],FOLLOWUP:[2 weeks]] PROVIDER:[TOKEN:[[id number]],FOLLOWUP:[1 week]],PROVIDER:[TOKEN:[[id number]],FOLLOWUP:[1 week]]

## 2023-04-14 NOTE — DISCHARGE NOTE PROVIDER - HOSPITAL COURSE
76 y/o M w/ PMHx of CAD s/p CABG 2017, HTN, DM, HLD, GERD, BPH, CVA x2 with bilateral residual weakness (L>R) p/w abdominal pain. In the ED, VS significant for elevated BP of 158/63. EKG NSR. CT A/P showed urinary bladder wall thickening with pericystic inflammatory stranding, compatible with cystitis. CXR unremarkable. Labs were significant for WBC 13K, Hgb 12.3, K 5.6, BUN 29, Cr 1.8, Lipase 73. UA + for LE, nitrites, pyuria and bacteria. S/p Aztreonam x 1 and LR bolus. Started on IV Ceftriaxone for acute cystitis. UCx ----. BCx NGTD. Pt is medically stable for d/c to home w/ HHA. 76 y/o M w/ PMHx of CAD s/p CABG 2017, HTN, DM, HLD, GERD, BPH, CVA x2 with bilateral residual weakness (L>R) p/w abdominal pain. In the ED, VS significant for elevated BP of 158/63. EKG NSR. CT A/P showed urinary bladder wall thickening with pericystic inflammatory stranding, compatible with cystitis. CXR unremarkable. Labs were significant for WBC 13K, Hgb 12.3, K 5.6, BUN 29, Cr 1.8, Lipase 73. UA + for LE, nitrites, pyuria and bacteria. S/p Aztreonam x 1 and LR bolus. Started on IV Ceftriaxone for acute cystitis. UCx ----. BCx NGTD. Pt is medically stable for d/c to home w/ HHA.     To be discharged on vantin    Needs to follow up with cardiology and his PCP 76 y/o M w/ PMHx of CAD s/p CABG 2017, HTN, DM, HLD, GERD, BPH, CVA x2 with bilateral residual weakness (L>R) p/w abdominal pain. In the ED, VS significant for elevated BP of 158/63. EKG NSR. CT A/P showed urinary bladder wall thickening with pericystic inflammatory stranding, compatible with cystitis. CXR unremarkable. Labs were significant for WBC 13K, Hgb 12.3, K 5.6, BUN 29, Cr 1.8, Lipase 73. UA + for LE, nitrites, pyuria and bacteria. S/p Aztreonam x 1 and LR bolus. Started on IV Ceftriaxone for acute cystitis. UCx ----. BCx NGTD. Pt is medically stable for d/c to home w/ HHA.     To be discharged on oral Vantin 10 day course     Needs to follow up with cardiology and his PCP  -seen and examined this morning and stable for home discharge today   -spent over 35 mins d/c planning; direct patient care and chart review

## 2023-04-14 NOTE — H&P ADULT - ATTENDING COMMENTS
patient seen and examined independently of admitting medical resident and agree with the note unless otherwise stated     Vital Signs Last 24 Hrs  T(C): 38.2 (14 Apr 2023 13:48), Max: 38.9 (14 Apr 2023 02:34)  T(F): 100.8 (14 Apr 2023 13:48), Max: 102 (14 Apr 2023 02:34)  HR: 95 (14 Apr 2023 13:48) (84 - 95)  BP: 161/69 (14 Apr 2023 13:48) (133/61 - 194/85)  BP(mean): --  RR: 18 (14 Apr 2023 13:48) (18 - 18)  SpO2: 95% (14 Apr 2023 13:48) (94% - 98%)    Parameters below as of 14 Apr 2023 13:48  Patient On (Oxygen Delivery Method): room air                          10.6   12.94 )-----------( 289      ( 14 Apr 2023 08:15 )             33.8   04-14    139  |  105  |  25<H>  ----------------------------<  101<H>  4.1   |  22  |  1.7<H>    Ca    8.7      14 Apr 2023 08:15  Mg     2.1     04-14    TPro  6.0  /  Alb  3.7  /  TBili  0.5  /  DBili  x   /  AST  19  /  ALT  9   /  AlkPhos  37  04-14    # Sepsis/UTI  # CAD/CABG  # HTN  # DM II   # CKD Stage III/ hyperkalemia   # OLD CVA     -IV abx - pancultures   -recheck BMP   -home maintenance meds   -monitor BP and finger sticks on current regimen   -oob to chair as tolerated and or with assistance   -skin care as per nursing     DISPO: home once clinically stable   -patient admitted to hospital for above acuity / sepsis   -spoke to patient about his plan of care     Attending Physician Dr. Sydnie Alfredo # [id number]

## 2023-04-14 NOTE — DISCHARGE NOTE PROVIDER - NSDCFUSCHEDAPPT_GEN_ALL_CORE_FT
[facility]  CARDIOLOGY [address]  Scheduled Appointment: 04/25/2023    Desirae Husain  [facility]  ELECTROPH [address]  Scheduled Appointment: 07/03/2023

## 2023-04-15 LAB
ANION GAP SERPL CALC-SCNC: 13 MMOL/L — SIGNIFICANT CHANGE UP (ref 7–14)
BASOPHILS # BLD AUTO: 0.04 K/UL — SIGNIFICANT CHANGE UP (ref 0–0.2)
BASOPHILS NFR BLD AUTO: 0.4 % — SIGNIFICANT CHANGE UP (ref 0–1)
BUN SERPL-MCNC: 20 MG/DL — SIGNIFICANT CHANGE UP (ref 10–20)
CALCIUM SERPL-MCNC: 8.9 MG/DL — SIGNIFICANT CHANGE UP (ref 8.4–10.5)
CHLORIDE SERPL-SCNC: 104 MMOL/L — SIGNIFICANT CHANGE UP (ref 98–110)
CO2 SERPL-SCNC: 23 MMOL/L — SIGNIFICANT CHANGE UP (ref 17–32)
CREAT SERPL-MCNC: 1.6 MG/DL — HIGH (ref 0.7–1.5)
CULTURE RESULTS: SIGNIFICANT CHANGE UP
EGFR: 44 ML/MIN/1.73M2 — LOW
EOSINOPHIL # BLD AUTO: 0.05 K/UL — SIGNIFICANT CHANGE UP (ref 0–0.7)
EOSINOPHIL NFR BLD AUTO: 0.5 % — SIGNIFICANT CHANGE UP (ref 0–8)
GLUCOSE BLDC GLUCOMTR-MCNC: 103 MG/DL — HIGH (ref 70–99)
GLUCOSE BLDC GLUCOMTR-MCNC: 107 MG/DL — HIGH (ref 70–99)
GLUCOSE BLDC GLUCOMTR-MCNC: 114 MG/DL — HIGH (ref 70–99)
GLUCOSE BLDC GLUCOMTR-MCNC: 115 MG/DL — HIGH (ref 70–99)
GLUCOSE SERPL-MCNC: 117 MG/DL — HIGH (ref 70–99)
HCT VFR BLD CALC: 36.8 % — LOW (ref 42–52)
HGB BLD-MCNC: 11.6 G/DL — LOW (ref 14–18)
IMM GRANULOCYTES NFR BLD AUTO: 0.3 % — SIGNIFICANT CHANGE UP (ref 0.1–0.3)
LYMPHOCYTES # BLD AUTO: 0.82 K/UL — LOW (ref 1.2–3.4)
LYMPHOCYTES # BLD AUTO: 8.6 % — LOW (ref 20.5–51.1)
MAGNESIUM SERPL-MCNC: 2.2 MG/DL — SIGNIFICANT CHANGE UP (ref 1.8–2.4)
MCHC RBC-ENTMCNC: 27.4 PG — SIGNIFICANT CHANGE UP (ref 27–31)
MCHC RBC-ENTMCNC: 31.5 G/DL — LOW (ref 32–37)
MCV RBC AUTO: 87 FL — SIGNIFICANT CHANGE UP (ref 80–94)
MONOCYTES # BLD AUTO: 0.59 K/UL — SIGNIFICANT CHANGE UP (ref 0.1–0.6)
MONOCYTES NFR BLD AUTO: 6.2 % — SIGNIFICANT CHANGE UP (ref 1.7–9.3)
NEUTROPHILS # BLD AUTO: 7.98 K/UL — HIGH (ref 1.4–6.5)
NEUTROPHILS NFR BLD AUTO: 84 % — HIGH (ref 42.2–75.2)
NRBC # BLD: 0 /100 WBCS — SIGNIFICANT CHANGE UP (ref 0–0)
PLATELET # BLD AUTO: 274 K/UL — SIGNIFICANT CHANGE UP (ref 130–400)
PMV BLD: 10.5 FL — HIGH (ref 7.4–10.4)
POTASSIUM SERPL-MCNC: 3.9 MMOL/L — SIGNIFICANT CHANGE UP (ref 3.5–5)
POTASSIUM SERPL-SCNC: 3.9 MMOL/L — SIGNIFICANT CHANGE UP (ref 3.5–5)
PROCALCITONIN SERPL-MCNC: 0.07 NG/ML — SIGNIFICANT CHANGE UP (ref 0.02–0.1)
RBC # BLD: 4.23 M/UL — LOW (ref 4.7–6.1)
RBC # FLD: 15.4 % — HIGH (ref 11.5–14.5)
SODIUM SERPL-SCNC: 140 MMOL/L — SIGNIFICANT CHANGE UP (ref 135–146)
SPECIMEN SOURCE: SIGNIFICANT CHANGE UP
WBC # BLD: 9.51 K/UL — SIGNIFICANT CHANGE UP (ref 4.8–10.8)
WBC # FLD AUTO: 9.51 K/UL — SIGNIFICANT CHANGE UP (ref 4.8–10.8)

## 2023-04-15 PROCEDURE — 99232 SBSQ HOSP IP/OBS MODERATE 35: CPT

## 2023-04-15 RX ADMIN — Medication 650 MILLIGRAM(S): at 23:10

## 2023-04-15 RX ADMIN — HEPARIN SODIUM 5000 UNIT(S): 5000 INJECTION INTRAVENOUS; SUBCUTANEOUS at 13:08

## 2023-04-15 RX ADMIN — SODIUM CHLORIDE 75 MILLILITER(S): 9 INJECTION, SOLUTION INTRAVENOUS at 04:27

## 2023-04-15 RX ADMIN — Medication 650 MILLIGRAM(S): at 22:18

## 2023-04-15 RX ADMIN — Medication 81 MILLIGRAM(S): at 13:08

## 2023-04-15 RX ADMIN — SODIUM CHLORIDE 75 MILLILITER(S): 9 INJECTION, SOLUTION INTRAVENOUS at 17:50

## 2023-04-15 RX ADMIN — CEFTRIAXONE 100 MILLIGRAM(S): 500 INJECTION, POWDER, FOR SOLUTION INTRAMUSCULAR; INTRAVENOUS at 05:16

## 2023-04-15 RX ADMIN — SODIUM CHLORIDE 75 MILLILITER(S): 9 INJECTION, SOLUTION INTRAVENOUS at 05:17

## 2023-04-15 RX ADMIN — HEPARIN SODIUM 5000 UNIT(S): 5000 INJECTION INTRAVENOUS; SUBCUTANEOUS at 21:04

## 2023-04-15 RX ADMIN — HEPARIN SODIUM 5000 UNIT(S): 5000 INJECTION INTRAVENOUS; SUBCUTANEOUS at 05:16

## 2023-04-15 RX ADMIN — ATORVASTATIN CALCIUM 40 MILLIGRAM(S): 80 TABLET, FILM COATED ORAL at 21:04

## 2023-04-15 NOTE — PHYSICAL THERAPY INITIAL EVALUATION ADULT - GENERAL OBSERVATIONS, REHAB EVAL
PT IE [phone]. Chart reviewed and case discussed with GRACE Chou. Pt encountered semi-de la rosa in bed. In NAD. + IV line disconnected by RN for mobility

## 2023-04-15 NOTE — PHYSICAL THERAPY INITIAL EVALUATION ADULT - PERTINENT HX OF CURRENT PROBLEM, REHAB EVAL
77 year old male with past medical history CAD s/p CABG 2017, HTN, DM, HLD, GERD, BPH, CVA x2 with bilateral residual weakness (L>R) presents to ED with complaint of  abdominal pain. He states that the pain started 4 days ago . The describes that pain as a diffuse, constant, 7/10 pain worse in his  lower abdomen

## 2023-04-16 LAB
ANION GAP SERPL CALC-SCNC: 16 MMOL/L — HIGH (ref 7–14)
BASOPHILS # BLD AUTO: 0.05 K/UL — SIGNIFICANT CHANGE UP (ref 0–0.2)
BASOPHILS NFR BLD AUTO: 0.5 % — SIGNIFICANT CHANGE UP (ref 0–1)
BUN SERPL-MCNC: 21 MG/DL — HIGH (ref 10–20)
CALCIUM SERPL-MCNC: 9.5 MG/DL — SIGNIFICANT CHANGE UP (ref 8.4–10.5)
CHLORIDE SERPL-SCNC: 104 MMOL/L — SIGNIFICANT CHANGE UP (ref 98–110)
CO2 SERPL-SCNC: 22 MMOL/L — SIGNIFICANT CHANGE UP (ref 17–32)
CREAT SERPL-MCNC: 1.5 MG/DL — SIGNIFICANT CHANGE UP (ref 0.7–1.5)
EGFR: 47 ML/MIN/1.73M2 — LOW
EOSINOPHIL # BLD AUTO: 0.16 K/UL — SIGNIFICANT CHANGE UP (ref 0–0.7)
EOSINOPHIL NFR BLD AUTO: 1.5 % — SIGNIFICANT CHANGE UP (ref 0–8)
GLUCOSE BLDC GLUCOMTR-MCNC: 113 MG/DL — HIGH (ref 70–99)
GLUCOSE BLDC GLUCOMTR-MCNC: 118 MG/DL — HIGH (ref 70–99)
GLUCOSE BLDC GLUCOMTR-MCNC: 135 MG/DL — HIGH (ref 70–99)
GLUCOSE BLDC GLUCOMTR-MCNC: 136 MG/DL — HIGH (ref 70–99)
GLUCOSE BLDC GLUCOMTR-MCNC: 177 MG/DL — HIGH (ref 70–99)
GLUCOSE SERPL-MCNC: 102 MG/DL — HIGH (ref 70–99)
HCT VFR BLD CALC: 38.4 % — LOW (ref 42–52)
HGB BLD-MCNC: 12.2 G/DL — LOW (ref 14–18)
IMM GRANULOCYTES NFR BLD AUTO: 0.3 % — SIGNIFICANT CHANGE UP (ref 0.1–0.3)
LACTATE SERPL-SCNC: 1.6 MMOL/L — SIGNIFICANT CHANGE UP (ref 0.7–2)
LYMPHOCYTES # BLD AUTO: 1.37 K/UL — SIGNIFICANT CHANGE UP (ref 1.2–3.4)
LYMPHOCYTES # BLD AUTO: 13.3 % — LOW (ref 20.5–51.1)
MAGNESIUM SERPL-MCNC: 2.3 MG/DL — SIGNIFICANT CHANGE UP (ref 1.8–2.4)
MCHC RBC-ENTMCNC: 27.9 PG — SIGNIFICANT CHANGE UP (ref 27–31)
MCHC RBC-ENTMCNC: 31.8 G/DL — LOW (ref 32–37)
MCV RBC AUTO: 87.9 FL — SIGNIFICANT CHANGE UP (ref 80–94)
MONOCYTES # BLD AUTO: 1.03 K/UL — HIGH (ref 0.1–0.6)
MONOCYTES NFR BLD AUTO: 10 % — HIGH (ref 1.7–9.3)
NEUTROPHILS # BLD AUTO: 7.69 K/UL — HIGH (ref 1.4–6.5)
NEUTROPHILS NFR BLD AUTO: 74.4 % — SIGNIFICANT CHANGE UP (ref 42.2–75.2)
NRBC # BLD: 0 /100 WBCS — SIGNIFICANT CHANGE UP (ref 0–0)
PLATELET # BLD AUTO: 290 K/UL — SIGNIFICANT CHANGE UP (ref 130–400)
PMV BLD: 10.8 FL — HIGH (ref 7.4–10.4)
POTASSIUM SERPL-MCNC: 4.1 MMOL/L — SIGNIFICANT CHANGE UP (ref 3.5–5)
POTASSIUM SERPL-SCNC: 4.1 MMOL/L — SIGNIFICANT CHANGE UP (ref 3.5–5)
RBC # BLD: 4.37 M/UL — LOW (ref 4.7–6.1)
RBC # FLD: 15.3 % — HIGH (ref 11.5–14.5)
SODIUM SERPL-SCNC: 142 MMOL/L — SIGNIFICANT CHANGE UP (ref 135–146)
WBC # BLD: 10.33 K/UL — SIGNIFICANT CHANGE UP (ref 4.8–10.8)
WBC # FLD AUTO: 10.33 K/UL — SIGNIFICANT CHANGE UP (ref 4.8–10.8)

## 2023-04-16 PROCEDURE — 99232 SBSQ HOSP IP/OBS MODERATE 35: CPT

## 2023-04-16 RX ADMIN — HEPARIN SODIUM 5000 UNIT(S): 5000 INJECTION INTRAVENOUS; SUBCUTANEOUS at 21:04

## 2023-04-16 RX ADMIN — SODIUM CHLORIDE 75 MILLILITER(S): 9 INJECTION, SOLUTION INTRAVENOUS at 08:48

## 2023-04-16 RX ADMIN — HEPARIN SODIUM 5000 UNIT(S): 5000 INJECTION INTRAVENOUS; SUBCUTANEOUS at 13:19

## 2023-04-16 RX ADMIN — SODIUM CHLORIDE 50 MILLILITER(S): 9 INJECTION, SOLUTION INTRAVENOUS at 21:57

## 2023-04-16 RX ADMIN — HEPARIN SODIUM 5000 UNIT(S): 5000 INJECTION INTRAVENOUS; SUBCUTANEOUS at 05:07

## 2023-04-16 RX ADMIN — Medication 2: at 11:58

## 2023-04-16 RX ADMIN — ATORVASTATIN CALCIUM 40 MILLIGRAM(S): 80 TABLET, FILM COATED ORAL at 21:04

## 2023-04-16 RX ADMIN — CEFTRIAXONE 100 MILLIGRAM(S): 500 INJECTION, POWDER, FOR SOLUTION INTRAMUSCULAR; INTRAVENOUS at 06:01

## 2023-04-16 RX ADMIN — Medication 81 MILLIGRAM(S): at 12:00

## 2023-04-17 VITALS
DIASTOLIC BLOOD PRESSURE: 71 MMHG | RESPIRATION RATE: 18 BRPM | TEMPERATURE: 98 F | HEART RATE: 86 BPM | SYSTOLIC BLOOD PRESSURE: 166 MMHG

## 2023-04-17 LAB
ANION GAP SERPL CALC-SCNC: 14 MMOL/L — SIGNIFICANT CHANGE UP (ref 7–14)
BASOPHILS # BLD AUTO: 0.06 K/UL — SIGNIFICANT CHANGE UP (ref 0–0.2)
BASOPHILS NFR BLD AUTO: 0.6 % — SIGNIFICANT CHANGE UP (ref 0–1)
BUN SERPL-MCNC: 22 MG/DL — HIGH (ref 10–20)
CALCIUM SERPL-MCNC: 9.7 MG/DL — SIGNIFICANT CHANGE UP (ref 8.4–10.5)
CHLORIDE SERPL-SCNC: 106 MMOL/L — SIGNIFICANT CHANGE UP (ref 98–110)
CO2 SERPL-SCNC: 22 MMOL/L — SIGNIFICANT CHANGE UP (ref 17–32)
CREAT SERPL-MCNC: 1.5 MG/DL — SIGNIFICANT CHANGE UP (ref 0.7–1.5)
EGFR: 47 ML/MIN/1.73M2 — LOW
EOSINOPHIL # BLD AUTO: 0.3 K/UL — SIGNIFICANT CHANGE UP (ref 0–0.7)
EOSINOPHIL NFR BLD AUTO: 2.9 % — SIGNIFICANT CHANGE UP (ref 0–8)
GLUCOSE BLDC GLUCOMTR-MCNC: 118 MG/DL — HIGH (ref 70–99)
GLUCOSE BLDC GLUCOMTR-MCNC: 118 MG/DL — HIGH (ref 70–99)
GLUCOSE BLDC GLUCOMTR-MCNC: 123 MG/DL — HIGH (ref 70–99)
GLUCOSE SERPL-MCNC: 136 MG/DL — HIGH (ref 70–99)
HCT VFR BLD CALC: 38.2 % — LOW (ref 42–52)
HGB BLD-MCNC: 12.2 G/DL — LOW (ref 14–18)
IMM GRANULOCYTES NFR BLD AUTO: 0.3 % — SIGNIFICANT CHANGE UP (ref 0.1–0.3)
LACTATE SERPL-SCNC: 1.2 MMOL/L — SIGNIFICANT CHANGE UP (ref 0.7–2)
LYMPHOCYTES # BLD AUTO: 1.85 K/UL — SIGNIFICANT CHANGE UP (ref 1.2–3.4)
LYMPHOCYTES # BLD AUTO: 17.8 % — LOW (ref 20.5–51.1)
MAGNESIUM SERPL-MCNC: 2.4 MG/DL — SIGNIFICANT CHANGE UP (ref 1.8–2.4)
MCHC RBC-ENTMCNC: 27.7 PG — SIGNIFICANT CHANGE UP (ref 27–31)
MCHC RBC-ENTMCNC: 31.9 G/DL — LOW (ref 32–37)
MCV RBC AUTO: 86.8 FL — SIGNIFICANT CHANGE UP (ref 80–94)
MONOCYTES # BLD AUTO: 0.86 K/UL — HIGH (ref 0.1–0.6)
MONOCYTES NFR BLD AUTO: 8.3 % — SIGNIFICANT CHANGE UP (ref 1.7–9.3)
NEUTROPHILS # BLD AUTO: 7.3 K/UL — HIGH (ref 1.4–6.5)
NEUTROPHILS NFR BLD AUTO: 70.1 % — SIGNIFICANT CHANGE UP (ref 42.2–75.2)
NRBC # BLD: 0 /100 WBCS — SIGNIFICANT CHANGE UP (ref 0–0)
PLATELET # BLD AUTO: 361 K/UL — SIGNIFICANT CHANGE UP (ref 130–400)
PMV BLD: 10.5 FL — HIGH (ref 7.4–10.4)
POTASSIUM SERPL-MCNC: 4.2 MMOL/L — SIGNIFICANT CHANGE UP (ref 3.5–5)
POTASSIUM SERPL-SCNC: 4.2 MMOL/L — SIGNIFICANT CHANGE UP (ref 3.5–5)
RBC # BLD: 4.4 M/UL — LOW (ref 4.7–6.1)
RBC # FLD: 15.3 % — HIGH (ref 11.5–14.5)
SODIUM SERPL-SCNC: 142 MMOL/L — SIGNIFICANT CHANGE UP (ref 135–146)
WBC # BLD: 10.4 K/UL — SIGNIFICANT CHANGE UP (ref 4.8–10.8)
WBC # FLD AUTO: 10.4 K/UL — SIGNIFICANT CHANGE UP (ref 4.8–10.8)

## 2023-04-17 PROCEDURE — 99239 HOSP IP/OBS DSCHRG MGMT >30: CPT

## 2023-04-17 RX ORDER — FENOFIBRATE,MICRONIZED 130 MG
1 CAPSULE ORAL
Qty: 0 | Refills: 0 | DISCHARGE

## 2023-04-17 RX ORDER — PENTOXIFYLLINE 400 MG
1 TABLET, EXTENDED RELEASE ORAL
Qty: 0 | Refills: 0 | DISCHARGE

## 2023-04-17 RX ORDER — ASPIRIN/CALCIUM CARB/MAGNESIUM 324 MG
1 TABLET ORAL
Refills: 0 | DISCHARGE

## 2023-04-17 RX ORDER — ASPIRIN/CALCIUM CARB/MAGNESIUM 324 MG
1 TABLET ORAL
Qty: 0 | Refills: 0 | DISCHARGE

## 2023-04-17 RX ORDER — RANOLAZINE 500 MG/1
1 TABLET, FILM COATED, EXTENDED RELEASE ORAL
Qty: 0 | Refills: 0 | DISCHARGE

## 2023-04-17 RX ORDER — CLOPIDOGREL BISULFATE 75 MG/1
1 TABLET, FILM COATED ORAL
Refills: 0 | DISCHARGE

## 2023-04-17 RX ORDER — ASPIRIN/CALCIUM CARB/MAGNESIUM 324 MG
1 TABLET ORAL
Qty: 0 | Refills: 0 | DISCHARGE
Start: 2023-04-17

## 2023-04-17 RX ORDER — TAMSULOSIN HYDROCHLORIDE 0.4 MG/1
0.4 CAPSULE ORAL AT BEDTIME
Refills: 0 | Status: DISCONTINUED | OUTPATIENT
Start: 2023-04-17 | End: 2023-04-17

## 2023-04-17 RX ORDER — CEFPODOXIME PROXETIL 100 MG
1 TABLET ORAL
Qty: 20 | Refills: 0
Start: 2023-04-17 | End: 2023-04-26

## 2023-04-17 RX ORDER — TAMSULOSIN HYDROCHLORIDE 0.4 MG/1
1 CAPSULE ORAL
Qty: 0 | Refills: 0 | DISCHARGE

## 2023-04-17 RX ORDER — LEVETIRACETAM 250 MG/1
1 TABLET, FILM COATED ORAL
Refills: 0 | DISCHARGE

## 2023-04-17 RX ORDER — TAMSULOSIN HYDROCHLORIDE 0.4 MG/1
1 CAPSULE ORAL
Qty: 30 | Refills: 0
Start: 2023-04-17 | End: 2023-05-16

## 2023-04-17 RX ORDER — GABAPENTIN 400 MG/1
1 CAPSULE ORAL
Refills: 0 | DISCHARGE

## 2023-04-17 RX ADMIN — HEPARIN SODIUM 5000 UNIT(S): 5000 INJECTION INTRAVENOUS; SUBCUTANEOUS at 13:19

## 2023-04-17 RX ADMIN — Medication 81 MILLIGRAM(S): at 13:19

## 2023-04-17 RX ADMIN — CEFTRIAXONE 100 MILLIGRAM(S): 500 INJECTION, POWDER, FOR SOLUTION INTRAMUSCULAR; INTRAVENOUS at 05:37

## 2023-04-17 RX ADMIN — HEPARIN SODIUM 5000 UNIT(S): 5000 INJECTION INTRAVENOUS; SUBCUTANEOUS at 06:20

## 2023-04-17 NOTE — PROGRESS NOTE ADULT - SUBJECTIVE AND OBJECTIVE BOX
ERICK MARTINEZ  77y  Male      Patient is a 77y old  Male who presents with a chief complaint of     INTERVAL HPI/OVERNIGHT EVENTS:    pt seen this am   -continue with IV antibiotics   -not discharge ready this weekend   -follow cultures   -oob to chair     -Vital Signs Last 24 Hrs  T(C): 37.7 (15 Apr 2023 12:26), Max: 38.2 (2023 13:48)  T(F): 99.8 (15 Apr 2023 12:26), Max: 100.8 (2023 13:48)  HR: 95 (15 Apr 2023 12:26) (76 - 95)  BP: 166/81 (15 Apr 2023 12:26) (151/76 - 166/81)  BP(mean): --  RR: 16 (15 Apr 2023 12:26) (16 - 18)  SpO2: 98% (15 Apr 2023 05:12) (95% - 98%)    Parameters below as of 2023 22:23  Patient On (Oxygen Delivery Method): room air        PHYSICAL EXAM:  GENERAL: Not in distress - speaking in full sentences   HEAD:  Atraumatic, Normocephalic  EYES: EOMI, PERRLA, conjunctiva and sclera clear  NERVOUS SYSTEM:  Alert & Oriented X 4, Good concentration   CHEST/LUNG: Clear b/l  CV/HEART: Regular rate and rhythm; No murmurs, rubs, or gallops  GI/ABDOMEN: Soft abdomen with suprapubic tenderness on palpation   EXTREMITIES:  2+ Peripheral Pulses, No clubbing, cyanosis, or edema  SKIN: No rashes or lesions    LAB:                        11.6   9.51  )-----------( 274      ( 15 Apr 2023 08:39 )             36.8     04-15    140  |  104  |  20  ----------------------------<  117<H>  3.9   |  23  |  1.6<H>    Ca    8.9      15 Apr 2023 08:39  Mg     2.2     04-15    Daily   CAPILLARY BLOOD GLUCOSE      POCT Blood Glucose.: 114 mg/dL (15 Apr 2023 11:28)  POCT Blood Glucose.: 115 mg/dL (15 Apr 2023 07:42)  POCT Blood Glucose.: 136 mg/dL (2023 21:31)  POCT Blood Glucose.: 125 mg/dL (2023 17:33)    Urinalysis Basic - ( 2023 01:00 )    Color: Light Yellow / Appearance: Slightly Turbid / S.016 / pH: x  Gluc: x / Ketone: Negative  / Bili: Negative / Urobili: <2 mg/dL   Blood: x / Protein: 100 mg/dL / Nitrite: Positive   Leuk Esterase: Large / RBC: 0 /HPF /  /HPF   Sq Epi: x / Non Sq Epi: x / Bacteria: Many      LIVER FUNCTIONS - ( 2023 08:15 )  Alb: 3.7 g/dL / Pro: 6.0 g/dL / ALK PHOS: 37 U/L / ALT: 9 U/L / AST: 19 U/L / GGT: x           RADIOLOGY:    Imaging Personally visualized and Reviewed:  [ y ] YES  [ ] NO    HEALTH ISSUES - PROBLEM Dx:    MEDS:  acetaminophen     Tablet .. 650 milliGRAM(s) Oral every 6 hours PRN  aluminum hydroxide/magnesium hydroxide/simethicone Suspension 30 milliLiter(s) Oral every 4 hours PRN  aspirin  chewable 81 milliGRAM(s) Oral daily  atorvastatin 40 milliGRAM(s) Oral at bedtime  cefTRIAXone   IVPB 1000 milliGRAM(s) IV Intermittent every 24 hours  dextrose 50% Injectable 25 Gram(s) IV Push once  dextrose 50% Injectable 12.5 Gram(s) IV Push once  dextrose 50% Injectable 25 Gram(s) IV Push once  dextrose Oral Gel 15 Gram(s) Oral once PRN  glucagon  Injectable 1 milliGRAM(s) IntraMuscular once  heparin   Injectable 5000 Unit(s) SubCutaneous every 8 hours  insulin lispro (ADMELOG) corrective regimen sliding scale   SubCutaneous three times a day before meals  lactated ringers. 1000 milliLiter(s) IV Continuous <Continuous>  melatonin 3 milliGRAM(s) Oral at bedtime PRN  ondansetron Injectable 4 milliGRAM(s) IV Push every 8 hours PRN      
  ERICK MARTINEZ  77y  Male      Patient is a 77y old  Male who presents with a chief complaint of     INTERVAL HPI/OVERNIGHT EVENTS:    pt seen this am   -d/c home today on oral antibiotics     Vital Signs Last 24 Hrs  T(C): 36.8 (2023 14:43), Max: 36.8 (2023 14:43)  T(F): 98.2 (2023 14:43), Max: 98.2 (2023 14:43)  HR: 86 (2023 14:43) (82 - 90)  BP: 166/71 (2023 14:43) (150/82 - 178/98)  BP(mean): --  RR: 18 (2023 14:43) (16 - 18)  SpO2: 97% (2023 04:34) (97% - 99%)    PHYSICAL EXAM:  GENERAL: Not in distress - speaking in full sentences   HEAD:  Atraumatic, Normocephalic  EYES: EOMI, PERRLA, conjunctiva and sclera clear  NERVOUS SYSTEM:  Alert & Oriented X 4, Good concentration   CHEST/LUNG: Clear b/l  CV/HEART: Regular rate and rhythm; No murmurs, rubs, or gallops  GI/ABDOMEN: Soft abdomen with suprapubic tenderness on palpation   EXTREMITIES:  2+ Peripheral Pulses, No clubbing, cyanosis, or edema  SKIN: No rashes or lesions    LAB:                                      12.2   10.40 )-----------( 361      ( 2023 07:57 )             38.2   04-    142  |  106  |  22<H>  ----------------------------<  136<H>  4.2   |  22  |  1.5    Ca    9.7      2023 07:57  Mg     2.4     -        Daily   CAPILLARY BLOOD GLUCOSE      POCT Blood Glucose.: 114 mg/dL (15 Apr 2023 11:28)  POCT Blood Glucose.: 115 mg/dL (15 Apr 2023 07:42)  POCT Blood Glucose.: 136 mg/dL (2023 21:31)  POCT Blood Glucose.: 125 mg/dL (2023 17:33)    Urinalysis Basic - ( 2023 01:00 )    Color: Light Yellow / Appearance: Slightly Turbid / S.016 / pH: x  Gluc: x / Ketone: Negative  / Bili: Negative / Urobili: <2 mg/dL   Blood: x / Protein: 100 mg/dL / Nitrite: Positive   Leuk Esterase: Large / RBC: 0 /HPF /  /HPF   Sq Epi: x / Non Sq Epi: x / Bacteria: Many      LIVER FUNCTIONS - ( 2023 08:15 )  Alb: 3.7 g/dL / Pro: 6.0 g/dL / ALK PHOS: 37 U/L / ALT: 9 U/L / AST: 19 U/L / GGT: x           RADIOLOGY:    Imaging Personally visualized and Reviewed:  [ y ] YES  [ ] NO    HEALTH ISSUES - PROBLEM Dx:    MEDICATIONS  (STANDING):  aspirin  chewable 81 milliGRAM(s) Oral daily  atorvastatin 40 milliGRAM(s) Oral at bedtime  cefTRIAXone   IVPB 1000 milliGRAM(s) IV Intermittent every 24 hours  dextrose 50% Injectable 25 Gram(s) IV Push once  dextrose 50% Injectable 25 Gram(s) IV Push once  dextrose 50% Injectable 12.5 Gram(s) IV Push once  glucagon  Injectable 1 milliGRAM(s) IntraMuscular once  heparin   Injectable 5000 Unit(s) SubCutaneous every 8 hours  insulin lispro (ADMELOG) corrective regimen sliding scale   SubCutaneous three times a day before meals  lactated ringers. 1000 milliLiter(s) (50 mL/Hr) IV Continuous <Continuous>  tamsulosin 0.4 milliGRAM(s) Oral at bedtime    MEDICATIONS  (PRN):  acetaminophen     Tablet .. 650 milliGRAM(s) Oral every 6 hours PRN Temp greater or equal to 38C (100.4F), Mild Pain (1 - 3)  aluminum hydroxide/magnesium hydroxide/simethicone Suspension 30 milliLiter(s) Oral every 4 hours PRN Dyspepsia  dextrose Oral Gel 15 Gram(s) Oral once PRN Blood Glucose LESS THAN 70 milliGRAM(s)/deciliter  melatonin 3 milliGRAM(s) Oral at bedtime PRN Insomnia  ondansetron Injectable 4 milliGRAM(s) IV Push every 8 hours PRN Nausea and/or Vomiting  
  ERICK MARTINEZ  77y  Male      Patient is a 77y old  Male who presents with a chief complaint of     INTERVAL HPI/OVERNIGHT EVENTS:    pt seen this am   -continue with current regimen   -follow cultures   -oob to chair   -d/c planning next 24 hrs     -Vital Signs Last 24 Hrs  T(C): 37.7 (15 Apr 2023 12:26), Max: 38.2 (2023 13:48)  T(F): 99.8 (15 Apr 2023 12:26), Max: 100.8 (2023 13:48)  HR: 95 (15 Apr 2023 12:26) (76 - 95)  BP: 166/81 (15 Apr 2023 12:26) (151/76 - 166/81)  BP(mean): --  RR: 16 (15 Apr 2023 12:26) (16 - 18)  SpO2: 98% (15 Apr 2023 05:12) (95% - 98%)    Parameters below as of 2023 22:23  Patient On (Oxygen Delivery Method): room air        PHYSICAL EXAM:  GENERAL: Not in distress - speaking in full sentences   HEAD:  Atraumatic, Normocephalic  EYES: EOMI, PERRLA, conjunctiva and sclera clear  NERVOUS SYSTEM:  Alert & Oriented X 4, Good concentration   CHEST/LUNG: Clear b/l  CV/HEART: Regular rate and rhythm; No murmurs, rubs, or gallops  GI/ABDOMEN: Soft abdomen with suprapubic tenderness on palpation   EXTREMITIES:  2+ Peripheral Pulses, No clubbing, cyanosis, or edema  SKIN: No rashes or lesions    LAB:                                 12.2   10.33 )-----------( 290      ( 2023 08:31 )             38.4   04-16    142  |  104  |  21<H>  ----------------------------<  102<H>  4.1   |  22  |  1.5    Ca    9.5      2023 08:31  Mg     2.3     04-16    Daily   CAPILLARY BLOOD GLUCOSE      POCT Blood Glucose.: 114 mg/dL (15 Apr 2023 11:28)  POCT Blood Glucose.: 115 mg/dL (15 Apr 2023 07:42)  POCT Blood Glucose.: 136 mg/dL (2023 21:31)  POCT Blood Glucose.: 125 mg/dL (2023 17:33)    Urinalysis Basic - ( 2023 01:00 )    Color: Light Yellow / Appearance: Slightly Turbid / S.016 / pH: x  Gluc: x / Ketone: Negative  / Bili: Negative / Urobili: <2 mg/dL   Blood: x / Protein: 100 mg/dL / Nitrite: Positive   Leuk Esterase: Large / RBC: 0 /HPF /  /HPF   Sq Epi: x / Non Sq Epi: x / Bacteria: Many      LIVER FUNCTIONS - ( 2023 08:15 )  Alb: 3.7 g/dL / Pro: 6.0 g/dL / ALK PHOS: 37 U/L / ALT: 9 U/L / AST: 19 U/L / GGT: x           RADIOLOGY:    Imaging Personally visualized and Reviewed:  [ y ] YES  [ ] NO    HEALTH ISSUES - PROBLEM Dx:    MEDICATIONS  (STANDING):  aspirin  chewable 81 milliGRAM(s) Oral daily  atorvastatin 40 milliGRAM(s) Oral at bedtime  cefTRIAXone   IVPB 1000 milliGRAM(s) IV Intermittent every 24 hours  dextrose 50% Injectable 12.5 Gram(s) IV Push once  dextrose 50% Injectable 25 Gram(s) IV Push once  dextrose 50% Injectable 25 Gram(s) IV Push once  glucagon  Injectable 1 milliGRAM(s) IntraMuscular once  heparin   Injectable 5000 Unit(s) SubCutaneous every 8 hours  insulin lispro (ADMELOG) corrective regimen sliding scale   SubCutaneous three times a day before meals  lactated ringers. 1000 milliLiter(s) (75 mL/Hr) IV Continuous <Continuous>    MEDICATIONS  (PRN):  acetaminophen     Tablet .. 650 milliGRAM(s) Oral every 6 hours PRN Temp greater or equal to 38C (100.4F), Mild Pain (1 - 3)  aluminum hydroxide/magnesium hydroxide/simethicone Suspension 30 milliLiter(s) Oral every 4 hours PRN Dyspepsia  dextrose Oral Gel 15 Gram(s) Oral once PRN Blood Glucose LESS THAN 70 milliGRAM(s)/deciliter  melatonin 3 milliGRAM(s) Oral at bedtime PRN Insomnia  ondansetron Injectable 4 milliGRAM(s) IV Push every 8 hours PRN Nausea and/or Vomiting

## 2023-04-17 NOTE — PROGRESS NOTE ADULT - ASSESSMENT
·	Sepsis/UTI  ·	CAD/CABG  ·	HTN  ·	DM II   ·	SAMANTA on CKD Stage III/ hyperkalemia   ·	OLD CVA     -IV abx - blood cultures negative - still with low grade fevers past 24 hrs   -rechecked BMP - K+ 4.1  -serum Cr improved to 1.5 from 1.8   -home maintenance meds   -monitor BP and finger sticks on current regimen   -oob to chair as tolerated and or with assistance   -skin care as per nursing     DISPO: home once clinically stable   -discussed plan of care with patient and answered all questions     Attending Physician Dr. Sydnie Alfredo # [id number]
  ·	Sepsis/UTI  ·	CAD/CABG  ·	HTN  ·	DM II   ·	CKD Stage III/ hyperkalemia   ·	OLD CVA     -IV abx - pancultures   -rechecked BMP - K+ 3.9  -serum Cr improved to 1.6 from 1.8   -home maintenance meds   -monitor BP and finger sticks on current regimen   -oob to chair as tolerated and or with assistance   -skin care as per nursing     DISPO: home once clinically stable   -discussed plan of care with patient and answered all questions     Attending Physician Dr. Sydnie Alfredo # [id number]
  ·	Sepsis/UTI  ·	CAD/CABG  ·	HTN  ·	DM II   ·	SAMANTA on CKD Stage III/ hyperkalemia   ·	OLD CVA     -switch to oral Vantin 10 more days   -serum Cr improved to 1.5 from 1.8   -home maintenance meds - medical intern clarified medications with the patient and pharmacy - outpatient cardio follow up with Dr. Stewart  -monitor BP and finger sticks on current regimen   -oob to chair as tolerated and or with assistance   -skin care as per nursing     DISPO: home today   -discussed plan of care with patient and answered all questions - wants to go home     Attending Physician Dr. Sydnie Alfredo # [id number]

## 2023-04-18 RX ORDER — CIPROFLOXACIN LACTATE 400MG/40ML
1 VIAL (ML) INTRAVENOUS
Qty: 10 | Refills: 0
Start: 2023-04-18 | End: 2023-04-22

## 2023-04-19 LAB
CULTURE RESULTS: SIGNIFICANT CHANGE UP
SPECIMEN SOURCE: SIGNIFICANT CHANGE UP

## 2023-04-21 DIAGNOSIS — Z87.891 PERSONAL HISTORY OF NICOTINE DEPENDENCE: ICD-10-CM

## 2023-04-21 DIAGNOSIS — I12.9 HYPERTENSIVE CHRONIC KIDNEY DISEASE WITH STAGE 1 THROUGH STAGE 4 CHRONIC KIDNEY DISEASE, OR UNSPECIFIED CHRONIC KIDNEY DISEASE: ICD-10-CM

## 2023-04-21 DIAGNOSIS — N40.0 BENIGN PROSTATIC HYPERPLASIA WITHOUT LOWER URINARY TRACT SYMPTOMS: ICD-10-CM

## 2023-04-21 DIAGNOSIS — E78.5 HYPERLIPIDEMIA, UNSPECIFIED: ICD-10-CM

## 2023-04-21 DIAGNOSIS — Z95.1 PRESENCE OF AORTOCORONARY BYPASS GRAFT: ICD-10-CM

## 2023-04-21 DIAGNOSIS — A41.9 SEPSIS, UNSPECIFIED ORGANISM: ICD-10-CM

## 2023-04-21 DIAGNOSIS — Z79.82 LONG TERM (CURRENT) USE OF ASPIRIN: ICD-10-CM

## 2023-04-21 DIAGNOSIS — I69.351 HEMIPLEGIA AND HEMIPARESIS FOLLOWING CEREBRAL INFARCTION AFFECTING RIGHT DOMINANT SIDE: ICD-10-CM

## 2023-04-21 DIAGNOSIS — I25.10 ATHEROSCLEROTIC HEART DISEASE OF NATIVE CORONARY ARTERY WITHOUT ANGINA PECTORIS: ICD-10-CM

## 2023-04-21 DIAGNOSIS — Z79.84 LONG TERM (CURRENT) USE OF ORAL HYPOGLYCEMIC DRUGS: ICD-10-CM

## 2023-04-21 DIAGNOSIS — E87.5 HYPERKALEMIA: ICD-10-CM

## 2023-04-21 DIAGNOSIS — Z88.0 ALLERGY STATUS TO PENICILLIN: ICD-10-CM

## 2023-04-21 DIAGNOSIS — F41.9 ANXIETY DISORDER, UNSPECIFIED: ICD-10-CM

## 2023-04-21 DIAGNOSIS — N17.9 ACUTE KIDNEY FAILURE, UNSPECIFIED: ICD-10-CM

## 2023-04-21 DIAGNOSIS — I69.354 HEMIPLEGIA AND HEMIPARESIS FOLLOWING CEREBRAL INFARCTION AFFECTING LEFT NON-DOMINANT SIDE: ICD-10-CM

## 2023-04-21 DIAGNOSIS — K21.9 GASTRO-ESOPHAGEAL REFLUX DISEASE WITHOUT ESOPHAGITIS: ICD-10-CM

## 2023-04-21 DIAGNOSIS — N30.00 ACUTE CYSTITIS WITHOUT HEMATURIA: ICD-10-CM

## 2023-04-21 DIAGNOSIS — N18.30 CHRONIC KIDNEY DISEASE, STAGE 3 UNSPECIFIED: ICD-10-CM

## 2023-04-21 DIAGNOSIS — E11.22 TYPE 2 DIABETES MELLITUS WITH DIABETIC CHRONIC KIDNEY DISEASE: ICD-10-CM

## 2023-04-25 ENCOUNTER — NON-APPOINTMENT (OUTPATIENT)
Age: 78
End: 2023-04-25

## 2023-04-25 ENCOUNTER — APPOINTMENT (OUTPATIENT)
Dept: CARDIOLOGY | Facility: CLINIC | Age: 78
End: 2023-04-25
Payer: MEDICARE

## 2023-04-25 PROCEDURE — G2066: CPT

## 2023-04-25 PROCEDURE — 93298 REM INTERROG DEV EVAL SCRMS: CPT

## 2023-05-26 ENCOUNTER — NON-APPOINTMENT (OUTPATIENT)
Age: 78
End: 2023-05-26

## 2023-05-26 ENCOUNTER — APPOINTMENT (OUTPATIENT)
Dept: CARDIOLOGY | Facility: CLINIC | Age: 78
End: 2023-05-26
Payer: MEDICARE

## 2023-05-26 PROCEDURE — G2066: CPT

## 2023-05-26 PROCEDURE — 93298 REM INTERROG DEV EVAL SCRMS: CPT

## 2023-07-03 ENCOUNTER — APPOINTMENT (OUTPATIENT)
Dept: ELECTROPHYSIOLOGY | Facility: CLINIC | Age: 78
End: 2023-07-03

## 2023-07-07 ENCOUNTER — INPATIENT (INPATIENT)
Facility: HOSPITAL | Age: 78
LOS: 3 days | Discharge: HOME HEALTH | DRG: 69 | End: 2023-07-11
Attending: STUDENT IN AN ORGANIZED HEALTH CARE EDUCATION/TRAINING PROGRAM | Admitting: HOSPITALIST
Payer: MEDICARE

## 2023-07-07 ENCOUNTER — APPOINTMENT (OUTPATIENT)
Dept: ELECTROPHYSIOLOGY | Facility: CLINIC | Age: 78
End: 2023-07-07

## 2023-07-07 VITALS
DIASTOLIC BLOOD PRESSURE: 56 MMHG | SYSTOLIC BLOOD PRESSURE: 141 MMHG | HEART RATE: 72 BPM | OXYGEN SATURATION: 98 % | RESPIRATION RATE: 18 BRPM

## 2023-07-07 DIAGNOSIS — Z95.1 PRESENCE OF AORTOCORONARY BYPASS GRAFT: Chronic | ICD-10-CM

## 2023-07-07 DIAGNOSIS — Z90.49 ACQUIRED ABSENCE OF OTHER SPECIFIED PARTS OF DIGESTIVE TRACT: Chronic | ICD-10-CM

## 2023-07-07 DIAGNOSIS — H26.9 UNSPECIFIED CATARACT: Chronic | ICD-10-CM

## 2023-07-07 DIAGNOSIS — R07.9 CHEST PAIN, UNSPECIFIED: ICD-10-CM

## 2023-07-07 LAB
ALBUMIN SERPL ELPH-MCNC: 4 G/DL — SIGNIFICANT CHANGE UP (ref 3.5–5.2)
ALP SERPL-CCNC: 36 U/L — SIGNIFICANT CHANGE UP (ref 30–115)
ALT FLD-CCNC: 10 U/L — SIGNIFICANT CHANGE UP (ref 0–41)
ANION GAP SERPL CALC-SCNC: 11 MMOL/L — SIGNIFICANT CHANGE UP (ref 7–14)
AST SERPL-CCNC: 19 U/L — SIGNIFICANT CHANGE UP (ref 0–41)
BASOPHILS # BLD AUTO: 0.04 K/UL — SIGNIFICANT CHANGE UP (ref 0–0.2)
BASOPHILS NFR BLD AUTO: 0.5 % — SIGNIFICANT CHANGE UP (ref 0–1)
BILIRUB SERPL-MCNC: 0.3 MG/DL — SIGNIFICANT CHANGE UP (ref 0.2–1.2)
BUN SERPL-MCNC: 33 MG/DL — HIGH (ref 10–20)
CALCIUM SERPL-MCNC: 9.3 MG/DL — SIGNIFICANT CHANGE UP (ref 8.4–10.5)
CHLORIDE SERPL-SCNC: 105 MMOL/L — SIGNIFICANT CHANGE UP (ref 98–110)
CO2 SERPL-SCNC: 22 MMOL/L — SIGNIFICANT CHANGE UP (ref 17–32)
CREAT SERPL-MCNC: 1.9 MG/DL — HIGH (ref 0.7–1.5)
EGFR: 36 ML/MIN/1.73M2 — LOW
EOSINOPHIL # BLD AUTO: 0.04 K/UL — SIGNIFICANT CHANGE UP (ref 0–0.7)
EOSINOPHIL NFR BLD AUTO: 0.5 % — SIGNIFICANT CHANGE UP (ref 0–8)
GLUCOSE SERPL-MCNC: 119 MG/DL — HIGH (ref 70–99)
HCT VFR BLD CALC: 42.6 % — SIGNIFICANT CHANGE UP (ref 42–52)
HGB BLD-MCNC: 13.6 G/DL — LOW (ref 14–18)
IMM GRANULOCYTES NFR BLD AUTO: 0.3 % — SIGNIFICANT CHANGE UP (ref 0.1–0.3)
LYMPHOCYTES # BLD AUTO: 1.13 K/UL — LOW (ref 1.2–3.4)
LYMPHOCYTES # BLD AUTO: 13 % — LOW (ref 20.5–51.1)
MCHC RBC-ENTMCNC: 28.6 PG — SIGNIFICANT CHANGE UP (ref 27–31)
MCHC RBC-ENTMCNC: 31.9 G/DL — LOW (ref 32–37)
MCV RBC AUTO: 89.5 FL — SIGNIFICANT CHANGE UP (ref 80–94)
MONOCYTES # BLD AUTO: 0.37 K/UL — SIGNIFICANT CHANGE UP (ref 0.1–0.6)
MONOCYTES NFR BLD AUTO: 4.2 % — SIGNIFICANT CHANGE UP (ref 1.7–9.3)
NEUTROPHILS # BLD AUTO: 7.11 K/UL — HIGH (ref 1.4–6.5)
NEUTROPHILS NFR BLD AUTO: 81.5 % — HIGH (ref 42.2–75.2)
NRBC # BLD: 0 /100 WBCS — SIGNIFICANT CHANGE UP (ref 0–0)
PLATELET # BLD AUTO: 250 K/UL — SIGNIFICANT CHANGE UP (ref 130–400)
PMV BLD: 10.5 FL — HIGH (ref 7.4–10.4)
POTASSIUM SERPL-MCNC: 5 MMOL/L — SIGNIFICANT CHANGE UP (ref 3.5–5)
POTASSIUM SERPL-SCNC: 5 MMOL/L — SIGNIFICANT CHANGE UP (ref 3.5–5)
PROT SERPL-MCNC: 6.5 G/DL — SIGNIFICANT CHANGE UP (ref 6–8)
RBC # BLD: 4.76 M/UL — SIGNIFICANT CHANGE UP (ref 4.7–6.1)
RBC # FLD: 15.4 % — HIGH (ref 11.5–14.5)
SODIUM SERPL-SCNC: 138 MMOL/L — SIGNIFICANT CHANGE UP (ref 135–146)
TROPONIN T SERPL-MCNC: <0.01 NG/ML — SIGNIFICANT CHANGE UP
WBC # BLD: 8.72 K/UL — SIGNIFICANT CHANGE UP (ref 4.8–10.8)
WBC # FLD AUTO: 8.72 K/UL — SIGNIFICANT CHANGE UP (ref 4.8–10.8)

## 2023-07-07 PROCEDURE — 99223 1ST HOSP IP/OBS HIGH 75: CPT

## 2023-07-07 PROCEDURE — 71045 X-RAY EXAM CHEST 1 VIEW: CPT | Mod: 26

## 2023-07-07 PROCEDURE — 82962 GLUCOSE BLOOD TEST: CPT

## 2023-07-07 PROCEDURE — 80061 LIPID PANEL: CPT

## 2023-07-07 PROCEDURE — 70498 CT ANGIOGRAPHY NECK: CPT | Mod: 26,MA

## 2023-07-07 PROCEDURE — 86900 BLOOD TYPING SEROLOGIC ABO: CPT

## 2023-07-07 PROCEDURE — 70496 CT ANGIOGRAPHY HEAD: CPT | Mod: 26,MA

## 2023-07-07 PROCEDURE — 36415 COLL VENOUS BLD VENIPUNCTURE: CPT

## 2023-07-07 PROCEDURE — 84484 ASSAY OF TROPONIN QUANT: CPT

## 2023-07-07 PROCEDURE — 85027 COMPLETE CBC AUTOMATED: CPT

## 2023-07-07 PROCEDURE — 76770 US EXAM ABDO BACK WALL COMP: CPT

## 2023-07-07 PROCEDURE — 70450 CT HEAD/BRAIN W/O DYE: CPT | Mod: 26,MA,59

## 2023-07-07 PROCEDURE — 70551 MRI BRAIN STEM W/O DYE: CPT

## 2023-07-07 PROCEDURE — 93290 INTERROG DEV EVAL ICPMS IP: CPT

## 2023-07-07 PROCEDURE — 84100 ASSAY OF PHOSPHORUS: CPT

## 2023-07-07 PROCEDURE — 80048 BASIC METABOLIC PNL TOTAL CA: CPT

## 2023-07-07 PROCEDURE — 99285 EMERGENCY DEPT VISIT HI MDM: CPT | Mod: GC

## 2023-07-07 PROCEDURE — 70551 MRI BRAIN STEM W/O DYE: CPT | Mod: 26

## 2023-07-07 PROCEDURE — 93306 TTE W/DOPPLER COMPLETE: CPT

## 2023-07-07 PROCEDURE — 97110 THERAPEUTIC EXERCISES: CPT | Mod: GP

## 2023-07-07 PROCEDURE — 86901 BLOOD TYPING SEROLOGIC RH(D): CPT

## 2023-07-07 PROCEDURE — 93005 ELECTROCARDIOGRAM TRACING: CPT

## 2023-07-07 PROCEDURE — 86850 RBC ANTIBODY SCREEN: CPT

## 2023-07-07 PROCEDURE — 97116 GAIT TRAINING THERAPY: CPT | Mod: GP

## 2023-07-07 PROCEDURE — 83735 ASSAY OF MAGNESIUM: CPT

## 2023-07-07 RX ORDER — ASPIRIN/CALCIUM CARB/MAGNESIUM 324 MG
324 TABLET ORAL ONCE
Refills: 0 | Status: COMPLETED | OUTPATIENT
Start: 2023-07-07 | End: 2023-07-07

## 2023-07-07 RX ORDER — ACETAMINOPHEN 500 MG
650 TABLET ORAL EVERY 6 HOURS
Refills: 0 | Status: DISCONTINUED | OUTPATIENT
Start: 2023-07-07 | End: 2023-07-11

## 2023-07-07 RX ORDER — ASPIRIN/CALCIUM CARB/MAGNESIUM 324 MG
81 TABLET ORAL DAILY
Refills: 0 | Status: DISCONTINUED | OUTPATIENT
Start: 2023-07-07 | End: 2023-07-11

## 2023-07-07 RX ORDER — ISOSORBIDE MONONITRATE 60 MG/1
60 TABLET, EXTENDED RELEASE ORAL DAILY
Refills: 0 | Status: DISCONTINUED | OUTPATIENT
Start: 2023-07-07 | End: 2023-07-09

## 2023-07-07 RX ORDER — METOPROLOL TARTRATE 50 MG
25 TABLET ORAL
Refills: 0 | Status: DISCONTINUED | OUTPATIENT
Start: 2023-07-07 | End: 2023-07-11

## 2023-07-07 RX ORDER — VENLAFAXINE HCL 75 MG
1 CAPSULE, EXT RELEASE 24 HR ORAL
Qty: 0 | Refills: 0 | DISCHARGE

## 2023-07-07 RX ORDER — FENOFIBRATE,MICRONIZED 130 MG
145 CAPSULE ORAL DAILY
Refills: 0 | Status: DISCONTINUED | OUTPATIENT
Start: 2023-07-07 | End: 2023-07-11

## 2023-07-07 RX ORDER — FENOFIBRATE,MICRONIZED 130 MG
1 CAPSULE ORAL
Refills: 0 | DISCHARGE

## 2023-07-07 RX ORDER — ROSUVASTATIN CALCIUM 5 MG/1
1 TABLET ORAL
Refills: 0 | DISCHARGE

## 2023-07-07 RX ORDER — TAMSULOSIN HYDROCHLORIDE 0.4 MG/1
0.4 CAPSULE ORAL AT BEDTIME
Refills: 0 | Status: DISCONTINUED | OUTPATIENT
Start: 2023-07-07 | End: 2023-07-11

## 2023-07-07 RX ORDER — RANOLAZINE 500 MG/1
1 TABLET, FILM COATED, EXTENDED RELEASE ORAL
Refills: 0 | DISCHARGE

## 2023-07-07 RX ORDER — HEPARIN SODIUM 5000 [USP'U]/ML
5000 INJECTION INTRAVENOUS; SUBCUTANEOUS EVERY 12 HOURS
Refills: 0 | Status: DISCONTINUED | OUTPATIENT
Start: 2023-07-07 | End: 2023-07-11

## 2023-07-07 RX ORDER — PENTOXIFYLLINE 400 MG
1 TABLET, EXTENDED RELEASE ORAL
Refills: 0 | DISCHARGE

## 2023-07-07 RX ORDER — CLOPIDOGREL BISULFATE 75 MG/1
75 TABLET, FILM COATED ORAL DAILY
Refills: 0 | Status: DISCONTINUED | OUTPATIENT
Start: 2023-07-07 | End: 2023-07-11

## 2023-07-07 RX ORDER — LOSARTAN POTASSIUM 100 MG/1
100 TABLET, FILM COATED ORAL DAILY
Refills: 0 | Status: DISCONTINUED | OUTPATIENT
Start: 2023-07-07 | End: 2023-07-08

## 2023-07-07 RX ORDER — LANOLIN ALCOHOL/MO/W.PET/CERES
3 CREAM (GRAM) TOPICAL AT BEDTIME
Refills: 0 | Status: DISCONTINUED | OUTPATIENT
Start: 2023-07-07 | End: 2023-07-11

## 2023-07-07 RX ORDER — NIFEDIPINE 30 MG
1 TABLET, EXTENDED RELEASE 24 HR ORAL
Refills: 0 | DISCHARGE

## 2023-07-07 RX ADMIN — Medication 145 MILLIGRAM(S): at 23:20

## 2023-07-07 RX ADMIN — CLOPIDOGREL BISULFATE 75 MILLIGRAM(S): 75 TABLET, FILM COATED ORAL at 23:21

## 2023-07-07 RX ADMIN — TAMSULOSIN HYDROCHLORIDE 0.4 MILLIGRAM(S): 0.4 CAPSULE ORAL at 23:22

## 2023-07-07 RX ADMIN — Medication 25 MILLIGRAM(S): at 23:22

## 2023-07-07 RX ADMIN — ISOSORBIDE MONONITRATE 60 MILLIGRAM(S): 60 TABLET, EXTENDED RELEASE ORAL at 23:21

## 2023-07-07 RX ADMIN — HEPARIN SODIUM 5000 UNIT(S): 5000 INJECTION INTRAVENOUS; SUBCUTANEOUS at 23:23

## 2023-07-07 RX ADMIN — Medication 324 MILLIGRAM(S): at 17:37

## 2023-07-07 RX ADMIN — Medication 81 MILLIGRAM(S): at 23:23

## 2023-07-07 RX ADMIN — LOSARTAN POTASSIUM 100 MILLIGRAM(S): 100 TABLET, FILM COATED ORAL at 23:23

## 2023-07-07 NOTE — H&P ADULT - NSHPSOCIALHISTORY_GEN_ALL_CORE
former tobacco use 2ppd [id number] = 50 pack year smoker, quit ~1985  rare EtOH use   no illicit drug use  lives at home w wife, benefits from wife's HHA  walker at baseline

## 2023-07-07 NOTE — ED ADULT NURSE NOTE - NSFALLUNIVINTERV_ED_ALL_ED
Bed/Stretcher in lowest position, wheels locked, appropriate side rails in place/Call bell, personal items and telephone in reach/Instruct patient to call for assistance before getting out of bed/chair/stretcher/Non-slip footwear applied when patient is off stretcher/[place] to call system/Physically safe environment - no spills, clutter or unnecessary equipment/Purposeful proactive rounding/Room/bathroom lighting operational, light cord in reach

## 2023-07-07 NOTE — H&P ADULT - HISTORY OF PRESENT ILLNESS
76 y/o man w PMHx of HTN, HLD, DM, CAD, s/p 3vCABG 2017, EF 63% w mild LVH and G1DD in 10/2022, follows w Dr Stewart, loop recorder implanted ~2021, 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   HCP is wife Kristina Ruffin [phone], but she is developing dementia   Alternative HCP is good friend Layo - pt cannot remember this phone number and left phone at home, states wife has this number.     Pt states dizziness onset while in the shower, no room spinning, only felt he himself was spinning. No recent head trauma, no fall, no focal weakness noted. Sat on toilet after and was unable to get up afterwards 2/2 generalized weakness, no focal weakness. Had associated mild posterior headache. Re: CP - describes L shoulder/L parasternal chest pain which was 7/10 "pulling" sensation w mild associated SOB but no diaphoresis, n/v. CP is intermittent, onset after dizziness and has recurred even after dizziness resolved. At time of admission, no dizziness nor CP. Otherwise asymptomatic. Baseline walks w walker, lives at home w wife and wife has HHA that pt also benefits from.     In ED triage vitals were: 141/56, HR 72bpm, RR 18, SaO2 98% on RA. Temp not done at triage.   Labs notable for: Cr 1.9, K 5.0, trop <0.01  Imaging: Stable CT Head, CTA Head and Neck notable for new moderate stenosis R MCA   Treatments given: ASA 324mg  Pending: MRI noncon

## 2023-07-07 NOTE — H&P ADULT - NSHPPHYSICALEXAM_GEN_ALL_CORE
GENERAL: NAD, lying in bed comfortably  HEAD:  Atraumatic, Normocephalic  EYES: EOMI, sclera clear  ENT: Moist mucous membranes. Poor dentition - left dentures at home, along w cell phone.   NECK: Supple, trachea midline  CHEST/LUNG: Clear to auscultation anteriorly bilaterally; No rales, rhonchi, wheezing, or rubs. Unlabored respirations but intermittent wet sounding cough   HEART: Regular rate and rhythm; 2/6 murmur heard best at LLSB   ABDOMEN: (+)BS; softly distended, nontender   EXTREMITIES:  2+ Radial Pulses, brisk capillary refill. No clubbing, cyanosis, or edema  NERVOUS SYSTEM:  A&Ox3, no noted facial droop. 5/5 strength RUE, 4/5 str LUE. LLE can raise minimally against gravity, RLE against gravity.   SKIN: No rashes or lesions to b/l forearm, face.

## 2023-07-07 NOTE — ED PROVIDER NOTE - PHYSICAL EXAMINATION
CONSTITUTIONAL:  in mild acute distress.   SKIN: warm, dry  HEAD: Normocephalic; atraumatic.  EYES: PERRL, EOMI, normal sclera and conjunctiva   ENT: No nasal discharge; airway clear.  NECK: Supple; non tender.  CARD:  Regular rate and rhythm.   RESP: NO inc WOB   ABD: soft ntnd  EXT: Normal ROM.    LYMPH: No acute cervical adenopathy.  NEURO: dizzyness, no tremors, ataxia  PSYCH: Cooperative, appropriate.

## 2023-07-07 NOTE — ED ADULT NURSE REASSESSMENT NOTE - NS ED NURSE REASSESS COMMENT FT1
Report received from GRACE HUBBARD. Pt admitted to [facility] for chest pain, SAMANTA and dizziness. Per pt, he had h/o CVA in the past and left sided weakness/numbness. Denies any other compliants at this time. Denies chest pain. Denies dizziness. Safety measures maintained. VS stable - /62 HR 84 RR18 O2 sat 100% on RA.

## 2023-07-07 NOTE — CONSULT NOTE ADULT - ATTENDING COMMENTS
77 year old man w/ PMH of CAD s/p CABG, DM, HTN, HLD, ischemic strokes presents w/ acute onset dizziness. History obtained from patient at bedside. As per patient, he has been having dizziness for the past couple of days (per ER report). On my report, patient states he was showering today and felt dizzy immediately afterwards, unclear whether room-spinning, sat on the toilet, and was unable to get up afterwards. States his symptoms have greatly improved and nearly resolved. On exam, patient has LUE and LLE drift.     MR brain 7.24.23 L. frontal lobe infarction involving the deep white matter (?L. SUSANNE territory)   MR brain 10.14.22 L. SUSANNE territory infarction   CTH 7.7.23 no acute pathology; chronic L. SUSANNE territory infarction.   CTA h/n new moderate/severe stenosis of the R. M1 segment; L. A2 occlusion (chronic); Unchanged severe R. P2 stenosis  A1c 6.6 (4.14.23)   LDL 58 (4.14.23)     Impression: Acute onset non-specific dizziness of unclear etiology -- will rule out ischemic etiology.     Plan:   -Will treat as TIA/mild stroke; Load ASA 325mg; Start ASA 81mg + Plavix 75mg (continue) for 3 weeks followed by Plavix 75mg indefinitely per [facility]  -MR brain w/o contrast   -A1c, Lipid panel   -Orthostatics     80 minutes spent on total encounter. The necessity of the time spent during the encounter on this date of service was due to:     Review of imaging and chart; obtaining history; examination of pt; discussion and coordination of care, and discussion of lifestyle modification and risk factor control.

## 2023-07-07 NOTE — ED PROVIDER NOTE - CARE PLAN
1 Principal Discharge DX:	Chest pain  Secondary Diagnosis:	SAMANTA (acute kidney injury)  Secondary Diagnosis:	Dizziness

## 2023-07-07 NOTE — ED PROVIDER NOTE - CLINICAL SUMMARY MEDICAL DECISION MAKING FREE TEXT BOX
77-year-old male that presents with chest pain and dizziness.  Will obtain EKG imaging labs. Labs and EKG were ordered and reviewed.  Imaging was ordered and reviewed by me.  Appropriate medications for patient's presenting complaints were ordered and effects were reassessed.  Patient's records (prior hospital, ED visit, and/or nursing home notes if available) were reviewed.  Additional history was obtained from EMS, family, and/or PCP (where available).  Escalation to admission/observation was considered.  Patient requires inpatient hospitalization - telemetry.

## 2023-07-07 NOTE — H&P ADULT - ASSESSMENT
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   HCP is wife Kristina Ruffin [phone], but she is developing dementia   Alternative HCP is good friend Layo - pt cannot remember this phone number and left phone at home, states wife has this number.     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  Resolved 2 hour episode, no focal weakness  DDx: neuro event such as TIA vs CVA, orthostatic hypotension, hypovolemia, arrhythmia, peripheral vertigo eg meniere's  - CT Head stable since 12/2022  - CTA Head and Neck new moderate stenosis R MCA   - Neuro recs 7/7/23: to treat as TIA/CVA   - Load ASA 325mg, then ASA 81mg QD   - Cont Plavix 75mg  - MR brain noncon - reviewed MRI screen w pt - states only loop recorder and dental posts, had MRI head 1 year ago while loop recorder in place  - f/u A1c, lipids   - Orthostatics   - EP consult to investigate loop recorder implanted ~2021 for evidence of arrhythmia  - PT consulted    #CP, intermittent  #CAD, s/p 3vCABG 2017  #EF 63% w mild LVH and G1DD in 10/2022  Follows w Dr Stewart  Describes L shoulder/L parasternal chest pain which was 7/10 "pulling" sensation w mild associated SOB but no diaphoresis, n/v. CP is intermittent, not exertional.   DDx: Unstable angina, Euvolemic on exam, doubt HF exacerbation  - trop <0.01 on admission - trend   - ECG 7/7/23 was NSR, no significant ST/T wave changes, narrow QRS, partial L BBB   - Had echo 8 months ago: EF 63% w mild LVH and G1DD in 10/2022  - Repeat echo     #SAMANTA, borderline K   #CKD3a  - Cr baseline 1.5 w eGFR 47, Cr 1.9 on admission   - Monitor, no conc K diet     #HTN   - Isosorbide mononitrate 60mg QD: home med, continue  - Losartan 100mg QD: home med, continue   - Metoprolol tartrate 25mg BID: home med, continue     #HLD - Fenofibrate 145mg QD: home med   #DM - Holding home Tradjenta 5mg QD, start sliding scale as needed  #GERD - no home med, start PPI as needed  #BPH - Tamsulosin 0.4mg QD: home med, continue   #recurrent UTIs - no urinary symptoms on admission, afebrile, will not obtain UA.                                                                               ----------------------------------------------------  # DVT prophylaxis: heparin 5000mg SQ QD  # GI prophylaxis: N/A  # Diet: Soft/Bite sized DASH TLC CC no conc K +ensure   # Activity: ambulate as tolerated   # Code status: DNR, trial intubation   # Disposition: Tele                                                                            --------------------------------------------------------    # Handoff:  - Echo  - Orthostatics  - MR brain noncon   - PT  - repeat EKG, trop  78 y/o man w PMHx of HTN, HLD, DM, CAD, s/p 3vCABG 2017, EF 63% w mild LVH and G1DD in 10/2022, follows w Dr Stewart, loop recorder implanted ~2021, 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   HCP is wife Kristina Ruffin [phone], but she is developing dementia   Alternative HCP is good friend Layo - pt cannot remember this phone number and left phone at home, states wife has this number.     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  Resolved 2 hour episode, no focal weakness  DDx: neuro event such as TIA vs CVA, orthostatic hypotension, hypovolemia, arrhythmia, peripheral vertigo eg meniere's  - CT Head stable since 12/2022  - CTA Head and Neck new moderate stenosis R MCA   - Neuro recs 7/7/23: to treat as TIA/CVA   - Load ASA 325mg, then ASA 81mg QD   - Cont Plavix 75mg  - MR brain noncon - reviewed MRI screen w pt - states only loop recorder and dental posts, had MRI head 1 year ago while loop recorder in place  - f/u A1c, lipids   - Orthostatics   - EP consult to investigate loop recorder implanted ~2021 for evidence of arrhythmia  - PT consulted    #CP, intermittent  #CAD, s/p 3vCABG 2017  #EF 63% w mild LVH and G1DD in 10/2022  Follows w Dr Stewart  Describes L shoulder/L parasternal chest pain which was 7/10 "pulling" sensation w mild associated SOB but no diaphoresis, n/v. CP is intermittent, not exertional.   DDx: Unstable angina, Euvolemic on exam, doubt HF exacerbation  - trop <0.01 on admission - trend   - ECG 7/7/23 was NSR, no significant ST/T wave changes, narrow QRS, partial L BBB   - Had echo 8 months ago: EF 63% w mild LVH and G1DD in 10/2022  - Repeat echo     #SAMANTA, borderline K   #CKD3a  - Cr baseline 1.5 w eGFR 47, Cr 1.9 on admission   - Monitor, no conc K diet     #HTN   - Isosorbide mononitrate 60mg QD: home med, continue  - Losartan 100mg QD: home med, continue   - Metoprolol tartrate 25mg BID: home med, continue     #HLD - Fenofibrate 145mg QD: home med   #DM - Holding home Tradjenta 5mg QD, start sliding scale as needed  #GERD - no home med, start PPI as needed  #BPH - Tamsulosin 0.4mg QD: home med, continue   #recurrent UTIs - no urinary symptoms on admission, afebrile, will not obtain UA.                                                                               ----------------------------------------------------  # DVT prophylaxis: heparin 5000mg SQ  # GI prophylaxis: N/A  # Diet: Soft/Bite sized DASH TLC CC no conc K +ensure   # Activity: ambulate as tolerated   # Code status: DNR, trial intubation   # Disposition: Tele                                                                            --------------------------------------------------------    # Handoff:  - Echo  - Orthostatics  - MR brain noncon   - PT  - repeat EKG, trop  76 y/o man w PMHx of HTN, HLD, DM, CAD, s/p 3vCABG 2017, EF 63% w mild LVH and G1DD in 10/2022, follows w Dr Stewart, loop recorder implanted ~2021, 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   HCP is wife Kristina Ruffin [phone], but she is developing dementia   Alternative HCP is good friend Layo - pt cannot remember this phone number and left phone at home, states wife has this number.     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  Resolved 2 hour episode, no focal weakness  DDx: neuro event such as TIA vs CVA, orthostatic hypotension, hypovolemia, arrhythmia, peripheral vertigo eg meniere's  - CT Head stable since 12/2022  - CTA Head and Neck new moderate stenosis R MCA   - Neuro recs 7/7/23: to treat as TIA/CVA   - Load ASA 325mg, then ASA 81mg QD   - Cont Plavix 75mg  - MR brain noncon - reviewed MRI screen w pt - states only loop recorder and dental posts, had MRI head 1 year ago while loop recorder in place  - f/u A1c, lipids   - Orthostatics   - EP consult to investigate loop recorder implanted ~2021 for evidence of arrhythmia  - PT consulted    #CP, intermittent  #CAD, s/p 3vCABG 2017  #EF 63% w mild LVH and G1DD in 10/2022  Follows w Dr Stewart  Describes L shoulder/L parasternal chest pain which was 7/10 "pulling" sensation w mild associated SOB but no diaphoresis, n/v. CP is intermittent, not exertional.   DDx: Unstable angina, Euvolemic on exam, doubt HF exacerbation, vs mskl pain - states worse w coughing   - trop <0.01 on admission - trend   - ECG 7/7/23 was NSR, no significant ST/T wave changes, narrow QRS, partial L BBB   - Had echo 8 months ago: EF 63% w mild LVH and G1DD in 10/2022  - Repeat echo     #SAMANTA, borderline K   #CKD3a  - Cr baseline 1.5 w eGFR 47, Cr 1.9 on admission   - Monitor, no conc K diet     #HTN   - Isosorbide mononitrate 60mg QD: home med, continue  - Losartan 100mg QD: home med, continue   - Metoprolol tartrate 25mg BID: home med, continue     #HLD - Fenofibrate 145mg QD: home med   #DM - Holding home Tradjenta 5mg QD, start sliding scale as needed  #GERD - no home med, start PPI as needed  #BPH - Tamsulosin 0.4mg QD: home med, continue   #recurrent UTIs - no urinary symptoms on admission, afebrile, will not obtain UA.                                                                               ----------------------------------------------------  # DVT prophylaxis: heparin 5000mg SQ  # GI prophylaxis: N/A  # Diet: Soft/Bite sized DASH TLC CC no conc K +ensure   # Activity: ambulate as tolerated   # Code status: DNR, trial intubation   # Disposition: Tele                                                                            --------------------------------------------------------    # Handoff:  - Echo  - Orthostatics  - MR brain noncon   - PT  - repeat EKG, trop

## 2023-07-07 NOTE — ED ADULT TRIAGE NOTE - CHIEF COMPLAINT QUOTE
Pt BIBEMS reports chest pain and dizziness since this morning denies pain at present, pt has left sided deficits from stroke in the past

## 2023-07-07 NOTE — ED ADULT NURSE NOTE - NSFALLHARMRISKINTERV_ED_ALL_ED
Communicate risk of Fall with Harm to all staff, patient, and family/Provide visual cue: red socks, yellow wristband, yellow gown, etc/Reinforce activity limits and safety measures with patient and family/Toileting schedule using arm’s reach rule for commode and bathroom/Bed in lowest position, wheels locked, appropriate side rails in place/Call bell, personal items and telephone in reach/Instruct patient to call for assistance before getting out of bed/chair/stretcher/Non-slip footwear applied when patient is off stretcher/Lydia to call system/Physically safe environment - no spills, clutter or unnecessary equipment/Purposeful Proactive Rounding/Room/bathroom lighting operational, light cord in reach

## 2023-07-07 NOTE — ED PROVIDER NOTE - OBJECTIVE STATEMENT
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 He reports being at the shower when he felt dizzy/ room spinning sensation with loss of balance but was able to manage to sit down, but was unable to get up again. No fall/head trauma. He reports associated chest pain and L facial numbness. He denies diplopia, dysarthria, dysphagia, HA, or recent weakness. On my assessment, he reports his symptoms of dizziness have resolved.

## 2023-07-07 NOTE — H&P ADULT - ATTENDING COMMENTS
Patient seen and examined at bedside independently of the residents. I read the resident's note and agree with the plan with the additions and corrections as noted below. My note supersedes the resident's note.     REVIEW OF SYSTEMS:  Negative except in HPI.     PMH: recurrent CVA (on plavix), HTN, HLD, DM II, CAD s/p CABG, recurrent UTI, GERD, BPH    FHx: Reviewed. No fhx of asthma/copd, No fhx of liver and pulmonary disease. No fhx of hematological disorder.     Physical Exam:  GEN: No acute distress. Awake, Alert and oriented x 3.   Head: Atraumatic, Normocephalic.   Eye: PEERLA. No sclera icterus. EOMI.   ENT: Normal oropharynx, no thyromegaly, no mass, no lymphadenopathy.   LUNGS: Clear to auscultation bilaterally. No wheeze/rales/crackles.   HEART: Normal. S1/S2 present. RRR. No murmur/gallops.   ABD: Soft, non-tender, non-distended. Bowel sounds present.   EXT: No pitting edema. No erythema. No tenderness.  Integumentary: No rash, No sore, No petechia.   NEURO: CN III-XII intact. Moving all extremities.     Vital Signs Last 24 Hrs  T(C): 37.1 (2023 00:12), Max: 37.1 (2023 00:12)  T(F): 98.7 (2023 00:12), Max: 98.7 (2023 00:12)  HR: 75 (2023 00:12) (72 - 84)  BP: 151/78 (2023 00:12) (134/62 - 192/84)  BP(mean): 111 (2023 23:25) (111 - 111)  RR: 19 (2023 00:12) (18 - 19)  SpO2: 97% (2023 00:12) (97% - 100%)    Parameters below as of 2023 00:12  Patient On (Oxygen Delivery Method): room air      Please see the above notes for Labs and radiology.     Assessment and Plan:     78 yo M with hx of recurrent CVA (on plavix), HTN, HLD, DM II, CAD s/p CABG, recurrent UTI, GERD, BPH presents to ED for dizziness started today. Also complains of chest pain and left facial numbness.    Dizziness   - Ddx: TIA vs. CVA   - CTH neg for acute intracranial pathology.   - CTA H & N: In comparison to the previous CTA neck/brain dated 2022: New moderate/severe stenosis of the M1 segment of the right MCA. Unchanged occlusion of the A2 segment of the left SUSANNE. Unchanged severe stenosis of the P2 segment of the right PCA.  - s/p ASA 325mg x 1, and c/w ASA 81mg qd and plavix 75mg qd x 3 weeks, then continue with plavix 75mg qd indefinitely.   - c/w statin   - check MRI brain NC  - check orthostatic VS and TTE with bubble study  - check HbA1c and Lipid panel   - neuro check   - f/u Neurology.   - EP consult for loop recorder interrogation.     Chest pain (r/o ACS)  - EKG NSR. No significant ST/T wave changes.   - Troponin neg x 1. Check 2nd troponin.   - check TTE   - monitor on Telemetry.     Mild SAMANTA on CKD stage 3  - serum Cr 1.9 currently. Baseline Cr 1.5.   - start on IVF NS 75cc/hr   - renal US and urine studies.   - avoid nephrotoxic drugs.     HTN/HLD - hold losartan due to SAMANTA.   CAD s/p CABG - c/w home med  BPH - flomax     DVT ppx: Heparin SC  GI ppx: PPI   Diet: DASH/CC diet   Activity: as tolerated.     Date seen by the attendin2023  Total time spent: 75 minutes. Patient seen and examined at bedside independently of the residents. I read the resident's note and agree with the plan with the additions and corrections as noted below. My note supersedes the resident's note.     REVIEW OF SYSTEMS:  Negative except in HPI.     PMH: recurrent CVA (on plavix), HTN, HLD, DM II, CAD s/p CABG, recurrent UTI, GERD, BPH    FHx: Reviewed. No fhx of asthma/copd, No fhx of liver and pulmonary disease. No fhx of hematological disorder.     Physical Exam:  GEN: No acute distress. Awake, Alert and oriented x 3.   Head: Atraumatic, Normocephalic.   Eye: PEERLA. No sclera icterus. EOMI.   ENT: Normal oropharynx, no thyromegaly, no mass, no lymphadenopathy.   LUNGS: Clear to auscultation bilaterally. No wheeze/rales/crackles.   HEART: Normal. S1/S2 present. RRR. No murmur/gallops.   ABD: Soft, non-tender, non-distended. Bowel sounds present.   EXT: No pitting edema. No erythema. No tenderness.  Integumentary: No rash, No sore, No petechia.   NEURO: CN III-XII intact. Moving all extremities.     Vital Signs Last 24 Hrs  T(C): 37.1 (2023 00:12), Max: 37.1 (2023 00:12)  T(F): 98.7 (2023 00:12), Max: 98.7 (2023 00:12)  HR: 75 (2023 00:12) (72 - 84)  BP: 151/78 (2023 00:12) (134/62 - 192/84)  BP(mean): 111 (2023 23:25) (111 - 111)  RR: 19 (2023 00:12) (18 - 19)  SpO2: 97% (2023 00:12) (97% - 100%)    Parameters below as of 2023 00:12  Patient On (Oxygen Delivery Method): room air      Please see the above notes for Labs and radiology.     Assessment and Plan:     76 yo M with hx of recurrent CVA (on plavix), HTN, HLD, DM II, CAD s/p CABG, recurrent UTI, GERD, BPH presents to ED for dizziness started today. Also complains of chest pain and left facial numbness.    Dizziness   - Ddx: TIA vs. CVA   - CTH neg for acute intracranial pathology.   - CTA H & N: In comparison to the previous CTA neck/brain dated 2022: New moderate/severe stenosis of the M1 segment of the right MCA. Unchanged occlusion of the A2 segment of the left SUSANNE. Unchanged severe stenosis of the P2 segment of the right PCA.  - s/p ASA 325mg x 1, and c/w ASA 81mg qd and plavix 75mg qd x 3 weeks, then continue with plavix 75mg qd indefinitely.   - c/w statin   - check MRI brain NC  - check orthostatic VS and TTE with bubble study  - check HbA1c and Lipid panel   - neuro check   - f/u Neurology.   - EP consult for loop recorder interrogation.     Chest pain (r/o ACS)  - EKG NSR. No significant ST/T wave changes.   - Troponin neg x 1. Check 2nd troponin.   - check TTE   - monitor on Telemetry.     Mild SAMANTA on CKD stage 3  - serum Cr 1.9 currently. Baseline Cr 1.5.   - start on IVF NS 75cc/hr   - renal US and urine studies.   - avoid nephrotoxic drugs.     HTN/HLD - hold losartan due to SAMANTA.   CAD s/p CABG - c/w home med  BPH - flomax     DVT ppx: Heparin SC  GI ppx: PPI   Diet: DASH/CC diet   Activity: as tolerated.   Code status: DNR/ with trial of intubation    Date seen by the attendin2023  Total time spent: 75 minutes.

## 2023-07-07 NOTE — H&P ADULT - NSHPREVIEWOFSYSTEMS_GEN_ALL_CORE
General: No fevers, chills, generalized weakness  HEENT: No vision changes, speech changes, sore throat. (+)chronic intermittent cough, unchanged.   CV: No chest pressure, palpitations. (+)CP  Resp: No wheezing. Mild SOB w CP earlier.   GI: No nausea, vomiting, abd pain  Ext: No arm or leg swelling or pain  Neuro: No acute confusion, focal weakness. (+)dizziness.   Skin: No rashes or lesions

## 2023-07-07 NOTE — ED ADULT NURSE NOTE - CAS TRG GEN SKIN CONDITION
- c/w sancuso  - can do phenergan po or suppository prn   - ativan at night  - may add zyprexa for next cycle   Warm

## 2023-07-07 NOTE — CONSULT NOTE ADULT - SUBJECTIVE AND OBJECTIVE BOX
**STROKE CONSULT NOTE**    Last known well time/Time of onset of symptoms:    HPI: 77y Male with PMHx of     T(C): --  HR: 72 (07-07-23 @ 09:47) (72 - 72)  BP: 141/56 (07-07-23 @ 09:47) (141/56 - 141/56)  RR: 18 (07-07-23 @ 09:47) (18 - 18)  SpO2: 98% (07-07-23 @ 09:47) (98% - 98%)    PAST MEDICAL & SURGICAL HISTORY:  CAD (coronary artery disease)      HTN (hypertension)      Depression      Anxiety      Diabetes  niddm      Angina pectoris      BPH (benign prostatic hyperplasia)      GERD (gastroesophageal reflux disease)      CVA (cerebral vascular accident)      History of appendectomy      Bilateral cataracts      History of heart bypass surgery          FAMILY HISTORY:      SOCIAL HISTORY:   Patient lives with *** at ***.   Smoking status:  Drinking:  Drug Use:     ROS: ***  Constitutional: No fever, weight loss or fatigue  Eyes: No eye pain, visual disturbances, or discharge  ENMT:  No difficulty hearing, tinnitus; No sinus or throat pain  Neck: No pain or stiffness  Respiratory: No cough, wheezing, chills or hemoptysis  Cardiovascular: No chest pain, palpitations, shortness of breath, or leg swelling  Gastrointestinal: No abdominal pain. No nausea, vomiting or hematemesis; No diarrhea or constipation. Nohematochezia.  Genitourinary: No dysuria, frequency, hematuria or incontinence  Neurological: As per HPI  Skin: No itching, burning, rashes or lesions   Endocrine: No heat or cold intolerance; No hair loss  Musculoskeletal: No joint pain or swelling; No muscle, back or extremity pain  Heme/Lymph: No easy bruising or bleeding gums    MEDICATIONS  (STANDING):  aspirin  chewable 324 milliGRAM(s) Oral once    MEDICATIONS  (PRN):    Allergies    penicillin (Rash)  PC Pen VK (Rash)  clindamycin (Rash)    Intolerances      Vital Signs Last 24 Hrs  T(C): --  T(F): --  HR: 72 (07 Jul 2023 09:47) (72 - 72)  BP: 141/56 (07 Jul 2023 09:47) (141/56 - 141/56)  BP(mean): --  RR: 18 (07 Jul 2023 09:47) (18 - 18)  SpO2: 98% (07 Jul 2023 09:47) (98% - 98%)    Parameters below as of 07 Jul 2023 09:47  Patient On (Oxygen Delivery Method): room air        Physical exam:  Constitutional: No acute distress, conversant  Eyes: Anicteric sclerae, moist conjunctivae, see below for CNs  Neck: trachea midline, FROM, supple, no thyromegaly or lymphadenopathy  Cardiovascular: Regular rate and rhythm, no murmurs, rubs, or gallops. No carotid bruits.   Pulmonary: Anterior breath sounds clear bilaterally, no crackles or wheezing. No use of accessory muscles  GI: Abdomen soft, non-distended, non-tender  Extremities: Radial and DP pulses +2, no edema    Neurologic:  -Mental status: Awake, alert, oriented to person, place, and time. Speech is fluent with intact naming, repetition, and comprehension, no dysarthria. Recent and remote memory intact. Follows commands. Attention/concentration intact. Fund of knowledge appropriate.  -Cranial nerves:   II: Visual fields are full to confrontation.  III, IV, VI: Extraocular movements are intact without nystagmus. Pupils equally round and reactive to light  V:  Facial sensation V1-V3 equal and intact   VII: Face is symmetric with normal eye closure and smile  VIII: Hearing is bilaterally intact to finger rub  IX, X: Uvula is midline and soft palate rises symmetrically  XI: Head turning and shoulder shrug are intact.  XII: Tongue protrudes midline  Motor: Normal bulk and tone. No pronator drift. Strength bilateral upper extremity 5/5, bilateral lower extremities 5/5.  Rapid alternating movements intact and symmetric  Sensation: Intact to light touch bilaterally. No neglect or extinction on double simultaneous testing.  Coordination: No dysmetria on finger-to-nose and heel-to-shin bilaterally  Reflexes: Downgoing toes bilaterally   Gait: Narrow gait and steady    NIHSS: **** ASPECT Score: ***** ICH Score: ****** (GCS)    Fingerstick Blood Glucose: CAPILLARY BLOOD GLUCOSE        LABS:                        13.6   8.72  )-----------( 250      ( 07 Jul 2023 11:50 )             42.6     07-07    138  |  105  |  33<H>  ----------------------------<  119<H>  5.0   |  22  |  1.9<H>    Ca    9.3      07 Jul 2023 11:50    TPro  6.5  /  Alb  4.0  /  TBili  0.3  /  DBili  x   /  AST  19  /  ALT  10  /  AlkPhos  36  07-07      CARDIAC MARKERS ( 07 Jul 2023 11:50 )  x     / <0.01 ng/mL / x     / x     / x          Urinalysis Basic - ( 07 Jul 2023 11:50 )    Color: x / Appearance: x / SG: x / pH: x  Gluc: 119 mg/dL / Ketone: x  / Bili: x / Urobili: x   Blood: x / Protein: x / Nitrite: x   Leuk Esterase: x / RBC: x / WBC x   Sq Epi: x / Non Sq Epi: x / Bacteria: x        RADIOLOGY & ADDITIONAL STUDIES:      -----------------------------------------------------------------------------------------------------------------  IV-tPA (Y/N):    ***                              Bolus time:    Alteplase Dose Verification w/ RN:  Reason IV-tPA not given: ***    **STROKE CONSULT NOTE**      HPI: 77y Male with PMHx of recurrent strokes on Plavix , seizures, HTN, CAD s/p CABG, recurrent UTs, 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 He reports being at the shower when he felt dizzy/ room spinning sensation with loss of balance but was able to manage to sit down, but was unable to get up again. No fall/head trauma. He reports associated chest pain and L facial numbness. He denies diplopia, dysarthria, dysphagia, HA, or recent weakness. On my assessment, he reports his symptoms of dizziness have resolved.     T(C): --  HR: 72 (07-07-23 @ 09:47) (72 - 72)  BP: 141/56 (07-07-23 @ 09:47) (141/56 - 141/56)  RR: 18 (07-07-23 @ 09:47) (18 - 18)  SpO2: 98% (07-07-23 @ 09:47) (98% - 98%)    PAST MEDICAL & SURGICAL HISTORY:  CAD (coronary artery disease)      HTN (hypertension)      Depression      Anxiety      Diabetes  niddm      Angina pectoris      BPH (benign prostatic hyperplasia)      GERD (gastroesophageal reflux disease)      CVA (cerebral vascular accident)      History of appendectomy      Bilateral cataracts      History of heart bypass surgery          FAMILY HISTORY:      SOCIAL HISTORY:   Patient lives with wife and son  Smoking status: quits>30 years  Drinking:Denies  Drug Use: Denies    ROS:  Constitutional: No fever, weight loss or fatigue  Eyes: No eye pain, visual disturbances, or discharge  ENMT:  No difficulty hearing, tinnitus; No sinus or throat pain  Neck: No pain or stiffness  Respiratory: No cough, wheezing, chills or hemoptysis  Cardiovascular: No chest pain, palpitations, shortness of breath, or leg swelling  Gastrointestinal: No abdominal pain. No nausea, vomiting or hematemesis; No diarrhea or constipation. Nohematochezia.  Genitourinary: No dysuria, frequency, hematuria or incontinence  Neurological: As per HPI  Skin: No itching, burning, rashes or lesions   Endocrine: No heat or cold intolerance; No hair loss  Musculoskeletal: No joint pain or swelling; No muscle, back or extremity pain  Heme/Lymph: No easy bruising or bleeding gums    MEDICATIONS  (STANDING):  aspirin  chewable 324 milliGRAM(s) Oral once    MEDICATIONS  (PRN):    Allergies    penicillin (Rash)  PC Pen VK (Rash)  clindamycin (Rash)    Intolerances      Vital Signs Last 24 Hrs  T(C): --  T(F): --  HR: 72 (07 Jul 2023 09:47) (72 - 72)  BP: 141/56 (07 Jul 2023 09:47) (141/56 - 141/56)  BP(mean): --  RR: 18 (07 Jul 2023 09:47) (18 - 18)  SpO2: 98% (07 Jul 2023 09:47) (98% - 98%)    Parameters below as of 07 Jul 2023 09:47  Patient On (Oxygen Delivery Method): room air        Physical exam:  Constitutional: No acute distress, conversant    Neurologic:  -Mental status: Awake, alert, oriented to person, place, and time. Speech is dysarthric (chronic) with intact naming, repetition, and comprehension.  -Cranial nerves:   II: Visual fields are full to confrontation.  III, IV, VI: Extraocular movements are intact without nystagmus. Pupils equally round and reactive to light  V:  Decreased sensation over the L face compared to the R side (chronic)  VII: L facial droop (chronic)  Motor: No pronator drift. LUE spasticity. LLE drift (chronic weakness)  Sensation: Intact to light touch bilaterally. No neglect or extinction on double simultaneous testing.  Coordination: No dysmetria on finger-to-nose  Reflexes: Downgoing toes bilaterally   Gait: Not assessed      Fingerstick Blood Glucose: CAPILLARY BLOOD GLUCOSE        LABS:                        13.6   8.72  )-----------( 250      ( 07 Jul 2023 11:50 )             42.6     07-07    138  |  105  |  33<H>  ----------------------------<  119<H>  5.0   |  22  |  1.9<H>    Ca    9.3      07 Jul 2023 11:50    TPro  6.5  /  Alb  4.0  /  TBili  0.3  /  DBili  x   /  AST  19  /  ALT  10  /  AlkPhos  36  07-07      CARDIAC MARKERS ( 07 Jul 2023 11:50 )  x     / <0.01 ng/mL / x     / x     / x          Urinalysis Basic - ( 07 Jul 2023 11:50 )    Color: x / Appearance: x / SG: x / pH: x  Gluc: 119 mg/dL / Ketone: x  / Bili: x / Urobili: x   Blood: x / Protein: x / Nitrite: x   Leuk Esterase: x / RBC: x / WBC x   Sq Epi: x / Non Sq Epi: x / Bacteria: x        RADIOLOGY & ADDITIONAL STUDIES:    < from: CT Head No Cont (07.07.23 @ 12:44) >  1.  No evidence of acute intracranial pathology. Stable exam since   12/7/2022.    2.  Stable chronic infarct in the left SUSANNE territory,chronic   microvascular changes and chronic lacunar infarcts.    < end of copied text >  < from: CT Angio Head w/ IV Cont (07.07.23 @ 12:45) >  IMPRESSION:  In comparison to the previous CTA neck/brain dated 12/7/2022:    1.  New moderate/severe stenosis of the M1 segment of the right MCA.    2.  Unchanged occlusion of the A2 segment of the left SUSANNE.    3.  Unchanged severe stenosis of the P2 segment of the right PCA.    4.  Unchanged additional mild/moderate scattered intracranial stenoses as   above.

## 2023-07-07 NOTE — ED PROVIDER NOTE - ATTENDING CONTRIBUTION TO CARE
77-year-old male with a past medical history significant for CAD status post CABG diabetes hypertension hyperlipidemia GERD BPH CVA x2 with residual weakness who presents with chest pain and dizziness.  Patient states that for the last couple of days he has been having dizziness and today developed left-sided chest pain radiating down his left arm.  Patient denies any nausea vomiting fever chills or any other medical complaints.    VITAL SIGNS: I have reviewed nursing notes and confirm.  CONSTITUTIONAL: non-toxic, well appearing  SKIN: no rash, no petechiae.  EYES: EOMI, pink conjunctiva, anicteric  ENT: tongue midline, no exudates, MMM  NECK: Supple; no meningismus, no JVD  CARD: RRR, no murmurs, equal radial pulses bilaterally 2+  RESP: CTAB, no respiratory distress  ABD: Soft, non-tender, non-distended, no peritoneal signs, no HSM, no CVA tenderness  EXT: Normal ROM x4. No edema. No calves tenderness  NEURO: Alert, oriented x3. CN2-12 intact, L sided residual weakness from past strokes     77-year-old male that presents with chest pain and dizziness.  Will obtain EKG imaging labs reassess dispo pending

## 2023-07-07 NOTE — H&P ADULT - CONVERSATION DETAILS
Explained to pt that unless otherwise stated, in a hospital everyone is treated as full code, meaning we do everything to keep them alive such as compressions when a pt's heart does not beat on its own and intubation and ventilation when a pt cannot breathe on their own. Asked whether this is something that pt would want.   Would not want CPR, but would consider trial of intubation.     Asked whether pt has anyone who they would want to make their healthcare decisions for them if they were too confused, not awake, or otherwise unable to do so.  HCP is wife Kristina Ruffin [phone], but she is developing dementia   Alternative HCP is good friend Layo - pt cannot remember this phone number and left phone at home, states wife has this number.

## 2023-07-08 LAB
ANION GAP SERPL CALC-SCNC: 11 MMOL/L — SIGNIFICANT CHANGE UP (ref 7–14)
BLD GP AB SCN SERPL QL: SIGNIFICANT CHANGE UP
BUN SERPL-MCNC: 36 MG/DL — HIGH (ref 10–20)
CALCIUM SERPL-MCNC: 9.5 MG/DL — SIGNIFICANT CHANGE UP (ref 8.4–10.5)
CHLORIDE SERPL-SCNC: 108 MMOL/L — SIGNIFICANT CHANGE UP (ref 98–110)
CHOLEST SERPL-MCNC: 193 MG/DL — SIGNIFICANT CHANGE UP
CO2 SERPL-SCNC: 24 MMOL/L — SIGNIFICANT CHANGE UP (ref 17–32)
CREAT SERPL-MCNC: 2.1 MG/DL — HIGH (ref 0.7–1.5)
EGFR: 32 ML/MIN/1.73M2 — LOW
GLUCOSE BLDC GLUCOMTR-MCNC: 117 MG/DL — HIGH (ref 70–99)
GLUCOSE BLDC GLUCOMTR-MCNC: 121 MG/DL — HIGH (ref 70–99)
GLUCOSE BLDC GLUCOMTR-MCNC: 149 MG/DL — HIGH (ref 70–99)
GLUCOSE SERPL-MCNC: 124 MG/DL — HIGH (ref 70–99)
HCT VFR BLD CALC: 40.9 % — LOW (ref 42–52)
HDLC SERPL-MCNC: 50 MG/DL — SIGNIFICANT CHANGE UP
HGB BLD-MCNC: 12.7 G/DL — LOW (ref 14–18)
LIPID PNL WITH DIRECT LDL SERPL: 109 MG/DL — HIGH
MAGNESIUM SERPL-MCNC: 2.4 MG/DL — SIGNIFICANT CHANGE UP (ref 1.8–2.4)
MCHC RBC-ENTMCNC: 28.1 PG — SIGNIFICANT CHANGE UP (ref 27–31)
MCHC RBC-ENTMCNC: 31.1 G/DL — LOW (ref 32–37)
MCV RBC AUTO: 90.5 FL — SIGNIFICANT CHANGE UP (ref 80–94)
NON HDL CHOLESTEROL: 143 MG/DL — HIGH
NRBC # BLD: 0 /100 WBCS — SIGNIFICANT CHANGE UP (ref 0–0)
PLATELET # BLD AUTO: 255 K/UL — SIGNIFICANT CHANGE UP (ref 130–400)
PMV BLD: 11 FL — HIGH (ref 7.4–10.4)
POTASSIUM SERPL-MCNC: 4.6 MMOL/L — SIGNIFICANT CHANGE UP (ref 3.5–5)
POTASSIUM SERPL-SCNC: 4.6 MMOL/L — SIGNIFICANT CHANGE UP (ref 3.5–5)
RBC # BLD: 4.52 M/UL — LOW (ref 4.7–6.1)
RBC # FLD: 15.7 % — HIGH (ref 11.5–14.5)
SODIUM SERPL-SCNC: 143 MMOL/L — SIGNIFICANT CHANGE UP (ref 135–146)
TRIGL SERPL-MCNC: 171 MG/DL — HIGH
TROPONIN T SERPL-MCNC: <0.01 NG/ML — SIGNIFICANT CHANGE UP
TROPONIN T SERPL-MCNC: <0.01 NG/ML — SIGNIFICANT CHANGE UP
WBC # BLD: 7.53 K/UL — SIGNIFICANT CHANGE UP (ref 4.8–10.8)
WBC # FLD AUTO: 7.53 K/UL — SIGNIFICANT CHANGE UP (ref 4.8–10.8)

## 2023-07-08 PROCEDURE — 93291 INTERROG DEV EVAL SCRMS IP: CPT | Mod: 26

## 2023-07-08 PROCEDURE — 99233 SBSQ HOSP IP/OBS HIGH 50: CPT

## 2023-07-08 PROCEDURE — 93010 ELECTROCARDIOGRAM REPORT: CPT

## 2023-07-08 PROCEDURE — 93306 TTE W/DOPPLER COMPLETE: CPT | Mod: 26

## 2023-07-08 RX ORDER — SODIUM CHLORIDE 9 MG/ML
1000 INJECTION INTRAMUSCULAR; INTRAVENOUS; SUBCUTANEOUS
Refills: 0 | Status: DISCONTINUED | OUTPATIENT
Start: 2023-07-08 | End: 2023-07-09

## 2023-07-08 RX ADMIN — SODIUM CHLORIDE 75 MILLILITER(S): 9 INJECTION INTRAMUSCULAR; INTRAVENOUS; SUBCUTANEOUS at 06:38

## 2023-07-08 RX ADMIN — HEPARIN SODIUM 5000 UNIT(S): 5000 INJECTION INTRAVENOUS; SUBCUTANEOUS at 06:34

## 2023-07-08 RX ADMIN — TAMSULOSIN HYDROCHLORIDE 0.4 MILLIGRAM(S): 0.4 CAPSULE ORAL at 22:08

## 2023-07-08 RX ADMIN — Medication 145 MILLIGRAM(S): at 11:07

## 2023-07-08 RX ADMIN — Medication 81 MILLIGRAM(S): at 11:06

## 2023-07-08 RX ADMIN — CLOPIDOGREL BISULFATE 75 MILLIGRAM(S): 75 TABLET, FILM COATED ORAL at 11:06

## 2023-07-08 RX ADMIN — ISOSORBIDE MONONITRATE 60 MILLIGRAM(S): 60 TABLET, EXTENDED RELEASE ORAL at 11:07

## 2023-07-08 RX ADMIN — HEPARIN SODIUM 5000 UNIT(S): 5000 INJECTION INTRAVENOUS; SUBCUTANEOUS at 17:36

## 2023-07-08 RX ADMIN — Medication 25 MILLIGRAM(S): at 06:35

## 2023-07-08 RX ADMIN — Medication 25 MILLIGRAM(S): at 17:36

## 2023-07-08 NOTE — PROGRESS NOTE ADULT - SUBJECTIVE AND OBJECTIVE BOX
ERICK MARTINEZ  77y Male    INTERVAL HPI/OVERNIGHT EVENTS:    pt feels better today - still with some dizziness - described as spinning sensation  + orthostatics today - he is receiving IVF and will repeat  no further chest pain, SOB, N/V, increased weakness  son at bedside  pt wants to go home tomorrow    T(F): 97.7 (07-08-23 @ 07:10), Max: 98.7 (07-08-23 @ 00:12)  HR: 61 (07-08-23 @ 07:10) (61 - 84)  BP: 156/71 (07-08-23 @ 07:10) (134/62 - 192/84)  RR: 18 (07-08-23 @ 07:10) (18 - 19)  SpO2: 96% (07-08-23 @ 07:10) (96% - 100%) on RA    POCT Blood Glucose.: 121 mg/dL (08 Jul 2023 11:25)  POCT Blood Glucose.: 117 mg/dL (08 Jul 2023 07:54)    PHYSICAL EXAM:  GENERAL: NAD  HEAD:  Normocephalic  EYES:  conjunctiva and sclera clear  ENMT: Moist mucous membranes  NECK: Supple, No JVD  NERVOUS SYSTEM:  Alert, awake, Good concentration, left UE and left LE 4/5, mild left facial droop, EOMI, follows commands  CHEST/LUNG: CTA b/l  HEART: Regular rate and rhythm  ABDOMEN: Soft, Nontender, Nondistended; Bowel sounds present  EXTREMITIES: No edema  SKIN: warm, dry    Consultant(s) Notes Reviewed:  [x ] YES  [ ] NO  Care Discussed with Consultants/Other Providers [ x] YES  [ ] NO    MEDICATIONS  (STANDING):  aspirin  chewable 81 milliGRAM(s) Oral daily  clopidogrel Tablet 75 milliGRAM(s) Oral daily  fenofibrate Tablet 145 milliGRAM(s) Oral daily  heparin   Injectable 5000 Unit(s) SubCutaneous every 12 hours  isosorbide   mononitrate ER Tablet (IMDUR) 60 milliGRAM(s) Oral daily  metoprolol tartrate 25 milliGRAM(s) Oral two times a day  sodium chloride 0.9%. 1000 milliLiter(s) (75 mL/Hr) IV Continuous <Continuous>  tamsulosin 0.4 milliGRAM(s) Oral at bedtime    MEDICATIONS  (PRN):  acetaminophen     Tablet .. 650 milliGRAM(s) Oral every 6 hours PRN Temp greater or equal to 38C (100.4F), Mild Pain (1 - 3)  aluminum hydroxide/magnesium hydroxide/simethicone Suspension 30 milliLiter(s) Oral every 4 hours PRN Dyspepsia  melatonin 3 milliGRAM(s) Oral at bedtime PRN Insomnia      Telemetry reviewed by me    LABS:                        12.7   7.53  )-----------( 255      ( 08 Jul 2023 06:52 )             40.9     07-08    143  |  108  |  36<H>  ----------------------------<  124<H>  4.6   |  24  |  2.1<H>    Ca    9.5      08 Jul 2023 06:52  Mg     2.4     07-08    TPro  6.5  /  Alb  4.0  /  TBili  0.3  /  DBili  x   /  AST  19  /  ALT  10  /  AlkPhos  36  07-07      CARDIAC MARKERS ( 08 Jul 2023 06:52 )  x     / <0.01 ng/mL / x     / x     / x      CARDIAC MARKERS ( 08 Jul 2023 00:29 )  x     / <0.01 ng/mL / x     / x     / x      CARDIAC MARKERS ( 07 Jul 2023 11:50 )  x     / <0.01 ng/mL / x     / x     / x              RADIOLOGY & ADDITIONAL TESTS:    Imaging or report Personally Reviewed:  [ x] YES  [ ] NO    < from: CT Angio Head w/ IV Cont (07.07.23 @ 12:45) >  IMPRESSION:  In comparison to the previous CTA neck/brain dated 12/7/2022:    1.  New moderate/severe stenosis of the M1 segment of the right MCA.    2.  Unchanged occlusion of the A2 segment of the left SUSANNE.    3.  Unchanged severe stenosis of the P2 segment of the right PCA.    4.  Unchanged additional mild/moderate scattered intracranial stenoses as   above.      < end of copied text >      < from: CT Head No Cont (07.07.23 @ 12:44) >    IMPRESSION:    1.  No evidence of acute intracranial pathology. Stable exam since   12/7/2022.    2.  Stable chronic infarct in the left SUSANNE territory,chronic   microvascular changes and chronic lacunar infarcts.    < end of copied text >      Case discussed with residents and RN on rounds today    Care discussed with pt and family

## 2023-07-08 NOTE — PATIENT PROFILE ADULT - FALL HARM RISK - FACTORS
Dizziness/Impaired gait/Medication side effects/Orthostatic hypotension/Other medical problems/Weakness/Other

## 2023-07-08 NOTE — PHYSICAL THERAPY INITIAL EVALUATION ADULT - GENERAL OBSERVATIONS, REHAB EVAL
Patient encountered lying in bed. Agreed to participate in therapy. Refer to vital signs flow sheet for osthostatic readings.

## 2023-07-08 NOTE — PROGRESS NOTE ADULT - ASSESSMENT
76 y/o man with PMH of HTN, hyperlipidemia, DM, CAD s/p CABG x 3 vessel in 2017 and follows with Dr Stewart, loop recorder implanted ~2021, 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     1. Dizziness   + spinning sensation per chart  now improved per pt  neuro eval appreciated: being treated as TIA vs CVA  f/u MRI of brain report  CT of head negative for acute intracranial pathology.   CTA H & N: In comparison to the previous CTA neck/brain dated 12/7/2022: New moderate/severe stenosis of the M1 segment of the right MCA. Unchanged occlusion of the A2 segment of the left SUSANNE. Unchanged severe stenosis of the P2 segment of the right PCA.  now on ASA 81mg qd and plavix 75mg qd x 3 weeks, then continue with plavix 75mg qd indefinitely  c/w statin   f/u ECHO  EP consulted for interrogation of loop recorder -> if no arrhythmia, then discontinue telemetry  repeat orthostatics  PT consulted    2. Chest pain - now resolved  atypical presentation per chart  troponins negative  EKG reviewed and interpreted by me  no further episodes  f/u ECHO    3. CAD s/p CABG x 3 vessel  on antiplatelet, statin, metoprolol, Imdur    4. SAMANTA over CKD 3  Cr now 2.1  + orthostatics today -> receiving IVF and then reassess  check renal US (h/o BPH and on flomax) and urine lytes and urine Cr  has condom cath with urine output  BMP in AM  avoid nephrotoxic meds    5. HTN - on metoprolol and BP acceptable today    6. DM type 2 - FS acceptable    7. DVT ppx: Heparin SQ      DNR with trial of intubation  guarded prognosis      PROGRESS NOTE HANDOFF    Pending: renal US, f/u MRI of brain report, PT consult, labs in AM, repeat orthostatics    Family discussion: with pt and son at bedside    Disposition: home with care possibly in 24hrs after review of above and if SAMANTA is improved

## 2023-07-08 NOTE — PHYSICAL THERAPY INITIAL EVALUATION ADULT - PERTINENT HX OF CURRENT PROBLEM, REHAB EVAL
76 y/o man w PMHx of HTN, HLD, DM, CAD, s/p 3vCABG 2017, EF 63% w mild LVH and G1DD in 10/2022, follows w Dr Stewart, loop recorder implanted ~2021, 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   Pt states dizziness onset while in the shower, no room spinning, only felt he himself was spinning. No recent head trauma, no fall, no focal weakness noted. Sat on toilet after and was unable to get up afterwards 2/2 generalized weakness, no focal weakness. Had associated mild posterior headache. Re: CP - describes L shoulder/L parasternal chest pain which was 7/10 "pulling" sensation w mild associated SOB but no diaphoresis, n/v. CP is intermittent, onset after dizziness and has recurred even after dizziness resolved. At time of admission, no dizziness nor CP. Otherwise asymptomatic. Baseline walks w walker, lives at home w wife and wife has HHA that pt also benefits from.

## 2023-07-08 NOTE — PHYSICAL THERAPY INITIAL EVALUATION ADULT - ADDITIONAL COMMENTS
Patient lives in house with wife and has HHA. Has stairs to enter home and 2ndfloor bedroom. Has chair lift for stairs. Was independent in ADLs and ambulation using RW

## 2023-07-08 NOTE — PATIENT PROFILE ADULT - FALL HARM RISK - HARM RISK INTERVENTIONS
Assistance with ambulation/Assistance OOB with selected safe patient handling equipment/Communicate Risk of Fall with Harm to all staff/Discuss with provider need for PT consult/Monitor gait and stability/Provide patient with walking aids - walker, cane, crutches/Reinforce activity limits and safety measures with patient and family/Review medications for side effects contributing to fall risk/Sit up slowly, dangle for a short time, stand at bedside before walking/Tailored Fall Risk Interventions/Toileting schedule using arm’s reach rule for commode and bathroom/Visual Cue: Yellow wristband and red socks/Bed in lowest position, wheels locked, appropriate side rails in place/Call bell, personal items and telephone in reach/Instruct patient to call for assistance before getting out of bed or chair/Non-slip footwear when patient is out of bed/[place] to call system/Physically safe environment - no spills, clutter or unnecessary equipment/Purposeful Proactive Rounding/Room/bathroom lighting operational, light cord in reach

## 2023-07-09 LAB
ANION GAP SERPL CALC-SCNC: 10 MMOL/L — SIGNIFICANT CHANGE UP (ref 7–14)
BUN SERPL-MCNC: 29 MG/DL — HIGH (ref 10–20)
CALCIUM SERPL-MCNC: 9.2 MG/DL — SIGNIFICANT CHANGE UP (ref 8.4–10.5)
CHLORIDE SERPL-SCNC: 112 MMOL/L — HIGH (ref 98–110)
CO2 SERPL-SCNC: 21 MMOL/L — SIGNIFICANT CHANGE UP (ref 17–32)
CREAT SERPL-MCNC: 1.8 MG/DL — HIGH (ref 0.7–1.5)
EGFR: 38 ML/MIN/1.73M2 — LOW
GLUCOSE SERPL-MCNC: 122 MG/DL — HIGH (ref 70–99)
HCT VFR BLD CALC: 41.5 % — LOW (ref 42–52)
HGB BLD-MCNC: 13.2 G/DL — LOW (ref 14–18)
MAGNESIUM SERPL-MCNC: 2.2 MG/DL — SIGNIFICANT CHANGE UP (ref 1.8–2.4)
MCHC RBC-ENTMCNC: 27.8 PG — SIGNIFICANT CHANGE UP (ref 27–31)
MCHC RBC-ENTMCNC: 31.8 G/DL — LOW (ref 32–37)
MCV RBC AUTO: 87.6 FL — SIGNIFICANT CHANGE UP (ref 80–94)
NRBC # BLD: 0 /100 WBCS — SIGNIFICANT CHANGE UP (ref 0–0)
PHOSPHATE SERPL-MCNC: 3.1 MG/DL — SIGNIFICANT CHANGE UP (ref 2.1–4.9)
PLATELET # BLD AUTO: 239 K/UL — SIGNIFICANT CHANGE UP (ref 130–400)
PMV BLD: 11 FL — HIGH (ref 7.4–10.4)
POTASSIUM SERPL-MCNC: 4 MMOL/L — SIGNIFICANT CHANGE UP (ref 3.5–5)
POTASSIUM SERPL-SCNC: 4 MMOL/L — SIGNIFICANT CHANGE UP (ref 3.5–5)
RBC # BLD: 4.74 M/UL — SIGNIFICANT CHANGE UP (ref 4.7–6.1)
RBC # FLD: 15.4 % — HIGH (ref 11.5–14.5)
SODIUM SERPL-SCNC: 143 MMOL/L — SIGNIFICANT CHANGE UP (ref 135–146)
WBC # BLD: 9.5 K/UL — SIGNIFICANT CHANGE UP (ref 4.8–10.8)
WBC # FLD AUTO: 9.5 K/UL — SIGNIFICANT CHANGE UP (ref 4.8–10.8)

## 2023-07-09 PROCEDURE — 99233 SBSQ HOSP IP/OBS HIGH 50: CPT

## 2023-07-09 PROCEDURE — 76770 US EXAM ABDO BACK WALL COMP: CPT | Mod: 26

## 2023-07-09 RX ADMIN — HEPARIN SODIUM 5000 UNIT(S): 5000 INJECTION INTRAVENOUS; SUBCUTANEOUS at 06:14

## 2023-07-09 RX ADMIN — TAMSULOSIN HYDROCHLORIDE 0.4 MILLIGRAM(S): 0.4 CAPSULE ORAL at 21:13

## 2023-07-09 RX ADMIN — ISOSORBIDE MONONITRATE 60 MILLIGRAM(S): 60 TABLET, EXTENDED RELEASE ORAL at 12:27

## 2023-07-09 RX ADMIN — Medication 650 MILLIGRAM(S): at 06:12

## 2023-07-09 RX ADMIN — CLOPIDOGREL BISULFATE 75 MILLIGRAM(S): 75 TABLET, FILM COATED ORAL at 12:27

## 2023-07-09 RX ADMIN — Medication 81 MILLIGRAM(S): at 12:28

## 2023-07-09 RX ADMIN — Medication 25 MILLIGRAM(S): at 17:18

## 2023-07-09 RX ADMIN — Medication 145 MILLIGRAM(S): at 12:28

## 2023-07-09 RX ADMIN — Medication 25 MILLIGRAM(S): at 06:13

## 2023-07-09 RX ADMIN — HEPARIN SODIUM 5000 UNIT(S): 5000 INJECTION INTRAVENOUS; SUBCUTANEOUS at 17:18

## 2023-07-09 RX ADMIN — Medication 650 MILLIGRAM(S): at 07:00

## 2023-07-09 NOTE — PROGRESS NOTE ADULT - ASSESSMENT
76 y/o man with PMH of HTN, hyperlipidemia, DM, CAD s/p CABG x 3 vessel in 2017 and follows with Dr Stewart, loop recorder implanted ~2021, 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     1. Dizziness   + spinning sensation per chart  now improved per pt  neuro eval appreciated: being treated as TIA   MRI of brain: no acute intracranial pathology, no acute infarct  CT of head negative for acute intracranial pathology.   CTA H & N: In comparison to the previous CTA neck/brain dated 12/7/2022: New moderate/severe stenosis of the M1 segment of the right MCA. Unchanged occlusion of the A2 segment of the left SUSANNE. Unchanged severe stenosis of the P2 segment of the right PCA.  now on ASA 81mg qd and plavix 75mg qd x 3 weeks, then continue with plavix 75mg qd indefinitely  c/w statin   ECHO: EF 54%, WMAs, mod to severe AS  s/p interrogation of loop recorder -> no events -> telemetry discontinued  pt with positive orthostatics on multiple readings now - losartan was stopped on admission - pt also on flomax and Imdur which can contribute -> he was given IVF  support stockings  encourage PO hydration  repeat orthostatics in AM  cardiology consulted - significant valvular heart disease, supine HTN, severe orthostatic hypotension  pt ambulating with physical therapy - home with PT on discharge    2. Chest pain - now resolved  atypical presentation per chart  troponins negative  EKG reviewed and interpreted by me  no further episodes  ECHO report as above    3. CAD s/p CABG x 3 vessel  on antiplatelet, statin, metoprolol, Imdur    4. SAMANTA over CKD 3  Cr now 1.8 - likely close to his baseline  renal US (h/o BPH and on flomax) and urine lytes and urine Cr  has chronic urinary incontinence  BMP in AM  avoid nephrotoxic meds    5. HTN - on metoprolol  has supine HTN and orthostatic hypotension    6. DM type 2 - FS acceptable    7. DVT ppx: Heparin SQ      DNR with trial of intubation  guarded prognosis      PROGRESS NOTE HANDOFF    Pending: cardiology consult, f/u report of renal US, labs in AM, repeat orthostatics    Family discussion: with pt and son at bedside    Disposition: home with care when orthostatics improved and after cleared by cardiology

## 2023-07-09 NOTE — PROGRESS NOTE ADULT - SUBJECTIVE AND OBJECTIVE BOX
ERICK MARTINEZ  77y Male    INTERVAL HPI/OVERNIGHT EVENTS:    pt feels well  denies pain, dizziness, SOB  no fever, N/V      T(F): 96.5 (23 @ 10:11), Max: 98.6 (23 @ 19:12)  HR: 75 (23 @ 05:00) (75 - 78)  BP: 177/80 (23 @ 05:00) (134/59 - 177/80)  RR: 18 (23 @ 05:00) (18 - 18)  SpO2: 97% (23 @ 19:12) (96% - 97%) on RA    I&O's Summary    2023 07:01  -  2023 12:04  --------------------------------------------------------  IN: 360 mL / OUT: 200 mL / NET: 160 mL        Daily Height in cm: 154.94 (2023 19:12)    Daily Weight in k.1 (2023 05:00)    CAPILLARY BLOOD GLUCOSE      POCT Blood Glucose.: 149 mg/dL (2023 17:06)        PHYSICAL EXAM:  GENERAL: NAD  HEAD:  Normocephalic  EYES:  conjunctiva and sclera clear  ENMT: Moist mucous membranes  NECK: Supple  NERVOUS SYSTEM:  Alert, awake, Good concentration, chronic left sided weakness  CHEST/LUNG: CTA b/l  HEART: Regular rate and rhythm; 3/6 GREG  ABDOMEN: Soft, Nontender, Nondistended; Bowel sounds present  EXTREMITIES: No edema  SKIN: warm, dry    Consultant(s) Notes Reviewed:  [x ] YES  [ ] NO  Care Discussed with Consultants/Other Providers [ x] YES  [ ] NO    MEDICATIONS  (STANDING):  aspirin  chewable 81 milliGRAM(s) Oral daily  clopidogrel Tablet 75 milliGRAM(s) Oral daily  fenofibrate Tablet 145 milliGRAM(s) Oral daily  heparin   Injectable 5000 Unit(s) SubCutaneous every 12 hours  isosorbide   mononitrate ER Tablet (IMDUR) 60 milliGRAM(s) Oral daily  metoprolol tartrate 25 milliGRAM(s) Oral two times a day  sodium chloride 0.9%. 1000 milliLiter(s) (75 mL/Hr) IV Continuous <Continuous>  tamsulosin 0.4 milliGRAM(s) Oral at bedtime    MEDICATIONS  (PRN):  acetaminophen     Tablet .. 650 milliGRAM(s) Oral every 6 hours PRN Temp greater or equal to 38C (100.4F), Mild Pain (1 - 3)  aluminum hydroxide/magnesium hydroxide/simethicone Suspension 30 milliLiter(s) Oral every 4 hours PRN Dyspepsia  melatonin 3 milliGRAM(s) Oral at bedtime PRN Insomnia      Telemetry reviewed by me    LABS:                        13.2   9.50  )-----------( 239      ( 2023 07:18 )             41.5     07-09    143  |  112<H>  |  29<H>  ----------------------------<  122<H>  4.0   |  21  |  1.8<H>    Ca    9.2      2023 07:18  Phos  3.1     07-  Mg     2.2     07-09        CARDIAC MARKERS ( 2023 06:52 )  x     / <0.01 ng/mL / x     / x     / x      CARDIAC MARKERS ( 2023 00:29 )  x     / <0.01 ng/mL / x     / x     / x              RADIOLOGY & ADDITIONAL TESTS:    Imaging or report Personally Reviewed:  [ x] YES  [ ] NO    < from: MR Head No Cont (23 @ 22:45) >  IMPRESSION:    No acute intracranial pathology.    Mild chronic microvascular ischemic changes and scattered chronic   infarcts.      < end of copied text >      < from: TTE Echo Complete w/ Contrast w/ Doppler (23 @ 09:50) >  Summary:   1. Normal global left ventricular systolic function.   2. Basal and mid inferior wall and basal and mid inferolateral wall are   abnormal as described above.   3. LV Ejection Fraction by Johnson's Method with a biplane EF of 54 %.   4. The left ventricular diastolic function could not be assessed in this   study.   5. Normal left atrial size.   6. Normal right atrial size.   7. Mild mitral annular calcification.   8. Mild mitral valve regurgitation.   9. Mild thickening of the anterior and posterior mitral valve leaflets.  10. Aortic valve thickening with decreased leaflet opening.  11. Moderate to severe aortic stenosis, peak/mean PG 18/10 mmHg, JAROD 1.0   cm^2.    < end of copied text >      Case discussed with RN today    Care discussed with pt and family

## 2023-07-10 LAB
ANION GAP SERPL CALC-SCNC: 12 MMOL/L — SIGNIFICANT CHANGE UP (ref 7–14)
BUN SERPL-MCNC: 44 MG/DL — HIGH (ref 10–20)
CALCIUM SERPL-MCNC: 9.7 MG/DL — SIGNIFICANT CHANGE UP (ref 8.4–10.4)
CHLORIDE SERPL-SCNC: 108 MMOL/L — SIGNIFICANT CHANGE UP (ref 98–110)
CO2 SERPL-SCNC: 23 MMOL/L — SIGNIFICANT CHANGE UP (ref 17–32)
CREAT SERPL-MCNC: 1.9 MG/DL — HIGH (ref 0.7–1.5)
EGFR: 36 ML/MIN/1.73M2 — LOW
GLUCOSE BLDC GLUCOMTR-MCNC: 118 MG/DL — HIGH (ref 70–99)
GLUCOSE BLDC GLUCOMTR-MCNC: 137 MG/DL — HIGH (ref 70–99)
GLUCOSE BLDC GLUCOMTR-MCNC: 141 MG/DL — HIGH (ref 70–99)
GLUCOSE SERPL-MCNC: 132 MG/DL — HIGH (ref 70–99)
MAGNESIUM SERPL-MCNC: 2.4 MG/DL — SIGNIFICANT CHANGE UP (ref 1.8–2.4)
PHOSPHATE SERPL-MCNC: 5.2 MG/DL — HIGH (ref 2.1–4.9)
POTASSIUM SERPL-MCNC: 4.8 MMOL/L — SIGNIFICANT CHANGE UP (ref 3.5–5)
POTASSIUM SERPL-SCNC: 4.8 MMOL/L — SIGNIFICANT CHANGE UP (ref 3.5–5)
SODIUM SERPL-SCNC: 143 MMOL/L — SIGNIFICANT CHANGE UP (ref 135–146)

## 2023-07-10 RX ORDER — SODIUM CHLORIDE 9 MG/ML
1000 INJECTION INTRAMUSCULAR; INTRAVENOUS; SUBCUTANEOUS
Refills: 0 | Status: DISCONTINUED | OUTPATIENT
Start: 2023-07-10 | End: 2023-07-11

## 2023-07-10 RX ORDER — NIFEDIPINE 30 MG
30 TABLET, EXTENDED RELEASE 24 HR ORAL DAILY
Refills: 0 | Status: DISCONTINUED | OUTPATIENT
Start: 2023-07-10 | End: 2023-07-11

## 2023-07-10 RX ORDER — SODIUM CHLORIDE 9 MG/ML
1000 INJECTION INTRAMUSCULAR; INTRAVENOUS; SUBCUTANEOUS ONCE
Refills: 0 | Status: COMPLETED | OUTPATIENT
Start: 2023-07-10 | End: 2023-07-10

## 2023-07-10 RX ADMIN — Medication 25 MILLIGRAM(S): at 05:11

## 2023-07-10 RX ADMIN — Medication 145 MILLIGRAM(S): at 11:29

## 2023-07-10 RX ADMIN — SODIUM CHLORIDE 80 MILLILITER(S): 9 INJECTION INTRAMUSCULAR; INTRAVENOUS; SUBCUTANEOUS at 21:51

## 2023-07-10 RX ADMIN — CLOPIDOGREL BISULFATE 75 MILLIGRAM(S): 75 TABLET, FILM COATED ORAL at 11:29

## 2023-07-10 RX ADMIN — SODIUM CHLORIDE 1000 MILLILITER(S): 9 INJECTION INTRAMUSCULAR; INTRAVENOUS; SUBCUTANEOUS at 09:12

## 2023-07-10 RX ADMIN — Medication 30 MILLIGRAM(S): at 09:13

## 2023-07-10 RX ADMIN — TAMSULOSIN HYDROCHLORIDE 0.4 MILLIGRAM(S): 0.4 CAPSULE ORAL at 21:44

## 2023-07-10 RX ADMIN — Medication 25 MILLIGRAM(S): at 17:25

## 2023-07-10 RX ADMIN — HEPARIN SODIUM 5000 UNIT(S): 5000 INJECTION INTRAVENOUS; SUBCUTANEOUS at 05:12

## 2023-07-10 RX ADMIN — SODIUM CHLORIDE 80 MILLILITER(S): 9 INJECTION INTRAMUSCULAR; INTRAVENOUS; SUBCUTANEOUS at 11:30

## 2023-07-10 RX ADMIN — Medication 81 MILLIGRAM(S): at 11:29

## 2023-07-10 RX ADMIN — HEPARIN SODIUM 5000 UNIT(S): 5000 INJECTION INTRAVENOUS; SUBCUTANEOUS at 17:24

## 2023-07-10 NOTE — PROGRESS NOTE ADULT - ASSESSMENT
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       Plan  #Dizziness   -spinning sensation per chart  -now improved per pt, pt states dizziness worsens when moving from sleeping to standing position.  -being treated as TIA as per neuro evaluation  -MRI of brain shows no acute intracranial pathology, no acute infarct/hemorrhage  -CTA Head & Neck: In comparison to the previous CTA neck/brain dated 12/7/2022 there is new moderate/severe stenosis of the M1 segment of the right MCA. Unchanged occlusion of the A2 -segment of the left SUSANNE. Unchanged severe stenosis of the P2 segment of the right PCA.  -Currently on ASA 81mg qd and plavix 75mg qd x 3 weeks, then continue with plavix 75mg qd indefinitely  -c/w statin   -ECHO: EF 54%, minor wall motion abnormalties, mod to severe AS  -s/p interrogation of loop recorder -> no events -> telemetry discontinued  -pt with positive orthostatics on multiple readings now - losartan was stopped on admission - pt also on flomax and Imdur which can contribute -> he was given IVF  support stockings  -encourage PO hydration  -repeat orthostatics in PM  -cardiology consulted - significant valvular heart disease, supine HTN, severe orthostatic hypotension  -pt ambulating with physical therapy - home with PT on discharge    # Chest pain - now resolved  atypical presentation per chart  troponins negative  no further episodes  ECHO report as above    3. CAD s/p CABG x 3 vessel  on antiplatelet, statin, metoprolol, Imdur    4. SAMANTA over CKD 3  Creatinine: 1.9 mg/dL   renal US (h/o BPH and on flomax)   has chronic urinary incontinence  avoid nephrotoxic meds    5. HTN - on metoprolol  has supine HTN and orthostatic hypotension  Pt started on nifedipine 30mg due to uncontrolled HTN    6. 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       Plan  #Dizziness   -spinning sensation per chart  -now improved per pt, pt states dizziness worsens when moving from sleeping to standing position.  -being treated as TIA as per neuro evaluation  -MRI of brain shows no acute intracranial pathology, no acute infarct/hemorrhage  -CTA Head & Neck: In comparison to the previous CTA neck/brain dated 12/7/2022 there is new moderate/severe stenosis of the M1 segment of the right MCA. Unchanged occlusion of the A2 -segment of the left SUSANNE. Unchanged severe stenosis of the P2 segment of the right PCA.  -Currently on ASA 81mg qd and plavix 75mg qd x 3 weeks, then continue with plavix 75mg qd indefinitely  -c/w statin   -ECHO: EF 54%, minor wall motion abnormalties, mod to severe AS  -s/p interrogation of loop recorder -> no events -> telemetry discontinued  -pt with positive orthostatics on multiple readings now - losartan was stopped on admission - pt also on flomax and Imdur which can contribute -> he was given IVF  support stockings  -encourage PO hydration  -repeat orthostatics in PM  -cardiology consulted - significant valvular heart disease, supine HTN, severe orthostatic hypotension  -pt ambulating with physical therapy - home with PT on discharge  -F/U on cardiology consult    # Chest pain - now resolved  atypical presentation per chart  troponins negative  no further episodes  ECHO report as above    3. CAD s/p CABG x 3 vessel  on antiplatelet, statin, metoprolol, Imdur    4. SAMANTA over CKD 3  Creatinine: 1.9 mg/dL   renal US (h/o BPH and on flomax)   has chronic urinary incontinence  avoid nephrotoxic meds    5. HTN - on metoprolol  has supine HTN and orthostatic hypotension  Pt started on nifedipine 30mg due to uncontrolled HTN    6. 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       Plan  #Dizziness   -spinning sensation per chart  -now improved per pt, pt states dizziness worsens when moving from sleeping to standing position.  -being treated as TIA as per neuro evaluation  -MRI of brain shows no acute intracranial pathology, no acute infarct/hemorrhage  -CTA Head & Neck: In comparison to the previous CTA neck/brain dated 12/7/2022 there is new moderate/severe stenosis of the M1 segment of the right MCA. Unchanged occlusion of the A2 -segment of the left SUSANNE. Unchanged severe stenosis of the P2 segment of the right PCA.  -Currently on ASA 81mg qd and plavix 75mg qd x 3 weeks, then continue with plavix 75mg qd indefinitely  -c/w statin   -ECHO: EF 54%, minor wall motion abnormalties, mod to severe AS  -s/p interrogation of loop recorder -> no events -> telemetry discontinued  -pt with positive orthostatics on multiple readings-----> Fluid bolus given to patient as EF is good  -encourage PO hydration  -repeat orthostatics in PM  -cardiology consulted - significant valvular heart disease, supine HTN, severe orthostatic hypotension  -pt ambulating with physical therapy - home with PT on discharge  -F/U on cardiology consult    # Chest pain - now resolved  atypical presentation per chart  troponins negative  no further episodes  ECHO report as above    3. CAD s/p CABG x 3 vessel  on antiplatelet, statin, metoprolol, Imdur    4. SAMANTA over CKD 3  Creatinine: 1.9 mg/dL   renal US (h/o BPH and on flomax)   has chronic urinary incontinence  avoid nephrotoxic meds    5. HTN - on metoprolol  has supine HTN and orthostatic hypotension  Pt started on nifedipine 30mg due to uncontrolled HTN    6. DM type 2 - FS acceptable    DVT ppx: Heparin SQ  DNR with trial of intubation  guarded prognosis

## 2023-07-10 NOTE — PROGRESS NOTE ADULT - SUBJECTIVE AND OBJECTIVE BOX
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     Primary diagnosis of dizziness due to TIA.       Today is hospital day 3d.      PAST MEDICAL & SURGICAL HISTORY  CAD (coronary artery disease)    HTN (hypertension)    Depression    Anxiety    Diabetes  niddm    Angina pectoris    BPH (benign prostatic hyperplasia)    GERD (gastroesophageal reflux disease)    CVA (cerebral vascular accident)    History of appendectomy    Bilateral cataracts    History of heart bypass surgery      SOCIAL HISTORY:  Negative for smoking/alcohol/drug use.     ALLERGIES:  penicillin (Rash)  PC Pen VK (Rash)  clindamycin (Rash)    MEDICATIONS:  STANDING MEDICATIONS  aspirin  chewable 81 milliGRAM(s) Oral daily  clopidogrel Tablet 75 milliGRAM(s) Oral daily  fenofibrate Tablet 145 milliGRAM(s) Oral daily  heparin   Injectable 5000 Unit(s) SubCutaneous every 12 hours  metoprolol tartrate 25 milliGRAM(s) Oral two times a day  NIFEdipine XL 30 milliGRAM(s) Oral daily  sodium chloride 0.9%. 1000 milliLiter(s) IV Continuous <Continuous>  tamsulosin 0.4 milliGRAM(s) Oral at bedtime    PRN MEDICATIONS  acetaminophen     Tablet .. 650 milliGRAM(s) Oral every 6 hours PRN  aluminum hydroxide/magnesium hydroxide/simethicone Suspension 30 milliLiter(s) Oral every 4 hours PRN  melatonin 3 milliGRAM(s) Oral at bedtime PRN    VITALS:   T(F): 98.4  HR: 70  BP: 173/77  RR: 18  SpO2: 96%    LABS:                        13.2   9.50  )-----------( 239      ( 09 Jul 2023 07:18 )             41.5     07-10    143  |  108  |  44<H>  ----------------------------<  132<H>  4.8   |  23  |  1.9<H>    Ca    9.7      10 Jul 2023 07:41  Phos  5.2     07-10  Mg     2.4     07-10        Urinalysis Basic - ( 10 Jul 2023 07:41 )    Color: x / Appearance: x / SG: x / pH: x  Gluc: 132 mg/dL / Ketone: x  / Bili: x / Urobili: x   Blood: x / Protein: x / Nitrite: x   Leuk Esterase: x / RBC: x / WBC x   Sq Epi: x / Non Sq Epi: x / Bacteria: x    PHYSICAL EXAM:  GENERAL: NAD  EYES: conjunctiva and sclera clear  ENMT: No tonsillar erythema, exudates, or enlargement; Moist mucous membranes, Good dentition, No lesions  NECK: Supple, No JVD, Normal thyroid  NERVOUS SYSTEM:  Alert & Oriented X3, Good concentration; Motor Strength 5/5 B/L upper and lower extremities; DTRs 2+ intact and symmetric  CHEST/LUNG: Clear to auscultation bilaterally; No rales, rhonchi, wheezing, or rubs  HEART: Regular rate and rhythm; No murmurs, rubs, or gallops  ABDOMEN: Soft, Nontender, Nondistended; Bowel sounds present  EXTREMITIES:  2+ Peripheral Pulses, No clubbing, cyanosis, or edema  LYMPH: No lymphadenopathy noted  SKIN: No rashes or lesions       24H events:    76 y/o man with PMH of HTN, hyperlipidemia, DM, CAD s/p CABG x 3 vessel in 2017 and follows with Dr Stewart, loop recorder implanted ~2021, 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     Primary diagnosis of dizziness due to TIA.       Today is hospital day 3d.      PAST MEDICAL & SURGICAL HISTORY  CAD (coronary artery disease)    HTN (hypertension)    Depression    Anxiety    Diabetes  niddm    Angina pectoris    BPH (benign prostatic hyperplasia)    GERD (gastroesophageal reflux disease)    CVA (cerebral vascular accident)    History of appendectomy    Bilateral cataracts    History of heart bypass surgery      SOCIAL HISTORY:  Negative for smoking/alcohol/drug use.     ALLERGIES:  penicillin (Rash)  PC Pen VK (Rash)  clindamycin (Rash)    MEDICATIONS:  STANDING MEDICATIONS  aspirin  chewable 81 milliGRAM(s) Oral daily  clopidogrel Tablet 75 milliGRAM(s) Oral daily  fenofibrate Tablet 145 milliGRAM(s) Oral daily  heparin   Injectable 5000 Unit(s) SubCutaneous every 12 hours  metoprolol tartrate 25 milliGRAM(s) Oral two times a day  NIFEdipine XL 30 milliGRAM(s) Oral daily  sodium chloride 0.9%. 1000 milliLiter(s) IV Continuous <Continuous>  tamsulosin 0.4 milliGRAM(s) Oral at bedtime    PRN MEDICATIONS  acetaminophen     Tablet .. 650 milliGRAM(s) Oral every 6 hours PRN  aluminum hydroxide/magnesium hydroxide/simethicone Suspension 30 milliLiter(s) Oral every 4 hours PRN  melatonin 3 milliGRAM(s) Oral at bedtime PRN    VITALS:   T(F): 98.4  HR: 70  BP: 173/77  RR: 18  SpO2: 96%    LABS:                        13.2   9.50  )-----------( 239      ( 09 Jul 2023 07:18 )             41.5     07-10    143  |  108  |  44<H>  ----------------------------<  132<H>  4.8   |  23  |  1.9<H>    Ca    9.7      10 Jul 2023 07:41  Phos  5.2     07-10  Mg     2.4     07-10        Urinalysis Basic - ( 10 Jul 2023 07:41 )    Color: x / Appearance: x / SG: x / pH: x  Gluc: 132 mg/dL / Ketone: x  / Bili: x / Urobili: x   Blood: x / Protein: x / Nitrite: x   Leuk Esterase: x / RBC: x / WBC x   Sq Epi: x / Non Sq Epi: x / Bacteria: x    PHYSICAL EXAM:  GENERAL: NAD  EYES: conjunctiva and sclera clear  NECK: Supple, No JVD, Normal thyroid  NERVOUS SYSTEM:  Alert & Oriented X3, Good concentration; Motor Strength 4/5 in right upper and lower extremities and 3/5 in left upper and lower extremities  CHEST/LUNG: Clear to auscultation bilaterally; No rales, rhonchi, wheezing, or rubs  HEART: Regular rate and rhythm; Systolic murmur present, rubs, or gallops  ABDOMEN: Soft, Nontender, Nondistended; Bowel sounds present  EXTREMITIES:  2+ Peripheral Pulses, No clubbing, cyanosis, or edema

## 2023-07-11 ENCOUNTER — TRANSCRIPTION ENCOUNTER (OUTPATIENT)
Age: 78
End: 2023-07-11

## 2023-07-11 VITALS
HEART RATE: 85 BPM | DIASTOLIC BLOOD PRESSURE: 67 MMHG | RESPIRATION RATE: 18 BRPM | TEMPERATURE: 98 F | SYSTOLIC BLOOD PRESSURE: 111 MMHG

## 2023-07-11 LAB — GLUCOSE BLDC GLUCOMTR-MCNC: 129 MG/DL — HIGH (ref 70–99)

## 2023-07-11 RX ORDER — NIFEDIPINE 30 MG
1 TABLET, EXTENDED RELEASE 24 HR ORAL
Qty: 30 | Refills: 0
Start: 2023-07-11 | End: 2023-08-09

## 2023-07-11 RX ORDER — ASPIRIN/CALCIUM CARB/MAGNESIUM 324 MG
1 TABLET ORAL
Qty: 18 | Refills: 0
Start: 2023-07-11 | End: 2023-07-28

## 2023-07-11 RX ORDER — LOSARTAN POTASSIUM 100 MG/1
1 TABLET, FILM COATED ORAL
Refills: 0 | DISCHARGE

## 2023-07-11 RX ADMIN — Medication 81 MILLIGRAM(S): at 12:16

## 2023-07-11 RX ADMIN — Medication 25 MILLIGRAM(S): at 05:14

## 2023-07-11 RX ADMIN — HEPARIN SODIUM 5000 UNIT(S): 5000 INJECTION INTRAVENOUS; SUBCUTANEOUS at 05:14

## 2023-07-11 RX ADMIN — Medication 30 MILLIGRAM(S): at 05:14

## 2023-07-11 RX ADMIN — Medication 145 MILLIGRAM(S): at 12:16

## 2023-07-11 RX ADMIN — CLOPIDOGREL BISULFATE 75 MILLIGRAM(S): 75 TABLET, FILM COATED ORAL at 12:15

## 2023-07-11 NOTE — CONSULT NOTE ADULT - ASSESSMENT
Pt well known to me with hx of cad and CVA in past.   He presents as dizziness and is being treated as TIA.     Noted CE are negative and Echo with NL lvef and ECG is unchanged.     There is some degree of aortic stenosis despite normal gradients    1. Hx of CVA and recent TIA with renal insuff. Has loop and is being treated for TIA.   Despite hx of CAD and CP with nl cardiac enzymes and normal unchanged ECG with ECHO with preserved EF in the setting of recent TIA will treat medically     There is some component of AS but not critical so we can observe and follow up bot valve and cors safely as an outpatient.     Optimize medical treatment will FU as out pt form CV stand point.   
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 Currently asymptomatic on my assessment. Neuro exam with chronic L sided facial droop and chronic L hemiparesis. CTH with  no acute findings and CTA with severe ICAD.    Recommendations:  Admit to obs for Brain MRI  ASA 300mg once, then c/w ASA 81 mg daily for 21 days  C/w Plavix 75 mg daily  Q4 neurochecks    Discussed with neuro attending

## 2023-07-11 NOTE — DISCHARGE NOTE NURSING/CASE MANAGEMENT/SOCIAL WORK - NSDCPEFALRISK_GEN_ALL_CORE
For information on Fall & Injury Prevention, visit: [email or web address] OR  [email or web address] OR  https://www.cdc.gov/steadi/patient.html

## 2023-07-11 NOTE — DISCHARGE NOTE PROVIDER - NSDCMRMEDTOKEN_GEN_ALL_CORE_FT
aspirin 81 mg oral tablet, chewable: 1 tab(s) orally once a day  fenofibrate 145 mg oral tablet: 1 orally once a day  linagliptin 5 mg oral tablet: 1 tab(s) orally once a day  metoprolol tartrate 25 mg oral tablet: 1 tab(s) orally 2 times a day  NIFEdipine 30 mg oral tablet, extended release: 1 tab(s) orally once a day  Plavix 75 mg oral tablet: 1 orally once a day  tamsulosin 0.4 mg oral capsule: 1 cap(s) orally once a day

## 2023-07-11 NOTE — DISCHARGE NOTE PROVIDER - NSDCPNSUBOBJ_GEN_ALL_CORE
Pt was seen and examined at the bedside.  resting comfortably.  Denies feeling dizzy.  BP better controlled.  Unable to reach patient.  Will have patient follow up outpatient with Cardiology.     Pt was seen and examined at the bedside.  resting comfortably.  Denies feeling dizzy.  BP better controlled.  Will have patient follow up outpatient with Cardiology.

## 2023-07-11 NOTE — DISCHARGE NOTE NURSING/CASE MANAGEMENT/SOCIAL WORK - PATIENT PORTAL LINK FT
You can access the FollowMyHealth Patient Portal offered by [facility] by registering at the following website: [email or web address]. By joining [employer]’s FollowMyHealth portal, you will also be able to view your health information using other applications (apps) compatible with our system.

## 2023-07-11 NOTE — DISCHARGE NOTE PROVIDER - PROVIDER TOKENS
PROVIDER:[TOKEN:[[id number]],FOLLOWUP:[1 week]],PROVIDER:[TOKEN:[[id number]],FOLLOWUP:[2 weeks]]

## 2023-07-11 NOTE — CONSULT NOTE ADULT - SUBJECTIVE AND OBJECTIVE BOX
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   HCP is wife Kristina Ruffin [phone], but she is developing dementia   Alternative HCP is good friend Layo - pt cannot remember this phone number and left phone at home, states wife has this number.     Pt states dizziness onset while in the shower, no room spinning, only felt he himself was spinning. No recent head trauma, no fall, no focal weakness noted. Sat on toilet after and was unable to get up afterwards 2/2 generalized weakness, no focal weakness. Had associated mild posterior headache. Re: CP - describes L shoulder/L parasternal chest pain which was 7/10 "pulling" sensation w mild associated SOB but no diaphoresis, n/v. CP is intermittent, onset after dizziness and has recurred even after dizziness resolved. At time of admission, no dizziness nor CP. Otherwise asymptomatic. Baseline walks w walker, lives at home w wife and wife has HHA that pt also benefits from.     In ED triage vitals were: 141/56, HR 72bpm, RR 18, SaO2 98% on RA. Temp not done at triage.   Labs notable for: Cr 1.9, K 5.0, trop <0.01  Imaging: Stable CT Head, CTA Head and Neck notable for new moderate stenosis R MCA   Treatments given: ASA 324mg  Pending: MRI noncon  (07 Jul 2023 20:17)    PAST MEDICAL & SURGICAL HISTORY:  CAD (coronary artery disease)      HTN (hypertension)      Depression      Anxiety      Diabetes  niddm      Angina pectoris      BPH (benign prostatic hyperplasia)      GERD (gastroesophageal reflux disease)      CVA (cerebral vascular accident)      History of appendectomy      Bilateral cataracts      History of heart bypass surgery        FAMILY HISTORY:    Allergies    penicillin (Rash)  PC Pen VK (Rash)  clindamycin (Rash)    Intolerances    	  Home Medications:  fenofibrate 145 mg oral tablet: 1 orally once a day (07 Jul 2023 22:03)  linagliptin 5 mg oral tablet: 1 tab(s) orally once a day (08 Dec 2022 03:59)  metoprolol tartrate 25 mg oral tablet: 1 tab(s) orally 2 times a day (17 Apr 2023 13:34)  Plavix 75 mg oral tablet: 1 orally once a day (07 Jul 2023 22:03)    MEDICATIONS  (STANDING):  aspirin  chewable 81 milliGRAM(s) Oral daily  clopidogrel Tablet 75 milliGRAM(s) Oral daily  fenofibrate Tablet 145 milliGRAM(s) Oral daily  heparin   Injectable 5000 Unit(s) SubCutaneous every 12 hours  metoprolol tartrate 25 milliGRAM(s) Oral two times a day  NIFEdipine XL 30 milliGRAM(s) Oral daily  sodium chloride 0.9%. 1000 milliLiter(s) (80 mL/Hr) IV Continuous <Continuous>  tamsulosin 0.4 milliGRAM(s) Oral at bedtime    MEDICATIONS  (PRN):  acetaminophen     Tablet .. 650 milliGRAM(s) Oral every 6 hours PRN Temp greater or equal to 38C (100.4F), Mild Pain (1 - 3)  aluminum hydroxide/magnesium hydroxide/simethicone Suspension 30 milliLiter(s) Oral every 4 hours PRN Dyspepsia  melatonin 3 milliGRAM(s) Oral at bedtime PRN Insomnia        SOCIAL HISTORY:    [ ] Non-smoker  [ ] Smoker  [ ] Alcohol      REVIEW OF SYSTEMS:    PHYSICAL EXAM:  T(C): 36.5 (07-11-23 @ 13:35), Max: 37.1 (07-10-23 @ 20:45)  HR: 85 (07-11-23 @ 13:35) (83 - 85)  BP: 111/67 (07-11-23 @ 13:35) (111/67 - 164/73)  RR: 18 (07-11-23 @ 13:35) (18 - 18)  SpO2: 99% (07-11-23 @ 08:15) (98% - 99%)  Wt(kg): --  I&O's Summary    10 Jul 2023 07:01  -  11 Jul 2023 07:00  --------------------------------------------------------  IN: 1800 mL / OUT: 0 mL / NET: 1800 mL      Daily     Daily     General Appearance: Normal	  Cardiovascular: Normal S1 S2, No JVD, No murmurs, No edema  Respiratory: Lungs clear to auscultation	  Psychiatry: A & O x 3, Mood & affect appropriate  Gastrointestinal:  Soft, Non-tender  Skin: No rashes, No ecchymoses, No cyanosis	  Neurologic: Non-focal  Extremities: Normal range of motion, No clubbing, cyanosis or edema  Vascular: Peripheral pulses palpable 2+ bilaterally        LABS:	 	    07-10    143  |  108  |  44<H>  ----------------------------<  132<H>  4.8   |  23  |  1.9<H>    Ca    9.7      10 Jul 2023 07:41  Phos  5.2     07-10  Mg     2.4     07-10        proBNP:   Lipid Profile:   HgA1c:   TSH:       CARDIAC MARKERS:            TELEMETRY EVENTS: 	    ECG:  	  RADIOLOGY:  	    PREVIOUS DIAGNOSTIC TESTING:    [ ] Echocardiogram:  < from: TTE Echo Complete w/ Contrast w/ Doppler (07.08.23 @ 09:50) >  1. Normal global left ventricular systolic function.   2. Basal and mid inferior wall and basal and mid inferolateral wall are   abnormal as described above.   3. LV Ejection Fraction by Johnson's Method with a biplane EF of 54 %.   4. The left ventricular diastolic function could not be assessed in this   study.   5. Normal left atrial size.   6. Normal right atrial size.   7. Mild mitral annular calcification.   8. Mild mitral valve regurgitation.   9. Mild thickening of the anterior and posterior mitral valve leaflets.  10. Aortic valve thickening with decreasedleaflet opening.  11. Moderate to severe aortic stenosis, peak/mean PG 18/10 mmHg, JAROD 1.0   cm^2.      < end of copied text >  [ ]  Catheterization:  [ ] Stress Test:

## 2023-07-11 NOTE — DISCHARGE NOTE PROVIDER - NSDCCPCAREPLAN_GEN_ALL_CORE_FT
PRINCIPAL DISCHARGE DIAGNOSIS  Diagnosis: Dizziness  Assessment and Plan of Treatment: Luckily you did not suffer a stroke at this time, however you may have had what we believe is a "TIA" or a mini-stroke. You do not have any changes on images of your brain and you will not have any residual problems from it, but we worry about TIAs because they may predict a higher likelihood of having a stroke in the future. Be sure to practice healthy lifestyle measures, exercise 150min per week, eat a healthy well balanced diet. you have to take the following medications :   1) aspirin ; take it for 18 days ( a total duration for 21 days )   2) plavix ; you have to take one pill daily for lifelong   3) fenofibrate   and you should follow up with the neurology doctor Dr Lisa Bhakta after 2 weeks and possibly starting a drug called statin.  Phone: [phone]  And you will need home physical therapy    be sure to take them as prescribed. Return to  ER immediately if you have symptoms consistent with a stroke again such as slurred speech, one sided weakness, confusion, inability to talk or to understand speech from others.        SECONDARY DISCHARGE DIAGNOSES  Diagnosis: SAMANTA (acute kidney injury)  Assessment and Plan of Treatment:     Diagnosis: Dizziness  Assessment and Plan of Treatment:     Diagnosis: Chest pain  Assessment and Plan of Treatment: Chest pain can be caused by many different conditions which may or may not be dangerous. Causes include heartburn, lung infections, heart attack, blood clot in lungs, skin infections, strain or damage to muscle, cartilage, or bones, etc. In addition to a history and physical examination, an electrocardiogram (ECG) and cardiac enzymes lab tests have been performed to you and we ruled out any acute heart damage this time.   You were taking a drug called nitrate for your heart vessels , we stopped it due to dropping in your blood pressure while you were here in the hospital. You should follow up with your cardiology doctor Dr Stewart for possible resuming or alternatives to this medication.  Follow up with him after 1 week # Phone: [phone]  SEEK IMMEDIATE MEDICAL CARE IF YOU HAVE ANY OF THE FOLLOWING SYMPTOMS: worsening chest pain, coughing up blood, unexplained back/neck/jaw pain, severe abdominal pain, dizziness or lightheadedness, fainting, shortness of breath, sweaty or clammy skin, vomiting, or racing heart beat. These symptoms may represent a serious problem that is an emergency. Do not wait to see if the symptoms will go away. Get medical help right away. Call 911 and do not drive yourself to the hospital.

## 2023-07-11 NOTE — DISCHARGE NOTE PROVIDER - NSDCFUSCHEDAPPT_GEN_ALL_CORE_FT
[facility] Physician [facility]  CARDIOLOGY [address] [facility]  Scheduled Appointment: 08/03/2023

## 2023-07-11 NOTE — DISCHARGE NOTE PROVIDER - HOSPITAL COURSE
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     Primary diagnosis of dizziness due to TIA vs Orthostatic changes.     patient was admitted , had chest pain , resolved , ACS was r/o     treated as TIA according to neuro team.     his hospital remarks were as following :     Plan  #Dizziness   -spinning sensation per chart  -now improved per pt, pt states dizziness worsens when moving from sleeping to standing position.  -being treated as TIA as per neuro evaluation  -MRI of brain shows no acute intracranial pathology, no acute infarct/hemorrhage  -CTA Head & Neck: In comparison to the previous CTA neck/brain dated 12/7/2022 there is new moderate/severe stenosis of the M1 segment of the right MCA. Unchanged occlusion of the A2 -segment of the left SUSANNE. Unchanged severe stenosis of the P2 segment of the right PCA.  -Currently on ASA 81mg qd and plavix 75mg qd x 3 weeks, then continue with plavix 75mg qd indefinitely  -c/w statin   -ECHO: EF 54%, minor wall motion abnormalties, mod to severe AS  -s/p interrogation of loop recorder -> no events -> telemetry discontinued  -pt with positive orthostatics on multiple readings-----> Fluid bolus given to patient as EF is good  -encourage PO hydration  -cardiology consulted - significant valvular heart disease, supine HTN, severe orthostatic hypotension  -pt ambulating with physical therapy - home with PT on discharge    # Chest pain - now resolved  atypical presentation per chart  troponins negative  no further episodes  ECHO report as above    3. CAD s/p CABG x 3 vessel  on antiplatelet, statin, metoprolol, Imdur    4. SAMANTA over CKD 3 - cr trending down -   Creatinine: 1.9 mg/dL   renal US (h/o BPH and on flomax)   has chronic urinary incontinence  avoid nephrotoxic meds  f/u with nephrology as outpatient     5. HTN - on metoprolol  has supine HTN and orthostatic hypotension  Pt started on nifedipine 30mg due to uncontrolled HTN    6. DM type 2 - FS acceptable

## 2023-07-11 NOTE — DISCHARGE NOTE PROVIDER - CARE PROVIDER_API CALL
Charlie Stewrat  Interventional Cardiology  [address]  [place] [address]  Phone: [phone]  Fax: [phone]  Follow Up Time: 1 week    Livia Gonzalez  Neurology  [address] [place] [address]  Phone: [phone]  Fax: [phone]  Follow Up Time: 2 weeks

## 2023-07-11 NOTE — DISCHARGE NOTE NURSING/CASE MANAGEMENT/SOCIAL WORK - NSDCVIVACCINE_GEN_ALL_CORE_FT
Tdap; 20-Jul-2021 15:17; Dejuan Fry (RN); [employer]; [id number] (Exp. Date: 28-Jan-2023); IntraMuscular; Deltoid Left.; 0.5 milliLiter(s); VIS (VIS Published: 09-May-2013, VIS Presented: 20-Jul-2021);

## 2023-07-13 LAB
GLUCOSE BLDC GLUCOMTR-MCNC: 118 MG/DL — HIGH (ref 70–99)
GLUCOSE BLDC GLUCOMTR-MCNC: 122 MG/DL — HIGH (ref 70–99)

## 2023-07-14 DIAGNOSIS — Z79.82 LONG TERM (CURRENT) USE OF ASPIRIN: ICD-10-CM

## 2023-07-14 DIAGNOSIS — K21.9 GASTRO-ESOPHAGEAL REFLUX DISEASE WITHOUT ESOPHAGITIS: ICD-10-CM

## 2023-07-14 DIAGNOSIS — Z95.1 PRESENCE OF AORTOCORONARY BYPASS GRAFT: ICD-10-CM

## 2023-07-14 DIAGNOSIS — I12.9 HYPERTENSIVE CHRONIC KIDNEY DISEASE WITH STAGE 1 THROUGH STAGE 4 CHRONIC KIDNEY DISEASE, OR UNSPECIFIED CHRONIC KIDNEY DISEASE: ICD-10-CM

## 2023-07-14 DIAGNOSIS — I69.954 HEMIPLEGIA AND HEMIPARESIS FOLLOWING UNSPECIFIED CEREBROVASCULAR DISEASE AFFECTING LEFT NON-DOMINANT SIDE: ICD-10-CM

## 2023-07-14 DIAGNOSIS — Z88.1 ALLERGY STATUS TO OTHER ANTIBIOTIC AGENTS STATUS: ICD-10-CM

## 2023-07-14 DIAGNOSIS — Z88.0 ALLERGY STATUS TO PENICILLIN: ICD-10-CM

## 2023-07-14 DIAGNOSIS — R07.9 CHEST PAIN, UNSPECIFIED: ICD-10-CM

## 2023-07-14 DIAGNOSIS — I25.10 ATHEROSCLEROTIC HEART DISEASE OF NATIVE CORONARY ARTERY WITHOUT ANGINA PECTORIS: ICD-10-CM

## 2023-07-14 DIAGNOSIS — Z88.8 ALLERGY STATUS TO OTHER DRUGS, MEDICAMENTS AND BIOLOGICAL SUBSTANCES: ICD-10-CM

## 2023-07-14 DIAGNOSIS — R42 DIZZINESS AND GIDDINESS: ICD-10-CM

## 2023-07-14 DIAGNOSIS — G45.9 TRANSIENT CEREBRAL ISCHEMIC ATTACK, UNSPECIFIED: ICD-10-CM

## 2023-07-14 DIAGNOSIS — N40.0 BENIGN PROSTATIC HYPERPLASIA WITHOUT LOWER URINARY TRACT SYMPTOMS: ICD-10-CM

## 2023-07-14 DIAGNOSIS — N17.9 ACUTE KIDNEY FAILURE, UNSPECIFIED: ICD-10-CM

## 2023-07-14 DIAGNOSIS — N18.31 CHRONIC KIDNEY DISEASE, STAGE 3A: ICD-10-CM

## 2023-07-14 DIAGNOSIS — I95.1 ORTHOSTATIC HYPOTENSION: ICD-10-CM

## 2023-07-14 DIAGNOSIS — E78.5 HYPERLIPIDEMIA, UNSPECIFIED: ICD-10-CM

## 2023-07-14 DIAGNOSIS — E11.22 TYPE 2 DIABETES MELLITUS WITH DIABETIC CHRONIC KIDNEY DISEASE: ICD-10-CM

## 2023-08-03 ENCOUNTER — APPOINTMENT (OUTPATIENT)
Dept: CARDIOLOGY | Facility: CLINIC | Age: 78
End: 2023-08-03
Payer: MEDICARE

## 2023-08-03 ENCOUNTER — NON-APPOINTMENT (OUTPATIENT)
Age: 78
End: 2023-08-03

## 2023-08-03 PROCEDURE — 93298 REM INTERROG DEV EVAL SCRMS: CPT

## 2023-08-03 PROCEDURE — G2066: CPT

## 2023-08-30 ENCOUNTER — INPATIENT (INPATIENT)
Facility: HOSPITAL | Age: 78
LOS: 6 days | Discharge: HOME HEALTH | DRG: 871 | End: 2023-09-06
Attending: INTERNAL MEDICINE | Admitting: STUDENT IN AN ORGANIZED HEALTH CARE EDUCATION/TRAINING PROGRAM
Payer: MEDICARE

## 2023-08-30 VITALS
RESPIRATION RATE: 18 BRPM | TEMPERATURE: 98 F | DIASTOLIC BLOOD PRESSURE: 90 MMHG | HEIGHT: 61 IN | WEIGHT: 134.04 LBS | HEART RATE: 86 BPM | OXYGEN SATURATION: 98 % | SYSTOLIC BLOOD PRESSURE: 179 MMHG

## 2023-08-30 DIAGNOSIS — Z90.49 ACQUIRED ABSENCE OF OTHER SPECIFIED PARTS OF DIGESTIVE TRACT: Chronic | ICD-10-CM

## 2023-08-30 DIAGNOSIS — H26.9 UNSPECIFIED CATARACT: Chronic | ICD-10-CM

## 2023-08-30 DIAGNOSIS — R07.9 CHEST PAIN, UNSPECIFIED: ICD-10-CM

## 2023-08-30 DIAGNOSIS — Z95.1 PRESENCE OF AORTOCORONARY BYPASS GRAFT: Chronic | ICD-10-CM

## 2023-08-30 LAB
ALBUMIN SERPL ELPH-MCNC: 4.1 G/DL — SIGNIFICANT CHANGE UP (ref 3.5–5.2)
ALP SERPL-CCNC: 36 U/L — SIGNIFICANT CHANGE UP (ref 30–115)
ALT FLD-CCNC: 10 U/L — SIGNIFICANT CHANGE UP (ref 0–41)
ANION GAP SERPL CALC-SCNC: 13 MMOL/L — SIGNIFICANT CHANGE UP (ref 7–14)
APTT BLD: 30.9 SEC — SIGNIFICANT CHANGE UP (ref 27–39.2)
AST SERPL-CCNC: 13 U/L — SIGNIFICANT CHANGE UP (ref 0–41)
BASOPHILS # BLD AUTO: 0.04 K/UL — SIGNIFICANT CHANGE UP (ref 0–0.2)
BASOPHILS NFR BLD AUTO: 0.3 % — SIGNIFICANT CHANGE UP (ref 0–1)
BILIRUB SERPL-MCNC: 0.3 MG/DL — SIGNIFICANT CHANGE UP (ref 0.2–1.2)
BUN SERPL-MCNC: 30 MG/DL — HIGH (ref 10–20)
CALCIUM SERPL-MCNC: 9.4 MG/DL — SIGNIFICANT CHANGE UP (ref 8.4–10.5)
CHLORIDE SERPL-SCNC: 103 MMOL/L — SIGNIFICANT CHANGE UP (ref 98–110)
CO2 SERPL-SCNC: 23 MMOL/L — SIGNIFICANT CHANGE UP (ref 17–32)
CREAT SERPL-MCNC: 1.8 MG/DL — HIGH (ref 0.7–1.5)
EGFR: 38 ML/MIN/1.73M2 — LOW
EOSINOPHIL # BLD AUTO: 0.08 K/UL — SIGNIFICANT CHANGE UP (ref 0–0.7)
EOSINOPHIL NFR BLD AUTO: 0.5 % — SIGNIFICANT CHANGE UP (ref 0–8)
GLUCOSE SERPL-MCNC: 115 MG/DL — HIGH (ref 70–99)
HCT VFR BLD CALC: 38 % — LOW (ref 42–52)
HGB BLD-MCNC: 12.3 G/DL — LOW (ref 14–18)
IMM GRANULOCYTES NFR BLD AUTO: 0.5 % — HIGH (ref 0.1–0.3)
INR BLD: 1.01 RATIO — SIGNIFICANT CHANGE UP (ref 0.65–1.3)
LYMPHOCYTES # BLD AUTO: 1.39 K/UL — SIGNIFICANT CHANGE UP (ref 1.2–3.4)
LYMPHOCYTES # BLD AUTO: 9.2 % — LOW (ref 20.5–51.1)
MCHC RBC-ENTMCNC: 29 PG — SIGNIFICANT CHANGE UP (ref 27–31)
MCHC RBC-ENTMCNC: 32.4 G/DL — SIGNIFICANT CHANGE UP (ref 32–37)
MCV RBC AUTO: 89.6 FL — SIGNIFICANT CHANGE UP (ref 80–94)
MONOCYTES # BLD AUTO: 1.19 K/UL — HIGH (ref 0.1–0.6)
MONOCYTES NFR BLD AUTO: 7.9 % — SIGNIFICANT CHANGE UP (ref 1.7–9.3)
NEUTROPHILS # BLD AUTO: 12.27 K/UL — HIGH (ref 1.4–6.5)
NEUTROPHILS NFR BLD AUTO: 81.6 % — HIGH (ref 42.2–75.2)
NRBC # BLD: 0 /100 WBCS — SIGNIFICANT CHANGE UP (ref 0–0)
NT-PROBNP SERPL-SCNC: 1104 PG/ML — HIGH (ref 0–300)
PLATELET # BLD AUTO: 226 K/UL — SIGNIFICANT CHANGE UP (ref 130–400)
PMV BLD: 10.6 FL — HIGH (ref 7.4–10.4)
POTASSIUM SERPL-MCNC: 4.4 MMOL/L — SIGNIFICANT CHANGE UP (ref 3.5–5)
POTASSIUM SERPL-SCNC: 4.4 MMOL/L — SIGNIFICANT CHANGE UP (ref 3.5–5)
PROT SERPL-MCNC: 6.3 G/DL — SIGNIFICANT CHANGE UP (ref 6–8)
PROTHROM AB SERPL-ACNC: 11.5 SEC — SIGNIFICANT CHANGE UP (ref 9.95–12.87)
RBC # BLD: 4.24 M/UL — LOW (ref 4.7–6.1)
RBC # FLD: 14.7 % — HIGH (ref 11.5–14.5)
SODIUM SERPL-SCNC: 139 MMOL/L — SIGNIFICANT CHANGE UP (ref 135–146)
TROPONIN T SERPL-MCNC: <0.01 NG/ML — SIGNIFICANT CHANGE UP
WBC # BLD: 15.04 K/UL — HIGH (ref 4.8–10.8)
WBC # FLD AUTO: 15.04 K/UL — HIGH (ref 4.8–10.8)

## 2023-08-30 PROCEDURE — 87077 CULTURE AEROBIC IDENTIFY: CPT

## 2023-08-30 PROCEDURE — 82553 CREATINE MB FRACTION: CPT

## 2023-08-30 PROCEDURE — 99223 1ST HOSP IP/OBS HIGH 75: CPT

## 2023-08-30 PROCEDURE — 97110 THERAPEUTIC EXERCISES: CPT | Mod: GP

## 2023-08-30 PROCEDURE — 81001 URINALYSIS AUTO W/SCOPE: CPT

## 2023-08-30 PROCEDURE — 99285 EMERGENCY DEPT VISIT HI MDM: CPT | Mod: FS

## 2023-08-30 PROCEDURE — 80061 LIPID PANEL: CPT

## 2023-08-30 PROCEDURE — 92610 EVALUATE SWALLOWING FUNCTION: CPT | Mod: GN

## 2023-08-30 PROCEDURE — 70450 CT HEAD/BRAIN W/O DYE: CPT | Mod: 26,MA

## 2023-08-30 PROCEDURE — 83735 ASSAY OF MAGNESIUM: CPT

## 2023-08-30 PROCEDURE — 82962 GLUCOSE BLOOD TEST: CPT

## 2023-08-30 PROCEDURE — 80048 BASIC METABOLIC PNL TOTAL CA: CPT

## 2023-08-30 PROCEDURE — 85027 COMPLETE CBC AUTOMATED: CPT

## 2023-08-30 PROCEDURE — 93005 ELECTROCARDIOGRAM TRACING: CPT

## 2023-08-30 PROCEDURE — 86901 BLOOD TYPING SEROLOGIC RH(D): CPT

## 2023-08-30 PROCEDURE — 0225U NFCT DS DNA&RNA 21 SARSCOV2: CPT

## 2023-08-30 PROCEDURE — 80053 COMPREHEN METABOLIC PANEL: CPT

## 2023-08-30 PROCEDURE — 87040 BLOOD CULTURE FOR BACTERIA: CPT

## 2023-08-30 PROCEDURE — 87186 SC STD MICRODIL/AGAR DIL: CPT

## 2023-08-30 PROCEDURE — 87086 URINE CULTURE/COLONY COUNT: CPT

## 2023-08-30 PROCEDURE — 83036 HEMOGLOBIN GLYCOSYLATED A1C: CPT

## 2023-08-30 PROCEDURE — 97116 GAIT TRAINING THERAPY: CPT | Mod: GP

## 2023-08-30 PROCEDURE — 87449 NOS EACH ORGANISM AG IA: CPT

## 2023-08-30 PROCEDURE — 86900 BLOOD TYPING SEROLOGIC ABO: CPT

## 2023-08-30 PROCEDURE — 71045 X-RAY EXAM CHEST 1 VIEW: CPT | Mod: 26

## 2023-08-30 PROCEDURE — 93010 ELECTROCARDIOGRAM REPORT: CPT | Mod: 77

## 2023-08-30 PROCEDURE — 84484 ASSAY OF TROPONIN QUANT: CPT

## 2023-08-30 PROCEDURE — 71250 CT THORAX DX C-: CPT

## 2023-08-30 PROCEDURE — 97162 PT EVAL MOD COMPLEX 30 MIN: CPT | Mod: GP

## 2023-08-30 PROCEDURE — 87899 AGENT NOS ASSAY W/OPTIC: CPT

## 2023-08-30 PROCEDURE — 84145 PROCALCITONIN (PCT): CPT

## 2023-08-30 PROCEDURE — 84100 ASSAY OF PHOSPHORUS: CPT

## 2023-08-30 PROCEDURE — 85025 COMPLETE CBC W/AUTO DIFF WBC: CPT

## 2023-08-30 PROCEDURE — 36415 COLL VENOUS BLD VENIPUNCTURE: CPT

## 2023-08-30 PROCEDURE — 86850 RBC ANTIBODY SCREEN: CPT

## 2023-08-30 RX ORDER — LABETALOL HCL 100 MG
10 TABLET ORAL ONCE
Refills: 0 | Status: COMPLETED | OUTPATIENT
Start: 2023-08-30 | End: 2023-08-30

## 2023-08-30 RX ORDER — NIFEDIPINE 30 MG
30 TABLET, EXTENDED RELEASE 24 HR ORAL ONCE
Refills: 0 | Status: COMPLETED | OUTPATIENT
Start: 2023-08-30 | End: 2023-08-30

## 2023-08-30 RX ORDER — ACETAMINOPHEN 500 MG
650 TABLET ORAL EVERY 6 HOURS
Refills: 0 | Status: DISCONTINUED | OUTPATIENT
Start: 2023-08-30 | End: 2023-09-06

## 2023-08-30 RX ORDER — LANOLIN ALCOHOL/MO/W.PET/CERES
3 CREAM (GRAM) TOPICAL AT BEDTIME
Refills: 0 | Status: DISCONTINUED | OUTPATIENT
Start: 2023-08-30 | End: 2023-09-06

## 2023-08-30 NOTE — ED PROVIDER NOTE - CADM POA URETHRAL CATHETER
Think about steroid ointment for your hand    Visit with Dr Shannon for endocrinology to talk about your parathyroid    Keep up your CPAP - ok to use steroid ointment on your face     Keep your visit with Dr Meraz    Most important homework is to find a way to be active - exercise - even walking in place    Expect a phone call to schedule a visit with our clinical pharmacist, Kim Winkler      
No

## 2023-08-30 NOTE — H&P ADULT - ASSESSMENT
#DVT PPx-   #GI PPx-  #Diet-   #CHG  #Activity-   #Dispo-   #Code-    77-year-old male past medical history hypertension, hyperlipidemia, diabetes, CKD,TIAs, stroke in 2020, CAD status post CABG cardiologist Dr. Molina presenting to ED for evaluation of intermittent left-sided chest pressure since yesterday, 9/10 in intensity, non exertional, radiating to the arm, associated with SOB, waned on its own, not wosening.  Patient also endorsing when he felt room spinning dizziness earlier today that has since resolved and CT scan of the head was ordered which was negative. Patient was given aspirin and nitro by EMS with relief. He has hx of PAD and endorses that his legs are hurting at times.    #  Constant non exertional chest pain associated with SOB  # Hypertensive Urgency 190/90, no symptoms  #CAD s/p CABG in 2017  - negative tropos  - ekg n/l  - f.u repeat trops  - Probnp 1104  - f/u Cardio consult( Dr Vasquez)  - might need a repeat cath  - LV Ejection Fraction by Johnson's Method with a biplane EF of 54 %.  - Crescendo descrendo murmur on LSB  - euvolemic on exam  - c/w metoprolol tartrate 25 mg BID  - c/w aspirin and clopidogrel     ##h/o CVA x 2 with bilateral residual weakness, multiple TIAs  - CT head negative  - no new motor weakness in the body as per patient    # HTN  - losartan 100 mg, Nifedipine ER 30 mg, Isosorbide dinitrate  - DASH diet    #DM  -A1C with Estimated Average Glucose Result: 6.6:   - f.u a1c  - on Tradjenta 5 mg    #HLD  - c/w fenofibrate, not on statins as per pharmacy, will start inpatient  - f.u lipid profile as previous profile showed LDL not at goal    #CKD III b  - stable, monitor  - gfr of 38      #Misc:  #DVT PPX: heparin  #GI PPX: protonix  #Diet: DASH/CC  #Activity: bedrest  #Dispo: acute

## 2023-08-30 NOTE — H&P ADULT - ATTENDING COMMENTS
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  Patient also endorsing when he felt room spinning dizziness earlier today that has since resolved and CT scan of the head was ordered which was negative. Patient was given aspirin and nitro by EMS with relief. He has hx of PAD and endorses that his legs are hurting at times.    Agree  with assessment  except for changes below.     IMPRESSION  Likely Stable Angina   Constant Exertional  Chest  Pain   Hx CAD S/P CABG in 2017   Hx of  PAD   2017, EF 63% w mild LVH and G1DD   Described  also symptoms of Cludicatioin Pain  Hemodynamically Stable   ECG NSR   Trop  0.01   consider  repeating  echo   consider  Ordering SLADE   f/u  repeat trops  - Probnp 1104  - f/u Cardio consult( Dr Vasquez) for  further Optimization   - Crescendo descrendo murmur on LSB  - euvolemic on exam  - c/w metoprolol tartrate 25 mg BID  - c/w aspirin and clopidogrel    Hx  CVA x 2 with bilateral residual weakness, multiple TIAs  - CT head negative  - no new motor weakness in the body as per patient    Hx  HTN  - losartan 100 mg, Nifedipine ER 30 mg, Isosorbide dinitrate  - DASH diet    Hx DM  Hold oral meds;  check fs;  Start insulin  regimend if  serum Glucose >180, start insulin  protocol and adjust insulin  Lantus/Lispro,  Hold for Hypoglycemia Goal  Gaol 140-180     Hx HLD  - c/w fenofibrate, not on statins as per pharmacy, will start inpatient  - f.u lipid profile as previous profile showed LDL not at goal    Hx CKD III b  No need for urgent RRT  Monitor Cr/BUN Electrolytes including Phosphorus  Avoid Nephro toxins 78 y/o man w PMHx of HTN, HLD, DM, CAD, s/p 3vCABG 2017, EF 63% w mild LVH and G1DD in 10/2022, follows w Dr Stewart, loop recorder implanted ~2021, 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  Patient also endorsing when he felt room spinning dizziness earlier today that has since resolved and CT scan of the head was ordered which was negative. Patient was given aspirin and nitro by EMS with relief. He has hx of PAD and endorses that his legs are hurting at times.    Agree  with assessment  except for changes below.     IMPRESSION  Likely Stable Angina   Constant Exertional  Chest  Pain   Hx CAD S/P CABG in 2017   Hx of  PAD   2017, EF 63% w mild LVH and G1DD   Described  also symptoms of Cludicatioin Pain  Hemodynamically Stable   ECG NSR   Trop  0.01   consider  repeating  echo   consider  Ordering SLADE   f/u  repeat trops  - Probnp 1104  - f/u Cardio consult( Dr Vasquez) for  further Optimization   - Crescendo Decrescendo murmur on LSB  - euvolemic on exam  - c/w metoprolol tartrate 25 mg BID, Imdur   - c/w aspirin and clopidogrel    Leokocytosis   send  UA   Blood  cultures   Monitor of Abx  for now     Hx  CVA x 2 with bilateral residual weakness, multiple TIAs  - CT head negative  - no new motor weakness in the body as per patient    Hx  HTN  - losartan 100 mg, Nifedipine ER 30 mg, Isosorbide dinitrate  - DASH diet    Hx DM  Hold oral meds;  check fs;  Start insulin  regimend if  serum Glucose >180, start insulin  protocol and adjust insulin  Lantus/Lispro,  Hold for Hypoglycemia Goal  Gaol 140-180     Hx HLD  - c/w fenofibrate, not on statins as per pharmacy, will start inpatient  - f.u lipid profile as previous profile showed LDL not at goal    Hx CKD III b  No need for urgent RRT  Monitor Cr/BUN Electrolytes including Phosphorus  Avoid Nephro toxins 78 y/o man w PMHx of HTN, HLD, DM, CAD, s/p 3vCABG 2017, EF 63% w mild LVH and G1DD in 10/2022, follows w Dr Stewart, loop recorder implanted ~2021, 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  Patient also endorsing when he felt room spinning dizziness earlier today that has since resolved and CT scan of the head was ordered which was negative. Patient was given aspirin and nitro by EMS with relief. He has hx of PAD and endorses that his legs are hurting at times.    Agree  with assessment  except for changes below.     IMPRESSION  Likely Stable Angina   Constant Exertional  Chest  Pain   Hx CAD S/P CABG in 2017   Hx of  PAD   2017, EF 63% w mild LVH and G1DD   Described  also symptoms of Cludicatioin Pain  Hemodynamically Stable   ECG NSR   Trop  0.01   consider  repeating  echo   consider  Ordering SLADE   f/u  repeat trops  - Probnp 1104  - f/u Cardio consult( Dr Vasquez) for  further Optimization   - Crescendo Decrescendo murmur on LSB  - euvolemic on exam  - c/w metoprolol tartrate 25 mg BID, Imdur   - c/w aspirin and clopidogrel    Leokocytosis   send  UA   Blood  cultures   Monitor of Abx  for now   Chest X-Ray small Left effusion     Hx  CVA x 2 with bilateral residual weakness, multiple TIAs  - CT head negative  - no new motor weakness in the body as per patient    Hx  HTN  - losartan 100 mg, Nifedipine ER 30 mg, Isosorbide dinitrate  - DASH diet    Hx DM  Hold oral meds;  check fs;  Start insulin  regimend if  serum Glucose >180, start insulin  protocol and adjust insulin  Lantus/Lispro,  Hold for Hypoglycemia Goal  Gaol 140-180     Hx HLD  - c/w fenofibrate, not on statins as per pharmacy, will start inpatient  - f.u lipid profile as previous profile showed LDL not at goal    Hx CKD III b  No need for urgent RRT  Monitor Cr/BUN Electrolytes including Phosphorus  Avoid Nephro toxins 76 y/o man w PMHx of HTN, HLD, DM, CAD, s/p 3vCABG 2017, EF 63% w mild LVH and G1DD in 10/2022, follows w Dr Stewart, loop recorder implanted ~2021, 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  Patient also endorsing when he felt room spinning dizziness earlier today that has since resolved and CT scan of the head was ordered which was negative. Patient was given aspirin and nitro by EMS with relief. He has hx of PAD and endorses that his legs are hurting at times.    Agree  with assessment  except for changes below.       PHYSICAL EXAM  GENERAL: NAD,  HEAD:  NCAT, EOMI, MM  NECK: Supple, Nontender  NERVOUS SYSTEM:  AAOx3, NFD  CHEST/LUNG: +bs b/l, No wheezing   HEART: +s1s2 RRR, reproducible chest tenderness   ABDOMEN: soft, NT/ND  EXTREMITIES:  pp, no edema  SKIN: age related skin changes     IMPRESSION  Exertional Chest Pain   On  closer examination pain is reproducible with palpation     Constant Exertional  Chest  Pain   Hx CAD S/P CABG in 2017   Hx of  PAD   2017, EF 63% w mild LVH and G1DD   Described  also symptoms of Cludicatioin Pain  Hemodynamically Stable   ECG NSR   Trop  0.01   consider  repeating  echo   consider  Ordering SLADE   f/u  repeat trops  - Probnp 1104  - f/u Cardio consult( Dr Vasquez) for  further Optimization   - Crescendo Decrescendo murmur on LSB  - euvolemic on exam  - c/w metoprolol tartrate 25 mg BID, Imdur   - c/w aspirin and clopidogrel    Leokocytosis   send  UA   Blood  cultures   Monitor of Abx  for now   Chest X-Ray small Left effusion     Hx  CVA x 2 with bilateral residual weakness, multiple TIAs  - CT head negative  - no new motor weakness in the body as per patient    Hx  HTN  - losartan 100 mg, Nifedipine ER 30 mg, Isosorbide dinitrate  - DASH diet    Hx DM  Hold oral meds;  check fs;  Start insulin  regimend if  serum Glucose >180, start insulin  protocol and adjust insulin  Lantus/Lispro,  Hold for Hypoglycemia Goal  Gaol 140-180     Hx HLD  - c/w fenofibrate, not on statins as per pharmacy, will start inpatient  - f.u lipid profile as previous profile showed LDL not at goal    Hx CKD III b  No need for urgent RRT  Monitor Cr/BUN Electrolytes including Phosphorus  Avoid Nephro toxins    Seen on 08/30/23

## 2023-08-30 NOTE — H&P ADULT - NSHPREVIEWOFSYSTEMS_GEN_ALL_CORE
CONSTITUTIONAL: No weakness, fevers or chills  EYES/ENT: No visual changes;  No vertigo or throat pain   NECK: No pain or stiffness  RESPIRATORY: No cough, wheezing, hemoptysis; No shortness of breath  CARDIOVASCULAR: No chest pain or palpitations  GASTROINTESTINAL: No abdominal or epigastric pain. No nausea, vomiting, or hematemesis; No diarrhea or constipation. No melena or hematochezia.  GENITOURINARY: No dysuria, frequency or hematuria  NEUROLOGICAL: No numbness or weakness  SKIN: No itching, rashes CONSTITUTIONAL: No weakness, fevers or chills  EYES/ENT: No visual changes;  No vertigo or throat pain   NECK: No pain or stiffness  RESPIRATORY: No cough, wheezing, hemoptysis; No shortness of breath  CARDIOVASCULAR: No chest pain or palpitations  GASTROINTESTINAL: No abdominal or epigastric pain. No nausea, vomiting, or hematemesis; No diarrhea or constipation. No melena or hematochezia.  GENITOURINARY: No dysuria, frequency or hematuria  NEUROLOGICAL: B/L weakness due to stroke   SKIN: No itching, rashes

## 2023-08-30 NOTE — ED PROVIDER NOTE - ATTENDING APP SHARED VISIT CONTRIBUTION OF CARE
Patient is c/o chest pain, intermittent episodes, associated with lightheadedness. EMS was called for recurrent symptoms and improved with NTG.   Vitals reviewed.   Lungs: B/L decreased air entry,  A/P: Chest pain,   labs, EKG, CXR,   reevaluation.

## 2023-08-30 NOTE — H&P ADULT - NSHPLABSRESULTS_GEN_ALL_CORE
12.3   15.04 )-----------( 226      ( 30 Aug 2023 21:00 )             38.0     08-30    139  |  103  |  30<H>  ----------------------------<  115<H>  4.4   |  23  |  1.8<H>    Ca    9.4      30 Aug 2023 21:00    TPro  6.3  /  Alb  4.1  /  TBili  0.3  /  DBili  x   /  AST  13  /  ALT  10  /  AlkPhos  36  08-30          Urinalysis Basic - ( 30 Aug 2023 21:00 )    Color: x / Appearance: x / SG: x / pH: x  Gluc: 115 mg/dL / Ketone: x  / Bili: x / Urobili: x   Blood: x / Protein: x / Nitrite: x   Leuk Esterase: x / RBC: x / WBC x   Sq Epi: x / Non Sq Epi: x / Bacteria: x      PT/INR - ( 30 Aug 2023 21:00 )   PT: 11.50 sec;   INR: 1.01 ratio         PTT - ( 30 Aug 2023 21:00 )  PTT:30.9 sec  Lactate Trend    CARDIAC MARKERS ( 30 Aug 2023 21:00 )  x     / <0.01 ng/mL / x     / x     / x          CAPILLARY BLOOD GLUCOSE

## 2023-08-30 NOTE — ED ADULT TRIAGE NOTE - CHIEF COMPLAINT QUOTE
Encounter Summary
  Created on: 2020
 
 SantosMoi
 External Reference #: [id number]
 : 42
 Sex: Female
 
 Demographics
 
 
+-----------------------+----------------------+
| Address               | [address]     |
|                       | [place]  [address] |
+-----------------------+----------------------+
| Home Phone            | [phone]      |
+-----------------------+----------------------+
| Preferred Language    | Unknown              |
+-----------------------+----------------------+
| Marital Status        |               |
+-----------------------+----------------------+
| Advent Affiliation | 1077                 |
+-----------------------+----------------------+
| Race                  | Unknown              |
+-----------------------+----------------------+
| Ethnic Group          | Unknown              |
+-----------------------+----------------------+
 
 
 Author
 
 
+--------------+--------------------------------------------+
| Author       | [facility]  |
|              | [facility]                                |
+--------------+--------------------------------------------+
| Organization | [facility]  |
|              | and [facility]                                |
+--------------+--------------------------------------------+
| Address      | Unknown                                    |
+--------------+--------------------------------------------+
| Phone        | Unavailable                                |
+--------------+--------------------------------------------+
 
 
 
 Support
 
 
+----------------+--------------+-----------------+-----------------+
| Name           | Relationship | Address         | Phone           |
+----------------+--------------+-----------------+-----------------+
| Johan Santos | [place]         | [address] | [phone] |
|                |              | [place]  [address] |                 |
+----------------+--------------+-----------------+-----------------+
| Ariel Santos   | [place]         | Unknown         | [phone] |
+----------------+--------------+-----------------+-----------------+
| Luis Santos   | [place]         | Unknown         | [phone] |
 
+----------------+--------------+-----------------+-----------------+
 
 
 
 Care Team Providers
 
 
+-----------------------+------+-------------+
| Care Team Member Name | Role | Phone       |
+-----------------------+------+-------------+
 PCP  | Unavailable |
+-----------------------+------+-------------+
 
 
 
 Encounter Details
 
 
+--------+-----------+----------------------+----------------------+---------------------+
| Date   | Type      | Department           | Care Team            | Description         |
+--------+-----------+----------------------+----------------------+---------------------+
| / | Hospital  |   [facility]     |   Manuel Calvin MD | Spinal Stenosis of  |
|  - | Encounter | CENTER SURGICAL  [address] |   [address]     | [place]       |
|        |           |  [address]          | [address]            |                     |
| / |           | [place], [place]         | [place], [place]        |                     |
|    |           | [address]           | [address]           |                     |
|        |           | [phone]         | [phone]         |                     |
|        |           |                      | [phone] (Fax)   |                     |
+--------+-----------+----------------------+----------------------+---------------------+
 
 
 
 Social History
 
 
+----------------+-------+-----------+--------+------+
| Tobacco Use    | Types | Packs/Day | Years  | Date |
|                |       |           | Used   |      |
+----------------+-------+-----------+--------+------+
| Never Assessed |       |           |        |      |
+----------------+-------+-----------+--------+------+
 
 
 
+------------------+---------------+
| Sex Assigned at  | Date Recorded |
| Birth            |               |
+------------------+---------------+
| Not on file      |               |
+------------------+---------------+
 documented as of this encounter
 
 Plan of Treatment
 
 
+--------+---------+------------+----------------------+-------------+
| Date   | Type    | Specialty  | Care Team            | Description |
+--------+---------+------------+----------------------+-------------+
| / | Office  | Nephrology |   Dante Escudero MD  |             |
| 2020   | Visit   |            |  [address]  |             |
 
|        |         |            | [address]  [place]   |             |
|        |         |            | [address]  [phone]  |             |
|        |         |            |  [phone] (Fax)  |             |
+--------+---------+------------+----------------------+-------------+
 documented as of this encounter
 
 Procedures
 
 
+----------------------+--------+-------------+----------------------+----------------------
+
| Procedure Name       | Priori | Date/Time   | Associated Diagnosis | Comments             
|
|                      | ty     |             |                      |                      
|
+----------------------+--------+-------------+----------------------+----------------------
+
| XR LUMBAR SPINE 2 OR | Routin | 2009  |                      |   Results for this   
|
|  3 VW                | e      |  9:47 AM    |                      | procedure are in the 
|
|                      |        | PST         |                      |  results section.    
|
+----------------------+--------+-------------+----------------------+----------------------
+
| FL C ARM > 1 HOUR    | Routin | 2009  |                      |   Results for this   
|
|                      | e      |  1:02 PM    |                      | procedure are in the 
|
|                      |        | PST         |                      |  results section.    
|
+----------------------+--------+-------------+----------------------+----------------------
+
| XR CHEST 2 VIEWS     | Routin | 11/10/2009  |                      |   Results for this   
|
|                      | e      | 11:11 AM    |                      | procedure are in the 
|
|                      |        | PST         |                      |  results section.    
|
+----------------------+--------+-------------+----------------------+----------------------
+
 documented in this encounter
 
 Results
 XR Lumbar Spine 2 or 3 Vw (2009  9:47 AM PST)
 
+----------+
| Specimen |
+----------+
|          |
+----------+
 
 
 
+------------------------------------------------------------------------+--------------+
| Narrative                                                              | Performed At |
+------------------------------------------------------------------------+--------------+
|      [id number]                                                           |              |
|            Page   1  RADIOLOGY                                         |              |
|                           [address]                                   |              |
 
|                                                 I/P  [facility]    |              |
| CENTER                  NAME:   [place] [address]   |              |
|                      MEDICAL RECORD #:   [id number]  [phone]   |              |
|                         ACCOUNT #:   [id number]  [phone]     |              |
|                     YOB: 1944     ORDER NUMBER:      |              |
|   [id number]  EXAM DATE/TIME:   2009 07:00 A  ORDERING PHYSICIAN:   |              |
|   MANUEL CALVIN  ORDER DETAIL:   7190 /   / [facility]  EXAM DESCRIPTION:  |              |
| XR LUMBAR SPINE LMT 2-3V                                               |              |
| ____________________________________________________________________   |              |
| 2-VIEW LUMBAR SPINE 2009     HISTORY  Lumbar spinal fusion.      |              |
| COMPARISON  Compared to 2009.     FINDINGS  The patient has      |              |
| undergone posterior spinal fusion with spinal rods and  pedicle screws |              |
|  at L3, L4, L5, and S1. Skin staples are seen in the lower  abdominal  |              |
| midline. A drain is present in soft tissues. There is no  substantial  |              |
| spondylolisthesis. Heights of lumbar vertebral bodies are  preserved.  |              |
|     IMPRESSION  Spinal fixation from L3 through S1 with posterior      |              |
| spinal rods and  pedicle screws.        Read by  MILAD DALEY MD     |              |
| 2009 11:05 A  Electronically Signed by  MILAD DALEY MD        |              |
| 2009 10:52 P     DD:    2009 11:05 A  DT:    2009    |              |
| 06:58 P  [facility]/[id number]/  cc:    MD MILAD ABDI  |              |
| MD CAROLINA SALAZAR MD                              |              |
+------------------------------------------------------------------------+--------------+
 
 
 
+--------------------------------------------------------------------------+
| Procedure Note                                                           |
+--------------------------------------------------------------------------+
|   Ady Quiñones - 2019 11:11 PM PDT                          |
| [id number]                                              Page  1             |
| RADIOLOGY                                            [address]          |
|                                                      I/P                 |
| [facility]            NAME:  [place]                 |
| [place] [address]               MEDICAL RECORD #:  [id number]            |
| [phone]                 ACCOUNT #:  [id number]                  |
| [phone]                YOB: 1944              |
|                                                                          |
| ORDER NUMBER:  [id number]                                                   |
| EXAM DATE/TIME:  2009 07:00 A                                      |
| ORDERING PHYSICIAN:  MANUEL CALVIN                                    |
| ORDER DETAIL:  7190 /  / [facility]I                                             |
| EXAM DESCRIPTION: XR LUMBAR SPINE LMT 2-3V                               |
| ____________________________________________________________________     |
| 2-VIEW LUMBAR SPINE 2009                                           |
|                                                                          |
| HISTORY                                                                  |
| Lumbar spinal fusion.                                                    |
|                                                                          |
| COMPARISON                                                               |
| Compared to 2009.                                                  |
|                                                                          |
| FINDINGS                                                                 |
| The patient has undergone posterior spinal fusion with spinal rods and   |
| pedicle screws at L3, L4, L5, and S1. Skin staples are seen in the lower |
| abdominal midline. A drain is present in soft tissues. There is no       |
| substantial spondylolisthesis. Heights of lumbar vertebral bodies are    |
| preserved.                                                               |
|                                                                          |
| IMPRESSION                                                               |
| Spinal fixation from L3 through S1 with posterior spinal rods and        |
 
| pedicle screws.                                                          |
|                                                                          |
|                                                                          |
| Read by                                                                  |
| MILAD DALEY MD 2009 11:05 A                                     |
| Electronically Signed by                                                 |
| MILAD DALEY MD 2009 10:52 P                                     |
|                                                                          |
| DD:   2009 11:05 A                                                 |
| DT:   2009 06:58 P                                                 |
| [facility]/jeanette/[id number]/                                                         |
| cc:   MANUEL CALVIN MD                                                |
|       MILAD DALEY MD                                                  |
|       CAROLINA WOOTEN MD                                                |
+--------------------------------------------------------------------------+
 FL C-Arm > 1 Hour (2009  1:02 PM PST)
 
+----------+
| Specimen |
+----------+
|          |
+----------+
 
 
 
+------------------------------------------------------------------------+--------------+
| Narrative                                                              | Performed At |
+------------------------------------------------------------------------+--------------+
|      [id number]                                                           |              |
|            Page   1  RADIOLOGY                                         |              |
|                           [address]                                   |              |
|                                                 I/P  [facility]    |              |
| CENTER                  NAME:   [place] [address]   |              |
|                      MEDICAL RECORD #:   [id number]  [phone]   |              |
|                         ACCOUNT #:   [id number]  [phone]     |              |
|                     YOB: 1944     ORDER NUMBER:      |              |
|   [id number]  EXAM DATE/TIME:   2009 07:30 A  ORDERING PHYSICIAN:   |              |
|   MANUEL CALVIN  ORDER DETAIL:   8750 /   / I  EXAM DESCRIPTION:  |              |
| XR C-ARM FLUORO OVER 1HR                                               |              |
| ____________________________________________________________________   |              |
| C-ARM FLUOROSCOPY LUMBAR SPINE 2009     HISTORY  Lumbar fusion.  |              |
|     TECHNIQUE  Three spot radiographs of the lower lumbar spine were   |              |
| obtained in the  operating room using C-arm technique.     FINDINGS    |              |
| The films demonstrate the placement of multiple pedicle screws at L3,  |              |
|  L4, L5 and S1. Disk spacing devices have been placed at L3-4 and      |              |
| L4-5.  There is degenerative disk disease noted at L5-S1.     Read by  |              |
|  PERLA THOMAS MD 2009 02:58 P  Electronically Signed by  |              |
|  PERLA THOMAS MD 2009 08:51 P     DD:    2009      |              |
| 02:58 P  DT:    2009 07:55 P  [facility]//[id number]/  cc:    MANUEL DICK  |              |
| MD PERLA CALVIN MD MARIA ORDINARIO, |              |
|  MD                                                                    |              |
+------------------------------------------------------------------------+--------------+
 
 
 
+------------------------------------------------------------------------+
| Procedure Note                                                         |
+------------------------------------------------------------------------+
|   Ady Quiñones - 2019 11:11 PM PDT                        |
| [id number]                                              Page  1           |
 
| RADIOLOGY                                            [address]        |
|                                                      I/P               |
| [facility]            NAME:  MOI [place]               |
| [place] [address]               MEDICAL RECORD #:  [id number]          |
| [phone]                 ACCOUNT #:  [id number]                |
| [phone]                YOB: 1944            |
|                                                                        |
| ORDER NUMBER:  [id number]                                                 |
| EXAM DATE/TIME:  2009 07:30 A                                    |
| ORDERING PHYSICIAN:  MANUEL CALVIN                                  |
| ORDER DETAIL:  8750 /  / HDI                                           |
| EXAM DESCRIPTION: XR C-ARM FLUORO OVER 1HR                             |
| ____________________________________________________________________   |
| C-ARM FLUOROSCOPY LUMBAR SPINE 2009                              |
|                                                                        |
| HISTORY                                                                |
| Lumbar fusion.                                                         |
|                                                                        |
| TECHNIQUE                                                              |
| Three spot radiographs of the lower lumbar spine were obtained in the  |
| operating room using C-arm technique.                                  |
|                                                                        |
| FINDINGS                                                               |
| The films demonstrate the placement of multiple pedicle screws at L3,  |
| L4, L5 and S1. Disk spacing devices have been placed at L3-4 and L4-5. |
| There is degenerative disk disease noted at L5-S1.                     |
|                                                                        |
| Read by                                                                |
| PERLA THOMAS MD 2009 02:58 P                             |
| Electronically Signed by                                               |
| PERLA THOMAS MD 2009 08:51 P                             |
|                                                                        |
| DD:   2009 02:58 P                                               |
| DT:   2009 07:55 P                                               |
| [facility]//[id number]/                                                        |
| cc:   MANUEL CALVIN MD                                              |
|       PERLA THOMAS MD                                          |
|       CAROLINA WOOTEN MD                                              |
+------------------------------------------------------------------------+
 XR Chest 2 Vws (11/10/2009 11:11 AM PST)
 
+----------+
| Specimen |
+----------+
|          |
+----------+
 
 
 
+------------------------------------------------------------------------+--------------+
| Narrative                                                              | Performed At |
+------------------------------------------------------------------------+--------------+
|      [id number]                                                           |              |
|            Page   1  RADIOLOGY                                         |              |
|                           /                                            |              |
|                                        [facility]      |              |
|               [place] [address]             |              |
|           MEDICAL RECORD #:   [id number]  [phone]              |              |
|             ACCOUNT #:   [id number]  [phone]                 |              |
|         YOB: 1944     ORDER NUMBER:   [id number]  EXAM  |              |
 
| DATE/TIME:   11/10/2009 10:06 A  ORDERING PHYSICIAN:   MANUEL CALVIN  |              |
| E  ORDER DETAIL:   7000 /   / HDI  EXAM DESCRIPTION: XR CHEST 2 VIEW   |              |
| ____________________________________________________________________   |              |
| 2-VIEW CHEST 11/10/2009     HISTORY  Preop.     TECHNIQUE  Anatomic    |              |
| features evaluated include osseous structures, lungs, heart,           |              |
| mediastinum and contents, and anatomic features beneath the diaphragm  |              |
| to  the extent depicted. Abnormal findings or anatomic variants will   |              |
| be  discussed below.     FINDINGS  None.     IMPRESSION  Unremarkable  |              |
| 2-view chest x-ray.        Read by  HUANG SAVAGE MD 11/10/2009      |              |
| 04:38 P  Electronically Signed by  HUANG SAVAGE MD 2009 08:27 |              |
|  A     DD:    11/10/2009 04:38 P  DT:    2009 07:07 P            |              |
| [facility]/[id number]/  cc:    MD MANUEL SHAFER, |              |
|  MD CAROLINA WOOTEN MD                                      |              |
+------------------------------------------------------------------------+--------------+
 
 
 
+--------------------------------------------------------------------------+
| Procedure Note                                                           |
+--------------------------------------------------------------------------+
|   Ady Quiñones - 2019 11:11 PM PDT                          |
| [id number]                                              Page  1             |
| RADIOLOGY                                            /                   |
|                                                      [facility]                 |
| [facility]            NAME:  MOI [place]                 |
| [place] [address]               MEDICAL RECORD #:  [id number]            |
| [phone]                 ACCOUNT #:  [id number]                  |
| [phone]                YOB: 1944              |
|                                                                          |
| ORDER NUMBER:  [id number]                                                   |
| EXAM DATE/TIME:  11/10/2009 10:06 A                                      |
| ORDERING PHYSICIAN:  MANUEL CALVIN                                    |
| ORDER DETAIL:  7000 /  / HDI                                             |
| EXAM DESCRIPTION: XR CHEST 2 VIEW                                        |
| ____________________________________________________________________     |
| 2-VIEW CHEST 11/10/2009                                                  |
|                                                                          |
| HISTORY                                                                  |
| Preop.                                                                   |
|                                                                          |
| TECHNIQUE                                                                |
| Anatomic features evaluated include osseous structures, lungs, heart,    |
| mediastinum and contents, and anatomic features beneath the diaphragm to |
| the extent depicted. Abnormal findings or anatomic variants will be      |
| discussed below.                                                         |
|                                                                          |
| FINDINGS                                                                 |
| None.                                                                    |
|                                                                          |
| IMPRESSION                                                               |
| Unremarkable 2-view chest x-ray.                                         |
|                                                                          |
|                                                                          |
| Read by                                                                  |
| HUANG SAVAGE MD 11/10/2009 04:38 P                                    |
| Electronically Signed by                                                 |
| HUANG SAVAGE MD 2009 08:27 A                                    |
|                                                                          |
| DD:   11/10/2009 04:38 P                                                 |
| DT:   2009 07:07 P                                                 |
 
| [facility]/[id number]/                                                         |
| cc:   HUANG SAVAGE MD                                                 |
|       MANUEL CALVIN MD                                                |
|       CAROLINA WOOTEN MD                                                |
+--------------------------------------------------------------------------+
 documented in this encounter
 
 Visit Diagnoses
 
 
+-------------------------------------------------------------------+
| Diagnosis                                                         |
+-------------------------------------------------------------------+
|   Spinal stenosis, lumbar region, without neurogenic claudication |
+-------------------------------------------------------------------+
 documented in this encounter BIBEMS from home for chest pain and dizziness, per pt he had chest pain 2 days ago but it went away on his own.  orginally b/p 199/98     in route 324 Asprin and 4 nitro sprays, pain was 9/10 now 7/10 and pt less dizzy   fs 150 in route

## 2023-08-30 NOTE — H&P ADULT - NSHPPHYSICALEXAM_GEN_ALL_CORE
GENERAL: NAD, lying in bed comfortably  HEAD:  Atraumatic, Normocephalic  EYES: conjunctiva and sclera clear  ENT: Moist mucous membranes  NECK: Supple, No JVD  CHEST/LUNG: Clear to auscultation bilaterally; No rales, rhonchi, wheezing, or rubs. Unlabored respirations  HEART: Regular rate and rhythm; No murmurs, rubs, or gallops  ABDOMEN: Soft, nontender, nondistended  EXTREMITIES:  No clubbing, cyanosis, or edema  NERVOUS SYSTEM:  A&Ox3 GENERAL: NAD, lying in bed comfortably  HEAD:  Atraumatic, Normocephalic  EYES: conjunctiva and sclera clear  ENT: Moist mucous membranes  NECK: Supple, No JVD  CHEST/LUNG: Clear to auscultation bilaterally; No rales, rhonchi, wheezing, or rubs. Unlabored respirations  HEART: LSB crescendo descrendo murmur  ABDOMEN: Soft, nontender, nondistended  EXTREMITIES:  No clubbing, cyanosis, or edema  NERVOUS SYSTEM:  A&Ox3, weakness in B/L extremities

## 2023-08-30 NOTE — ED PROVIDER NOTE - CLINICAL SUMMARY MEDICAL DECISION MAKING FREE TEXT BOX
Laboratory results reviewed and discussed with patient. CBC shows WBC count of 15, BUN shows elevated Bun/CR. +ProBNP is elevated.   CXR reviewed by me and shows: No PTx, +effusion, no free air.   Patient remained hemodynamically stable during the course of ED stay. Discussed with patient about the results of the diagnostic studies. Discussed with admitting physician and MAR, patient is admitted to Medicine for further evaluation and care.

## 2023-08-30 NOTE — ED PROVIDER NOTE - PHYSICAL EXAMINATION
GENERAL: Well-nourished, Well-developed. NAD.  HEAD: No visible or palpable bumps or hematomas. No ecchymosis behind ears B/L.  Eyes: PERRLA, EOMI.   ENMT: MMM.   Neck: Supple. FROM  CVS: Normal S1,S2. No murmurs appreciated on auscultation   RESP: No use of accessory muscles. Chest rise symmetrical with good expansion. Lungs clear to auscultation B/L. No wheezing, rales, or rhonchi auscultated.  Skin: Warm, Dry. No rashes or lesions. Good cap refill < 2 sec B/L.  EXT: Radial and pedal pulses present B/L. No calf tenderness or swelling B/L. No palpable cords. No pedal edema B/L.  Neuro: AA&O x 3. Sensation grossly intact. Strength 5/5 B/L.

## 2023-08-30 NOTE — ED PROVIDER NOTE - NS ED ATTENDING STATEMENT MOD
· Home regimen:  Albuterol and Spiriva   · No acute signs of exacerbation on admission This was a shared visit with the HENRIETTA. I reviewed and verified the documentation and independently performed the documented:

## 2023-08-30 NOTE — H&P ADULT - HISTORY OF PRESENT ILLNESS
Vital Signs Last 24 Hrs  T(C): 36.9 (30 Aug 2023 19:37), Max: 36.9 (30 Aug 2023 19:37)  T(F): 98.5 (30 Aug 2023 19:37), Max: 98.5 (30 Aug 2023 19:37)  HR: 86 (30 Aug 2023 19:37) (86 - 86)  BP: 179/90 (30 Aug 2023 19:37) (179/90 - 179/90)  RR: 18 (30 Aug 2023 19:37) (18 - 18)  SpO2: 98% (30 Aug 2023 19:37) (98% - 98%)  O2 Parameters below as of 30 Aug 2023 19:37  Patient On (Oxygen Delivery Method): room air         77-year-old male past medical history hypertension, hyperlipidemia, diabetes, CKD,TIAs, stroke in 2020, CAD status post CABG cardiologist Dr. Molina presenting to ED for evaluation of intermittent left-sided chest pressure since yesterday, 9/10 in intensity, non exertional, radiating to the arm, associated with SOB, waned on its own, not wosening.  Patient also endorsing when he felt room spinning dizziness earlier today that has since resolved and CT scan of the head was ordered which was negative. Patient was given aspirin and nitro by EMS with relief. He has hx of PAD and endorses that his legs are hurting at times.  Denies fever, chills, cough, syncope, lower extremity swelling, calf pain, headaches, no new weakness in the body.    Vital Signs Last 24 Hrs  T(C): 36.9 (30 Aug 2023 19:37), Max: 36.9 (30 Aug 2023 19:37)  T(F): 98.5 (30 Aug 2023 19:37), Max: 98.5 (30 Aug 2023 19:37)  HR: 86 (30 Aug 2023 19:37) (86 - 86)  BP: 179/90 (30 Aug 2023 19:37) (179/90 - 179/90)  RR: 18 (30 Aug 2023 19:37) (18 - 18)  SpO2: 98% (30 Aug 2023 19:37) (98% - 98%)  O2 Parameters below as of 30 Aug 2023 19:37  Patient On (Oxygen Delivery Method): room air

## 2023-08-30 NOTE — ED PROVIDER NOTE - OBJECTIVE STATEMENT
77-year-old male past medical history hypertension, hyperlipidemia, diabetes, CKD, CAD status post CABG cardiologist Dr. Devin peck presenting to ED for evaluation of intermittent left-sided chest pressure since yesterday.  Patient also endorsing when he felt room spinning dizziness earlier today that has since resolved.  Patient reporting constant, nonexertional chest heaviness.  Patient was given aspirin and nitro by EMS with relief.  Denies fever, chills, cough, syncope, lower extremity swelling, calf pain. 77-year-old male past medical history hypertension, hyperlipidemia, diabetes, CKD, CAD status post CABG cardiologist Dr. Molina presenting to ED for evaluation of intermittent left-sided chest pressure since yesterday.  Patient also endorsing when he felt room spinning dizziness earlier today that has since resolved.  Patient reporting constant, nonexertional chest heaviness.  Patient was given aspirin and nitro by EMS with relief.  Denies fever, chills, cough, syncope, lower extremity swelling, calf pain.

## 2023-08-31 LAB
A1C WITH ESTIMATED AVERAGE GLUCOSE RESULT: 6.5 % — HIGH (ref 4–5.6)
ANION GAP SERPL CALC-SCNC: 13 MMOL/L — SIGNIFICANT CHANGE UP (ref 7–14)
BUN SERPL-MCNC: 26 MG/DL — HIGH (ref 10–20)
CALCIUM SERPL-MCNC: 9.6 MG/DL — SIGNIFICANT CHANGE UP (ref 8.4–10.5)
CHLORIDE SERPL-SCNC: 103 MMOL/L — SIGNIFICANT CHANGE UP (ref 98–110)
CHOLEST SERPL-MCNC: 159 MG/DL — SIGNIFICANT CHANGE UP
CK MB CFR SERPL CALC: 1.6 NG/ML — SIGNIFICANT CHANGE UP (ref 0.6–6.3)
CO2 SERPL-SCNC: 24 MMOL/L — SIGNIFICANT CHANGE UP (ref 17–32)
CREAT SERPL-MCNC: 1.7 MG/DL — HIGH (ref 0.7–1.5)
EGFR: 41 ML/MIN/1.73M2 — LOW
ESTIMATED AVERAGE GLUCOSE: 140 MG/DL — HIGH (ref 68–114)
GLUCOSE SERPL-MCNC: 128 MG/DL — HIGH (ref 70–99)
HCT VFR BLD CALC: 40.3 % — LOW (ref 42–52)
HDLC SERPL-MCNC: 66 MG/DL — SIGNIFICANT CHANGE UP
HGB BLD-MCNC: 12.7 G/DL — LOW (ref 14–18)
LIPID PNL WITH DIRECT LDL SERPL: 71 MG/DL — SIGNIFICANT CHANGE UP
MAGNESIUM SERPL-MCNC: 2.2 MG/DL — SIGNIFICANT CHANGE UP (ref 1.8–2.4)
MCHC RBC-ENTMCNC: 28.3 PG — SIGNIFICANT CHANGE UP (ref 27–31)
MCHC RBC-ENTMCNC: 31.5 G/DL — LOW (ref 32–37)
MCV RBC AUTO: 90 FL — SIGNIFICANT CHANGE UP (ref 80–94)
NON HDL CHOLESTEROL: 93 MG/DL — SIGNIFICANT CHANGE UP
NRBC # BLD: 0 /100 WBCS — SIGNIFICANT CHANGE UP (ref 0–0)
PLATELET # BLD AUTO: 224 K/UL — SIGNIFICANT CHANGE UP (ref 130–400)
PMV BLD: 10.5 FL — HIGH (ref 7.4–10.4)
POTASSIUM SERPL-MCNC: 5.1 MMOL/L — HIGH (ref 3.5–5)
POTASSIUM SERPL-SCNC: 5.1 MMOL/L — HIGH (ref 3.5–5)
RBC # BLD: 4.48 M/UL — LOW (ref 4.7–6.1)
RBC # FLD: 14.6 % — HIGH (ref 11.5–14.5)
SODIUM SERPL-SCNC: 140 MMOL/L — SIGNIFICANT CHANGE UP (ref 135–146)
TRIGL SERPL-MCNC: 111 MG/DL — SIGNIFICANT CHANGE UP
TROPONIN T SERPL-MCNC: <0.01 NG/ML — SIGNIFICANT CHANGE UP
WBC # BLD: 13.38 K/UL — HIGH (ref 4.8–10.8)
WBC # FLD AUTO: 13.38 K/UL — HIGH (ref 4.8–10.8)

## 2023-08-31 PROCEDURE — 71250 CT THORAX DX C-: CPT | Mod: 26

## 2023-08-31 PROCEDURE — 99232 SBSQ HOSP IP/OBS MODERATE 35: CPT

## 2023-08-31 PROCEDURE — 93010 ELECTROCARDIOGRAM REPORT: CPT

## 2023-08-31 RX ORDER — ASPIRIN/CALCIUM CARB/MAGNESIUM 324 MG
81 TABLET ORAL DAILY
Refills: 0 | Status: DISCONTINUED | OUTPATIENT
Start: 2023-08-31 | End: 2023-09-06

## 2023-08-31 RX ORDER — LABETALOL HCL 100 MG
20 TABLET ORAL ONCE
Refills: 0 | Status: COMPLETED | OUTPATIENT
Start: 2023-08-31 | End: 2023-08-31

## 2023-08-31 RX ORDER — ISOSORBIDE MONONITRATE 60 MG/1
1 TABLET, EXTENDED RELEASE ORAL
Refills: 0 | DISCHARGE

## 2023-08-31 RX ORDER — LOSARTAN POTASSIUM 100 MG/1
100 TABLET, FILM COATED ORAL DAILY
Refills: 0 | Status: DISCONTINUED | OUTPATIENT
Start: 2023-08-31 | End: 2023-09-06

## 2023-08-31 RX ORDER — LINAGLIPTIN 5 MG/1
1 TABLET, FILM COATED ORAL
Qty: 0 | Refills: 0 | DISCHARGE

## 2023-08-31 RX ORDER — CLOPIDOGREL BISULFATE 75 MG/1
1 TABLET, FILM COATED ORAL
Refills: 0 | DISCHARGE

## 2023-08-31 RX ORDER — METOPROLOL TARTRATE 50 MG
25 TABLET ORAL
Refills: 0 | Status: DISCONTINUED | OUTPATIENT
Start: 2023-08-31 | End: 2023-09-06

## 2023-08-31 RX ORDER — ISOSORBIDE MONONITRATE 60 MG/1
60 TABLET, EXTENDED RELEASE ORAL DAILY
Refills: 0 | Status: DISCONTINUED | OUTPATIENT
Start: 2023-08-31 | End: 2023-09-06

## 2023-08-31 RX ORDER — FENOFIBRATE,MICRONIZED 130 MG
145 CAPSULE ORAL DAILY
Refills: 0 | Status: DISCONTINUED | OUTPATIENT
Start: 2023-08-31 | End: 2023-09-06

## 2023-08-31 RX ORDER — NIFEDIPINE 30 MG
30 TABLET, EXTENDED RELEASE 24 HR ORAL DAILY
Refills: 0 | Status: DISCONTINUED | OUTPATIENT
Start: 2023-08-31 | End: 2023-09-06

## 2023-08-31 RX ORDER — TAMSULOSIN HYDROCHLORIDE 0.4 MG/1
0.4 CAPSULE ORAL AT BEDTIME
Refills: 0 | Status: DISCONTINUED | OUTPATIENT
Start: 2023-08-31 | End: 2023-09-06

## 2023-08-31 RX ORDER — CLOPIDOGREL BISULFATE 75 MG/1
75 TABLET, FILM COATED ORAL DAILY
Refills: 0 | Status: DISCONTINUED | OUTPATIENT
Start: 2023-08-31 | End: 2023-09-06

## 2023-08-31 RX ORDER — NIFEDIPINE 30 MG
60 TABLET, EXTENDED RELEASE 24 HR ORAL ONCE
Refills: 0 | Status: COMPLETED | OUTPATIENT
Start: 2023-08-31 | End: 2023-08-31

## 2023-08-31 RX ORDER — PANTOPRAZOLE SODIUM 20 MG/1
40 TABLET, DELAYED RELEASE ORAL
Refills: 0 | Status: DISCONTINUED | OUTPATIENT
Start: 2023-08-31 | End: 2023-09-06

## 2023-08-31 RX ORDER — HEPARIN SODIUM 5000 [USP'U]/ML
5000 INJECTION INTRAVENOUS; SUBCUTANEOUS EVERY 12 HOURS
Refills: 0 | Status: DISCONTINUED | OUTPATIENT
Start: 2023-08-31 | End: 2023-09-06

## 2023-08-31 RX ORDER — FENOFIBRATE,MICRONIZED 130 MG
1 CAPSULE ORAL
Refills: 0 | DISCHARGE

## 2023-08-31 RX ORDER — METOPROLOL TARTRATE 50 MG
1 TABLET ORAL
Refills: 0 | DISCHARGE

## 2023-08-31 RX ORDER — LOSARTAN POTASSIUM 100 MG/1
1 TABLET, FILM COATED ORAL
Refills: 0 | DISCHARGE

## 2023-08-31 RX ADMIN — Medication 650 MILLIGRAM(S): at 20:03

## 2023-08-31 RX ADMIN — Medication 145 MILLIGRAM(S): at 12:35

## 2023-08-31 RX ADMIN — LOSARTAN POTASSIUM 100 MILLIGRAM(S): 100 TABLET, FILM COATED ORAL at 05:40

## 2023-08-31 RX ADMIN — Medication 25 MILLIGRAM(S): at 05:40

## 2023-08-31 RX ADMIN — Medication 10 MILLIGRAM(S): at 00:04

## 2023-08-31 RX ADMIN — ISOSORBIDE MONONITRATE 60 MILLIGRAM(S): 60 TABLET, EXTENDED RELEASE ORAL at 12:34

## 2023-08-31 RX ADMIN — PANTOPRAZOLE SODIUM 40 MILLIGRAM(S): 20 TABLET, DELAYED RELEASE ORAL at 07:07

## 2023-08-31 RX ADMIN — Medication 60 MILLIGRAM(S): at 03:43

## 2023-08-31 RX ADMIN — Medication 30 MILLIGRAM(S): at 05:41

## 2023-08-31 RX ADMIN — HEPARIN SODIUM 5000 UNIT(S): 5000 INJECTION INTRAVENOUS; SUBCUTANEOUS at 05:41

## 2023-08-31 RX ADMIN — Medication 25 MILLIGRAM(S): at 17:15

## 2023-08-31 RX ADMIN — TAMSULOSIN HYDROCHLORIDE 0.4 MILLIGRAM(S): 0.4 CAPSULE ORAL at 21:13

## 2023-08-31 RX ADMIN — Medication 30 MILLIGRAM(S): at 00:05

## 2023-08-31 RX ADMIN — HEPARIN SODIUM 5000 UNIT(S): 5000 INJECTION INTRAVENOUS; SUBCUTANEOUS at 17:15

## 2023-08-31 RX ADMIN — Medication 81 MILLIGRAM(S): at 12:35

## 2023-08-31 RX ADMIN — CLOPIDOGREL BISULFATE 75 MILLIGRAM(S): 75 TABLET, FILM COATED ORAL at 12:35

## 2023-08-31 RX ADMIN — Medication 20 MILLIGRAM(S): at 02:05

## 2023-08-31 NOTE — PHYSICAL THERAPY INITIAL EVALUATION ADULT - GENERAL OBSERVATIONS, REHAB EVAL
pt. encountered in bed in NAD + tele, +IV lock, son present at bedside. transport arrived during IE to take pt to CT scan [phone]

## 2023-08-31 NOTE — PATIENT PROFILE ADULT - FUNCTIONAL ASSESSMENT - BASIC MOBILITY 6.
3-calculated by average/Not able to assess (calculate score using [facility] averaging method)

## 2023-08-31 NOTE — PHYSICAL THERAPY INITIAL EVALUATION ADULT - GAIT TRAINING, PT EVAL
Goal: pt will ambulate w/ rolling walker 50 feet with supervision by discharge to facilitate return to PLOF.

## 2023-08-31 NOTE — PHYSICAL THERAPY INITIAL EVALUATION ADULT - ADDITIONAL COMMENTS
pt lives with spouse in a house with 6 steps to enter and 10 inside. pt and wife split HHA 8 hrs x 5 days. Pt ambulated with a rolling walker and assistance prior to admission. pt lives with spouse in a house with 6 steps to enter and 10 inside with a chair lift. pt and wife split HHA 8 hrs x 5 days. Pt ambulated with a rolling walker and assistance prior to admission.

## 2023-08-31 NOTE — CONSULT NOTE ADULT - SUBJECTIVE AND OBJECTIVE BOX
Date of Admission: 8/30/23    CHIEF COMPLAINT: chest pain    HISTORY OF PRESENT ILLNESS: 77yMale with PMH below presented to the hospital for above CC. Has multiple medical complaints including Room spinning sensation which has since resolved. Chest pain has also resolved.   Patient has a known history of multiple atherosclerotic beds including PAD, CVA, and CAD s/p CABG. Upon arrival to the ER, BP was elevated into the 200 systolic range.     PAST MEDICAL & SURGICAL HISTORY:  CAD (coronary artery disease)      HTN (hypertension)      Depression      Anxiety      Diabetes  niddm      Angina pectoris      BPH (benign prostatic hyperplasia)      GERD (gastroesophageal reflux disease)      CVA (cerebral vascular accident)      History of appendectomy      Bilateral cataracts      History of heart bypass surgery          FAMILY HISTORY:  [ ] no pertinent family history of premature cardiovascular disease in first degree relatives.  Mother:   Father:   Siblings:     SOCIAL HISTORY:    [x ] Non-smoker  [ ] Smoker  [ ] Alcohol    Allergies    penicillin (Rash)  PC Pen VK (Rash)  clindamycin (Rash)    Intolerances    	    REVIEW OF SYSTEMS:  CONSTITUTIONAL: No fever, weight loss, or fatigue  CARDIOLOGY: see HPI  RESPIRATORY: see HPI  NEUROLOGICAL: NO weakness, no focal deficits to report.  ENDOCRINOLOGICAL: no recent change in diabetic medications.   GI: no BRBPR, no N,V,diarrhea.    PSYCHIATRY: normal mood and affect  HEENT: no nasal discharge, no ecchymosis  SKIN: no ecchymosis, no breakdown  MUSCULOSKELETAL: Full range of motion x4.     PHYSICAL EXAM:  T(C): 37.4 (08-31-23 @ 15:18), Max: 37.4 (08-31-23 @ 15:18)  HR: 90 (08-31-23 @ 15:18) (77 - 90)  BP: 114/59 (08-31-23 @ 15:18) (114/59 - 218/84)  RR: 18 (08-31-23 @ 15:18) (18 - 18)  SpO2: 95% (08-31-23 @ 15:18) (95% - 99%)  Wt(kg): --  I&O's Summary      General Appearance: elderly gentleman	  Neck: normal JVP, no bruit.   Eyes: No xanthomalasia, Extra Ocular muscles intact.   Cardiovascular: regular rate and rhythm S1 S2, No JVD, III/VI Systolic ejection murmur, No edema  Respiratory: Lungs clear to auscultation	  Psychiatry: Alert and oriented x 3, Mood & affect appropriate  Gastrointestinal:  Soft, Non-tender  Skin/Integumen: No rashes, No ecchymoses, No cyanosis	  Neurologic: Non-focal  Musculoskeletal/ extremities: Normal range of motion, No clubbing, cyanosis or edema  Vascular: Peripheral pulses palpable 2+ bilaterally    LABS:	 	                          12.7   13.38 )-----------( 224      ( 31 Aug 2023 06:21 )             40.3     08-31    140  |  103  |  26<H>  ----------------------------<  128<H>  5.1<H>   |  24  |  1.7<H>    Ca    9.6      31 Aug 2023 06:21  Mg     2.2     08-31    TPro  6.3  /  Alb  4.1  /  TBili  0.3  /  DBili  x   /  AST  13  /  ALT  10  /  AlkPhos  36  08-30    CARDIAC MARKERS ( 31 Aug 2023 12:14 )  x     / <0.01 ng/mL / x     / x     / 1.6 ng/mL  CARDIAC MARKERS ( 30 Aug 2023 21:00 )  x     / <0.01 ng/mL / x     / x     / x          PT/INR - ( 30 Aug 2023 21:00 )   PT: 11.50 sec;   INR: 1.01 ratio         PTT - ( 30 Aug 2023 21:00 )  PTT:30.9 sec    CARDIAC MARKERS:            TELEMETRY EVENTS: 	    ECG: < from: 12 Lead ECG (08.31.23 @ 13:38) >  Diagnosis Line Normal sinus rhythm  Nonspecific T wave abnormality  Possible Left atrial enlargement  Borderline ECG    < end of copied text >    	  RADIOLOGY:  OTHER: 	    PREVIOUS DIAGNOSTIC TESTING:    [x ] Echocardiogram: < from: TTE Echo Complete w/ Contrast w/ Doppler (07.08.23 @ 09:50) >   1. Normal global left ventricular systolic function.   2. Basal and mid inferior wall and basal and mid inferolateral wall are   abnormal as described above.   3. LV Ejection Fraction by Johnson's Method with a biplane EF of 54 %.   4. The left ventricular diastolic function could not be assessed in this   study.   5. Normal left atrial size.   6. Normal right atrial size.   7. Mild mitral annular calcification.   8. Mild mitral valve regurgitation.   9. Mild thickening of the anterior and posterior mitral valve leaflets.  10. Aortic valve thickening with decreasedleaflet opening.  11. Moderate to severe aortic stenosis, peak/mean PG 18/10 mmHg, JAROD 1.0   cm^2.    < end of copied text >    [ ]  Catheterization:  [ ] Stress Test:  	  	    Home Medications:  fenofibrate 145 mg oral tablet: 1 orally once a day (31 Aug 2023 00:42)  Imdur 60 mg oral tablet, extended release: 1 orally once a day (31 Aug 2023 00:41)  linagliptin 5 mg oral tablet: 1 tab(s) orally once a day (31 Aug 2023 00:42)  losartan 100 mg oral tablet: 1 orally once a day (31 Aug 2023 00:40)  metoprolol tartrate 25 mg oral tablet: 1 tab(s) orally 2 times a day (31 Aug 2023 00:42)  Plavix 75 mg oral tablet: 1 orally once a day (31 Aug 2023 00:42)    MEDICATIONS  (STANDING):  aspirin  chewable 81 milliGRAM(s) Oral daily  clopidogrel Tablet 75 milliGRAM(s) Oral daily  fenofibrate Tablet 145 milliGRAM(s) Oral daily  heparin   Injectable 5000 Unit(s) SubCutaneous every 12 hours  isosorbide   mononitrate ER Tablet (IMDUR) 60 milliGRAM(s) Oral daily  losartan 100 milliGRAM(s) Oral daily  metoprolol tartrate 25 milliGRAM(s) Oral two times a day  NIFEdipine XL 30 milliGRAM(s) Oral daily  pantoprazole    Tablet 40 milliGRAM(s) Oral before breakfast  tamsulosin 0.4 milliGRAM(s) Oral at bedtime    MEDICATIONS  (PRN):  acetaminophen     Tablet .. 650 milliGRAM(s) Oral every 6 hours PRN Temp greater or equal to 38C (100.4F), Mild Pain (1 - 3)  aluminum hydroxide/magnesium hydroxide/simethicone Suspension 30 milliLiter(s) Oral every 4 hours PRN Dyspepsia  melatonin 3 milliGRAM(s) Oral at bedtime PRN Insomnia

## 2023-08-31 NOTE — PATIENT PROFILE ADULT - LEGAL HELP
UP AD LID IN ROOM WITH CANE. TOLERATES PILLS IN WATER ONE AT A TIME. LOOKING
FORWARD TO GOING BACK TO ASSISTED LIVING TODAY no

## 2023-08-31 NOTE — PHYSICAL THERAPY INITIAL EVALUATION ADULT - PERTINENT HX OF CURRENT PROBLEM, REHAB EVAL
77 year old male presenting to ED for evaluation of intermittent left-sided chest pressure since yesterday, 9/10 in intensity, non exertional, radiating to the arm, associated with SOB, waned on its own, not  worsening. Patient also endorsing that he felt room spinning dizziness earlier  on the day of presentation that has since resolved.

## 2023-08-31 NOTE — PROGRESS NOTE ADULT - SUBJECTIVE AND OBJECTIVE BOX
MICHELLEERICK  77y, Male  Allergy: penicillin (Rash)  PC Pen VK (Rash)  clindamycin (Rash)    Hospital Day: 1d    Patient seen and examined. No acute events overnight    PMH/PSH:  PAST MEDICAL & SURGICAL HISTORY:  CAD (coronary artery disease)      HTN (hypertension)      Depression      Anxiety      Diabetes  niddm      Angina pectoris      BPH (benign prostatic hyperplasia)      GERD (gastroesophageal reflux disease)      CVA (cerebral vascular accident)      History of appendectomy      Bilateral cataracts      History of heart bypass surgery          VITALS:  T(F): 98.7 (08-31-23 @ 00:02), Max: 98.7 (08-31-23 @ 00:02)  HR: 77 (08-31-23 @ 05:30)  BP: 130/60 (08-31-23 @ 11:30) (130/60 - 218/84)  RR: 18 (08-30-23 @ 19:37)  SpO2: 99% (08-31-23 @ 00:13)    TESTS & MEASUREMENTS:  Weight (Kg): 60.781 (08-30-23 @ 19:37)  BMI (kg/m2): 25.3 (08-30)                          12.7   13.38 )-----------( 224      ( 31 Aug 2023 06:21 )             40.3     PT/INR - ( 30 Aug 2023 21:00 )   PT: 11.50 sec;   INR: 1.01 ratio         PTT - ( 30 Aug 2023 21:00 )  PTT:30.9 sec  08-31    140  |  103  |  26<H>  ----------------------------<  128<H>  5.1<H>   |  24  |  1.7<H>    Ca    9.6      31 Aug 2023 06:21  Mg     2.2     08-31    TPro  6.3  /  Alb  4.1  /  TBili  0.3  /  DBili  x   /  AST  13  /  ALT  10  /  AlkPhos  36  08-30    LIVER FUNCTIONS - ( 30 Aug 2023 21:00 )  Alb: 4.1 g/dL / Pro: 6.3 g/dL / ALK PHOS: 36 U/L / ALT: 10 U/L / AST: 13 U/L / GGT: x           CARDIAC MARKERS ( 30 Aug 2023 21:00 )  x     / <0.01 ng/mL / x     / x     / x            Urinalysis Basic - ( 31 Aug 2023 06:21 )    Color: x / Appearance: x / SG: x / pH: x  Gluc: 128 mg/dL / Ketone: x  / Bili: x / Urobili: x   Blood: x / Protein: x / Nitrite: x   Leuk Esterase: x / RBC: x / WBC x   Sq Epi: x / Non Sq Epi: x / Bacteria: x        RADIOLOGY & ADDITIONAL TESTS:    RECENT DIAGNOSTIC ORDERS:  CKMB Units: 11:00 (08-31-23 @ 09:52)  Troponin T, Serum: 11:00 (08-31-23 @ 09:52)  Culture - Blood: AM Sched. Collection:31-Aug-2023 04:30  Specimen Source: Blood (08-31-23 @ 02:35)  Urinalysis: Routine (08-31-23 @ 02:32)  Diet, DASH/TLC:   Sodium & Cholesterol Restricted (08-31-23 @ 00:29)      MEDICATIONS:  MEDICATIONS  (STANDING):  aspirin  chewable 81 milliGRAM(s) Oral daily  clopidogrel Tablet 75 milliGRAM(s) Oral daily  fenofibrate Tablet 145 milliGRAM(s) Oral daily  heparin   Injectable 5000 Unit(s) SubCutaneous every 12 hours  isosorbide   mononitrate ER Tablet (IMDUR) 60 milliGRAM(s) Oral daily  losartan 100 milliGRAM(s) Oral daily  metoprolol tartrate 25 milliGRAM(s) Oral two times a day  NIFEdipine XL 30 milliGRAM(s) Oral daily  pantoprazole    Tablet 40 milliGRAM(s) Oral before breakfast  tamsulosin 0.4 milliGRAM(s) Oral at bedtime    MEDICATIONS  (PRN):  acetaminophen     Tablet .. 650 milliGRAM(s) Oral every 6 hours PRN Temp greater or equal to 38C (100.4F), Mild Pain (1 - 3)  aluminum hydroxide/magnesium hydroxide/simethicone Suspension 30 milliLiter(s) Oral every 4 hours PRN Dyspepsia  melatonin 3 milliGRAM(s) Oral at bedtime PRN Insomnia      HOME MEDICATIONS:  fenofibrate 145 mg oral tablet (08-31)  Imdur 60 mg oral tablet, extended release (08-31)  linagliptin 5 mg oral tablet (08-31)  losartan 100 mg oral tablet (08-31)  metoprolol tartrate 25 mg oral tablet (08-31)  Plavix 75 mg oral tablet (08-31)      REVIEW OF SYSTEMS:  All other review of systems is negative unless indicated above.     PHYSICAL EXAM:  PHYSICAL EXAM:  GENERAL: NAD  HEAD:  Atraumatic, Normocephalic  NECK: Supple, No JVD  CHEST/LUNG: Clear to auscultation bilaterally; No wheeze  HEART: Regular rate and rhythm; No murmurs, rubs, or gallops  ABDOMEN: Soft, Nontender, Nondistended; Bowel sounds present  EXTREMITIES:  2+ Peripheral Pulses, No clubbing, cyanosis, or edema  SKIN: No rashes or lesions

## 2023-08-31 NOTE — PATIENT PROFILE ADULT - TYPE OF COMMUNICATION USED TO ADDRESS HEALTHCARE
Other
likely due to worsening lung metastases with poor lung reserve volume  Sinus tachycardia likely due to underlying lung disease burden. Will check TTE  Presently on bipap  multiple unsuccessful attempts at ABG. Will repeat VBG  CTA chest negative for PE and with diffuse lung mets  Continue chronic prednisone 5mg, ASA 81mg  Discussed with pulmonary fellow at bedside

## 2023-08-31 NOTE — CONSULT NOTE ADULT - ASSESSMENT
CAD s/p CABG  PAD  CKD  HTN Urgency  Moderate to Severe AS    - Etiology of symptomatology is likely multifactorial but primary culprit is HTN with underlying chronic cardiac conditions  - BP presently controlled.   - Echo from prior hospital visit revealed moderate to severe As with calculated JAROD 1.0. Despite this patient has a normal EF but does not meet gradients for severe As. His dimensionless index is 0.31 which is also consistent with paradoxical low-flow, low gradient moderate to severe AS.   - Goal is to optimize medical therapy. Continue current RX. Will finish workup for this as an outpatient.   - outpatient follow up with Dr. Stewart as outpatient in 1-2 weeks to arrange for further workup.  CAD s/p CABG  PAD  CKD  HTN Urgency  Moderate to Severe AS    - Atypical Chest pain is pleuritic in nature, relocates based on position and worse with inspiration. Has it currently at rest. Also has low grade fevers and has had a productive cough for the last few days. CT chest pending for L lower lobe opacity-- Possible pneumonia. (elevated WBC, cough, low grade fever)  - BP presently controlled.   - Echo from prior hospital visit revealed moderate to severe As with calculated JAROD 1.0. Despite this patient has a normal EF but does not meet gradients for severe As. His dimensionless index is 0.31 which is also consistent with paradoxical low-flow, low gradient moderate to severe AS.   - Goal is to optimize medical therapy. Continue current RX. Will finish workup for this as an outpatient.   - outpatient follow up with Dr. Stewart as outpatient in 1-2 weeks to arrange for further workup.

## 2023-08-31 NOTE — PATIENT PROFILE ADULT - FALL HARM RISK - HARM RISK INTERVENTIONS

## 2023-08-31 NOTE — PATIENT PROFILE ADULT - NSPROEXTENSIONSOFSELF_GEN_A_NUR
Has some anxiety when around large crowds of people.  Also with some depression symptoms. Would like her to get set up with counseling and potentially psychiatry. Gave information for where they can go.   none

## 2023-08-31 NOTE — PROGRESS NOTE ADULT - ASSESSMENT
76 y/o man w PMHx of HTN, HLD, DM, CAD, s/p 3vCABG 2017, EF 63% w mild LVH and G1DD in 10/2022, follows w Dr Stewart, loop recorder implanted ~2021, 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  Patient also endorsing when he felt room spinning dizziness earlier today that has since resolved and CT scan of the head was ordered which was negative. Patient was given aspirin and nitro by EMS with relief. He has hx of PAD and endorses that his legs are hurting at times.    #Chest pain, exertional  #Hx of CAD sp CABG  #Hx of PAD  #HTN/HLD  Troponins negative x1  Echo 07/2023: 54%EF  - Pending cardio eval, Dr. Solis  - c/w metoprolol tartrate 25 mg BID  - c/w imdur 60mg   - losartan 100 mg  - Nifedipine ER 30 mg  - c/w aspirin and clopidogrel  - f/u repeat troponins    #Left basilar opacity vs pleural effusion  #Leukocytosis   r/o PNA  afebrile, does not meet sepsis criteria  - CT Chest ordered  - f/u BCx  - Monitor off abx for now, if ct chest shows consolidation,  start cef/azithro    #Hx  CVA x 2 with bilateral residual weakness, multiple TIAs  CT head negative  - no new motor weakness in the body as per patient    #DM  - Cont insulin regimen    #CKD III b  No need for urgent RRT  Monitor Cr/BUN Electrolytes including Phosphorus  Avoid Nephro toxins    DVT PPX, heparin    #Progress Note Handoff  Pending (specify): Cardio f/u, CT Chest  Family discussion: dw pt regarding eval for chest pain  Disposition: Home

## 2023-08-31 NOTE — ED ADULT NURSE NOTE - CHIEF COMPLAINT QUOTE
BIBEMS from home for chest pain and dizziness, per pt he had chest pain 2 days ago but it went away on his own.  orginally b/p 199/98     in route 324 Asprin and 4 nitro sprays, pain was 9/10 now 7/10 and pt less dizzy   fs 150 in route

## 2023-08-31 NOTE — PHYSICAL THERAPY INITIAL EVALUATION ADULT - IMPAIRMENTS CONTRIBUTING TO GAIT DEVIATIONS, PT EVAL
impaired balance/decreased strength

## 2023-09-01 LAB
ANION GAP SERPL CALC-SCNC: 14 MMOL/L — SIGNIFICANT CHANGE UP (ref 7–14)
APPEARANCE UR: ABNORMAL
BACTERIA # UR AUTO: ABNORMAL /HPF
BILIRUB UR-MCNC: NEGATIVE — SIGNIFICANT CHANGE UP
BUN SERPL-MCNC: 31 MG/DL — HIGH (ref 10–20)
CALCIUM SERPL-MCNC: 9.3 MG/DL — SIGNIFICANT CHANGE UP (ref 8.4–10.5)
CAST: 5 /LPF — HIGH (ref 0–4)
CHLORIDE SERPL-SCNC: 107 MMOL/L — SIGNIFICANT CHANGE UP (ref 98–110)
CO2 SERPL-SCNC: 22 MMOL/L — SIGNIFICANT CHANGE UP (ref 17–32)
COLOR SPEC: YELLOW — SIGNIFICANT CHANGE UP
CREAT SERPL-MCNC: 2.1 MG/DL — HIGH (ref 0.7–1.5)
DIFF PNL FLD: ABNORMAL
EGFR: 32 ML/MIN/1.73M2 — LOW
GLUCOSE BLDC GLUCOMTR-MCNC: 121 MG/DL — HIGH (ref 70–99)
GLUCOSE BLDC GLUCOMTR-MCNC: 130 MG/DL — HIGH (ref 70–99)
GLUCOSE SERPL-MCNC: 122 MG/DL — HIGH (ref 70–99)
GLUCOSE UR QL: NEGATIVE MG/DL — SIGNIFICANT CHANGE UP
HCT VFR BLD CALC: 40.8 % — LOW (ref 42–52)
HGB BLD-MCNC: 12.7 G/DL — LOW (ref 14–18)
HYALINE CASTS # UR AUTO: 5 /LPF — HIGH (ref 0–4)
KETONES UR-MCNC: NEGATIVE MG/DL — SIGNIFICANT CHANGE UP
LEUKOCYTE ESTERASE UR-ACNC: ABNORMAL
MAGNESIUM SERPL-MCNC: 2.3 MG/DL — SIGNIFICANT CHANGE UP (ref 1.8–2.4)
MCHC RBC-ENTMCNC: 27.9 PG — SIGNIFICANT CHANGE UP (ref 27–31)
MCHC RBC-ENTMCNC: 31.1 G/DL — LOW (ref 32–37)
MCV RBC AUTO: 89.7 FL — SIGNIFICANT CHANGE UP (ref 80–94)
NITRITE UR-MCNC: NEGATIVE — SIGNIFICANT CHANGE UP
NRBC # BLD: 0 /100 WBCS — SIGNIFICANT CHANGE UP (ref 0–0)
PH UR: 6 — SIGNIFICANT CHANGE UP (ref 5–8)
PLATELET # BLD AUTO: 244 K/UL — SIGNIFICANT CHANGE UP (ref 130–400)
PMV BLD: 11 FL — HIGH (ref 7.4–10.4)
POTASSIUM SERPL-MCNC: 4.5 MMOL/L — SIGNIFICANT CHANGE UP (ref 3.5–5)
POTASSIUM SERPL-SCNC: 4.5 MMOL/L — SIGNIFICANT CHANGE UP (ref 3.5–5)
PROT UR-MCNC: 300 MG/DL
RBC # BLD: 4.55 M/UL — LOW (ref 4.7–6.1)
RBC # FLD: 14.6 % — HIGH (ref 11.5–14.5)
RBC CASTS # UR COMP ASSIST: 0 /HPF — SIGNIFICANT CHANGE UP (ref 0–4)
SODIUM SERPL-SCNC: 143 MMOL/L — SIGNIFICANT CHANGE UP (ref 135–146)
SP GR SPEC: 1.01 — SIGNIFICANT CHANGE UP (ref 1–1.03)
SQUAMOUS # UR AUTO: 0 /HPF — SIGNIFICANT CHANGE UP (ref 0–5)
TROPONIN T SERPL-MCNC: 0.01 NG/ML — SIGNIFICANT CHANGE UP
UROBILINOGEN FLD QL: 1 MG/DL — SIGNIFICANT CHANGE UP (ref 0.2–1)
WBC # BLD: 10.14 K/UL — SIGNIFICANT CHANGE UP (ref 4.8–10.8)
WBC # FLD AUTO: 10.14 K/UL — SIGNIFICANT CHANGE UP (ref 4.8–10.8)
WBC UR QL: 758 /HPF — HIGH (ref 0–5)

## 2023-09-01 PROCEDURE — 99232 SBSQ HOSP IP/OBS MODERATE 35: CPT

## 2023-09-01 RX ORDER — CEFTRIAXONE 500 MG/1
2000 INJECTION, POWDER, FOR SOLUTION INTRAMUSCULAR; INTRAVENOUS EVERY 24 HOURS
Refills: 0 | Status: DISCONTINUED | OUTPATIENT
Start: 2023-09-02 | End: 2023-09-06

## 2023-09-01 RX ORDER — CEFTRIAXONE 500 MG/1
2000 INJECTION, POWDER, FOR SOLUTION INTRAMUSCULAR; INTRAVENOUS ONCE
Refills: 0 | Status: COMPLETED | OUTPATIENT
Start: 2023-09-01 | End: 2023-09-01

## 2023-09-01 RX ORDER — AZITHROMYCIN 500 MG/1
500 TABLET, FILM COATED ORAL DAILY
Refills: 0 | Status: DISCONTINUED | OUTPATIENT
Start: 2023-09-01 | End: 2023-09-05

## 2023-09-01 RX ORDER — CEFTRIAXONE 500 MG/1
INJECTION, POWDER, FOR SOLUTION INTRAMUSCULAR; INTRAVENOUS
Refills: 0 | Status: DISCONTINUED | OUTPATIENT
Start: 2023-09-01 | End: 2023-09-06

## 2023-09-01 RX ADMIN — CEFTRIAXONE 100 MILLIGRAM(S): 500 INJECTION, POWDER, FOR SOLUTION INTRAMUSCULAR; INTRAVENOUS at 20:05

## 2023-09-01 RX ADMIN — Medication 25 MILLIGRAM(S): at 18:32

## 2023-09-01 RX ADMIN — HEPARIN SODIUM 5000 UNIT(S): 5000 INJECTION INTRAVENOUS; SUBCUTANEOUS at 18:32

## 2023-09-01 RX ADMIN — Medication 30 MILLIGRAM(S): at 05:56

## 2023-09-01 RX ADMIN — Medication 650 MILLIGRAM(S): at 23:18

## 2023-09-01 RX ADMIN — Medication 81 MILLIGRAM(S): at 11:31

## 2023-09-01 RX ADMIN — AZITHROMYCIN 500 MILLIGRAM(S): 500 TABLET, FILM COATED ORAL at 21:18

## 2023-09-01 RX ADMIN — CLOPIDOGREL BISULFATE 75 MILLIGRAM(S): 75 TABLET, FILM COATED ORAL at 11:31

## 2023-09-01 RX ADMIN — PANTOPRAZOLE SODIUM 40 MILLIGRAM(S): 20 TABLET, DELAYED RELEASE ORAL at 05:56

## 2023-09-01 RX ADMIN — HEPARIN SODIUM 5000 UNIT(S): 5000 INJECTION INTRAVENOUS; SUBCUTANEOUS at 05:56

## 2023-09-01 RX ADMIN — ISOSORBIDE MONONITRATE 60 MILLIGRAM(S): 60 TABLET, EXTENDED RELEASE ORAL at 11:31

## 2023-09-01 RX ADMIN — Medication 25 MILLIGRAM(S): at 05:56

## 2023-09-01 RX ADMIN — Medication 145 MILLIGRAM(S): at 18:33

## 2023-09-01 RX ADMIN — LOSARTAN POTASSIUM 100 MILLIGRAM(S): 100 TABLET, FILM COATED ORAL at 05:56

## 2023-09-01 RX ADMIN — TAMSULOSIN HYDROCHLORIDE 0.4 MILLIGRAM(S): 0.4 CAPSULE ORAL at 21:18

## 2023-09-01 NOTE — PROGRESS NOTE ADULT - ASSESSMENT
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    Assessment    ·	Atypical pleuritic chest pain likely secondary to infectious process, doubt ACS  ·	HTN  ·	CKD 3  ·	DM  ·	Hx of CAD CABG  ·	mod-severe aortic stenosis  ·	Hx of CVA x2  ·	BPH    Plan    ·	Mild leukocytosis, patient became febrile last night, f/u ID pt has penicillin allergy, f/u cultures, f/u urinalysis  ·	c/w nifedipine / imdur / metoprolol / losartan  ·	insulin if fs > 180  ·	creatinine baseline is around 1.9, mild increase to 2.1, will monitor for now considering mod-sev aortic stenosis, encourage PO fluid intake  ·	S&S recs appreciated  ·	c/w tamsulosin    Pending: ID follow up    # DVT PPX: heparin sq

## 2023-09-01 NOTE — SWALLOW BEDSIDE ASSESSMENT ADULT - SLP GENERAL OBSERVATIONS
pt awake alert without c/o pain. pt known to SLP dept from admission for CVA. no h/o dysphagia. speech clear and fluent

## 2023-09-01 NOTE — PROGRESS NOTE ADULT - SUBJECTIVE AND OBJECTIVE BOX
SUBJECTIVE:    Patient is a 77y old Male who presents with a chief complaint of Chest pain (01 Sep 2023 14:45)      HPI:  77-year-old male past medical history hypertension, hyperlipidemia, diabetes, CKD,TIAs, stroke in 2020, CAD status post CABG cardiologist Dr. Molina presenting to ED for evaluation of intermittent left-sided chest pressure since yesterday, 9/10 in intensity, non exertional, radiating to the arm, associated with SOB, waned on its own, not wosening.  Patient also endorsing when he felt room spinning dizziness earlier today that has since resolved and CT scan of the head was ordered which was negative. Patient was given aspirin and nitro by EMS with relief. He has hx of PAD and endorses that his legs are hurting at times.  Denies fever, chills, cough, syncope, lower extremity swelling, calf pain, headaches, no new weakness in the body.    Vital Signs Last 24 Hrs  T(C): 36.9 (30 Aug 2023 19:37), Max: 36.9 (30 Aug 2023 19:37)  T(F): 98.5 (30 Aug 2023 19:37), Max: 98.5 (30 Aug 2023 19:37)  HR: 86 (30 Aug 2023 19:37) (86 - 86)  BP: 179/90 (30 Aug 2023 19:37) (179/90 - 179/90)  RR: 18 (30 Aug 2023 19:37) (18 - 18)  SpO2: 98% (30 Aug 2023 19:37) (98% - 98%)  O2 Parameters below as of 30 Aug 2023 19:37  Patient On (Oxygen Delivery Method): room air         (30 Aug 2023 23:25)      Currently admitted to medicine with the primary diagnosis of Chest pain    no shortness of breath, has interimttent cough    Besides the pertinent positives and negatives described above, the ROS was within normal limits.    PAST MEDICAL & SURGICAL HISTORY  CAD (coronary artery disease)    HTN (hypertension)    Depression    Anxiety    Diabetes  niddm    Angina pectoris    BPH (benign prostatic hyperplasia)    GERD (gastroesophageal reflux disease)    CVA (cerebral vascular accident)    History of appendectomy    Bilateral cataracts    History of heart bypass surgery      SOCIAL HISTORY:    ALLERGIES:  penicillin (Rash)  PC Pen VK (Rash)  clindamycin (Rash)    MEDICATIONS:  STANDING MEDICATIONS  aspirin  chewable 81 milliGRAM(s) Oral daily  clopidogrel Tablet 75 milliGRAM(s) Oral daily  fenofibrate Tablet 145 milliGRAM(s) Oral daily  heparin   Injectable 5000 Unit(s) SubCutaneous every 12 hours  isosorbide   mononitrate ER Tablet (IMDUR) 60 milliGRAM(s) Oral daily  losartan 100 milliGRAM(s) Oral daily  metoprolol tartrate 25 milliGRAM(s) Oral two times a day  NIFEdipine XL 30 milliGRAM(s) Oral daily  pantoprazole    Tablet 40 milliGRAM(s) Oral before breakfast  tamsulosin 0.4 milliGRAM(s) Oral at bedtime    PRN MEDICATIONS  acetaminophen     Tablet .. 650 milliGRAM(s) Oral every 6 hours PRN  aluminum hydroxide/magnesium hydroxide/simethicone Suspension 30 milliLiter(s) Oral every 4 hours PRN  melatonin 3 milliGRAM(s) Oral at bedtime PRN    VITALS:   T(F): 98.7  HR: 61  BP: 105/54  RR: 18  SpO2: 93%    LABS:                        12.7   10.14 )-----------( 244      ( 01 Sep 2023 05:42 )             40.8     09-01    143  |  107  |  31<H>  ----------------------------<  122<H>  4.5   |  22  |  2.1<H>    Ca    9.3      01 Sep 2023 05:42  Mg     2.3     09-01    TPro  6.3  /  Alb  4.1  /  TBili  0.3  /  DBili  x   /  AST  13  /  ALT  10  /  AlkPhos  36  08-30    PT/INR - ( 30 Aug 2023 21:00 )   PT: 11.50 sec;   INR: 1.01 ratio         PTT - ( 30 Aug 2023 21:00 )  PTT:30.9 sec  Urinalysis Basic - ( 01 Sep 2023 05:42 )    Color: x / Appearance: x / SG: x / pH: x  Gluc: 122 mg/dL / Ketone: x  / Bili: x / Urobili: x   Blood: x / Protein: x / Nitrite: x   Leuk Esterase: x / RBC: x / WBC x   Sq Epi: x / Non Sq Epi: x / Bacteria: x        Troponin T, Serum: 0.01 ng/mL (09-01-23 @ 05:42)      CARDIAC MARKERS ( 01 Sep 2023 05:42 )  x     / 0.01 ng/mL / x     / x     / x      CARDIAC MARKERS ( 31 Aug 2023 12:14 )  x     / <0.01 ng/mL / x     / x     / 1.6 ng/mL  CARDIAC MARKERS ( 30 Aug 2023 21:00 )  x     / <0.01 ng/mL / x     / x     / x          Chest pain      RADIOLOGY:    PHYSICAL EXAM:  General: WN/WD NAD  Neurology: A&Ox3, nonfocal, LANTIGUA x 4  Head:  Normocephalic, atraumatic  ENT:  Mucosa moist, no ulcerations  Neck:  Supple, no sinuses or palpable masses  Lymphatic:  No palpable cervical, supraclavicular, axillary or inguinal adenopathy  Respiratory: CTA B/L  CV: RRR, S1S2, no murmur  Abdominal: Soft, NT, ND no palpable mass  MSK: No edema, + peripheral pulses  Incisions: intact, no erythema or drainage    Intravenous access:   NG tube:   Abbott Catheter:        SUBJECTIVE:    Patient is a 77y old Male who presents with a chief complaint of Chest pain (01 Sep 2023 14:45)      HPI:  77-year-old male past medical history hypertension, hyperlipidemia, diabetes, CKD,TIAs, stroke in 2020, CAD status post CABG cardiologist Dr. Molina presenting to ED for evaluation of intermittent left-sided chest pressure since yesterday, 9/10 in intensity, non exertional, radiating to the arm, associated with SOB, waned on its own, not wosening.  Patient also endorsing when he felt room spinning dizziness earlier today that has since resolved and CT scan of the head was ordered which was negative. Patient was given aspirin and nitro by EMS with relief. He has hx of PAD and endorses that his legs are hurting at times.  Denies fever, chills, cough, syncope, lower extremity swelling, calf pain, headaches, no new weakness in the body.    Vital Signs Last 24 Hrs  T(C): 36.9 (30 Aug 2023 19:37), Max: 36.9 (30 Aug 2023 19:37)  T(F): 98.5 (30 Aug 2023 19:37), Max: 98.5 (30 Aug 2023 19:37)  HR: 86 (30 Aug 2023 19:37) (86 - 86)  BP: 179/90 (30 Aug 2023 19:37) (179/90 - 179/90)  RR: 18 (30 Aug 2023 19:37) (18 - 18)  SpO2: 98% (30 Aug 2023 19:37) (98% - 98%)  O2 Parameters below as of 30 Aug 2023 19:37  Patient On (Oxygen Delivery Method): room air         (30 Aug 2023 23:25)      Currently admitted to medicine with the primary diagnosis of Chest pain    no shortness of breath, has interimttent cough    Besides the pertinent positives and negatives described above, the ROS was within normal limits.    PAST MEDICAL & SURGICAL HISTORY  CAD (coronary artery disease)    HTN (hypertension)    Depression    Anxiety    Diabetes  niddm    Angina pectoris    BPH (benign prostatic hyperplasia)    GERD (gastroesophageal reflux disease)    CVA (cerebral vascular accident)    History of appendectomy    Bilateral cataracts    History of heart bypass surgery      SOCIAL HISTORY:    ALLERGIES:  penicillin (Rash)  PC Pen VK (Rash)  clindamycin (Rash)    MEDICATIONS:  STANDING MEDICATIONS  aspirin  chewable 81 milliGRAM(s) Oral daily  clopidogrel Tablet 75 milliGRAM(s) Oral daily  fenofibrate Tablet 145 milliGRAM(s) Oral daily  heparin   Injectable 5000 Unit(s) SubCutaneous every 12 hours  isosorbide   mononitrate ER Tablet (IMDUR) 60 milliGRAM(s) Oral daily  losartan 100 milliGRAM(s) Oral daily  metoprolol tartrate 25 milliGRAM(s) Oral two times a day  NIFEdipine XL 30 milliGRAM(s) Oral daily  pantoprazole    Tablet 40 milliGRAM(s) Oral before breakfast  tamsulosin 0.4 milliGRAM(s) Oral at bedtime    PRN MEDICATIONS  acetaminophen     Tablet .. 650 milliGRAM(s) Oral every 6 hours PRN  aluminum hydroxide/magnesium hydroxide/simethicone Suspension 30 milliLiter(s) Oral every 4 hours PRN  melatonin 3 milliGRAM(s) Oral at bedtime PRN    VITALS:   T(F): 98.7  HR: 61  BP: 105/54  RR: 18  SpO2: 93%    LABS:                        12.7   10.14 )-----------( 244      ( 01 Sep 2023 05:42 )             40.8     09-01    143  |  107  |  31<H>  ----------------------------<  122<H>  4.5   |  22  |  2.1<H>    Ca    9.3      01 Sep 2023 05:42  Mg     2.3     09-01    TPro  6.3  /  Alb  4.1  /  TBili  0.3  /  DBili  x   /  AST  13  /  ALT  10  /  AlkPhos  36  08-30    PT/INR - ( 30 Aug 2023 21:00 )   PT: 11.50 sec;   INR: 1.01 ratio         PTT - ( 30 Aug 2023 21:00 )  PTT:30.9 sec  Urinalysis Basic - ( 01 Sep 2023 05:42 )    Color: x / Appearance: x / SG: x / pH: x  Gluc: 122 mg/dL / Ketone: x  / Bili: x / Urobili: x   Blood: x / Protein: x / Nitrite: x   Leuk Esterase: x / RBC: x / WBC x   Sq Epi: x / Non Sq Epi: x / Bacteria: x        Troponin T, Serum: 0.01 ng/mL (09-01-23 @ 05:42)      CARDIAC MARKERS ( 01 Sep 2023 05:42 )  x     / 0.01 ng/mL / x     / x     / x      CARDIAC MARKERS ( 31 Aug 2023 12:14 )  x     / <0.01 ng/mL / x     / x     / 1.6 ng/mL  CARDIAC MARKERS ( 30 Aug 2023 21:00 )  x     / <0.01 ng/mL / x     / x     / x          Chest pain      RADIOLOGY:    PHYSICAL EXAM:  General: WN/WD NAD  Neurology: A&Ox3, nonfocal, LANTIGUA x 4  Head:  Normocephalic, atraumatic  ENT:  Mucosa moist, no ulcerations  Neck:  Supple, no sinuses or palpable masses  Lymphatic:  No palpable cervical, supraclavicular, axillary or inguinal adenopathy  Respiratory: CTA B/L  CV: RRR, S1S2, no murmur  Abdominal: Soft, NT, ND no palpable mass  MSK: No edema, + peripheral pulses  Incisions: intact, no erythema or drainage    Intravenous access: yes  NG tube: no  Abbott Catheter: no

## 2023-09-01 NOTE — PROGRESS NOTE ADULT - SUBJECTIVE AND OBJECTIVE BOX
SUBJECTIVE / OVERNIGHT EVENTS  Patient was seen and examined. Patient reporting constant mild to moderate bilateral chest pain, sharp, non radiating. He denied fever. No overnight events    MEDICATIONS  aspirin  chewable 81 milliGRAM(s) Oral daily  clopidogrel Tablet 75 milliGRAM(s) Oral daily  fenofibrate Tablet 145 milliGRAM(s) Oral daily  heparin   Injectable 5000 Unit(s) SubCutaneous every 12 hours  isosorbide   mononitrate ER Tablet (IMDUR) 60 milliGRAM(s) Oral daily  losartan 100 milliGRAM(s) Oral daily  metoprolol tartrate 25 milliGRAM(s) Oral two times a day  NIFEdipine XL 30 milliGRAM(s) Oral daily  pantoprazole    Tablet 40 milliGRAM(s) Oral before breakfast  tamsulosin 0.4 milliGRAM(s) Oral at bedtime    acetaminophen     Tablet .. 650 milliGRAM(s) Oral every 6 hours PRN Temp greater or equal to 38C (100.4F), Mild Pain (1 - 3)  aluminum hydroxide/magnesium hydroxide/simethicone Suspension 30 milliLiter(s) Oral every 4 hours PRN Dyspepsia  melatonin 3 milliGRAM(s) Oral at bedtime PRN Insomnia    VITALS /  EXAM    T(C): 36.3 (09-01-23 @ 04:58), Max: 39.1 (08-31-23 @ 20:01)  HR: 80 (09-01-23 @ 04:58) (80 - 90)  BP: 139/62 (09-01-23 @ 04:58) (112/57 - 139/62)  RR: 18 (08-31-23 @ 22:57) (18 - 18)  SpO2: 93% (08-31-23 @ 21:08) (93% - 95%)  POCT Blood Glucose.: 130 mg/dL (09-01-23 @ 08:02)    GENERAL: AAO  HEAD:  Atraumatic, Normocephalic  NECK: Supple, No JVD  CHEST/LUNG: Clear to auscultation bilaterally; No wheeze  HEART: Regular rate and rhythm; No murmurs, rubs, or gallops  ABDOMEN: Soft, Nontender, Nondistended; Bowel sounds present  EXTREMITIES:  2+ Peripheral Pulses, No clubbing, cyanosis, or edema  SKIN: No rashes or lesions      I's & O's     09-01-23 @ 07:01  -  09-01-23 @ 11:18  --------------------------------------------------------  IN:    Oral Fluid: 650 mL  Total IN: 650 mL    OUT:  Total OUT: 0 mL    Total NET: 650 mL        LABS             12.7   10.14 )-----------( 244      ( 09-01-23 @ 05:42 )             40.8     143  |  107  |  31  -------------------------<  122   09-01-23 @ 05:42  4.5  |  22  |  2.1    Ca      9.3     09-01-23 @ 05:42  Mg     2.3     09-01-23 @ 05:42    TPro  6.3  /  Alb  4.1  /  TBili  0.3  /  DBili  x   /  AST  13  /  ALT  10  /  AlkPhos  36  /  GGT  x     08-30-23 @ 21:00    PT/INR - ( 08-30-23 @ 21:00 )   PT: 11.50 sec;   INR: 1.01 ratio  PTT - ( 08-30-23 @ 21:00 )  PTT:30.9 sec    Troponin T, Serum: 0.01 ng/mL (09-01-23 @ 05:42)  Troponin T, Serum: <0.01 ng/mL (08-31-23 @ 12:14)  Troponin T, Serum: <0.01 ng/mL (08-30-23 @ 21:00)    CKMB Units: 1.6 ng/mL (08-31-23 @ 12:14)              Urinalysis Basic - ( 01 Sep 2023 05:42 )    Color: x / Appearance: x / SG: x / pH: x  Gluc: 122 mg/dL / Ketone: x  / Bili: x / Urobili: x   Blood: x / Protein: x / Nitrite: x   Leuk Esterase: x / RBC: x / WBC x   Sq Epi: x / Non Sq Epi: x / Bacteria: x      MICRO / IMAGING / CARDIOLOGY  Telemetry: Reviewed   EKG: Reviewed    CULTURES    IMAGING  PACS Image:  (08-31-23 @ 16:51)  PACS Image:  (08-30-23 @ 21:18)  PACS Image:  (08-30-23 @ 21:10)    CARDIOLOGY

## 2023-09-01 NOTE — PROGRESS NOTE ADULT - ASSESSMENT
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 unrelated to physical activity  -Troponins negative x2  -Echo 07/2023: 54%EF  - CT chest: Small left-sided pleural effusion with atelectasis.Pleural-based as well as parenchymal infiltrates lower lung field calcifications bilaterally.   - wbc today: 10.4   - c/w metoprolol tartrate 25 mg BID  - c/w imdur 60mg   - losartan 100 mg  - Nifedipine ER 30 mg  - c/w aspirin and clopidogrel        - f/u BCx  - Monitor off abx for now, if ct chest shows consolidation,  start cef/azithro    #Hx  CVA x 2 with bilateral residual weakness, multiple TIAs  CT head negative  - no new motor weakness in the body as per patient    #HTN/HLD  - Continue home meds    #DM  - Cont insulin regimen    #CKD III b  No need for urgent RRT  Monitor Cr/BUN Electrolytes including Phosphorus  Avoid Nephro toxins    DVT PPX, heparin      Family discussion: dw pt regarding eval for chest pain  Disposition: Home   78 y/o man w PMHx of HTN, HLD, DM, CAD, s/p 3vCABG 2017, EF 63% w mild LVH and G1DD in 10/2022, follows w Dr Stewart, loop recorder implanted ~2021, CVA x2 w residual L>R weakness, GERD, CKD3a, BPH presenting to ED for chest pain     Chest pain, pleuritic  #Hx of CAD sp CABG  #Left basilar opacity vs pleural effusion  #Leukocytosis   #Hx of PAD  - Patient still reportign chest pain. unrelated to physical activity  -Troponins negative x2  -Echo 07/2023: 54%EF  - CT chest: Small left-sided pleural effusion with atelectasis.Pleural-based as well as parenchymal infiltrates lower lung field calcifications bilaterally.   - wbc today: 10.4   - c/w metoprolol tartrate 25 mg BID  - c/w imdur 60mg   - losartan 100 mg  - Nifedipine ER 30 mg  - c/w aspirin and clopidogre  - f/u BCx and RVPS  -f/u ID      #Hx  CVA x 2 with bilateral residual weakness, multiple TIAs  CT head negative  - no new motor weakness in the body as per patient  - f/u with speech and swallow's  recommendations    #HTN/HLD  - Continue home meds    #DM  - Cont insulin regimen    #CKD III b  No need for urgent RRT  Monitor Cr/BUN Electrolytes including Phosphorus  Avoid Nephro toxins    DVT PPX, heparin      Family discussion: dw pt regarding eval for chest pain  Disposition: Home

## 2023-09-01 NOTE — SWALLOW BEDSIDE ASSESSMENT ADULT - SLP PERTINENT HISTORY OF CURRENT PROBLEM
prog77 y/o man w PMHx of HTN, HLD, DM, CAD, s/p 3vCABG 2017, EF 63% w mild LVH and G1DD in 10/2022, follows w Dr Stewart, loop recorder implanted ~2021, CVA x2 w residual L>R weakness, GERD, CKD3a, BPH presenting to ED for chest pain - CT chest: Small left-sided pleural effusion with atelectasis.Pleural-based as well as parenchymal infiltrates lower lung field calcifications bilaterally.

## 2023-09-01 NOTE — CONSULT NOTE ADULT - SUBJECTIVE AND OBJECTIVE BOX
MICHELLEERICK  77y, Male  Allergy: penicillin (Rash)  PC Pen VK (Rash)  clindamycin (Rash)      CHIEF COMPLAINT: Chest pain (01 Sep 2023 11:17)      LOS  2d    HPI:  77-year-old male past medical history hypertension, hyperlipidemia, diabetes, CKD,TIAs, stroke in 2020, CAD status post CABG cardiologist Dr. Molina presenting to ED for evaluation of intermittent left-sided chest pressure since yesterday, 9/10 in intensity, non exertional, radiating to the arm, associated with SOB, waned on its own, not wosening.  Patient also endorsing when he felt room spinning dizziness earlier today that has since resolved and CT scan of the head was ordered which was negative. Patient was given aspirin and nitro by EMS with relief. He has hx of PAD and endorses that his legs are hurting at times.  Denies fever, chills, cough, syncope, lower extremity swelling, calf pain, headaches, no new weakness in the body.    Vital Signs Last 24 Hrs  T(C): 36.9 (30 Aug 2023 19:37), Max: 36.9 (30 Aug 2023 19:37)  T(F): 98.5 (30 Aug 2023 19:37), Max: 98.5 (30 Aug 2023 19:37)  HR: 86 (30 Aug 2023 19:37) (86 - 86)  BP: 179/90 (30 Aug 2023 19:37) (179/90 - 179/90)  RR: 18 (30 Aug 2023 19:37) (18 - 18)  SpO2: 98% (30 Aug 2023 19:37) (98% - 98%)  O2 Parameters below as of 30 Aug 2023 19:37  Patient On (Oxygen Delivery Method): room air         (30 Aug 2023 23:25)      INFECTIOUS DISEASE HISTORY:    PAST MEDICAL & SURGICAL HISTORY:  CAD (coronary artery disease)      HTN (hypertension)      Depression      Anxiety      Diabetes  niddm      Angina pectoris      BPH (benign prostatic hyperplasia)      GERD (gastroesophageal reflux disease)      CVA (cerebral vascular accident)      History of appendectomy      Bilateral cataracts      History of heart bypass surgery          FAMILY HISTORY      SOCIAL HISTORY  Social History:  former tobacco use 2ppd [id number] = 50 pack year smoker, quit ~1985  rare EtOH use   no illicit drug use  lives at home w wife, benefits from wife's HHA  walker at baseline (07 Jul 2023 20:17)        ROS  General: Denies rigors, nightsweats  HEENT: Denies headache, rhinorrhea, sore throat, eye pain  CV: Denies CP, palpitations  PULM: Denies wheezing, hemoptysis  GI: Denies hematemesis, hematochezia, melena  : Denies discharge, hematuria  MSK: Denies arthralgias, myalgias  SKIN: Denies rash, lesions  NEURO: Denies paresthesias, weakness  PSYCH: Denies depression, anxiety    VITALS:  T(F): 98.7, Max: 102.3 (08-31-23 @ 20:01)  HR: 61  BP: 105/54  RR: 18Vital Signs Last 24 Hrs  T(C): 37.1 (01 Sep 2023 13:37), Max: 39.1 (31 Aug 2023 20:01)  T(F): 98.7 (01 Sep 2023 13:37), Max: 102.3 (31 Aug 2023 20:01)  HR: 61 (01 Sep 2023 13:37) (61 - 90)  BP: 105/54 (01 Sep 2023 13:37) (105/54 - 139/62)  BP(mean): 82 (31 Aug 2023 15:18) (82 - 82)  RR: 18 (01 Sep 2023 13:37) (18 - 18)  SpO2: 93% (31 Aug 2023 21:08) (93% - 95%)    Parameters below as of 31 Aug 2023 21:08  Patient On (Oxygen Delivery Method): room air        PHYSICAL EXAM:  Gen: NAD, resting in bed  HEENT: Normocephalic, atraumatic  Neck: supple, no lymphadenopathy  CV: Regular rate & regular rhythm  Lungs: decreased BS at bases, no fremitus  Abdomen: Soft, BS present  Ext: Warm, well perfused  Neuro: non focal, awake  Skin: no rash, no erythema  Lines: no phlebitis    TESTS & MEASUREMENTS:                        12.7   10.14 )-----------( 244      ( 01 Sep 2023 05:42 )             40.8     09-01    143  |  107  |  31<H>  ----------------------------<  122<H>  4.5   |  22  |  2.1<H>    Ca    9.3      01 Sep 2023 05:42  Mg     2.3     09-01    TPro  6.3  /  Alb  4.1  /  TBili  0.3  /  DBili  x   /  AST  13  /  ALT  10  /  AlkPhos  36  08-30      LIVER FUNCTIONS - ( 30 Aug 2023 21:00 )  Alb: 4.1 g/dL / Pro: 6.3 g/dL / ALK PHOS: 36 U/L / ALT: 10 U/L / AST: 13 U/L / GGT: x           Urinalysis Basic - ( 01 Sep 2023 05:42 )    Color: x / Appearance: x / SG: x / pH: x  Gluc: 122 mg/dL / Ketone: x  / Bili: x / Urobili: x   Blood: x / Protein: x / Nitrite: x   Leuk Esterase: x / RBC: x / WBC x   Sq Epi: x / Non Sq Epi: x / Bacteria: x        Culture - Blood (collected 04-14-23 @ 13:49)  Source: .Blood Blood-Peripheral  Final Report (04-19-23 @ 23:00):    No Growth Final    Culture - Urine (collected 04-14-23 @ 09:00)  Source: Clean Catch Clean Catch (Midstream)  Final Report (04-15-23 @ 13:51):    <10,000 CFU/mL Normal Urogenital Clau    Culture - Blood (collected 04-13-23 @ 21:38)  Source: .Blood Blood-Peripheral  Final Report (04-19-23 @ 03:01):    No Growth Final    Culture - Blood (collected 04-13-23 @ 21:38)  Source: .Blood Blood-Peripheral  Final Report (04-19-23 @ 03:01):    No Growth Final    Culture - Blood (collected 12-08-22 @ 07:30)  Source: .Blood None  Final Report (12-13-22 @ 18:00):    No Growth Final    Culture - Blood (collected 12-08-22 @ 07:30)  Source: .Blood None  Final Report (12-13-22 @ 18:00):    No Growth Final    Culture - Blood (collected 10-24-22 @ 08:39)  Source: .Blood Blood-Venous  Final Report (10-29-22 @ 14:00):    No Growth Final            INFECTIOUS DISEASES TESTING  Procalcitonin, Serum: 0.07 ng/mL (04-14-23 @ 13:46)  COVID-19 PCR: NotDetec (10-26-22 @ 14:25)  COVID-19 PCR: NotDetec (10-23-22 @ 16:09)  COVID-19 PCR: NotDetec (10-13-22 @ 17:00)      RADIOLOGY & ADDITIONAL TESTS:  I have personally reviewed the last Chest xray  CXR  Xray Chest 1 View- PORTABLE-Urgent:   ACC: [id number] EXAM:  XR CHEST PORTABLE URGENT 1V   ORDERED BY: KATHY SANDOVAL     PROCEDURE DATE:  08/30/2023          INTERPRETATION:  Clinical History / Reason for exam: Chest pain    Comparison : Chest radiograph 7/7/2023.    Technique/Positioning: Frontal view.    Findings:    Support devices: None. Telemetry leads    Cardiac/mediastinum/hilum: Poststernotomy with partially obscured cardiac   silhouette. Cardiac loop monitor.    Lung parenchyma/Pleura: Left pleural effusion/basilar opacity. Several   calcified granulomas. No pneumothorax.    Skeleton/soft tissues: Degenerative changes of the thoracic spine.      Impression: New left pleural effusion/basilar opacity.    --- End of Report ---            BETITO PATEL MD; Attending Radiologist  This document has been electronically signed. Aug 31 2023  7:20AM (08-30-23 @ 21:18)      CT  CT Chest No Cont:   ACC: [id number] EXAM:  CT CHEST   ORDERED BY: KIRSTEN AGN     PROCEDURE DATE:  08/31/2023          INTERPRETATION:  Reason for Exam:  Shortness of breath.  CT of the chest was performed from the thoracic inlet to the level of the   adrenal glands without contrast injection. Coronal and sagittal images   have been submitted.    Comparison: Concurrent plain film imaging. CT scan dated July 20, 2021    Findings:    Tubes/Lines: None    Lungs, Pleura, and Airways:Examination reveals left-sided pleural   effusion with an area of atelectasis. Within the atelectatic lung within   the left lung base, areas of calcifications are seen. Calcifications are   seen within the right lower lung field. Atelectasis is seen within the   inferior aspect of the lingula.    Mediastinum/Lymph Nodes:Reactive mediastinal lymph nodes.    Heart/Great Vessels: Heart size is normal. Coronary calcifications. The   patient is status post median sternotomy. The patient has a bovine arch   which is a normal variant. The great vessels are atherosclerotic.    Abdomen: Partially imaged simple cyst off the upper pole the right kidney   measuring 3 cm in AP diameter. Hypertrophy of the adrenal glands without   change with areas of calcification.    Bones and soft tissues: Bony demineralization.    IMPRESSION:    Small left-sided pleural effusion with atelectasis.    Pleural-based as well as parenchymal infiltrates lower lung field   calcifications bilaterally.    Simple cyst within the right kidney. Adrenal calcifications.    Findings likely sequelae of prior granulomatous disease.    --- End of Report ---            MAIKOL VALERIO MD; Attending Interventional Radiologist  This document has been electronically signed. Sep  1 2023 10:09AM (08-31-23 @ 16:51)      CARDIOLOGY TESTING  12 Lead ECG:   Ventricular Rate 83 BPM    Atrial Rate 83 BPM    P-R Interval 136 ms    QRS Duration 92 ms    Q-T Interval 356 ms    QTC Calculation(Bazett) 418 ms    P Axis 48 degrees    R Axis 6 degrees    T Axis 77 degrees    Diagnosis Line Normal sinus rhythm  Nonspecific T wave abnormality  Possible Left atrial enlargement  Borderline ECG    Confirmed by Abraham Reyna ([id number]) on 8/31/2023 2:09:01 PM (08-31-23 @ 13:38)  12 Lead ECG:   Ventricular Rate 80 BPM    Atrial Rate 80 BPM    P-R Interval 146 ms    QRS Duration 88 ms    Q-T Interval 368 ms    QTC Calculation(Bazett) 424 ms    P Axis 67 degrees    R Axis 21 degrees    T Axis 51 degrees    Diagnosis Line Normal sinus rhythm  Nonspecific ST abnormality  Abnormal ECG    Confirmed by Abraham Reyna ([id number]) on 8/31/2023 3:48:32 PM (08-30-23 @ 22:05)      MEDICATIONS  aspirin  chewable 81 Oral daily  clopidogrel Tablet 75 Oral daily  fenofibrate Tablet 145 Oral daily  heparin   Injectable 5000 SubCutaneous every 12 hours  isosorbide   mononitrate ER Tablet (IMDUR) 60 Oral daily  losartan 100 Oral daily  metoprolol tartrate 25 Oral two times a day  NIFEdipine XL 30 Oral daily  pantoprazole    Tablet 40 Oral before breakfast  tamsulosin 0.4 Oral at bedtime      Weight  Weight (kg): 61.3 (08-31-23 @ 22:57)    ANTIBIOTICS:      ALLERGIES:  penicillin (Rash)  PC Pen VK (Rash)  clindamycin (Rash)      ASSESSMENT  77-year-old male past medical history hypertension, hyperlipidemia, diabetes, CKD,TIAs, stroke in 2020, CAD status post CABG cardiologist Dr. Molina presenting to ED for evaluation of intermittent left-sided chest pressure since yesterday, 9/10 in intensity, non exertional, radiating to the arm, associated with SOB, waned on its own, not wosening.  Patient also endorsing when he felt room spinning dizziness earlier today that has since resolved and CT scan of the head was ordered which was negative. Patient was given aspirin and nitro by EMS with relief. He has hx of PAD and endorses that his legs are hurting at times.  Denies fever, chills, cough, syncope, lower extremity swelling, calf pain, headaches, no new weakness in the body.    IMPRESSION  #    #Obesity BMI (kg/m2): 24.7  #DM   #Abx allergy: penicillin (Rash)  PC Pen VK (Rash)  clindamycin (Rash)        RECOMMENDATIONS  This is a preliminary incomplete pended note, all final recommendations to follow after interview and examination of the patient.    Spectra [phone]     MICHELLEERICK  77y, Male  Allergy: penicillin (Rash)  PC Pen VK (Rash)  clindamycin (Rash)      CHIEF COMPLAINT: Chest pain (01 Sep 2023 11:17)      LOS  2d    HPI:  77-year-old male past medical history hypertension, hyperlipidemia, diabetes, CKD,TIAs, stroke in 2020, CAD status post CABG cardiologist Dr. Molina presenting to ED for evaluation of intermittent left-sided chest pressure since yesterday, 9/10 in intensity, non exertional, radiating to the arm, associated with SOB, waned on its own, not wosening.  Patient also endorsing when he felt room spinning dizziness earlier today that has since resolved and CT scan of the head was ordered which was negative. Patient was given aspirin and nitro by EMS with relief. He has hx of PAD and endorses that his legs are hurting at times.  Denies fever, chills, cough, syncope, lower extremity swelling, calf pain, headaches, no new weakness in the body.    Vital Signs Last 24 Hrs  T(C): 36.9 (30 Aug 2023 19:37), Max: 36.9 (30 Aug 2023 19:37)  T(F): 98.5 (30 Aug 2023 19:37), Max: 98.5 (30 Aug 2023 19:37)  HR: 86 (30 Aug 2023 19:37) (86 - 86)  BP: 179/90 (30 Aug 2023 19:37) (179/90 - 179/90)  RR: 18 (30 Aug 2023 19:37) (18 - 18)  SpO2: 98% (30 Aug 2023 19:37) (98% - 98%)  O2 Parameters below as of 30 Aug 2023 19:37  Patient On (Oxygen Delivery Method): room air         (30 Aug 2023 23:25)      INFECTIOUS DISEASE HISTORY:  History as above.  ID consutled for antibiotic management.   Reports feeling ill 3 days prior to admission.   Had recent cold-like illness 2 weeks prior.   No recent travel.   No history of possible aspiration.       PAST MEDICAL & SURGICAL HISTORY:  CAD (coronary artery disease)      HTN (hypertension)      Depression      Anxiety      Diabetes  niddm      Angina pectoris      BPH (benign prostatic hyperplasia)      GERD (gastroesophageal reflux disease)      CVA (cerebral vascular accident)      History of appendectomy      Bilateral cataracts      History of heart bypass surgery          FAMILY HISTORY      SOCIAL HISTORY  Social History:  former tobacco use 2ppd [id number] = 50 pack year smoker, quit ~1985  rare EtOH use   no illicit drug use  lives at home w wife, benefits from wife's HHA  walker at baseline (07 Jul 2023 20:17)        ROS  General: Denies rigors, nightsweats  HEENT: Denies headache, rhinorrhea, sore throat, eye pain  CV: Denies CP, palpitations  PULM: Denies wheezing, hemoptysis  GI: Denies hematemesis, hematochezia, melena  : Denies discharge, hematuria  MSK: Denies arthralgias, myalgias  SKIN: Denies rash, lesions  NEURO: Denies paresthesias, weakness  PSYCH: Denies depression, anxiety    VITALS:  T(F): 98.7, Max: 102.3 (08-31-23 @ 20:01)  HR: 61  BP: 105/54  RR: 18Vital Signs Last 24 Hrs  T(C): 37.1 (01 Sep 2023 13:37), Max: 39.1 (31 Aug 2023 20:01)  T(F): 98.7 (01 Sep 2023 13:37), Max: 102.3 (31 Aug 2023 20:01)  HR: 61 (01 Sep 2023 13:37) (61 - 90)  BP: 105/54 (01 Sep 2023 13:37) (105/54 - 139/62)  BP(mean): 82 (31 Aug 2023 15:18) (82 - 82)  RR: 18 (01 Sep 2023 13:37) (18 - 18)  SpO2: 93% (31 Aug 2023 21:08) (93% - 95%)    Parameters below as of 31 Aug 2023 21:08  Patient On (Oxygen Delivery Method): room air        PHYSICAL EXAM:  Gen: NAD, resting in bed  HEENT: Normocephalic, atraumatic  Neck: supple, no lymphadenopathy  CV: Regular rate & regular rhythm  Lungs: decreased BS at bases, no fremitus  Abdomen: Soft, BS present  Ext: Warm, well perfused  Neuro: non focal, awake  Skin: no rash, no erythema  Lines: no phlebitis    TESTS & MEASUREMENTS:                        12.7   10.14 )-----------( 244      ( 01 Sep 2023 05:42 )             40.8     09-01    143  |  107  |  31<H>  ----------------------------<  122<H>  4.5   |  22  |  2.1<H>    Ca    9.3      01 Sep 2023 05:42  Mg     2.3     09-01    TPro  6.3  /  Alb  4.1  /  TBili  0.3  /  DBili  x   /  AST  13  /  ALT  10  /  AlkPhos  36  08-30      LIVER FUNCTIONS - ( 30 Aug 2023 21:00 )  Alb: 4.1 g/dL / Pro: 6.3 g/dL / ALK PHOS: 36 U/L / ALT: 10 U/L / AST: 13 U/L / GGT: x           Urinalysis Basic - ( 01 Sep 2023 05:42 )    Color: x / Appearance: x / SG: x / pH: x  Gluc: 122 mg/dL / Ketone: x  / Bili: x / Urobili: x   Blood: x / Protein: x / Nitrite: x   Leuk Esterase: x / RBC: x / WBC x   Sq Epi: x / Non Sq Epi: x / Bacteria: x        Culture - Blood (collected 04-14-23 @ 13:49)  Source: .Blood Blood-Peripheral  Final Report (04-19-23 @ 23:00):    No Growth Final    Culture - Urine (collected 04-14-23 @ 09:00)  Source: Clean Catch Clean Catch (Midstream)  Final Report (04-15-23 @ 13:51):    <10,000 CFU/mL Normal Urogenital Clau    Culture - Blood (collected 04-13-23 @ 21:38)  Source: .Blood Blood-Peripheral  Final Report (04-19-23 @ 03:01):    No Growth Final    Culture - Blood (collected 04-13-23 @ 21:38)  Source: .Blood Blood-Peripheral  Final Report (04-19-23 @ 03:01):    No Growth Final    Culture - Blood (collected 12-08-22 @ 07:30)  Source: .Blood None  Final Report (12-13-22 @ 18:00):    No Growth Final    Culture - Blood (collected 12-08-22 @ 07:30)  Source: .Blood None  Final Report (12-13-22 @ 18:00):    No Growth Final    Culture - Blood (collected 10-24-22 @ 08:39)  Source: .Blood Blood-Venous  Final Report (10-29-22 @ 14:00):    No Growth Final            INFECTIOUS DISEASES TESTING  Procalcitonin, Serum: 0.07 ng/mL (04-14-23 @ 13:46)  COVID-19 PCR: NotDetec (10-26-22 @ 14:25)  COVID-19 PCR: NotDetec (10-23-22 @ 16:09)  COVID-19 PCR: NotDetec (10-13-22 @ 17:00)      RADIOLOGY & ADDITIONAL TESTS:  I have personally reviewed the last Chest xray  CXR  Xray Chest 1 View- PORTABLE-Urgent:   ACC: [id number] EXAM:  XR CHEST PORTABLE URGENT 1V   ORDERED BY: KATHY SANDOVAL     PROCEDURE DATE:  08/30/2023          INTERPRETATION:  Clinical History / Reason for exam: Chest pain    Comparison : Chest radiograph 7/7/2023.    Technique/Positioning: Frontal view.    Findings:    Support devices: None. Telemetry leads    Cardiac/mediastinum/hilum: Poststernotomy with partially obscured cardiac   silhouette. Cardiac loop monitor.    Lung parenchyma/Pleura: Left pleural effusion/basilar opacity. Several   calcified granulomas. No pneumothorax.    Skeleton/soft tissues: Degenerative changes of the thoracic spine.      Impression: New left pleural effusion/basilar opacity.    --- End of Report ---            BETITO PATEL MD; Attending Radiologist  This document has been electronically signed. Aug 31 2023  7:20AM (08-30-23 @ 21:18)      CT  CT Chest No Cont:   ACC: [id number] EXAM:  CT CHEST   ORDERED BY: KIRSTEN GAN     PROCEDURE DATE:  08/31/2023          INTERPRETATION:  Reason for Exam:  Shortness of breath.  CT of the chest was performed from the thoracic inlet to the level of the   adrenal glands without contrast injection. Coronal and sagittal images   have been submitted.    Comparison: Concurrent plain film imaging. CT scan dated July 20, 2021    Findings:    Tubes/Lines: None    Lungs, Pleura, and Airways:Examination reveals left-sided pleural   effusion with an area of atelectasis. Within the atelectatic lung within   the left lung base, areas of calcifications are seen. Calcifications are   seen within the right lower lung field. Atelectasis is seen within the   inferior aspect of the lingula.    Mediastinum/Lymph Nodes:Reactive mediastinal lymph nodes.    Heart/Great Vessels: Heart size is normal. Coronary calcifications. The   patient is status post median sternotomy. The patient has a bovine arch   which is a normal variant. The great vessels are atherosclerotic.    Abdomen: Partially imaged simple cyst off the upper pole the right kidney   measuring 3 cm in AP diameter. Hypertrophy of the adrenal glands without   change with areas of calcification.    Bones and soft tissues: Bony demineralization.    IMPRESSION:    Small left-sided pleural effusion with atelectasis.    Pleural-based as well as parenchymal infiltrates lower lung field   calcifications bilaterally.    Simple cyst within the right kidney. Adrenal calcifications.    Findings likely sequelae of prior granulomatous disease.    --- End of Report ---            MAIKOL VALERIO MD; Attending Interventional Radiologist  This document has been electronically signed. Sep  1 2023 10:09AM (08-31-23 @ 16:51)      CARDIOLOGY TESTING  12 Lead ECG:   Ventricular Rate 83 BPM    Atrial Rate 83 BPM    P-R Interval 136 ms    QRS Duration 92 ms    Q-T Interval 356 ms    QTC Calculation(Bazett) 418 ms    P Axis 48 degrees    R Axis 6 degrees    T Axis 77 degrees    Diagnosis Line Normal sinus rhythm  Nonspecific T wave abnormality  Possible Left atrial enlargement  Borderline ECG    Confirmed by Abraham Reyna ([id number]) on 8/31/2023 2:09:01 PM (08-31-23 @ 13:38)  12 Lead ECG:   Ventricular Rate 80 BPM    Atrial Rate 80 BPM    P-R Interval 146 ms    QRS Duration 88 ms    Q-T Interval 368 ms    QTC Calculation(Bazett) 424 ms    P Axis 67 degrees    R Axis 21 degrees    T Axis 51 degrees    Diagnosis Line Normal sinus rhythm  Nonspecific ST abnormality  Abnormal ECG    Confirmed by Abraham Reyna ([id number]) on 8/31/2023 3:48:32 PM (08-30-23 @ 22:05)      MEDICATIONS  aspirin  chewable 81 Oral daily  clopidogrel Tablet 75 Oral daily  fenofibrate Tablet 145 Oral daily  heparin   Injectable 5000 SubCutaneous every 12 hours  isosorbide   mononitrate ER Tablet (IMDUR) 60 Oral daily  losartan 100 Oral daily  metoprolol tartrate 25 Oral two times a day  NIFEdipine XL 30 Oral daily  pantoprazole    Tablet 40 Oral before breakfast  tamsulosin 0.4 Oral at bedtime      Weight  Weight (kg): 61.3 (08-31-23 @ 22:57)    ANTIBIOTICS:      ALLERGIES:  penicillin (Rash)  PC Pen VK (Rash)  clindamycin (Rash)      ASSESSMENT  77-year-old male past medical history hypertension, hyperlipidemia, diabetes, CKD,TIAs, stroke in 2020, CAD status post CABG cardiologist Dr. Molina presenting to ED for evaluation of intermittent left-sided chest pressure since yesterday, 9/10 in intensity, non exertional, radiating to the arm, associated with SOB    IMPRESSION  #Severe Sepsis present on admission   #CAP with small parapneumonic effusion   - CT Chest No Cont (08.31.23 @ 16:51): Small left-sided pleural effusion with atelectasis. Pleural-based as well as parenchymal infiltrates lower lung field  calcifications bilaterally. Simple cyst within the right kidney. Adrenal calcifications  Findings likely sequelae of prior granulomatous disease.    #CKD  #CAD s/p CABG    #Obesity BMI (kg/m2): 24.7  #DM   #Abx allergy: penicillin (Rash)  PC Pen VK (Rash)  clindamycin (Rash)    RECOMMENDATIONS  - Ceftriaxone 2g daily (allergies reviewed) and azithromycin 500 mg q 24 hours for CAP   - check urine legionella and urine strep   - trend fever curve   - follow-up blood cx      Please call or message on [employer] Teams if with any questions.  Spectra [phone]

## 2023-09-02 LAB
ANION GAP SERPL CALC-SCNC: 12 MMOL/L — SIGNIFICANT CHANGE UP (ref 7–14)
BUN SERPL-MCNC: 33 MG/DL — HIGH (ref 10–20)
CALCIUM SERPL-MCNC: 9.6 MG/DL — SIGNIFICANT CHANGE UP (ref 8.4–10.5)
CHLORIDE SERPL-SCNC: 106 MMOL/L — SIGNIFICANT CHANGE UP (ref 98–110)
CO2 SERPL-SCNC: 22 MMOL/L — SIGNIFICANT CHANGE UP (ref 17–32)
CREAT SERPL-MCNC: 1.9 MG/DL — HIGH (ref 0.7–1.5)
EGFR: 36 ML/MIN/1.73M2 — LOW
GLUCOSE SERPL-MCNC: 117 MG/DL — HIGH (ref 70–99)
HCT VFR BLD CALC: 39.4 % — LOW (ref 42–52)
HGB BLD-MCNC: 12.6 G/DL — LOW (ref 14–18)
MAGNESIUM SERPL-MCNC: 2.4 MG/DL — SIGNIFICANT CHANGE UP (ref 1.8–2.4)
MCHC RBC-ENTMCNC: 28.8 PG — SIGNIFICANT CHANGE UP (ref 27–31)
MCHC RBC-ENTMCNC: 32 G/DL — SIGNIFICANT CHANGE UP (ref 32–37)
MCV RBC AUTO: 90.2 FL — SIGNIFICANT CHANGE UP (ref 80–94)
NRBC # BLD: 0 /100 WBCS — SIGNIFICANT CHANGE UP (ref 0–0)
PLATELET # BLD AUTO: 290 K/UL — SIGNIFICANT CHANGE UP (ref 130–400)
PMV BLD: 10.7 FL — HIGH (ref 7.4–10.4)
POTASSIUM SERPL-MCNC: 4.5 MMOL/L — SIGNIFICANT CHANGE UP (ref 3.5–5)
POTASSIUM SERPL-SCNC: 4.5 MMOL/L — SIGNIFICANT CHANGE UP (ref 3.5–5)
RAPID RVP RESULT: SIGNIFICANT CHANGE UP
RBC # BLD: 4.37 M/UL — LOW (ref 4.7–6.1)
RBC # FLD: 14.6 % — HIGH (ref 11.5–14.5)
SARS-COV-2 RNA SPEC QL NAA+PROBE: SIGNIFICANT CHANGE UP
SODIUM SERPL-SCNC: 140 MMOL/L — SIGNIFICANT CHANGE UP (ref 135–146)
WBC # BLD: 10.29 K/UL — SIGNIFICANT CHANGE UP (ref 4.8–10.8)
WBC # FLD AUTO: 10.29 K/UL — SIGNIFICANT CHANGE UP (ref 4.8–10.8)

## 2023-09-02 PROCEDURE — 99232 SBSQ HOSP IP/OBS MODERATE 35: CPT

## 2023-09-02 RX ADMIN — HEPARIN SODIUM 5000 UNIT(S): 5000 INJECTION INTRAVENOUS; SUBCUTANEOUS at 05:41

## 2023-09-02 RX ADMIN — CLOPIDOGREL BISULFATE 75 MILLIGRAM(S): 75 TABLET, FILM COATED ORAL at 12:17

## 2023-09-02 RX ADMIN — Medication 30 MILLIGRAM(S): at 05:41

## 2023-09-02 RX ADMIN — PANTOPRAZOLE SODIUM 40 MILLIGRAM(S): 20 TABLET, DELAYED RELEASE ORAL at 05:42

## 2023-09-02 RX ADMIN — Medication 81 MILLIGRAM(S): at 12:17

## 2023-09-02 RX ADMIN — HEPARIN SODIUM 5000 UNIT(S): 5000 INJECTION INTRAVENOUS; SUBCUTANEOUS at 17:28

## 2023-09-02 RX ADMIN — ISOSORBIDE MONONITRATE 60 MILLIGRAM(S): 60 TABLET, EXTENDED RELEASE ORAL at 12:17

## 2023-09-02 RX ADMIN — Medication 650 MILLIGRAM(S): at 00:00

## 2023-09-02 RX ADMIN — Medication 145 MILLIGRAM(S): at 12:17

## 2023-09-02 RX ADMIN — Medication 25 MILLIGRAM(S): at 05:41

## 2023-09-02 RX ADMIN — AZITHROMYCIN 500 MILLIGRAM(S): 500 TABLET, FILM COATED ORAL at 14:19

## 2023-09-02 RX ADMIN — CEFTRIAXONE 100 MILLIGRAM(S): 500 INJECTION, POWDER, FOR SOLUTION INTRAMUSCULAR; INTRAVENOUS at 20:55

## 2023-09-02 RX ADMIN — TAMSULOSIN HYDROCHLORIDE 0.4 MILLIGRAM(S): 0.4 CAPSULE ORAL at 20:55

## 2023-09-02 RX ADMIN — LOSARTAN POTASSIUM 100 MILLIGRAM(S): 100 TABLET, FILM COATED ORAL at 05:41

## 2023-09-02 RX ADMIN — Medication 25 MILLIGRAM(S): at 17:28

## 2023-09-02 NOTE — PROGRESS NOTE ADULT - ASSESSMENT
76 y/o man w PMHx of HTN, HLD, DM, CAD, s/p 3vCABG 2017, EF 63% w mild LVH and G1DD in 10/2022, follows w Dr Stewart, loop recorder implanted ~2021, CVA x2 w residual L>R weakness, GERD, CKD3a, BPH presenting to ED for evaluation of intermittent left-sided chest pressure since yesterday, 9/10 in intensity, non exertional, radiating to the arm, associated with SOB that has resolved.    Assessment    ·	Atypical pleuritic chest pain possibly secondary to infectious process, doubt ACS, however now resolved  ·	UTI  ·	HTN  ·	CKD 3  ·	DM  ·	Hx of CAD CABG  ·	mod-severe aortic stenosis  ·	Hx of CVA x2  ·	BPH    Plan    ·	Has been having intermittent fevers now resolved, mild left pleural effusion, started on ceftriaxone/zithromax for possible CAP as per ID, however may be UTI considering positive UA, urine culture not done, ordered for today, f/u bladder scan had recent kidney bladder sono  ·	c/w nifedipine / imdur / metoprolol / losartan  ·	insulin if fs > 180  ·	S&S recs appreciated  ·	c/w tamsulosin    Pending: urine culture, bladder scan    # DVT PPX: heparin sq

## 2023-09-02 NOTE — PROGRESS NOTE ADULT - SUBJECTIVE AND OBJECTIVE BOX
SUBJECTIVE:    Patient is a 77y old Male who presents with a chief complaint of Chest pain (01 Sep 2023 16:17)      HPI:  77-year-old male past medical history hypertension, hyperlipidemia, diabetes, CKD,TIAs, stroke in 2020, CAD status post CABG cardiologist Dr. Molina presenting to ED for evaluation of intermittent left-sided chest pressure since yesterday, 9/10 in intensity, non exertional, radiating to the arm, associated with SOB, waned on its own, not wosening.  Patient also endorsing when he felt room spinning dizziness earlier today that has since resolved and CT scan of the head was ordered which was negative. Patient was given aspirin and nitro by EMS with relief. He has hx of PAD and endorses that his legs are hurting at times.  Denies fever, chills, cough, syncope, lower extremity swelling, calf pain, headaches, no new weakness in the body.    Vital Signs Last 24 Hrs  T(C): 36.9 (30 Aug 2023 19:37), Max: 36.9 (30 Aug 2023 19:37)  T(F): 98.5 (30 Aug 2023 19:37), Max: 98.5 (30 Aug 2023 19:37)  HR: 86 (30 Aug 2023 19:37) (86 - 86)  BP: 179/90 (30 Aug 2023 19:37) (179/90 - 179/90)  RR: 18 (30 Aug 2023 19:37) (18 - 18)  SpO2: 98% (30 Aug 2023 19:37) (98% - 98%)  O2 Parameters below as of 30 Aug 2023 19:37  Patient On (Oxygen Delivery Method): room air         (30 Aug 2023 23:25)      Currently admitted to medicine with the primary diagnosis of Chest pain    no complaints today, mild cough, no pain anywhere    Besides the pertinent positives and negatives described above, the ROS was within normal limits.    PAST MEDICAL & SURGICAL HISTORY  CAD (coronary artery disease)    HTN (hypertension)    Depression    Anxiety    Diabetes  niddm    Angina pectoris    BPH (benign prostatic hyperplasia)    GERD (gastroesophageal reflux disease)    CVA (cerebral vascular accident)    History of appendectomy    Bilateral cataracts    History of heart bypass surgery      SOCIAL HISTORY:    ALLERGIES:  penicillin (Rash)  PC Pen VK (Rash)  clindamycin (Rash)    MEDICATIONS:  STANDING MEDICATIONS  aspirin  chewable 81 milliGRAM(s) Oral daily  azithromycin   Tablet 500 milliGRAM(s) Oral daily  cefTRIAXone   IVPB      cefTRIAXone   IVPB 2000 milliGRAM(s) IV Intermittent every 24 hours  clopidogrel Tablet 75 milliGRAM(s) Oral daily  fenofibrate Tablet 145 milliGRAM(s) Oral daily  heparin   Injectable 5000 Unit(s) SubCutaneous every 12 hours  isosorbide   mononitrate ER Tablet (IMDUR) 60 milliGRAM(s) Oral daily  losartan 100 milliGRAM(s) Oral daily  metoprolol tartrate 25 milliGRAM(s) Oral two times a day  NIFEdipine XL 30 milliGRAM(s) Oral daily  pantoprazole    Tablet 40 milliGRAM(s) Oral before breakfast  tamsulosin 0.4 milliGRAM(s) Oral at bedtime    PRN MEDICATIONS  acetaminophen     Tablet .. 650 milliGRAM(s) Oral every 6 hours PRN  aluminum hydroxide/magnesium hydroxide/simethicone Suspension 30 milliLiter(s) Oral every 4 hours PRN  melatonin 3 milliGRAM(s) Oral at bedtime PRN    VITALS:   T(F): 97.5  HR: 82  BP: 116/59  RR: 18  SpO2: --    LABS:                        12.6   10.29 )-----------( 290      ( 02 Sep 2023 07:48 )             39.4     09-02    140  |  106  |  33<H>  ----------------------------<  117<H>  4.5   |  22  |  1.9<H>    Ca    9.6      02 Sep 2023 07:48  Mg     2.4     09-02        Urinalysis Basic - ( 02 Sep 2023 07:48 )    Color: x / Appearance: x / SG: x / pH: x  Gluc: 117 mg/dL / Ketone: x  / Bili: x / Urobili: x   Blood: x / Protein: x / Nitrite: x   Leuk Esterase: x / RBC: x / WBC x   Sq Epi: x / Non Sq Epi: x / Bacteria: x            Culture - Blood (collected 31 Aug 2023 06:21)  Source: .Blood None  Preliminary Report (01 Sep 2023 23:02):    No growth at 24 hours      CARDIAC MARKERS ( 01 Sep 2023 05:42 )  x     / 0.01 ng/mL / x     / x     / x          Chest pain      RADIOLOGY:    PHYSICAL EXAM:  General: WN/WD NAD  Neurology: A&Ox3  Head:  Normocephalic, atraumatic  ENT:  Mucosa moist, no ulcerations  Neck:  Supple, no sinuses or palpable masses  Lymphatic:  No palpable cervical, supraclavicular, axillary or inguinal adenopathy  Respiratory: CTA B/L  CV: RRR, S1S2, no murmur  Abdominal: Soft, NT, ND no palpable mass  MSK: No edema  Incisions: intact, no erythema or drainage    Intravenous access: yes  NG tube: no  Abbott Catheter: no

## 2023-09-02 NOTE — CHART NOTE - NSCHARTNOTEFT_GEN_A_CORE
RD consulted for MST score 2 or more -- Unsure was clicked for this question: Have you recently lost weight without trying?     Per H&P, pt is a 77-year-old male past medical history hypertension, hyperlipidemia, diabetes, CKD, TIAs, stroke in 2020, CAD status post CABG cardiologist Dr. Molina presenting to ED for evaluation of intermittent left-sided chest pressure.    Dosing weight is 61.3 KG; BMI 24.7 (normal range). UBW is 130#/59 KG -- checked 1 month ago per pt. No weight loss reported. Pt gained weight within 1 month compared to current dosing weight.     Current diet order is DASH/TLC. Pt consumed 100% eggs, 25% corn muffin, 100% coffee and 100% juice this morning. Pt has good appetite. Tolerating diet texture/consistency well. No nausea or vomiting reported. Skin is intact; no pressure injuries noted. No edema noted per flow sheet. Last BM last week per pt; no BMs documented; GI: no gastrointestinal signs/symptoms per flow sheet. Recommend to adjust BM regimen.     Pt is at low nutrition risk; will f/u in 7-10 days or prn. Consult not warranted at this time.    RD to remain available: Denise Vargas [phone] or TEAMS

## 2023-09-03 LAB
ALBUMIN SERPL ELPH-MCNC: 3.4 G/DL — LOW (ref 3.5–5.2)
ALP SERPL-CCNC: 36 U/L — SIGNIFICANT CHANGE UP (ref 30–115)
ALT FLD-CCNC: 18 U/L — SIGNIFICANT CHANGE UP (ref 0–41)
ANION GAP SERPL CALC-SCNC: 14 MMOL/L — SIGNIFICANT CHANGE UP (ref 7–14)
AST SERPL-CCNC: 19 U/L — SIGNIFICANT CHANGE UP (ref 0–41)
BASOPHILS # BLD AUTO: 0.06 K/UL — SIGNIFICANT CHANGE UP (ref 0–0.2)
BASOPHILS NFR BLD AUTO: 0.6 % — SIGNIFICANT CHANGE UP (ref 0–1)
BILIRUB SERPL-MCNC: <0.2 MG/DL — SIGNIFICANT CHANGE UP (ref 0.2–1.2)
BUN SERPL-MCNC: 41 MG/DL — HIGH (ref 10–20)
CALCIUM SERPL-MCNC: 9.5 MG/DL — SIGNIFICANT CHANGE UP (ref 8.4–10.5)
CHLORIDE SERPL-SCNC: 107 MMOL/L — SIGNIFICANT CHANGE UP (ref 98–110)
CO2 SERPL-SCNC: 21 MMOL/L — SIGNIFICANT CHANGE UP (ref 17–32)
CREAT SERPL-MCNC: 2.1 MG/DL — HIGH (ref 0.7–1.5)
EGFR: 32 ML/MIN/1.73M2 — LOW
EOSINOPHIL # BLD AUTO: 0.2 K/UL — SIGNIFICANT CHANGE UP (ref 0–0.7)
EOSINOPHIL NFR BLD AUTO: 2.2 % — SIGNIFICANT CHANGE UP (ref 0–8)
GLUCOSE BLDC GLUCOMTR-MCNC: 105 MG/DL — HIGH (ref 70–99)
GLUCOSE BLDC GLUCOMTR-MCNC: 111 MG/DL — HIGH (ref 70–99)
GLUCOSE BLDC GLUCOMTR-MCNC: 136 MG/DL — HIGH (ref 70–99)
GLUCOSE SERPL-MCNC: 196 MG/DL — HIGH (ref 70–99)
HCT VFR BLD CALC: 38.8 % — LOW (ref 42–52)
HGB BLD-MCNC: 12.1 G/DL — LOW (ref 14–18)
IMM GRANULOCYTES NFR BLD AUTO: 0.4 % — HIGH (ref 0.1–0.3)
LEGIONELLA AG UR QL: NEGATIVE — SIGNIFICANT CHANGE UP
LYMPHOCYTES # BLD AUTO: 1.77 K/UL — SIGNIFICANT CHANGE UP (ref 1.2–3.4)
LYMPHOCYTES # BLD AUTO: 19.1 % — LOW (ref 20.5–51.1)
MAGNESIUM SERPL-MCNC: 2.3 MG/DL — SIGNIFICANT CHANGE UP (ref 1.8–2.4)
MCHC RBC-ENTMCNC: 28.2 PG — SIGNIFICANT CHANGE UP (ref 27–31)
MCHC RBC-ENTMCNC: 31.2 G/DL — LOW (ref 32–37)
MCV RBC AUTO: 90.4 FL — SIGNIFICANT CHANGE UP (ref 80–94)
MONOCYTES # BLD AUTO: 0.66 K/UL — HIGH (ref 0.1–0.6)
MONOCYTES NFR BLD AUTO: 7.1 % — SIGNIFICANT CHANGE UP (ref 1.7–9.3)
NEUTROPHILS # BLD AUTO: 6.56 K/UL — HIGH (ref 1.4–6.5)
NEUTROPHILS NFR BLD AUTO: 70.6 % — SIGNIFICANT CHANGE UP (ref 42.2–75.2)
NRBC # BLD: 0 /100 WBCS — SIGNIFICANT CHANGE UP (ref 0–0)
PHOSPHATE SERPL-MCNC: 4.2 MG/DL — SIGNIFICANT CHANGE UP (ref 2.1–4.9)
PLATELET # BLD AUTO: 353 K/UL — SIGNIFICANT CHANGE UP (ref 130–400)
PMV BLD: 10.6 FL — HIGH (ref 7.4–10.4)
POTASSIUM SERPL-MCNC: 4.5 MMOL/L — SIGNIFICANT CHANGE UP (ref 3.5–5)
POTASSIUM SERPL-SCNC: 4.5 MMOL/L — SIGNIFICANT CHANGE UP (ref 3.5–5)
PROT SERPL-MCNC: 6.1 G/DL — SIGNIFICANT CHANGE UP (ref 6–8)
RBC # BLD: 4.29 M/UL — LOW (ref 4.7–6.1)
RBC # FLD: 14.8 % — HIGH (ref 11.5–14.5)
S PNEUM AG UR QL: NEGATIVE — SIGNIFICANT CHANGE UP
SODIUM SERPL-SCNC: 142 MMOL/L — SIGNIFICANT CHANGE UP (ref 135–146)
WBC # BLD: 9.29 K/UL — SIGNIFICANT CHANGE UP (ref 4.8–10.8)
WBC # FLD AUTO: 9.29 K/UL — SIGNIFICANT CHANGE UP (ref 4.8–10.8)

## 2023-09-03 PROCEDURE — 99232 SBSQ HOSP IP/OBS MODERATE 35: CPT

## 2023-09-03 RX ADMIN — TAMSULOSIN HYDROCHLORIDE 0.4 MILLIGRAM(S): 0.4 CAPSULE ORAL at 21:19

## 2023-09-03 RX ADMIN — Medication 145 MILLIGRAM(S): at 11:37

## 2023-09-03 RX ADMIN — ISOSORBIDE MONONITRATE 60 MILLIGRAM(S): 60 TABLET, EXTENDED RELEASE ORAL at 11:37

## 2023-09-03 RX ADMIN — PANTOPRAZOLE SODIUM 40 MILLIGRAM(S): 20 TABLET, DELAYED RELEASE ORAL at 06:03

## 2023-09-03 RX ADMIN — Medication 81 MILLIGRAM(S): at 11:37

## 2023-09-03 RX ADMIN — HEPARIN SODIUM 5000 UNIT(S): 5000 INJECTION INTRAVENOUS; SUBCUTANEOUS at 06:03

## 2023-09-03 RX ADMIN — Medication 30 MILLIGRAM(S): at 06:02

## 2023-09-03 RX ADMIN — LOSARTAN POTASSIUM 100 MILLIGRAM(S): 100 TABLET, FILM COATED ORAL at 06:02

## 2023-09-03 RX ADMIN — CEFTRIAXONE 100 MILLIGRAM(S): 500 INJECTION, POWDER, FOR SOLUTION INTRAMUSCULAR; INTRAVENOUS at 20:29

## 2023-09-03 RX ADMIN — AZITHROMYCIN 500 MILLIGRAM(S): 500 TABLET, FILM COATED ORAL at 11:37

## 2023-09-03 RX ADMIN — Medication 25 MILLIGRAM(S): at 06:02

## 2023-09-03 RX ADMIN — Medication 25 MILLIGRAM(S): at 17:21

## 2023-09-03 RX ADMIN — HEPARIN SODIUM 5000 UNIT(S): 5000 INJECTION INTRAVENOUS; SUBCUTANEOUS at 17:21

## 2023-09-03 RX ADMIN — CLOPIDOGREL BISULFATE 75 MILLIGRAM(S): 75 TABLET, FILM COATED ORAL at 11:37

## 2023-09-03 NOTE — PROGRESS NOTE ADULT - ASSESSMENT
78 y/o man w PMHx of HTN, HLD, DM, CAD, s/p 3vCABG 2017, EF 63% w mild LVH and G1DD in 10/2022, follows w Dr Stewart, loop recorder implanted ~2021, CVA x2 w residual L>R weakness, GERD, CKD3a, BPH presenting to ED for evaluation of intermittent left-sided chest pressure , 9/10 in intensity, non exertional, radiating to the arm, associated with SOB that has resolved.  Patient had fevers here and is now being treated for likely complicated UTI vs. asp pneumonia    Assessment    ·	Atypical pleuritic chest pain possibly secondary to pneumonia, doubt ACS, however now resolved  ·	Complicated UTI  ·	HTN  ·	CKD 3  ·	DM  ·	Hx of CAD CABG  ·	mod-severe aortic stenosis  ·	Hx of CVA x2  ·	BPH    Plan    ·	Has been having intermittent fevers now resolved, mild left pleural effusion, started on ceftriaxone/zithromax for possible CAP as per ID, however may be UTI considering positive UA, awaiting urine culture (however may be falsely negative as it was sent after receiving abx), f/u bladder scan, will order PT to get him to ambulate, uses walker at home  ·	c/w nifedipine / imdur / metoprolol / losartan  ·	insulin if fs > 180  ·	S&S recs appreciated  ·	c/w tamsulosin    Pending: urine culture, bladder scan, PT    # DVT PPX: heparin sq

## 2023-09-03 NOTE — PROGRESS NOTE ADULT - SUBJECTIVE AND OBJECTIVE BOX
SUBJECTIVE / OVERNIGHT EVENTS  No acute events reported. Patient been complaining chronically of sharp bilateral chest pain   MEDICATIONS  aspirin  chewable 81 milliGRAM(s) Oral daily  azithromycin   Tablet 500 milliGRAM(s) Oral daily  cefTRIAXone   IVPB 2000 milliGRAM(s) IV Intermittent every 24 hours  cefTRIAXone   IVPB      clopidogrel Tablet 75 milliGRAM(s) Oral daily  fenofibrate Tablet 145 milliGRAM(s) Oral daily  heparin   Injectable 5000 Unit(s) SubCutaneous every 12 hours  isosorbide   mononitrate ER Tablet (IMDUR) 60 milliGRAM(s) Oral daily  losartan 100 milliGRAM(s) Oral daily  metoprolol tartrate 25 milliGRAM(s) Oral two times a day  NIFEdipine XL 30 milliGRAM(s) Oral daily  pantoprazole    Tablet 40 milliGRAM(s) Oral before breakfast  tamsulosin 0.4 milliGRAM(s) Oral at bedtime    acetaminophen     Tablet .. 650 milliGRAM(s) Oral every 6 hours PRN Temp greater or equal to 38C (100.4F), Mild Pain (1 - 3)  aluminum hydroxide/magnesium hydroxide/simethicone Suspension 30 milliLiter(s) Oral every 4 hours PRN Dyspepsia  melatonin 3 milliGRAM(s) Oral at bedtime PRN Insomnia    VITALS /  EXAM    T(C): 37.2 (09-02-23 @ 20:38), Max: 37.2 (09-02-23 @ 20:38)  HR: 94 (09-02-23 @ 20:38) (68 - 94)  BP: 115/70 (09-02-23 @ 20:38) (111/55 - 125/60)  RR: 18 (09-02-23 @ 13:09) (18 - 18)  SpO2: --    General: AAO  Neurology: A&Ox3  Head:  Normocephalic, atraumatic  ENT:  Mucosa moist, no ulcerations  Neck:  Supple, no sinuses or palpable masses  Lymphatic:  No palpable cervical, supraclavicular, axillary or inguinal adenopathy  Respiratory: CTA B/L  CV: RRR, S1S2, no murmur  Abdominal: Soft, NT, ND no palpable mass  MSK: No edema  Incisions: intact, no erythema or drainage    Intravenous access: yes  NG tube: no  Abbott Catheter: no       I's & O's     09-01-23 @ 07:01  -  09-02-23 @ 07:00  --------------------------------------------------------  IN:    Oral Fluid: 650 mL  Total IN: 650 mL    OUT:    Voided (mL): 300 mL  Total OUT: 300 mL    Total NET: 350 mL      09-02-23 @ 07:01  -  09-03-23 @ 00:25  --------------------------------------------------------  IN:    Oral Fluid: 852 mL  Total IN: 852 mL    OUT:    Voided (mL): 1200 mL  Total OUT: 1200 mL    Total NET: -348 mL        LABS             12.6   10.29 )-----------( 290      ( 09-02-23 @ 07:48 )             39.4     140  |  106  |  33  -------------------------<  117   09-02-23 @ 07:48  4.5  |  22  |  1.9    Ca      9.6     09-02-23 @ 07:48  Mg     2.4     09-02-23 @ 07:48        Troponin T, Serum: 0.01 ng/mL (09-01-23 @ 05:42)  Troponin T, Serum: <0.01 ng/mL (08-31-23 @ 12:14)    CKMB Units: 1.6 ng/mL (08-31-23 @ 12:14)              Urinalysis Basic - ( 02 Sep 2023 07:48 )    Color: x / Appearance: x / SG: x / pH: x  Gluc: 117 mg/dL / Ketone: x  / Bili: x / Urobili: x   Blood: x / Protein: x / Nitrite: x   Leuk Esterase: x / RBC: x / WBC x   Sq Epi: x / Non Sq Epi: x / Bacteria: x      MICRO / IMAGING / CARDIOLOGY  Telemetry: Reviewed   EKG: Reviewed    CULTURES    Culture - Blood (collected 09-01-23 @ 12:10)  Source: .Blood None  Preliminary Report:    No growth at 24 hours    Culture - Blood (collected 08-31-23 @ 06:21)  Source: .Blood None  Preliminary Report:    No growth at 48 Hours      IMAGING    CARDIOLOGY

## 2023-09-03 NOTE — PROGRESS NOTE ADULT - SUBJECTIVE AND OBJECTIVE BOX
SUBJECTIVE:    Patient is a 77y old Male who presents with a chief complaint of Chest pain (03 Sep 2023 00:25)      HPI:  77-year-old male past medical history hypertension, hyperlipidemia, diabetes, CKD,TIAs, stroke in 2020, CAD status post CABG cardiologist Dr. Molina presenting to ED for evaluation of intermittent left-sided chest pressure since yesterday, 9/10 in intensity, non exertional, radiating to the arm, associated with SOB, waned on its own, not wosening.  Patient also endorsing when he felt room spinning dizziness earlier today that has since resolved and CT scan of the head was ordered which was negative. Patient was given aspirin and nitro by EMS with relief. He has hx of PAD and endorses that his legs are hurting at times.  Denies fever, chills, cough, syncope, lower extremity swelling, calf pain, headaches, no new weakness in the body.    Vital Signs Last 24 Hrs  T(C): 36.9 (30 Aug 2023 19:37), Max: 36.9 (30 Aug 2023 19:37)  T(F): 98.5 (30 Aug 2023 19:37), Max: 98.5 (30 Aug 2023 19:37)  HR: 86 (30 Aug 2023 19:37) (86 - 86)  BP: 179/90 (30 Aug 2023 19:37) (179/90 - 179/90)  RR: 18 (30 Aug 2023 19:37) (18 - 18)  SpO2: 98% (30 Aug 2023 19:37) (98% - 98%)  O2 Parameters below as of 30 Aug 2023 19:37  Patient On (Oxygen Delivery Method): room air         (30 Aug 2023 23:25)      Currently admitted to medicine with the primary diagnosis of Chest pain     no chest pain or shortness of breath, stating he has difficulty urinating at times, no pain    Besides the pertinent positives and negatives described above, the ROS was within normal limits.    PAST MEDICAL & SURGICAL HISTORY  CAD (coronary artery disease)    HTN (hypertension)    Depression    Anxiety    Diabetes  niddm    Angina pectoris    BPH (benign prostatic hyperplasia)    GERD (gastroesophageal reflux disease)    CVA (cerebral vascular accident)    History of appendectomy    Bilateral cataracts    History of heart bypass surgery      SOCIAL HISTORY:    ALLERGIES:  penicillin (Rash)  PC Pen VK (Rash)  clindamycin (Rash)    MEDICATIONS:  STANDING MEDICATIONS  aspirin  chewable 81 milliGRAM(s) Oral daily  azithromycin   Tablet 500 milliGRAM(s) Oral daily  cefTRIAXone   IVPB      cefTRIAXone   IVPB 2000 milliGRAM(s) IV Intermittent every 24 hours  clopidogrel Tablet 75 milliGRAM(s) Oral daily  fenofibrate Tablet 145 milliGRAM(s) Oral daily  heparin   Injectable 5000 Unit(s) SubCutaneous every 12 hours  isosorbide   mononitrate ER Tablet (IMDUR) 60 milliGRAM(s) Oral daily  losartan 100 milliGRAM(s) Oral daily  metoprolol tartrate 25 milliGRAM(s) Oral two times a day  NIFEdipine XL 30 milliGRAM(s) Oral daily  pantoprazole    Tablet 40 milliGRAM(s) Oral before breakfast  tamsulosin 0.4 milliGRAM(s) Oral at bedtime    PRN MEDICATIONS  acetaminophen     Tablet .. 650 milliGRAM(s) Oral every 6 hours PRN  aluminum hydroxide/magnesium hydroxide/simethicone Suspension 30 milliLiter(s) Oral every 4 hours PRN  melatonin 3 milliGRAM(s) Oral at bedtime PRN    VITALS:   T(F): 97.7  HR: 84  BP: 133/67  RR: 18  SpO2: --    LABS:                        12.1   9.29  )-----------( 353      ( 03 Sep 2023 06:41 )             38.8     09-03    142  |  107  |  41<H>  ----------------------------<  196<H>  4.5   |  21  |  2.1<H>    Ca    9.5      03 Sep 2023 06:41  Phos  4.2     09-03  Mg     2.3     09-03    TPro  6.1  /  Alb  3.4<L>  /  TBili  <0.2  /  DBili  x   /  AST  19  /  ALT  18  /  AlkPhos  36  09-03      Urinalysis Basic - ( 03 Sep 2023 06:41 )    Color: x / Appearance: x / SG: x / pH: x  Gluc: 196 mg/dL / Ketone: x  / Bili: x / Urobili: x   Blood: x / Protein: x / Nitrite: x   Leuk Esterase: x / RBC: x / WBC x   Sq Epi: x / Non Sq Epi: x / Bacteria: x            Culture - Blood (collected 01 Sep 2023 12:10)  Source: .Blood None  Preliminary Report (02 Sep 2023 23:01):    No growth at 24 hours          Chest pain      RADIOLOGY:    PHYSICAL EXAM:  General: WN/WD NAD  Neurology: A&Ox3, nonfocal, LANTIGUA x 4  Head:  Normocephalic, atraumatic  ENT:  Mucosa moist, no ulcerations  Neck:  Supple, no sinuses or palpable masses  Lymphatic:  No palpable cervical, supraclavicular, axillary or inguinal adenopathy  Respiratory: CTA B/L  CV: RRR, S1S2, no murmur  Abdominal: Soft, NT, ND no palpable mass, mild suprapubic discomfort on palpation  MSK: No edema  Incisions: intact, no erythema or drainage    Intravenous access: yes  NG tube: no  Abbott Catheter: no

## 2023-09-03 NOTE — PROGRESS NOTE ADULT - ASSESSMENT
76 y/o man w PMHx of HTN, HLD, DM, CAD, s/p 3vCABG 2017, EF 63% w mild LVH and G1DD in 10/2022, follows w Dr Stewart, loop recorder implanted ~2021, CVA x2 w residual L>R weakness, GERD, CKD3a, BPH presenting to ED for evaluation of pleuritic chest pain    Assessment    Atypical pleuritic chest pain possibly secondary to infectious process, doubt ACS, however now resolved  UTI  HTN  CKD 3  DM  Hx of CAD CABG  mod-severe aortic stenosis  Hx of CVA x2  BPH    Plan    Has been having intermittent fevers now resolved, mild left pleural effusion, started on ceftriaxone/zithromax for possible CAP as per ID, however may be UTI considering positive UA, urine culture not done, ordered for today, f/u bladder scan had recent kidney bladder sono  c/w nifedipine / imdur / metoprolol / losartan  insulin if fs > 180  S&S recs appreciated  c/w tamsulosin    Pending: urine culture, bladder scan

## 2023-09-04 LAB
ALBUMIN SERPL ELPH-MCNC: 3.5 G/DL — SIGNIFICANT CHANGE UP (ref 3.5–5.2)
ALP SERPL-CCNC: 37 U/L — SIGNIFICANT CHANGE UP (ref 30–115)
ALT FLD-CCNC: 18 U/L — SIGNIFICANT CHANGE UP (ref 0–41)
ANION GAP SERPL CALC-SCNC: 13 MMOL/L — SIGNIFICANT CHANGE UP (ref 7–14)
AST SERPL-CCNC: 19 U/L — SIGNIFICANT CHANGE UP (ref 0–41)
BASOPHILS # BLD AUTO: 0.07 K/UL — SIGNIFICANT CHANGE UP (ref 0–0.2)
BASOPHILS NFR BLD AUTO: 0.7 % — SIGNIFICANT CHANGE UP (ref 0–1)
BILIRUB SERPL-MCNC: 0.2 MG/DL — SIGNIFICANT CHANGE UP (ref 0.2–1.2)
BUN SERPL-MCNC: 43 MG/DL — HIGH (ref 10–20)
CALCIUM SERPL-MCNC: 9.8 MG/DL — SIGNIFICANT CHANGE UP (ref 8.4–10.5)
CHLORIDE SERPL-SCNC: 109 MMOL/L — SIGNIFICANT CHANGE UP (ref 98–110)
CO2 SERPL-SCNC: 23 MMOL/L — SIGNIFICANT CHANGE UP (ref 17–32)
CREAT SERPL-MCNC: 2.1 MG/DL — HIGH (ref 0.7–1.5)
EGFR: 32 ML/MIN/1.73M2 — LOW
EOSINOPHIL # BLD AUTO: 0.36 K/UL — SIGNIFICANT CHANGE UP (ref 0–0.7)
EOSINOPHIL NFR BLD AUTO: 3.8 % — SIGNIFICANT CHANGE UP (ref 0–8)
GLUCOSE BLDC GLUCOMTR-MCNC: 100 MG/DL — HIGH (ref 70–99)
GLUCOSE BLDC GLUCOMTR-MCNC: 113 MG/DL — HIGH (ref 70–99)
GLUCOSE BLDC GLUCOMTR-MCNC: 125 MG/DL — HIGH (ref 70–99)
GLUCOSE BLDC GLUCOMTR-MCNC: 126 MG/DL — HIGH (ref 70–99)
GLUCOSE SERPL-MCNC: 117 MG/DL — HIGH (ref 70–99)
HCT VFR BLD CALC: 38.4 % — LOW (ref 42–52)
HGB BLD-MCNC: 12 G/DL — LOW (ref 14–18)
IMM GRANULOCYTES NFR BLD AUTO: 0.5 % — HIGH (ref 0.1–0.3)
LYMPHOCYTES # BLD AUTO: 2.11 K/UL — SIGNIFICANT CHANGE UP (ref 1.2–3.4)
LYMPHOCYTES # BLD AUTO: 22 % — SIGNIFICANT CHANGE UP (ref 20.5–51.1)
MAGNESIUM SERPL-MCNC: 2.3 MG/DL — SIGNIFICANT CHANGE UP (ref 1.8–2.4)
MCHC RBC-ENTMCNC: 28.4 PG — SIGNIFICANT CHANGE UP (ref 27–31)
MCHC RBC-ENTMCNC: 31.3 G/DL — LOW (ref 32–37)
MCV RBC AUTO: 91 FL — SIGNIFICANT CHANGE UP (ref 80–94)
MONOCYTES # BLD AUTO: 0.62 K/UL — HIGH (ref 0.1–0.6)
MONOCYTES NFR BLD AUTO: 6.5 % — SIGNIFICANT CHANGE UP (ref 1.7–9.3)
NEUTROPHILS # BLD AUTO: 6.39 K/UL — SIGNIFICANT CHANGE UP (ref 1.4–6.5)
NEUTROPHILS NFR BLD AUTO: 66.5 % — SIGNIFICANT CHANGE UP (ref 42.2–75.2)
NRBC # BLD: 0 /100 WBCS — SIGNIFICANT CHANGE UP (ref 0–0)
PLATELET # BLD AUTO: 340 K/UL — SIGNIFICANT CHANGE UP (ref 130–400)
PMV BLD: 10.4 FL — SIGNIFICANT CHANGE UP (ref 7.4–10.4)
POTASSIUM SERPL-MCNC: 4.9 MMOL/L — SIGNIFICANT CHANGE UP (ref 3.5–5)
POTASSIUM SERPL-SCNC: 4.9 MMOL/L — SIGNIFICANT CHANGE UP (ref 3.5–5)
PROT SERPL-MCNC: 6.2 G/DL — SIGNIFICANT CHANGE UP (ref 6–8)
RBC # BLD: 4.22 M/UL — LOW (ref 4.7–6.1)
RBC # FLD: 14.6 % — HIGH (ref 11.5–14.5)
SODIUM SERPL-SCNC: 145 MMOL/L — SIGNIFICANT CHANGE UP (ref 135–146)
WBC # BLD: 9.6 K/UL — SIGNIFICANT CHANGE UP (ref 4.8–10.8)
WBC # FLD AUTO: 9.6 K/UL — SIGNIFICANT CHANGE UP (ref 4.8–10.8)

## 2023-09-04 PROCEDURE — 99232 SBSQ HOSP IP/OBS MODERATE 35: CPT

## 2023-09-04 RX ADMIN — AZITHROMYCIN 500 MILLIGRAM(S): 500 TABLET, FILM COATED ORAL at 11:53

## 2023-09-04 RX ADMIN — ISOSORBIDE MONONITRATE 60 MILLIGRAM(S): 60 TABLET, EXTENDED RELEASE ORAL at 11:53

## 2023-09-04 RX ADMIN — PANTOPRAZOLE SODIUM 40 MILLIGRAM(S): 20 TABLET, DELAYED RELEASE ORAL at 06:22

## 2023-09-04 RX ADMIN — TAMSULOSIN HYDROCHLORIDE 0.4 MILLIGRAM(S): 0.4 CAPSULE ORAL at 21:14

## 2023-09-04 RX ADMIN — Medication 25 MILLIGRAM(S): at 17:03

## 2023-09-04 RX ADMIN — CEFTRIAXONE 100 MILLIGRAM(S): 500 INJECTION, POWDER, FOR SOLUTION INTRAMUSCULAR; INTRAVENOUS at 21:14

## 2023-09-04 RX ADMIN — HEPARIN SODIUM 5000 UNIT(S): 5000 INJECTION INTRAVENOUS; SUBCUTANEOUS at 17:03

## 2023-09-04 RX ADMIN — Medication 81 MILLIGRAM(S): at 11:53

## 2023-09-04 RX ADMIN — Medication 145 MILLIGRAM(S): at 11:53

## 2023-09-04 RX ADMIN — HEPARIN SODIUM 5000 UNIT(S): 5000 INJECTION INTRAVENOUS; SUBCUTANEOUS at 06:21

## 2023-09-04 RX ADMIN — LOSARTAN POTASSIUM 100 MILLIGRAM(S): 100 TABLET, FILM COATED ORAL at 06:21

## 2023-09-04 RX ADMIN — Medication 30 MILLIGRAM(S): at 06:22

## 2023-09-04 RX ADMIN — Medication 25 MILLIGRAM(S): at 06:21

## 2023-09-04 RX ADMIN — CLOPIDOGREL BISULFATE 75 MILLIGRAM(S): 75 TABLET, FILM COATED ORAL at 11:56

## 2023-09-04 NOTE — PROGRESS NOTE ADULT - ASSESSMENT
78 y/o man w PMHx of HTN, HLD, DM, CAD, s/p 3vCABG 2017, EF 63% w mild LVH and G1DD in 10/2022, follows w Dr Stewart, loop recorder implanted ~2021, CVA x2 w residual L>R weakness, GERD, CKD3a, BPH presenting to ED for chest pain     #Chest pain, pleuritic. Possible CAP  #Hx of CAD sp CABG  #Left basilar opacity vs pleural effusion   #Hx of PAD  - Patient still reportign chest pain. unrelated to physical activity  -Troponins negative x2  -Echo 07/2023: 54%EF  - CT chest: Small left-sided pleural effusion with atelectasis.Pleural-based as well as parenchymal infiltrates lower lung field calcifications bilaterally.   - wbc today: 9.4   - c/w metoprolol tartrate 25 mg BID  - c/w imdur 60mg   - losartan 100 mg  - Nifedipine ER 30 mg  - c/w aspirin and clopidogre  - c/w with ceftriaxone and azithromycin. Stop azithromycin tmrw  - f/u blood cultures  - PT evaluation      #Hx  CVA x 2 with bilateral residual weakness, multiple TIAs  CT head negative  - no new motor weakness in the body as per patient  - f/u with speech and swallow's  recommendations    #HTN/HLD  - Continue home meds    #DM  - Cont insulin regimen    #CKD III b  No need for urgent RRT  Monitor Cr/BUN Electrolytes including Phosphorus  Avoid Nephro toxins    DVT PPX, heparin      Family discussion: dw pt regarding eval for chest pain  Disposition: Home

## 2023-09-04 NOTE — PROGRESS NOTE ADULT - SUBJECTIVE AND OBJECTIVE BOX
SUBJECTIVE / OVERNIGHT EVENTS  Patient was seen and examined. Patient reporting improvement in chest pain . He reported no bowel movements yesterday. No overnight events    MEDICATIONS  aspirin  chewable 81 milliGRAM(s) Oral daily  azithromycin   Tablet 500 milliGRAM(s) Oral daily  cefTRIAXone   IVPB      cefTRIAXone   IVPB 2000 milliGRAM(s) IV Intermittent every 24 hours  clopidogrel Tablet 75 milliGRAM(s) Oral daily  fenofibrate Tablet 145 milliGRAM(s) Oral daily  heparin   Injectable 5000 Unit(s) SubCutaneous every 12 hours  isosorbide   mononitrate ER Tablet (IMDUR) 60 milliGRAM(s) Oral daily  losartan 100 milliGRAM(s) Oral daily  metoprolol tartrate 25 milliGRAM(s) Oral two times a day  NIFEdipine XL 30 milliGRAM(s) Oral daily  pantoprazole    Tablet 40 milliGRAM(s) Oral before breakfast  tamsulosin 0.4 milliGRAM(s) Oral at bedtime    acetaminophen     Tablet .. 650 milliGRAM(s) Oral every 6 hours PRN Temp greater or equal to 38C (100.4F), Mild Pain (1 - 3)  aluminum hydroxide/magnesium hydroxide/simethicone Suspension 30 milliLiter(s) Oral every 4 hours PRN Dyspepsia  melatonin 3 milliGRAM(s) Oral at bedtime PRN Insomnia    VITALS /  EXAM    T(C): 36.3 (09-04-23 @ 05:00), Max: 36.4 (09-03-23 @ 14:38)  HR: 73 (09-04-23 @ 05:00) (73 - 94)  BP: 155/74 (09-04-23 @ 05:00) (107/69 - 188/84)  RR: 18 (09-03-23 @ 14:38) (18 - 18)  SpO2: --  POCT Blood Glucose.: 100 mg/dL (09-04-23 @ 07:48)  POCT Blood Glucose.: 136 mg/dL (09-03-23 @ 16:39)  POCT Blood Glucose.: 111 mg/dL (09-03-23 @ 11:46)    GENERAL: AAO   CHEST/LUNG: Clear to auscultation bilaterally; No wheezes, rales or rhonchi  HEART: Regular rate and rhythm; No murmurs, rubs, or gallops  ABDOMEN: Soft, Nontender, Nondistended; Bowel sounds present, no masses.  EXTREMITIES:  2+ Peripheral Pulses, No clubbing, cyanosis, or edema  Neuro: residual right sided weakness    I's & O's     09-03-23 @ 07:01  -  09-04-23 @ 07:00  --------------------------------------------------------  IN:    Oral Fluid: 561 mL  Total IN: 561 mL    OUT:    Voided (mL): 200 mL  Total OUT: 200 mL    Total NET: 361 mL        LABS             12.0   9.60  )-----------( 340      ( 09-04-23 @ 06:56 )             38.4     145  |  109  |  43  -------------------------<  117   09-04-23 @ 06:56  4.9  |  23  |  2.1    Ca      9.8     09-04-23 @ 06:56  Phos   4.2     09-03-23 @ 06:41  Mg     2.3     09-04-23 @ 06:56    TPro  6.2  /  Alb  3.5  /  TBili  0.2  /  DBili  x   /  AST  19  /  ALT  18  /  AlkPhos  37  /  GGT  x     09-04-23 @ 06:56                    Urinalysis Basic - ( 04 Sep 2023 06:56 )    Color: x / Appearance: x / SG: x / pH: x  Gluc: 117 mg/dL / Ketone: x  / Bili: x / Urobili: x   Blood: x / Protein: x / Nitrite: x   Leuk Esterase: x / RBC: x / WBC x   Sq Epi: x / Non Sq Epi: x / Bacteria: x      MICRO / IMAGING / CARDIOLOGY  Telemetry: Reviewed   EKG: Reviewed    CULTURES    Culture - Urine (collected 09-02-23 @ 20:05)  Source: Clean Catch Clean Catch (Midstream)  Preliminary Report:    10,000 - 49,000 CFU/mL Gram Positive Cocci in Pairs and Chains    Culture - Blood (collected 09-01-23 @ 12:10)  Source: .Blood None  Preliminary Report:    No growth at 48 Hours    Culture - Blood (collected 08-31-23 @ 06:21)  Source: .Blood None  Preliminary Report:    No growth at 72 Hours      IMAGING    CARDIOLOGY

## 2023-09-04 NOTE — PROGRESS NOTE ADULT - SUBJECTIVE AND OBJECTIVE BOX
SUBJECTIVE:    Patient is a 77y old Male who presents with a chief complaint of Chest pain (04 Sep 2023 10:50)      HPI:  77-year-old male past medical history hypertension, hyperlipidemia, diabetes, CKD,TIAs, stroke in 2020, CAD status post CABG cardiologist Dr. Molina presenting to ED for evaluation of intermittent left-sided chest pressure since yesterday, 9/10 in intensity, non exertional, radiating to the arm, associated with SOB, waned on its own, not wosening.  Patient also endorsing when he felt room spinning dizziness earlier today that has since resolved and CT scan of the head was ordered which was negative. Patient was given aspirin and nitro by EMS with relief. He has hx of PAD and endorses that his legs are hurting at times.  Denies fever, chills, cough, syncope, lower extremity swelling, calf pain, headaches, no new weakness in the body.    Vital Signs Last 24 Hrs  T(C): 36.9 (30 Aug 2023 19:37), Max: 36.9 (30 Aug 2023 19:37)  T(F): 98.5 (30 Aug 2023 19:37), Max: 98.5 (30 Aug 2023 19:37)  HR: 86 (30 Aug 2023 19:37) (86 - 86)  BP: 179/90 (30 Aug 2023 19:37) (179/90 - 179/90)  RR: 18 (30 Aug 2023 19:37) (18 - 18)  SpO2: 98% (30 Aug 2023 19:37) (98% - 98%)  O2 Parameters below as of 30 Aug 2023 19:37  Patient On (Oxygen Delivery Method): room air         (30 Aug 2023 23:25)      Currently admitted to medicine with the primary diagnosis of Chest pain     no medical complaints, no pain    Besides the pertinent positives and negatives described above, the ROS was within normal limits.    PAST MEDICAL & SURGICAL HISTORY  CAD (coronary artery disease)    HTN (hypertension)    Depression    Anxiety    Diabetes  niddm    Angina pectoris    BPH (benign prostatic hyperplasia)    GERD (gastroesophageal reflux disease)    CVA (cerebral vascular accident)    History of appendectomy    Bilateral cataracts    History of heart bypass surgery      SOCIAL HISTORY:    ALLERGIES:  penicillin (Rash)  PC Pen VK (Rash)  clindamycin (Rash)    MEDICATIONS:  STANDING MEDICATIONS  aspirin  chewable 81 milliGRAM(s) Oral daily  azithromycin   Tablet 500 milliGRAM(s) Oral daily  cefTRIAXone   IVPB      cefTRIAXone   IVPB 2000 milliGRAM(s) IV Intermittent every 24 hours  clopidogrel Tablet 75 milliGRAM(s) Oral daily  fenofibrate Tablet 145 milliGRAM(s) Oral daily  heparin   Injectable 5000 Unit(s) SubCutaneous every 12 hours  isosorbide   mononitrate ER Tablet (IMDUR) 60 milliGRAM(s) Oral daily  losartan 100 milliGRAM(s) Oral daily  metoprolol tartrate 25 milliGRAM(s) Oral two times a day  NIFEdipine XL 30 milliGRAM(s) Oral daily  pantoprazole    Tablet 40 milliGRAM(s) Oral before breakfast  tamsulosin 0.4 milliGRAM(s) Oral at bedtime    PRN MEDICATIONS  acetaminophen     Tablet .. 650 milliGRAM(s) Oral every 6 hours PRN  aluminum hydroxide/magnesium hydroxide/simethicone Suspension 30 milliLiter(s) Oral every 4 hours PRN  melatonin 3 milliGRAM(s) Oral at bedtime PRN    VITALS:   T(F): 97.8  HR: 75  BP: 116/58  RR: 18  SpO2: --    LABS:                        12.0   9.60  )-----------( 340      ( 04 Sep 2023 06:56 )             38.4     09-04    145  |  109  |  43<H>  ----------------------------<  117<H>  4.9   |  23  |  2.1<H>    Ca    9.8      04 Sep 2023 06:56  Phos  4.2     09-03  Mg     2.3     09-04    TPro  6.2  /  Alb  3.5  /  TBili  0.2  /  DBili  x   /  AST  19  /  ALT  18  /  AlkPhos  37  09-04      Urinalysis Basic - ( 04 Sep 2023 06:56 )    Color: x / Appearance: x / SG: x / pH: x  Gluc: 117 mg/dL / Ketone: x  / Bili: x / Urobili: x   Blood: x / Protein: x / Nitrite: x   Leuk Esterase: x / RBC: x / WBC x   Sq Epi: x / Non Sq Epi: x / Bacteria: x            Culture - Urine (collected 02 Sep 2023 20:05)  Source: Clean Catch Clean Catch (Midstream)  Preliminary Report (04 Sep 2023 07:42):    10,000 - 49,000 CFU/mL Gram Positive Cocci in Pairs and Chains          Chest pain      RADIOLOGY:    PHYSICAL EXAM:  General: WN/WD NAD  Neurology: A&Ox3, nonfocal, LANTIGUA x 4  Head:  Normocephalic, atraumatic  ENT:  Mucosa moist, no ulcerations  Neck:  Supple, no sinuses or palpable masses  Lymphatic:  No palpable cervical, supraclavicular, axillary or inguinal adenopathy  Respiratory: CTA B/L  CV: RRR, S1S2, no murmur  Abdominal: Soft, NT, ND no palpable mass  MSK: No edema, + peripheral pulses  Incisions: intact, no erythema or drainage    Intravenous access: yes  NG tube: no  Abbott Catheter: no

## 2023-09-04 NOTE — PROGRESS NOTE ADULT - ASSESSMENT
78 y/o man w PMHx of HTN, HLD, DM, CAD, s/p 3vCABG 2017, EF 63% w mild LVH and G1DD in 10/2022, follows w Dr Stewart, loop recorder implanted ~2021, CVA x2 w residual L>R weakness, GERD, CKD3a, BPH presenting to ED for evaluation of intermittent left-sided chest pressure , 9/10 in intensity, non exertional, radiating to the arm, associated with SOB that has resolved.  Patient had fevers here and is now being treated for likely complicated UTI vs. asp pneumonia    Assessment    ·	Atypical pleuritic chest pain possibly secondary to pneumonia, doubt ACS, however now resolved  ·	Complicated UTI  ·	HTN  ·	CKD 3  ·	DM  ·	Hx of CAD CABG  ·	mod-severe aortic stenosis  ·	Hx of CVA x2  ·	BPH    Plan    ·	Has been having intermittent fevers now resolved, mild left pleural effusion, started on ceftriaxone/zithromax for possible CAP as per ID, urine culture showing gram pos cocci in pairs and chains, bladder scan neg, f/u PT, uses walker at home  ·	c/w nifedipine / imdur / metoprolol / losartan  ·	insulin if fs > 180  ·	S&S recs appreciated  ·	c/w tamsulosin    Pending: urine culture sensitivities, PT    # DVT PPX: heparin sq

## 2023-09-05 LAB
ANION GAP SERPL CALC-SCNC: 13 MMOL/L — SIGNIFICANT CHANGE UP (ref 7–14)
BUN SERPL-MCNC: 52 MG/DL — HIGH (ref 10–20)
CALCIUM SERPL-MCNC: 9.8 MG/DL — SIGNIFICANT CHANGE UP (ref 8.4–10.5)
CHLORIDE SERPL-SCNC: 107 MMOL/L — SIGNIFICANT CHANGE UP (ref 98–110)
CO2 SERPL-SCNC: 20 MMOL/L — SIGNIFICANT CHANGE UP (ref 17–32)
CREAT SERPL-MCNC: 2.1 MG/DL — HIGH (ref 0.7–1.5)
CULTURE RESULTS: SIGNIFICANT CHANGE UP
EGFR: 32 ML/MIN/1.73M2 — LOW
GLUCOSE BLDC GLUCOMTR-MCNC: 115 MG/DL — HIGH (ref 70–99)
GLUCOSE BLDC GLUCOMTR-MCNC: 119 MG/DL — HIGH (ref 70–99)
GLUCOSE BLDC GLUCOMTR-MCNC: 156 MG/DL — HIGH (ref 70–99)
GLUCOSE SERPL-MCNC: 119 MG/DL — HIGH (ref 70–99)
HCT VFR BLD CALC: 38.6 % — LOW (ref 42–52)
HGB BLD-MCNC: 12.1 G/DL — LOW (ref 14–18)
MAGNESIUM SERPL-MCNC: 2.6 MG/DL — HIGH (ref 1.8–2.4)
MCHC RBC-ENTMCNC: 28.2 PG — SIGNIFICANT CHANGE UP (ref 27–31)
MCHC RBC-ENTMCNC: 31.3 G/DL — LOW (ref 32–37)
MCV RBC AUTO: 90 FL — SIGNIFICANT CHANGE UP (ref 80–94)
NRBC # BLD: 0 /100 WBCS — SIGNIFICANT CHANGE UP (ref 0–0)
PLATELET # BLD AUTO: 377 K/UL — SIGNIFICANT CHANGE UP (ref 130–400)
PMV BLD: 10.3 FL — SIGNIFICANT CHANGE UP (ref 7.4–10.4)
POTASSIUM SERPL-MCNC: 4.6 MMOL/L — SIGNIFICANT CHANGE UP (ref 3.5–5)
POTASSIUM SERPL-SCNC: 4.6 MMOL/L — SIGNIFICANT CHANGE UP (ref 3.5–5)
RBC # BLD: 4.29 M/UL — LOW (ref 4.7–6.1)
RBC # FLD: 14.6 % — HIGH (ref 11.5–14.5)
SODIUM SERPL-SCNC: 140 MMOL/L — SIGNIFICANT CHANGE UP (ref 135–146)
SPECIMEN SOURCE: SIGNIFICANT CHANGE UP
WBC # BLD: 9.17 K/UL — SIGNIFICANT CHANGE UP (ref 4.8–10.8)
WBC # FLD AUTO: 9.17 K/UL — SIGNIFICANT CHANGE UP (ref 4.8–10.8)

## 2023-09-05 PROCEDURE — 99232 SBSQ HOSP IP/OBS MODERATE 35: CPT

## 2023-09-05 RX ADMIN — ISOSORBIDE MONONITRATE 60 MILLIGRAM(S): 60 TABLET, EXTENDED RELEASE ORAL at 11:10

## 2023-09-05 RX ADMIN — TAMSULOSIN HYDROCHLORIDE 0.4 MILLIGRAM(S): 0.4 CAPSULE ORAL at 21:04

## 2023-09-05 RX ADMIN — Medication 145 MILLIGRAM(S): at 11:11

## 2023-09-05 RX ADMIN — Medication 25 MILLIGRAM(S): at 17:12

## 2023-09-05 RX ADMIN — PANTOPRAZOLE SODIUM 40 MILLIGRAM(S): 20 TABLET, DELAYED RELEASE ORAL at 05:06

## 2023-09-05 RX ADMIN — HEPARIN SODIUM 5000 UNIT(S): 5000 INJECTION INTRAVENOUS; SUBCUTANEOUS at 17:12

## 2023-09-05 RX ADMIN — LOSARTAN POTASSIUM 100 MILLIGRAM(S): 100 TABLET, FILM COATED ORAL at 05:07

## 2023-09-05 RX ADMIN — Medication 81 MILLIGRAM(S): at 11:11

## 2023-09-05 RX ADMIN — CEFTRIAXONE 100 MILLIGRAM(S): 500 INJECTION, POWDER, FOR SOLUTION INTRAMUSCULAR; INTRAVENOUS at 18:49

## 2023-09-05 RX ADMIN — CLOPIDOGREL BISULFATE 75 MILLIGRAM(S): 75 TABLET, FILM COATED ORAL at 11:15

## 2023-09-05 RX ADMIN — Medication 25 MILLIGRAM(S): at 05:07

## 2023-09-05 RX ADMIN — HEPARIN SODIUM 5000 UNIT(S): 5000 INJECTION INTRAVENOUS; SUBCUTANEOUS at 05:07

## 2023-09-05 RX ADMIN — Medication 30 MILLIGRAM(S): at 05:06

## 2023-09-05 NOTE — PROGRESS NOTE ADULT - ASSESSMENT
ASSESSMENT  77-year-old male past medical history hypertension, hyperlipidemia, diabetes, CKD,TIAs, stroke in 2020, CAD status post CABG cardiologist Dr. Molina presenting to ED for evaluation of intermittent left-sided chest pressure since yesterday, 9/10 in intensity, non exertional, radiating to the arm, associated with SOB    IMPRESSION  #Severe Sepsis present on admission   #CAP with small parapneumonic effusion   - CT Chest No Cont (08.31.23 @ 16:51): Small left-sided pleural effusion with atelectasis. Pleural-based as well as parenchymal infiltrates lower lung field  calcifications bilaterally. Simple cyst within the right kidney. Adrenal calcifications  Findings likely sequelae of prior granulomatous disease.  - Streptococcus pneumoniae Ag, Ur Result: Negative (09.02.23 @ 20:05)  - Legionella Antigen, Urine: Negative:  (09.02.23 @ 20:05    #CKD  #CAD s/p CABG    #Obesity BMI (kg/m2): 24.7  #DM   #Abx allergy: penicillin (Rash)  PC Pen VK (Rash)  clindamycin (Rash)    RECOMMENDATIONS  - Ceftriaxone 2g daily while in house   - on discharge, switch to PO vantin 200 mg to complete until 9/13 for CAP with parapneumonic effusion     Please call or message on [employer] Teams if with any questions.  Spectra [phone]

## 2023-09-05 NOTE — PROGRESS NOTE ADULT - SUBJECTIVE AND OBJECTIVE BOX
MICHELLEERICK  77y, Male  Allergy: penicillin (Rash)  PC Pen VK (Rash)  clindamycin (Rash)      LOS  6d    CHIEF COMPLAINT: Chest pain (05 Sep 2023 11:41)      INTERVAL EVENTS/HPI  - No acute events overnight  - T(F): , Max: 97.8 (09-04-23 @ 13:19)  - Denies any worsening symptoms  - Tolerating medication  - WBC Count: 9.17 (09-05-23 @ 07:20)  WBC Count: 9.60 (09-04-23 @ 06:56)     - Creatinine: 2.1 (09-05-23 @ 07:20)  Creatinine: 2.1 (09-04-23 @ 06:56)       ROS  General: Denies rigors, nightsweats  HEENT: Denies headache, rhinorrhea, sore throat, eye pain  CV: Denies CP, palpitations  PULM: Denies wheezing, hemoptysis  GI: Denies hematemesis, hematochezia, melena  : Denies discharge, hematuria  MSK: Denies arthralgias, myalgias  SKIN: Denies rash, lesions  NEURO: Denies paresthesias, weakness  PSYCH: Denies depression, anxiety    VITALS:  T(F): 97.1, Max: 97.8 (09-04-23 @ 13:19)  HR: 80  BP: 169/72  RR: 16Vital Signs Last 24 Hrs  T(C): 36.2 (05 Sep 2023 05:00), Max: 36.6 (04 Sep 2023 13:19)  T(F): 97.1 (05 Sep 2023 05:00), Max: 97.8 (04 Sep 2023 13:19)  HR: 80 (05 Sep 2023 05:00) (75 - 89)  BP: 169/72 (05 Sep 2023 05:00) (109/57 - 169/72)  BP(mean): --  RR: 16 (05 Sep 2023 05:00) (16 - 18)  SpO2: 98% (04 Sep 2023 14:49) (98% - 98%)    Parameters below as of 04 Sep 2023 14:49  Patient On (Oxygen Delivery Method): room air        PHYSICAL EXAM:  Gen: NAD, resting in bed  HEENT: Normocephalic, atraumatic  Neck: supple, no lymphadenopathy  CV: Regular rate & regular rhythm  Lungs: decreased BS at bases, no fremitus  Abdomen: Soft, BS present  Ext: Warm, well perfused  Neuro: non focal, awake  Skin: no rash, no erythema  Lines: no phlebitis    FH: Non-contributory  Social Hx: Non-contributory    TESTS & MEASUREMENTS:                        12.1   9.17  )-----------( 377      ( 05 Sep 2023 07:20 )             38.6     09-05    140  |  107  |  52<H>  ----------------------------<  119<H>  4.6   |  20  |  2.1<H>    Ca    9.8      05 Sep 2023 07:20  Mg     2.6     09-05    TPro  6.2  /  Alb  3.5  /  TBili  0.2  /  DBili  x   /  AST  19  /  ALT  18  /  AlkPhos  37  09-04      LIVER FUNCTIONS - ( 04 Sep 2023 06:56 )  Alb: 3.5 g/dL / Pro: 6.2 g/dL / ALK PHOS: 37 U/L / ALT: 18 U/L / AST: 19 U/L / GGT: x           Urinalysis Basic - ( 05 Sep 2023 07:20 )    Color: x / Appearance: x / SG: x / pH: x  Gluc: 119 mg/dL / Ketone: x  / Bili: x / Urobili: x   Blood: x / Protein: x / Nitrite: x   Leuk Esterase: x / RBC: x / WBC x   Sq Epi: x / Non Sq Epi: x / Bacteria: x        Culture - Urine (collected 09-02-23 @ 20:05)  Source: Clean Catch Clean Catch (Midstream)  Preliminary Report (09-04-23 @ 19:12):    10,000 - 49,000 CFU/mL Enterococcus faecalis    Culture - Blood (collected 09-01-23 @ 12:10)  Source: .Blood None  Preliminary Report (09-04-23 @ 23:01):    No growth at 72 Hours    Culture - Blood (collected 08-31-23 @ 06:21)  Source: .Blood None  Preliminary Report (09-04-23 @ 23:01):    No growth at 4 days            INFECTIOUS DISEASES TESTING  Legionella Antigen, Urine: Negative (09-02-23 @ 20:05)  Rapid RVP Result: NotDetec (09-01-23 @ 19:30)  Procalcitonin, Serum: 0.07 (04-14-23 @ 13:46)  COVID-19 PCR: NotDetec (10-26-22 @ 14:25)  COVID-19 PCR: NotDetec (10-23-22 @ 16:09)  COVID-19 PCR: NotDetec (10-13-22 @ 17:00)      INFLAMMATORY MARKERS      RADIOLOGY & ADDITIONAL TESTS:  I have personally reviewed the last available Chest xray  CXR      CT      CARDIOLOGY TESTING  12 Lead ECG:   Ventricular Rate 83 BPM    Atrial Rate 83 BPM    P-R Interval 136 ms    QRS Duration 92 ms    Q-T Interval 356 ms    QTC Calculation(Bazett) 418 ms    P Axis 48 degrees    R Axis 6 degrees    T Axis 77 degrees    Diagnosis Line Normal sinus rhythm  Nonspecific T wave abnormality  Possible Left atrial enlargement  Borderline ECG    Confirmed by Abraahm Reyna ([id number]) on 8/31/2023 2:09:01 PM (08-31-23 @ 13:38)  12 Lead ECG:   Ventricular Rate 80 BPM    Atrial Rate 80 BPM    P-R Interval 146 ms    QRS Duration 88 ms    Q-T Interval 368 ms    QTC Calculation(Bazett) 424 ms    P Axis 67 degrees    R Axis 21 degrees    T Axis 51 degrees    Diagnosis Line Normal sinus rhythm  Nonspecific ST abnormality  Abnormal ECG    Confirmed by Abraham Reyna ([id number]) on 8/31/2023 3:48:32 PM (08-30-23 @ 22:05)      MEDICATIONS  aspirin  chewable 81 Oral daily  cefTRIAXone   IVPB     cefTRIAXone   IVPB 2000 IV Intermittent every 24 hours  clopidogrel Tablet 75 Oral daily  fenofibrate Tablet 145 Oral daily  heparin   Injectable 5000 SubCutaneous every 12 hours  isosorbide   mononitrate ER Tablet (IMDUR) 60 Oral daily  losartan 100 Oral daily  metoprolol tartrate 25 Oral two times a day  NIFEdipine XL 30 Oral daily  pantoprazole    Tablet 40 Oral before breakfast  tamsulosin 0.4 Oral at bedtime      WEIGHT  Weight (kg): 61.3 (08-31-23 @ 22:57)  Creatinine: 2.1 mg/dL (09-05-23 @ 07:20)      ANTIBIOTICS:  cefTRIAXone   IVPB      cefTRIAXone   IVPB 2000 milliGRAM(s) IV Intermittent every 24 hours      All available historical records have been reviewed

## 2023-09-05 NOTE — PROGRESS NOTE ADULT - SUBJECTIVE AND OBJECTIVE BOX
ERICK MARTINEZ  77y  Male      Patient is a 77y old  Male who presents with a chief complaint of Chest pain     INTERVAL HPI/OVERNIGHT EVENTS:      ******************************* REVIEW OF SYSTEMS:**********************************************  All other review of systems negative    *********************** VITALS ******************************************    T(F): 97.1 (09-05-23 @ 05:00)  HR: 80 (09-05-23 @ 05:00) (75 - 89)  BP: 169/72 (09-05-23 @ 05:00) (109/57 - 169/72)  RR: 16 (09-05-23 @ 05:00) (16 - 18)  SpO2: 98% (09-04-23 @ 14:49) (98% - 98%)    09-04-23 @ 07:01  -  09-05-23 @ 07:00  --------------------------------------------------------  IN: 240 mL / OUT: 200 mL / NET: 40 mL            09-04-23 @ 07:01  -  09-05-23 @ 07:00  --------------------------------------------------------  IN: 240 mL / OUT: 200 mL / NET: 40 mL        ******************************** PHYSICAL EXAM:**************************************************  GENERAL: NAD    PSYCH: no agitation, baseline mentation  HEENT:     NERVOUS SYSTEM:  Alert & Oriented X3,     PULMONARY: ES, CTA    CARDIOVASCULAR: S1S2 RRR    GI: Soft, NT, ND; BS present.    EXTREMITIES:  2+ Peripheral Pulses, No clubbing, cyanosis, or edema    LYMPH: No lymphadenopathy noted    SKIN: No rashes or lesions      **************************** LABS *******************************************************                          12.1   9.17  )-----------( 377      ( 05 Sep 2023 07:20 )             38.6     09-05    140  |  107  |  52<H>  ----------------------------<  119<H>  4.6   |  20  |  2.1<H>    Ca    9.8      05 Sep 2023 07:20  Mg     2.6     09-05    TPro  6.2  /  Alb  3.5  /  TBili  0.2  /  DBili  x   /  AST  19  /  ALT  18  /  AlkPhos  37  09-04      Urinalysis Basic - ( 05 Sep 2023 07:20 )    Color: x / Appearance: x / SG: x / pH: x  Gluc: 119 mg/dL / Ketone: x  / Bili: x / Urobili: x   Blood: x / Protein: x / Nitrite: x   Leuk Esterase: x / RBC: x / WBC x   Sq Epi: x / Non Sq Epi: x / Bacteria: x        Lactate Trend        CAPILLARY BLOOD GLUCOSE      POCT Blood Glucose.: 115 mg/dL (05 Sep 2023 08:06)          **************************Active Medications *******************************************  penicillin (Rash)  PC Pen VK (Rash)  clindamycin (Rash)      acetaminophen     Tablet .. 650 milliGRAM(s) Oral every 6 hours PRN  aluminum hydroxide/magnesium hydroxide/simethicone Suspension 30 milliLiter(s) Oral every 4 hours PRN  aspirin  chewable 81 milliGRAM(s) Oral daily  cefTRIAXone   IVPB      cefTRIAXone   IVPB 2000 milliGRAM(s) IV Intermittent every 24 hours  clopidogrel Tablet 75 milliGRAM(s) Oral daily  fenofibrate Tablet 145 milliGRAM(s) Oral daily  heparin   Injectable 5000 Unit(s) SubCutaneous every 12 hours  isosorbide   mononitrate ER Tablet (IMDUR) 60 milliGRAM(s) Oral daily  losartan 100 milliGRAM(s) Oral daily  melatonin 3 milliGRAM(s) Oral at bedtime PRN  metoprolol tartrate 25 milliGRAM(s) Oral two times a day  NIFEdipine XL 30 milliGRAM(s) Oral daily  pantoprazole    Tablet 40 milliGRAM(s) Oral before breakfast  tamsulosin 0.4 milliGRAM(s) Oral at bedtime      ***************************************************  RADIOLOGY & ADDITIONAL TESTS:    Imaging Personally Reviewed:  [ ] YES  [ ] NO    HEALTH ISSUES - PROBLEM Dx:

## 2023-09-05 NOTE — CDI QUERY NOTE - NSCDIOTHERTXTBX_GEN_ALL_CORE_HH
Based on your professional judgment and the clinical indicators below, please clarify if sepsis can be further specified as:  • Sepsis was not POA but developed during admission  • No sepsis  • Other (please specify):  • Clinically unable to determine      CLINICAL INDICATORS    8/31 Hospitalist Progress Note: …#Left basilar opacity vs pleural effusion… afebrile, does not meet sepsis criteria… Monitor off abx for now, if ct chest shows consolidation,  start cef/azithro…    9/1 ID Consult Note: …#Severe Sepsis present on admission. #CAP with small parapneumonic effusion… #DM… #Obesity BMI (kg/m2): 24.7…    9/4 Hospitalist Progress Note: …Patient had fevers here and is now being treated for likely complicated UTI vs. asp pneumonia…     Lab Results:  • WBC 13.38 (8/31)    Nursing Flowsheet:  • Temp 102.3F (8/31)  • HR 90 (8/31)    Orders:  • Blood cultures (8/31)  • Zithromax 500 mg PO Daily (9/1 – 9/4)  • IV Rocephin 2G Q24H ind: Bacteremia (9/1 – current)

## 2023-09-05 NOTE — PROGRESS NOTE ADULT - ASSESSMENT
78 y/o man w PMHx of HTN, HLD, DM, CAD, s/p 3vCABG 2017, EF 63% w mild LVH and G1DD in 10/2022, follows w Dr Stewart, loop recorder implanted ~2021, CVA x2 w residual L>R weakness, GERD, CKD3a, BPH presenting to ED for chest pain     #Chest pain, pleuritic. Possible CAP  #Left basilar opacity vs pleural effusion   - Patient reports no chest pain today   -Troponins negative x2  -Echo 07/2023: 54%EF  - CT chest: Small left-sided pleural effusion with atelectasis.    Pleural-based as well as parenchymal infiltrates lower lung field calcifications bilaterally.   - c/w with ceftriaxone for now    - azithro done 09/05  - Bcx NGTD  - legionella, Covid, RVP neg , please F/U  Procal     # Hx of CAD/ CABG /HTN     - c/w metoprolol tartrate 25 mg BID  - c/w imdur 60mg   - losartan 100 mg  - Nifedipine ER 30 mg  - c/w aspirin and clopidogre    #complicated UTI  - UA positive   - pend urine cx sensitivities  - Ucx positive  >> Enterococcus     #Hx  CVA x 2 with bilateral residual weakness, multiple TIAs  CT head negative  - no new motor weakness in the body as per patient  - speech and swallow's  recs >. regular with thin liquids     #DM  - Cont insulin regimen    #CKD III b  No need for urgent RRT  Monitor Cr/BUN Electrolytes including Phosphorus  Avoid Nephro toxins    DVT PPX, heparin    Pend: PT eval f/u  and urine Cx sensitivities   Disposition: Home vs STR

## 2023-09-05 NOTE — PROGRESS NOTE ADULT - SUBJECTIVE AND OBJECTIVE BOX
24H events:    Patient is a 77y old Male who presents with a chief complaint of Chest pain (04 Sep 2023 14:30)    Primary diagnosis of Chest pain    Today is hospital day 6d. This morning patient was seen and examined at bedside, resting comfortably in bed.    No acute or major events overnight. Hemodynamically stable, tolerating oral diet, voiding appropriately with appropriate bowel movements.     Code Status: full code      PAST MEDICAL & SURGICAL HISTORY  CAD (coronary artery disease)    HTN (hypertension)    Depression    Anxiety    Diabetes  niddm    Angina pectoris    BPH (benign prostatic hyperplasia)    GERD (gastroesophageal reflux disease)    CVA (cerebral vascular accident)    History of appendectomy    Bilateral cataracts    History of heart bypass surgery      SOCIAL HISTORY:  Social History:      ALLERGIES:  penicillin (Rash)  PC Pen VK (Rash)  clindamycin (Rash)    MEDICATIONS:  STANDING MEDICATIONS  aspirin  chewable 81 milliGRAM(s) Oral daily  cefTRIAXone   IVPB      cefTRIAXone   IVPB 2000 milliGRAM(s) IV Intermittent every 24 hours  clopidogrel Tablet 75 milliGRAM(s) Oral daily  fenofibrate Tablet 145 milliGRAM(s) Oral daily  heparin   Injectable 5000 Unit(s) SubCutaneous every 12 hours  isosorbide   mononitrate ER Tablet (IMDUR) 60 milliGRAM(s) Oral daily  losartan 100 milliGRAM(s) Oral daily  metoprolol tartrate 25 milliGRAM(s) Oral two times a day  NIFEdipine XL 30 milliGRAM(s) Oral daily  pantoprazole    Tablet 40 milliGRAM(s) Oral before breakfast  tamsulosin 0.4 milliGRAM(s) Oral at bedtime    PRN MEDICATIONS  acetaminophen     Tablet .. 650 milliGRAM(s) Oral every 6 hours PRN  aluminum hydroxide/magnesium hydroxide/simethicone Suspension 30 milliLiter(s) Oral every 4 hours PRN  melatonin 3 milliGRAM(s) Oral at bedtime PRN    VITALS:   T(F): 97.1  HR: 80  BP: 169/72  RR: 16  SpO2: 98%    PHYSICAL EXAM:  GENERAL: NAD, lying in bed comfortably,   HEAD: NCAD, no hematoma or laceration   HEART: Regular rate and rhythm, normal S1/S2, no murmurs, heaves, thrills  LUNGS: No acute respiratory distress, clear b/l breath sounds, no wheezing, rales, rhonchi  ABDOMEN:  soft, non-tender, non-distented, + BS,   NERVOUS SYSTEM:  A&Ox3, follows commands, answers questions appropriately         LABS:                        12.1   9.17  )-----------( 377      ( 05 Sep 2023 07:20 )             38.6     09-05    140  |  107  |  52<H>  ----------------------------<  119<H>  4.6   |  20  |  2.1<H>    Ca    9.8      05 Sep 2023 07:20  Mg     2.6     09-05    TPro  6.2  /  Alb  3.5  /  TBili  0.2  /  DBili  x   /  AST  19  /  ALT  18  /  AlkPhos  37  09-04      Urinalysis Basic - ( 05 Sep 2023 07:20 )    Color: x / Appearance: x / SG: x / pH: x  Gluc: 119 mg/dL / Ketone: x  / Bili: x / Urobili: x   Blood: x / Protein: x / Nitrite: x   Leuk Esterase: x / RBC: x / WBC x   Sq Epi: x / Non Sq Epi: x / Bacteria: x            Culture - Urine (collected 02 Sep 2023 20:05)  Source: Clean Catch Clean Catch (Midstream)  Preliminary Report (04 Sep 2023 19:12):    10,000 - 49,000 CFU/mL Enterococcus faecalis

## 2023-09-05 NOTE — PROGRESS NOTE ADULT - ASSESSMENT
76 y/o man w PMHx of HTN, HLD, DM, CAD, s/p 3vCABG 2017, EF 63% w mild LVH and G1DD in 10/2022, follows w Dr Stewart, loop recorder implanted ~2021, CVA x2 w residual L>R weakness, GERD, CKD3a, BPH presenting to ED for chest pain     #Chest pain, pleuritic. Possible CAP  #Hx of CAD sp CABG  #Left basilar opacity vs pleural effusion   #Hx of PAD  - Patient still reportign chest pain. unrelated to physical activity  -Troponins negative x2  -Echo 07/2023: 54%EF  - CT chest: Small left-sided pleural effusion with atelectasis.Pleural-based as well as parenchymal infiltrates lower lung field calcifications bilaterally.   - wbc today: 9.4   - c/w metoprolol tartrate 25 mg BID  - c/w imdur 60mg   - losartan 100 mg  - Nifedipine ER 30 mg  - c/w aspirin and clopidogre  - c/w with ceftriaxone   - azithro done 09/05  - Bcx NGTD  - legionella, Covid, RVP neg   - PT evaluation    #complicated UTI  - UA positive   - pend urine cx sensitivities  - Ucx positive for gram positive cocci in pairs and chains     #Hx  CVA x 2 with bilateral residual weakness, multiple TIAs  CT head negative  - no new motor weakness in the body as per patient  - f/u with speech and swallow's  recommendations    #HTN/HLD  - Continue home meds    #DM  - Cont insulin regimen    #CKD III b  No need for urgent RRT  Monitor Cr/BUN Electrolytes including Phosphorus  Avoid Nephro toxins    DVT PPX, heparin      Family discussion: dw pt regarding eval for chest pain  Pend: PT eval f/u  and urine Cx sensitivities   Disposition: Home

## 2023-09-06 ENCOUNTER — TRANSCRIPTION ENCOUNTER (OUTPATIENT)
Age: 78
End: 2023-09-06

## 2023-09-06 VITALS — TEMPERATURE: 97 F

## 2023-09-06 LAB
-  AMPICILLIN: SIGNIFICANT CHANGE UP
-  CIPROFLOXACIN: SIGNIFICANT CHANGE UP
-  LEVOFLOXACIN: SIGNIFICANT CHANGE UP
-  NITROFURANTOIN: SIGNIFICANT CHANGE UP
-  TETRACYCLINE: SIGNIFICANT CHANGE UP
-  VANCOMYCIN: SIGNIFICANT CHANGE UP
ALBUMIN SERPL ELPH-MCNC: 3.6 G/DL — SIGNIFICANT CHANGE UP (ref 3.5–5.2)
ALP SERPL-CCNC: 35 U/L — SIGNIFICANT CHANGE UP (ref 30–115)
ALT FLD-CCNC: 13 U/L — SIGNIFICANT CHANGE UP (ref 0–41)
ANION GAP SERPL CALC-SCNC: 16 MMOL/L — HIGH (ref 7–14)
AST SERPL-CCNC: 13 U/L — SIGNIFICANT CHANGE UP (ref 0–41)
BASOPHILS # BLD AUTO: 0.05 K/UL — SIGNIFICANT CHANGE UP (ref 0–0.2)
BASOPHILS NFR BLD AUTO: 0.7 % — SIGNIFICANT CHANGE UP (ref 0–1)
BILIRUB SERPL-MCNC: <0.2 MG/DL — SIGNIFICANT CHANGE UP (ref 0.2–1.2)
BLD GP AB SCN SERPL QL: SIGNIFICANT CHANGE UP
BUN SERPL-MCNC: 52 MG/DL — HIGH (ref 10–20)
CALCIUM SERPL-MCNC: 9.6 MG/DL — SIGNIFICANT CHANGE UP (ref 8.4–10.5)
CHLORIDE SERPL-SCNC: 107 MMOL/L — SIGNIFICANT CHANGE UP (ref 98–110)
CO2 SERPL-SCNC: 20 MMOL/L — SIGNIFICANT CHANGE UP (ref 17–32)
CREAT SERPL-MCNC: 2.1 MG/DL — HIGH (ref 0.7–1.5)
CULTURE RESULTS: SIGNIFICANT CHANGE UP
CULTURE RESULTS: SIGNIFICANT CHANGE UP
EGFR: 32 ML/MIN/1.73M2 — LOW
EOSINOPHIL # BLD AUTO: 0.27 K/UL — SIGNIFICANT CHANGE UP (ref 0–0.7)
EOSINOPHIL NFR BLD AUTO: 3.6 % — SIGNIFICANT CHANGE UP (ref 0–8)
GLUCOSE BLDC GLUCOMTR-MCNC: 119 MG/DL — HIGH (ref 70–99)
GLUCOSE SERPL-MCNC: 112 MG/DL — HIGH (ref 70–99)
HCT VFR BLD CALC: 37 % — LOW (ref 42–52)
HGB BLD-MCNC: 11.5 G/DL — LOW (ref 14–18)
IMM GRANULOCYTES NFR BLD AUTO: 0.8 % — HIGH (ref 0.1–0.3)
LYMPHOCYTES # BLD AUTO: 2.02 K/UL — SIGNIFICANT CHANGE UP (ref 1.2–3.4)
LYMPHOCYTES # BLD AUTO: 26.6 % — SIGNIFICANT CHANGE UP (ref 20.5–51.1)
MAGNESIUM SERPL-MCNC: 2.4 MG/DL — SIGNIFICANT CHANGE UP (ref 1.8–2.4)
MCHC RBC-ENTMCNC: 27.4 PG — SIGNIFICANT CHANGE UP (ref 27–31)
MCHC RBC-ENTMCNC: 31.1 G/DL — LOW (ref 32–37)
MCV RBC AUTO: 88.3 FL — SIGNIFICANT CHANGE UP (ref 80–94)
METHOD TYPE: SIGNIFICANT CHANGE UP
MONOCYTES # BLD AUTO: 0.51 K/UL — SIGNIFICANT CHANGE UP (ref 0.1–0.6)
MONOCYTES NFR BLD AUTO: 6.7 % — SIGNIFICANT CHANGE UP (ref 1.7–9.3)
NEUTROPHILS # BLD AUTO: 4.68 K/UL — SIGNIFICANT CHANGE UP (ref 1.4–6.5)
NEUTROPHILS NFR BLD AUTO: 61.6 % — SIGNIFICANT CHANGE UP (ref 42.2–75.2)
NRBC # BLD: 0 /100 WBCS — SIGNIFICANT CHANGE UP (ref 0–0)
ORGANISM # SPEC MICROSCOPIC CNT: SIGNIFICANT CHANGE UP
ORGANISM # SPEC MICROSCOPIC CNT: SIGNIFICANT CHANGE UP
PLATELET # BLD AUTO: 381 K/UL — SIGNIFICANT CHANGE UP (ref 130–400)
PMV BLD: 10.6 FL — HIGH (ref 7.4–10.4)
POTASSIUM SERPL-MCNC: 4.9 MMOL/L — SIGNIFICANT CHANGE UP (ref 3.5–5)
POTASSIUM SERPL-SCNC: 4.9 MMOL/L — SIGNIFICANT CHANGE UP (ref 3.5–5)
PROCALCITONIN SERPL-MCNC: 0.13 NG/ML — HIGH (ref 0.02–0.1)
PROT SERPL-MCNC: 6.1 G/DL — SIGNIFICANT CHANGE UP (ref 6–8)
RBC # BLD: 4.19 M/UL — LOW (ref 4.7–6.1)
RBC # FLD: 14.6 % — HIGH (ref 11.5–14.5)
SODIUM SERPL-SCNC: 143 MMOL/L — SIGNIFICANT CHANGE UP (ref 135–146)
SPECIMEN SOURCE: SIGNIFICANT CHANGE UP
SPECIMEN SOURCE: SIGNIFICANT CHANGE UP
WBC # BLD: 7.59 K/UL — SIGNIFICANT CHANGE UP (ref 4.8–10.8)
WBC # FLD AUTO: 7.59 K/UL — SIGNIFICANT CHANGE UP (ref 4.8–10.8)

## 2023-09-06 PROCEDURE — 99239 HOSP IP/OBS DSCHRG MGMT >30: CPT

## 2023-09-06 RX ORDER — PANTOPRAZOLE SODIUM 20 MG/1
1 TABLET, DELAYED RELEASE ORAL
Qty: 30 | Refills: 0
Start: 2023-09-06 | End: 2023-10-05

## 2023-09-06 RX ORDER — CEFPODOXIME PROXETIL 100 MG
1 TABLET ORAL
Qty: 10 | Refills: 0
Start: 2023-09-06 | End: 2023-09-10

## 2023-09-06 RX ORDER — TAMSULOSIN HYDROCHLORIDE 0.4 MG/1
1 CAPSULE ORAL
Qty: 30 | Refills: 0
Start: 2023-09-06 | End: 2023-10-05

## 2023-09-06 RX ORDER — ASPIRIN/CALCIUM CARB/MAGNESIUM 324 MG
1 TABLET ORAL
Qty: 18 | Refills: 0
Start: 2023-09-06 | End: 2023-09-23

## 2023-09-06 RX ORDER — NIFEDIPINE 30 MG
1 TABLET, EXTENDED RELEASE 24 HR ORAL
Qty: 30 | Refills: 0
Start: 2023-09-06 | End: 2023-10-05

## 2023-09-06 RX ORDER — CHLORHEXIDINE GLUCONATE 213 G/1000ML
1 SOLUTION TOPICAL
Refills: 0 | Status: DISCONTINUED | OUTPATIENT
Start: 2023-09-06 | End: 2023-09-06

## 2023-09-06 RX ADMIN — Medication 145 MILLIGRAM(S): at 11:26

## 2023-09-06 RX ADMIN — ISOSORBIDE MONONITRATE 60 MILLIGRAM(S): 60 TABLET, EXTENDED RELEASE ORAL at 11:26

## 2023-09-06 RX ADMIN — Medication 30 MILLIGRAM(S): at 05:06

## 2023-09-06 RX ADMIN — Medication 25 MILLIGRAM(S): at 05:06

## 2023-09-06 RX ADMIN — PANTOPRAZOLE SODIUM 40 MILLIGRAM(S): 20 TABLET, DELAYED RELEASE ORAL at 05:06

## 2023-09-06 RX ADMIN — HEPARIN SODIUM 5000 UNIT(S): 5000 INJECTION INTRAVENOUS; SUBCUTANEOUS at 05:05

## 2023-09-06 RX ADMIN — CHLORHEXIDINE GLUCONATE 1 APPLICATION(S): 213 SOLUTION TOPICAL at 11:26

## 2023-09-06 RX ADMIN — LOSARTAN POTASSIUM 100 MILLIGRAM(S): 100 TABLET, FILM COATED ORAL at 05:06

## 2023-09-06 RX ADMIN — CLOPIDOGREL BISULFATE 75 MILLIGRAM(S): 75 TABLET, FILM COATED ORAL at 11:27

## 2023-09-06 RX ADMIN — Medication 81 MILLIGRAM(S): at 11:26

## 2023-09-06 NOTE — DISCHARGE NOTE PROVIDER - CARE PROVIDER_API CALL
PRATIBHA SANCHEZ  Follow Up Time: 1 week    Charlie Stewart  Interventional Cardiology  [address]  [place] [address]  Phone: [phone]  Fax: [phone]  Follow Up Time: 2 weeks

## 2023-09-06 NOTE — DISCHARGE NOTE NURSING/CASE MANAGEMENT/SOCIAL WORK - NSDCVIVACCINE_GEN_ALL_CORE_FT
Tdap; 20-Jul-2021 15:17; Dejuan Fry (RN); [employer]; [id number] (Exp. Date: 28-Jan-2023); IntraMuscular; Deltoid Left.; 0.5 milliLiter(s); VIS (VIS Published: 09-May-2013, VIS Presented: 20-Jul-2021);

## 2023-09-06 NOTE — DISCHARGE NOTE PROVIDER - ATTENDING DISCHARGE PHYSICAL EXAMINATION:
GENERAL: NAD  CHEST/LUNG: Clear to auscultation bilaterally; Left crackles at the bases  HEART: Regular rate and rhythm; No murmurs, rubs, or gallops  ABDOMEN: Bowel sounds present; Soft, Nontender, Nondistended. No hepatomegally  EXTREMITIES:  2+ Peripheral Pulses, brisk capillary refill. No clubbing, cyanosis, or edema  NERVOUS SYSTEM:  Alert & Oriented X3, speech clear. No deficits   MSK: FROM all 4 extremities, full and equal strength  SKIN: No rashes or lesions

## 2023-09-06 NOTE — PROGRESS NOTE ADULT - SUBJECTIVE AND OBJECTIVE BOX
24H events:    Patient is a 77y old Male who presents with a chief complaint of Chest pain (06 Sep 2023 10:31)    Primary diagnosis of Chest pain      Today is hospital day 7d. This morning patient was seen and examined at bedside, resting comfortably in bed.    No acute or major events overnight. Hemodynamically stable, tolerating oral diet, voiding appropriately with appropriate bowel movements.     Code Status: full code      PAST MEDICAL & SURGICAL HISTORY  CAD (coronary artery disease)    HTN (hypertension)    Depression    Anxiety    Diabetes  niddm    Angina pectoris    BPH (benign prostatic hyperplasia)    GERD (gastroesophageal reflux disease)    CVA (cerebral vascular accident)    History of appendectomy    Bilateral cataracts    History of heart bypass surgery      SOCIAL HISTORY:  Social History:      ALLERGIES:  penicillin (Rash)  PC Pen VK (Rash)  clindamycin (Rash)    MEDICATIONS:  STANDING MEDICATIONS  aspirin  chewable 81 milliGRAM(s) Oral daily  cefTRIAXone   IVPB      cefTRIAXone   IVPB 2000 milliGRAM(s) IV Intermittent every 24 hours  chlorhexidine 2% Cloths 1 Application(s) Topical <User Schedule>  clopidogrel Tablet 75 milliGRAM(s) Oral daily  fenofibrate Tablet 145 milliGRAM(s) Oral daily  heparin   Injectable 5000 Unit(s) SubCutaneous every 12 hours  isosorbide   mononitrate ER Tablet (IMDUR) 60 milliGRAM(s) Oral daily  losartan 100 milliGRAM(s) Oral daily  metoprolol tartrate 25 milliGRAM(s) Oral two times a day  NIFEdipine XL 30 milliGRAM(s) Oral daily  pantoprazole    Tablet 40 milliGRAM(s) Oral before breakfast  tamsulosin 0.4 milliGRAM(s) Oral at bedtime    PRN MEDICATIONS  acetaminophen     Tablet .. 650 milliGRAM(s) Oral every 6 hours PRN  aluminum hydroxide/magnesium hydroxide/simethicone Suspension 30 milliLiter(s) Oral every 4 hours PRN  melatonin 3 milliGRAM(s) Oral at bedtime PRN    VITALS:   T(F): 98.5  HR: 65  BP: 144/63  RR: 18  SpO2: 95%    PHYSICAL EXAM:  GENERAL: NAD, lying in bed comfortably  HEAD: NCAD, no hematoma or laceration   HEART: Regular rate and rhythm, normal S1/S2,   LUNGS: No acute respiratory distress, clear b/l breath sounds, no wheezing, rales, rhonchi  ABDOMEN:  soft, non-tender, non-distented, + BS,    NERVOUS SYSTEM:  A&Ox3,  follows commands, answers questions appropriately     LABS:                        11.5   7.59  )-----------( 381      ( 06 Sep 2023 05:29 )             37.0     09-06    143  |  107  |  52<H>  ----------------------------<  112<H>  4.9   |  20  |  2.1<H>    Ca    9.6      06 Sep 2023 05:29  Mg     2.4     09-06    TPro  6.1  /  Alb  3.6  /  TBili  <0.2  /  DBili  x   /  AST  13  /  ALT  13  /  AlkPhos  35  09-06      Urinalysis Basic - ( 06 Sep 2023 05:29 )    Color: x / Appearance: x / SG: x / pH: x  Gluc: 112 mg/dL / Ketone: x  / Bili: x / Urobili: x   Blood: x / Protein: x / Nitrite: x   Leuk Esterase: x / RBC: x / WBC x   Sq Epi: x / Non Sq Epi: x / Bacteria: x

## 2023-09-06 NOTE — PROGRESS NOTE ADULT - ASSESSMENT
78 y/o man w PMHx of HTN, HLD, DM, CAD, s/p 3vCABG 2017, EF 63% w mild LVH and G1DD in 10/2022, follows w Dr Stewart, loop recorder implanted ~2021, CVA x2 w residual L>R weakness, GERD, CKD3a, BPH presenting to ED for chest pain     #Chest pain, pleuritic. Possible CAP  #Hx of CAD sp CABG  #Left basilar opacity vs pleural effusion   #Hx of PAD  - Patient does not have CP today 09/06  -Troponins negative x2  -Echo 07/2023: 54%EF  - CT chest: Small left-sided pleural effusion with atelectasis.Pleural-based as well as parenchymal infiltrates lower lung field calcifications bilaterally.   - ID consult apprecitaed: recc c/w 2g rocephin and on d/c switch to vantin 200mg PO until 09/13   - c/w metoprolol tartrate 25 mg BID  - c/w imdur 60mg   - losartan 100 mg  - Nifedipine ER 30 mg  - c/w aspirin and clopidogrel  - c/w with ceftriaxone   - azithro done 09/05  - Bcx NGTD  - legionella, Covid, RVP neg   - PT evaluation appreciated 09/06    #complicated UTI  - UA positive   - Ucx positive for E. faecalis 10K-49K CFU and sensitivities in     #Hx  CVA x 2 with bilateral residual weakness, multiple TIAs  CT head negative  - no new motor weakness in the body as per patient  - f/u with speech and swallow's  recommendations    #HTN/HLD  - Continue home meds    #DM  - Cont insulin regimen    #CKD III b  No need for urgent RRT  Monitor Cr/BUN Electrolytes including Phosphorus  Avoid Nephro toxins    DVT PPX, heparin    #Misc PT re-evaluated yesterday and dropped note in flowsheets   Family discussion: dw pt regarding eval for chest pain  Pend: CM eval   Disposition: Home

## 2023-09-06 NOTE — DISCHARGE NOTE PROVIDER - DISCHARGE SERVICE FOR PATIENT
on the discharge service for the patient. I have reviewed and made amendments to the documentation where necessary.
none

## 2023-09-06 NOTE — DISCHARGE NOTE PROVIDER - PROVIDER TOKENS
PROVIDER:[TOKEN:[[id number]],FOLLOWUP:[1 week]],PROVIDER:[TOKEN:[[id number]],FOLLOWUP:[2 weeks]]

## 2023-09-06 NOTE — DISCHARGE NOTE PROVIDER - NSDCFUSCHEDAPPT_GEN_ALL_CORE_FT
[facility] Physician [facility]  CARDIOLOGY [address] [facility]  Scheduled Appointment: 09/07/2023

## 2023-09-06 NOTE — DISCHARGE NOTE PROVIDER - HOSPITAL COURSE
77-year-old male past medical history hypertension, hyperlipidemia, diabetes, CKD,TIAs, stroke in 2020, CAD status post CABG cardiologist Dr. Molina presenting to ED for evaluation of intermittent left-sided chest pressure since yesterday, 9/10 in intensity, non exertional, radiating to the arm, associated with SOB, waned on its own, not wosening.  Patient also endorsing when he felt room spinning dizziness earlier today that has since resolved and CT scan of the head was ordered which was negative. Patient was given aspirin and nitro by EMS with relief. He has hx of PAD and endorses that his legs are hurting at times.  Denies fever, chills, cough, syncope, lower extremity swelling, calf pain, headaches, no new weakness in the body.    CT chest performed and showed small left-sided pleural effusion with atelectasis with pleural based as well as parenchymal infiltrates in the lower lung fields bilaterally and CXR showed left basical opacity so suspicious for PNA. Started on ceftriaxone 2000mg and azithromycin 500mg. Patient finished azithromycin on hospital day 6. UA was positive for LE large, bacteria, and blood but negative for nitrites. Ucx showed E. faecalis 10K- 49K CFU. Sensitivities were obtained as well. Infectious disease was consulted and recommended to continue rocephin 2g and on discharge to switch to Vantin 200mg PO until 09/13. PT eval recommendations appreciated.     Will need to see PCP within the next week and repeat the CT chest in 6 weeks to see if the CAP with the parapneumonic effusion resolved.

## 2023-09-06 NOTE — PROGRESS NOTE ADULT - PROVIDER SPECIALTY LIST ADULT
Hospitalist
Internal Medicine
Hospitalist
Infectious Disease
Internal Medicine

## 2023-09-06 NOTE — DISCHARGE NOTE PROVIDER - NSDCCPCAREPLAN_GEN_ALL_CORE_FT
PRINCIPAL DISCHARGE DIAGNOSIS  Diagnosis: Chest pain  Assessment and Plan of Treatment: You came in with Chest pressure and pain radiating to the arm with associated shortness of breath. CT of the chest was performed which showed pneumonia with a parapneumonic effusion. You were placed on antibiotics. Infectious disease was consulted and recommended home antibiotics for discharge. Please follow up with your primary care doctor in a week for a repeat CT chest in 6 weeks to see if the parapneumonic effusions resolved.   Please take your medications as directed. Don’t skip doses. Follow up with your primary care physician within 3 days. Continue taking your antibiotics as directed until they are all gone—even if you start to feel better. This will prevent the pneumonia from reoccuring. Coughing up mucus is normal. Don’t use medicines to suppress your cough unless your cough is dry, painful, or interferes with your sleep. Get plenty of rest until your fever, shortness of breath, and chest pain go away. Plan to get a flu shot every year. Ask your primary care doctor about pneumonia vaccines.  Seek immediate medical attention if you experience chest pain, trouble breathing, blue lips or fingernails, fever of 100.4°F  (38°C) or higher, yellow, green, bloody, or smelly sputum, more than normal mucus production, vomiting or diarrhea.      SECONDARY DISCHARGE DIAGNOSES  Diagnosis: Complicated UTI (urinary tract infection)  Assessment and Plan of Treatment: You were noted either on arrival or during this hospitalization to have a Urinary Tract Infection. You may have already been treated and completed the antibiotics, please refer to the list of medications present on your discharge paperwork. If you notice that there are antibiotics listed, these may be to treat your infection, be sure to complete taking the full course, whether you have symptoms or not, as prescribed.  While taking antibiotics, you may benefit from taking a probiotic such as florastore to help to try and prevent an infectious type of diarrhea known as C Diff. If you notice that you begin having severe watery diarrhea, more than 4-5 episodes a day, please see your Primary Care Doctor or come to the ER to have your stool tested for this infection.   It is not necessary to repeat a urine test to see if the infection is gone, it is assumed that after treatment it should have resolved. However, if you continue to have symptoms, please see your Primary Care doctor or return to the ER.

## 2023-09-06 NOTE — DISCHARGE NOTE NURSING/CASE MANAGEMENT/SOCIAL WORK - PATIENT PORTAL LINK FT
You can access the FollowMyHealth Patient Portal offered by [facility] by registering at the following website: [email or web address]. By joining [employer]’s FollowMyHealth portal, you will also be able to view your health information using other applications (apps) compatible with our system.

## 2023-09-06 NOTE — DISCHARGE NOTE NURSING/CASE MANAGEMENT/SOCIAL WORK - NSDCPEFALRISK_GEN_ALL_CORE
For information on Fall & Injury Prevention, visit: [email or web address] OR  [email or web address] OR  https://www.cdc.gov/steadi/patient.html

## 2023-09-07 ENCOUNTER — NON-APPOINTMENT (OUTPATIENT)
Age: 78
End: 2023-09-07

## 2023-09-07 ENCOUNTER — APPOINTMENT (OUTPATIENT)
Dept: CARDIOLOGY | Facility: CLINIC | Age: 78
End: 2023-09-07
Payer: MEDICARE

## 2023-09-08 PROCEDURE — G2066: CPT

## 2023-09-08 PROCEDURE — 93298 REM INTERROG DEV EVAL SCRMS: CPT

## 2023-09-20 DIAGNOSIS — I35.0 NONRHEUMATIC AORTIC (VALVE) STENOSIS: ICD-10-CM

## 2023-09-20 DIAGNOSIS — I16.0 HYPERTENSIVE URGENCY: ICD-10-CM

## 2023-09-20 DIAGNOSIS — R29.898 OTHER SYMPTOMS AND SIGNS INVOLVING THE MUSCULOSKELETAL SYSTEM: ICD-10-CM

## 2023-09-20 DIAGNOSIS — R65.20 SEVERE SEPSIS WITHOUT SEPTIC SHOCK: ICD-10-CM

## 2023-09-20 DIAGNOSIS — I12.9 HYPERTENSIVE CHRONIC KIDNEY DISEASE WITH STAGE 1 THROUGH STAGE 4 CHRONIC KIDNEY DISEASE, OR UNSPECIFIED CHRONIC KIDNEY DISEASE: ICD-10-CM

## 2023-09-20 DIAGNOSIS — Z79.82 LONG TERM (CURRENT) USE OF ASPIRIN: ICD-10-CM

## 2023-09-20 DIAGNOSIS — N18.32 CHRONIC KIDNEY DISEASE, STAGE 3B: ICD-10-CM

## 2023-09-20 DIAGNOSIS — R07.9 CHEST PAIN, UNSPECIFIED: ICD-10-CM

## 2023-09-20 DIAGNOSIS — K21.9 GASTRO-ESOPHAGEAL REFLUX DISEASE WITHOUT ESOPHAGITIS: ICD-10-CM

## 2023-09-20 DIAGNOSIS — J18.9 PNEUMONIA, UNSPECIFIED ORGANISM: ICD-10-CM

## 2023-09-20 DIAGNOSIS — N39.0 URINARY TRACT INFECTION, SITE NOT SPECIFIED: ICD-10-CM

## 2023-09-20 DIAGNOSIS — I25.10 ATHEROSCLEROTIC HEART DISEASE OF NATIVE CORONARY ARTERY WITHOUT ANGINA PECTORIS: ICD-10-CM

## 2023-09-20 DIAGNOSIS — E11.22 TYPE 2 DIABETES MELLITUS WITH DIABETIC CHRONIC KIDNEY DISEASE: ICD-10-CM

## 2023-09-20 DIAGNOSIS — Z95.1 PRESENCE OF AORTOCORONARY BYPASS GRAFT: ICD-10-CM

## 2023-09-20 DIAGNOSIS — Z88.0 ALLERGY STATUS TO PENICILLIN: ICD-10-CM

## 2023-09-20 DIAGNOSIS — N40.0 BENIGN PROSTATIC HYPERPLASIA WITHOUT LOWER URINARY TRACT SYMPTOMS: ICD-10-CM

## 2023-09-20 DIAGNOSIS — Z79.02 LONG TERM (CURRENT) USE OF ANTITHROMBOTICS/ANTIPLATELETS: ICD-10-CM

## 2023-09-20 DIAGNOSIS — A41.9 SEPSIS, UNSPECIFIED ORGANISM: ICD-10-CM

## 2023-09-20 DIAGNOSIS — I69.398 OTHER SEQUELAE OF CEREBRAL INFARCTION: ICD-10-CM

## 2023-10-12 ENCOUNTER — NON-APPOINTMENT (OUTPATIENT)
Age: 78
End: 2023-10-12

## 2023-10-12 ENCOUNTER — APPOINTMENT (OUTPATIENT)
Dept: CARDIOLOGY | Facility: CLINIC | Age: 78
End: 2023-10-12
Payer: MEDICARE

## 2023-10-13 PROCEDURE — G2066: CPT

## 2023-10-13 PROCEDURE — 93298 REM INTERROG DEV EVAL SCRMS: CPT

## 2023-11-16 ENCOUNTER — APPOINTMENT (OUTPATIENT)
Dept: CARDIOLOGY | Facility: CLINIC | Age: 78
End: 2023-11-16
Payer: MEDICARE

## 2023-11-16 ENCOUNTER — NON-APPOINTMENT (OUTPATIENT)
Age: 78
End: 2023-11-16

## 2023-11-17 PROCEDURE — G2066: CPT | Mod: NC

## 2023-11-17 PROCEDURE — 93298 REM INTERROG DEV EVAL SCRMS: CPT | Mod: NC

## 2023-12-20 ENCOUNTER — NON-APPOINTMENT (OUTPATIENT)
Age: 78
End: 2023-12-20

## 2023-12-20 ENCOUNTER — APPOINTMENT (OUTPATIENT)
Dept: CARDIOLOGY | Facility: CLINIC | Age: 78
End: 2023-12-20
Payer: MEDICARE

## 2023-12-21 PROCEDURE — G2066: CPT

## 2023-12-21 PROCEDURE — 93298 REM INTERROG DEV EVAL SCRMS: CPT

## 2023-12-27 NOTE — DISCHARGE NOTE PROVIDER - REASON FOR ADMISSION
Dizziness and CP Pt presents to ed reporting stomach cramping/abd pain since x 2 days. associated with diarrhea. endorses pmh of diverticulitis

## 2024-01-24 ENCOUNTER — APPOINTMENT (OUTPATIENT)
Dept: CARDIOLOGY | Facility: CLINIC | Age: 79
End: 2024-01-24
Payer: MEDICARE

## 2024-01-24 ENCOUNTER — NON-APPOINTMENT (OUTPATIENT)
Age: 79
End: 2024-01-24

## 2024-01-25 PROCEDURE — 93298 REM INTERROG DEV EVAL SCRMS: CPT

## 2024-02-15 NOTE — ED ADULT NURSE NOTE - NSICDXPASTMEDICALHX_GEN_ALL_CORE_FT
done
PAST MEDICAL HISTORY:  Angina pectoris     Anxiety     BPH (benign prostatic hyperplasia)     CAD (coronary artery disease)     CVA (cerebral vascular accident)     Depression     Diabetes niddm    GERD (gastroesophageal reflux disease)     HTN (hypertension)

## 2024-02-24 NOTE — ED PROVIDER NOTE - ATTENDING CONTRIBUTION TO CARE
SUBJECTIVE: Pt reports she is doing a little better today.  Pt reports she wore PATITO hose on RLE at hospital yesterday folded over below incision and it helped edema in LE but pt reports increased pain/stiffness in R knee.  CAREGIVER INVOLVEMENT/ASSISTANCE NEEDED FOR: Pts son and dtr assist pt with all needs prn but not present during PT session.  .  OBJECTIVE:  See interventions.  PATIENT EDUCATION PROVIDED THIS VISIT: Pt instructed to continue HEP 2x/day and amb with FWW in home every hour.  Cold pac to R knee prn < 20 minutes/hr for pain/edema management using skin barrier.  Reviewed self massage to R knee/lower leg to decrease edema and R knee stiffness.  PATIENT RESPONSE TO EDUCATION PROVIDED: Pt reports she uses cold pac frequently and has been doing her HEP and massage.  PATIENT RESPONSE TO TREATMENT: Pt requires occ rest breaks throughout PT session secondary to c/o fatigue.  Pt reports she is not sure of progressing to amb with SC but will continue to try during PT sessions.  .  ASSESSMENT OF PROGRESS TOWARD GOALS: Outdoor amb deferred today secondary to inclement weather.  Pt has increased ROM in R knee today from 88* yesterday to 92* today in supine.  Pt began amb with SC in home with cuing for proper cane placement and gait sequencing.  Pt demonstrated increase R knee flex during amb with FWW today but had a rigid RLE posture with cane.  .  PLAN FOR NEXT VISIT: Will plan to continue working towards improving gait ability with SC and attempt stairs/outdoor amb with weather permitting.  THE FOLLOWING DISCHARGE PLANNING WAS DISCUSSED WITH THE PATIENT/CAREGIVER: Pt is scheduled to continue PT daily through 1/26/24 then 1w1 with dc at end of next week. 75M p/w multiple falls with HT + LOC x 2 days.   Vitals reviewed by me noted to be wnl.  On exam with right knee abrasion   Left UE/ Left LE weakness/ left facial from prior CVA.  Labs reviewed by me- noted to have ckd, elevated bnp  ED Chest X-Ray reviewed by me which did not reveal a ptx, infiltrate, or effusion.   ED Pelvis portable XR negative for fx's or dislocations     Pending ct=pan scan and final dispo    Case s/o to

## 2024-02-28 ENCOUNTER — APPOINTMENT (OUTPATIENT)
Dept: CARDIOLOGY | Facility: CLINIC | Age: 79
End: 2024-02-28
Payer: MEDICARE

## 2024-02-28 ENCOUNTER — NON-APPOINTMENT (OUTPATIENT)
Age: 79
End: 2024-02-28

## 2024-02-28 PROCEDURE — 93298 REM INTERROG DEV EVAL SCRMS: CPT

## 2024-04-04 ENCOUNTER — NON-APPOINTMENT (OUTPATIENT)
Age: 79
End: 2024-04-04

## 2024-04-04 ENCOUNTER — APPOINTMENT (OUTPATIENT)
Dept: CARDIOLOGY | Facility: CLINIC | Age: 79
End: 2024-04-04
Payer: MEDICARE

## 2024-04-04 PROCEDURE — 93298 REM INTERROG DEV EVAL SCRMS: CPT

## 2024-05-08 ENCOUNTER — NON-APPOINTMENT (OUTPATIENT)
Age: 79
End: 2024-05-08

## 2024-05-09 ENCOUNTER — NON-APPOINTMENT (OUTPATIENT)
Age: 79
End: 2024-05-09

## 2024-05-09 ENCOUNTER — APPOINTMENT (OUTPATIENT)
Dept: CARDIOLOGY | Facility: CLINIC | Age: 79
End: 2024-05-09
Payer: MEDICARE

## 2024-05-09 PROCEDURE — 93298 REM INTERROG DEV EVAL SCRMS: CPT

## 2024-06-13 ENCOUNTER — NON-APPOINTMENT (OUTPATIENT)
Age: 79
End: 2024-06-13

## 2024-06-13 ENCOUNTER — APPOINTMENT (OUTPATIENT)
Dept: CARDIOLOGY | Facility: CLINIC | Age: 79
End: 2024-06-13
Payer: MEDICARE

## 2024-06-13 PROCEDURE — 93298 REM INTERROG DEV EVAL SCRMS: CPT

## 2024-07-18 ENCOUNTER — APPOINTMENT (OUTPATIENT)
Dept: CARDIOLOGY | Facility: CLINIC | Age: 79
End: 2024-07-18
Payer: MEDICARE

## 2024-07-18 ENCOUNTER — NON-APPOINTMENT (OUTPATIENT)
Age: 79
End: 2024-07-18

## 2024-07-18 PROCEDURE — 93298 REM INTERROG DEV EVAL SCRMS: CPT

## 2024-08-01 ENCOUNTER — INPATIENT (INPATIENT)
Facility: HOSPITAL | Age: 79
LOS: 5 days | Discharge: HOME | DRG: 872 | End: 2024-08-07
Attending: HOSPITALIST | Admitting: STUDENT IN AN ORGANIZED HEALTH CARE EDUCATION/TRAINING PROGRAM
Payer: MEDICARE

## 2024-08-01 VITALS
TEMPERATURE: 100 F | OXYGEN SATURATION: 97 % | HEART RATE: 90 BPM | SYSTOLIC BLOOD PRESSURE: 192 MMHG | HEIGHT: 62 IN | WEIGHT: 128.09 LBS | RESPIRATION RATE: 16 BRPM | DIASTOLIC BLOOD PRESSURE: 74 MMHG

## 2024-08-01 DIAGNOSIS — Z90.49 ACQUIRED ABSENCE OF OTHER SPECIFIED PARTS OF DIGESTIVE TRACT: Chronic | ICD-10-CM

## 2024-08-01 DIAGNOSIS — H26.9 UNSPECIFIED CATARACT: Chronic | ICD-10-CM

## 2024-08-01 DIAGNOSIS — Z95.1 PRESENCE OF AORTOCORONARY BYPASS GRAFT: Chronic | ICD-10-CM

## 2024-08-01 LAB
ALBUMIN SERPL ELPH-MCNC: 3.9 G/DL — SIGNIFICANT CHANGE UP (ref 3.5–5.2)
ALP SERPL-CCNC: 47 U/L — SIGNIFICANT CHANGE UP (ref 30–115)
ALT FLD-CCNC: 11 U/L — SIGNIFICANT CHANGE UP (ref 0–41)
ANION GAP SERPL CALC-SCNC: 13 MMOL/L — SIGNIFICANT CHANGE UP (ref 7–14)
APPEARANCE UR: ABNORMAL
APTT BLD: 31.7 SEC — SIGNIFICANT CHANGE UP (ref 27–39.2)
AST SERPL-CCNC: 16 U/L — SIGNIFICANT CHANGE UP (ref 0–41)
BASOPHILS # BLD AUTO: 0.03 K/UL — SIGNIFICANT CHANGE UP (ref 0–0.2)
BASOPHILS NFR BLD AUTO: 0.2 % — SIGNIFICANT CHANGE UP (ref 0–1)
BILIRUB SERPL-MCNC: 0.4 MG/DL — SIGNIFICANT CHANGE UP (ref 0.2–1.2)
BILIRUB UR-MCNC: NEGATIVE — SIGNIFICANT CHANGE UP
BUN SERPL-MCNC: 40 MG/DL — HIGH (ref 10–20)
CALCIUM SERPL-MCNC: 9.2 MG/DL — SIGNIFICANT CHANGE UP (ref 8.4–10.4)
CHLORIDE SERPL-SCNC: 100 MMOL/L — SIGNIFICANT CHANGE UP (ref 98–110)
CO2 SERPL-SCNC: 21 MMOL/L — SIGNIFICANT CHANGE UP (ref 17–32)
COLOR SPEC: YELLOW — SIGNIFICANT CHANGE UP
CREAT SERPL-MCNC: 2.4 MG/DL — HIGH (ref 0.7–1.5)
DIFF PNL FLD: ABNORMAL
EGFR: 27 ML/MIN/1.73M2 — LOW
EOSINOPHIL # BLD AUTO: 0.01 K/UL — SIGNIFICANT CHANGE UP (ref 0–0.7)
EOSINOPHIL NFR BLD AUTO: 0.1 % — SIGNIFICANT CHANGE UP (ref 0–8)
GLUCOSE SERPL-MCNC: 124 MG/DL — HIGH (ref 70–99)
GLUCOSE UR QL: NEGATIVE MG/DL — SIGNIFICANT CHANGE UP
HCT VFR BLD CALC: 35.5 % — LOW (ref 42–52)
HGB BLD-MCNC: 11.3 G/DL — LOW (ref 14–18)
IMM GRANULOCYTES NFR BLD AUTO: 0.3 % — SIGNIFICANT CHANGE UP (ref 0.1–0.3)
INR BLD: 1.05 RATIO — SIGNIFICANT CHANGE UP (ref 0.65–1.3)
KETONES UR-MCNC: NEGATIVE MG/DL — SIGNIFICANT CHANGE UP
LACTATE SERPL-SCNC: 1.1 MMOL/L — SIGNIFICANT CHANGE UP (ref 0.7–2)
LACTATE SERPL-SCNC: 1.2 MMOL/L — SIGNIFICANT CHANGE UP (ref 0.7–2)
LEUKOCYTE ESTERASE UR-ACNC: ABNORMAL
LYMPHOCYTES # BLD AUTO: 0.67 K/UL — LOW (ref 1.2–3.4)
LYMPHOCYTES # BLD AUTO: 4.6 % — LOW (ref 20.5–51.1)
MCHC RBC-ENTMCNC: 29.2 PG — SIGNIFICANT CHANGE UP (ref 27–31)
MCHC RBC-ENTMCNC: 31.8 G/DL — LOW (ref 32–37)
MCV RBC AUTO: 91.7 FL — SIGNIFICANT CHANGE UP (ref 80–94)
MONOCYTES # BLD AUTO: 1.01 K/UL — HIGH (ref 0.1–0.6)
MONOCYTES NFR BLD AUTO: 6.9 % — SIGNIFICANT CHANGE UP (ref 1.7–9.3)
NEUTROPHILS # BLD AUTO: 12.81 K/UL — HIGH (ref 1.4–6.5)
NEUTROPHILS NFR BLD AUTO: 87.9 % — HIGH (ref 42.2–75.2)
NITRITE UR-MCNC: NEGATIVE — SIGNIFICANT CHANGE UP
NRBC # BLD: 0 /100 WBCS — SIGNIFICANT CHANGE UP (ref 0–0)
PH UR: 6.5 — SIGNIFICANT CHANGE UP (ref 5–8)
PLATELET # BLD AUTO: 237 K/UL — SIGNIFICANT CHANGE UP (ref 130–400)
PMV BLD: 10.5 FL — HIGH (ref 7.4–10.4)
POTASSIUM SERPL-MCNC: 4.5 MMOL/L — SIGNIFICANT CHANGE UP (ref 3.5–5)
POTASSIUM SERPL-SCNC: 4.5 MMOL/L — SIGNIFICANT CHANGE UP (ref 3.5–5)
PROT SERPL-MCNC: 6.5 G/DL — SIGNIFICANT CHANGE UP (ref 6–8)
PROT UR-MCNC: 300 MG/DL
PROTHROM AB SERPL-ACNC: 12 SEC — SIGNIFICANT CHANGE UP (ref 9.95–12.87)
RBC # BLD: 3.87 M/UL — LOW (ref 4.7–6.1)
RBC # FLD: 15.2 % — HIGH (ref 11.5–14.5)
SODIUM SERPL-SCNC: 134 MMOL/L — LOW (ref 135–146)
SP GR SPEC: 1.03 — SIGNIFICANT CHANGE UP (ref 1–1.03)
TROPONIN T, HIGH SENSITIVITY RESULT: 41 NG/L — HIGH (ref 6–21)
TROPONIN T, HIGH SENSITIVITY RESULT: 46 NG/L — HIGH (ref 6–21)
UROBILINOGEN FLD QL: 1 MG/DL — SIGNIFICANT CHANGE UP (ref 0.2–1)
WBC # BLD: 14.58 K/UL — HIGH (ref 4.8–10.8)
WBC # FLD AUTO: 14.58 K/UL — HIGH (ref 4.8–10.8)

## 2024-08-01 PROCEDURE — 74176 CT ABD & PELVIS W/O CONTRAST: CPT | Mod: 26,MC

## 2024-08-01 PROCEDURE — 71045 X-RAY EXAM CHEST 1 VIEW: CPT | Mod: 26

## 2024-08-01 PROCEDURE — 70450 CT HEAD/BRAIN W/O DYE: CPT | Mod: 26,MC

## 2024-08-01 PROCEDURE — 99285 EMERGENCY DEPT VISIT HI MDM: CPT | Mod: GC

## 2024-08-01 PROCEDURE — 71250 CT THORAX DX C-: CPT | Mod: 26,MC

## 2024-08-01 PROCEDURE — 70498 CT ANGIOGRAPHY NECK: CPT | Mod: 26,MC

## 2024-08-01 PROCEDURE — 70496 CT ANGIOGRAPHY HEAD: CPT | Mod: 26,MC

## 2024-08-01 RX ORDER — VANCOMYCIN HYDROCHLORIDE 5 G/100ML
1000 INJECTION, POWDER, LYOPHILIZED, FOR SOLUTION INTRAVENOUS ONCE
Refills: 0 | Status: COMPLETED | OUTPATIENT
Start: 2024-08-01 | End: 2024-08-01

## 2024-08-01 RX ORDER — AZTREONAM 75 MG/ML
2000 KIT INHALATION ONCE
Refills: 0 | Status: COMPLETED | OUTPATIENT
Start: 2024-08-01 | End: 2024-08-01

## 2024-08-01 RX ORDER — DEXTROSE MONOHYDRATE, SODIUM CHLORIDE, SODIUM LACTATE, CALCIUM CHLORIDE, MAGNESIUM CHLORIDE 1.5; 538; 448; 18.4; 5.08 G/100ML; MG/100ML; MG/100ML; MG/100ML; MG/100ML
1800 SOLUTION INTRAPERITONEAL ONCE
Refills: 0 | Status: COMPLETED | OUTPATIENT
Start: 2024-08-01 | End: 2024-08-01

## 2024-08-01 RX ORDER — ACETAMINOPHEN 500 MG
650 TABLET ORAL ONCE
Refills: 0 | Status: COMPLETED | OUTPATIENT
Start: 2024-08-01 | End: 2024-08-01

## 2024-08-01 RX ADMIN — DEXTROSE MONOHYDRATE, SODIUM CHLORIDE, SODIUM LACTATE, CALCIUM CHLORIDE, MAGNESIUM CHLORIDE 1800 MILLILITER(S): 1.5; 538; 448; 18.4; 5.08 SOLUTION INTRAPERITONEAL at 22:44

## 2024-08-01 RX ADMIN — DEXTROSE MONOHYDRATE, SODIUM CHLORIDE, SODIUM LACTATE, CALCIUM CHLORIDE, MAGNESIUM CHLORIDE 1800 MILLILITER(S): 1.5; 538; 448; 18.4; 5.08 SOLUTION INTRAPERITONEAL at 21:00

## 2024-08-01 RX ADMIN — Medication 650 MILLIGRAM(S): at 21:00

## 2024-08-01 RX ADMIN — VANCOMYCIN HYDROCHLORIDE 250 MILLIGRAM(S): 5 INJECTION, POWDER, LYOPHILIZED, FOR SOLUTION INTRAVENOUS at 22:24

## 2024-08-01 RX ADMIN — AZTREONAM 50 MILLIGRAM(S): KIT at 22:23

## 2024-08-01 NOTE — ED PROVIDER NOTE - PHYSICAL EXAMINATION
CONSTITUTIONAL: Appears clinically ill   HEAD: Normocephalic; atraumatic.   EYES: PERRL; EOM intact. Conjunctiva normal B/L.   ENT: Normal pharynx with no tonsillar hypertrophy. MMM.  NECK: Supple; non-tender; no cervical lymphadenopathy.   CHEST: Normal chest excursion with respiration.   CARDIOVASCULAR: Normal S1, S2; no murmurs, rubs, or gallops.   RESPIRATORY: Normal chest excursion with respiration; breath sounds clear and equal bilaterally; no wheezes, rhonchi, or rales.  GI/: Normal bowel sounds; non-distended; non-tender.  BACK: No evidence of trauma or deformity. Non-tender to palpation. No CVA tenderness.   EXT: Decreased strength in LUE and LLE in comparison to the right; non-tender to palpation; distal pulses are normal. No leg edema B/L.   SKIN: Normal for age and race; warm; dry; good turgor.  NEURO: A & O x 4; Left-sided facial droop, left arm and leg weakness

## 2024-08-01 NOTE — ED ADULT NURSE NOTE - OBJECTIVE STATEMENT
pt BIBEMS. pt at home with son and HHA.  patient c/o left side neck pain radiating down to left leg and generalized muscle pain. pt also endorses increased urination. denies fever/chills. denies n/v/d

## 2024-08-01 NOTE — ED PROVIDER NOTE - OBJECTIVE STATEMENT
78-year-old male with PMH CAD s/p CABG, CKD, prior stroke, DM, PAD, HTN, HLD 78-year-old male with PMH CAD s/p CABG, CKD, prior stroke, DM, PAD, HTN, HLD Who presents to the ED with left-sided facial droop, left shoulder pain that radiates throughout his arm, and left leg pain all of which began yesterday morning. He additionally reports 2 episodes since then of shaking without loss of consciousness. Most recent episode was witnessed by son who thought he was having a seizure and called 911. The patient remembers the whole episode and denies LOC or head strike.

## 2024-08-01 NOTE — ED PROVIDER NOTE - ATTENDING CONTRIBUTION TO CARE
78-year-old male to ED with weakness from home.  Patient is competent medical history including stroke diabetes hypertension hyperlipidemia and CAD.  Presents with left facial droop left shoulder pain and 2 episodes of shaking.  Son was concerned with seizure brought to the hospital for eval.  Patient states he remembers entire episodes denies a head strike and states his left arm and leg are not moving with the same tone as before.  Also noted left facial droop and weakness in left upper extremity left lower extremity.  NIHSS=4  Last known well is yesteday morning when the pain and symptoms all started. Seizure like episode was today.    Not a candidate for thrombolytics as symptoms all started yesterday.  Neuro consulted on evaluation and CT scans of brain ordered for vascular imaging

## 2024-08-01 NOTE — ED PROVIDER NOTE - PROGRESS NOTE DETAILS
NIHSS 4. Unsure what patients baseline is after multiple strokes as both his son and him are unable to clarify. Febrile to 102.4 per rectum. Code sepsis called at 1936. Pending scans at this time. He is resting comfortably in bed. Patient found to have UTI.  Neuro consulted CT without any acute process but extensive findings as noted.  Antibiotics given.

## 2024-08-01 NOTE — ED ADULT NURSE NOTE - NSFALLHARMRISKINTERV_ED_ALL_ED

## 2024-08-01 NOTE — ED ADULT TRIAGE NOTE - IDEAL BODY WEIGHT(KG)
Cat Bite    A cat bite can cause a wound deep enough to break the skin. In such cases, the wound is cleaned and then closed. Sometimes, the wound is not closed completely. This is so that fluid can drain if the wound becomes infected. In addition to wound care, a tetanus shot may be given, if needed.  Home Care  · Wash your hands well with soap and warm water before and after caring for the wound. This helps lower the risk of infection.  · Care for the wound as directed. If a dressing was applied to the wound, be sure to change it as directed.  · If the wound bleeds, place a clean, soft cloth on the wound. Then firmly apply pressure until the bleeding stops. This may take up to 5 minutes. Do not release the pressure and look at the wound during this time.  · Most wounds heal within 10 days. But an infection can occur even with proper treatment. So be sure to check the wound daily for signs of infection (see below).  · Antibiotics may be prescribed. These help prevent or treat infection. If you’re given antibiotics, take them as directed. Also be sure to complete the medications.  Rabies Prevention   Rabies is a virus that can be carried in certain animals. These can include domestic animals such as cats and dogs. Pets fully vaccinated against rabies (2 shots) are at very low risk of infection. But because human rabies is almost always fatal, any biting pet should be confined for 10 days as an extra precaution. In general, if there is a risk for rabies, the following steps may need to be taken:  · If someone’s pet cat has bitten you, it should be kept in a secure area for the next 10 days to watch for signs of illness. (If the pet owner won’t allow this, contact your local animal control center.) If the cat becomes ill or dies during that time, contact your local animal control center at once so the animal may be tested for rabies. If the cat stays healthy for the next 10 days, there is no danger of rabies in the  animal or you.  · If a stray cat bit you, contact your local animal control center. They can give information on capture, quarantine, and animal rabies testing.  · If you can’t locate the animal that bit you in the next 2 days, and if rabies exists in your region, you may need to receive the rabies vaccine series. Call your health care provider right away. Or, return to the emergency department promptly.  · All animal bites should be reported to the local animal control center. If you were not given a form to fill out, you can report this yourself.  Follow-up care  Follow up with your health care provider, or as directed.  When to seek medical advice  Call your health care provider right away if any of these occur:  · Signs of infection:  ¨ Spreading redness or warmth from the wound  ¨ Increased pain or swelling  ¨ Fever of 100.4ºF (38ºC) or higher, or as directed by your health care provider  ¨ Colored fluid or pus draining from the wound  · Signs of rabies infection:  ¨ Headache  ¨ Confusion  ¨ Strange behavior  ¨ Seizure  · Decreased ability to move any body part near the bite area  · Bleeding that cannot be stopped after 5 minutes of firm pressure             © 9817-8891 [employer]. [address] [place] [address], [place] [address]. All rights reserved. This information is not intended as a substitute for professional medical care. Always follow your healthcare professional's instructions.         55

## 2024-08-02 DIAGNOSIS — A41.9 SEPSIS, UNSPECIFIED ORGANISM: ICD-10-CM

## 2024-08-02 LAB
ALBUMIN SERPL ELPH-MCNC: 3.5 G/DL — SIGNIFICANT CHANGE UP (ref 3.5–5.2)
ALP SERPL-CCNC: 41 U/L — SIGNIFICANT CHANGE UP (ref 30–115)
ALT FLD-CCNC: 9 U/L — SIGNIFICANT CHANGE UP (ref 0–41)
ANION GAP SERPL CALC-SCNC: 11 MMOL/L — SIGNIFICANT CHANGE UP (ref 7–14)
AST SERPL-CCNC: 15 U/L — SIGNIFICANT CHANGE UP (ref 0–41)
BILIRUB SERPL-MCNC: 0.4 MG/DL — SIGNIFICANT CHANGE UP (ref 0.2–1.2)
BUN SERPL-MCNC: 30 MG/DL — HIGH (ref 10–20)
CALCIUM SERPL-MCNC: 8.7 MG/DL — SIGNIFICANT CHANGE UP (ref 8.4–10.4)
CHLORIDE SERPL-SCNC: 104 MMOL/L — SIGNIFICANT CHANGE UP (ref 98–110)
CO2 SERPL-SCNC: 22 MMOL/L — SIGNIFICANT CHANGE UP (ref 17–32)
CREAT SERPL-MCNC: 2 MG/DL — HIGH (ref 0.7–1.5)
EGFR: 33 ML/MIN/1.73M2 — LOW
GLUCOSE BLDC GLUCOMTR-MCNC: 140 MG/DL — HIGH (ref 70–99)
GLUCOSE BLDC GLUCOMTR-MCNC: 147 MG/DL — HIGH (ref 70–99)
GLUCOSE SERPL-MCNC: 100 MG/DL — HIGH (ref 70–99)
HCT VFR BLD CALC: 30.8 % — LOW (ref 42–52)
HGB BLD-MCNC: 9.8 G/DL — LOW (ref 14–18)
IRON SATN MFR SERPL: 19 UG/DL — LOW (ref 35–150)
IRON SATN MFR SERPL: 9 % — LOW (ref 15–50)
MAGNESIUM SERPL-MCNC: 2.1 MG/DL — SIGNIFICANT CHANGE UP (ref 1.8–2.4)
MCHC RBC-ENTMCNC: 29.3 PG — SIGNIFICANT CHANGE UP (ref 27–31)
MCHC RBC-ENTMCNC: 31.8 G/DL — LOW (ref 32–37)
MCV RBC AUTO: 92.2 FL — SIGNIFICANT CHANGE UP (ref 80–94)
NRBC # BLD: 0 /100 WBCS — SIGNIFICANT CHANGE UP (ref 0–0)
PLATELET # BLD AUTO: 211 K/UL — SIGNIFICANT CHANGE UP (ref 130–400)
PMV BLD: 10.5 FL — HIGH (ref 7.4–10.4)
POTASSIUM SERPL-MCNC: 4.3 MMOL/L — SIGNIFICANT CHANGE UP (ref 3.5–5)
POTASSIUM SERPL-SCNC: 4.3 MMOL/L — SIGNIFICANT CHANGE UP (ref 3.5–5)
PROT SERPL-MCNC: 5.6 G/DL — LOW (ref 6–8)
RBC # BLD: 3.34 M/UL — LOW (ref 4.7–6.1)
RBC # FLD: 15 % — HIGH (ref 11.5–14.5)
SODIUM SERPL-SCNC: 137 MMOL/L — SIGNIFICANT CHANGE UP (ref 135–146)
TIBC SERPL-MCNC: 203 UG/DL — LOW (ref 220–430)
UIBC SERPL-MCNC: 184 UG/DL — SIGNIFICANT CHANGE UP (ref 110–370)
WBC # BLD: 14.4 K/UL — HIGH (ref 4.8–10.8)
WBC # FLD AUTO: 14.4 K/UL — HIGH (ref 4.8–10.8)

## 2024-08-02 PROCEDURE — 85025 COMPLETE CBC W/AUTO DIFF WBC: CPT

## 2024-08-02 PROCEDURE — 95816 EEG AWAKE AND DROWSY: CPT

## 2024-08-02 PROCEDURE — 95819 EEG AWAKE AND ASLEEP: CPT

## 2024-08-02 PROCEDURE — 70551 MRI BRAIN STEM W/O DYE: CPT | Mod: MC

## 2024-08-02 PROCEDURE — 82728 ASSAY OF FERRITIN: CPT

## 2024-08-02 PROCEDURE — 99223 1ST HOSP IP/OBS HIGH 75: CPT

## 2024-08-02 PROCEDURE — 85730 THROMBOPLASTIN TIME PARTIAL: CPT

## 2024-08-02 PROCEDURE — 83605 ASSAY OF LACTIC ACID: CPT

## 2024-08-02 PROCEDURE — 80053 COMPREHEN METABOLIC PANEL: CPT

## 2024-08-02 PROCEDURE — 85027 COMPLETE CBC AUTOMATED: CPT

## 2024-08-02 PROCEDURE — 82607 VITAMIN B-12: CPT

## 2024-08-02 PROCEDURE — 85610 PROTHROMBIN TIME: CPT

## 2024-08-02 PROCEDURE — 83550 IRON BINDING TEST: CPT

## 2024-08-02 PROCEDURE — 84100 ASSAY OF PHOSPHORUS: CPT

## 2024-08-02 PROCEDURE — 83036 HEMOGLOBIN GLYCOSYLATED A1C: CPT

## 2024-08-02 PROCEDURE — 83540 ASSAY OF IRON: CPT

## 2024-08-02 PROCEDURE — 70551 MRI BRAIN STEM W/O DYE: CPT | Mod: 26

## 2024-08-02 PROCEDURE — 97162 PT EVAL MOD COMPLEX 30 MIN: CPT | Mod: GP

## 2024-08-02 PROCEDURE — 73620 X-RAY EXAM OF FOOT: CPT | Mod: RT

## 2024-08-02 PROCEDURE — 36415 COLL VENOUS BLD VENIPUNCTURE: CPT

## 2024-08-02 PROCEDURE — 92526 ORAL FUNCTION THERAPY: CPT | Mod: GN

## 2024-08-02 PROCEDURE — 83735 ASSAY OF MAGNESIUM: CPT

## 2024-08-02 PROCEDURE — 82746 ASSAY OF FOLIC ACID SERUM: CPT

## 2024-08-02 PROCEDURE — 92610 EVALUATE SWALLOWING FUNCTION: CPT | Mod: GN

## 2024-08-02 PROCEDURE — 70450 CT HEAD/BRAIN W/O DYE: CPT | Mod: MC

## 2024-08-02 PROCEDURE — 82140 ASSAY OF AMMONIA: CPT

## 2024-08-02 PROCEDURE — 82962 GLUCOSE BLOOD TEST: CPT

## 2024-08-02 PROCEDURE — 99222 1ST HOSP IP/OBS MODERATE 55: CPT

## 2024-08-02 PROCEDURE — 80048 BASIC METABOLIC PNL TOTAL CA: CPT

## 2024-08-02 PROCEDURE — 84146 ASSAY OF PROLACTIN: CPT

## 2024-08-02 RX ORDER — ONDANSETRON HCL/PF 4 MG/2 ML
4 VIAL (ML) INJECTION EVERY 8 HOURS
Refills: 0 | Status: DISCONTINUED | OUTPATIENT
Start: 2024-08-02 | End: 2024-08-07

## 2024-08-02 RX ORDER — INSULIN LISPRO 100/ML
VIAL (ML) SUBCUTANEOUS
Refills: 0 | Status: DISCONTINUED | OUTPATIENT
Start: 2024-08-02 | End: 2024-08-07

## 2024-08-02 RX ORDER — PANTOPRAZOLE SODIUM 20 MG/1
40 TABLET, DELAYED RELEASE ORAL
Refills: 0 | Status: DISCONTINUED | OUTPATIENT
Start: 2024-08-02 | End: 2024-08-07

## 2024-08-02 RX ORDER — MELATONIN 3 MG
3 TABLET ORAL AT BEDTIME
Refills: 0 | Status: DISCONTINUED | OUTPATIENT
Start: 2024-08-02 | End: 2024-08-07

## 2024-08-02 RX ORDER — HEPARIN SODIUM 1000 [USP'U]/ML
5000 INJECTION, SOLUTION INTRAVENOUS; SUBCUTANEOUS EVERY 12 HOURS
Refills: 0 | Status: DISCONTINUED | OUTPATIENT
Start: 2024-08-02 | End: 2024-08-07

## 2024-08-02 RX ORDER — DEXTROSE MONOHYDRATE, SODIUM CHLORIDE, SODIUM LACTATE, CALCIUM CHLORIDE, MAGNESIUM CHLORIDE 1.5; 538; 448; 18.4; 5.08 G/100ML; MG/100ML; MG/100ML; MG/100ML; MG/100ML
1000 SOLUTION INTRAPERITONEAL
Refills: 0 | Status: COMPLETED | OUTPATIENT
Start: 2024-08-02 | End: 2024-08-03

## 2024-08-02 RX ORDER — HYDRALAZINE HYDROCHLORIDE 100 MG/1
50 TABLET ORAL
Refills: 0 | Status: DISCONTINUED | OUTPATIENT
Start: 2024-08-02 | End: 2024-08-05

## 2024-08-02 RX ORDER — FINASTERIDE 5 MG/1
5 TABLET, FILM COATED ORAL DAILY
Refills: 0 | Status: DISCONTINUED | OUTPATIENT
Start: 2024-08-02 | End: 2024-08-07

## 2024-08-02 RX ORDER — DEXTROSE MONOHYDRATE, SODIUM CHLORIDE, SODIUM LACTATE, CALCIUM CHLORIDE, MAGNESIUM CHLORIDE 1.5; 538; 448; 18.4; 5.08 G/100ML; MG/100ML; MG/100ML; MG/100ML; MG/100ML
1000 SOLUTION INTRAPERITONEAL
Refills: 0 | Status: DISCONTINUED | OUTPATIENT
Start: 2024-08-02 | End: 2024-08-07

## 2024-08-02 RX ORDER — DEXTROSE 4 G
15 TABLET,CHEWABLE ORAL ONCE
Refills: 0 | Status: DISCONTINUED | OUTPATIENT
Start: 2024-08-02 | End: 2024-08-07

## 2024-08-02 RX ORDER — AMLODIPINE BESYLATE 2.5 MG/1
5 TABLET ORAL DAILY
Refills: 0 | Status: DISCONTINUED | OUTPATIENT
Start: 2024-08-02 | End: 2024-08-05

## 2024-08-02 RX ORDER — ATORVASTATIN CALCIUM 40 MG/1
80 TABLET, FILM COATED ORAL AT BEDTIME
Refills: 0 | Status: DISCONTINUED | OUTPATIENT
Start: 2024-08-02 | End: 2024-08-07

## 2024-08-02 RX ORDER — DEXTROSE 4 G
25 TABLET,CHEWABLE ORAL ONCE
Refills: 0 | Status: DISCONTINUED | OUTPATIENT
Start: 2024-08-02 | End: 2024-08-07

## 2024-08-02 RX ORDER — GLUCAGON INJECTION, SOLUTION 0.5 MG/.1ML
1 INJECTION, SOLUTION SUBCUTANEOUS ONCE
Refills: 0 | Status: DISCONTINUED | OUTPATIENT
Start: 2024-08-02 | End: 2024-08-07

## 2024-08-02 RX ORDER — FENOFIBRATE 30 MG/1
145 CAPSULE ORAL DAILY
Refills: 0 | Status: DISCONTINUED | OUTPATIENT
Start: 2024-08-02 | End: 2024-08-07

## 2024-08-02 RX ORDER — ASPIRIN 500 MG
81 TABLET ORAL DAILY
Refills: 0 | Status: DISCONTINUED | OUTPATIENT
Start: 2024-08-02 | End: 2024-08-07

## 2024-08-02 RX ORDER — ISOSORBIDE MONONITRATE 60 MG/1
60 TABLET, EXTENDED RELEASE ORAL DAILY
Refills: 0 | Status: DISCONTINUED | OUTPATIENT
Start: 2024-08-02 | End: 2024-08-05

## 2024-08-02 RX ORDER — MAGNESIUM, ALUMINUM HYDROXIDE 200-225/5
30 SUSPENSION, ORAL (FINAL DOSE FORM) ORAL EVERY 4 HOURS
Refills: 0 | Status: DISCONTINUED | OUTPATIENT
Start: 2024-08-02 | End: 2024-08-07

## 2024-08-02 RX ORDER — ACETAMINOPHEN 500 MG
650 TABLET ORAL EVERY 6 HOURS
Refills: 0 | Status: DISCONTINUED | OUTPATIENT
Start: 2024-08-02 | End: 2024-08-07

## 2024-08-02 RX ORDER — TAMSULOSIN HCL 0.4 MG
0.4 CAPSULE ORAL AT BEDTIME
Refills: 0 | Status: DISCONTINUED | OUTPATIENT
Start: 2024-08-02 | End: 2024-08-07

## 2024-08-02 RX ORDER — METOPROLOL TARTRATE 100 MG
25 TABLET ORAL
Refills: 0 | Status: DISCONTINUED | OUTPATIENT
Start: 2024-08-02 | End: 2024-08-07

## 2024-08-02 RX ORDER — CLOPIDOGREL BISULFATE 75 MG/1
75 TABLET, FILM COATED ORAL DAILY
Refills: 0 | Status: DISCONTINUED | OUTPATIENT
Start: 2024-08-02 | End: 2024-08-07

## 2024-08-02 RX ADMIN — Medication 81 MILLIGRAM(S): at 12:57

## 2024-08-02 RX ADMIN — Medication 25 MILLIGRAM(S): at 17:17

## 2024-08-02 RX ADMIN — DEXTROSE MONOHYDRATE, SODIUM CHLORIDE, SODIUM LACTATE, CALCIUM CHLORIDE, MAGNESIUM CHLORIDE 75 MILLILITER(S): 1.5; 538; 448; 18.4; 5.08 SOLUTION INTRAPERITONEAL at 10:03

## 2024-08-02 RX ADMIN — FINASTERIDE 5 MILLIGRAM(S): 5 TABLET, FILM COATED ORAL at 12:57

## 2024-08-02 RX ADMIN — Medication 650 MILLIGRAM(S): at 13:06

## 2024-08-02 RX ADMIN — ISOSORBIDE MONONITRATE 60 MILLIGRAM(S): 60 TABLET, EXTENDED RELEASE ORAL at 06:49

## 2024-08-02 RX ADMIN — ATORVASTATIN CALCIUM 80 MILLIGRAM(S): 40 TABLET, FILM COATED ORAL at 21:30

## 2024-08-02 RX ADMIN — Medication 650 MILLIGRAM(S): at 14:42

## 2024-08-02 RX ADMIN — Medication 100 MILLIGRAM(S): at 12:58

## 2024-08-02 RX ADMIN — CLOPIDOGREL BISULFATE 75 MILLIGRAM(S): 75 TABLET, FILM COATED ORAL at 12:57

## 2024-08-02 RX ADMIN — HEPARIN SODIUM 5000 UNIT(S): 1000 INJECTION, SOLUTION INTRAVENOUS; SUBCUTANEOUS at 17:18

## 2024-08-02 RX ADMIN — Medication 0.4 MILLIGRAM(S): at 21:30

## 2024-08-02 RX ADMIN — HYDRALAZINE HYDROCHLORIDE 50 MILLIGRAM(S): 100 TABLET ORAL at 17:17

## 2024-08-02 RX ADMIN — FENOFIBRATE 145 MILLIGRAM(S): 30 CAPSULE ORAL at 12:58

## 2024-08-02 NOTE — H&P ADULT - NSHPPHYSICALEXAM_GEN_ALL_CORE
VITALS:   T(C): 37.2 (08-01-24 @ 23:12), Max: 39.1 (08-01-24 @ 19:27)  HR: 102 (08-02-24 @ 08:43) (79 - 102)  BP: 151/72 (08-02-24 @ 08:43) (150/79 - 192/74)  RR: 18 (08-01-24 @ 23:12) (16 - 18)  SpO2: 98% (08-01-24 @ 23:12) (97% - 98%)    GENERAL: NAD, lying in bed comfortably  HEAD:  Atraumatic, normocephalic  EYES: EOMI, PERRLA, conjunctiva and sclera clear  ENT: Moist mucous membranes  NECK: Supple, no JVD  HEART: Regular rate and rhythm. Systolic murmur heard.  LUNGS: Unlabored respirations.  Clear to auscultation bilaterally, no crackles, wheezing, or rhonchi  ABDOMEN: Soft, nontender, nondistended, +BS  EXTREMITIES: 2+ peripheral pulses bilaterally. No clubbing, cyanosis, or edema  NERVOUS SYSTEM:  A&Ox3. Left sided weakness of facial muscles, upper and lower extremities.  SKIN: No rashes or lesions

## 2024-08-02 NOTE — SWALLOW BEDSIDE ASSESSMENT ADULT - SLP GENERAL OBSERVATIONS
Pt found sitting in bed A&OX3, easily distracted, L side facial droop from previous TIA as per pt. Pt found sitting in bed A&OX3 and easily distracted. Pt w/ L side facial droop from previous TIA as per pt. Pt denies any hx of dysphagia or changes in speech/cognition. Pt found sitting in bed A&OX3 and easily distracted. Pt w/ L side facial droop. Pt denies any hx of dysphagia or changes in speech/cognition.

## 2024-08-02 NOTE — SWALLOW BEDSIDE ASSESSMENT ADULT - SWALLOW EVAL: FUNCTIONAL LEVEL AT TIME OF EVAL
A&OX3, easily distracted, L side facial droop from previous TIA as per pt A&OX3, easily distracted, L side facial droop

## 2024-08-02 NOTE — H&P ADULT - ATTENDING COMMENTS
78-year-old male L handed with PMH CAD s/p CABG, CKD, prior stroke, DM, PAD, HTN, HLD presented to the ED on 8/1/24 for left sided weakness, initially 2 days prior to admission but then improved and then worsened again on day prior to presentation so presented.    #Sepsis, POA (febrile, tachycardic)  #Suspected UTI  Pyuria noted  - Cont IV ceftriaxone 1g q24h  - fu UCx, BCx    #Left sided weakness likely recrudescne of prior CVA  MR Head reviewed showing no acute infarct, chronic left [place] infarct and bialteral basal ganglia chronic lacunar infarcts  dw neurology  - On DAPT/statin  - PT Eval    #CAD  #PVD  #HTN/HLD  - Cont lipitor 80mg qD  - Cont amlodipine 5mg qD  - Cont metoprolol 25 BID  - Cont hydralazine 25 BID  - Cont imdur 60  - Cont lipitor and fenofibrate  - Cont DAPT    #Anemia  Hemoglobin 9.8  -Iron TIBC, ferritin, B12, folate  - Monitor    #CKD stage 3  Creatinine at baseline    #BPH  -Continue home finasteride and tamsulosin    #DM  -Continue insulin regimen    #Depression  - Cont venlefaxine    DVT PPX: heparin    #Progress Note Handoff  Pending (specify): Cont tx for UTI, fu UCx,BCx, PT Eval  Family discussion: team dw son regarding plan of care  Disposition: GMF, from home

## 2024-08-02 NOTE — CONSULT NOTE ADULT - SUBJECTIVE AND OBJECTIVE BOX
NEUROLOGY CONSULT    HPI:    78-year-old male L handed with PMH CAD s/p CABG, CKD, prior stroke, DM, PAD, HTN, HLD presents to the ED with left-sided facial droop, left arm and leg weakness, left shoulder pain that radiates throughout his arm, and left leg pain all of which began yesterday morning. He additionally reports 2 episodes since then of shaking without loss of consciousness. Most recent episode was witnessed by son who thought he was having a seizure and called 911. The patient remembers the whole episode and denies LOC or head strike. Neurology was consulted for evaluation of his L sided symptoms. Patient reported that his L sided symptoms are similar to his presentation after his stroke in 2017. Noted that he was evaluated at [facility] for a TIA with R leg weakness that improved. Reports that he normally walks indepently and is able to shop online independently but needs help with cooking and cleaning. Noted that he has a positive UTI, though patient denied dysuria.    mRankin score 3 at baseline  0 No symptoms at all  1 No significant disability despite symptoms; able to carry out all usual duties and activities without assistance  2 Slight disability; unable to carry out all previous activities, but able to look after own affairs  3 Moderate disability; requiring some help, but able to walk without assistance  4 Moderately severe disability; unable to walk without assistance and unable to attend to own bodily needs without assistance  5 Severe disability; bedridden, incontinent and requiring constant nursing care and attention  6 Dead     MEDICATIONS  Home Medications:  fenofibrate 145 mg oral tablet: 1 orally once a day (31 Aug 2023 00:42)  Imdur 60 mg oral tablet, extended release: 1 orally once a day (31 Aug 2023 00:41)  linagliptin 5 mg oral tablet: 1 tab(s) orally once a day (31 Aug 2023 00:42)  losartan 100 mg oral tablet: 1 orally once a day (31 Aug 2023 00:40)  metoprolol tartrate 25 mg oral tablet: 1 tab(s) orally 2 times a day (31 Aug 2023 00:42)  Plavix 75 mg oral tablet: 1 orally once a day (31 Aug 2023 00:42)    MEDICATIONS  (STANDING):    MEDICATIONS  (PRN):      FAMILY HISTORY:    SOCIAL HISTORY: negative for tobacco, alcohol, or ilicit drug use.    Allergies    clindamycin (Rash)  PC Pen VK (Rash)  penicillin (Rash)    Intolerances        GEN: NAD, pleasant, cooperative    NEURO:   MENTAL STATUS: AAOx3  LANG/SPEECH: Fluent, intact naming, repetition & comprehension  CRANIAL NERVES:  II: Pupils equal and reactive, no RAPD, normal visual field and fundus  III, IV, VI: EOM intact, difficulty with L upper gaze but denied diplopia  V: reduced light touch and temperature on L  VII: L droop  VIII: normal hearing to speech  MOTOR: 5/5 in both upper and lower extremities  REFLEXES: 2/4 throughout, bilateral flexor plantars  SENSORY: Normal to touch, temperature & pin prick in all extremiteis  COORD: Normal finger to nose and heel to shin, no tremor, no dysmetria  Gait:   NIHSS: **** ASPECT Score: ***** ICH Score: ****** (GCS)    LABS:                        11.3   14.58 )-----------( 237      ( 01 Aug 2024 19:08 )             35.5     08-    134<L>  |  100  |  40<H>  ----------------------------<  124<H>  4.5   |  21  |  2.4<H>    Ca    9.2      01 Aug 2024 19:08    TPro  6.5  /  Alb  3.9  /  TBili  0.4  /  DBili  x   /  AST  16  /  ALT  11  /  AlkPhos  47  08-    Hemoglobin A1C:   Vitamin B12   PT/INR - ( 01 Aug 2024 19:08 )   PT: 12.00 sec;   INR: 1.05 ratio         PTT - ( 01 Aug 2024 19:08 )  PTT:31.7 sec  CAPILLARY BLOOD GLUCOSE      POCT Blood Glucose.: 148 mg/dL (01 Aug 2024 23:20)      Urinalysis Basic - ( 01 Aug 2024 21:53 )    Color: Yellow / Appearance: Cloudy / S.026 / pH: x  Gluc: x / Ketone: Negative mg/dL  / Bili: Negative / Urobili: 1.0 mg/dL   Blood: x / Protein: 300 mg/dL / Nitrite: Negative   Leuk Esterase: Large / RBC: 16 /HPF /  /HPF   Sq Epi: x / Non Sq Epi: 0 /HPF / Bacteria: Many /HPF            Microbiology:    Urinalysis with Rflx Culture (collected 01 Aug 2024 21:53)        RADIOLOGY, EKG AND ADDITIONAL TESTS: Reviewed.           NEUROLOGY CONSULT    HPI:    78-year-old male L handed with PMH CAD s/p CABG, CKD, prior stroke, DM, PAD, HTN, HLD presents to the ED with left-sided facial droop, left arm and leg weakness, left shoulder pain that radiates throughout his arm, and left leg pain all of which began yesterday morning. He additionally reports 2 episodes since then of shaking without loss of consciousness. Most recent episode was witnessed by son who thought he was having a seizure and called 911. The patient remembers the whole episode and denies LOC or head strike. Neurology was consulted for evaluation of his L sided symptoms. Patient reported that his L sided symptoms are similar to his presentation after his stroke in 2017. Noted that he was evaluated at [facility] for a TIA with R leg weakness that improved. Reports that he normally walks indepently and is able to shop online independently but needs help with cooking and cleaning. Noted that he has a positive UTI, though patient denied dysuria.    mRankin score 3 at baseline  0 No symptoms at all  1 No significant disability despite symptoms; able to carry out all usual duties and activities without assistance  2 Slight disability; unable to carry out all previous activities, but able to look after own affairs  3 Moderate disability; requiring some help, but able to walk without assistance  4 Moderately severe disability; unable to walk without assistance and unable to attend to own bodily needs without assistance  5 Severe disability; bedridden, incontinent and requiring constant nursing care and attention  6 Dead     MEDICATIONS  Home Medications:  fenofibrate 145 mg oral tablet: 1 orally once a day (31 Aug 2023 00:42)  Imdur 60 mg oral tablet, extended release: 1 orally once a day (31 Aug 2023 00:41)  linagliptin 5 mg oral tablet: 1 tab(s) orally once a day (31 Aug 2023 00:42)  losartan 100 mg oral tablet: 1 orally once a day (31 Aug 2023 00:40)  metoprolol tartrate 25 mg oral tablet: 1 tab(s) orally 2 times a day (31 Aug 2023 00:42)  Plavix 75 mg oral tablet: 1 orally once a day (31 Aug 2023 00:42)    MEDICATIONS  (STANDING):    MEDICATIONS  (PRN):      FAMILY HISTORY:    SOCIAL HISTORY: negative for tobacco, alcohol, or ilicit drug use.    Allergies    clindamycin (Rash)  PC Pen VK (Rash)  penicillin (Rash)    Intolerances        GEN: NAD, pleasant, cooperative    NEURO:   MENTAL STATUS: AAOx3  LANG/SPEECH: Fluent, intact naming, repetition & comprehension  CRANIAL NERVES:  II: Pupils equal and reactive, no RAPD, normal visual field and fundus  III, IV, VI: EOM intact, difficulty with L upper gaze but denied diplopia  V: reduced light touch and temperature on L  VII: L droop  VIII: normal hearing to speech  MOTOR: 5/5 RUE, RLE, 4/5 shoulder abduction, bicep flexion, tricep extension, thumb abduction, finger flexion, finger extension, finger abduction, hip flexion, knee extension  TONE: LLE spastic  REFLEXES: 2/4 bicep, tricep, brachioradialis, achilles, 1/4 patellar bl  SENSORY: Normal to touch, temperature & pin prick in all extremiteis  COORD: Normal finger to nose and heel to shin, no tremor, no dysmetria  Gait: deferred for patient safety    LABS:                        11.3   14.58 )-----------( 237      ( 01 Aug 2024 19:08 )             35.5     08-    134<L>  |  100  |  40<H>  ----------------------------<  124<H>  4.5   |  21  |  2.4<H>    Ca    9.2      01 Aug 2024 19:08    TPro  6.5  /  Alb  3.9  /  TBili  0.4  /  DBili  x   /  AST  16  /  ALT  11  /  AlkPhos  47  08-    Hemoglobin A1C:   Vitamin B12   PT/INR - ( 01 Aug 2024 19:08 )   PT: 12.00 sec;   INR: 1.05 ratio         PTT - ( 01 Aug 2024 19:08 )  PTT:31.7 sec  CAPILLARY BLOOD GLUCOSE      POCT Blood Glucose.: 148 mg/dL (01 Aug 2024 23:20)      Urinalysis Basic - ( 01 Aug 2024 21:53 )    Color: Yellow / Appearance: Cloudy / S.026 / pH: x  Gluc: x / Ketone: Negative mg/dL  / Bili: Negative / Urobili: 1.0 mg/dL   Blood: x / Protein: 300 mg/dL / Nitrite: Negative   Leuk Esterase: Large / RBC: 16 /HPF /  /HPF   Sq Epi: x / Non Sq Epi: 0 /HPF / Bacteria: Many /HPF            Microbiology:    Urinalysis with Rflx Culture (collected 01 Aug 2024 21:53)        RADIOLOGY, EKG AND ADDITIONAL TESTS: Reviewed.    < from: CT Head No Cont (24 @ 19:35) >  Stable chronic infarction left SUSANNE territory. Stable chronic lacunar   infarctions.    There are patchy hypodensities throughout the hemispheric white matter   without mass effect compatible with chronic microvascular changes.      No evidence for acute territorial infarction.    < end of copied text >    < from: CT Angio Head w/ IV Cont (24 @ 19:35) >  Moderate stenosis in the region of the cavernous and supraclinoid   portions of the left ICA.    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    Moderate stenoses of the M1 and M2 segments of the right and left middle   cerebral arteries is noted.    No evidence of aneurysm.    < end of copied text >       NEUROLOGY CONSULT    HPI:    78-year-old male L handed with PMH CAD s/p CABG, CKD, prior stroke, DM, PAD, HTN, HLD presents to the ED with left-sided facial droop, left arm and leg weakness, left shoulder pain that radiates throughout his arm, and left leg pain all of which began yesterday morning. He additionally reports 2 episodes of L handed shaking without loss of consciousness. The patient remembers the whole episode and denies LOC or head strike. Neurology was consulted for evaluation of his L sided symptoms. Patient reported that his L sided symptoms are similar to his presentation after his stroke in 2017. Noted that he was evaluated at [facility] for a TIA with R leg weakness that improved. Denied any recent trauma to L shoulder, fall, or sleeping on that side. Reports that he normally walks independently and is able to shop online independently but needs help with cooking and cleaning. Noted that he has a positive UTI, though patient denied dysuria.    mRankin score 3 at baseline  0 No symptoms at all  1 No significant disability despite symptoms; able to carry out all usual duties and activities without assistance  2 Slight disability; unable to carry out all previous activities, but able to look after own affairs  3 Moderate disability; requiring some help, but able to walk without assistance  4 Moderately severe disability; unable to walk without assistance and unable to attend to own bodily needs without assistance  5 Severe disability; bedridden, incontinent and requiring constant nursing care and attention  6 Dead     MEDICATIONS  Home Medications:  fenofibrate 145 mg oral tablet: 1 orally once a day (31 Aug 2023 00:42)  Imdur 60 mg oral tablet, extended release: 1 orally once a day (31 Aug 2023 00:41)  linagliptin 5 mg oral tablet: 1 tab(s) orally once a day (31 Aug 2023 00:42)  losartan 100 mg oral tablet: 1 orally once a day (31 Aug 2023 00:40)  metoprolol tartrate 25 mg oral tablet: 1 tab(s) orally 2 times a day (31 Aug 2023 00:42)  Plavix 75 mg oral tablet: 1 orally once a day (31 Aug 2023 00:42)    MEDICATIONS  (STANDING):    MEDICATIONS  (PRN):      FAMILY HISTORY:    SOCIAL HISTORY: negative for tobacco, alcohol, or ilicit drug use.    Allergies    clindamycin (Rash)  PC Pen VK (Rash)  penicillin (Rash)    Intolerances        GEN: NAD, pleasant, cooperative    NEURO:   MENTAL STATUS: AAOx3  LANG/SPEECH: Fluent, intact naming, repetition & comprehension  CRANIAL NERVES:  II: Pupils equal and reactive, no RAPD, normal visual field and fundus  III, IV, VI: EOM intact, difficulty with L upper gaze but denied diplopia  V: reduced light touch and temperature on L  VII: L droop  VIII: normal hearing to speech  MOTOR: 5/5 RUE, RLE, 4/5 shoulder abduction, bicep flexion, tricep extension, thumb abduction, finger flexion, finger extension, finger abduction, hip flexion, knee extension  TONE: LLE spastic  REFLEXES: 2/4 bicep, tricep, brachioradialis, achilles, 1/4 patellar bl  SENSORY: Normal to touch, temperature & pin prick in all extremiteis  COORD: Normal finger to nose and heel to shin, no tremor, no dysmetria  Gait: deferred for patient safety    LABS:                        11.3   14.58 )-----------( 237      ( 01 Aug 2024 19:08 )             35.5     08-    134<L>  |  100  |  40<H>  ----------------------------<  124<H>  4.5   |  21  |  2.4<H>    Ca    9.2      01 Aug 2024 19:08    TPro  6.5  /  Alb  3.9  /  TBili  0.4  /  DBili  x   /  AST  16  /  ALT  11  /  AlkPhos  47  08-    Hemoglobin A1C:   Vitamin B12   PT/INR - ( 01 Aug 2024 19:08 )   PT: 12.00 sec;   INR: 1.05 ratio         PTT - ( 01 Aug 2024 19:08 )  PTT:31.7 sec  CAPILLARY BLOOD GLUCOSE      POCT Blood Glucose.: 148 mg/dL (01 Aug 2024 23:20)      Urinalysis Basic - ( 01 Aug 2024 21:53 )    Color: Yellow / Appearance: Cloudy / S.026 / pH: x  Gluc: x / Ketone: Negative mg/dL  / Bili: Negative / Urobili: 1.0 mg/dL   Blood: x / Protein: 300 mg/dL / Nitrite: Negative   Leuk Esterase: Large / RBC: 16 /HPF /  /HPF   Sq Epi: x / Non Sq Epi: 0 /HPF / Bacteria: Many /HPF            Microbiology:    Urinalysis with Rflx Culture (collected 01 Aug 2024 21:53)        RADIOLOGY, EKG AND ADDITIONAL TESTS: Reviewed.    < from: CT Head No Cont (24 @ 19:35) >  Stable chronic infarction left SUSANNE territory. Stable chronic lacunar   infarctions.    There are patchy hypodensities throughout the hemispheric white matter   without mass effect compatible with chronic microvascular changes.      No evidence for acute territorial infarction.    < end of copied text >    < from: CT Angio Head w/ IV Cont (24 @ 19:35) >  Moderate stenosis in the region of the cavernous and supraclinoid   portions of the left ICA.    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    Moderate stenoses of the M1 and M2 segments of the right and left middle   cerebral arteries is noted.    No evidence of aneurysm.    < end of copied text >

## 2024-08-02 NOTE — CONSULT NOTE ADULT - ASSESSMENT
78-year-old male L handed with PMH CAD s/p CABG, CKD, prior stroke, DM, PAD, HTN, HLD presents to the ED with left-sided facial droop, left arm and leg weakness, left shoulder pain that radiates throughout his arm, and left leg pain all of which began yesterday morning. He additionally reports 2 episodes since then of shaking without loss of consciousness. Most recent episode was witnessed by son who thought he was having a seizure and called 911. The patient remembers the whole episode and denies LOC or head strike. Neurology was consulted for evaluation of his L sided symptoms. Patient reported that his L sided symptoms are similar to his presentation after his stroke in 2017. Noted that he was evaluated at [facility] for a TIA with R leg weakness that improved. Reports that he normally walks indepently and is able to shop online independently but needs help with cooking and cleaning. Noted that he has a positive UTI, though patient denied dysuria.        < from: CT Head No Cont (08.01.24 @ 19:35) >  Stable chronic infarction left SUSANNE territory. Stable chronic lacunar   infarctions.    There are patchy hypodensities throughout the hemispheric white matter   without mass effect compatible with chronic microvascular changes.      No evidence for acute territorial infarction.    < end of copied text >    < from: CT Angio Head w/ IV Cont (08.01.24 @ 19:35) >  Moderate stenosis in the region of the cavernous and supraclinoid   portions of the left ICA.    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    Moderate stenoses of the M1 and M2 segments of the right and left middle   cerebral arteries is noted.    No evidence of aneurysm.    < end of copied text >   78-year-old male L handed with PMH CAD s/p CABG, CKD, prior stroke, DM, PAD, HTN, HLD presented to the ED with left-sided facial droop, left arm and leg weakness, left shoulder pain that radiates throughout his arm, and left leg pain all of which began yesterday morning. He additionally reports 2 episodes L handed shaking without loss of consciousness. Neurology was consulted for evaluation of these L sided symptoms. Patient reported that his L sided symptoms are similar to his presentation after his stroke in 2017. CTH shows stable chronic infarction left SUSANNE territory and stable chronic lacunar infarctions without acute infarct. 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 In the setting of current UTI his symptoms may be a recrudescence of his old stroke as his symptoms correspond to her symptoms in 2017 after his stroke, and he has moderate stenosis in the R MCA.    RECOMMENDATIONS  - [facility]  - continUE DAPT  - BP goal 140-180  - medical management of his UTI  - PT/OT    Discussed case with neurology attending Dr. Persaud

## 2024-08-02 NOTE — PATIENT PROFILE ADULT - FALL HARM RISK - DEVICES
Medication: levothyroxine 75 MCG tablet  Last office visit date: 06/27/23  Next appointment scheduled?: Yes   Number of refills given: 3 None

## 2024-08-02 NOTE — PATIENT PROFILE ADULT - FALL HARM RISK - HARM RISK INTERVENTIONS
Returned patients call to reschedule his appointment for tomorrow.     Appointment has been rescheduled to Thursday March 29th at 2pm with Clemencia Middleton NP
Assistance with ambulation/Assistance OOB with selected safe patient handling equipment/Communicate Risk of Fall with Harm to all staff/Reinforce activity limits and safety measures with patient and family/Tailored Fall Risk Interventions/Visual Cue: Yellow wristband and red socks/Bed in lowest position, wheels locked, appropriate side rails in place/Call bell, personal items and telephone in reach/Instruct patient to call for assistance before getting out of bed or chair/Non-slip footwear when patient is out of bed/Effort to call system/Physically safe environment - no spills, clutter or unnecessary equipment/Purposeful Proactive Rounding/Room/bathroom lighting operational, light cord in reach

## 2024-08-02 NOTE — CHART NOTE - NSCHARTNOTEFT_GEN_A_CORE
Please complete contact patient's son, Mauricio, phone number is [phone], in the afternoon to obtain med rec. I called him this morning and he told me that he cannot obtain the med rec until this afternoon. He said that he will come with the med rec to the hospital on 8/2/24 in PM with the list. I called [facility] and got what information I could from them, but patient's son needs to be contacted to finalize what patient is actually taking. Some meds still may need to be started based on that conversation.

## 2024-08-02 NOTE — H&P ADULT - HISTORY OF PRESENT ILLNESS
78-year-old male L handed with PMH CAD s/p CABG, CKD, prior stroke, DM, PAD, HTN, HLD presented to the ED on 24 with left-sided facial droop, left arm and leg weakness, left shoulder pain that radiates throughout his arm, and left leg pain all of which began yesterday morning. He additionally reports 2 episodes of L handed shaking without loss of consciousness. The patient remembers the whole episode and denies LOC or head strike. In the ED, neurology was consulted for evaluation of his L sided symptoms. Patient reported that his L sided symptoms are similar to his presentation after his stroke in 2017. Noted that he was evaluated at [facility] for a TIA with R leg weakness that improved. Denied any recent trauma to L shoulder, fall, or sleeping on that side. Reports that he normally walks independently and is able to shop online independently but needs help with cooking and cleaning. ROS negative for dysuria, hematuria, chest pain, shortness of breath, cough, sore throat, vision changes. Positive for constipation (for years) and left sided weakness (as above).  In the ED: Vital Signs on presentation: · BP Systolic	  192 mm Hg · BP Diastolic	74 mm Hg · Heart Rate	90 /min · Respiration Rate (breaths/min)	16 /min · Temp (F)	100.3 Degrees F · Temp (C)	37.9 Degrees C · Temp site	oral · SpO2 (%)	97 % · O2 Delivery/Oxygen Delivery Method	room air · Temp at ED Arrival (C)	37.9 Degrees C  Select labs from ED: Blood Urea Nitrogen: 30 mg/dL Creatinine: 2.0 mg/dL WBC: 14.40 H.8 Lactate: 1.2 Troponin: 46->41  Urinalysis with Rflx Culture (24 @ 21:53)  Urine Appearance: Cloudy Color: Yellow Specific Gravity: 1.026 pH Urine: 6.5 Protein, Urine: 300 mg/dL Glucose Qualitative, Urine: Negative mg/dL Ketone - Urine: Negative mg/dL Blood, Urine: Moderate Bilirubin: Negative Urobilinogen: 1.0 mg/dL Leukocyte Esterase Concentration: Large Nitrite: Negative Imaging in ED: < from: CT Abdomen and Pelvis No Cont (24 @ 19:56) > IMPRESSION:  Left basilar atelectasis/consolidation, likely chronic.  Chronic trabeculated thick-walled urinary bladder.  < end of copied text >  < from: MR Head No Cont (24 @ 08:13) > IMPRESSION: No evidence of acute intracranial pathology. No acute infarct,  intracranial hemorrhage, mass effect or midline shift.  Chronic left SUSANNE territory infarct as well as bilateral basal ganglia  chronic lacunar infarcts.  < end of copied text >   In the ED, patient received 2000 mg IV aztreonam IVPB once and 1000 mg vancomycin once for sepsis protocol as well as 1800 mL LR bolus. Patient was subsequently started on 75 ml/hr LR on 24. Blood cultures x2 were drawn. Urine culture: Moderate blood. Large leukocyte esterase. Nitrite negative.   Patient admitted to medicine for: Sepsis via SIRS criteria (white count and tachycardia) with source (UTI)    78-year-old male L handed with PMH CAD s/p CABG, CKD, prior stroke, DM, PAD, HTN, HLD presented to the ED on 24 with left-sided facial droop, left arm and leg weakness, left shoulder pain that radiates throughout his arm, and left leg pain all of which began yesterday morning. He additionally reports 2 episodes of L handed shaking without loss of consciousness. The patient remembers the whole episode and denies LOC or head strike. In the ED, neurology was consulted for evaluation of his L sided symptoms. Patient reported that his L sided symptoms are similar to his presentation after his stroke in 2017. Noted that he was evaluated at [facility] for a TIA with R leg weakness that improved. Denied any recent trauma to L shoulder, fall, or sleeping on that side. Reports that he normally walks independently and is able to shop online independently but needs help with cooking and cleaning. ROS negative for dysuria, hematuria, chest pain, shortness of breath, cough, sore throat, vision changes. Positive for constipation (for years) and left sided weakness (as above).  In the ED: Vital Signs on presentation: · BP Systolic	  192 mm Hg · BP Diastolic	74 mm Hg · Heart Rate	90 /min · Respiration Rate (breaths/min)	16 /min · Temp (F)	100.3 Degrees F · Temp (C)	37.9 Degrees C · Temp site	oral · SpO2 (%)	97 % · O2 Delivery/Oxygen Delivery Method	room air · Temp at ED Arrival (C)	37.9 Degrees C  Select labs from ED: Blood Urea Nitrogen: 30 mg/dL Creatinine: 2.0 mg/dL WBC: 14.40 H.8 Lactate: 1.2 Troponin: 46->41  Urinalysis with Rflx Culture (24 @ 21:53)  Urine Appearance: Cloudy Color: Yellow Specific Gravity: 1.026 pH Urine: 6.5 Protein, Urine: 300 mg/dL Glucose Qualitative, Urine: Negative mg/dL Ketone - Urine: Negative mg/dL Blood, Urine: Moderate Bilirubin: Negative Urobilinogen: 1.0 mg/dL Leukocyte Esterase Concentration: Large Nitrite: Negative Imaging in ED: < from: CT Abdomen and Pelvis No Cont (24 @ 19:56) > IMPRESSION:  Left basilar atelectasis/consolidation, likely chronic.  Chronic trabeculated thick-walled urinary bladder.  < end of copied text >  < from: MR Head No Cont (24 @ 08:13) > IMPRESSION: No evidence of acute intracranial pathology. No acute infarct,  intracranial hemorrhage, mass effect or midline shift.  Chronic left SUSANNE territory infarct as well as bilateral basal ganglia  chronic lacunar infarcts.  < end of copied text >   In the ED, patient received 2000 mg IV aztreonam IVPB once and 1000 mg vancomycin once for sepsis protocol as well as 1800 mL LR bolus. Patient was subsequently started on 75 ml/hr LR on 24. Blood cultures x2 were drawn. Urine culture: Moderate blood. Large leukocyte esterase. Nitrite negative.   Patient admitted to medicine for: Sepsis via SIRS criteria (fever, white count and tachycardia on presentation) with source (UTI).

## 2024-08-02 NOTE — H&P ADULT - ASSESSMENT
THIS NOTE IS INCOMPLETE AT THIS TIME. 78-year-old male L handed with PMH CAD s/p CABG, CKD, prior stroke, DM, PAD, HTN, HLD presented to the ED on 8/1/24 for left sided weakness, initially 2 days prior to admission but then improved and then worsened again on day prior to presentation so presented. Was evaluated by neurology in the ED due to suspicion for stroke but code stroke negative. CTH non-con and MRH non-con showed no acute evidence of acute intracranial pathology. No acute infarct, intracranial hemorrhage, mass effect or midline shift. Chronic left SUSANNE territory infarct as well as bilateral basal ganglia chronic lacunar infarcts. Urinalysis positive for UTI and patient treatment initiated for sepsis due to source of infection plus fever, white count and tachycardia on admission. Patient s/p 1800 mL LR fluid bolus + empiric broad spectrum abx (2000 mg IV aztreonam IVPB once and 1000 mg vancomycin) x1 in ER. Patient admitted to medicine floor for management of sepsis.      #Immunocompromised 2/2 CKD and DM    #Sepsis on admission by SIRS criteria (fever, white count and tachycardia on admission) + UTI via UA  Only mild suprapubic tenderness on exam.  WBC 14.40  S/p vanco and aztreonam boluses x1 in ED  S/p 1800 mL LR fluid bolus in ED  -Continue IV fluids: 75 mL/hr LR  -Start ceftriaxone IV 1 g for 3 days  -Follow CBC, CMP    #Left sided weakness  Imaging negative for acute event.  F/u neuro recs.    #CAD  -Continue DAPT    #Anemia  Hemoglobin 9.8  -Iron TIBC, ferritin, B12, folate    #CKD  Creatinine at baseline    #HTN  -Continue home metoprolol tartrate, amlodipine, hydralazine    #PAD  #HLD  -Continue home fenofibrate. Start atorvastatin 80 (no equivalent for rosuvastatin 40 (home med)).    #BPH  -Continue home finasteride and tamsulosin    #DM  -Sliding scale insulin    #Depression?  Please follow up to see if patient is taking venlafaxine at home and start if yes.      DVT PPX: Heparin subq 5000 units q12 hr.  GI PPX: Pantoprazole 40   DIET: Soft and bite sized  ACTIVITY: Ambulate as tolerated  CODE STATUS:  Full        PENDING:  Med rec: Please complete contact patient's son, Mauricio, at [phone] in the afternoon to obtain med rec. I called him this morning and he told me that he cannot obtain the med rec until this afternoon. I called [facility] and got what information I could from them, but patient's son needs to be contacted to finalize what patient is actually taking.

## 2024-08-03 LAB
A1C WITH ESTIMATED AVERAGE GLUCOSE RESULT: 6.4 % — HIGH (ref 4–5.6)
ALBUMIN SERPL ELPH-MCNC: 3.2 G/DL — LOW (ref 3.5–5.2)
ALP SERPL-CCNC: 41 U/L — SIGNIFICANT CHANGE UP (ref 30–115)
ALT FLD-CCNC: 14 U/L — SIGNIFICANT CHANGE UP (ref 0–41)
ANION GAP SERPL CALC-SCNC: 11 MMOL/L — SIGNIFICANT CHANGE UP (ref 7–14)
AST SERPL-CCNC: 20 U/L — SIGNIFICANT CHANGE UP (ref 0–41)
BASOPHILS # BLD AUTO: 0.04 K/UL — SIGNIFICANT CHANGE UP (ref 0–0.2)
BASOPHILS NFR BLD AUTO: 0.4 % — SIGNIFICANT CHANGE UP (ref 0–1)
BILIRUB SERPL-MCNC: 0.4 MG/DL — SIGNIFICANT CHANGE UP (ref 0.2–1.2)
BUN SERPL-MCNC: 27 MG/DL — HIGH (ref 10–20)
CALCIUM SERPL-MCNC: 8.6 MG/DL — SIGNIFICANT CHANGE UP (ref 8.4–10.5)
CHLORIDE SERPL-SCNC: 103 MMOL/L — SIGNIFICANT CHANGE UP (ref 98–110)
CO2 SERPL-SCNC: 23 MMOL/L — SIGNIFICANT CHANGE UP (ref 17–32)
CREAT SERPL-MCNC: 2.1 MG/DL — HIGH (ref 0.7–1.5)
EGFR: 31 ML/MIN/1.73M2 — LOW
EOSINOPHIL # BLD AUTO: 0.09 K/UL — SIGNIFICANT CHANGE UP (ref 0–0.7)
EOSINOPHIL NFR BLD AUTO: 0.8 % — SIGNIFICANT CHANGE UP (ref 0–8)
ESTIMATED AVERAGE GLUCOSE: 137 MG/DL — HIGH (ref 68–114)
FERRITIN SERPL-MCNC: 269 NG/ML — SIGNIFICANT CHANGE UP (ref 30–400)
FOLATE SERPL-MCNC: 14 NG/ML — SIGNIFICANT CHANGE UP
GLUCOSE BLDC GLUCOMTR-MCNC: 108 MG/DL — HIGH (ref 70–99)
GLUCOSE BLDC GLUCOMTR-MCNC: 143 MG/DL — HIGH (ref 70–99)
GLUCOSE BLDC GLUCOMTR-MCNC: 156 MG/DL — HIGH (ref 70–99)
GLUCOSE BLDC GLUCOMTR-MCNC: 159 MG/DL — HIGH (ref 70–99)
GLUCOSE SERPL-MCNC: 107 MG/DL — HIGH (ref 70–99)
HCT VFR BLD CALC: 30.8 % — LOW (ref 42–52)
HGB BLD-MCNC: 9.8 G/DL — LOW (ref 14–18)
IMM GRANULOCYTES NFR BLD AUTO: 0.5 % — HIGH (ref 0.1–0.3)
LYMPHOCYTES # BLD AUTO: 1.06 K/UL — LOW (ref 1.2–3.4)
LYMPHOCYTES # BLD AUTO: 9.6 % — LOW (ref 20.5–51.1)
MAGNESIUM SERPL-MCNC: 2.1 MG/DL — SIGNIFICANT CHANGE UP (ref 1.8–2.4)
MCHC RBC-ENTMCNC: 29.1 PG — SIGNIFICANT CHANGE UP (ref 27–31)
MCHC RBC-ENTMCNC: 31.8 G/DL — LOW (ref 32–37)
MCV RBC AUTO: 91.4 FL — SIGNIFICANT CHANGE UP (ref 80–94)
MONOCYTES # BLD AUTO: 0.96 K/UL — HIGH (ref 0.1–0.6)
MONOCYTES NFR BLD AUTO: 8.7 % — SIGNIFICANT CHANGE UP (ref 1.7–9.3)
NEUTROPHILS # BLD AUTO: 8.88 K/UL — HIGH (ref 1.4–6.5)
NEUTROPHILS NFR BLD AUTO: 80 % — HIGH (ref 42.2–75.2)
NRBC # BLD: 0 /100 WBCS — SIGNIFICANT CHANGE UP (ref 0–0)
PHOSPHATE SERPL-MCNC: 2.4 MG/DL — SIGNIFICANT CHANGE UP (ref 2.1–4.9)
PLATELET # BLD AUTO: 218 K/UL — SIGNIFICANT CHANGE UP (ref 130–400)
PMV BLD: 10.8 FL — HIGH (ref 7.4–10.4)
POTASSIUM SERPL-MCNC: 4.2 MMOL/L — SIGNIFICANT CHANGE UP (ref 3.5–5)
POTASSIUM SERPL-SCNC: 4.2 MMOL/L — SIGNIFICANT CHANGE UP (ref 3.5–5)
PROT SERPL-MCNC: 5.4 G/DL — LOW (ref 6–8)
RBC # BLD: 3.37 M/UL — LOW (ref 4.7–6.1)
RBC # FLD: 15.2 % — HIGH (ref 11.5–14.5)
SODIUM SERPL-SCNC: 137 MMOL/L — SIGNIFICANT CHANGE UP (ref 135–146)
VIT B12 SERPL-MCNC: 251 PG/ML — SIGNIFICANT CHANGE UP (ref 232–1245)
WBC # BLD: 11.08 K/UL — HIGH (ref 4.8–10.8)
WBC # FLD AUTO: 11.08 K/UL — HIGH (ref 4.8–10.8)

## 2024-08-03 PROCEDURE — 73620 X-RAY EXAM OF FOOT: CPT | Mod: 26,RT

## 2024-08-03 PROCEDURE — 99232 SBSQ HOSP IP/OBS MODERATE 35: CPT

## 2024-08-03 PROCEDURE — 95816 EEG AWAKE AND DROWSY: CPT | Mod: 26

## 2024-08-03 RX ORDER — TRAMADOL HCL 50 MG
25 TABLET ORAL ONCE
Refills: 0 | Status: DISCONTINUED | OUTPATIENT
Start: 2024-08-03 | End: 2024-08-03

## 2024-08-03 RX ORDER — VENLAFAXINE 25 MG/1
75 TABLET ORAL DAILY
Refills: 0 | Status: DISCONTINUED | OUTPATIENT
Start: 2024-08-03 | End: 2024-08-07

## 2024-08-03 RX ORDER — BACLOFEN 10 MG/1
5 TABLET ORAL EVERY 12 HOURS
Refills: 0 | Status: DISCONTINUED | OUTPATIENT
Start: 2024-08-03 | End: 2024-08-07

## 2024-08-03 RX ADMIN — ATORVASTATIN CALCIUM 80 MILLIGRAM(S): 40 TABLET, FILM COATED ORAL at 21:18

## 2024-08-03 RX ADMIN — Medication 650 MILLIGRAM(S): at 08:24

## 2024-08-03 RX ADMIN — CLOPIDOGREL BISULFATE 75 MILLIGRAM(S): 75 TABLET, FILM COATED ORAL at 14:00

## 2024-08-03 RX ADMIN — AMLODIPINE BESYLATE 5 MILLIGRAM(S): 2.5 TABLET ORAL at 05:32

## 2024-08-03 RX ADMIN — Medication 25 MILLIGRAM(S): at 17:26

## 2024-08-03 RX ADMIN — HYDRALAZINE HYDROCHLORIDE 50 MILLIGRAM(S): 100 TABLET ORAL at 17:26

## 2024-08-03 RX ADMIN — BACLOFEN 5 MILLIGRAM(S): 10 TABLET ORAL at 17:26

## 2024-08-03 RX ADMIN — Medication 25 MILLIGRAM(S): at 14:00

## 2024-08-03 RX ADMIN — FINASTERIDE 5 MILLIGRAM(S): 5 TABLET, FILM COATED ORAL at 14:00

## 2024-08-03 RX ADMIN — ISOSORBIDE MONONITRATE 60 MILLIGRAM(S): 60 TABLET, EXTENDED RELEASE ORAL at 14:00

## 2024-08-03 RX ADMIN — Medication 81 MILLIGRAM(S): at 14:01

## 2024-08-03 RX ADMIN — HEPARIN SODIUM 5000 UNIT(S): 1000 INJECTION, SOLUTION INTRAVENOUS; SUBCUTANEOUS at 17:26

## 2024-08-03 RX ADMIN — Medication 100 MILLIGRAM(S): at 14:00

## 2024-08-03 RX ADMIN — Medication 25 MILLIGRAM(S): at 14:30

## 2024-08-03 RX ADMIN — PANTOPRAZOLE SODIUM 40 MILLIGRAM(S): 20 TABLET, DELAYED RELEASE ORAL at 05:32

## 2024-08-03 RX ADMIN — FENOFIBRATE 145 MILLIGRAM(S): 30 CAPSULE ORAL at 14:01

## 2024-08-03 RX ADMIN — VENLAFAXINE 75 MILLIGRAM(S): 25 TABLET ORAL at 14:01

## 2024-08-03 RX ADMIN — HEPARIN SODIUM 5000 UNIT(S): 1000 INJECTION, SOLUTION INTRAVENOUS; SUBCUTANEOUS at 05:32

## 2024-08-03 RX ADMIN — Medication 1: at 17:25

## 2024-08-03 RX ADMIN — Medication 650 MILLIGRAM(S): at 07:54

## 2024-08-03 RX ADMIN — Medication 0.4 MILLIGRAM(S): at 21:18

## 2024-08-03 RX ADMIN — Medication 25 MILLIGRAM(S): at 05:32

## 2024-08-03 RX ADMIN — HYDRALAZINE HYDROCHLORIDE 50 MILLIGRAM(S): 100 TABLET ORAL at 05:32

## 2024-08-03 NOTE — PROGRESS NOTE ADULT - ASSESSMENT
78-year-old male L handed with PMH CAD s/p CABG, CKD, prior stroke, DM, PAD, HTN, HLD presented to the ED on 8/1/24 for left sided weakness, initially 2 days prior to admission but then improved and then worsened again on day prior to presentation so presented.    #Sepsis, POA (febrile, tachycardic)  #Suspected UTI  Pyuria noted  BCx 08/01 NGTD  - Cont IV ceftriaxone 1g q24h  - fu UCx    #Left sided weakness likely recrudescne of prior CVA  MR Head reviewed showing no acute infarct, chronic left SUSANNE territoria infarct and bialteral basal ganglia chronic lacunar infarcts  dw neurology  - On DAPT/statin  - PT Eval    #CAD  #PVD  #HTN/HLD  - Cont lipitor 80mg qD  - Cont amlodipine 5mg qD  - Cont metoprolol 25 BID  - Cont hydralazine 25 BID  - Cont imdur 60  - Cont lipitor and fenofibrate  - Cont DAPT    #Anemia  Hemoglobin 9.8  -Iron TIBC, ferritin, B12, folate  - Monitor    #CKD stage 3  Creatinine at baseline    #BPH  -Continue home finasteride and tamsulosin    #DM  -Continue insulin regimen    #Depression  - Cont venlefaxine    DVT PPX: heparin    #Progress Note Handoff  Pending (specify): Cont tx for UTI, fu UCx, PT Eval  Family discussion: dw pt regarding plan of care  Disposition: GMF, from home

## 2024-08-03 NOTE — PROGRESS NOTE ADULT - SUBJECTIVE AND OBJECTIVE BOX
MICHELLEERICK  78y, Male  Allergy: clindamycin (Rash)  PC Pen VK (Rash)  penicillin (Rash)    Hospital Day: 1d    Patient seen and examined. No acute events overnight    PMH/PSH:  PAST MEDICAL & SURGICAL HISTORY:  CAD (coronary artery disease)      HTN (hypertension)      Depression      Anxiety      Diabetes  niddm      Angina pectoris      BPH (benign prostatic hyperplasia)      GERD (gastroesophageal reflux disease)      CVA (cerebral vascular accident)      History of appendectomy      Bilateral cataracts      History of heart bypass surgery          VITALS:  T(F): 97.9 (08-03-24 @ 06:36), Max: 100.1 (08-02-24 @ 11:40)  HR: 82 (08-03-24 @ 06:36)  BP: 122/67 (08-03-24 @ 06:36) (114/64 - 180/90)  RR: 18 (08-03-24 @ 06:36)  SpO2: 98% (08-03-24 @ 06:36)    TESTS & MEASUREMENTS:  Weight (Kg): 58.1 (08-01-24 @ 17:28)  BMI (kg/m2): 23.4 (08-01)    08-02-24 @ 07:01  -  08-03-24 @ 07:00  --------------------------------------------------------  IN: 0 mL / OUT: 700 mL / NET: -700 mL                            9.8    11.08 )-----------( 218      ( 03 Aug 2024 07:11 )             30.8     PT/INR - ( 01 Aug 2024 19:08 )   PT: 12.00 sec;   INR: 1.05 ratio         PTT - ( 01 Aug 2024 19:08 )  PTT:31.7 sec  08-03    137  |  103  |  27<H>  ----------------------------<  107<H>  4.2   |  23  |  2.1<H>    Ca    8.6      03 Aug 2024 07:11  Phos  2.4     08-03  Mg     2.1     08-03    TPro  5.4<L>  /  Alb  3.2<L>  /  TBili  0.4  /  DBili  x   /  AST  20  /  ALT  14  /  AlkPhos  41  08-03    LIVER FUNCTIONS - ( 03 Aug 2024 07:11 )  Alb: 3.2 g/dL / Pro: 5.4 g/dL / ALK PHOS: 41 U/L / ALT: 14 U/L / AST: 20 U/L / GGT: x                 Culture - Blood (collected 08-01-24 @ 21:53)  Source: .Blood Blood-Peripheral  Preliminary Report (08-03-24 @ 09:02):    No growth at 24 hours    Culture - Blood (collected 08-01-24 @ 21:53)  Source: .Blood Blood-Peripheral  Preliminary Report (08-03-24 @ 09:02):    No growth at 24 hours    Urinalysis with Rflx Culture (collected 08-01-24 @ 21:53)      Urinalysis Basic - ( 03 Aug 2024 07:11 )    Color: x / Appearance: x / SG: x / pH: x  Gluc: 107 mg/dL / Ketone: x  / Bili: x / Urobili: x   Blood: x / Protein: x / Nitrite: x   Leuk Esterase: x / RBC: x / WBC x   Sq Epi: x / Non Sq Epi: x / Bacteria: x        RADIOLOGY & ADDITIONAL TESTS:    RECENT DIAGNOSTIC ORDERS:  Folate, Serum: 16:00 (08-02-24 @ 12:01)  Vitamin B12, Serum: 16:00 (08-02-24 @ 12:01)  Ferritin: 16:00 (08-02-24 @ 12:01)      MEDICATIONS:  MEDICATIONS  (STANDING):  amLODIPine   Tablet 5 milliGRAM(s) Oral daily  aspirin  chewable 81 milliGRAM(s) Oral daily  atorvastatin 80 milliGRAM(s) Oral at bedtime  cefTRIAXone   IVPB 1000 milliGRAM(s) IV Intermittent every 24 hours  cefTRIAXone   IVPB      clopidogrel Tablet 75 milliGRAM(s) Oral daily  dextrose 5%. 1000 milliLiter(s) (50 mL/Hr) IV Continuous <Continuous>  dextrose 50% Injectable 25 Gram(s) IV Push once  fenofibrate Tablet 145 milliGRAM(s) Oral daily  finasteride 5 milliGRAM(s) Oral daily  glucagon  Injectable 1 milliGRAM(s) IntraMuscular once  heparin   Injectable 5000 Unit(s) SubCutaneous every 12 hours  hydrALAZINE 50 milliGRAM(s) Oral two times a day  insulin lispro (ADMELOG) corrective regimen sliding scale   SubCutaneous three times a day before meals  isosorbide   mononitrate ER Tablet (IMDUR) 60 milliGRAM(s) Oral daily  lactated ringers. 1000 milliLiter(s) (75 mL/Hr) IV Continuous <Continuous>  metoprolol tartrate 25 milliGRAM(s) Oral two times a day  pantoprazole    Tablet 40 milliGRAM(s) Oral before breakfast  tamsulosin 0.4 milliGRAM(s) Oral at bedtime  traMADol 25 milliGRAM(s) Oral once  venlafaxine XR. 75 milliGRAM(s) Oral daily    MEDICATIONS  (PRN):  acetaminophen     Tablet .. 650 milliGRAM(s) Oral every 6 hours PRN Temp greater or equal to 38C (100.4F), Mild Pain (1 - 3)  aluminum hydroxide/magnesium hydroxide/simethicone Suspension 30 milliLiter(s) Oral every 4 hours PRN Dyspepsia  dextrose Oral Gel 15 Gram(s) Oral once PRN Blood Glucose LESS THAN 70 milliGRAM(s)/deciliter  melatonin 3 milliGRAM(s) Oral at bedtime PRN Insomnia  ondansetron Injectable 4 milliGRAM(s) IV Push every 8 hours PRN Nausea and/or Vomiting      HOME MEDICATIONS:  Albuterol (08-02)  amLODIPine 5 mg oral tablet (08-02)  fenofibrate 145 mg oral tablet (08-02)  finasteride 5 mg oral tablet (08-02)  hydrALAZINE 50 mg oral tablet (08-02)  Imdur 60 mg oral tablet, extended release (08-02)  linagliptin 5 mg oral tablet (08-02)  losartan 100 mg oral tablet (08-02)  metoprolol tartrate 25 mg oral tablet (08-02)  Plavix 75 mg oral tablet (08-02)  ranolazine 500 mg oral tablet, extended release (08-02)  rosuvastatin 40 mg oral tablet (08-02)  venlafaxine 75 mg oral capsule, extended release (08-02)  Vitamin D (08-02)      REVIEW OF SYSTEMS:  All other review of systems is negative unless indicated above.     PHYSICAL EXAM:  PHYSICAL EXAM:  GENERAL: NAD  HEAD:  Atraumatic, Normocephalic  NECK: Supple, No JVD  CHEST/LUNG: Clear to auscultation bilaterally; No wheeze  HEART: Regular rate and rhythm; No murmurs, rubs, or gallops  ABDOMEN: Soft, Nontender, Nondistended; Bowel sounds present  EXTREMITIES:  2+ Peripheral Pulses, No clubbing, cyanosis, or edema; LUE and LLE 3-4/5 strength  SKIN: No rashes or lesions

## 2024-08-04 LAB
ALBUMIN SERPL ELPH-MCNC: 3.2 G/DL — LOW (ref 3.5–5.2)
ALP SERPL-CCNC: 40 U/L — SIGNIFICANT CHANGE UP (ref 30–115)
ALT FLD-CCNC: 26 U/L — SIGNIFICANT CHANGE UP (ref 0–41)
ANION GAP SERPL CALC-SCNC: 9 MMOL/L — SIGNIFICANT CHANGE UP (ref 7–14)
AST SERPL-CCNC: 33 U/L — SIGNIFICANT CHANGE UP (ref 0–41)
BASOPHILS # BLD AUTO: 0.04 K/UL — SIGNIFICANT CHANGE UP (ref 0–0.2)
BASOPHILS NFR BLD AUTO: 0.5 % — SIGNIFICANT CHANGE UP (ref 0–1)
BILIRUB SERPL-MCNC: <0.2 MG/DL — SIGNIFICANT CHANGE UP (ref 0.2–1.2)
BUN SERPL-MCNC: 29 MG/DL — HIGH (ref 10–20)
CALCIUM SERPL-MCNC: 8.6 MG/DL — SIGNIFICANT CHANGE UP (ref 8.4–10.5)
CHLORIDE SERPL-SCNC: 107 MMOL/L — SIGNIFICANT CHANGE UP (ref 98–110)
CO2 SERPL-SCNC: 23 MMOL/L — SIGNIFICANT CHANGE UP (ref 17–32)
CREAT SERPL-MCNC: 2.2 MG/DL — HIGH (ref 0.7–1.5)
EGFR: 30 ML/MIN/1.73M2 — LOW
EOSINOPHIL # BLD AUTO: 0.14 K/UL — SIGNIFICANT CHANGE UP (ref 0–0.7)
EOSINOPHIL NFR BLD AUTO: 1.9 % — SIGNIFICANT CHANGE UP (ref 0–8)
GLUCOSE BLDC GLUCOMTR-MCNC: 107 MG/DL — HIGH (ref 70–99)
GLUCOSE BLDC GLUCOMTR-MCNC: 121 MG/DL — HIGH (ref 70–99)
GLUCOSE BLDC GLUCOMTR-MCNC: 123 MG/DL — HIGH (ref 70–99)
GLUCOSE BLDC GLUCOMTR-MCNC: 123 MG/DL — HIGH (ref 70–99)
GLUCOSE SERPL-MCNC: 94 MG/DL — SIGNIFICANT CHANGE UP (ref 70–99)
HCT VFR BLD CALC: 29.6 % — LOW (ref 42–52)
HGB BLD-MCNC: 9.3 G/DL — LOW (ref 14–18)
IMM GRANULOCYTES NFR BLD AUTO: 0.4 % — HIGH (ref 0.1–0.3)
LYMPHOCYTES # BLD AUTO: 1.4 K/UL — SIGNIFICANT CHANGE UP (ref 1.2–3.4)
LYMPHOCYTES # BLD AUTO: 18.5 % — LOW (ref 20.5–51.1)
MAGNESIUM SERPL-MCNC: 2.2 MG/DL — SIGNIFICANT CHANGE UP (ref 1.8–2.4)
MCHC RBC-ENTMCNC: 29 PG — SIGNIFICANT CHANGE UP (ref 27–31)
MCHC RBC-ENTMCNC: 31.4 G/DL — LOW (ref 32–37)
MCV RBC AUTO: 92.2 FL — SIGNIFICANT CHANGE UP (ref 80–94)
MONOCYTES # BLD AUTO: 0.56 K/UL — SIGNIFICANT CHANGE UP (ref 0.1–0.6)
MONOCYTES NFR BLD AUTO: 7.4 % — SIGNIFICANT CHANGE UP (ref 1.7–9.3)
NEUTROPHILS # BLD AUTO: 5.39 K/UL — SIGNIFICANT CHANGE UP (ref 1.4–6.5)
NEUTROPHILS NFR BLD AUTO: 71.3 % — SIGNIFICANT CHANGE UP (ref 42.2–75.2)
NRBC # BLD: 0 /100 WBCS — SIGNIFICANT CHANGE UP (ref 0–0)
PLATELET # BLD AUTO: 261 K/UL — SIGNIFICANT CHANGE UP (ref 130–400)
PMV BLD: 10.8 FL — HIGH (ref 7.4–10.4)
POTASSIUM SERPL-MCNC: 4.5 MMOL/L — SIGNIFICANT CHANGE UP (ref 3.5–5)
POTASSIUM SERPL-SCNC: 4.5 MMOL/L — SIGNIFICANT CHANGE UP (ref 3.5–5)
PROT SERPL-MCNC: 5.6 G/DL — LOW (ref 6–8)
RBC # BLD: 3.21 M/UL — LOW (ref 4.7–6.1)
RBC # FLD: 15.1 % — HIGH (ref 11.5–14.5)
SODIUM SERPL-SCNC: 139 MMOL/L — SIGNIFICANT CHANGE UP (ref 135–146)
WBC # BLD: 7.56 K/UL — SIGNIFICANT CHANGE UP (ref 4.8–10.8)
WBC # FLD AUTO: 7.56 K/UL — SIGNIFICANT CHANGE UP (ref 4.8–10.8)

## 2024-08-04 PROCEDURE — 99233 SBSQ HOSP IP/OBS HIGH 50: CPT

## 2024-08-04 RX ADMIN — ISOSORBIDE MONONITRATE 60 MILLIGRAM(S): 60 TABLET, EXTENDED RELEASE ORAL at 13:13

## 2024-08-04 RX ADMIN — FINASTERIDE 5 MILLIGRAM(S): 5 TABLET, FILM COATED ORAL at 11:06

## 2024-08-04 RX ADMIN — HYDRALAZINE HYDROCHLORIDE 50 MILLIGRAM(S): 100 TABLET ORAL at 05:39

## 2024-08-04 RX ADMIN — Medication 25 MILLIGRAM(S): at 05:40

## 2024-08-04 RX ADMIN — AMLODIPINE BESYLATE 5 MILLIGRAM(S): 2.5 TABLET ORAL at 05:40

## 2024-08-04 RX ADMIN — Medication 0.4 MILLIGRAM(S): at 21:31

## 2024-08-04 RX ADMIN — FENOFIBRATE 145 MILLIGRAM(S): 30 CAPSULE ORAL at 13:13

## 2024-08-04 RX ADMIN — HEPARIN SODIUM 5000 UNIT(S): 1000 INJECTION, SOLUTION INTRAVENOUS; SUBCUTANEOUS at 16:32

## 2024-08-04 RX ADMIN — CLOPIDOGREL BISULFATE 75 MILLIGRAM(S): 75 TABLET, FILM COATED ORAL at 11:06

## 2024-08-04 RX ADMIN — HEPARIN SODIUM 5000 UNIT(S): 1000 INJECTION, SOLUTION INTRAVENOUS; SUBCUTANEOUS at 05:40

## 2024-08-04 RX ADMIN — Medication 100 MILLIGRAM(S): at 12:34

## 2024-08-04 RX ADMIN — ATORVASTATIN CALCIUM 80 MILLIGRAM(S): 40 TABLET, FILM COATED ORAL at 21:31

## 2024-08-04 RX ADMIN — HYDRALAZINE HYDROCHLORIDE 50 MILLIGRAM(S): 100 TABLET ORAL at 16:31

## 2024-08-04 RX ADMIN — BACLOFEN 5 MILLIGRAM(S): 10 TABLET ORAL at 05:39

## 2024-08-04 RX ADMIN — PANTOPRAZOLE SODIUM 40 MILLIGRAM(S): 20 TABLET, DELAYED RELEASE ORAL at 05:40

## 2024-08-04 RX ADMIN — Medication 25 MILLIGRAM(S): at 16:32

## 2024-08-04 RX ADMIN — BACLOFEN 5 MILLIGRAM(S): 10 TABLET ORAL at 16:31

## 2024-08-04 RX ADMIN — Medication 81 MILLIGRAM(S): at 11:06

## 2024-08-04 RX ADMIN — VENLAFAXINE 75 MILLIGRAM(S): 25 TABLET ORAL at 13:13

## 2024-08-04 NOTE — PROGRESS NOTE ADULT - SUBJECTIVE AND OBJECTIVE BOX
MICHELLEERICK  78y, Male  Allergy: clindamycin (Rash)  PC Pen VK (Rash)  penicillin (Rash)    Patient seen and examined. No acute events overnight  denies any complaints     Vital Signs Last 24 Hrs  T(C): 36.7 (04 Aug 2024 07:00), Max: 37.1 (04 Aug 2024 01:55)  T(F): 98.1 (04 Aug 2024 07:00), Max: 98.8 (04 Aug 2024 01:55)  HR: 72 (04 Aug 2024 07:16) (68 - 76)  BP: 152/72 (04 Aug 2024 07:16) (124/65 - 155/77)  BP(mean): --  RR: 18 (04 Aug 2024 07:00) (18 - 18)  SpO2: 96% (04 Aug 2024 07:00) (96% - 100%)    Parameters below as of 04 Aug 2024 07:00  Patient On (Oxygen Delivery Method): room air      REVIEW OF SYSTEMS:  All other review of systems is negative unless indicated above.     PHYSICAL EXAM:  GENERAL: NAD, chronically ill appearing   HEAD:  Atraumatic, Normocephalic  NECK: Supple, No JVD  CHEST/LUNG: Clear to auscultation bilaterally; No wheeze  HEART: Regular rate and rhythm; No murmurs, rubs, or gallops  ABDOMEN: Soft, Nontender, Nondistended; Bowel sounds present  EXTREMITIES:  2+ Peripheral Pulses, No clubbing, cyanosis, or edema; LUE and LLE 3-4/5 strength  SKIN: No rashes or lesions                          9.3    7.56  )-----------( 261      ( 04 Aug 2024 08:30 )             29.6     08-04    139  |  107  |  29<H>  ----------------------------<  94  4.5   |  23  |  2.2<H>    Ca    8.6      04 Aug 2024 08:30  Phos  2.4     08-03  Mg     2.2     08-04    TPro  5.6<L>  /  Alb  3.2<L>  /  TBili  <0.2  /  DBili  x   /  AST  33  /  ALT  26  /  AlkPhos  40  08-04      Culture - Blood (collected 08-01-24 @ 21:53)  Source: .Blood Blood-Peripheral  Preliminary Report (08-03-24 @ 09:02):    No growth at 24 hours    Culture - Blood (collected 08-01-24 @ 21:53)  Source: .Blood Blood-Peripheral  Preliminary Report (08-03-24 @ 09:02):    No growth at 24 hours    Urinalysis with Rflx Culture (collected 08-01-24 @ 21:53)      Urinalysis Basic - ( 03 Aug 2024 07:11 )    Color: x / Appearance: x / SG: x / pH: x  Gluc: 107 mg/dL / Ketone: x  / Bili: x / Urobili: x   Blood: x / Protein: x / Nitrite: x   Leuk Esterase: x / RBC: x / WBC x   Sq Epi: x / Non Sq Epi: x / Bacteria: x        RADIOLOGY & ADDITIONAL TESTS:    RECENT DIAGNOSTIC ORDERS:  Folate, Serum: 16:00 (08-02-24 @ 12:01)  Vitamin B12, Serum: 16:00 (08-02-24 @ 12:01)  Ferritin: 16:00 (08-02-24 @ 12:01)      MEDICATIONS:  MEDICATIONS  (STANDING):  amLODIPine   Tablet 5 milliGRAM(s) Oral daily  aspirin  chewable 81 milliGRAM(s) Oral daily  atorvastatin 80 milliGRAM(s) Oral at bedtime  baclofen 5 milliGRAM(s) Oral every 12 hours  cefTRIAXone   IVPB 1000 milliGRAM(s) IV Intermittent every 24 hours  cefTRIAXone   IVPB      clopidogrel Tablet 75 milliGRAM(s) Oral daily  dextrose 5%. 1000 milliLiter(s) (50 mL/Hr) IV Continuous <Continuous>  dextrose 50% Injectable 25 Gram(s) IV Push once  fenofibrate Tablet 145 milliGRAM(s) Oral daily  finasteride 5 milliGRAM(s) Oral daily  glucagon  Injectable 1 milliGRAM(s) IntraMuscular once  heparin   Injectable 5000 Unit(s) SubCutaneous every 12 hours  hydrALAZINE 50 milliGRAM(s) Oral two times a day  insulin lispro (ADMELOG) corrective regimen sliding scale   SubCutaneous three times a day before meals  isosorbide   mononitrate ER Tablet (IMDUR) 60 milliGRAM(s) Oral daily  lactated ringers. 1000 milliLiter(s) (75 mL/Hr) IV Continuous <Continuous>  metoprolol tartrate 25 milliGRAM(s) Oral two times a day  pantoprazole    Tablet 40 milliGRAM(s) Oral before breakfast  tamsulosin 0.4 milliGRAM(s) Oral at bedtime  venlafaxine XR. 75 milliGRAM(s) Oral daily    MEDICATIONS  (PRN):  acetaminophen     Tablet .. 650 milliGRAM(s) Oral every 6 hours PRN Temp greater or equal to 38C (100.4F), Mild Pain (1 - 3)  aluminum hydroxide/magnesium hydroxide/simethicone Suspension 30 milliLiter(s) Oral every 4 hours PRN Dyspepsia  dextrose Oral Gel 15 Gram(s) Oral once PRN Blood Glucose LESS THAN 70 milliGRAM(s)/deciliter  melatonin 3 milliGRAM(s) Oral at bedtime PRN Insomnia  ondansetron Injectable 4 milliGRAM(s) IV Push every 8 hours PRN Nausea and/or Vomiting

## 2024-08-04 NOTE — PROGRESS NOTE ADULT - ASSESSMENT
78-year-old male L handed with PMH CAD s/p CABG, CKD, prior stroke, DM, PAD, HTN, HLD presented to the ED on 8/1/24 for left sided weakness, initially 2 days prior to admission but then improved and then worsened again on day prior to presentation so presented.    #Suspected UTI  - on IV rocephine  - follow up cultures  - oral hydration     #Left sided weakness   # history of CVA  - MR Head - no acute infarct, chronic left SUSANNE territorial infarct and bilateral basal ganglia chronic lacunar infarcts  - neurology following   - continue DAPT/statin  - PT Eval  - check eeg  - started on baclofen     #CAD  #PVD  #HTN/HLD  #BPH  #Anemia - stable   #CKD III - stable  - continue home meds  - monitor BP    #DM II - well controlled   -Continue sliding scale insulin regimen    #Depression  - Cont venlefaxine    Progress Note Handoff  Pending Consults: PT, CM  Pending Tests: cultures  Pending Results: cultures  Family Discussion: Discussed labs, meds, cultures and overall plan of care with pt and medical staff. All questions answered.   Disposition: Home_____/SNF______/Other_____/Unknown at this time__x___  Spent over 55 min reviewing chart, speaking with patient/family and on coordinating patient care during interdisciplinary rounds

## 2024-08-04 NOTE — CHART NOTE - NSCHARTNOTEFT_GEN_A_CORE
Consult received for "MST Score = />2." Upon chart review, patient with stable weight, does not currently meet criteria for Protein Calorie Malnutrition risk per MST. RD remains available for assessment per protocol or as needed.    Discussed with pt at bedside this morning. Pt reports having good appetite; consuming >75% of meals provided in-house. Tolerating diet texture/consistency well. No nausea or vomiting reported. Last BM 2 days ago. No weight loss or gain prior to admit reported.     Pt states I had a good appetite at home; consumed 3 meals daily. Pt takes a multivitamin daily. Denies drinking protein shake supplements. NKFA; denies any restrictive cultural or [employer] food preferences.     Low risk; will f/u in 7-10 days or prn.     RD to remain available: Denise Vargas [phone] or TEAMS

## 2024-08-05 ENCOUNTER — TRANSCRIPTION ENCOUNTER (OUTPATIENT)
Age: 79
End: 2024-08-05

## 2024-08-05 LAB
GLUCOSE BLDC GLUCOMTR-MCNC: 116 MG/DL — HIGH (ref 70–99)
GLUCOSE BLDC GLUCOMTR-MCNC: 121 MG/DL — HIGH (ref 70–99)
GLUCOSE BLDC GLUCOMTR-MCNC: 140 MG/DL — HIGH (ref 70–99)
GLUCOSE BLDC GLUCOMTR-MCNC: 141 MG/DL — HIGH (ref 70–99)

## 2024-08-05 PROCEDURE — 99233 SBSQ HOSP IP/OBS HIGH 50: CPT

## 2024-08-05 RX ORDER — DEXTROSE MONOHYDRATE, SODIUM CHLORIDE, SODIUM LACTATE, CALCIUM CHLORIDE, MAGNESIUM CHLORIDE 1.5; 538; 448; 18.4; 5.08 G/100ML; MG/100ML; MG/100ML; MG/100ML; MG/100ML
500 SOLUTION INTRAPERITONEAL ONCE
Refills: 0 | Status: COMPLETED | OUTPATIENT
Start: 2024-08-05 | End: 2024-08-05

## 2024-08-05 RX ORDER — ERGOCALCIFEROL (VITAMIN D2) 200 MCG/ML
0 DROPS ORAL
Refills: 0 | DISCHARGE

## 2024-08-05 RX ORDER — HYDRALAZINE HYDROCHLORIDE 100 MG/1
50 TABLET ORAL ONCE
Refills: 0 | Status: DISCONTINUED | OUTPATIENT
Start: 2024-08-05 | End: 2024-08-05

## 2024-08-05 RX ORDER — HYDRALAZINE HYDROCHLORIDE 100 MG/1
50 TABLET ORAL THREE TIMES A DAY
Refills: 0 | Status: DISCONTINUED | OUTPATIENT
Start: 2024-08-05 | End: 2024-08-05

## 2024-08-05 RX ORDER — RANOLAZINE 1000 MG/1
500 TABLET, FILM COATED, EXTENDED RELEASE ORAL DAILY
Refills: 0 | Status: DISCONTINUED | OUTPATIENT
Start: 2024-08-05 | End: 2024-08-07

## 2024-08-05 RX ORDER — HYDRALAZINE HYDROCHLORIDE 100 MG/1
50 TABLET ORAL ONCE
Refills: 0 | Status: COMPLETED | OUTPATIENT
Start: 2024-08-05 | End: 2024-08-05

## 2024-08-05 RX ORDER — AMLODIPINE BESYLATE 2.5 MG/1
5 TABLET ORAL DAILY
Refills: 0 | Status: DISCONTINUED | OUTPATIENT
Start: 2024-08-06 | End: 2024-08-06

## 2024-08-05 RX ORDER — HYDRALAZINE HYDROCHLORIDE 100 MG/1
50 TABLET ORAL
Refills: 0 | Status: DISCONTINUED | OUTPATIENT
Start: 2024-08-05 | End: 2024-08-07

## 2024-08-05 RX ADMIN — Medication 25 MILLIGRAM(S): at 17:02

## 2024-08-05 RX ADMIN — ATORVASTATIN CALCIUM 80 MILLIGRAM(S): 40 TABLET, FILM COATED ORAL at 21:19

## 2024-08-05 RX ADMIN — AMLODIPINE BESYLATE 5 MILLIGRAM(S): 2.5 TABLET ORAL at 05:31

## 2024-08-05 RX ADMIN — Medication 0.4 MILLIGRAM(S): at 21:19

## 2024-08-05 RX ADMIN — DEXTROSE MONOHYDRATE, SODIUM CHLORIDE, SODIUM LACTATE, CALCIUM CHLORIDE, MAGNESIUM CHLORIDE 75 MILLILITER(S): 1.5; 538; 448; 18.4; 5.08 SOLUTION INTRAPERITONEAL at 12:29

## 2024-08-05 RX ADMIN — BACLOFEN 5 MILLIGRAM(S): 10 TABLET ORAL at 17:04

## 2024-08-05 RX ADMIN — PANTOPRAZOLE SODIUM 40 MILLIGRAM(S): 20 TABLET, DELAYED RELEASE ORAL at 05:31

## 2024-08-05 RX ADMIN — ISOSORBIDE MONONITRATE 60 MILLIGRAM(S): 60 TABLET, EXTENDED RELEASE ORAL at 11:06

## 2024-08-05 RX ADMIN — HYDRALAZINE HYDROCHLORIDE 50 MILLIGRAM(S): 100 TABLET ORAL at 17:02

## 2024-08-05 RX ADMIN — Medication 25 MILLIGRAM(S): at 05:31

## 2024-08-05 RX ADMIN — CLOPIDOGREL BISULFATE 75 MILLIGRAM(S): 75 TABLET, FILM COATED ORAL at 11:06

## 2024-08-05 RX ADMIN — Medication 0: at 11:11

## 2024-08-05 RX ADMIN — DEXTROSE MONOHYDRATE, SODIUM CHLORIDE, SODIUM LACTATE, CALCIUM CHLORIDE, MAGNESIUM CHLORIDE 500 MILLILITER(S): 1.5; 538; 448; 18.4; 5.08 SOLUTION INTRAPERITONEAL at 12:29

## 2024-08-05 RX ADMIN — HYDRALAZINE HYDROCHLORIDE 50 MILLIGRAM(S): 100 TABLET ORAL at 01:09

## 2024-08-05 RX ADMIN — Medication 100 MILLIGRAM(S): at 11:10

## 2024-08-05 RX ADMIN — BACLOFEN 5 MILLIGRAM(S): 10 TABLET ORAL at 05:31

## 2024-08-05 RX ADMIN — RANOLAZINE 500 MILLIGRAM(S): 1000 TABLET, FILM COATED, EXTENDED RELEASE ORAL at 17:08

## 2024-08-05 RX ADMIN — HEPARIN SODIUM 5000 UNIT(S): 1000 INJECTION, SOLUTION INTRAVENOUS; SUBCUTANEOUS at 17:04

## 2024-08-05 RX ADMIN — FINASTERIDE 5 MILLIGRAM(S): 5 TABLET, FILM COATED ORAL at 11:11

## 2024-08-05 RX ADMIN — VENLAFAXINE 75 MILLIGRAM(S): 25 TABLET ORAL at 11:06

## 2024-08-05 RX ADMIN — HEPARIN SODIUM 5000 UNIT(S): 1000 INJECTION, SOLUTION INTRAVENOUS; SUBCUTANEOUS at 05:32

## 2024-08-05 RX ADMIN — FENOFIBRATE 145 MILLIGRAM(S): 30 CAPSULE ORAL at 11:06

## 2024-08-05 RX ADMIN — Medication 81 MILLIGRAM(S): at 11:10

## 2024-08-05 RX ADMIN — HYDRALAZINE HYDROCHLORIDE 50 MILLIGRAM(S): 100 TABLET ORAL at 05:32

## 2024-08-05 NOTE — DISCHARGE NOTE PROVIDER - NSDCHHATTENDCERT_GEN_ALL_CORE
My signature below certifies that the above stated patient is homebound and upon completion of the Face-To-Face encounter, has the need for intermittent skilled nursing, physical therapy and/or speech or occupational therapy services in their home for their current diagnosis as outlined in their initial plan of care. These services will continue to be monitored by myself or another physician. CHANGE IN LEVEL OF CONSCIOUSNESS

## 2024-08-05 NOTE — SWALLOW BEDSIDE ASSESSMENT ADULT - SLP GENERAL OBSERVATIONS
Patient Pt found sitting in bed asleep awoke upon auditory arousal. Pt w/ L side facial droop. Pt denies any hx of dysphagia or changes in speech/cognition.

## 2024-08-05 NOTE — DISCHARGE NOTE PROVIDER - NSDCFUSCHEDAPPT_GEN_ALL_CORE_FT
Sheryl Montez  [facility]  UROLOGY [address]  Scheduled Appointment: 08/09/2024    [facility]  CARDIOLOGY [address] [facility]  Scheduled Appointment: 08/21/2024    Christofer Jain  [facility]  VASCULAR [address]  Scheduled Appointment: 08/30/2024     Sheryl Montez  [facility]  UROLOGY [address] [facility] [facility]  Scheduled Appointment: 08/09/2024    [facility]  CARDIOLOGY [address] [facility]  Scheduled Appointment: 08/21/2024

## 2024-08-05 NOTE — SWALLOW BEDSIDE ASSESSMENT ADULT - COMMENTS
Known to ST September 2023 with recommendations for regular and thin liquids  pending mri
Known to ST September 2023 with recommendations for regular and thin liquids  MRI results revealed chronic left SUSANNE territory infarct bilateral basal ganglia and chronic lacunar infarcts

## 2024-08-05 NOTE — DISCHARGE NOTE PROVIDER - DISCHARGE SERVICE FOR PATIENT
on the discharge service for the patient. I have reviewed and made amendments to the documentation where necessary.
bubbles in chest

## 2024-08-05 NOTE — PROGRESS NOTE ADULT - ASSESSMENT
78-year-old male L handed with PMH CAD s/p CABG, CKD, prior stroke, DM, PAD, HTN, HLD presented to the ED on 8/1/24 for left sided weakness, initially 2 days prior to admission but then improved and then worsened again on day prior to presentation so presented.    #Suspected UTI  - on IV Rocephin  - follow up cultures  - oral hydration encouraged    #Left sided weakness   # history of CVA  - MR Head - no acute infarct, chronic left SUSANNE territorial infarct and bilateral basal ganglia chronic lacunar infarcts  - neurology following   - continue DAPT/statin  - PT Eval  - follow up results of eeg  - started on baclofen   - patient has a HHA for 8 hours - plan is to return home upon dc    #CAD  #PVD  #HTN - uncontrolled   #HLD  #BPH  #Anemia - stable   #CKD III - stable  - continue home meds  - monitor BP closely  - house staff to clarify meds with pt's son     #DM II - well controlled   -Continue sliding scale insulin regimen    #Depression  - Cont venlefaxine    Progress Note Handoff  Pending Consults: PT, CM  Pending Tests: cultures  Pending Results: cultures  Family Discussion: Discussed labs, meds, cultures and overall plan of care with pt and medical staff. All questions answered. PFD for 24hrs  Disposition: Home_____/SNF______/Other_____/Unknown at this time__x___  Spent over 55 min reviewing chart, speaking with patient/family and on coordinating patient care during interdisciplinary rounds

## 2024-08-05 NOTE — DISCHARGE NOTE PROVIDER - NSDCCPCAREPLAN_GEN_ALL_CORE_FT
PRINCIPAL DISCHARGE DIAGNOSIS  Diagnosis: Sepsis secondary to UTI  Assessment and Plan of Treatment: You were found to have a urinary tract infection (UTI, explained below). This was treated with antibiotics and a midline was placed in your arm so that you could continue your course of antibiotics. Please take your antibiotics daily without missing any dose. Please follow up with your cardiologist, primary care doctor, urologist, and the neurology clinic in 2 weeks.   WHAT YOU NEED TO KNOW:  A urinary tract infection (UTI) is caused by bacteria that get inside your urinary tract. Most bacteria that enter your urinary tract come out when you urinate. If the bacteria stay in your urinary tract, you may get an infection. Your urinary tract includes your kidneys, ureters, bladder, and urethra. Urine is made in your kidneys, and it flows from the ureters to the bladder. Urine leaves the bladder through the urethra. A UTI is more common in your lower urinary tract, which includes your bladder and urethra.  Seek care immediately if:  •You are urinating very little or not at all.  •You have a high fever with shaking chills.  •You have side or back pain that gets worse.  Contact your healthcare provider if:  •You have a fever.  •You do not feel better after 2 days of taking antibiotics.  •You are vomiting.  •You have questions or concerns about your condition or care.  Prevent another UTI:  •Empty your bladder often. Urinate and empty your bladder as soon as you feel the need. Do not hold your urine for long periods of time.  •Wipe from front to back after you urinate or have a bowel movement. This will help prevent germs from getting into your urinary tract through your urethra.  •Drink liquids as directed. Ask how much liquid to drink each day and which liquids are best for you. You may need to drink more liquids than usual to help flush out the bacteria. Do not drink alcohol, caffeine, or citrus juices. These can irritate your bladder and increase your symptoms. Your healthcare provider may recommend cranberry juice to help prevent a UTI

## 2024-08-05 NOTE — SWALLOW BEDSIDE ASSESSMENT ADULT - SWALLOW EVAL: DIAGNOSIS
+ toleration of easy to chew w/ thin liquids w/o overt s/s of aspiration/penetration
+ toleration of sbs & etc w/ thin liquids w/o overt s/s of aspiration/penetration

## 2024-08-05 NOTE — SWALLOW BEDSIDE ASSESSMENT ADULT - SWALLOW EVAL: FUNCTIONAL LEVEL AT TIME OF EVAL
A&O to person, place, time however not event. Mild confusion pt. was unsure of who he lived with. L side facial droop

## 2024-08-05 NOTE — DISCHARGE NOTE PROVIDER - HOSPITAL COURSE
78-year-old male L handed with PMH CAD s/p CABG, CKD, prior stroke, DM, PAD, HTN, HLD presented to the ED on 24 with left-sided facial droop, left arm and leg weakness, left shoulder pain that radiates throughout his arm, and left leg pain all of which began yesterday morning. He additionally reports 2 episodes of L handed shaking without loss of consciousness. The patient remembers the whole episode and denies LOC or head strike. In the ED, neurology was consulted for evaluation of his L sided symptoms. Patient reported that his L sided symptoms are similar to his presentation after his stroke in 2017. Noted that he was evaluated at [facility] for a TIA with R leg weakness that improved. Denied any recent trauma to L shoulder, fall, or sleeping on that side. Reports that he normally walks independently and is able to shop online independently but needs help with cooking and cleaning.  ROS negative for dysuria, hematuria, chest pain, shortness of breath, cough, sore throat, vision changes. Positive for constipation (for years) and left sided weakness (as above).    In the ED:  Vital Signs on presentation:  · / 74   HR 90  RR 16 /min  100.3 Degrees F  97 % ra  37.9 Degrees C    Select labs from ED:  Blood Urea Nitrogen: 30 mg/dL  Creatinine: 2.0 mg/dL  WBC: 14.40  H.8  Lactate: 1.2  Troponin: 46->41    Urinalysis with Rflx Culture   Urine Appearance: Cloudy  pH Urine: 6.5  Protein, Urine: 300 mg/dL  Leukocyte Esterase Concentration: Large  Nitrite: Negative  Imaging in ED: CT Abdomen and Pelvis No Cont:  Left basilar atelectasis/consolidation, likely chronic.    Chronic trabeculated thick-walled urinary bladder.    MR Head No Cont: No evidence of acute intracranial pathology.  Chronic left SUSANNE territory infarct as well as bilateral basal ganglia   chronic lacunar infarcts.      In the ED, patient received 2000 mg IV aztreonam IVPB once and 1000 mg vancomycin once for sepsis protocol as well as 1800 mL LR bolus. Patient was subsequently started on 75 ml/hr LR on 24. Blood cultures x2 were drawn.  Urine culture: Moderate blood. Large leukocyte esterase. Nitrite negative.      Patient admitted to medicine for: Sepsis via SIRS criteria (fever, white count and tachycardia on presentation) with source (UTI).   78-year-old male L handed with PMH CAD s/p CABG, CKD, prior stroke, DM, PAD, HTN, HLD presented to the ED on 24 with left-sided facial droop, left arm and leg weakness, left shoulder pain that radiates throughout his arm, and left leg pain all of which began yesterday morning. He additionally reports 2 episodes of L handed shaking without loss of consciousness. The patient remembers the whole episode and denies LOC or head strike. In the ED, neurology was consulted for evaluation of his L sided symptoms. Patient reported that his L sided symptoms are similar to his presentation after his stroke in 2017. Noted that he was evaluated at [facility] for a TIA with R leg weakness that improved. Denied any recent trauma to L shoulder, fall, or sleeping on that side. Reports that he normally walks independently and is able to shop online independently but needs help with cooking and cleaning.  ROS negative for dysuria, hematuria, chest pain, shortness of breath, cough, sore throat, vision changes. Positive for constipation (for years) and left sided weakness (as above).    In the ED:  Vital Signs on presentation:  · / 74   HR 90  RR 16 /min  100.3 Degrees F  97 % ra  37.9 Degrees C    Select labs from ED:  Blood Urea Nitrogen: 30 mg/dL  Creatinine: 2.0 mg/dL  WBC: 14.40  H.8  Lactate: 1.2  Troponin: 46->41    Urinalysis with Rflx Culture   Urine Appearance: Cloudy  pH Urine: 6.5  Protein, Urine: 300 mg/dL  Leukocyte Esterase Concentration: Large  Nitrite: Negative  Imaging in ED: CT Abdomen and Pelvis No Cont:  Left basilar atelectasis/consolidation, likely chronic.    Chronic trabeculated thick-walled urinary bladder.    MR Head No Cont: No evidence of acute intracranial pathology.  Chronic left SUSANNE territory infarct as well as bilateral basal ganglia   chronic lacunar infarcts.      In the ED, patient received 2000 mg IV aztreonam IVPB once and 1000 mg vancomycin once for sepsis protocol as well as 1800 mL LR bolus. Patient was subsequently started on 75 ml/hr LR on 24. Blood cultures x2 were drawn.  Urine culture: Moderate blood. Large leukocyte esterase. Nitrite negative.      Patient admitted to medicine for: Sepsis via SIRS criteria (fever, white count and tachycardia on presentation) with source (UTI).    Medicine course: Neurology was consulted, stated likely recrudescence of prior stroke symptoms 2/2 sepsis/UTI vs rule out seizure. EEG was performed which showed slowing, no seizures. fluids     78-year-old male L handed with PMH CAD s/p CABG, CKD, prior stroke, DM, PAD, HTN, HLD presented to the ED on 24 with left-sided facial droop, left arm and leg weakness, left shoulder pain that radiates throughout his arm, and left leg pain all of which began yesterday morning. He additionally reports 2 episodes of L handed shaking without loss of consciousness. The patient remembers the whole episode and denies LOC or head strike. In the ED, neurology was consulted for evaluation of his L sided symptoms. Patient reported that his L sided symptoms are similar to his presentation after his stroke in 2017. Noted that he was evaluated at [facility] for a TIA with R leg weakness that improved. Denied any recent trauma to L shoulder, fall, or sleeping on that side. Reports that he normally walks independently and is able to shop online independently but needs help with cooking and cleaning.  ROS negative for dysuria, hematuria, chest pain, shortness of breath, cough, sore throat, vision changes. Positive for constipation (for years) and left sided weakness (as above).    In the ED:  Vital Signs on presentation:  · / 74   HR 90  RR 16 /min  100.3 Degrees F  97 % ra  37.9 Degrees C    Select labs from ED:  Blood Urea Nitrogen: 30 mg/dL  Creatinine: 2.0 mg/dL  WBC: 14.40  H.8  Lactate: 1.2  Troponin: 46->41    Urinalysis with Rflx Culture   Urine Appearance: Cloudy  pH Urine: 6.5  Protein, Urine: 300 mg/dL  Leukocyte Esterase Concentration: Large  Nitrite: Negative  Imaging in ED: CT Abdomen and Pelvis No Cont:  Left basilar atelectasis/consolidation, likely chronic.    Chronic trabeculated thick-walled urinary bladder.    MR Head No Cont: No evidence of acute intracranial pathology.  Chronic left SUSANNE territory infarct as well as bilateral basal ganglia   chronic lacunar infarcts.      In the ED, patient received 2000 mg IV aztreonam IVPB once and 1000 mg vancomycin once for sepsis protocol as well as 1800 mL LR bolus. Patient was subsequently started on 75 ml/hr LR on 24. Blood cultures x2 were drawn.  Urine culture: Moderate blood. Large leukocyte esterase. Nitrite negative.      Patient admitted to medicine for: Sepsis via SIRS criteria (fever, white count and tachycardia on presentation) with source (UTI).    Medicine course: Neurology was consulted, stated likely recrudescence of prior stroke symptoms 2/2 possible UTI vs rule out seizure. EEG was performed which showed slowing, no seizures. fluids given and 3 days of rocephin given. Pt's BP was found to be soft SBP in 90s, given 500cc LR bolus.      78-year-old male L handed with PMH CAD s/p CABG, CKD, prior stroke, DM, PAD, HTN, HLD presented to the ED on 24 with left-sided facial droop, left arm and leg weakness, left shoulder pain that radiates throughout his arm, and left leg pain all of which began yesterday morning. He additionally reports 2 episodes of L handed shaking without loss of consciousness. The patient remembers the whole episode and denies LOC or head strike. In the ED, neurology was consulted for evaluation of his L sided symptoms. Patient reported that his L sided symptoms are similar to his presentation after his stroke in 2017. Noted that he was evaluated at [facility] for a TIA with R leg weakness that improved. Denied any recent trauma to L shoulder, fall, or sleeping on that side. Reports that he normally walks independently and is able to shop online independently but needs help with cooking and cleaning.  ROS negative for dysuria, hematuria, chest pain, shortness of breath, cough, sore throat, vision changes. Positive for constipation (for years) and left sided weakness (as above).    In the ED:  Vital Signs on presentation:  · / 74   HR 90  RR 16 /min  100.3 Degrees F  97 % ra  37.9 Degrees C    Select labs from ED:  Blood Urea Nitrogen: 30 mg/dL  Creatinine: 2.0 mg/dL  WBC: 14.40  H.8  Lactate: 1.2  Troponin: 46->41    Urinalysis with Rflx Culture   Urine Appearance: Cloudy  pH Urine: 6.5  Protein, Urine: 300 mg/dL  Leukocyte Esterase Concentration: Large  Nitrite: Negative  Imaging in ED: CT Abdomen and Pelvis No Cont:  Left basilar atelectasis/consolidation, likely chronic.    Chronic trabeculated thick-walled urinary bladder.    MR Head No Cont: No evidence of acute intracranial pathology.  Chronic left SUSANNE territory infarct as well as bilateral basal ganglia   chronic lacunar infarcts.      In the ED, patient received 2000 mg IV aztreonam IVPB once and 1000 mg vancomycin once for sepsis protocol as well as 1800 mL LR bolus. Patient was subsequently started on 75 ml/hr LR on 24. Blood cultures x2 were drawn.  Urine culture: Moderate blood. Large leukocyte esterase. Nitrite negative.      Patient admitted to medicine for: Sepsis via SIRS criteria (fever, white count and tachycardia on presentation) with source (UTI).    Medicine course: Neurology was consulted, stated likely recrudescence of prior stroke symptoms 2/2 possible UTI vs rule out seizure. EEG was performed which showed slowing, no seizures. fluids given and 3 days of rocephin given. Pt's BP was found to be soft SBP in 90s, given 500cc LR bolus. Course complicated with worsening of mental status, CT head neg, EEG negative, found to be retaining, improved with intermittent straight catheterization. Blood culture was found to grow enterobacter cloaca. ID consulted, given meropenem inpt and to be discharge with midline and Ertapenem. Pt to follow up urology, neurology, cardiology and primary care doctor in 2 weeks. Discussion of discharge plan of care, including discharge diagnoses, medication reconciliation, and follow-ups was conducted with Dr. Woodall on 24 at 1:00 PM and discharge was approved.

## 2024-08-05 NOTE — DISCHARGE NOTE PROVIDER - NSFOLLOWUPCLINICS_GEN_ALL_ED_FT
[facility]  Neurology  [address] [place] [address], [facility] [address]  [place] [address]  Phone: [phone]  Fax:   Follow Up Time: 2 weeks

## 2024-08-05 NOTE — SWALLOW BEDSIDE ASSESSMENT ADULT - SLP PERTINENT HISTORY OF CURRENT PROBLEM
78-year-old male L handed with PMH CAD s/p CABG, CKD, prior stroke, DM, PAD, HTN, HLD presents to the ED with left-sided facial droop, left arm and leg weakness, left shoulder pain that radiates throughout his arm, and left leg pain all of which began yesterday morning. He additionally reports 2 episodes of L handed shaking without loss of consciousness. The patient remembers the whole episode and denies LOC or head strike. Neurology was consulted for evaluation of his L sided symptoms. Patient reported that his L sided symptoms are similar to his presentation after his stroke in 2017. Noted that he was evaluated at [facility] for a TIA with R leg weakness that improved. Denied any recent trauma to L shoulder, fall, or sleeping on that side. Reports that he normally walks independently and is able to shop online independently but needs help with cooking and cleaning. Noted that he has a positive UTI, though patient denied dysuria.
78-year-old male L handed with PMH CAD s/p CABG, CKD, prior stroke, DM, PAD, HTN, HLD presents to the ED with left-sided facial droop, left arm and leg weakness, left shoulder pain that radiates throughout his arm, and left leg pain all of which began yesterday morning. He additionally reports 2 episodes of L handed shaking without loss of consciousness. The patient remembers the whole episode and denies LOC or head strike. Neurology was consulted for evaluation of his L sided symptoms. Patient reported that his L sided symptoms are similar to his presentation after his stroke in 2017. Noted that he was evaluated at [facility] for a TIA with R leg weakness that improved. Denied any recent trauma to L shoulder, fall, or sleeping on that side. Reports that he normally walks independently and is able to shop online independently but needs help with cooking and cleaning. Noted that he has a positive UTI, though patient denied dysuria.

## 2024-08-05 NOTE — SWALLOW BEDSIDE ASSESSMENT ADULT - NS SPL SWALLOW CLINIC TRIAL FT
+ toleration of ETC w/ thin liquids w/o overt s/s of aspiration/penetration.
+ toleration of ETC w/ thin liquids w/o overt s/s of aspiration/penetration. No further PO trials 2/2 generalized weakness

## 2024-08-05 NOTE — PROGRESS NOTE ADULT - SUBJECTIVE AND OBJECTIVE BOX
MICHELLEERICK  78y, Male  Allergy: clindamycin (Rash)  PC Pen VK (Rash)  penicillin (Rash)    Patient seen and examined. No acute events overnight  denies any complaints     Vital Signs Last 24 Hrs  T(C): 36.4 (05 Aug 2024 08:28), Max: 36.8 (04 Aug 2024 15:00)  T(F): 97.6 (05 Aug 2024 08:28), Max: 98.3 (04 Aug 2024 15:00)  HR: 71 (05 Aug 2024 08:28) (68 - 77)  BP: 135/60 (05 Aug 2024 08:28) (123/69 - 183/70)  BP(mean): --  RR: 19 (05 Aug 2024 08:28) (19 - 21)  SpO2: 98% (05 Aug 2024 08:28) (97% - 98%)    Parameters below as of 05 Aug 2024 08:28  Patient On (Oxygen Delivery Method): room air      REVIEW OF SYSTEMS:  All other review of systems is negative unless indicated above.     PHYSICAL EXAM:  GENERAL: NAD, chronically ill appearing   HEAD:  Atraumatic, Normocephalic  NECK: Supple, No JVD  CHEST/LUNG: Clear to auscultation bilaterally; No wheeze  HEART: Regular rate and rhythm; No murmurs, rubs, or gallops  ABDOMEN: Soft, Nontender, Nondistended; Bowel sounds present  EXTREMITIES:  2+ Peripheral Pulses, No clubbing, cyanosis, or edema; LUE and LLE 3-4/5 strength  SKIN: No rashes or lesions                          9.3    7.56  )-----------( 261      ( 04 Aug 2024 08:30 )             29.6     08-04    139  |  107  |  29<H>  ----------------------------<  94  4.5   |  23  |  2.2<H>    Ca    8.6      04 Aug 2024 08:30  Phos  2.4     08-03  Mg     2.2     08-04    TPro  5.6<L>  /  Alb  3.2<L>  /  TBili  <0.2  /  DBili  x   /  AST  33  /  ALT  26  /  AlkPhos  40  08-04      Culture - Blood (collected 08-01-24 @ 21:53)  Source: .Blood Blood-Peripheral  Preliminary Report (08-03-24 @ 09:02):    No growth at 24 hours    Culture - Blood (collected 08-01-24 @ 21:53)  Source: .Blood Blood-Peripheral  Preliminary Report (08-03-24 @ 09:02):    No growth at 24 hours    Urinalysis with Rflx Culture (collected 08-01-24 @ 21:53)      Urinalysis Basic - ( 03 Aug 2024 07:11 )    Color: x / Appearance: x / SG: x / pH: x  Gluc: 107 mg/dL / Ketone: x  / Bili: x / Urobili: x   Blood: x / Protein: x / Nitrite: x   Leuk Esterase: x / RBC: x / WBC x   Sq Epi: x / Non Sq Epi: x / Bacteria: x        RADIOLOGY & ADDITIONAL TESTS:    RECENT DIAGNOSTIC ORDERS:  Folate, Serum: 16:00 (08-02-24 @ 12:01)  Vitamin B12, Serum: 16:00 (08-02-24 @ 12:01)  Ferritin: 16:00 (08-02-24 @ 12:01)      MEDICATIONS:  MEDICATIONS  (STANDING):  aspirin  chewable 81 milliGRAM(s) Oral daily  atorvastatin 80 milliGRAM(s) Oral at bedtime  baclofen 5 milliGRAM(s) Oral every 12 hours  clopidogrel Tablet 75 milliGRAM(s) Oral daily  dextrose 5%. 1000 milliLiter(s) (50 mL/Hr) IV Continuous <Continuous>  dextrose 50% Injectable 25 Gram(s) IV Push once  fenofibrate Tablet 145 milliGRAM(s) Oral daily  finasteride 5 milliGRAM(s) Oral daily  glucagon  Injectable 1 milliGRAM(s) IntraMuscular once  heparin   Injectable 5000 Unit(s) SubCutaneous every 12 hours  hydrALAZINE 50 milliGRAM(s) Oral three times a day  insulin lispro (ADMELOG) corrective regimen sliding scale   SubCutaneous three times a day before meals  isosorbide   mononitrate ER Tablet (IMDUR) 60 milliGRAM(s) Oral daily  lactated ringers. 1000 milliLiter(s) (75 mL/Hr) IV Continuous <Continuous>  metoprolol tartrate 25 milliGRAM(s) Oral two times a day  pantoprazole    Tablet 40 milliGRAM(s) Oral before breakfast  tamsulosin 0.4 milliGRAM(s) Oral at bedtime  venlafaxine XR. 75 milliGRAM(s) Oral daily    MEDICATIONS  (PRN):  acetaminophen     Tablet .. 650 milliGRAM(s) Oral every 6 hours PRN Temp greater or equal to 38C (100.4F), Mild Pain (1 - 3)  aluminum hydroxide/magnesium hydroxide/simethicone Suspension 30 milliLiter(s) Oral every 4 hours PRN Dyspepsia  dextrose Oral Gel 15 Gram(s) Oral once PRN Blood Glucose LESS THAN 70 milliGRAM(s)/deciliter  melatonin 3 milliGRAM(s) Oral at bedtime PRN Insomnia  ondansetron Injectable 4 milliGRAM(s) IV Push every 8 hours PRN Nausea and/or Vomiting

## 2024-08-05 NOTE — DISCHARGE NOTE PROVIDER - NSDCMRMEDTOKEN_GEN_ALL_CORE_FT
Albuterol: prn  amLODIPine 5 mg oral tablet: 1 tab(s) orally once a day  aspirin 81 mg oral tablet, chewable: 1 tab(s) orally once a day  fenofibrate 145 mg oral tablet: 1 orally once a day  finasteride 5 mg oral tablet: 1 tab(s) orally once a day  hydrALAZINE 50 mg oral tablet: 1 tab(s) orally 2 times a day  metoprolol tartrate 25 mg oral tablet: 1 tab(s) orally 2 times a day  Plavix 75 mg oral tablet: 1 orally once a day  ranolazine 500 mg oral tablet, extended release: 1 tab(s) orally once a day  rosuvastatin 40 mg oral tablet: 1 tab(s) orally once a day  tamsulosin 0.4 mg oral capsule: 1 cap(s) orally once a day  venlafaxine 75 mg oral capsule, extended release: 1 cap(s) orally once a day   Albuterol: prn  amLODIPine 5 mg oral tablet: 1 tab(s) orally once a day  aspirin 81 mg oral tablet, chewable: 1 tab(s) orally once a day  ertapenem 1 g injection: 500 milligram(s) intravenously once a day START 8/8/24, LAST DOSE 8/15/24  fenofibrate 145 mg oral tablet: 1 orally once a day  finasteride 5 mg oral tablet: 1 tab(s) orally once a day  hydrALAZINE 50 mg oral tablet: 1 tab(s) orally 2 times a day  metoprolol tartrate 25 mg oral tablet: 1 tab(s) orally 2 times a day  Plavix 75 mg oral tablet: 1 orally once a day  ranolazine 500 mg oral tablet, extended release: 1 tab(s) orally once a day  rosuvastatin 40 mg oral tablet: 1 tab(s) orally once a day  tamsulosin 0.4 mg oral capsule: 1 cap(s) orally once a day  venlafaxine 75 mg oral capsule, extended release: 1 cap(s) orally once a day   Albuterol: 1 puff(s) inhaled prn as needed for sob  aspirin 81 mg oral tablet, chewable: 1 tab(s) orally once a day  baclofen 5 mg oral tablet: 1 tab(s) orally every 12 hours  ertapenem 1 g injection: 500 milligram(s) intravenously once a day START 8/8/24, LAST DOSE 8/15/24  fenofibrate 145 mg oral tablet: 1 orally once a day  finasteride 5 mg oral tablet: 1 tab(s) orally once a day  hydrALAZINE 50 mg oral tablet: 1 tab(s) orally 2 times a day  metoprolol tartrate 25 mg oral tablet: 1 tab(s) orally 2 times a day  pantoprazole 40 mg oral delayed release tablet: 1 tab(s) orally once a day (before a meal)  Plavix 75 mg oral tablet: 1 orally once a day  ranolazine 500 mg oral tablet, extended release: 1 tab(s) orally once a day  rosuvastatin 40 mg oral tablet: 1 tab(s) orally once a day  tamsulosin 0.4 mg oral capsule: 1 cap(s) orally once a day  venlafaxine 75 mg oral capsule, extended release: 1 cap(s) orally once a day

## 2024-08-06 LAB
ALBUMIN SERPL ELPH-MCNC: 3.7 G/DL — SIGNIFICANT CHANGE UP (ref 3.5–5.2)
ALBUMIN SERPL ELPH-MCNC: 4 G/DL — SIGNIFICANT CHANGE UP (ref 3.5–5.2)
ALP SERPL-CCNC: 44 U/L — SIGNIFICANT CHANGE UP (ref 30–115)
ALP SERPL-CCNC: 44 U/L — SIGNIFICANT CHANGE UP (ref 30–115)
ALT FLD-CCNC: 22 U/L — SIGNIFICANT CHANGE UP (ref 0–41)
ALT FLD-CCNC: 23 U/L — SIGNIFICANT CHANGE UP (ref 0–41)
AMMONIA BLD-MCNC: 19 UMOL/L — SIGNIFICANT CHANGE UP (ref 11–55)
ANION GAP SERPL CALC-SCNC: 13 MMOL/L — SIGNIFICANT CHANGE UP (ref 7–14)
ANION GAP SERPL CALC-SCNC: 15 MMOL/L — HIGH (ref 7–14)
APTT BLD: 33.4 SEC — SIGNIFICANT CHANGE UP (ref 27–39.2)
AST SERPL-CCNC: 22 U/L — SIGNIFICANT CHANGE UP (ref 0–41)
AST SERPL-CCNC: 23 U/L — SIGNIFICANT CHANGE UP (ref 0–41)
BILIRUB SERPL-MCNC: 0.2 MG/DL — SIGNIFICANT CHANGE UP (ref 0.2–1.2)
BILIRUB SERPL-MCNC: <0.2 MG/DL — SIGNIFICANT CHANGE UP (ref 0.2–1.2)
BUN SERPL-MCNC: 31 MG/DL — HIGH (ref 10–20)
BUN SERPL-MCNC: 32 MG/DL — HIGH (ref 10–20)
CALCIUM SERPL-MCNC: 9.4 MG/DL — SIGNIFICANT CHANGE UP (ref 8.4–10.5)
CALCIUM SERPL-MCNC: 9.5 MG/DL — SIGNIFICANT CHANGE UP (ref 8.4–10.5)
CHLORIDE SERPL-SCNC: 107 MMOL/L — SIGNIFICANT CHANGE UP (ref 98–110)
CHLORIDE SERPL-SCNC: 108 MMOL/L — SIGNIFICANT CHANGE UP (ref 98–110)
CO2 SERPL-SCNC: 21 MMOL/L — SIGNIFICANT CHANGE UP (ref 17–32)
CO2 SERPL-SCNC: 23 MMOL/L — SIGNIFICANT CHANGE UP (ref 17–32)
CREAT SERPL-MCNC: 1.8 MG/DL — HIGH (ref 0.7–1.5)
CREAT SERPL-MCNC: 1.9 MG/DL — HIGH (ref 0.7–1.5)
EGFR: 35 ML/MIN/1.73M2 — LOW
EGFR: 38 ML/MIN/1.73M2 — LOW
GLUCOSE BLDC GLUCOMTR-MCNC: 105 MG/DL — HIGH (ref 70–99)
GLUCOSE BLDC GLUCOMTR-MCNC: 113 MG/DL — HIGH (ref 70–99)
GLUCOSE BLDC GLUCOMTR-MCNC: 116 MG/DL — HIGH (ref 70–99)
GLUCOSE BLDC GLUCOMTR-MCNC: 121 MG/DL — HIGH (ref 70–99)
GLUCOSE SERPL-MCNC: 112 MG/DL — HIGH (ref 70–99)
GLUCOSE SERPL-MCNC: 122 MG/DL — HIGH (ref 70–99)
HCT VFR BLD CALC: 35.4 % — LOW (ref 42–52)
HCT VFR BLD CALC: 36.1 % — LOW (ref 42–52)
HGB BLD-MCNC: 10.9 G/DL — LOW (ref 14–18)
HGB BLD-MCNC: 11.3 G/DL — LOW (ref 14–18)
INR BLD: 0.93 RATIO — SIGNIFICANT CHANGE UP (ref 0.65–1.3)
LACTATE SERPL-SCNC: 0.8 MMOL/L — SIGNIFICANT CHANGE UP (ref 0.7–2)
MCHC RBC-ENTMCNC: 28.2 PG — SIGNIFICANT CHANGE UP (ref 27–31)
MCHC RBC-ENTMCNC: 28.8 PG — SIGNIFICANT CHANGE UP (ref 27–31)
MCHC RBC-ENTMCNC: 30.8 G/DL — LOW (ref 32–37)
MCHC RBC-ENTMCNC: 31.3 G/DL — LOW (ref 32–37)
MCV RBC AUTO: 91.7 FL — SIGNIFICANT CHANGE UP (ref 80–94)
MCV RBC AUTO: 91.9 FL — SIGNIFICANT CHANGE UP (ref 80–94)
NRBC # BLD: 0 /100 WBCS — SIGNIFICANT CHANGE UP (ref 0–0)
NRBC # BLD: 0 /100 WBCS — SIGNIFICANT CHANGE UP (ref 0–0)
PLATELET # BLD AUTO: 353 K/UL — SIGNIFICANT CHANGE UP (ref 130–400)
PLATELET # BLD AUTO: 389 K/UL — SIGNIFICANT CHANGE UP (ref 130–400)
PMV BLD: 10.2 FL — SIGNIFICANT CHANGE UP (ref 7.4–10.4)
PMV BLD: 10.4 FL — SIGNIFICANT CHANGE UP (ref 7.4–10.4)
POTASSIUM SERPL-MCNC: 4.4 MMOL/L — SIGNIFICANT CHANGE UP (ref 3.5–5)
POTASSIUM SERPL-MCNC: 4.6 MMOL/L — SIGNIFICANT CHANGE UP (ref 3.5–5)
POTASSIUM SERPL-SCNC: 4.4 MMOL/L — SIGNIFICANT CHANGE UP (ref 3.5–5)
POTASSIUM SERPL-SCNC: 4.6 MMOL/L — SIGNIFICANT CHANGE UP (ref 3.5–5)
PROT SERPL-MCNC: 6.6 G/DL — SIGNIFICANT CHANGE UP (ref 6–8)
PROT SERPL-MCNC: 6.7 G/DL — SIGNIFICANT CHANGE UP (ref 6–8)
PROTHROM AB SERPL-ACNC: 10.6 SEC — SIGNIFICANT CHANGE UP (ref 9.95–12.87)
RBC # BLD: 3.86 M/UL — LOW (ref 4.7–6.1)
RBC # BLD: 3.93 M/UL — LOW (ref 4.7–6.1)
RBC # FLD: 15.2 % — HIGH (ref 11.5–14.5)
RBC # FLD: 15.5 % — HIGH (ref 11.5–14.5)
SODIUM SERPL-SCNC: 143 MMOL/L — SIGNIFICANT CHANGE UP (ref 135–146)
SODIUM SERPL-SCNC: 144 MMOL/L — SIGNIFICANT CHANGE UP (ref 135–146)
WBC # BLD: 6.55 K/UL — SIGNIFICANT CHANGE UP (ref 4.8–10.8)
WBC # BLD: 7.56 K/UL — SIGNIFICANT CHANGE UP (ref 4.8–10.8)
WBC # FLD AUTO: 6.55 K/UL — SIGNIFICANT CHANGE UP (ref 4.8–10.8)
WBC # FLD AUTO: 7.56 K/UL — SIGNIFICANT CHANGE UP (ref 4.8–10.8)

## 2024-08-06 PROCEDURE — 99233 SBSQ HOSP IP/OBS HIGH 50: CPT

## 2024-08-06 PROCEDURE — 70450 CT HEAD/BRAIN W/O DYE: CPT | Mod: 26

## 2024-08-06 RX ORDER — MEROPENEM 1 G/20ML
500 INJECTION, POWDER, FOR SOLUTION INTRAVENOUS ONCE
Refills: 0 | Status: COMPLETED | OUTPATIENT
Start: 2024-08-06 | End: 2024-08-06

## 2024-08-06 RX ORDER — CHLORHEXIDINE GLUCONATE 500 MG/1
1 CLOTH TOPICAL
Refills: 0 | Status: DISCONTINUED | OUTPATIENT
Start: 2024-08-06 | End: 2024-08-07

## 2024-08-06 RX ORDER — MEROPENEM 1 G/20ML
1000 INJECTION, POWDER, FOR SOLUTION INTRAVENOUS EVERY 12 HOURS
Refills: 0 | Status: DISCONTINUED | OUTPATIENT
Start: 2024-08-06 | End: 2024-08-07

## 2024-08-06 RX ADMIN — BACLOFEN 5 MILLIGRAM(S): 10 TABLET ORAL at 05:11

## 2024-08-06 RX ADMIN — MEROPENEM 100 MILLIGRAM(S): 1 INJECTION, POWDER, FOR SOLUTION INTRAVENOUS at 11:25

## 2024-08-06 RX ADMIN — MEROPENEM 100 MILLIGRAM(S): 1 INJECTION, POWDER, FOR SOLUTION INTRAVENOUS at 22:50

## 2024-08-06 RX ADMIN — HEPARIN SODIUM 5000 UNIT(S): 1000 INJECTION, SOLUTION INTRAVENOUS; SUBCUTANEOUS at 05:12

## 2024-08-06 RX ADMIN — PANTOPRAZOLE SODIUM 40 MILLIGRAM(S): 20 TABLET, DELAYED RELEASE ORAL at 05:11

## 2024-08-06 RX ADMIN — CHLORHEXIDINE GLUCONATE 1 APPLICATION(S): 500 CLOTH TOPICAL at 11:29

## 2024-08-06 RX ADMIN — HYDRALAZINE HYDROCHLORIDE 50 MILLIGRAM(S): 100 TABLET ORAL at 05:10

## 2024-08-06 RX ADMIN — ATORVASTATIN CALCIUM 80 MILLIGRAM(S): 40 TABLET, FILM COATED ORAL at 22:50

## 2024-08-06 RX ADMIN — HEPARIN SODIUM 5000 UNIT(S): 1000 INJECTION, SOLUTION INTRAVENOUS; SUBCUTANEOUS at 17:15

## 2024-08-06 RX ADMIN — MEROPENEM 100 MILLIGRAM(S): 1 INJECTION, POWDER, FOR SOLUTION INTRAVENOUS at 17:16

## 2024-08-06 RX ADMIN — AMLODIPINE BESYLATE 5 MILLIGRAM(S): 2.5 TABLET ORAL at 05:11

## 2024-08-06 RX ADMIN — Medication 25 MILLIGRAM(S): at 05:11

## 2024-08-06 RX ADMIN — Medication 0.4 MILLIGRAM(S): at 22:50

## 2024-08-06 NOTE — PROGRESS NOTE ADULT - SUBJECTIVE AND OBJECTIVE BOX
SUBJECTIVE/OVERNIGHT EVENTS  Today is hospital day 4d. This morning patient was seen and examined at bedside, resting comfortably in bed. No acute or major events overnight. Pt more confused this morning, ordered stat head CT and neurology reconsulted. Pt on proper AC.      MEDICATIONS  STANDING MEDICATIONS  aspirin  chewable 81 milliGRAM(s) Oral daily  atorvastatin 80 milliGRAM(s) Oral at bedtime  baclofen 5 milliGRAM(s) Oral every 12 hours  clopidogrel Tablet 75 milliGRAM(s) Oral daily  dextrose 5%. 1000 milliLiter(s) IV Continuous <Continuous>  dextrose 50% Injectable 25 Gram(s) IV Push once  fenofibrate Tablet 145 milliGRAM(s) Oral daily  finasteride 5 milliGRAM(s) Oral daily  glucagon  Injectable 1 milliGRAM(s) IntraMuscular once  heparin   Injectable 5000 Unit(s) SubCutaneous every 12 hours  hydrALAZINE 50 milliGRAM(s) Oral two times a day  insulin lispro (ADMELOG) corrective regimen sliding scale   SubCutaneous three times a day before meals  meropenem  IVPB 500 milliGRAM(s) IV Intermittent once  metoprolol tartrate 25 milliGRAM(s) Oral two times a day  pantoprazole    Tablet 40 milliGRAM(s) Oral before breakfast  ranolazine 500 milliGRAM(s) Oral daily  tamsulosin 0.4 milliGRAM(s) Oral at bedtime  venlafaxine XR. 75 milliGRAM(s) Oral daily    PRN MEDICATIONS  acetaminophen     Tablet .. 650 milliGRAM(s) Oral every 6 hours PRN  aluminum hydroxide/magnesium hydroxide/simethicone Suspension 30 milliLiter(s) Oral every 4 hours PRN  dextrose Oral Gel 15 Gram(s) Oral once PRN  melatonin 3 milliGRAM(s) Oral at bedtime PRN  ondansetron Injectable 4 milliGRAM(s) IV Push every 8 hours PRN    VITALS  T(F): 95 (08-06-24 @ 08:04), Max: 98.4 (08-05-24 @ 11:00)  HR: 66 (08-06-24 @ 08:04) (66 - 88)  BP: 109/56 (08-06-24 @ 08:04) (92/56 - 152/73)  RR: 18 (08-06-24 @ 08:04) (18 - 18)  SpO2: 97% (08-06-24 @ 08:04) (97% - 97%)  POCT Blood Glucose.: 121 mg/dL (08-06-24 @ 08:47)  POCT Blood Glucose.: 116 mg/dL (08-05-24 @ 21:00)  POCT Blood Glucose.: 121 mg/dL (08-05-24 @ 16:30)  POCT Blood Glucose.: 140 mg/dL (08-05-24 @ 12:20)  POCT Blood Glucose.: 141 mg/dL (08-05-24 @ 11:10)    PHYSICAL EXAM  GENERAL  (  x) NAD, lying in bed comfortably, confused     (  ) obtunded     (  ) lethargic     (  ) somnolent    HEAD  ( x ) Atraumatic     (  ) hematoma     (  ) laceration (specify location:       )     NECK  (x  ) Supple     (  ) neck stiffness     (  ) nuchal rigidity     (  )  no JVD     (  ) JVD present ( -- cm)    HEART  Rate -->  ( x ) normal rate    (  ) bradycardic    (  ) tachycardic  Rhythm -->  (x  ) regular    (  ) regularly irregular    (  ) irregularly irregular  Murmurs -->  (  ) normal s1/s2    (  ) systolic murmur    (  ) diastolic murmur    (  ) continuous murmur     (  ) S3 present    (  ) S4 present    LUNGS  (x  )Unlabored respirations     (  ) tachypnea  ( x ) B/L air entry     (  ) decreased breath sounds in:  (location     )    (  ) no adventitious sound     (  ) crackles     (  ) wheezing      (  ) rhonchi      (specify location:       )  (  ) chest wall tenderness (specify location:       )    ABDOMEN  ( x ) Soft     (  ) tense   |   (  ) nondistended     (  ) distended   |   (  ) +BS     (  ) hypoactive bowel sounds     (  ) hyperactive bowel sounds  ( x ) nontender     (  ) RUQ tenderness     (  ) RLQ tenderness     (  ) LLQ tenderness     (  ) epigastric tenderness     (  ) diffuse tenderness  (  ) Splenomegaly      (  ) Hepatomegaly      (  ) Jaundice     (  ) ecchymosis     EXTREMITIES  ( x ) Normal     (  ) Rash     (  ) ecchymosis     (  ) varicose veins      (  ) pitting edema     (  ) non-pitting edema   (  ) ulceration     (  ) gangrene:     (location:     )    NERVOUS SYSTEM  (  ) A&Ox3     (x  ) confused     (  ) lethargic  CN II-XII:     (x  ) Intact     (  ) focal deficits  (Specify:     )   Upper extremities:     (  ) strength X/5     (  ) focal deficit (specify:    )  Lower extremities:     (  ) strength  X/5    (  ) focal deficit (specify:    )    SKIN  (  x) No rashes or lesions     (  ) maculopapular rash     (  ) pustules     (  ) vesicles     (  ) ulcer     (  ) ecchymosis     (specify location:     )    LABS                    IMAGING

## 2024-08-06 NOTE — PHARMACOTHERAPY INTERVENTION NOTE - COMMENTS
Recommended to increase meropenem dose from 500mg IV q12h to 1000mg IV q12h because of his Scr is improving and his CrCl went from 21 mL/min to 25.7 mL/min. Will continue to monitor his Scr and renal function.

## 2024-08-06 NOTE — PROGRESS NOTE ADULT - ASSESSMENT
78-year-old man with prior stroke (2017, residual L hemiparesis), CAD s/p CABG, CKD, DM, PAD, HTN, HLD presented to the ED with worsening left-sided facial droop, left arm and leg weakness. Patient also reporting rigidity throughout his left side and fingers curling in is new? CTH shows stable chronic infarction left SUSANNE territory and stable chronic lacunar infarctions without acute infarct. CTA notable for b/l ICAD in ICAs, chronic occlusion of the left A2 and moderate stenoses of the M1 and M2 segments of the right and left middle cerebral arteries. MRI brain without acute stroke. Exam: Left facial droop, LUE 3/5 with spasticity, weak left hand  with fingers in flexion contracture, LLE 3/5 with spasticity. Found to have sepsis and suspected UTI. Repeat CTH 8/6 unremarkable.    Impression: Altered mental status with some catatonic features for the last 24 hours possible 2/2 to metabolic encephalopathy due to urinary retention +/- meropenem in patient with SAMANTA over CKD. Rule out seizure    Recommendations:  - Management by primary team  - STAT labs and EEG ordered  - Treat urinary retention  - If any neurological worsening or any further question, please contact back neurology    Case pending discussion with Neurology attending Dr. Frias 78-year-old man with prior stroke (2017, residual L hemiparesis), CAD s/p CABG, CKD, DM, PAD, HTN, HLD presented to the ED with worsening left-sided facial droop, left arm and leg weakness. Patient also reporting rigidity throughout his left side and fingers curling in is new? CTH shows stable chronic infarction left SUSANNE territory and stable chronic lacunar infarctions without acute infarct. CTA notable for b/l ICAD in ICAs, chronic occlusion of the left A2 and moderate stenoses of the M1 and M2 segments of the right and left middle cerebral arteries. MRI brain without acute stroke. Exam: Left facial droop, LUE 3/5 with spasticity, weak left hand  with fingers in flexion contracture, LLE 3/5 with spasticity. Found to have sepsis and suspected UTI. Urinary retention of 350 cc after bladder scan. Repeat CTH 8/6 unremarkable.    Impression: Altered mental status with some catatonic features for the last 24 hours possible 2/2 to metabolic encephalopathy due to urinary retention +/- meropenem in patient with SAMANTA over CKD. Rule out seizure    Recommendations:  - Management by primary team  - STAT labs and EEG ordered  - Treat urinary retention with straight cath  - If any neurological worsening or any further question, please contact back neurology    Case pending discussion with Neurology attending Dr. Frias 78-year-old man with prior stroke (2017, residual L hemiparesis), CAD s/p CABG, CKD, DM, PAD, HTN, HLD presented to the ED with worsening left-sided facial droop, left arm and leg weakness. Patient also reporting rigidity throughout his left side and fingers curling in is new? CTH shows stable chronic infarction left SUSANNE territory and stable chronic lacunar infarctions without acute infarct. CTA notable for b/l ICAD in ICAs, chronic occlusion of the left A2 and moderate stenoses of the M1 and M2 segments of the right and left middle cerebral arteries. MRI brain without acute stroke. Exam: Left facial droop, LUE 3/5 with spasticity, weak left hand  with fingers in flexion contracture, LLE 3/5 with spasticity. Found to have sepsis and suspected UTI. Urinary retention of 350 cc after bladder scan, after straight cath 450 cc. Progressively improving mental status and exam. His mental status progressively being able now to have a conversation, still perseverative and partially disoriented. Now following commands and improving progressively. Repeat CTH 8/6 unremarkable.    Impression: Improving altered mental status with some catatonic features for the last 24 hours possible 2/2 to metabolic encephalopathy due to urinary retention +/- meropenem in patient with SAMANTA over CKD. Rule out seizure    Recommendations:  - Monitor mental status  - Management by primary team  - Avoid if possible medications as carbapenems since that can be related with worsening encephalopathic symptoms  - STAT labs and EEG ordered  - Treat urinary retention with straight cath. Address this potential cause of worsening mental status   - If any neurological worsening or any further question, please contact back neurology    Case pending discussion with Neurology attending Dr. Frias 78-year-old man with prior stroke (2017, residual L hemiparesis), CAD s/p CABG, CKD, DM, PAD, HTN, HLD presented to the ED with worsening left-sided facial droop, left arm and leg weakness. Patient also reporting rigidity throughout his left side and fingers curling in is new? CTH shows stable chronic infarction left SUSANNE territory and stable chronic lacunar infarctions without acute infarct. CTA notable for b/l ICAD in ICAs, chronic occlusion of the left A2 and moderate stenoses of the M1 and M2 segments of the right and left middle cerebral arteries. MRI brain without acute stroke. Exam: Left facial droop, LUE 3/5 with spasticity, weak left hand  with fingers in flexion contracture, LLE 3/5 with spasticity. Found to have sepsis and suspected UTI. Urinary retention of 350 cc after bladder scan, after straight cath 450 cc. Progressively improving mental status and exam. His mental status progressively being able now to have a conversation, still perseverative and partially disoriented. Now following commands and improving progressively. Repeat CTH 8/6 unremarkable.    Impression: Improving altered mental status with some catatonic features for the last 24 hours possible 2/2 to metabolic encephalopathy due to urinary retention +/- meropenem in patient with SAMANTA over CKD. Rule out seizure    Recommendations:  - Management by primary team  - Avoid if possible medications as carbapenems since that can be related with worsening encephalopathic symptoms  - f/u b/w and Routine EEG - ordered  - Treat urinary retention with straight cath. Address this potential cause of worsening mental status   - If any neurological worsening or any further question, please contact back neurology  - continue baclofen  - continue secondary stroke prevention w/ ASA/Plavix    Case pending discussion with Neurology attending Dr. Frias

## 2024-08-06 NOTE — PROGRESS NOTE ADULT - ASSESSMENT
78-year-old male L handed with PMH CAD s/p CABG, CKD, prior stroke, DM, PAD, HTN, HLD presented to the ED on 8/1/24 for left sided weakness, initially 2 days prior to admission but then improved and then worsened again on day prior to presentation so presented.    #Suspected UTI  - previously on IV Rocephin but culture is resistant - ordered richard with ID consult   - oral hydration encouraged    #AMS/Rigidity   #Left sided weakness   # history of CVA  - MR Head - no acute infarct, chronic left SUSANNE territorial infarct and bilateral basal ganglia chronic lacunar infarcts  - neurology following   - continue DAPT/statin  - PT follow up when mental status improves  - repeat EEG ordered - follow up results  - started on baclofen   - patient has a HHA for 8 hours - plan is to return home upon dc - discussed with his son  - placed on remote tele    #CAD  #PVD  #HTN   #HLD  #BPH  #Anemia - stable   #CKD III - stable  #Urinary retention - harrison placed today  - continue home meds  - monitor BP closely  - house staff clarified meds with pt's son   - dc norvasc today   - add IVF today for 24hrs    #DM II - well controlled   -Continue sliding scale insulin regimen    #Depression  - Cont venlefaxine    Progress Note Handoff  Pending Consults: PT, CM, neuro   Pending Tests: labs, eeg  Pending Results: labs, eeg  Family Discussion: Discussed labs, meds, cultures and overall plan of care with pt, his son and medical staff. All questions answered. PFD for 48hrs  Disposition: Home__x___/SNF______/Other_____/Unknown at this time_____  Spent over 55 min reviewing chart, speaking with patient/family and on coordinating patient care during interdisciplinary rounds

## 2024-08-06 NOTE — CONSULT NOTE ADULT - SUBJECTIVE AND OBJECTIVE BOX
MICHELLEERCIK  78y, Male  Allergy: clindamycin (Rash)  PC Pen VK (Rash)  penicillin (Rash)    CHIEF COMPLAINT: Sepsis due to UTI (06 Aug 2024 12:07)    HPI:  78-year-old male L handed with PMH CAD s/p CABG, CKD, prior stroke, DM, PAD, HTN, HLD presented to the ED on 8/1/24 with left-sided facial droop, left arm and leg weakness, left shoulder pain that radiates throughout his arm, and left leg pain all of which began yesterday morning. He additionally reports 2 episodes of L handed shaking without loss of consciousness. The patient remembers the whole episode and denies LOC or head strike. In the ED, neurology was consulted for evaluation of his L sided symptoms. Patient reported that his L sided symptoms are similar to his presentation after his stroke in 2017. Noted that he was evaluated at [facility] for a TIA with R leg weakness that improved. Denied any recent trauma to L shoulder, fall, or sleeping on that side. Reports that he normally walks independently and is able to shop online independently but needs help with cooking and cleaning. ROS negative for dysuria, hematuria, chest pain, shortness of breath, cough, sore throat, vision changes. Positive for constipation (for years) and left sided weakness (as above).    In the ED, patient received 2000 mg IV aztreonam IVPB once and 1000 mg vancomycin once for sepsis protocol as well as 1800 mL LR bolus. Patient was subsequently started on 75 ml/hr LR on 8/2/24. Blood cultures x2 were drawn.  Urine culture: Moderate blood. Large leukocyte esterase. Nitrite negative.    Pt was evaluated at bedside but was unresponsive, AOx0. Per son, pt has a hx of UTIs. Pt started feeling L sided weakness and had an episode of shaking for which he was brought in to the ED. He has a home health aid that comes Mon-Fri for 8 hrs and was there to witness the incident, no loc or head strike.     Infectious Diseases History:  Old Micro Data/Cultures:     FAMILY HISTORY:    PAST MEDICAL & SURGICAL HISTORY:  CAD (coronary artery disease)    HTN (hypertension)    Depression    Anxiety    Diabetes  niddm    Angina pectoris    BPH (benign prostatic hyperplasia)    GERD (gastroesophageal reflux disease)    CVA (cerebral vascular accident)    History of appendectomy    Bilateral cataracts    History of heart bypass surgery    SOCIAL HISTORY  Social History:  Lives at home with son. (02 Aug 2024 09:39)    ROS  General: Denies rigors, nightsweats  HEENT: Denies headache, rhinorrhea, sore throat, eye pain  CV: Denies CP, palpitations  PULM: Denies wheezing, hemoptysis  GI: Denies hematemesis, hematochezia, melena  : Denies discharge, hematuria  MSK: Denies arthralgias, myalgias  SKIN: Denies rash, lesions  NEURO: Denies paresthesias, weakness  PSYCH: Denies depression, anxiety    VITALS:  T(F): 95, Max: 98.4 (08-05-24 @ 23:56)  HR: 66  BP: 109/56  RR: 18Vital Signs Last 24 Hrs  T(C): 35 (06 Aug 2024 08:04), Max: 36.9 (05 Aug 2024 23:56)  T(F): 95 (06 Aug 2024 08:04), Max: 98.4 (05 Aug 2024 23:56)  HR: 66 (06 Aug 2024 08:04) (66 - 88)  BP: 109/56 (06 Aug 2024 08:04) (109/56 - 152/73)  BP(mean): --  RR: 18 (06 Aug 2024 08:04) (18 - 18)  SpO2: 97% (06 Aug 2024 08:04) (97% - 97%)    Parameters below as of 06 Aug 2024 08:04  Patient On (Oxygen Delivery Method): room air      PHYSICAL EXAM:  GENERAL: NAD, chronically ill appearing   HEAD:  Atraumatic, Normocephalic  NECK: Supple, No JVD  CHEST/LUNG: Clear to auscultation bilaterally; No wheeze  HEART: Regular rate and rhythm; No murmurs, rubs, or gallops  ABDOMEN: Soft, Nontender, Nondistended; Bowel sounds present  EXTREMITIES:  2+ Peripheral Pulses, No clubbing, cyanosis, or edema; LUE and LLE 3-4/5 strength, stiff UEs  SKIN: No rashes or lesions    TESTS & MEASUREMENTS:                        11.3   7.56  )-----------( 389      ( 06 Aug 2024 10:50 )             36.1     08-06    143  |  107  |  31<H>  ----------------------------<  122<H>  4.6   |  21  |  1.9<H>    Ca    9.4      06 Aug 2024 10:50    TPro  6.6  /  Alb  4.0  /  TBili  0.2  /  DBili  x   /  AST  23  /  ALT  23  /  AlkPhos  44  08-06      LIVER FUNCTIONS - ( 06 Aug 2024 10:50 )  Alb: 4.0 g/dL / Pro: 6.6 g/dL / ALK PHOS: 44 U/L / ALT: 23 U/L / AST: 23 U/L / GGT: x           Urinalysis Basic - ( 06 Aug 2024 10:50 )    Color: x / Appearance: x / SG: x / pH: x  Gluc: 122 mg/dL / Ketone: x  / Bili: x / Urobili: x   Blood: x / Protein: x / Nitrite: x   Leuk Esterase: x / RBC: x / WBC x   Sq Epi: x / Non Sq Epi: x / Bacteria: x    Culture - Blood (collected 08-01-24 @ 21:53)  Source: .Blood Blood-Peripheral  Preliminary Report (08-06-24 @ 09:01):    No growth at 4 days    Culture - Blood (collected 08-01-24 @ 21:53)  Source: .Blood Blood-Peripheral  Preliminary Report (08-06-24 @ 09:01):    No growth at 4 days    Urinalysis with Rflx Culture (collected 08-01-24 @ 21:53)    Culture - Urine (collected 08-01-24 @ 21:53)  Source: Catheterized None  Final Report (08-05-24 @ 19:01):    >100,000 CFU/ml Enterobacter cloacae complex  Organism: Enterobacter cloacae complex (08-05-24 @ 19:01)  Organism: Enterobacter cloacae complex (08-05-24 @ 19:01)      Method Type: PUNEET      -  Amoxicillin/Clavulanic Acid: R >16/8      -  Ampicillin: R >16 These ampicillin results predict results for amoxicillin      -  Ampicillin/Sulbactam: R 16/8      -  Aztreonam: I 16      -  Cefazolin: R >16      -  Cefepime: SDD 8 The breakpoint for susceptible dose dependent may require the usage of a higher cefepime dose (equivalent to adult dose of 2g q8h for normal renal function) for successful treatment.      -  Cefoxitin: R >16      -  Ceftriaxone: R >32 Enterobacter cloacae, Klebsiella aerogenes, and Citrobacter freundii may develop resistance during prolonged therapy.      -  Ciprofloxacin: R >2      -  Ertapenem: S <=0.5      -  Gentamicin: R >8      -  Imipenem: S <=1      -  Levofloxacin: I 1      -  Meropenem: S <=1      -  Nitrofurantoin: S <=32 Should not be used to treat pyelonephritis      -  Piperacillin/Tazobactam: S <=8 Treatment with Pipercillin/Tazobactam is not recommended in severe infections casued by Klebsiella aerogenes, Enterobacter cloacae complex, and Citrobacter freundii complex.      -  Tobramycin: R >8      -  Trimethoprim/Sulfamethoxazole: R >2/38      Lactate, Blood: 0.8 mmol/L (08-06-24 @ 10:40)  Lactate, Blood: 1.2 mmol/L (08-01-24 @ 22:48)  Lactate, Blood: 1.1 mmol/L (08-01-24 @ 19:08)    INFECTIOUS DISEASES TESTING  Legionella Antigen, Urine: Negative (09-02-23 @ 20:05)    RADIOLOGY & ADDITIONAL TESTS:    CT Head No Cont (08.01.24)    IMPRESSION:    No CT evidence for acute intracranial pathology.    Stable chronic findings, as above.    CT Angio Head w/ IV Cont (08.01.24)    IMPRESSION:    Moderate stenosis in the region of the cavernous and supraclinoid   portions of the left ICA.    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    Moderate stenoses of the M1 and M2 segments of the right and left middle   cerebral arteries is noted.    No evidence of aneurysm.    CT Abdomen and Pelvis No Cont (08.01.24)    IMPRESSION:    Left basilar atelectasis/consolidation, likely chronic.    Chronic trabeculated thick-walled urinary bladder.    MR Head No Cont (08.02.24)    IMPRESSION:  No evidence of acute intracranial pathology. No acute infarct,   intracranial hemorrhage, mass effect or midline shift.    Chronic left SUSANNE territory infarct as well as bilateral basal ganglia   chronic lacunar infarcts.    Xray Foot AP + Lateral, Right (08.03.24)    IMPRESSION:    Vascular calcifications.    First MTP arthrosis.    Intact plantar arch.    No acute fracture or malalignment.      CT Head No Cont (08.06.24)    IMPRESSION:    No evidence of acute intracranial pathology. Stable exam since 8/1/2024.    Stable chronic findings, as above.      CARDIOLOGY TESTING  12 Lead ECG:   Ventricular Rate 88 BPM    Atrial Rate 88 BPM    P-R Interval 142 ms    QRS Duration 82 ms    Q-T Interval 374 ms    QTC Calculation(Bazett) 452 ms    P Axis 54 degrees    R Axis 8 degrees    T Axis 21 degrees    Diagnosis Line Normal sinus rhythm  Minimal voltage criteria for LVH, may be normal variant  Nonspecific ST abnormality  Abnormal ECG    Confirmed by Vadim Elliott ([id number]) on 8/1/2024 8:48:42 PM (08-01-24 @ 19:16)      All available historical records have been reviewed    MEDICATIONS  aspirin  chewable 81  atorvastatin 80  baclofen 5  chlorhexidine 2% Cloths 1  clopidogrel Tablet 75  dextrose 5%. 1000  dextrose 50% Injectable 25  fenofibrate Tablet 145  finasteride 5  glucagon  Injectable 1  heparin   Injectable 5000  hydrALAZINE 50  insulin lispro (ADMELOG) corrective regimen sliding scale   metoprolol tartrate 25  pantoprazole    Tablet 40  ranolazine 500  tamsulosin 0.4  venlafaxine XR. 75      ANTIBIOTICS:    All available historical data has been reviewed       MICHELLEERICK  78y, Male  Allergy: clindamycin (Rash)  PC Pen VK (Rash)  penicillin (Rash)    CHIEF COMPLAINT: Sepsis due to UTI (06 Aug 2024 12:07)    HPI:  78-year-old male L handed with PMH CAD s/p CABG, CKD, prior stroke, DM, PAD, HTN, HLD presented to the ED on 8/1/24 with left-sided facial droop, left arm and leg weakness, left shoulder pain that radiates throughout his arm, and left leg pain all of which began yesterday morning. He additionally reports 2 episodes of L handed shaking without loss of consciousness. The patient remembers the whole episode and denies LOC or head strike. In the ED, neurology was consulted for evaluation of his L sided symptoms. Patient reported that his L sided symptoms are similar to his presentation after his stroke in 2017. Noted that he was evaluated at [facility] for a TIA with R leg weakness that improved. Denied any recent trauma to L shoulder, fall, or sleeping on that side. Reports that he normally walks independently and is able to shop online independently but needs help with cooking and cleaning. ROS negative for dysuria, hematuria, chest pain, shortness of breath, cough, sore throat, vision changes. Positive for constipation (for years) and left sided weakness (as above).    In the ED, patient received 2000 mg IV aztreonam IVPB once and 1000 mg vancomycin once for sepsis protocol as well as 1800 mL LR bolus. Patient was subsequently started on 75 ml/hr LR on 8/2/24. Blood cultures x2 were drawn.  Urine culture: Moderate blood. Large leukocyte esterase. Nitrite negative.    Pt was evaluated at bedside but was unresponsive, AOx0. Per son, pt has a hx of UTIs. Pt started feeling L sided weakness and had an episode of shaking for which he was brought in to the ED. He has a home health aid that comes Mon-Fri for 8 hrs and was there to witness the incident, no loc or head strike.     Infectious Diseases History:  Old Micro Data/Cultures:     FAMILY HISTORY:    PAST MEDICAL & SURGICAL HISTORY:  CAD (coronary artery disease)    HTN (hypertension)    Depression    Anxiety    Diabetes  niddm    Angina pectoris    BPH (benign prostatic hyperplasia)    GERD (gastroesophageal reflux disease)    CVA (cerebral vascular accident)    History of appendectomy    Bilateral cataracts    History of heart bypass surgery    SOCIAL HISTORY  Social History:  Lives at home with son. (02 Aug 2024 09:39)    ROS  General: Denies rigors, nightsweats  HEENT: Denies headache, rhinorrhea, sore throat, eye pain  CV: Denies CP, palpitations  PULM: Denies wheezing, hemoptysis  GI: Denies hematemesis, hematochezia, melena  : Denies discharge, hematuria  MSK: Denies arthralgias, myalgias  SKIN: Denies rash, lesions  NEURO: Denies paresthesias, weakness  PSYCH: Denies depression, anxiety    VITALS:  T(F): 95, Max: 98.4 (08-05-24 @ 23:56)  HR: 66  BP: 109/56  RR: 18Vital Signs Last 24 Hrs  T(C): 35 (06 Aug 2024 08:04), Max: 36.9 (05 Aug 2024 23:56)  T(F): 95 (06 Aug 2024 08:04), Max: 98.4 (05 Aug 2024 23:56)  HR: 66 (06 Aug 2024 08:04) (66 - 88)  BP: 109/56 (06 Aug 2024 08:04) (109/56 - 152/73)  BP(mean): --  RR: 18 (06 Aug 2024 08:04) (18 - 18)  SpO2: 97% (06 Aug 2024 08:04) (97% - 97%)    Parameters below as of 06 Aug 2024 08:04  Patient On (Oxygen Delivery Method): room air    PHYSICAL EXAM:  GENERAL: NAD, chronically ill appearing  MENTAL STATUS: Non-verbal. Non-responsive to verbal, tactile and pain stimuli with eyes wide open  HEAD:  Atraumatic, Normocephalic  NECK: Supple, No JVD  CHEST/LUNG: Clear to auscultation bilaterally; No wheeze  HEART: Regular rate and rhythm; No murmurs, rubs, or gallops  ABDOMEN: Soft, Nontender, Nondistended; Bowel sounds present  EXTREMITIES:  2+ Peripheral Pulses, No clubbing, cyanosis, or edema, UE b/l rigidity  SKIN: No rashes or lesions    TESTS & MEASUREMENTS:                        11.3   7.56  )-----------( 389      ( 06 Aug 2024 10:50 )             36.1     08-06    143  |  107  |  31<H>  ----------------------------<  122<H>  4.6   |  21  |  1.9<H>    Ca    9.4      06 Aug 2024 10:50    TPro  6.6  /  Alb  4.0  /  TBili  0.2  /  DBili  x   /  AST  23  /  ALT  23  /  AlkPhos  44  08-06      LIVER FUNCTIONS - ( 06 Aug 2024 10:50 )  Alb: 4.0 g/dL / Pro: 6.6 g/dL / ALK PHOS: 44 U/L / ALT: 23 U/L / AST: 23 U/L / GGT: x           Urinalysis Basic - ( 06 Aug 2024 10:50 )    Color: x / Appearance: x / SG: x / pH: x  Gluc: 122 mg/dL / Ketone: x  / Bili: x / Urobili: x   Blood: x / Protein: x / Nitrite: x   Leuk Esterase: x / RBC: x / WBC x   Sq Epi: x / Non Sq Epi: x / Bacteria: x    Culture - Blood (collected 08-01-24 @ 21:53)  Source: .Blood Blood-Peripheral  Preliminary Report (08-06-24 @ 09:01):    No growth at 4 days    Culture - Blood (collected 08-01-24 @ 21:53)  Source: .Blood Blood-Peripheral  Preliminary Report (08-06-24 @ 09:01):    No growth at 4 days    Urinalysis with Rflx Culture (collected 08-01-24 @ 21:53)    Culture - Urine (collected 08-01-24 @ 21:53)  Source: Catheterized None  Final Report (08-05-24 @ 19:01):    >100,000 CFU/ml Enterobacter cloacae complex  Organism: Enterobacter cloacae complex (08-05-24 @ 19:01)  Organism: Enterobacter cloacae complex (08-05-24 @ 19:01)      Method Type: PUNEET      -  Amoxicillin/Clavulanic Acid: R >16/8      -  Ampicillin: R >16 These ampicillin results predict results for amoxicillin      -  Ampicillin/Sulbactam: R 16/8      -  Aztreonam: I 16      -  Cefazolin: R >16      -  Cefepime: SDD 8 The breakpoint for susceptible dose dependent may require the usage of a higher cefepime dose (equivalent to adult dose of 2g q8h for normal renal function) for successful treatment.      -  Cefoxitin: R >16      -  Ceftriaxone: R >32 Enterobacter cloacae, Klebsiella aerogenes, and Citrobacter freundii may develop resistance during prolonged therapy.      -  Ciprofloxacin: R >2      -  Ertapenem: S <=0.5      -  Gentamicin: R >8      -  Imipenem: S <=1      -  Levofloxacin: I 1      -  Meropenem: S <=1      -  Nitrofurantoin: S <=32 Should not be used to treat pyelonephritis      -  Piperacillin/Tazobactam: S <=8 Treatment with Pipercillin/Tazobactam is not recommended in severe infections casued by Klebsiella aerogenes, Enterobacter cloacae complex, and Citrobacter freundii complex.      -  Tobramycin: R >8      -  Trimethoprim/Sulfamethoxazole: R >2/38      Lactate, Blood: 0.8 mmol/L (08-06-24 @ 10:40)  Lactate, Blood: 1.2 mmol/L (08-01-24 @ 22:48)  Lactate, Blood: 1.1 mmol/L (08-01-24 @ 19:08)    INFECTIOUS DISEASES TESTING  Legionella Antigen, Urine: Negative (09-02-23 @ 20:05)    RADIOLOGY & ADDITIONAL TESTS:    CT Head No Cont (08.01.24)    IMPRESSION:    No CT evidence for acute intracranial pathology.    Stable chronic findings, as above.    CT Angio Head w/ IV Cont (08.01.24)    IMPRESSION:    Moderate stenosis in the region of the cavernous and supraclinoid   portions of the left ICA.    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    Moderate stenoses of the M1 and M2 segments of the right and left middle   cerebral arteries is noted.    No evidence of aneurysm.    CT Abdomen and Pelvis No Cont (08.01.24)    IMPRESSION:    Left basilar atelectasis/consolidation, likely chronic.    Chronic trabeculated thick-walled urinary bladder.    MR Head No Cont (08.02.24)    IMPRESSION:  No evidence of acute intracranial pathology. No acute infarct,   intracranial hemorrhage, mass effect or midline shift.    Chronic left SUSANNE territory infarct as well as bilateral basal ganglia   chronic lacunar infarcts.    Xray Foot AP + Lateral, Right (08.03.24)    IMPRESSION:    Vascular calcifications.    First MTP arthrosis.    Intact plantar arch.    No acute fracture or malalignment.      CT Head No Cont (08.06.24)    IMPRESSION:    No evidence of acute intracranial pathology. Stable exam since 8/1/2024.    Stable chronic findings, as above.      CARDIOLOGY TESTING  12 Lead ECG:   Ventricular Rate 88 BPM    Atrial Rate 88 BPM    P-R Interval 142 ms    QRS Duration 82 ms    Q-T Interval 374 ms    QTC Calculation(Bazett) 452 ms    P Axis 54 degrees    R Axis 8 degrees    T Axis 21 degrees    Diagnosis Line Normal sinus rhythm  Minimal voltage criteria for LVH, may be normal variant  Nonspecific ST abnormality  Abnormal ECG    Confirmed by Vadim Elliott ([id number]) on 8/1/2024 8:48:42 PM (08-01-24 @ 19:16)      All available historical records have been reviewed    MEDICATIONS  aspirin  chewable 81  atorvastatin 80  baclofen 5  chlorhexidine 2% Cloths 1  clopidogrel Tablet 75  dextrose 5%. 1000  dextrose 50% Injectable 25  fenofibrate Tablet 145  finasteride 5  glucagon  Injectable 1  heparin   Injectable 5000  hydrALAZINE 50  insulin lispro (ADMELOG) corrective regimen sliding scale   metoprolol tartrate 25  pantoprazole    Tablet 40  ranolazine 500  tamsulosin 0.4  venlafaxine XR. 75      ANTIBIOTICS:    All available historical data has been reviewed       MICHELLEERICK  78y, Male  Allergy: clindamycin (Rash)  PC Pen VK (Rash)  penicillin (Rash)    CHIEF COMPLAINT: Sepsis due to UTI (06 Aug 2024 12:07)    HPI:  78-year-old male L handed with PMH CAD s/p CABG, CKD, prior stroke, DM, PAD, HTN, HLD presented to the ED on 8/1/24 with left-sided facial droop, left arm and leg weakness, left shoulder pain that radiates throughout his arm, and left leg pain all of which began yesterday morning. He additionally reports 2 episodes of L handed shaking without loss of consciousness. The patient remembers the whole episode and denies LOC or head strike. In the ED, neurology was consulted for evaluation of his L sided symptoms. Patient reported that his L sided symptoms are similar to his presentation after his stroke in 2017. Noted that he was evaluated at [facility] for a TIA with R leg weakness that improved. Denied any recent trauma to L shoulder, fall, or sleeping on that side. Reports that he normally walks independently and is able to shop online independently but needs help with cooking and cleaning. ROS negative for dysuria, hematuria, chest pain, shortness of breath, cough, sore throat, vision changes. Positive for constipation (for years) and left sided weakness (as above).    In the ED, patient received 2000 mg IV aztreonam IVPB once and 1000 mg vancomycin once for sepsis protocol as well as 1800 mL LR bolus. Patient was subsequently started on 75 ml/hr LR on 8/2/24. Blood cultures x2 were drawn.  Urine culture: Moderate blood. Large leukocyte esterase. Nitrite negative.    Pt was evaluated at bedside but was responsive, AOx2. Per son, pt has a hx of UTIs. Pt started feeling L sided weakness and had an episode of shaking for which he was brought in to the ED. He has a home health aid that comes Mon-Fri for 8 hrs and was there to witness the incident, no loc or head strike. Pt denies any chest pain, dysuria, SOB, abdominal pain.     Infectious Diseases History:  Old Micro Data/Cultures:     FAMILY HISTORY:    PAST MEDICAL & SURGICAL HISTORY:  CAD (coronary artery disease)    HTN (hypertension)    Depression    Anxiety    Diabetes  niddm    Angina pectoris    BPH (benign prostatic hyperplasia)    GERD (gastroesophageal reflux disease)    CVA (cerebral vascular accident)    History of appendectomy    Bilateral cataracts    History of heart bypass surgery    SOCIAL HISTORY  Social History:  Lives at home with son. (02 Aug 2024 09:39)    ROS  General: Denies rigors, nightsweats  HEENT: Denies headache, rhinorrhea, sore throat, eye pain  CV: Denies CP, palpitations  PULM: Denies wheezing, hemoptysis  GI: Denies hematemesis, hematochezia, melena  : Denies discharge, hematuria  MSK: Denies arthralgias, myalgias  SKIN: Denies rash, lesions  NEURO: Denies paresthesias, weakness  PSYCH: Denies depression, anxiety    VITALS:  T(F): 95, Max: 98.4 (08-05-24 @ 23:56)  HR: 66  BP: 109/56  RR: 18Vital Signs Last 24 Hrs  T(C): 35 (06 Aug 2024 08:04), Max: 36.9 (05 Aug 2024 23:56)  T(F): 95 (06 Aug 2024 08:04), Max: 98.4 (05 Aug 2024 23:56)  HR: 66 (06 Aug 2024 08:04) (66 - 88)  BP: 109/56 (06 Aug 2024 08:04) (109/56 - 152/73)  BP(mean): --  RR: 18 (06 Aug 2024 08:04) (18 - 18)  SpO2: 97% (06 Aug 2024 08:04) (97% - 97%)    Parameters below as of 06 Aug 2024 08:04  Patient On (Oxygen Delivery Method): room air    PHYSICAL EXAM:  GENERAL: NAD, chronically ill appearing  MENTAL STATUS: Non-verbal. Non-responsive to verbal, tactile and pain stimuli with eyes wide open  HEAD:  Atraumatic, Normocephalic  NECK: Supple, No JVD  CHEST/LUNG: Clear to auscultation bilaterally; No wheeze  HEART: Regular rate and rhythm; No murmurs, rubs, or gallops  ABDOMEN: Soft, Nontender, Nondistended; Bowel sounds present  EXTREMITIES:  2+ Peripheral Pulses, No clubbing, cyanosis, or edema, UE b/l rigidity  SKIN: No rashes or lesions    TESTS & MEASUREMENTS:                        11.3   7.56  )-----------( 389      ( 06 Aug 2024 10:50 )             36.1     08-06    143  |  107  |  31<H>  ----------------------------<  122<H>  4.6   |  21  |  1.9<H>    Ca    9.4      06 Aug 2024 10:50    TPro  6.6  /  Alb  4.0  /  TBili  0.2  /  DBili  x   /  AST  23  /  ALT  23  /  AlkPhos  44  08-06      LIVER FUNCTIONS - ( 06 Aug 2024 10:50 )  Alb: 4.0 g/dL / Pro: 6.6 g/dL / ALK PHOS: 44 U/L / ALT: 23 U/L / AST: 23 U/L / GGT: x           Urinalysis Basic - ( 06 Aug 2024 10:50 )    Color: x / Appearance: x / SG: x / pH: x  Gluc: 122 mg/dL / Ketone: x  / Bili: x / Urobili: x   Blood: x / Protein: x / Nitrite: x   Leuk Esterase: x / RBC: x / WBC x   Sq Epi: x / Non Sq Epi: x / Bacteria: x    Culture - Blood (collected 08-01-24 @ 21:53)  Source: .Blood Blood-Peripheral  Preliminary Report (08-06-24 @ 09:01):    No growth at 4 days    Culture - Blood (collected 08-01-24 @ 21:53)  Source: .Blood Blood-Peripheral  Preliminary Report (08-06-24 @ 09:01):    No growth at 4 days    Urinalysis with Rflx Culture (collected 08-01-24 @ 21:53)    Culture - Urine (collected 08-01-24 @ 21:53)  Source: Catheterized None  Final Report (08-05-24 @ 19:01):    >100,000 CFU/ml Enterobacter cloacae complex  Organism: Enterobacter cloacae complex (08-05-24 @ 19:01)  Organism: Enterobacter cloacae complex (08-05-24 @ 19:01)      Method Type: PUNEET      -  Amoxicillin/Clavulanic Acid: R >16/8      -  Ampicillin: R >16 These ampicillin results predict results for amoxicillin      -  Ampicillin/Sulbactam: R 16/8      -  Aztreonam: I 16      -  Cefazolin: R >16      -  Cefepime: SDD 8 The breakpoint for susceptible dose dependent may require the usage of a higher cefepime dose (equivalent to adult dose of 2g q8h for normal renal function) for successful treatment.      -  Cefoxitin: R >16      -  Ceftriaxone: R >32 Enterobacter cloacae, Klebsiella aerogenes, and Citrobacter freundii may develop resistance during prolonged therapy.      -  Ciprofloxacin: R >2      -  Ertapenem: S <=0.5      -  Gentamicin: R >8      -  Imipenem: S <=1      -  Levofloxacin: I 1      -  Meropenem: S <=1      -  Nitrofurantoin: S <=32 Should not be used to treat pyelonephritis      -  Piperacillin/Tazobactam: S <=8 Treatment with Pipercillin/Tazobactam is not recommended in severe infections casued by Klebsiella aerogenes, Enterobacter cloacae complex, and Citrobacter freundii complex.      -  Tobramycin: R >8      -  Trimethoprim/Sulfamethoxazole: R >2/38      Lactate, Blood: 0.8 mmol/L (08-06-24 @ 10:40)  Lactate, Blood: 1.2 mmol/L (08-01-24 @ 22:48)  Lactate, Blood: 1.1 mmol/L (08-01-24 @ 19:08)    INFECTIOUS DISEASES TESTING  Legionella Antigen, Urine: Negative (09-02-23 @ 20:05)    RADIOLOGY & ADDITIONAL TESTS:    CT Head No Cont (08.01.24)    IMPRESSION:    No CT evidence for acute intracranial pathology.    Stable chronic findings, as above.    CT Angio Head w/ IV Cont (08.01.24)    IMPRESSION:    Moderate stenosis in the region of the cavernous and supraclinoid   portions of the left ICA.    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    Moderate stenoses of the M1 and M2 segments of the right and left middle   cerebral arteries is noted.    No evidence of aneurysm.    CT Abdomen and Pelvis No Cont (08.01.24)    IMPRESSION:    Left basilar atelectasis/consolidation, likely chronic.    Chronic trabeculated thick-walled urinary bladder.    MR Head No Cont (08.02.24)    IMPRESSION:  No evidence of acute intracranial pathology. No acute infarct,   intracranial hemorrhage, mass effect or midline shift.    Chronic left SUSANNE territory infarct as well as bilateral basal ganglia   chronic lacunar infarcts.    Xray Foot AP + Lateral, Right (08.03.24)    IMPRESSION:    Vascular calcifications.    First MTP arthrosis.    Intact plantar arch.    No acute fracture or malalignment.      CT Head No Cont (08.06.24)    IMPRESSION:    No evidence of acute intracranial pathology. Stable exam since 8/1/2024.    Stable chronic findings, as above.      CARDIOLOGY TESTING  12 Lead ECG:   Ventricular Rate 88 BPM    Atrial Rate 88 BPM    P-R Interval 142 ms    QRS Duration 82 ms    Q-T Interval 374 ms    QTC Calculation(Bazett) 452 ms    P Axis 54 degrees    R Axis 8 degrees    T Axis 21 degrees    Diagnosis Line Normal sinus rhythm  Minimal voltage criteria for LVH, may be normal variant  Nonspecific ST abnormality  Abnormal ECG    Confirmed by Vadim Elliott ([id number]) on 8/1/2024 8:48:42 PM (08-01-24 @ 19:16)      All available historical records have been reviewed    MEDICATIONS  aspirin  chewable 81  atorvastatin 80  baclofen 5  chlorhexidine 2% Cloths 1  clopidogrel Tablet 75  dextrose 5%. 1000  dextrose 50% Injectable 25  fenofibrate Tablet 145  finasteride 5  glucagon  Injectable 1  heparin   Injectable 5000  hydrALAZINE 50  insulin lispro (ADMELOG) corrective regimen sliding scale   metoprolol tartrate 25  pantoprazole    Tablet 40  ranolazine 500  tamsulosin 0.4  venlafaxine XR. 75      ANTIBIOTICS:    All available historical data has been reviewed       MICHELLEERICK  78y, Male  Allergy: clindamycin (Rash)  PC Pen VK (Rash)  penicillin (Rash)    CHIEF COMPLAINT: Sepsis due to UTI (06 Aug 2024 12:07)    HPI:  78-year-old male L handed with PMH CAD s/p CABG, CKD, prior stroke, DM, PAD, HTN, HLD presented to the ED on 8/1/24 with left-sided facial droop, left arm and leg weakness, left shoulder pain that radiates throughout his arm, and left leg pain all of which began yesterday morning. He additionally reports 2 episodes of L handed shaking without loss of consciousness. The patient remembers the whole episode and denies LOC or head strike. In the ED, neurology was consulted for evaluation of his L sided symptoms. Patient reported that his L sided symptoms are similar to his presentation after his stroke in 2017. Noted that he was evaluated at [facility] for a TIA with R leg weakness that improved. Denied any recent trauma to L shoulder, fall, or sleeping on that side. Reports that he normally walks independently and is able to shop online independently but needs help with cooking and cleaning. ROS negative for dysuria, hematuria, chest pain, shortness of breath, cough, sore throat, vision changes. Positive for constipation (for years) and left sided weakness (as above).    In the ED, patient received 2000 mg IV aztreonam IVPB once and 1000 mg vancomycin once for sepsis protocol as well as 1800 mL LR bolus. Patient was subsequently started on 75 ml/hr LR on 8/2/24. Blood cultures x2 were drawn.  Urine culture: Moderate blood. Large leukocyte esterase. Nitrite negative.    Pt was evaluated at bedside but was responsive, AOx2. Per son, pt has a hx of UTIs. Pt started feeling L sided weakness and had an episode of shaking for which he was brought in to the ED. He has a home health aid that comes Mon-Fri for 8 hrs and was there to witness the incident, no loc or head strike. Pt denies any chest pain, dysuria, SOB, abdominal pain.     Infectious Diseases History:  Old Micro Data/Cultures:     FAMILY HISTORY:    PAST MEDICAL & SURGICAL HISTORY:  CAD (coronary artery disease)    HTN (hypertension)    Depression    Anxiety    Diabetes  niddm    Angina pectoris    BPH (benign prostatic hyperplasia)    GERD (gastroesophageal reflux disease)    CVA (cerebral vascular accident)    History of appendectomy    Bilateral cataracts    History of heart bypass surgery    SOCIAL HISTORY  Social History:  Lives at home with son. (02 Aug 2024 09:39)    ROS  General: Denies rigors, nightsweats  HEENT: Denies headache, rhinorrhea, sore throat, eye pain  CV: Denies CP, palpitations  PULM: Denies wheezing, hemoptysis  GI: Denies hematemesis, hematochezia, melena  : Denies discharge, hematuria  MSK: Denies arthralgias, myalgias  SKIN: Denies rash, lesions  NEURO: Denies paresthesias, weakness  PSYCH: Denies depression, anxiety    VITALS:  T(F): 95, Max: 98.4 (08-05-24 @ 23:56)  HR: 66  BP: 109/56  RR: 18Vital Signs Last 24 Hrs  T(C): 35 (06 Aug 2024 08:04), Max: 36.9 (05 Aug 2024 23:56)  T(F): 95 (06 Aug 2024 08:04), Max: 98.4 (05 Aug 2024 23:56)  HR: 66 (06 Aug 2024 08:04) (66 - 88)  BP: 109/56 (06 Aug 2024 08:04) (109/56 - 152/73)  BP(mean): --  RR: 18 (06 Aug 2024 08:04) (18 - 18)  SpO2: 97% (06 Aug 2024 08:04) (97% - 97%)    Parameters below as of 06 Aug 2024 08:04  Patient On (Oxygen Delivery Method): room air    PHYSICAL EXAM:  GENERAL: NAD, chronically ill appearing  MENTAL STATUS: AOx2, aware of surroundings, verbal   HEAD:  Atraumatic, Normocephalic  NECK: Supple, No JVD  CHEST/LUNG: Clear to auscultation bilaterally; No wheeze  HEART: Regular rate and rhythm; No murmurs, rubs, or gallops  ABDOMEN: Soft, Nontender, Nondistended; Bowel sounds present  EXTREMITIES:  2+ Peripheral Pulses, No clubbing, cyanosis, or edema, UE b/l rigidity  SKIN: No rashes or lesions    TESTS & MEASUREMENTS:                        11.3   7.56  )-----------( 389      ( 06 Aug 2024 10:50 )             36.1     08-06    143  |  107  |  31<H>  ----------------------------<  122<H>  4.6   |  21  |  1.9<H>    Ca    9.4      06 Aug 2024 10:50    TPro  6.6  /  Alb  4.0  /  TBili  0.2  /  DBili  x   /  AST  23  /  ALT  23  /  AlkPhos  44  08-06      LIVER FUNCTIONS - ( 06 Aug 2024 10:50 )  Alb: 4.0 g/dL / Pro: 6.6 g/dL / ALK PHOS: 44 U/L / ALT: 23 U/L / AST: 23 U/L / GGT: x           Urinalysis Basic - ( 06 Aug 2024 10:50 )    Color: x / Appearance: x / SG: x / pH: x  Gluc: 122 mg/dL / Ketone: x  / Bili: x / Urobili: x   Blood: x / Protein: x / Nitrite: x   Leuk Esterase: x / RBC: x / WBC x   Sq Epi: x / Non Sq Epi: x / Bacteria: x    Culture - Blood (collected 08-01-24 @ 21:53)  Source: .Blood Blood-Peripheral  Preliminary Report (08-06-24 @ 09:01):    No growth at 4 days    Culture - Blood (collected 08-01-24 @ 21:53)  Source: .Blood Blood-Peripheral  Preliminary Report (08-06-24 @ 09:01):    No growth at 4 days    Urinalysis with Rflx Culture (collected 08-01-24 @ 21:53)    Culture - Urine (collected 08-01-24 @ 21:53)  Source: Catheterized None  Final Report (08-05-24 @ 19:01):    >100,000 CFU/ml Enterobacter cloacae complex  Organism: Enterobacter cloacae complex (08-05-24 @ 19:01)  Organism: Enterobacter cloacae complex (08-05-24 @ 19:01)      Method Type: PUNEET      -  Amoxicillin/Clavulanic Acid: R >16/8      -  Ampicillin: R >16 These ampicillin results predict results for amoxicillin      -  Ampicillin/Sulbactam: R 16/8      -  Aztreonam: I 16      -  Cefazolin: R >16      -  Cefepime: SDD 8 The breakpoint for susceptible dose dependent may require the usage of a higher cefepime dose (equivalent to adult dose of 2g q8h for normal renal function) for successful treatment.      -  Cefoxitin: R >16      -  Ceftriaxone: R >32 Enterobacter cloacae, Klebsiella aerogenes, and Citrobacter freundii may develop resistance during prolonged therapy.      -  Ciprofloxacin: R >2      -  Ertapenem: S <=0.5      -  Gentamicin: R >8      -  Imipenem: S <=1      -  Levofloxacin: I 1      -  Meropenem: S <=1      -  Nitrofurantoin: S <=32 Should not be used to treat pyelonephritis      -  Piperacillin/Tazobactam: S <=8 Treatment with Pipercillin/Tazobactam is not recommended in severe infections casued by Klebsiella aerogenes, Enterobacter cloacae complex, and Citrobacter freundii complex.      -  Tobramycin: R >8      -  Trimethoprim/Sulfamethoxazole: R >2/38      Lactate, Blood: 0.8 mmol/L (08-06-24 @ 10:40)  Lactate, Blood: 1.2 mmol/L (08-01-24 @ 22:48)  Lactate, Blood: 1.1 mmol/L (08-01-24 @ 19:08)    INFECTIOUS DISEASES TESTING  Legionella Antigen, Urine: Negative (09-02-23 @ 20:05)    RADIOLOGY & ADDITIONAL TESTS:    CT Head No Cont (08.01.24)    IMPRESSION:    No CT evidence for acute intracranial pathology.    Stable chronic findings, as above.    CT Angio Head w/ IV Cont (08.01.24)    IMPRESSION:    Moderate stenosis in the region of the cavernous and supraclinoid   portions of the left ICA.    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    Moderate stenoses of the M1 and M2 segments of the right and left middle   cerebral arteries is noted.    No evidence of aneurysm.    CT Abdomen and Pelvis No Cont (08.01.24)    IMPRESSION:    Left basilar atelectasis/consolidation, likely chronic.    Chronic trabeculated thick-walled urinary bladder.    MR Head No Cont (08.02.24)    IMPRESSION:  No evidence of acute intracranial pathology. No acute infarct,   intracranial hemorrhage, mass effect or midline shift.    Chronic left SUSANNE territory infarct as well as bilateral basal ganglia   chronic lacunar infarcts.    Xray Foot AP + Lateral, Right (08.03.24)    IMPRESSION:    Vascular calcifications.    First MTP arthrosis.    Intact plantar arch.    No acute fracture or malalignment.      CT Head No Cont (08.06.24)    IMPRESSION:    No evidence of acute intracranial pathology. Stable exam since 8/1/2024.    Stable chronic findings, as above.      CARDIOLOGY TESTING  12 Lead ECG:   Ventricular Rate 88 BPM    Atrial Rate 88 BPM    P-R Interval 142 ms    QRS Duration 82 ms    Q-T Interval 374 ms    QTC Calculation(Bazett) 452 ms    P Axis 54 degrees    R Axis 8 degrees    T Axis 21 degrees    Diagnosis Line Normal sinus rhythm  Minimal voltage criteria for LVH, may be normal variant  Nonspecific ST abnormality  Abnormal ECG    Confirmed by Vadim Elliott ([id number]) on 8/1/2024 8:48:42 PM (08-01-24 @ 19:16)      All available historical records have been reviewed    MEDICATIONS  aspirin  chewable 81  atorvastatin 80  baclofen 5  chlorhexidine 2% Cloths 1  clopidogrel Tablet 75  dextrose 5%. 1000  dextrose 50% Injectable 25  fenofibrate Tablet 145  finasteride 5  glucagon  Injectable 1  heparin   Injectable 5000  hydrALAZINE 50  insulin lispro (ADMELOG) corrective regimen sliding scale   metoprolol tartrate 25  pantoprazole    Tablet 40  ranolazine 500  tamsulosin 0.4  venlafaxine XR. 75      ANTIBIOTICS:    All available historical data has been reviewed

## 2024-08-06 NOTE — CONSULT NOTE ADULT - TIME BILLING
I have personally seen and examined this patient. I have reviewed all pertinent clinical information and reviewed all relevant imaging ( and noted the impression from the Radiologist ) and diagnostic studies personally. I counseled the patient about the diagnostic testing and treatment plan. I discussed my recommendations with the primary team.

## 2024-08-06 NOTE — CONSULT NOTE ADULT - ASSESSMENT
78-year-old male L handed with PMH CAD s/p CABG, CKD, prior stroke, DM, PAD, HTN, HLD presented to the ED on 8/1/24 with left-sided facial droop, left arm and leg weakness, left shoulder pain that radiates throughout his arm, and left leg pain all of which began yesterday morning. He additionally reports 2 episodes of L handed shaking without loss of consciousness. The patient remembers the whole episode and denies LOC or head strike. In the ED, neurology was consulted for evaluation of his L sided symptoms. Patient reported that his L sided symptoms are similar to his presentation after his stroke in 2017. Noted that he was evaluated at [facility] for a TIA with R leg weakness that improved. Denied any recent trauma to L shoulder, fall, or sleeping on that side. Reports that he normally walks independently and is able to shop online independently but needs help with cooking and cleaning. ROS negative for dysuria, hematuria, chest pain, shortness of breath, cough, sore throat, vision changes. Positive for constipation (for years) and left sided weakness (as above).    In the ED, patient received 2000 mg IV aztreonam IVPB once and 1000 mg vancomycin once for sepsis protocol as well as 1800 mL LR bolus. Patient was subsequently started on 75 ml/hr LR on 8/2/24. Blood cultures x2 were drawn.  Urine culture: Moderate blood. Large leukocyte esterase. Nitrite negative.    Pt was evaluated at bedside but was unresponsive, AOx0. Per son, pt has a hx of UTIs. Pt started feeling L sided weakness and had an episode of shaking for which he was brought in to the ED. He has a health aid that comes Mon-Fri for 8 hrs and was there to witness the incident, no loc or head strike.     IMPRESSION  #Complicated Pyelonephritis  fever  + urine culture  elevated BUN/Cr    RECOMMENDATIONS  - IV meropenem 500mg q8h    This is a pended note. All final recommendations to follow pending discussion with ID Attending   *INCOMPLETE* 78-year-old male L handed with PMH CAD s/p CABG, CKD, prior stroke, DM, PAD, HTN, HLD presented to the ED on 8/1/24 with left-sided facial droop, left arm and leg weakness, left shoulder pain that radiates throughout his arm, and left leg pain all of which began yesterday morning. He additionally reports 2 episodes of L handed shaking without loss of consciousness. The patient remembers the whole episode and denies LOC or head strike. In the ED, neurology was consulted for evaluation of his L sided symptoms. Patient reported that his L sided symptoms are similar to his presentation after his stroke in 2017. Noted that he was evaluated at [facility] for a TIA with R leg weakness that improved. Denied any recent trauma to L shoulder, fall, or sleeping on that side. Reports that he normally walks independently and is able to shop online independently but needs help with cooking and cleaning. ROS negative for dysuria, hematuria, chest pain, shortness of breath, cough, sore throat, vision changes. Positive for constipation (for years) and left sided weakness (as above).    In the ED, patient received 2000 mg IV aztreonam IVPB once and 1000 mg vancomycin once for sepsis protocol as well as 1800 mL LR bolus. Patient was subsequently started on 75 ml/hr LR on 8/2/24. Blood cultures x2 were drawn.  Urine culture: Moderate blood. Large leukocyte esterase. Nitrite negative.    Pt was evaluated at bedside but was responsive, AOx2. Per son, pt has a hx of UTIs. Pt started feeling L sided weakness and had an episode of shaking for which he was brought in to the ED. He has a home health aid that comes Mon-Fri for 8 hrs and was there to witness the incident, no loc or head strike. Pt denies any chest pain, dysuria, SOB, abdominal pain.     IMPRESSION  #Complicated Pyelonephritis  fever  + urine culture  elevated BUN/Cr    RECOMMENDATIONS  - IV meropenem 500mg q12h     This is a pended note. All final recommendations to follow pending discussion with ID Attending   *INCOMPLETE* 78-year-old male L handed with PMH CAD s/p CABG, CKD, prior stroke, DM, PAD, HTN, HLD presented to the ED on 8/1/24 with left-sided facial droop, left arm and leg weakness, left shoulder pain that radiates throughout his arm, and left leg pain all of which began yesterday morning. He additionally reports 2 episodes of L handed shaking without loss of consciousness. The patient remembers the whole episode and denies LOC or head strike. In the ED, neurology was consulted for evaluation of his L sided symptoms. Patient reported that his L sided symptoms are similar to his presentation after his stroke in 2017. Noted that he was evaluated at [facility] for a TIA with R leg weakness that improved. Denied any recent trauma to L shoulder, fall, or sleeping on that side. Reports that he normally walks independently and is able to shop online independently but needs help with cooking and cleaning. ROS negative for dysuria, hematuria, chest pain, shortness of breath, cough, sore throat, vision changes. Positive for constipation (for years) and left sided weakness (as above).    In the ED, patient received 2000 mg IV aztreonam IVPB once and 1000 mg vancomycin once for sepsis protocol as well as 1800 mL LR bolus. Patient was subsequently started on 75 ml/hr LR on 8/2/24. Blood cultures x2 were drawn.  Urine culture: Moderate blood. Large leukocyte esterase. Nitrite negative.    Pt was evaluated at bedside but was responsive, AOx2. Per son, pt has a hx of UTIs. Pt started feeling L sided weakness and had an episode of shaking for which he was brought in to the ED. He has a home health aid that comes Mon-Fri for 8 hrs and was there to witness the incident, no loc or head strike. Pt denies any chest pain, dysuria, SOB, abdominal pain.     IMPRESSION  #Complicated Pyelonephritis  fever  + urine culture  elevated BUN/Cr    RECOMMENDATIONS  - IV meropenem 500mg q12h

## 2024-08-06 NOTE — PROGRESS NOTE ADULT - SUBJECTIVE AND OBJECTIVE BOX
ERICK MARTINEZ  78y, Male    Patient seen and examined this morning  patient was lying in bed and conversational but confused this morning  sent for head CT around 8:30am; upon return to the floor the patient was found to be altered - not speaking and rigid extremities. Patient wasn't following commands but had stable vitals.   Called neuro for follow up   stat labs done - wnl  CT head negative    PATIENT DID NOT GET MEROPENEM BEFORE THIS EVENT     Vital Signs Last 24 Hrs  T(C): 35 (06 Aug 2024 08:04), Max: 36.9 (05 Aug 2024 23:56)  T(F): 95 (06 Aug 2024 08:04), Max: 98.4 (05 Aug 2024 23:56)  HR: 66 (06 Aug 2024 08:04) (66 - 88)  BP: 109/56 (06 Aug 2024 08:04) (109/56 - 152/73)  BP(mean): --  RR: 18 (06 Aug 2024 08:04) (18 - 18)  SpO2: 97% (06 Aug 2024 08:04) (97% - 97%)    Parameters below as of 06 Aug 2024 08:04  Patient On (Oxygen Delivery Method): room air        REVIEW OF SYSTEMS:  All other review of systems is negative unless indicated above.     PHYSICAL EXAM:  GENERAL: NAD, chronically ill appearing   NEURO: awake, confused around 8;30am   HEAD:  Atraumatic, Normocephalic  NECK: Supple, No JVD  CHEST/LUNG: Clear to auscultation bilaterally; No wheeze  HEART: Regular rate and rhythm; No murmurs, rubs, or gallops  ABDOMEN: Soft, Nontender, Nondistended; Bowel sounds present  EXTREMITIES:  2+ Peripheral Pulses, No clubbing, cyanosis, or edema; LUE and LLE 3-4/5 strength  SKIN: No rashes or lesions                LABS:                          11.3   7.56  )-----------( 389      ( 06 Aug 2024 10:50 )             36.1     08-06    143  |  107  |  31<H>  ----------------------------<  122<H>  4.6   |  21  |  1.9<H>    Ca    9.4      06 Aug 2024 10:50    TPro  6.6  /  Alb  4.0  /  TBili  0.2  /  DBili  x   /  AST  23  /  ALT  23  /  AlkPhos  44  08-06        Culture - Blood (collected 08-01-24 @ 21:53)  Source: .Blood Blood-Peripheral  Preliminary Report (08-03-24 @ 09:02):    No growth at 24 hours    Culture - Blood (collected 08-01-24 @ 21:53)  Source: .Blood Blood-Peripheral  Preliminary Report (08-03-24 @ 09:02):    No growth at 24 hours    Urinalysis with Rflx Culture (collected 08-01-24 @ 21:53)      Urinalysis Basic - ( 03 Aug 2024 07:11 )    Color: x / Appearance: x / SG: x / pH: x  Gluc: 107 mg/dL / Ketone: x  / Bili: x / Urobili: x   Blood: x / Protein: x / Nitrite: x   Leuk Esterase: x / RBC: x / WBC x   Sq Epi: x / Non Sq Epi: x / Bacteria: x        RADIOLOGY & ADDITIONAL TESTS:    RECENT DIAGNOSTIC ORDERS:  Folate, Serum: 16:00 (08-02-24 @ 12:01)  Vitamin B12, Serum: 16:00 (08-02-24 @ 12:01)  Ferritin: 16:00 (08-02-24 @ 12:01)      MEDICATIONS:  MEDICATIONS  (STANDING):  aspirin  chewable 81 milliGRAM(s) Oral daily  atorvastatin 80 milliGRAM(s) Oral at bedtime  baclofen 5 milliGRAM(s) Oral every 12 hours  chlorhexidine 2% Cloths 1 Application(s) Topical <User Schedule>  clopidogrel Tablet 75 milliGRAM(s) Oral daily  dextrose 5%. 1000 milliLiter(s) (50 mL/Hr) IV Continuous <Continuous>  dextrose 50% Injectable 25 Gram(s) IV Push once  fenofibrate Tablet 145 milliGRAM(s) Oral daily  finasteride 5 milliGRAM(s) Oral daily  glucagon  Injectable 1 milliGRAM(s) IntraMuscular once  heparin   Injectable 5000 Unit(s) SubCutaneous every 12 hours  hydrALAZINE 50 milliGRAM(s) Oral two times a day  insulin lispro (ADMELOG) corrective regimen sliding scale   SubCutaneous three times a day before meals  metoprolol tartrate 25 milliGRAM(s) Oral two times a day  pantoprazole    Tablet 40 milliGRAM(s) Oral before breakfast  ranolazine 500 milliGRAM(s) Oral daily  tamsulosin 0.4 milliGRAM(s) Oral at bedtime  venlafaxine XR. 75 milliGRAM(s) Oral daily    MEDICATIONS  (PRN):  acetaminophen     Tablet .. 650 milliGRAM(s) Oral every 6 hours PRN Temp greater or equal to 38C (100.4F), Mild Pain (1 - 3)  aluminum hydroxide/magnesium hydroxide/simethicone Suspension 30 milliLiter(s) Oral every 4 hours PRN Dyspepsia  dextrose Oral Gel 15 Gram(s) Oral once PRN Blood Glucose LESS THAN 70 milliGRAM(s)/deciliter  melatonin 3 milliGRAM(s) Oral at bedtime PRN Insomnia  ondansetron Injectable 4 milliGRAM(s) IV Push every 8 hours PRN Nausea and/or Vomiting

## 2024-08-06 NOTE — PROGRESS NOTE ADULT - ASSESSMENT
78-year-old male L handed with PMH CAD s/p CABG, CKD, prior stroke, DM, PAD, HTN, HLD presented to the ED on 8/1/24 for left sided weakness, initially 2 days prior to admission but then improved and then worsened again on day prior to presentation so presented.    #Suspected UTI  - on IV Rocephin  - follow up cultures  - oral hydration encouraged    #Left sided weakness   # history of CVA  - MR Head - no acute infarct, chronic left SUSANNE territorial infarct and bilateral basal ganglia chronic lacunar infarcts  - neurology following   - continue DAPT/statin  - PT Eval  - eeg negative for epilepsy showing generalized slowing  - started on baclofen   - patient has a HHA for 8 hours - plan is to return home upon dc  - pt more confused today, pending stat head ct and neuro follow up,   - dc Norvasc as BP soft, to ensure proper perfusion    #CAD  #PVD  #HTN - uncontrolled   #HLD  #BPH  #Anemia - stable   #CKD III - stable  - continue home meds  - monitor BP closely  - house staff to clarify meds with pt's son     #DM II - well controlled   -Continue sliding scale insulin regimen    #Depression  - Cont venlefaxine    Provider handoff: f/u head CT, BP, neuro follow up 78-year-old male L handed with PMH CAD s/p CABG, CKD, prior stroke, DM, PAD, HTN, HLD presented to the ED on 8/1/24 for left sided weakness, initially 2 days prior to admission but then improved and then worsened again on day prior to presentation so presented.    #Suspected UTI  - on IV Rocephin  - cultures, growing enterobacter cloaca  -1 dose meropenem started, f/u ID consult  - oral hydration encouraged    #Left sided weakness   # history of CVA  - MR Head - no acute infarct, chronic left SUSANNE territorial infarct and bilateral basal ganglia chronic lacunar infarcts  - neurology following   - continue DAPT/statin  - PT Eval  - eeg negative for epilepsy showing generalized slowing  - started on baclofen   - patient has a HHA for 8 hours - plan is to return home upon dc  - pt more confused today, pending stat head ct and neuro follow up,   - dc Norvasc as BP soft, to ensure proper perfusion    #CAD  #PVD  #HTN - uncontrolled   #HLD  #BPH  #Anemia - stable   #CKD III - stable  - continue home meds  - monitor BP closely  - house staff to clarify meds with pt's son     #DM II - well controlled   -Continue sliding scale insulin regimen    #Depression  - Cont venlefaxine    Provider handoff: f/u head CT, ID consult, BP, neuro follow up, fu am labs 78-year-old male L handed with PMH CAD s/p CABG, CKD, prior stroke, DM, PAD, HTN, HLD presented to the ED on 8/1/24 for left sided weakness, initially 2 days prior to admission but then improved and then worsened again on day prior to presentation so presented.    #Suspected UTI  - on IV Rocephin  - cultures, growing enterobacter cloaca  -1 dose meropenem started, ID recommended 1g meropenem q12 ( please renew if needed after 3 days)  - oral hydration encouraged    #Left sided weakness   # history of CVA  - MR Head - no acute infarct, chronic left SUSANNE territorial infarct and bilateral basal ganglia chronic lacunar infarcts  - neurology following   - continue DAPT/statin  - PT Eval  - eeg negative for epilepsy showing generalized slowing  - started on baclofen   - patient has a HHA for 8 hours - plan is to return home upon dc  - pt more confused today, pending stat head ct and neuro follow up,   - dc Norvasc as BP soft, to ensure proper perfusion    #CAD  #PVD  #HTN - uncontrolled   #HLD  #BPH  #Anemia - stable   #CKD III - stable  - continue home meds  - monitor BP closely  - house staff to clarify meds with pt's son     #DM II - well controlled   -Continue sliding scale insulin regimen    #Depression  - Cont venlefaxine    Provider handoff: f/u head CT, ID consult, BP, neuro follow up, fu am labs

## 2024-08-06 NOTE — PROGRESS NOTE ADULT - SUBJECTIVE AND OBJECTIVE BOX
NEUROLOGY CONSULT PROGRESS NOTE    INTERVAL HISTORY: Consulted for AMS in the last 24 hours. Patient o    REVIEW OF SYSTEMS:  As per HPI, otherwise negative for Constitutional, Eyes, Ears/Nose/Mouth/Throat, Neck, Cardiovascular, Respiratory, Gastrointestinal, Genitourinary, Skin, Endocrine, Musculoskeletal, Psychiatric, and Hematologic/Lymphatic.    MEDICATIONS:  acetaminophen     Tablet .. 650 milliGRAM(s) Oral every 6 hours PRN  aluminum hydroxide/magnesium hydroxide/simethicone Suspension 30 milliLiter(s) Oral every 4 hours PRN  aspirin  chewable 81 milliGRAM(s) Oral daily  atorvastatin 80 milliGRAM(s) Oral at bedtime  baclofen 5 milliGRAM(s) Oral every 12 hours  chlorhexidine 2% Cloths 1 Application(s) Topical <User Schedule>  clopidogrel Tablet 75 milliGRAM(s) Oral daily  dextrose 5%. 1000 milliLiter(s) IV Continuous <Continuous>  dextrose 50% Injectable 25 Gram(s) IV Push once  dextrose Oral Gel 15 Gram(s) Oral once PRN  fenofibrate Tablet 145 milliGRAM(s) Oral daily  finasteride 5 milliGRAM(s) Oral daily  glucagon  Injectable 1 milliGRAM(s) IntraMuscular once  heparin   Injectable 5000 Unit(s) SubCutaneous every 12 hours  hydrALAZINE 50 milliGRAM(s) Oral two times a day  insulin lispro (ADMELOG) corrective regimen sliding scale   SubCutaneous three times a day before meals  melatonin 3 milliGRAM(s) Oral at bedtime PRN  meropenem  IVPB 500 milliGRAM(s) IV Intermittent once  metoprolol tartrate 25 milliGRAM(s) Oral two times a day  ondansetron Injectable 4 milliGRAM(s) IV Push every 8 hours PRN  pantoprazole    Tablet 40 milliGRAM(s) Oral before breakfast  ranolazine 500 milliGRAM(s) Oral daily  tamsulosin 0.4 milliGRAM(s) Oral at bedtime  venlafaxine XR. 75 milliGRAM(s) Oral daily    VITAL SIGNS:  Vital Signs Last 24 Hrs  T(C): 35 (06 Aug 2024 08:04), Max: 36.9 (05 Aug 2024 23:56)  T(F): 95 (06 Aug 2024 08:04), Max: 98.4 (05 Aug 2024 23:56)  HR: 66 (06 Aug 2024 08:04) (66 - 88)  BP: 109/56 (06 Aug 2024 08:04) (109/56 - 152/73)  BP(mean): --  RR: 18 (06 Aug 2024 08:04) (18 - 18)  SpO2: 97% (06 Aug 2024 08:04) (97% - 97%)    Parameters below as of 06 Aug 2024 08:04  Patient On (Oxygen Delivery Method): room air    PHYSICAL EXAMINATION:  GEN: non-cooperative, waxy postures in both upper extremities  NEURO:   MENTAL STATUS: Non-responsive to verbal, tactile and pain stimuli with eyes open staring off. Non-verbal  BREATHING PATTERN: Regular breathing, no use of accesory respiratory muscles  BRAINSTEM REFLEXES: 2-3 mm pupillary reflexes. Corneal and oculocephalic reflexes present. TTB reflex positive in the whole visual field  TONE: LLE spastic, with UE b/l rigidity  REFLEXES: 1/4 bicep, tricep, brachioradialis, achilles, 1/4 patellar bl  MOTOR-SENSORY: Grimacing to pain in extremities with some withdrawal with mild contraction to pain NEUROLOGY CONSULT PROGRESS NOTE    INTERVAL HISTORY: Consulted for AMS in the last 24 hours. Patient less reactive than usual. Primary team drawing labs at the bedside. Patient non-verbal with catatonic type of posture. Keeping eyes open and staring off.     REVIEW OF SYSTEMS:  As per HPI, otherwise negative for Constitutional, Eyes, Ears/Nose/Mouth/Throat, Neck, Cardiovascular, Respiratory, Gastrointestinal, Genitourinary, Skin, Endocrine, Musculoskeletal, Psychiatric, and Hematologic/Lymphatic.    MEDICATIONS:  acetaminophen     Tablet .. 650 milliGRAM(s) Oral every 6 hours PRN  aluminum hydroxide/magnesium hydroxide/simethicone Suspension 30 milliLiter(s) Oral every 4 hours PRN  aspirin  chewable 81 milliGRAM(s) Oral daily  atorvastatin 80 milliGRAM(s) Oral at bedtime  baclofen 5 milliGRAM(s) Oral every 12 hours  chlorhexidine 2% Cloths 1 Application(s) Topical <User Schedule>  clopidogrel Tablet 75 milliGRAM(s) Oral daily  dextrose 5%. 1000 milliLiter(s) IV Continuous <Continuous>  dextrose 50% Injectable 25 Gram(s) IV Push once  dextrose Oral Gel 15 Gram(s) Oral once PRN  fenofibrate Tablet 145 milliGRAM(s) Oral daily  finasteride 5 milliGRAM(s) Oral daily  glucagon  Injectable 1 milliGRAM(s) IntraMuscular once  heparin   Injectable 5000 Unit(s) SubCutaneous every 12 hours  hydrALAZINE 50 milliGRAM(s) Oral two times a day  insulin lispro (ADMELOG) corrective regimen sliding scale   SubCutaneous three times a day before meals  melatonin 3 milliGRAM(s) Oral at bedtime PRN  meropenem  IVPB 500 milliGRAM(s) IV Intermittent once  metoprolol tartrate 25 milliGRAM(s) Oral two times a day  ondansetron Injectable 4 milliGRAM(s) IV Push every 8 hours PRN  pantoprazole    Tablet 40 milliGRAM(s) Oral before breakfast  ranolazine 500 milliGRAM(s) Oral daily  tamsulosin 0.4 milliGRAM(s) Oral at bedtime  venlafaxine XR. 75 milliGRAM(s) Oral daily    VITAL SIGNS:  Vital Signs Last 24 Hrs  T(C): 35 (06 Aug 2024 08:04), Max: 36.9 (05 Aug 2024 23:56)  T(F): 95 (06 Aug 2024 08:04), Max: 98.4 (05 Aug 2024 23:56)  HR: 66 (06 Aug 2024 08:04) (66 - 88)  BP: 109/56 (06 Aug 2024 08:04) (109/56 - 152/73)  BP(mean): --  RR: 18 (06 Aug 2024 08:04) (18 - 18)  SpO2: 97% (06 Aug 2024 08:04) (97% - 97%)    Parameters below as of 06 Aug 2024 08:04  Patient On (Oxygen Delivery Method): room air    PHYSICAL EXAMINATION:  GEN: non-cooperative, waxy postures in both upper extremities  NEURO:   MENTAL STATUS: Non-responsive to verbal, tactile and pain stimuli with eyes open staring off. Non-verbal  BREATHING PATTERN: Regular breathing, no use of accesory respiratory muscles  BRAINSTEM REFLEXES: 2-3 mm pupillary reflexes. Corneal and oculocephalic reflexes present. TTB reflex positive in the whole visual field  TONE: LLE spastic, with UE b/l rigidity  REFLEXES: 1/4 bicep, tricep, brachioradialis, achilles, 1/4 patellar bl  MOTOR-SENSORY: Grimacing to pain in extremities with some withdrawal with mild contraction to pain NEUROLOGY CONSULT PROGRESS NOTE    INTERVAL HISTORY: Consulted for AMS in the last 24 hours. Patient less reactive than usual. Primary team drawing labs at the bedside. Patient non-verbal with catatonic type of posture. Keeping eyes open and staring off. Patient baseline is using a wheelchair but can be transferred to bed walking. Patient able to have a regular converstation.     When we come back later for reevaluation, patient after straight cath 450 cc has improved his mental status progressively being able now to have a conversation, still perseverative and partially disoriented. Now following commands and improving progressively.     REVIEW OF SYSTEMS:  As per HPI, otherwise negative for Constitutional, Eyes, Ears/Nose/Mouth/Throat, Neck, Cardiovascular, Respiratory, Gastrointestinal, Genitourinary, Skin, Endocrine, Musculoskeletal, Psychiatric, and Hematologic/Lymphatic.    MEDICATIONS:  acetaminophen     Tablet .. 650 milliGRAM(s) Oral every 6 hours PRN  aluminum hydroxide/magnesium hydroxide/simethicone Suspension 30 milliLiter(s) Oral every 4 hours PRN  aspirin  chewable 81 milliGRAM(s) Oral daily  atorvastatin 80 milliGRAM(s) Oral at bedtime  baclofen 5 milliGRAM(s) Oral every 12 hours  chlorhexidine 2% Cloths 1 Application(s) Topical <User Schedule>  clopidogrel Tablet 75 milliGRAM(s) Oral daily  dextrose 5%. 1000 milliLiter(s) IV Continuous <Continuous>  dextrose 50% Injectable 25 Gram(s) IV Push once  dextrose Oral Gel 15 Gram(s) Oral once PRN  fenofibrate Tablet 145 milliGRAM(s) Oral daily  finasteride 5 milliGRAM(s) Oral daily  glucagon  Injectable 1 milliGRAM(s) IntraMuscular once  heparin   Injectable 5000 Unit(s) SubCutaneous every 12 hours  hydrALAZINE 50 milliGRAM(s) Oral two times a day  insulin lispro (ADMELOG) corrective regimen sliding scale   SubCutaneous three times a day before meals  melatonin 3 milliGRAM(s) Oral at bedtime PRN  meropenem  IVPB 500 milliGRAM(s) IV Intermittent once  metoprolol tartrate 25 milliGRAM(s) Oral two times a day  ondansetron Injectable 4 milliGRAM(s) IV Push every 8 hours PRN  pantoprazole    Tablet 40 milliGRAM(s) Oral before breakfast  ranolazine 500 milliGRAM(s) Oral daily  tamsulosin 0.4 milliGRAM(s) Oral at bedtime  venlafaxine XR. 75 milliGRAM(s) Oral daily    VITAL SIGNS:  Vital Signs Last 24 Hrs  T(C): 35 (06 Aug 2024 08:04), Max: 36.9 (05 Aug 2024 23:56)  T(F): 95 (06 Aug 2024 08:04), Max: 98.4 (05 Aug 2024 23:56)  HR: 66 (06 Aug 2024 08:04) (66 - 88)  BP: 109/56 (06 Aug 2024 08:04) (109/56 - 152/73)  BP(mean): --  RR: 18 (06 Aug 2024 08:04) (18 - 18)  SpO2: 97% (06 Aug 2024 08:04) (97% - 97%)    Parameters below as of 06 Aug 2024 08:04  Patient On (Oxygen Delivery Method): room air    PHYSICAL EXAMINATION:  GEN: non-cooperative, waxy postures in both upper extremities  NEURO:   MENTAL STATUS: Non-responsive to verbal, tactile and pain stimuli with eyes open staring off. Non-verbal  BREATHING PATTERN: Regular breathing, no use of accesory respiratory muscles  BRAINSTEM REFLEXES: 2-3 mm pupillary reflexes. Corneal and oculocephalic reflexes present. TTB reflex positive in the whole visual field  TONE: LLE spastic, with UE b/l rigidity  REFLEXES: 1/4 bicep, tricep, brachioradialis, achilles, 1/4 patellar bl  MOTOR-SENSORY: Grimacing to pain in extremities with some withdrawal with mild contraction to pain

## 2024-08-06 NOTE — OCCUPATIONAL THERAPY INITIAL EVALUATION ADULT - SPECIFY REASON(S)
Chart reviewed, Pt unable to maintain arousal. RN and MD at bedside. OT to f/u at a later time if time permits and when medically appropriate. Chart reviewed, Pt encountered reclined in bed with son at bedside. Pt unresponsive at this time. GRACE Cabral and MD Woodall notified and at bedside

## 2024-08-06 NOTE — PROGRESS NOTE ADULT - ATTENDING COMMENTS
Pt w/ waxing/waning mental status currently improved and interactive but still encephalopathic suspect delirium secondary to TME.  Recommendations as above.

## 2024-08-06 NOTE — CONSULT NOTE ADULT - ATTENDING COMMENTS
In summary,     78-year-old man with prior stroke (2017, residual L hemiparesis), CAD s/p CABG, CKD, DM, PAD, HTN, HLD presented to the ED with worsening left-sided facial droop, left arm and leg weakness. Patient also reporting rigidity throughout his left side and fingers curling in is new? CTH shows stable chronic infarction left SUSANNE territory and stable chronic lacunar infarctions without acute infarct. CTA notable for b/l ICAD in ICAs, chronic occlusion of the left A2 and moderate stenoses of the M1 and M2 segments of the right and left middle cerebral arteries. MRI brain without acute stroke. Exam: Left facial droop, LUE 3/5 with spasticity, weak left hand  with fingers in flexion contracture, LLE 3/5 with spasticity. Found to have sepsis and suspected UTI.    Impression: recrudescence of prior stroke symptoms 2/2 sepsis/UTI vs rule out seizure    Recommendations:  - routine EEG  - start Baclofen 5mg TID for spasticity  - BP goal normotensive  - continue DAPT  - PT/OT
78-year-old male L handed with PMH CAD s/p CABG, CKD, prior stroke, DM, PAD, HTN, HLD presented to the ED on 8/1/24 with left-sided facial droop, left arm and leg weakness, left shoulder pain that radiates throughout his arm, and left leg pain all of which began yesterday morning. He additionally reports 2 episodes of L handed shaking without loss of consciousness.       IMPRESSION/RECOMMENDATIONS  Immunosuppression/Immunosenescence ( above age 60 yrs there is a exponential decline in immunity which could result in poor clinical outcomes.   Acute pyelonephritis with no obstructive uropathy   8/1 UCx Enterobacter  8/1 BCx NG  WBC 7.5    -meropenem 500 mg iv q12h  -Off loading to prevent pressure sores and preventive measures to avoid aspiration  -bedside PT/Rehab. Out of bed to chair if possible.

## 2024-08-07 ENCOUNTER — TRANSCRIPTION ENCOUNTER (OUTPATIENT)
Age: 79
End: 2024-08-07

## 2024-08-07 ENCOUNTER — INPATIENT (INPATIENT)
Facility: HOSPITAL | Age: 79
LOS: 1 days | Discharge: NURSING, REHAB OR LONG-TERM CARE | DRG: 951 | End: 2024-08-09
Attending: INTERNAL MEDICINE | Admitting: INTERNAL MEDICINE
Payer: MEDICARE

## 2024-08-07 VITALS
RESPIRATION RATE: 18 BRPM | HEIGHT: 62 IN | HEART RATE: 84 BPM | WEIGHT: 130.07 LBS | TEMPERATURE: 98 F | SYSTOLIC BLOOD PRESSURE: 168 MMHG | OXYGEN SATURATION: 100 % | DIASTOLIC BLOOD PRESSURE: 81 MMHG

## 2024-08-07 VITALS — HEART RATE: 76 BPM | DIASTOLIC BLOOD PRESSURE: 72 MMHG | SYSTOLIC BLOOD PRESSURE: 130 MMHG

## 2024-08-07 DIAGNOSIS — Z90.49 ACQUIRED ABSENCE OF OTHER SPECIFIED PARTS OF DIGESTIVE TRACT: Chronic | ICD-10-CM

## 2024-08-07 DIAGNOSIS — Z95.1 PRESENCE OF AORTOCORONARY BYPASS GRAFT: Chronic | ICD-10-CM

## 2024-08-07 DIAGNOSIS — H26.9 UNSPECIFIED CATARACT: Chronic | ICD-10-CM

## 2024-08-07 DIAGNOSIS — R53.1 WEAKNESS: ICD-10-CM

## 2024-08-07 LAB
ALBUMIN SERPL ELPH-MCNC: 4 G/DL — SIGNIFICANT CHANGE UP (ref 3.5–5.2)
ALP SERPL-CCNC: 52 U/L — SIGNIFICANT CHANGE UP (ref 30–115)
ALT FLD-CCNC: 27 U/L — SIGNIFICANT CHANGE UP (ref 0–41)
ANION GAP SERPL CALC-SCNC: 15 MMOL/L — HIGH (ref 7–14)
ANION GAP SERPL CALC-SCNC: 16 MMOL/L — HIGH (ref 7–14)
AST SERPL-CCNC: 32 U/L — SIGNIFICANT CHANGE UP (ref 0–41)
BASOPHILS # BLD AUTO: 0.04 K/UL — SIGNIFICANT CHANGE UP (ref 0–0.2)
BASOPHILS NFR BLD AUTO: 0.3 % — SIGNIFICANT CHANGE UP (ref 0–1)
BILIRUB SERPL-MCNC: 0.2 MG/DL — SIGNIFICANT CHANGE UP (ref 0.2–1.2)
BUN SERPL-MCNC: 34 MG/DL — HIGH (ref 10–20)
BUN SERPL-MCNC: 40 MG/DL — HIGH (ref 10–20)
CALCIUM SERPL-MCNC: 9.7 MG/DL — SIGNIFICANT CHANGE UP (ref 8.4–10.5)
CALCIUM SERPL-MCNC: 9.8 MG/DL — SIGNIFICANT CHANGE UP (ref 8.4–10.5)
CHLORIDE SERPL-SCNC: 107 MMOL/L — SIGNIFICANT CHANGE UP (ref 98–110)
CHLORIDE SERPL-SCNC: 108 MMOL/L — SIGNIFICANT CHANGE UP (ref 98–110)
CO2 SERPL-SCNC: 20 MMOL/L — SIGNIFICANT CHANGE UP (ref 17–32)
CO2 SERPL-SCNC: 22 MMOL/L — SIGNIFICANT CHANGE UP (ref 17–32)
CREAT SERPL-MCNC: 2 MG/DL — HIGH (ref 0.7–1.5)
CREAT SERPL-MCNC: 2.4 MG/DL — HIGH (ref 0.7–1.5)
CULTURE RESULTS: SIGNIFICANT CHANGE UP
CULTURE RESULTS: SIGNIFICANT CHANGE UP
EGFR: 27 ML/MIN/1.73M2 — LOW
EGFR: 33 ML/MIN/1.73M2 — LOW
EOSINOPHIL # BLD AUTO: 0.09 K/UL — SIGNIFICANT CHANGE UP (ref 0–0.7)
EOSINOPHIL NFR BLD AUTO: 0.6 % — SIGNIFICANT CHANGE UP (ref 0–8)
GLUCOSE BLDC GLUCOMTR-MCNC: 106 MG/DL — HIGH (ref 70–99)
GLUCOSE BLDC GLUCOMTR-MCNC: 107 MG/DL — HIGH (ref 70–99)
GLUCOSE SERPL-MCNC: 140 MG/DL — HIGH (ref 70–99)
GLUCOSE SERPL-MCNC: 155 MG/DL — HIGH (ref 70–99)
HCT VFR BLD CALC: 35.8 % — LOW (ref 42–52)
HCT VFR BLD CALC: 37.8 % — LOW (ref 42–52)
HGB BLD-MCNC: 11.1 G/DL — LOW (ref 14–18)
HGB BLD-MCNC: 11.9 G/DL — LOW (ref 14–18)
IMM GRANULOCYTES NFR BLD AUTO: 0.8 % — HIGH (ref 0.1–0.3)
LYMPHOCYTES # BLD AUTO: 1 K/UL — LOW (ref 1.2–3.4)
LYMPHOCYTES # BLD AUTO: 7 % — LOW (ref 20.5–51.1)
MAGNESIUM SERPL-MCNC: 2.6 MG/DL — HIGH (ref 1.8–2.4)
MCHC RBC-ENTMCNC: 28.5 PG — SIGNIFICANT CHANGE UP (ref 27–31)
MCHC RBC-ENTMCNC: 29.2 PG — SIGNIFICANT CHANGE UP (ref 27–31)
MCHC RBC-ENTMCNC: 31 G/DL — LOW (ref 32–37)
MCHC RBC-ENTMCNC: 31.5 G/DL — LOW (ref 32–37)
MCV RBC AUTO: 91.8 FL — SIGNIFICANT CHANGE UP (ref 80–94)
MCV RBC AUTO: 92.6 FL — SIGNIFICANT CHANGE UP (ref 80–94)
MONOCYTES # BLD AUTO: 0.6 K/UL — SIGNIFICANT CHANGE UP (ref 0.1–0.6)
MONOCYTES NFR BLD AUTO: 4.2 % — SIGNIFICANT CHANGE UP (ref 1.7–9.3)
NEUTROPHILS # BLD AUTO: 12.45 K/UL — HIGH (ref 1.4–6.5)
NEUTROPHILS NFR BLD AUTO: 87.1 % — HIGH (ref 42.2–75.2)
NRBC # BLD: 0 /100 WBCS — SIGNIFICANT CHANGE UP (ref 0–0)
NRBC # BLD: 0 /100 WBCS — SIGNIFICANT CHANGE UP (ref 0–0)
PLATELET # BLD AUTO: 440 K/UL — HIGH (ref 130–400)
PLATELET # BLD AUTO: 445 K/UL — HIGH (ref 130–400)
PMV BLD: 10 FL — SIGNIFICANT CHANGE UP (ref 7.4–10.4)
PMV BLD: 10.4 FL — SIGNIFICANT CHANGE UP (ref 7.4–10.4)
POTASSIUM SERPL-MCNC: 4.3 MMOL/L — SIGNIFICANT CHANGE UP (ref 3.5–5)
POTASSIUM SERPL-MCNC: 4.7 MMOL/L — SIGNIFICANT CHANGE UP (ref 3.5–5)
POTASSIUM SERPL-SCNC: 4.3 MMOL/L — SIGNIFICANT CHANGE UP (ref 3.5–5)
POTASSIUM SERPL-SCNC: 4.7 MMOL/L — SIGNIFICANT CHANGE UP (ref 3.5–5)
PROLACTIN SERPL-MCNC: 16.4 NG/ML — SIGNIFICANT CHANGE UP (ref 4.1–18.4)
PROT SERPL-MCNC: 7.4 G/DL — SIGNIFICANT CHANGE UP (ref 6–8)
RBC # BLD: 3.9 M/UL — LOW (ref 4.7–6.1)
RBC # BLD: 4.08 M/UL — LOW (ref 4.7–6.1)
RBC # FLD: 15.6 % — HIGH (ref 11.5–14.5)
RBC # FLD: 15.9 % — HIGH (ref 11.5–14.5)
SODIUM SERPL-SCNC: 143 MMOL/L — SIGNIFICANT CHANGE UP (ref 135–146)
SODIUM SERPL-SCNC: 145 MMOL/L — SIGNIFICANT CHANGE UP (ref 135–146)
SPECIMEN SOURCE: SIGNIFICANT CHANGE UP
SPECIMEN SOURCE: SIGNIFICANT CHANGE UP
WBC # BLD: 10.43 K/UL — SIGNIFICANT CHANGE UP (ref 4.8–10.8)
WBC # BLD: 14.29 K/UL — HIGH (ref 4.8–10.8)
WBC # FLD AUTO: 10.43 K/UL — SIGNIFICANT CHANGE UP (ref 4.8–10.8)
WBC # FLD AUTO: 14.29 K/UL — HIGH (ref 4.8–10.8)

## 2024-08-07 PROCEDURE — 99239 HOSP IP/OBS DSCHRG MGMT >30: CPT

## 2024-08-07 PROCEDURE — 80048 BASIC METABOLIC PNL TOTAL CA: CPT

## 2024-08-07 PROCEDURE — 99285 EMERGENCY DEPT VISIT HI MDM: CPT | Mod: FS

## 2024-08-07 PROCEDURE — A9579: CPT

## 2024-08-07 PROCEDURE — 95816 EEG AWAKE AND DROWSY: CPT | Mod: 26

## 2024-08-07 PROCEDURE — 36415 COLL VENOUS BLD VENIPUNCTURE: CPT

## 2024-08-07 PROCEDURE — 85025 COMPLETE CBC W/AUTO DIFF WBC: CPT

## 2024-08-07 PROCEDURE — 82962 GLUCOSE BLOOD TEST: CPT

## 2024-08-07 PROCEDURE — 97166 OT EVAL MOD COMPLEX 45 MIN: CPT | Mod: GO

## 2024-08-07 PROCEDURE — 97162 PT EVAL MOD COMPLEX 30 MIN: CPT | Mod: GP

## 2024-08-07 PROCEDURE — 70548 MR ANGIOGRAPHY NECK W/DYE: CPT

## 2024-08-07 RX ORDER — BACLOFEN 10 MG/1
1 TABLET ORAL
Qty: 0 | Refills: 0 | DISCHARGE
Start: 2024-08-07

## 2024-08-07 RX ORDER — FINASTERIDE 5 MG/1
1 TABLET, FILM COATED ORAL
Refills: 0 | DISCHARGE

## 2024-08-07 RX ORDER — RANOLAZINE 1000 MG/1
1 TABLET, FILM COATED, EXTENDED RELEASE ORAL
Refills: 0 | DISCHARGE

## 2024-08-07 RX ORDER — PANTOPRAZOLE SODIUM 20 MG/1
1 TABLET, DELAYED RELEASE ORAL
Qty: 0 | Refills: 0 | DISCHARGE
Start: 2024-08-07

## 2024-08-07 RX ORDER — ERTAPENEM SODIUM 1 G/20ML
500 INJECTION INTRAMUSCULAR; INTRAVENOUS
Qty: 4 | Refills: 0
Start: 2024-08-07 | End: 2024-08-14

## 2024-08-07 RX ORDER — ROSUVASTATIN CALCIUM 10 MG
1 TABLET ORAL
Refills: 0 | DISCHARGE

## 2024-08-07 RX ORDER — ERTAPENEM SODIUM 1 G/20ML
INJECTION INTRAMUSCULAR; INTRAVENOUS
Refills: 0 | Status: DISCONTINUED | OUTPATIENT
Start: 2024-08-07 | End: 2024-08-07

## 2024-08-07 RX ORDER — HYDRALAZINE HYDROCHLORIDE 100 MG/1
1 TABLET ORAL
Refills: 0 | DISCHARGE

## 2024-08-07 RX ORDER — AMLODIPINE BESYLATE 2.5 MG/1
1 TABLET ORAL
Refills: 0 | DISCHARGE

## 2024-08-07 RX ORDER — ALBUTEROL 90 MCG
1 AEROSOL REFILL (GRAM) INHALATION
Qty: 0 | Refills: 0 | DISCHARGE

## 2024-08-07 RX ORDER — ERTAPENEM SODIUM 1 G/20ML
500 INJECTION INTRAMUSCULAR; INTRAVENOUS ONCE
Refills: 0 | Status: COMPLETED | OUTPATIENT
Start: 2024-08-07 | End: 2024-08-07

## 2024-08-07 RX ORDER — VENLAFAXINE 25 MG/1
1 TABLET ORAL
Refills: 0 | DISCHARGE

## 2024-08-07 RX ORDER — ERTAPENEM SODIUM 1 G/20ML
500 INJECTION INTRAMUSCULAR; INTRAVENOUS EVERY 24 HOURS
Refills: 0 | Status: DISCONTINUED | OUTPATIENT
Start: 2024-08-08 | End: 2024-08-07

## 2024-08-07 RX ORDER — BACTERIOSTATIC SODIUM CHLORIDE 0.9 %
1000 VIAL (ML) INJECTION ONCE
Refills: 0 | Status: COMPLETED | OUTPATIENT
Start: 2024-08-07 | End: 2024-08-07

## 2024-08-07 RX ADMIN — FENOFIBRATE 145 MILLIGRAM(S): 30 CAPSULE ORAL at 11:25

## 2024-08-07 RX ADMIN — RANOLAZINE 500 MILLIGRAM(S): 1000 TABLET, FILM COATED, EXTENDED RELEASE ORAL at 11:26

## 2024-08-07 RX ADMIN — Medication 1000 MILLILITER(S): at 19:50

## 2024-08-07 RX ADMIN — FINASTERIDE 5 MILLIGRAM(S): 5 TABLET, FILM COATED ORAL at 11:26

## 2024-08-07 RX ADMIN — HEPARIN SODIUM 5000 UNIT(S): 1000 INJECTION, SOLUTION INTRAVENOUS; SUBCUTANEOUS at 07:07

## 2024-08-07 RX ADMIN — HYDRALAZINE HYDROCHLORIDE 50 MILLIGRAM(S): 100 TABLET ORAL at 07:08

## 2024-08-07 RX ADMIN — ERTAPENEM SODIUM 500 MILLIGRAM(S): 1 INJECTION INTRAMUSCULAR; INTRAVENOUS at 12:24

## 2024-08-07 RX ADMIN — CLOPIDOGREL BISULFATE 75 MILLIGRAM(S): 75 TABLET, FILM COATED ORAL at 11:26

## 2024-08-07 RX ADMIN — Medication 81 MILLIGRAM(S): at 11:26

## 2024-08-07 RX ADMIN — Medication 25 MILLIGRAM(S): at 07:08

## 2024-08-07 RX ADMIN — BACLOFEN 5 MILLIGRAM(S): 10 TABLET ORAL at 07:09

## 2024-08-07 RX ADMIN — PANTOPRAZOLE SODIUM 40 MILLIGRAM(S): 20 TABLET, DELAYED RELEASE ORAL at 07:07

## 2024-08-07 RX ADMIN — VENLAFAXINE 75 MILLIGRAM(S): 25 TABLET ORAL at 11:25

## 2024-08-07 RX ADMIN — CHLORHEXIDINE GLUCONATE 1 APPLICATION(S): 500 CLOTH TOPICAL at 07:14

## 2024-08-07 NOTE — PROGRESS NOTE ADULT - SUBJECTIVE AND OBJECTIVE BOX
ERICK MARTINEZ  78y, Male    Patient seen and examined this morning  patient is awake and alert but confused  evaluated by ID - needs midline and ertapenem upon discharge     Vital Signs Last 24 Hrs  T(C): 35.6 (07 Aug 2024 08:38), Max: 36.4 (06 Aug 2024 15:50)  T(F): 96 (07 Aug 2024 08:38), Max: 97.6 (06 Aug 2024 15:50)  HR: 81 (07 Aug 2024 08:38) (70 - 81)  BP: 167/79 (07 Aug 2024 08:38) (142/67 - 167/79)  BP(mean): --  RR: 18 (07 Aug 2024 08:38) (18 - 18)  SpO2: 98% (07 Aug 2024 08:38) (97% - 98%)    Parameters below as of 07 Aug 2024 08:38  Patient On (Oxygen Delivery Method): room air      REVIEW OF SYSTEMS:  All other review of systems is negative unless indicated above.     PHYSICAL EXAM:  GENERAL: NAD, chronically ill appearing   NEURO: awake, alert, moves all extremities   HEAD:  Atraumatic, Normocephalic  NECK: Supple, No JVD  CHEST/LUNG: Clear to auscultation bilaterally; No wheeze  HEART: Regular rate and rhythm; No murmurs, rubs, or gallops  ABDOMEN: Soft, Nontender, Nondistended; Bowel sounds present  EXTREMITIES:  2+ Peripheral Pulses, No clubbing, cyanosis, or edema; LUE and LLE 3-4/5 strength  SKIN: No rashes or lesions                LABS:                          11.1   10.43 )-----------( 440      ( 07 Aug 2024 09:58 )             35.8     08-07    145  |  108  |  34<H>  ----------------------------<  140<H>  4.3   |  22  |  2.0<H>    Ca    9.7      07 Aug 2024 09:58    TPro  6.7  /  Alb  3.7  /  TBili  <0.2  /  DBili  x   /  AST  22  /  ALT  22  /  AlkPhos  44  08-06      Culture - Blood (collected 08-01-24 @ 21:53)  Source: .Blood Blood-Peripheral  Preliminary Report (08-03-24 @ 09:02):    No growth at 24 hours    Culture - Blood (collected 08-01-24 @ 21:53)  Source: .Blood Blood-Peripheral  Preliminary Report (08-03-24 @ 09:02):    No growth at 24 hours    Urinalysis with Rflx Culture (collected 08-01-24 @ 21:53)      Urinalysis Basic - ( 03 Aug 2024 07:11 )    Color: x / Appearance: x / SG: x / pH: x  Gluc: 107 mg/dL / Ketone: x  / Bili: x / Urobili: x   Blood: x / Protein: x / Nitrite: x   Leuk Esterase: x / RBC: x / WBC x   Sq Epi: x / Non Sq Epi: x / Bacteria: x        RADIOLOGY & ADDITIONAL TESTS:  < from: CT Head No Cont (08.06.24 @ 09:38) >  No evidence of acute intracranial pathology. Stable exam since 8/1/2024.    Stable chronic findings, as above.    < end of copied text >      MEDICATIONS:  MEDICATIONS  (STANDING):  aspirin  chewable 81 milliGRAM(s) Oral daily  atorvastatin 80 milliGRAM(s) Oral at bedtime  baclofen 5 milliGRAM(s) Oral every 12 hours  chlorhexidine 2% Cloths 1 Application(s) Topical <User Schedule>  clopidogrel Tablet 75 milliGRAM(s) Oral daily  dextrose 5%. 1000 milliLiter(s) (50 mL/Hr) IV Continuous <Continuous>  dextrose 50% Injectable 25 Gram(s) IV Push once  ertapenem  IVPB      ertapenem  IVPB 500 milliGRAM(s) IV Intermittent once  fenofibrate Tablet 145 milliGRAM(s) Oral daily  finasteride 5 milliGRAM(s) Oral daily  glucagon  Injectable 1 milliGRAM(s) IntraMuscular once  heparin   Injectable 5000 Unit(s) SubCutaneous every 12 hours  hydrALAZINE 50 milliGRAM(s) Oral two times a day  insulin lispro (ADMELOG) corrective regimen sliding scale   SubCutaneous three times a day before meals  metoprolol tartrate 25 milliGRAM(s) Oral two times a day  pantoprazole    Tablet 40 milliGRAM(s) Oral before breakfast  QUEtiapine 12.5 milliGRAM(s) Oral <User Schedule>  ranolazine 500 milliGRAM(s) Oral daily  tamsulosin 0.4 milliGRAM(s) Oral at bedtime  venlafaxine XR. 75 milliGRAM(s) Oral daily    MEDICATIONS  (PRN):  acetaminophen     Tablet .. 650 milliGRAM(s) Oral every 6 hours PRN Temp greater or equal to 38C (100.4F), Mild Pain (1 - 3)  aluminum hydroxide/magnesium hydroxide/simethicone Suspension 30 milliLiter(s) Oral every 4 hours PRN Dyspepsia  dextrose Oral Gel 15 Gram(s) Oral once PRN Blood Glucose LESS THAN 70 milliGRAM(s)/deciliter  melatonin 3 milliGRAM(s) Oral at bedtime PRN Insomnia  ondansetron Injectable 4 milliGRAM(s) IV Push every 8 hours PRN Nausea and/or Vomiting

## 2024-08-07 NOTE — ED PROVIDER NOTE - OBJECTIVE STATEMENT
77 yo male hx of HTN/DM/CAD/ CVA with mild residual left sided weakness/ anxiety/GERD BIBA back from home for medical evaluation. as per son, patient was just discharged back home from hospital few hours ago. Patient used to be able to walk 20 feet with walker and get off wheelchair by himself. However, patient wasn't even to get off the wheelchair to back to his bed after he got home and started screaming so son called EMS. patient denies HA, chest pain, abd pain.     Patient was admitted to hospital for 5 days for metabolic encephalopathy and complicated UTI on ertapenem. Patient was also offered to SNF/STR but son can't afford the extra fee of $200/day for the facility.

## 2024-08-07 NOTE — PROGRESS NOTE ADULT - ASSESSMENT
78-year-old male L handed with PMH CAD s/p CABG, CKD, prior stroke, DM, PAD, HTN, HLD presented to the ED on 8/1/24 with left-sided facial droop, left arm and leg weakness, left shoulder pain that radiates throughout his arm, and left leg pain all of which began yesterday morning. He additionally reports 2 episodes of L handed shaking without loss of consciousness.       IMPRESSION/RECOMMENDATIONS  Immunosuppression/Immunosenescence ( above age 60 yrs there is a exponential decline in immunity which could result in poor clinical outcomes.   Acute pyelonephritis with no obstructive uropathy   8/1 UCx Enterobacter  8/1 BCx NG  WBC 7.5    -meropenem 500 mg iv q12h  -midline  -d/c on Ertapenem 500 mg iv q24h till 8/15  -Off loading to prevent pressure sores and preventive measures to avoid aspiration  -bedside PT/Rehab. Out of bed to chair if possible.      Please do not hesitate to recall ID if any questions arise either through [email or web address] or through [employer]

## 2024-08-07 NOTE — PHYSICAL THERAPY INITIAL EVALUATION ADULT - GENERAL OBSERVATIONS, REHAB EVAL
Patient encountered lying in bed. Agreed to participate in therapy with max encouragement. Son in room.

## 2024-08-07 NOTE — DISCHARGE NOTE NURSING/CASE MANAGEMENT/SOCIAL WORK - PATIENT PORTAL LINK FT
You can access the FollowMyHealth Patient Portal offered by [facility] by registering at the following website: [email or web address]. By joining [employer]’s FollowMyHealth portal, you will also be able to view your health information using other applications (apps) compatible with our system.

## 2024-08-07 NOTE — ED PROVIDER NOTE - PHYSICAL EXAMINATION
CONSTITUTIONAL: chronic ill, eldely male; in no apparent distress.   EYES: PERRL; EOM intact.   CARDIOVASCULAR: Normal S1, S2; 3/6 murmur best heard over aortic region  RESPIRATORY: Normal chest excursion with respiration; breath sounds clear and equal bilaterally; no wheezes, rhonchi, or rales.  GI/: Normal bowel sounds; non-distended; non-tender; no palpable organomegaly.   MS: No calf swelling and tenderness.  SKIN: Normal for age and race; warm; dry; good turgor; no apparent lesions or exudate.   NEURO/PSYCH: A & O x 3; grossly unremarkable. speaking coherently. moving all extremities.

## 2024-08-07 NOTE — H&P ADULT - ATTENDING COMMENTS
a/p  # UTI  Culture - Urine (08.01.24 @ 21:53)    -  Ertapenem: S <=0.5    -  Gentamicin: R >8    -  Imipenem: S <=1    -  Levofloxacin: I 1    -  Ampicillin/Sulbactam: R 16/8    -  Aztreonam: I 16    -  Cefazolin: R >16    -  Cefepime: SDD 8 The breakpoint for susceptible dose dependent may require the usage of a higher cefepime dose (equivalent to adult dose of 2g q8h for normal renal function) for successful treatment.    -  Cefoxitin: R >16    -  Ceftriaxone: R >32 Enterobacter cloacae, Klebsiella aerogenes, and Citrobacter freundii may develop resistance during prolonged therapy.    -  Ciprofloxacin: R >2    -  Piperacillin/Tazobactam: S <=8 Treatment with Pipercillin/Tazobactam is not recommended in severe infections casued by Klebsiella aerogenes, Enterobacter cloacae complex, and Citrobacter freundii complex.    -  Amoxicillin/Clavulanic Acid: R >16/8    -  Ampicillin: R >16 These ampicillin results predict results for amoxicillin    -  Meropenem: S <=1    -  Nitrofurantoin: S <=32 Should not be used to treat pyelonephritis    -  Tobramycin: R >8    -  Trimethoprim/Sulfamethoxazole: R >2/38    >100,000 CFU/ml Enterobacter cloacae complex    cont ertapenem per ID     # left side weakness,   MRI brain shows old stroke :  Chronic left SUSANNE territory infarct as well as bilateral basal ganglia   chronic lacunar infarcts.  check MRI C spine   PT eval     # Ckd 4  eGFR: 30: no hyperkalemia   creatinine trend  2.2 (08-08-24 @ 05:44)  2.4 (08-07-24 @ 20:16)  2.0 (08-07-24 @ 09:58)  1.8 (08-06-24 @ 12:17)  1.9 (08-06-24 @ 10:50)  2.2 (08-04-24 @ 08:30)  Creatinine: 2.1 mg/dL (09.06.23 @ 05:29)    # urinary retention  intermittent catheterization     # CAD s/p CABG,   cont meds     # DM,   CAPILLARY BLOOD GLUCOSE  POCT Blood Glucose.: 115 mg/dL (08 Aug 2024 07:56)  POCT Blood Glucose.: 107 mg/dL (07 Aug 2024 11:41)    # PAD, HTN, HLD  cont meds     #Progress Note Handoff    Pending :  mri c spine, discharge planning   Family discussion: nahum pt , resident will call the son   Disposition: SNF   code status: full code

## 2024-08-07 NOTE — H&P ADULT - HISTORY OF PRESENT ILLNESS
78-year-old male L handed with PMH CAD s/p CABG, CKD, prior stroke, DM, PAD, HTN, HLD presented to the ED on 8/1/24 with left-sided facial droop, left arm and leg weakness, left shoulder pain that radiates throughout his arm, and left leg pain. He additionally reports 2 episodes of L handed shaking without loss of consciousness. was evaluated by neuro during his admission, MR head showed no acute infarct.  Workup also showed pyelonephritis (8/1 UCx Enterobacter, 8/1 BCx NG) treated with meropenem as per ID and was discahrged today 8/7/2024 on Ertapenem until 8/15.  He presented again today for weakness and inability to take  care of himself.     In ED:  vitals 168/81, HR 84, Temp 98.2, Sao2 100%, RR 18     wbc 14 (was 10 in am)  hb 11.9  plt 445   Na 143  K 4.7  Crea 2.4 (was 2 in am)                   IMPRESSION/RECOMMENDATIONS  Immunosuppression/Immunosenescence ( above age 60 yrs there is a exponential decline in immunity which could result in poor clinical outcomes.   Acute pyelonephritis with no obstructive uropathy   8/1 UCx Enterobacter  8/1 BCx NG  WBC 7.5    -meropenem 500 mg iv q12h  -midline  -d/c on Ertapenem 500 mg iv q24h till 8/15  -Off loading to prevent pressure sores and preventive measures to avoid aspiration  -bedside PT/Rehab. Out of bed to chair if possible.      Please do not hesitate to recall ID if any questions arise either through [employer] or through [employer]        presented again on 8/2/2024, discharged today on 8/7/2024   78-year-old male L handed with PMH CAD s/p CABG, CKD, prior stroke, DM, PAD, HTN, HLD presented to the ED on 8/1/24 with left-sided facial droop, left arm and leg weakness, left shoulder pain that radiates throughout his arm, and left leg pain. He additionally reports 2 episodes of L handed shaking without loss of consciousness. was evaluated by neuro during his admission, MR head showed no acute infarct.  Workup also showed pyelonephritis (8/1 UCx Enterobacter, 8/1 BCx NG) treated with meropenem as per ID and was discahrged today 8/7/2024 on Ertapenem until 8/15.  He presented again today for weakness and inability to take  care of himself.     In ED:  vitals 168/81, HR 84, Temp 98.2, Sao2 100%, RR 18     wbc 14 (was 10 in am)  hb 11.9  plt 445   Na 143  K 4.7  Crea 2.4 (was 2 in am)

## 2024-08-07 NOTE — PHYSICAL THERAPY INITIAL EVALUATION ADULT - ADDITIONAL COMMENTS
Patient lives with son in house with 5 steps to enter and 1 flight of steps to bedroom (+) chair lift. Patient mostly wheelchair bound but is able to ambulate short distances at home with assistance. Requires assistance in bed to chair transfers

## 2024-08-07 NOTE — PROGRESS NOTE ADULT - SUBJECTIVE AND OBJECTIVE BOX
SUBJECTIVE/OVERNIGHT EVENTS  Today is hospital day 5d. This morning patient was seen and examined at bedside, resting comfortably in bed. No acute or major events overnight.    HOSPITAL COURSE  Day 1:   Day 2:   Day 3:     CODE STATUS:    FAMILY COMMUNICATION  Contact date:  Name of person contacted:  Relationship to patient:  Communication details:    MEDICATIONS  STANDING MEDICATIONS  aspirin  chewable 81 milliGRAM(s) Oral daily  atorvastatin 80 milliGRAM(s) Oral at bedtime  baclofen 5 milliGRAM(s) Oral every 12 hours  chlorhexidine 2% Cloths 1 Application(s) Topical <User Schedule>  clopidogrel Tablet 75 milliGRAM(s) Oral daily  dextrose 5%. 1000 milliLiter(s) IV Continuous <Continuous>  dextrose 50% Injectable 25 Gram(s) IV Push once  fenofibrate Tablet 145 milliGRAM(s) Oral daily  finasteride 5 milliGRAM(s) Oral daily  glucagon  Injectable 1 milliGRAM(s) IntraMuscular once  heparin   Injectable 5000 Unit(s) SubCutaneous every 12 hours  hydrALAZINE 50 milliGRAM(s) Oral two times a day  insulin lispro (ADMELOG) corrective regimen sliding scale   SubCutaneous three times a day before meals  metoprolol tartrate 25 milliGRAM(s) Oral two times a day  pantoprazole    Tablet 40 milliGRAM(s) Oral before breakfast  QUEtiapine 12.5 milliGRAM(s) Oral <User Schedule>  ranolazine 500 milliGRAM(s) Oral daily  tamsulosin 0.4 milliGRAM(s) Oral at bedtime  venlafaxine XR. 75 milliGRAM(s) Oral daily    PRN MEDICATIONS  acetaminophen     Tablet .. 650 milliGRAM(s) Oral every 6 hours PRN  aluminum hydroxide/magnesium hydroxide/simethicone Suspension 30 milliLiter(s) Oral every 4 hours PRN  dextrose Oral Gel 15 Gram(s) Oral once PRN  melatonin 3 milliGRAM(s) Oral at bedtime PRN  ondansetron Injectable 4 milliGRAM(s) IV Push every 8 hours PRN    VITALS  T(F): 96 (08-07-24 @ 08:38), Max: 97.6 (08-06-24 @ 15:50)  HR: 81 (08-07-24 @ 08:38) (70 - 81)  BP: 167/79 (08-07-24 @ 08:38) (142/67 - 167/79)  RR: 18 (08-07-24 @ 08:38) (18 - 18)  SpO2: 98% (08-07-24 @ 08:38) (97% - 98%)  POCT Blood Glucose.: 106 mg/dL (08-07-24 @ 08:04)  POCT Blood Glucose.: 105 mg/dL (08-06-24 @ 21:08)  POCT Blood Glucose.: 113 mg/dL (08-06-24 @ 16:36)  POCT Blood Glucose.: 116 mg/dL (08-06-24 @ 10:25)    PHYSICAL EXAM  GENERAL  (  x) NAD, lying in bed comfortably, confused     (  ) obtunded     (  ) lethargic     (  ) somnolent    HEAD  ( x ) Atraumatic     (  ) hematoma     (  ) laceration (specify location:       )     NECK  (x  ) Supple     (  ) neck stiffness     (  ) nuchal rigidity     (  )  no JVD     (  ) JVD present ( -- cm)    HEART  Rate -->  ( x ) normal rate    (  ) bradycardic    (  ) tachycardic  Rhythm -->  (x  ) regular    (  ) regularly irregular    (  ) irregularly irregular  Murmurs -->  (  ) normal s1/s2    (  ) systolic murmur    (  ) diastolic murmur    (  ) continuous murmur     (  ) S3 present    (  ) S4 present    LUNGS  (x  )Unlabored respirations     (  ) tachypnea  ( x ) B/L air entry     (  ) decreased breath sounds in:  (location     )    (  ) no adventitious sound     (  ) crackles     (  ) wheezing      (  ) rhonchi      (specify location:       )  (  ) chest wall tenderness (specify location:       )    ABDOMEN  ( x ) Soft     (  ) tense   |   (  ) nondistended     (  ) distended   |   (  ) +BS     (  ) hypoactive bowel sounds     (  ) hyperactive bowel sounds  ( x ) nontender     (  ) RUQ tenderness     (  ) RLQ tenderness     (  ) LLQ tenderness     (  ) epigastric tenderness     (  ) diffuse tenderness  (  ) Splenomegaly      (  ) Hepatomegaly      (  ) Jaundice     (  ) ecchymosis     EXTREMITIES  ( x ) Normal     (  ) Rash     (  ) ecchymosis     (  ) varicose veins      (  ) pitting edema     (  ) non-pitting edema   (  ) ulceration     (  ) gangrene:     (location:     )    NERVOUS SYSTEM  (  ) A&Ox3     (x  ) confused     (  ) lethargic  CN II-XII:     (x  ) Intact     (  ) focal deficits  (Specify:     )   Upper extremities:     (  ) strength X/5     (  ) focal deficit (specify:    )  Lower extremities:     (  ) strength  X/5    (  ) focal deficit (specify:    )    SKIN  (  x) No rashes or lesions     (  ) maculopapular rash     (  ) pustules     (  ) vesicles     (  ) ulcer     (  ) ecchymosis     (specify location:     )        LABS             10.9   6.55  )-----------( 353      ( 08-06-24 @ 12:17 )             35.4     144  |  108  |  32  -------------------------<  112   08-06-24 @ 12:17  4.4  |  23  |  1.8    Ca      9.5     08-06-24 @ 12:17    TPro  6.7  /  Alb  3.7  /  TBili  <0.2  /  DBili  x   /  AST  22  /  ALT  22  /  AlkPhos  44  /  GGT  x     08-06-24 @ 12:17    PT/INR - ( 08-06-24 @ 10:50 )   PT: 10.60 sec;   INR: 0.93 ratio  PTT - ( 08-06-24 @ 10:50 )  PTT:33.4 sec      Urinalysis Basic - ( 06 Aug 2024 12:17 )    Color: x / Appearance: x / SG: x / pH: x  Gluc: 112 mg/dL / Ketone: x  / Bili: x / Urobili: x   Blood: x / Protein: x / Nitrite: x   Leuk Esterase: x / RBC: x / WBC x   Sq Epi: x / Non Sq Epi: x / Bacteria: x          IMAGING

## 2024-08-07 NOTE — DISCHARGE NOTE NURSING/CASE MANAGEMENT/SOCIAL WORK - NSDCVIVACCINE_GEN_ALL_CORE_FT
Tdap; 20-Jul-2021 15:17; Dejuan Fry (RN); [employer]; [id number] (Exp. Date: 28-Jan-2023); IntraMuscular; Deltoid Left.; 0.5 milliLiter(s); VIS (VIS Published: 09-May-2013, VIS Presented: 20-Jul-2021);

## 2024-08-07 NOTE — ED PROVIDER NOTE - INTERNATIONAL TRAVEL
Patient:   JOSE RIVERA            MRN: [id number]            FIN: [id number]              Age:   76 years     Sex:  FEMALE     :  41   Associated Diagnoses:   None   Author:   ERIBERTO VELEZ     Subjective   Chief complaint   The patient is feeling better today  She still has a dry cough  She is more tired today  Breathing has improved. She is on oxygen. Not on oxygen at home  Has some congestion  Ms. Rivera states she takes Zoloft, but does not take it often at home due to fatigue  .       Health Status   Current medications:    Medications (16) Active  Scheduled: (9)  AMIODArone 200 mg tab  200 mg 1 tab, Oral, Daily  AmLODIPine 10 mg tab  10 mg 1 tab, Oral, Daily  ApixaBAN 5 mg tab  5 mg 1 tab, Oral, Q12H  Benzonatate 100 mg cap  100 mg 1 cap, Oral, TID  HydrALAZINE 50 mg tab  50 mg 1 tab, Oral, Q8H (variable)  Insulin human lispro 1 unit/0.01 mL inj  1-6 units, Subcutaneous, QID [with meals & HS]  Lisinopril 20 mg tab  20 mg 1 tab, Oral, Daily  Melatonin 3 mg tab  3 mg 1 tab, Oral, Q Bedtime  Sertraline 25 mg tab  25 mg 1 tab, Oral, Daily  Continuous: (0)  PRN: (7)  Acetaminophen 325 mg tab  650 mg 2 tab, Oral, Q4H  Atropine 1 mg/10 mL syringe SDV  0.6 mg 6 mL, IV Push, Once (scheduled)  Dextrose (glucose) 40% 15 gm/37.5 gm oral gel UD  15 gm, Oral, As Directed PRN  Dextrose (glucose) 50% 25 gm/50 mL syringe  12.5 gm 25 mL, IV Push, As Directed PRN  Glucagon 1 mg/1 mL emergency kit SDV  1 mg 1 mL, IM, As Directed PRN  HydrALAZINE 20 mg/1 mL inj SDV  20 mg 1 mL, Slow IV Push, Q6H  Ondansetron 4 mg/2 mL inj SDV  4 mg 2 mL, Slow IV Push, Q6H      Objective   Intake and Output   I & O between:  2020 10:23 TO 2020 10:23  Med Dosing Weight:  80.8  kg   2020  24 Hour Intake:   7.00  ( 0.09 mL/kg )  24 Hour Output:   0.00           24 Hour Urine/Stool Output:   0.0  24 Hour Balance:   7.00           24 Hour Urine Output:   0.00  ( 0.00 mL/kg/hr )     VS/Measurements     Vitals between:    22-FEB-2020 10:23:09   TO   23-FEB-2020 10:23:09                   LAST RESULT MINIMUM MAXIMUM  Temperature 38.3 36.7 38.3  Heart Rate 82 44 92  Respiratory Rate 18 16 18  NISBP           161 161 192  NIDBP           64 56 75  SpO2                    92 92 97    General:  Alert and oriented, No acute distress.    Eye:  Normal conjunctiva.    HENT:  Normocephalic.    Neck:  Supple, No carotid bruit, No jugular venous distention, No thyromegaly.   Respiratory:  Respirations are non-labored, Breath sounds are equal, Symmetrical chest wall expansion, No chest wall tenderness, B/L congestion and crackles noted..   Cardiovascular:  Normal rate, Regular rhythm, Normal peripheral perfusion, No edema.   Gastrointestinal:  Soft.    Musculoskeletal     No tenderness.     Integumentary:  Warm, Dry.    Neurologic:  Alert, Oriented, No focal deficits.    Psychiatric:  Cooperative, Appropriate mood & affect, Normal judgment.      Results Review   General results   Interpretation:         Labs between:  22-FEB-2020 10:23 to 23-FEB-2020 10:23  POC GLU:                 Latest Result  Latest Date  Minimum  Min Date  Maximum  Max Date                             (H) 169  23-FEB-2020 (H) 169  23-FEB-2020 (H) 122  22-FEB-2020                 Cardiovascular Labs   No cardiovascular lab results found over the previous 48 hours.           General results   I personally reviewed telemetry on 2/23/20 showing : SR/SB   Sinus bradycardia in the 40's noted yesterday, heart rates now in the 80's.  Firsrt degree AV block  Intermittent ST and T wave abnormalities  Occasional ectopy, and short episode of a couple beats of SVT.     Impression and Plan        Sinus Bradycardia, heart rates are better.   -Atrial fibrillation, s/p cardioversion to sinus rhythm, Heart rates are stable.  Metoprolol stopped and amiodarone decreased.  -Hx of multiple types of arrythmia's with atrial flutter, svt, and history of pauses  -EP was consulted, heart rates  No currently stable.  ? need for pacemaker.  -Continue amiodarone, increase to bid, with short episodes of ectopy noted.  -no metoprolol for now.  CAD, s/p CABG   -no chest pain   -stable   -continue with asa and statin (no bb in setting of bradycardia)  History of LV dysfunction with EF of 45%.   -thought to be tachycardia induced.   -s/p cardioversion, and in sinus rhythm.   -would continue lasix 40 mg daily.  History of colon cancer   -on chemotherpy   -has a port.   By signing my name below, I, Gregory Gusman, attest that this documentation has been prepared under the direction and in the presence of my provider.  I, Dr. Keenan, personally performed the services described in this documentation. All medical record entries made by the scribe were at my direction and in my presence. I have reviewed the chart and agree that the records reflects my personal performance and is accurate and complete.

## 2024-08-07 NOTE — OCCUPATIONAL THERAPY INITIAL EVALUATION ADULT - PATIENT PROFILE REVIEW, REHAB EVAL
Added labs for upcoming [facility] appointment on 4/3/2024     Diagnosis Orders   1. Primary hypertension  Comprehensive Metabolic Panel      2. Acquired hypothyroidism  TSH      3. Mixed hyperlipidemia  Lipid Panel      4. Vitamin D deficiency  Vitamin D 25 Hydroxy           yes Authored by Attending

## 2024-08-07 NOTE — EEG REPORT - NS EEG TEXT BOX
[facility] Department of Neurology         Inpatient Routine-Electroencephalography Report    Patient Name:	ERICK MARTINEZ    :	1945  MRN:	-    Study Start Date/Time:	8/3/2024, 3:38:17 PM    End Time (if applicable):    Brief Clinical History:  ERICK MARTINEZ is a 78 year old Male; study performed to investigate for seizures or markers of epilepsy.   Technologist notes: CVA, Sepsis, GERD, Tremors, BPH, DM, Anxiety,Depression, HTN, CAD, Angina pectoris    Diagnosis Code:  R56.9 convulsions/seizure  CPT:   92034 (awake/drowsy)     Pertinent Medication:  n/a    Acquisition Details:  Electroencephalography was acquired using a minimum of 21 channels on an "[employer]" Neurology system v 9.3.1 with electrode placement according to the standard International 10-20 system following ACNS (American Clinical Neurophysiology Society) guidelines.  Anterior temporal T1 and T2 electrodes were utilized whenever possible.  The [employer] automated spike & seizure detections were all reviewed in detail, in addition to the entire raw EEG.    Findings:  Background:  continuous, with predominantly alpha and beta frequencies.  Generalized Slowing:  None  Symmetry/Focality: No persistent asymmetries of voltage or frequency.     Voltage:  Normal (20+ uV)  Organization:  Appropriate anterior-posterior gradient  Posterior Dominant Rhythm:  10 Hz symmetric, well-organized, and well-modulated  Sleep:  Absent.  Variability:   Yes		Reactivity:  Yes    Spontaneous Activity:  No epileptiform discharges   Events:  1)	No electrographic seizures or significant clinical events occurred during this study.  Provocations:  •	Hyperventilation: was not performed.  •	Photic stimulation: was not performed.  FINAL Impression:  Normalawake and/or drowsy routine EEG  1)	 Unremarkable awake  recording.      Final Clinical Correlation:  1)	There were no findings of active epilepsy, however this alone does not rule out the diagnosis.            Julia Infante MD   Attending Neurologist, [facility] Epilepsy Program    
[place] Department of Neurology  Inpatient Routine-EEG Report      Patient Name:	ERICK MARTINEZ    :	1945  MRN:	-  Study Date/Time:	2024, 12:42:53 PM  Referred by:	-    Brief Clinical History:  ERICK MARTINEZ is a 78 year old Male; study performed to investigate for seizures or markers of epilepsy.   Diagnosis Code: R56.9 convulsions/seizure    Patient Medication:  ZOFRAN    LIPITOR    ASPIRIN    LIORESAL    PLAVIX    PROSCAR    EFFEXOR      Acquisition Details:  Electroencephalography was acquired using a minimum of 21 channels on an [employer] Neu[employer] system v 9.3.1 with electrode placement according to the standard International 10-20 system following ACNS (American Clinical Neurophysiology Society) guidelines.  Anterior temporal T1 and T2 electrodes were utilized whenever possible.   The [employer] automated spike & seizure detections were all reviewed in detail, in addition to the entire raw EEG.    Findings:  Background:  continuous.   Voltage:  Normal (20uV)  Organization:  Rudimentary  Posterior Dominant Rhythm:  <7 Hz symmetric, well-organized, and well-modulated  Variability:  Yes	Reactivity:  Yes  Sleep:  Absent.  Focal abnormalities:  No persistent asymmetries of voltage or frequency.  Interictal Activity:  None  Focal Slowing:  None  Generalized Slowing:  Moderate  Events:  1)	No electrographic seizures or significant clinical events.  Provocations:  1)	Hyperventilation: was not performed.  2)	Photic stimulation: was not performed.  Impression:  Abnormal due to generalized slowing as above    Clinical Correlation:  Findings consistent with diffuse electrocerebral dysfunction secondary to nonspecific etiology    Tashi Jasmine MD  Attending Neurologist, Division of Epilepsy

## 2024-08-07 NOTE — PROGRESS NOTE ADULT - REASON FOR ADMISSION
Sepsis due to UTI

## 2024-08-07 NOTE — PHYSICAL THERAPY INITIAL EVALUATION ADULT - PERTINENT HX OF CURRENT PROBLEM, REHAB EVAL
78-year-old male L handed with PMH CAD s/p CABG, CKD, prior stroke, DM, PAD, HTN, HLD presented to the ED on 8/1/24 with left-sided facial droop, left arm and leg weakness, left shoulder pain that radiates throughout his arm, and left leg pain all of which began yesterday morning. He additionally reports 2 episodes of L handed shaking without loss of consciousness. The patient remembers the whole episode and denies LOC or head strike. In the ED, neurology was consulted for evaluation of his L sided symptoms. Patient reported that his L sided symptoms are similar to his presentation after his stroke in 2017. Noted that he was evaluated at [facility] for a TIA with R leg weakness that improved. Denied any recent trauma to L shoulder, fall, or sleeping on that side. Reports that he normally walks independently and is able to shop online independently but needs help with cooking and cleaning.  ROS negative for dysuria, hematuria, chest pain, shortness of breath, cough, sore throat, vision changes. Positive for constipation (for years) and left sided weakness (as above).

## 2024-08-07 NOTE — ED PROVIDER NOTE - PRO INTERPRETER NEED 2
Vascular Access Team    Ultrasound-Guided PIV Insertion    A 22g X 1inch peripheral IV was inserted into the right forearm in one needle pass using ultrasound guidance. Insertion performed using aseptic non-touch technique. Brisk blood return noted and IV flushed easily with normal saline. Dressing dated and intact. Kika GRIFFITHS was notified. Patient tolerated well, in person  bedside. Patient is a dialysis patient with only right arm available for IV placement. Vessel identified was superficial other vessel identified by writer was deeper but wrapped around the artery. Writer informed Kika GRIFFITHS to be gentle with PIV.   English

## 2024-08-07 NOTE — OCCUPATIONAL THERAPY INITIAL EVALUATION ADULT - SPECIFY REASON(S)
Chart reviewed. Attempted OT assessment, pt educated on purpose of OT assessment; despite education Pt refused. RN Krista chairez

## 2024-08-07 NOTE — PROGRESS NOTE ADULT - PROVIDER SPECIALTY LIST ADULT
Hospitalist
Internal Medicine
Neurology
Hospitalist
Infectious Disease
Internal Medicine

## 2024-08-07 NOTE — H&P ADULT - NSHPPHYSICALEXAM_GEN_ALL_CORE
Patient is AAOx3  Regular S1S2  soft abdomen, non tender  No lower limb edema    2+ Peripheral Pulses, No clubbing, cyanosis, or edema; LUE and LLE 3-4/5 strength

## 2024-08-07 NOTE — PATIENT PROFILE ADULT - FUNCTIONAL ASSESSMENT - BASIC MOBILITY 6.
2-calculated by average/Not able to assess (calculate score using [facility] averaging method)

## 2024-08-07 NOTE — PROGRESS NOTE ADULT - SUBJECTIVE AND OBJECTIVE BOX
MICHELLEERICK  78y, Male    All available historical data reviewed    OVERNIGHT EVENTS:  no fevers  confused but does respond    ROS:  General: Denies rigors, nightsweats  HEENT: Denies headache, rhinorrhea, sore throat, eye pain  CV: Denies CP, palpitations  PULM: Denies wheezing, hemoptysis  GI: Denies hematemesis, hematochezia, melena  : Denies discharge, hematuria  MSK: Denies arthralgias, myalgias  SKIN: Denies rash, lesions  NEURO: Denies paresthesias, weakness  PSYCH: Denies depression, anxiety    VITALS:  T(F): 97.6, Max: 97.6 (08-06-24 @ 15:50)  HR: 70  BP: 142/67  RR: 18Vital Signs Last 24 Hrs  T(C): 36.4 (06 Aug 2024 15:50), Max: 36.4 (06 Aug 2024 15:50)  T(F): 97.6 (06 Aug 2024 15:50), Max: 97.6 (06 Aug 2024 15:50)  HR: 70 (06 Aug 2024 15:50) (70 - 70)  BP: 142/67 (06 Aug 2024 15:50) (142/67 - 142/67)  BP(mean): --  RR: 18 (06 Aug 2024 15:50) (18 - 18)  SpO2: 97% (06 Aug 2024 15:50) (97% - 97%)    Parameters below as of 06 Aug 2024 15:50  Patient On (Oxygen Delivery Method): room air        TESTS & MEASUREMENTS:                        10.9   6.55  )-----------( 353      ( 06 Aug 2024 12:17 )             35.4     08-06    144  |  108  |  32<H>  ----------------------------<  112<H>  4.4   |  23  |  1.8<H>    Ca    9.5      06 Aug 2024 12:17    TPro  6.7  /  Alb  3.7  /  TBili  <0.2  /  DBili  x   /  AST  22  /  ALT  22  /  AlkPhos  44  08-06    LIVER FUNCTIONS - ( 06 Aug 2024 12:17 )  Alb: 3.7 g/dL / Pro: 6.7 g/dL / ALK PHOS: 44 U/L / ALT: 22 U/L / AST: 22 U/L / GGT: x             Urinalysis with Rflx Culture (collected 08-01-24 @ 21:53)    Culture - Blood (collected 08-01-24 @ 21:53)  Source: .Blood Blood-Peripheral  Preliminary Report (08-06-24 @ 09:01):    No growth at 4 days    Culture - Blood (collected 08-01-24 @ 21:53)  Source: .Blood Blood-Peripheral  Preliminary Report (08-06-24 @ 09:01):    No growth at 4 days    Culture - Urine (collected 08-01-24 @ 21:53)  Source: Catheterized None  Final Report (08-05-24 @ 19:01):    >100,000 CFU/ml Enterobacter cloacae complex  Organism: Enterobacter cloacae complex (08-05-24 @ 19:01)  Organism: Enterobacter cloacae complex (08-05-24 @ 19:01)      -  Levofloxacin: I 1      -  Tobramycin: R >8      -  Nitrofurantoin: S <=32 Should not be used to treat pyelonephritis      -  Aztreonam: I 16      -  Gentamicin: R >8      -  Cefazolin: R >16      -  Cefepime: SDD 8 The breakpoint for susceptible dose dependent may require the usage of a higher cefepime dose (equivalent to adult dose of 2g q8h for normal renal function) for successful treatment.      -  Piperacillin/Tazobactam: S <=8 Treatment with Pipercillin/Tazobactam is not recommended in severe infections casued by Klebsiella aerogenes, Enterobacter cloacae complex, and Citrobacter freundii complex.      -  Ciprofloxacin: R >2      -  Imipenem: S <=1      -  Ceftriaxone: R >32 Enterobacter cloacae, Klebsiella aerogenes, and Citrobacter freundii may develop resistance during prolonged therapy.      -  Ampicillin: R >16 These ampicillin results predict results for amoxicillin      Method Type: PUNEET      -  Meropenem: S <=1      -  Ampicillin/Sulbactam: R 16/8      -  Cefoxitin: R >16      -  Amoxicillin/Clavulanic Acid: R >16/8      -  Trimethoprim/Sulfamethoxazole: R >2/38      -  Ertapenem: S <=0.5      Urinalysis Basic - ( 06 Aug 2024 12:17 )    Color: x / Appearance: x / SG: x / pH: x  Gluc: 112 mg/dL / Ketone: x  / Bili: x / Urobili: x   Blood: x / Protein: x / Nitrite: x   Leuk Esterase: x / RBC: x / WBC x   Sq Epi: x / Non Sq Epi: x / Bacteria: x          Social History:  Tobacco Use: No  Alcohol Use: No  Drug Use: No    RADIOLOGY & ADDITIONAL TESTS:  Personal review of radiological diagnostics performed  Echo and EKG results noted when applicable.     MEDICATIONS:  acetaminophen     Tablet .. 650 milliGRAM(s) Oral every 6 hours PRN  aluminum hydroxide/magnesium hydroxide/simethicone Suspension 30 milliLiter(s) Oral every 4 hours PRN  aspirin  chewable 81 milliGRAM(s) Oral daily  atorvastatin 80 milliGRAM(s) Oral at bedtime  baclofen 5 milliGRAM(s) Oral every 12 hours  chlorhexidine 2% Cloths 1 Application(s) Topical <User Schedule>  clopidogrel Tablet 75 milliGRAM(s) Oral daily  dextrose 5%. 1000 milliLiter(s) IV Continuous <Continuous>  dextrose 50% Injectable 25 Gram(s) IV Push once  dextrose Oral Gel 15 Gram(s) Oral once PRN  fenofibrate Tablet 145 milliGRAM(s) Oral daily  finasteride 5 milliGRAM(s) Oral daily  glucagon  Injectable 1 milliGRAM(s) IntraMuscular once  heparin   Injectable 5000 Unit(s) SubCutaneous every 12 hours  hydrALAZINE 50 milliGRAM(s) Oral two times a day  insulin lispro (ADMELOG) corrective regimen sliding scale   SubCutaneous three times a day before meals  melatonin 3 milliGRAM(s) Oral at bedtime PRN  meropenem  IVPB 1000 milliGRAM(s) IV Intermittent every 12 hours  metoprolol tartrate 25 milliGRAM(s) Oral two times a day  ondansetron Injectable 4 milliGRAM(s) IV Push every 8 hours PRN  pantoprazole    Tablet 40 milliGRAM(s) Oral before breakfast  ranolazine 500 milliGRAM(s) Oral daily  tamsulosin 0.4 milliGRAM(s) Oral at bedtime  venlafaxine XR. 75 milliGRAM(s) Oral daily      ANTIBIOTICS:  meropenem  IVPB 1000 milliGRAM(s) IV Intermittent every 12 hours

## 2024-08-07 NOTE — PROGRESS NOTE ADULT - ASSESSMENT
78-year-old male L handed with PMH CAD s/p CABG, CKD, prior stroke, DM, PAD, HTN, HLD presented to the ED on 8/1/24 for left sided weakness, initially 2 days prior to admission but then improved and then worsened again on day prior to presentation so presented.    #Suspected UTI  - previously on IV Rocephin but culture is resistant   - ID consult appreciated - midline placed and ertapenem being infused   - oral hydration encouraged  - patient's son spoken to at length - SNF is recommended for IV abx but patient's son states he cannot afford $200/day for SNF; CM met with pt's son and informed him that patient will need assistance with IV abx  - medical team concerned regarding readmission   - patient has a HHA for 8hrs/day    #AMS/Rigidity - resolved  #Left sided weakness   #history of CVA  - MR Head - no acute infarct, chronic left SUSANNE territorial infarct and bilateral basal ganglia chronic lacunar infarcts  - neurology following - outpatient follow up recommended   - continue DAPT/statin  - PT follow up   - repeat EEG done - generalized slowing   - started on baclofen   - patient has a HHA for 8 hours - plan is to return home upon dc - discussed with his son although SNF is recommended  - placed on remote tele for 24hrs - no events     #CAD  #PVD  #HTN   #HLD  #BPH - on flomax and finasteride   #Anemia - stable   #CKD III - stable  #Urinary retention - straight cath done 2 times - less than 300 on both occasions   - continue home meds  - monitor BP closely  - house staff clarified meds with pt's son   - dc norvasc on 8/6    #DM II - well controlled   -Continue sliding scale insulin regimen    #Depression  - Cont venlefaxine    Progress Note Handoff  Pending Consults: PT, CM  Pending Tests: none  Pending Results: none  Family Discussion: Discussed labs, meds, cultures and overall plan of care with pt, his son and medical staff. All questions answered. DC today once home abx arranged. SNF recommended but pt's son cannot afford $200/day Concerned about readmission. CM closely following for safe dc plan   Disposition: Home__x___/SNF______/Other_____/Unknown at this time_____  Spent over 55 min reviewing chart, speaking with patient/family and on coordinating patient care during interdisciplinary rounds

## 2024-08-07 NOTE — PATIENT PROFILE ADULT - FALL HARM RISK - HARM RISK INTERVENTIONS

## 2024-08-07 NOTE — PROGRESS NOTE ADULT - ASSESSMENT
78-year-old male L handed with PMH CAD s/p CABG, CKD, prior stroke, DM, PAD, HTN, HLD presented to the ED on 8/1/24 for left sided weakness, initially 2 days prior to admission but then improved and then worsened again on day prior to presentation so presented.    #Suspected UTI  - on IV Rocephin  - cultures, growing enterobacter cloaca  -1 dose meropenem started, ID recommended 1g meropenem q12 ( please renew if needed after 3 days)  -will discharge with midline and ertapenem 500mg q24 until 8/15  - oral hydration encouraged    #Left sided weakness   # history of CVA  - MR Head - no acute infarct, chronic left SUSANNE territorial infarct and bilateral basal ganglia chronic lacunar infarcts  - neurology following   - continue DAPT/statin  - PT Eval  - eeg negative for epilepsy showing generalized slowing,   - started on baclofen   - patient has a HHA for 8 hours - plan is to return home upon dc  - pt more confused today, pending stat head ct and neuro follow up,   - dc Norvasc as BP soft, to ensure proper perfusion  -second eeg, CT head, negative  -pt found to be retaining, straight cath removed around 500c, intermittent cath q8    #CAD  #PVD  #HTN - uncontrolled   #HLD  #BPH  #Anemia - stable   #CKD III - stable  - continue home meds  - monitor BP closely      #DM II - well controlled   -Continue sliding scale insulin regimen    #Depression  - Cont venlafaxine    Provider handoff: discharge today

## 2024-08-07 NOTE — ED ADULT TRIAGE NOTE - IDEAL BODY WEIGHT(KG)
- Start sertraline 25 mg.  Counseled patient on possible side effects.  Will follow-up in 1 month.  - Provided list of therapists in the area and encourage patient to get in contact  -Obtain TSH levels.  Follow-up on labs     55

## 2024-08-07 NOTE — H&P ADULT - ASSESSMENT
78-year-old male with PMH CAD s/p CABG, CKD, prior stroke, DM, PAD, HTN, HLD presented to the ED on 8/1/24 for left sided weakness. evaluated by neuro. His workup also showed Pyelonephritis for which he was treated with Meropenem and discharged today (8/7) on Ertapenem until 8/15. Presented again today for weakness and inability to take care of himself.    #Placement  #inability to take care of self  -patient has a HHA for 8 hours - plan was to return home upon dc   -discussed with his son, agreed to dc home, however SNF was recommended  -patient is back on the same day of discharge inability to take care of himself  -PT/OT consult  -Placement     #SAMANTA on top of CKD  -crea 2.4 now (was 2 in today am)  -baseline around 1.9   -s/p LR 1 L in ED  -start LR at 40 ml/hr   -trend BMP     #Complicated UTI  -ID consulted, midline placed and discharged on ertapenem until 8/15  -c/w Ertapenem as per ID  -wbc 14 on admission, repeat CBC in am     #AMS/Rigidity - resolved  #Left sided weakness   #history of CVA  - MR Head last admission - no acute infarct, chronic left SUSANNE territorial infarct and bilateral basal ganglia chronic lacunar infarcts  - neurology --> outpatient follow up recommended   - continue DAPT/statin  - PT follow up   - started on baclofen     #CAD  #PVD  #HTN   #HLD  -c/w home meds    #BPH   - on flomax and finasteride     #DM II - well controlled   -Continue sliding scale insulin regimen    #Depression  - Cont venlefaxine    #DVT ppx: Heparin 5000 BID  Diet regular         78-year-old male with PMH CAD s/p CABG, CKD, prior stroke, DM, PAD, HTN, HLD presented to the ED on 8/1/24 for left sided weakness. evaluated by neuro. His workup also showed Pyelonephritis for which he was treated with Meropenem and discharged today (8/7) on Ertapenem until 8/15. Presented again today for weakness and inability to take care of himself.    #Placement  #inability to take care of self  -patient has a HHA for 8 hours - plan was to return home upon dc   -discussed with his son, agreed to dc home, was discahrged today 8/7   -patient is back on the same day of discharge for wekaness and inability to take care of himself  -PT/OT consult  -Placement     #SAMANTA on top of CKD  -crea 2.4 now (was 2 in today am)  -baseline around 1.9   -s/p LR 1 L in ED  -start LR at 40 ml/hr   -trend BMP     #Complicated UTI  -ID consulted, midline placed and discharged on ertapenem until 8/15  -c/w Ertapenem as per ID  -wbc 14 on admission, repeat CBC in am     #AMS/Rigidity - resolved  #Left sided weakness   #history of CVA  - MR Head last admission - no acute infarct, chronic left SUSANNE territorial infarct and bilateral basal ganglia chronic lacunar infarcts  - neurology --> outpatient follow up recommended   - continue DAPT/statin  - PT follow up   - started on baclofen     #CAD  #PVD  #HTN   #HLD  -c/w home meds    #BPH   - on flomax and finasteride     #DM II - well controlled   -Continue sliding scale insulin regimen    #Depression  - Cont venlefaxine    #DVT ppx: Heparin 5000 BID  Diet regular

## 2024-08-07 NOTE — PROGRESS NOTE ADULT - TIME BILLING
I have personally seen and examined this patient. I have reviewed all pertinent clinical information and reviewed all relevant imaging ( and noted the impression from the Radiologist ) and diagnostic studies personally. I counseled the patient about the diagnostic testing and treatment plan. I discussed my recommendations with the primary team.
above

## 2024-08-07 NOTE — ED ADULT TRIAGE NOTE - CHIEF COMPLAINT QUOTE
BIBEMS from home for increased weakness, pt was discharged from hospital today for UTI about 2 1/2 hours ago.  patients son noticed that he was to weak to get off the stair lift when he got home around 16:30 per son the patient is at baseline mental status and ambulatory status.  pt is wheel chair bound pt walks max 20 feet a day. pt was in the hospital for 5 days bed bound.  pt has R arm picc was due for at home abx.  pt has hx of 4 strokes in the past with right side residual weakness as well as slurred speech and facial drop

## 2024-08-07 NOTE — ED PROVIDER NOTE - CLINICAL SUMMARY MEDICAL DECISION MAKING FREE TEXT BOX
78-year-old male L handed with PMH CAD s/p CABG, CKD, prior stroke, DM, PAD, HTN, HLD presenting to ED for generalized weakness. Patient was just discharged few hours prior to arrival after 5-6 days admission, discharged with midline and antibiotics. Son noted him to appear weak and unable to stand. Patient was recommended admission to SNF but patient and son declined. Denies any new other symptom. Elderly male on exam. Mildly contracted extremities. In no acute distress. Abdomen soft NDNT. Labs were ordered and reviewed.  Escalation to admission/observation was considered.  Discussed possibility of admission for placement with patient and son.  Patient requires inpatient hospitalization.

## 2024-08-08 LAB
ANION GAP SERPL CALC-SCNC: 15 MMOL/L — HIGH (ref 7–14)
BASOPHILS # BLD AUTO: 0.06 K/UL — SIGNIFICANT CHANGE UP (ref 0–0.2)
BASOPHILS NFR BLD AUTO: 0.5 % — SIGNIFICANT CHANGE UP (ref 0–1)
BUN SERPL-MCNC: 39 MG/DL — HIGH (ref 10–20)
CALCIUM SERPL-MCNC: 9.1 MG/DL — SIGNIFICANT CHANGE UP (ref 8.4–10.5)
CHLORIDE SERPL-SCNC: 107 MMOL/L — SIGNIFICANT CHANGE UP (ref 98–110)
CO2 SERPL-SCNC: 21 MMOL/L — SIGNIFICANT CHANGE UP (ref 17–32)
CREAT SERPL-MCNC: 2.2 MG/DL — HIGH (ref 0.7–1.5)
EGFR: 30 ML/MIN/1.73M2 — LOW
EOSINOPHIL # BLD AUTO: 0.18 K/UL — SIGNIFICANT CHANGE UP (ref 0–0.7)
EOSINOPHIL NFR BLD AUTO: 1.5 % — SIGNIFICANT CHANGE UP (ref 0–8)
GLUCOSE BLDC GLUCOMTR-MCNC: 110 MG/DL — HIGH (ref 70–99)
GLUCOSE BLDC GLUCOMTR-MCNC: 115 MG/DL — HIGH (ref 70–99)
GLUCOSE BLDC GLUCOMTR-MCNC: 90 MG/DL — SIGNIFICANT CHANGE UP (ref 70–99)
GLUCOSE BLDC GLUCOMTR-MCNC: 95 MG/DL — SIGNIFICANT CHANGE UP (ref 70–99)
GLUCOSE SERPL-MCNC: 101 MG/DL — HIGH (ref 70–99)
HCT VFR BLD CALC: 36.1 % — LOW (ref 42–52)
HGB BLD-MCNC: 11.2 G/DL — LOW (ref 14–18)
IMM GRANULOCYTES NFR BLD AUTO: 0.7 % — HIGH (ref 0.1–0.3)
LYMPHOCYTES # BLD AUTO: 1.92 K/UL — SIGNIFICANT CHANGE UP (ref 1.2–3.4)
LYMPHOCYTES # BLD AUTO: 15.7 % — LOW (ref 20.5–51.1)
MCHC RBC-ENTMCNC: 29 PG — SIGNIFICANT CHANGE UP (ref 27–31)
MCHC RBC-ENTMCNC: 31 G/DL — LOW (ref 32–37)
MCV RBC AUTO: 93.5 FL — SIGNIFICANT CHANGE UP (ref 80–94)
MONOCYTES # BLD AUTO: 0.81 K/UL — HIGH (ref 0.1–0.6)
MONOCYTES NFR BLD AUTO: 6.6 % — SIGNIFICANT CHANGE UP (ref 1.7–9.3)
NEUTROPHILS # BLD AUTO: 9.2 K/UL — HIGH (ref 1.4–6.5)
NEUTROPHILS NFR BLD AUTO: 75 % — SIGNIFICANT CHANGE UP (ref 42.2–75.2)
NRBC # BLD: 0 /100 WBCS — SIGNIFICANT CHANGE UP (ref 0–0)
PLATELET # BLD AUTO: 425 K/UL — HIGH (ref 130–400)
PMV BLD: 10.1 FL — SIGNIFICANT CHANGE UP (ref 7.4–10.4)
POTASSIUM SERPL-MCNC: 4.5 MMOL/L — SIGNIFICANT CHANGE UP (ref 3.5–5)
POTASSIUM SERPL-SCNC: 4.5 MMOL/L — SIGNIFICANT CHANGE UP (ref 3.5–5)
RBC # BLD: 3.86 M/UL — LOW (ref 4.7–6.1)
RBC # FLD: 15.7 % — HIGH (ref 11.5–14.5)
SODIUM SERPL-SCNC: 143 MMOL/L — SIGNIFICANT CHANGE UP (ref 135–146)
WBC # BLD: 12.26 K/UL — HIGH (ref 4.8–10.8)
WBC # FLD AUTO: 12.26 K/UL — HIGH (ref 4.8–10.8)

## 2024-08-08 PROCEDURE — 99232 SBSQ HOSP IP/OBS MODERATE 35: CPT

## 2024-08-08 PROCEDURE — 70548 MR ANGIOGRAPHY NECK W/DYE: CPT | Mod: 26

## 2024-08-08 RX ORDER — DEXTROSE MONOHYDRATE, SODIUM CHLORIDE, SODIUM LACTATE, CALCIUM CHLORIDE, MAGNESIUM CHLORIDE 1.5; 538; 448; 18.4; 5.08 G/100ML; MG/100ML; MG/100ML; MG/100ML; MG/100ML
1000 SOLUTION INTRAPERITONEAL
Refills: 0 | Status: DISCONTINUED | OUTPATIENT
Start: 2024-08-08 | End: 2024-08-09

## 2024-08-08 RX ORDER — DEXTROSE 4 G
25 TABLET,CHEWABLE ORAL ONCE
Refills: 0 | Status: DISCONTINUED | OUTPATIENT
Start: 2024-08-08 | End: 2024-08-09

## 2024-08-08 RX ORDER — TAMSULOSIN HCL 0.4 MG
0.4 CAPSULE ORAL AT BEDTIME
Refills: 0 | Status: DISCONTINUED | OUTPATIENT
Start: 2024-08-07 | End: 2024-08-09

## 2024-08-08 RX ORDER — PANTOPRAZOLE SODIUM 20 MG/1
40 TABLET, DELAYED RELEASE ORAL
Refills: 0 | Status: DISCONTINUED | OUTPATIENT
Start: 2024-08-08 | End: 2024-08-09

## 2024-08-08 RX ORDER — FENOFIBRATE 30 MG/1
145 CAPSULE ORAL DAILY
Refills: 0 | Status: DISCONTINUED | OUTPATIENT
Start: 2024-08-07 | End: 2024-08-08

## 2024-08-08 RX ORDER — DEXTROSE 4 G
12.5 TABLET,CHEWABLE ORAL ONCE
Refills: 0 | Status: DISCONTINUED | OUTPATIENT
Start: 2024-08-08 | End: 2024-08-09

## 2024-08-08 RX ORDER — ERTAPENEM SODIUM 1 G/20ML
500 INJECTION INTRAMUSCULAR; INTRAVENOUS EVERY 24 HOURS
Refills: 0 | Status: DISCONTINUED | OUTPATIENT
Start: 2024-08-09 | End: 2024-08-09

## 2024-08-08 RX ORDER — HYDRALAZINE HYDROCHLORIDE 100 MG/1
50 TABLET ORAL
Refills: 0 | Status: DISCONTINUED | OUTPATIENT
Start: 2024-08-08 | End: 2024-08-09

## 2024-08-08 RX ORDER — ATORVASTATIN CALCIUM 40 MG/1
40 TABLET, FILM COATED ORAL AT BEDTIME
Refills: 0 | Status: DISCONTINUED | OUTPATIENT
Start: 2024-08-07 | End: 2024-08-09

## 2024-08-08 RX ORDER — GLUCAGON INJECTION, SOLUTION 0.5 MG/.1ML
1 INJECTION, SOLUTION SUBCUTANEOUS ONCE
Refills: 0 | Status: DISCONTINUED | OUTPATIENT
Start: 2024-08-08 | End: 2024-08-09

## 2024-08-08 RX ORDER — ASPIRIN 500 MG
81 TABLET ORAL DAILY
Refills: 0 | Status: DISCONTINUED | OUTPATIENT
Start: 2024-08-07 | End: 2024-08-09

## 2024-08-08 RX ORDER — FINASTERIDE 5 MG/1
5 TABLET, FILM COATED ORAL DAILY
Refills: 0 | Status: DISCONTINUED | OUTPATIENT
Start: 2024-08-07 | End: 2024-08-09

## 2024-08-08 RX ORDER — DEXTROSE 4 G
15 TABLET,CHEWABLE ORAL ONCE
Refills: 0 | Status: DISCONTINUED | OUTPATIENT
Start: 2024-08-08 | End: 2024-08-09

## 2024-08-08 RX ORDER — BACLOFEN 10 MG/1
5 TABLET ORAL EVERY 12 HOURS
Refills: 0 | Status: DISCONTINUED | OUTPATIENT
Start: 2024-08-08 | End: 2024-08-09

## 2024-08-08 RX ORDER — CLOPIDOGREL BISULFATE 75 MG/1
75 TABLET, FILM COATED ORAL DAILY
Refills: 0 | Status: DISCONTINUED | OUTPATIENT
Start: 2024-08-07 | End: 2024-08-09

## 2024-08-08 RX ORDER — METOPROLOL TARTRATE 100 MG
25 TABLET ORAL
Refills: 0 | Status: DISCONTINUED | OUTPATIENT
Start: 2024-08-08 | End: 2024-08-09

## 2024-08-08 RX ORDER — ALBUTEROL 90 MCG
1 AEROSOL REFILL (GRAM) INHALATION EVERY 6 HOURS
Refills: 0 | Status: DISCONTINUED | OUTPATIENT
Start: 2024-08-08 | End: 2024-08-09

## 2024-08-08 RX ORDER — RANOLAZINE 1000 MG/1
500 TABLET, FILM COATED, EXTENDED RELEASE ORAL DAILY
Refills: 0 | Status: DISCONTINUED | OUTPATIENT
Start: 2024-08-07 | End: 2024-08-09

## 2024-08-08 RX ORDER — INSULIN LISPRO 100/ML
VIAL (ML) SUBCUTANEOUS
Refills: 0 | Status: DISCONTINUED | OUTPATIENT
Start: 2024-08-08 | End: 2024-08-09

## 2024-08-08 RX ORDER — VENLAFAXINE 25 MG/1
75 TABLET ORAL DAILY
Refills: 0 | Status: DISCONTINUED | OUTPATIENT
Start: 2024-08-07 | End: 2024-08-09

## 2024-08-08 RX ORDER — HEPARIN SODIUM 1000 [USP'U]/ML
5000 INJECTION, SOLUTION INTRAVENOUS; SUBCUTANEOUS EVERY 12 HOURS
Refills: 0 | Status: DISCONTINUED | OUTPATIENT
Start: 2024-08-08 | End: 2024-08-09

## 2024-08-08 RX ADMIN — Medication 25 MILLIGRAM(S): at 05:35

## 2024-08-08 RX ADMIN — BACLOFEN 5 MILLIGRAM(S): 10 TABLET ORAL at 05:35

## 2024-08-08 RX ADMIN — HYDRALAZINE HYDROCHLORIDE 50 MILLIGRAM(S): 100 TABLET ORAL at 01:06

## 2024-08-08 RX ADMIN — HEPARIN SODIUM 5000 UNIT(S): 1000 INJECTION, SOLUTION INTRAVENOUS; SUBCUTANEOUS at 17:04

## 2024-08-08 RX ADMIN — HYDRALAZINE HYDROCHLORIDE 50 MILLIGRAM(S): 100 TABLET ORAL at 17:04

## 2024-08-08 RX ADMIN — DEXTROSE MONOHYDRATE, SODIUM CHLORIDE, SODIUM LACTATE, CALCIUM CHLORIDE, MAGNESIUM CHLORIDE 40 MILLILITER(S): 1.5; 538; 448; 18.4; 5.08 SOLUTION INTRAPERITONEAL at 01:06

## 2024-08-08 RX ADMIN — Medication 0.4 MILLIGRAM(S): at 21:07

## 2024-08-08 RX ADMIN — BACLOFEN 5 MILLIGRAM(S): 10 TABLET ORAL at 17:04

## 2024-08-08 RX ADMIN — VENLAFAXINE 75 MILLIGRAM(S): 25 TABLET ORAL at 12:15

## 2024-08-08 RX ADMIN — HEPARIN SODIUM 5000 UNIT(S): 1000 INJECTION, SOLUTION INTRAVENOUS; SUBCUTANEOUS at 05:35

## 2024-08-08 RX ADMIN — RANOLAZINE 500 MILLIGRAM(S): 1000 TABLET, FILM COATED, EXTENDED RELEASE ORAL at 12:15

## 2024-08-08 RX ADMIN — PANTOPRAZOLE SODIUM 40 MILLIGRAM(S): 20 TABLET, DELAYED RELEASE ORAL at 05:35

## 2024-08-08 RX ADMIN — Medication 81 MILLIGRAM(S): at 12:16

## 2024-08-08 RX ADMIN — FINASTERIDE 5 MILLIGRAM(S): 5 TABLET, FILM COATED ORAL at 12:16

## 2024-08-08 RX ADMIN — ATORVASTATIN CALCIUM 40 MILLIGRAM(S): 40 TABLET, FILM COATED ORAL at 21:07

## 2024-08-08 RX ADMIN — Medication 25 MILLIGRAM(S): at 17:04

## 2024-08-08 RX ADMIN — CLOPIDOGREL BISULFATE 75 MILLIGRAM(S): 75 TABLET, FILM COATED ORAL at 12:15

## 2024-08-08 NOTE — PHYSICAL THERAPY INITIAL EVALUATION ADULT - GAIT DEVIATIONS NOTED, PT EVAL
decreased B/L hip flexion L> R, decreased B/L knee flexion L >R/decreased cuate/decreased step length/decreased stride length

## 2024-08-08 NOTE — ADVANCED PRACTICE NURSE CONSULT - ASSESSMENT
History of Present Illness:   78-year-old male L handed with PMH CAD s/p CABG, CKD, prior stroke, DM, PAD, HTN, HLD presented to the ED on 8/1/24 with left-sided facial droop, left arm and leg weakness, left shoulder pain that radiates throughout his arm, and left leg pain. He additionally reports 2 episodes of L handed shaking without loss of consciousness. was evaluated by neuro during his admission, MR head showed no acute infarct. Workup also showed pyelonephritis (8/1 UCx Enterobacter, 8/1 BCx NG) treated with meropenem as per ID and was discahrged today 8/7/2024 on Ertapenem until 8/15.  He presented again today for weakness and inability to take  care of himself.     Allergies and Intolerances:        Allergies:  	PC Pen VK: Drug, Rash, Tolerated ceftriaxone and meropenem in 8/2024.  	penicillin: Drug, Rash, Tolerated ceftriaxone and meropenem in 8/2024.  	clindamycin: Drug, Rash    Home Medications:   * Patient Currently Takes Medications as of 07-Aug-2024 23:51 documented in Structured Notes  · 	pantoprazole 40 mg oral delayed release tablet: Last Dose Taken:  , 1 tab(s) orally once a day (before a meal)  · 	baclofen 5 mg oral tablet: Last Dose Taken:  , 1 tab(s) orally every 12 hours  · 	ertapenem 1 g injection: Last Dose Taken:  , 500 milligram(s) intravenously once a day START 8/8/24, LAST DOSE 8/15/24  · 	aspirin 81 mg oral tablet, chewable: Last Dose Taken:  , 1 tab(s) orally once a day  · 	tamsulosin 0.4 mg oral capsule: Last Dose Taken:  , 1 cap(s) orally once a day  · 	ranolazine 500 mg oral tablet, extended release: Last Dose Taken:  , 1 tab(s) orally once a day  · 	rosuvastatin 40 mg oral tablet: Last Dose Taken:  , 1 tab(s) orally once a day  · 	metoprolol tartrate 25 mg oral tablet: Last Dose Taken:  , 1 tab(s) orally 2 times a day  · 	fenofibrate 145 mg oral tablet: Last Dose Taken:  , 1 orally once a day  · 	Plavix 75 mg oral tablet: Last Dose Taken:  , 1 orally once a day  · 	Albuterol: Last Dose Taken:  , 1 puff(s) inhaled prn as needed for sob  · 	finasteride 5 mg oral tablet: Last Dose Taken:  , 1 tab(s) orally once a day  · 	venlafaxine 75 mg oral capsule, extended release: Last Dose Taken:  , 1 cap(s) orally once a day  · 	hydrALAZINE 50 mg oral tablet: Last Dose Taken:  , 1 tab(s) orally 2 times a day    Patient received lying in bed. Alert and awake. Limited mobility. Incontinent of urine and stool. High risk for pressure injury development and progression.    Blanchable redness to bilateral heels. Dry flaky skin.

## 2024-08-08 NOTE — PHYSICAL THERAPY INITIAL EVALUATION ADULT - PERTINENT HX OF CURRENT PROBLEM, REHAB EVAL
h78-year-old male L handed admitted for weakness. Pt with PMH CAD s/p CABG, CKD, prior stroke, DM, PAD, HTN, HLD presented to the ED on 8/1/24 with left-sided facial droop, left arm and leg weakness, left shoulder pain that radiates throughout his arm, and left leg pain. He additionally reports 2 episodes of L handed shaking without loss of consciousness. was evaluated by neuro during his admission, MR head showed no acute infarct. Workup also showed pyelonephritis (8/1 UCx Enterobacter, 8/1 BCx NG) treated with meropenem as per ID and was discahrged today 8/7/2024 on Ertapenem until 8/15.  He presented again today for weakness and inability to take  care of himself.

## 2024-08-08 NOTE — PHYSICAL THERAPY INITIAL EVALUATION ADULT - ADDITIONAL COMMENTS
[facility] 5 steps to enter, + steps inside with chairlift, amb small distances using RW, has a w/c at home. Pt has HHA 8 hrs/day, 5 days/wk. Son reports he has been complaining of feeling weaker.

## 2024-08-08 NOTE — CONSULT NOTE ADULT - ASSESSMENT
IMPRESSION: Rehab of L HP / CAD s/p CABG, CKD, prior stroke, DM, PAD, HTN, HLD    PRECAUTIONS: [   ] Cardiac  [   ] Respiratory  [   ] Seizures [   ] Contact Isolation  [   ] Droplet Isolation  [   ] Other    Weight Bearing Status:     RECOMMENDATION:    Out of Bed to Chair     DVT/Decubiti Prophylaxis    REHAB PLAN:     [x    ] Bedside P/T 3-5 times a week   [  x  ]   Bedside O/T  2-3 times a week             [    ] Speech Therapy               [    ]  No Rehab Therapy Indicated   Conditioning/ROM                                    ADL  Bed Mobility                                               Conditioning/ROM  Transfers                                                     Bed Mobility  Sitting /Standing Balance                         Transfers                                        Gait Training                                               Sitting/Standing Balance  Stair Training [   ]Applicable                    Home equipment Eval                                                                        Splinting  [   ] Only      GOALS:   ADL   [x    ]   Independent                    Transfers  [ x   ] Independent                          Ambulation  [   x ] Independent     [   x  ] With device                            [    ]  CG                                                         [    ]  CG                                                                  [    ] CG                            [    ] Min A                                                   [    ] Min A                                                              [    ] Min  A          DISCHARGE PLAN:   [    ]  Good candidate for Intensive Rehabilitation/Hospital based                                             Will tolerate 3hrs Intensive Rehab Daily                                       [   x  ]  Short Term Rehab in Skilled Nursing Facility                                       [     ]  Home with Outpatient or  services                                         [     ]  Possible Candidate for Intensive Hospital based Rehab

## 2024-08-08 NOTE — ADVANCED PRACTICE NURSE CONSULT - RECOMMEDATIONS
Cleanse bilateral heels with soap and water  Pat dry, apply daily moisturizer  Offload heels    Maintain pressure injury prevention.   Keep skin clean.   Maintain incontinence care.   Monitor skin for changes and notify provider   Case discussed with primary RN

## 2024-08-08 NOTE — PHYSICAL THERAPY INITIAL EVALUATION ADULT - RANGE OF MOTION EXAMINATION, REHAB EVAL
B UE's grossly WFL AAROM. R hand grossly WFL . L hand WFL AAROM. R LE grossly WFL. L LE grossly WFL AAROM.

## 2024-08-08 NOTE — CONSULT NOTE ADULT - SUBJECTIVE AND OBJECTIVE BOX
HPI:    78-year-old male L handed with PMH CAD s/p CABG, CKD, prior stroke, DM, PAD, HTN, HLD presented to the ED on 8/1/24 with left-sided facial droop, left arm and leg weakness, left shoulder pain that radiates throughout his arm, and left leg pain. He additionally reports 2 episodes of L handed shaking without loss of consciousness. was evaluated by neuro during his admission, MR head showed no acute infarct.  Workup also showed pyelonephritis (8/1 UCx Enterobacter, 8/1 BCx NG) treated with meropenem as per ID and was discahrged today 8/7/2024 on Ertapenem until 8/15.  He presented again today for weakness and inability to take  care of himself.     #Placement  #inability to take care of self  -patient has a HHA for 8 hours - plan was to return home upon dc   -discussed with his son, agreed to dc home, was discharged today 8/7   -patient is back on the same day of discharge for weakness and inability to take care of himself    < from: MR Angio Neck w/ IV Cont (08.08.24 @ 16:05) >  IMPRESSION:    1.  No evidence of major vascular stenosis or occlusion within the neck.    2.  Advanced degenerative changes of the cervical spine noted on the   localizer images, consider MRI cervical spine for further evaluation.    --- End of Report ---    < end of copied text >    PAST MEDICAL & SURGICAL HISTORY:  CAD (coronary artery disease)      HTN (hypertension)      Depression      Anxiety      Diabetes  niddm      Angina pectoris      BPH (benign prostatic hyperplasia)      GERD (gastroesophageal reflux disease)      CVA (cerebral vascular accident)      History of appendectomy      Bilateral cataracts      History of heart bypass surgery          Hospital Course:    TODAY'S SUBJECTIVE & REVIEW OF SYMPTOMS:     Constitutional WNL   Cardio WNL   Resp WNL   GI WNL  Heme WNL  Endo WNL  Skin WNL  MSK Weakness  Neuro WNL  Cognitive WNL  Psych WNL      MEDICATIONS  (STANDING):  aspirin  chewable 81 milliGRAM(s) Oral daily  atorvastatin 40 milliGRAM(s) Oral at bedtime  baclofen 5 milliGRAM(s) Oral every 12 hours  clopidogrel Tablet 75 milliGRAM(s) Oral daily  dextrose 5%. 1000 milliLiter(s) (50 mL/Hr) IV Continuous <Continuous>  dextrose 5%. 1000 milliLiter(s) (100 mL/Hr) IV Continuous <Continuous>  dextrose 50% Injectable 25 Gram(s) IV Push once  dextrose 50% Injectable 25 Gram(s) IV Push once  dextrose 50% Injectable 12.5 Gram(s) IV Push once  finasteride 5 milliGRAM(s) Oral daily  glucagon  Injectable 1 milliGRAM(s) IntraMuscular once  heparin   Injectable 5000 Unit(s) SubCutaneous every 12 hours  hydrALAZINE 50 milliGRAM(s) Oral two times a day  insulin lispro (ADMELOG) corrective regimen sliding scale   SubCutaneous three times a day before meals  lactated ringers. 1000 milliLiter(s) (40 mL/Hr) IV Continuous <Continuous>  metoprolol tartrate 25 milliGRAM(s) Oral two times a day  pantoprazole    Tablet 40 milliGRAM(s) Oral before breakfast  ranolazine 500 milliGRAM(s) Oral daily  tamsulosin 0.4 milliGRAM(s) Oral at bedtime  venlafaxine XR. 75 milliGRAM(s) Oral daily    MEDICATIONS  (PRN):  albuterol    90 MICROgram(s) HFA Inhaler 1 Puff(s) Inhalation every 6 hours PRN Shortness of Breath  dextrose Oral Gel 15 Gram(s) Oral once PRN Blood Glucose LESS THAN 70 milliGRAM(s)/deciliter      FAMILY HISTORY:      Allergies    clindamycin (Rash)  PC Pen VK (Rash)  penicillin (Rash)    Intolerances        SOCIAL HISTORY:    [  ] Etoh  [  ] Smoking  [  ] Substance abuse     Home Environment:  [   ] Home Alone  [ x  ] Lives with Family  [   ] Home Health Aid    Dwelling:  [   ] Apartment  [ x  ] Private House  [   ] Adult Home  [   ] Skilled Nursing Facility      [   ] Short Term  [   ] Long Term  [x   ] Stairs       Elevator [   ]    FUNCTIONAL STATUS PTA: (Check all that apply)  Ambulation: [ x   ]Independent    [   ] Dependent     [   ] Non-Ambulatory  Assistive Device: [   ] SA Cane  [   ]  Q Cane  [ x  ] Walker  [   ]  Wheelchair  ADL : [  x ] Independent  [    ]  Dependent       Vital Signs Last 24 Hrs  T(C): 36.6 (08 Aug 2024 13:33), Max: 36.8 (07 Aug 2024 19:15)  T(F): 97.9 (08 Aug 2024 13:33), Max: 98.2 (07 Aug 2024 19:15)  HR: 60 (08 Aug 2024 13:33) (60 - 92)  BP: 158/78 (08 Aug 2024 13:33) (148/79 - 185/91)  BP(mean): --  RR: 18 (08 Aug 2024 13:33) (18 - 19)  SpO2: 100% (08 Aug 2024 13:33) (95% - 100%)    Parameters below as of 08 Aug 2024 04:47  Patient On (Oxygen Delivery Method): room air          PHYSICAL EXAM: Awake & Alert  GENERAL: NAD  HEAD:  Normocephalic  CHEST/LUNG: Clear   HEART: S1S2+  ABDOMEN: Soft, Nontender  EXTREMITIES:  no calf tenderness    NERVOUS SYSTEM:  Cranial Nerves 2-12 intact [   ] Abnormal  [ x  ]left facial droop   ROM: WFL all extremities [  x ]  Abnormal [   ]  Motor Strength: WFL all extremities  [   ]  Abnormal [ x  ]4/5 left side / 5/5 right side   Sensation: intact to light touch [ x  ] Abnormal [   ]    FUNCTIONAL STATUS:  Bed Mobility: Independent [   ]  Supervision [   ]  Needs Assistance [  x ]  N/A [   ]  Transfers: Independent [   ]  Supervision [   ]  Needs Assistance [   ]  N/A [   ]   Ambulation: Independent [   ]  Supervision [   ]  Needs Assistance [   ]  N/A [   ]  ADL: Independent [   ] Requires Assistance [   ] N/A [   ]      LABS:                        11.2   12.26 )-----------( 425      ( 08 Aug 2024 05:44 )             36.1     08-08    143  |  107  |  39<H>  ----------------------------<  101<H>  4.5   |  21  |  2.2<H>    Ca    9.1      08 Aug 2024 05:44  Mg     2.6     08-07    TPro  7.4  /  Alb  4.0  /  TBili  0.2  /  DBili  x   /  AST  32  /  ALT  27  /  AlkPhos  52  08-07      Urinalysis Basic - ( 08 Aug 2024 05:44 )    Color: x / Appearance: x / SG: x / pH: x  Gluc: 101 mg/dL / Ketone: x  / Bili: x / Urobili: x   Blood: x / Protein: x / Nitrite: x   Leuk Esterase: x / RBC: x / WBC x   Sq Epi: x / Non Sq Epi: x / Bacteria: x        RADIOLOGY & ADDITIONAL STUDIES:  /

## 2024-08-08 NOTE — PHYSICAL THERAPY INITIAL EVALUATION ADULT - GENERAL OBSERVATIONS, REHAB EVAL
10:06-11:04. Pt encountered semifowler in bed in NAD, + IV R UE, no complaints, agreeable to PT, stated he needed to use bathroom for BM, PT assisted pt on bedpan, was not able to have BM. Jazmine RAMOS provided hygenic care at b/s prior to PT. Son arrived to /s.

## 2024-08-08 NOTE — PHYSICAL THERAPY INITIAL EVALUATION ADULT - NSACTIVITYREC_GEN_A_PT
Reviewed ther ex's for B LE to perform throughout day in sitting: hip flex, knee flex/ext, ankle pumps, 10 reps each. B UE's scapular retractions AAROM. Pt and son educated on ex's at b/s.

## 2024-08-08 NOTE — PROGRESS NOTE ADULT - SUBJECTIVE AND OBJECTIVE BOX
SUBJECTIVE/OVERNIGHT EVENTS  78-year-old male with PMH CAD s/p CABG, CKD, prior stroke, DM, PAD, HTN, HLD presented to the ED on 8/1/24 for left sided weakness. evaluated by neuro. His workup also showed Pyelonephritis for which he was treated with Meropenem and discharged today (8/7) on Ertapenem until 8/15. Presented again today for weakness and inability to take care of himself. 8/8, pt was seen and examined at bedside. Pt has notable left sided weakness, most likely baseline. Will get PT eval, MRI of the neck. Bladder scan showed 386ml and was straight cath. Will do bladder scans q6, straight cath >350ml.     CODE STATUS: full code      MEDICATIONS  STANDING MEDICATIONS  aspirin  chewable 81 milliGRAM(s) Oral daily  atorvastatin 40 milliGRAM(s) Oral at bedtime  baclofen 5 milliGRAM(s) Oral every 12 hours  clopidogrel Tablet 75 milliGRAM(s) Oral daily  dextrose 5%. 1000 milliLiter(s) IV Continuous <Continuous>  dextrose 5%. 1000 milliLiter(s) IV Continuous <Continuous>  dextrose 50% Injectable 25 Gram(s) IV Push once  dextrose 50% Injectable 25 Gram(s) IV Push once  dextrose 50% Injectable 12.5 Gram(s) IV Push once  finasteride 5 milliGRAM(s) Oral daily  glucagon  Injectable 1 milliGRAM(s) IntraMuscular once  heparin   Injectable 5000 Unit(s) SubCutaneous every 12 hours  hydrALAZINE 50 milliGRAM(s) Oral two times a day  insulin lispro (ADMELOG) corrective regimen sliding scale   SubCutaneous three times a day before meals  lactated ringers. 1000 milliLiter(s) IV Continuous <Continuous>  metoprolol tartrate 25 milliGRAM(s) Oral two times a day  pantoprazole    Tablet 40 milliGRAM(s) Oral before breakfast  ranolazine 500 milliGRAM(s) Oral daily  tamsulosin 0.4 milliGRAM(s) Oral at bedtime  venlafaxine XR. 75 milliGRAM(s) Oral daily    PRN MEDICATIONS  albuterol    90 MICROgram(s) HFA Inhaler 1 Puff(s) Inhalation every 6 hours PRN  dextrose Oral Gel 15 Gram(s) Oral once PRN    VITALS  T(F): 97.7 (08-08-24 @ 04:47), Max: 98.2 (08-07-24 @ 19:15)  HR: 74 (08-08-24 @ 04:47) (73 - 92)  BP: 148/79 (08-08-24 @ 04:47) (130/72 - 185/91)  RR: 19 (08-08-24 @ 04:47) (18 - 19)  SpO2: 95% (08-08-24 @ 04:47) (95% - 100%)  POCT Blood Glucose.: 115 mg/dL (08-08-24 @ 07:56)  POCT Blood Glucose.: 107 mg/dL (08-07-24 @ 11:41)    PHYSICAL EXAM  GENERAL  (  ) NAD, lying in bed comfortably     (  ) obtunded     (  ) lethargic     (  ) somnolent    HEAD  (  ) Atraumatic     (  ) hematoma     (  ) laceration (specify location:       )     NECK  (  ) Supple     (  ) neck stiffness     (  ) nuchal rigidity     (  )  no JVD     (  ) JVD present ( -- cm)    HEART  Rate -->  (  ) normal rate    (  ) bradycardic    (  ) tachycardic  Rhythm -->  (  ) regular    (  ) regularly irregular    (  ) irregularly irregular  Murmurs -->  (  ) normal s1/s2    (  ) systolic murmur    (  ) diastolic murmur    (  ) continuous murmur     (  ) S3 present    (  ) S4 present    LUNGS  (  )Unlabored respirations     (  ) tachypnea  (  ) B/L air entry     (  ) decreased breath sounds in:  (location     )    (  ) no adventitious sound     (  ) crackles     (  ) wheezing      (  ) rhonchi      (specify location:       )  (  ) chest wall tenderness (specify location:       )    ABDOMEN  (  ) Soft     (  ) tense   |   (  ) nondistended     (  ) distended   |   (  ) +BS     (  ) hypoactive bowel sounds     (  ) hyperactive bowel sounds  (  ) nontender     (  ) RUQ tenderness     (  ) RLQ tenderness     (  ) LLQ tenderness     (  ) epigastric tenderness     (  ) diffuse tenderness  (  ) Splenomegaly      (  ) Hepatomegaly      (  ) Jaundice     (  ) ecchymosis     EXTREMITIES  (  ) Normal     (  ) Rash     (  ) ecchymosis     (  ) varicose veins      (  ) pitting edema     (  ) non-pitting edema   (  ) ulceration     (  ) gangrene:     (location:     )    NERVOUS SYSTEM  (  ) A&Ox3     (  ) confused     (  ) lethargic  CN II-XII:     (  ) Intact     (  ) focal deficits  (Specify:     )   Upper extremities:     (  ) strength X/5     (  ) focal deficit (specify:    )  Lower extremities:     (  ) strength  X/5    (  ) focal deficit (specify:    )    SKIN  (  ) No rashes or lesions     (  ) maculopapular rash     (  ) pustules     (  ) vesicles     (  ) ulcer     (  ) ecchymosis     (specify location:     )    (  ) Indwelling Abbott Catheter   Date insterted:    Reason (  ) Critical illness     (  ) urinary retention    (  ) Accurate Ins/Outs Monitoring     (  ) CMO patient    (  ) Central Line  Date inserted:  Location: (  ) Right IJ   (  ) Left IJ   (  ) Right Fem   (  ) Left Fem    (  ) SPC  (  ) pigtail  (  ) PEG tube  (  ) colostomy  (  ) jejunostomy  (  ) U-Dall    LABS             11.2   12.26 )-----------( 425      ( 08-08-24 @ 05:44 )             36.1     143  |  107  |  39  -------------------------<  101   08-08-24 @ 05:44  4.5  |  21  |  2.2    Ca      9.1     08-08-24 @ 05:44  Mg     2.6     08-07-24 @ 20:16    TPro  7.4  /  Alb  4.0  /  TBili  0.2  /  DBili  x   /  AST  32  /  ALT  27  /  AlkPhos  52  /  GGT  x     08-07-24 @ 20:16        Urinalysis Basic - ( 08 Aug 2024 05:44 )    Color: x / Appearance: x / SG: x / pH: x  Gluc: 101 mg/dL / Ketone: x  / Bili: x / Urobili: x   Blood: x / Protein: x / Nitrite: x   Leuk Esterase: x / RBC: x / WBC x   Sq Epi: x / Non Sq Epi: x / Bacteria: x          IMAGING

## 2024-08-08 NOTE — PHYSICAL THERAPY INITIAL EVALUATION ADULT - PERSONAL SAFETY AND JUDGMENT, REHAB EVAL
pt attempted to pull out IV, kept thinking it was his condom catheter despite pt education/at risk behaviors demonstrated

## 2024-08-08 NOTE — PHYSICAL THERAPY INITIAL EVALUATION ADULT - MANUAL MUSCLE TESTING RESULTS, REHAB EVAL
B UEs: shoulder flex 3-/5, elbow and hand at least 3+/5. R LE at least 3/5. L LE 3-/5 hip flex, 3/5 knee flex, ankle DF 3/5.

## 2024-08-09 ENCOUNTER — TRANSCRIPTION ENCOUNTER (OUTPATIENT)
Age: 79
End: 2024-08-09

## 2024-08-09 ENCOUNTER — APPOINTMENT (OUTPATIENT)
Dept: UROLOGY | Facility: CLINIC | Age: 79
End: 2024-08-09

## 2024-08-09 VITALS — WEIGHT: 130.07 LBS

## 2024-08-09 LAB
ANION GAP SERPL CALC-SCNC: 13 MMOL/L — SIGNIFICANT CHANGE UP (ref 7–14)
BASOPHILS # BLD AUTO: 0.08 K/UL — SIGNIFICANT CHANGE UP (ref 0–0.2)
BASOPHILS NFR BLD AUTO: 0.7 % — SIGNIFICANT CHANGE UP (ref 0–1)
BUN SERPL-MCNC: 37 MG/DL — HIGH (ref 10–20)
CALCIUM SERPL-MCNC: 8.7 MG/DL — SIGNIFICANT CHANGE UP (ref 8.4–10.5)
CHLORIDE SERPL-SCNC: 108 MMOL/L — SIGNIFICANT CHANGE UP (ref 98–110)
CO2 SERPL-SCNC: 21 MMOL/L — SIGNIFICANT CHANGE UP (ref 17–32)
CREAT SERPL-MCNC: 2 MG/DL — HIGH (ref 0.7–1.5)
EGFR: 33 ML/MIN/1.73M2 — LOW
EOSINOPHIL # BLD AUTO: 0.35 K/UL — SIGNIFICANT CHANGE UP (ref 0–0.7)
EOSINOPHIL NFR BLD AUTO: 3 % — SIGNIFICANT CHANGE UP (ref 0–8)
GLUCOSE BLDC GLUCOMTR-MCNC: 105 MG/DL — HIGH (ref 70–99)
GLUCOSE BLDC GLUCOMTR-MCNC: 118 MG/DL — HIGH (ref 70–99)
GLUCOSE BLDC GLUCOMTR-MCNC: 161 MG/DL — HIGH (ref 70–99)
GLUCOSE SERPL-MCNC: 89 MG/DL — SIGNIFICANT CHANGE UP (ref 70–99)
HCT VFR BLD CALC: 33.8 % — LOW (ref 42–52)
HGB BLD-MCNC: 10.4 G/DL — LOW (ref 14–18)
IMM GRANULOCYTES NFR BLD AUTO: 1 % — HIGH (ref 0.1–0.3)
LYMPHOCYTES # BLD AUTO: 19.4 % — LOW (ref 20.5–51.1)
LYMPHOCYTES # BLD AUTO: 2.29 K/UL — SIGNIFICANT CHANGE UP (ref 1.2–3.4)
MCHC RBC-ENTMCNC: 28.5 PG — SIGNIFICANT CHANGE UP (ref 27–31)
MCHC RBC-ENTMCNC: 30.8 G/DL — LOW (ref 32–37)
MCV RBC AUTO: 92.6 FL — SIGNIFICANT CHANGE UP (ref 80–94)
MONOCYTES # BLD AUTO: 0.76 K/UL — HIGH (ref 0.1–0.6)
MONOCYTES NFR BLD AUTO: 6.4 % — SIGNIFICANT CHANGE UP (ref 1.7–9.3)
NEUTROPHILS # BLD AUTO: 8.2 K/UL — HIGH (ref 1.4–6.5)
NEUTROPHILS NFR BLD AUTO: 69.5 % — SIGNIFICANT CHANGE UP (ref 42.2–75.2)
NRBC # BLD: 0 /100 WBCS — SIGNIFICANT CHANGE UP (ref 0–0)
PLATELET # BLD AUTO: 410 K/UL — HIGH (ref 130–400)
PMV BLD: 10.3 FL — SIGNIFICANT CHANGE UP (ref 7.4–10.4)
POTASSIUM SERPL-MCNC: 4.6 MMOL/L — SIGNIFICANT CHANGE UP (ref 3.5–5)
POTASSIUM SERPL-SCNC: 4.6 MMOL/L — SIGNIFICANT CHANGE UP (ref 3.5–5)
RBC # BLD: 3.65 M/UL — LOW (ref 4.7–6.1)
RBC # FLD: 15.8 % — HIGH (ref 11.5–14.5)
SODIUM SERPL-SCNC: 142 MMOL/L — SIGNIFICANT CHANGE UP (ref 135–146)
WBC # BLD: 11.8 K/UL — HIGH (ref 4.8–10.8)
WBC # FLD AUTO: 11.8 K/UL — HIGH (ref 4.8–10.8)

## 2024-08-09 PROCEDURE — 99232 SBSQ HOSP IP/OBS MODERATE 35: CPT

## 2024-08-09 RX ADMIN — Medication 81 MILLIGRAM(S): at 12:27

## 2024-08-09 RX ADMIN — CLOPIDOGREL BISULFATE 75 MILLIGRAM(S): 75 TABLET, FILM COATED ORAL at 12:27

## 2024-08-09 RX ADMIN — RANOLAZINE 500 MILLIGRAM(S): 1000 TABLET, FILM COATED, EXTENDED RELEASE ORAL at 12:27

## 2024-08-09 RX ADMIN — BACLOFEN 5 MILLIGRAM(S): 10 TABLET ORAL at 05:10

## 2024-08-09 RX ADMIN — Medication 25 MILLIGRAM(S): at 17:52

## 2024-08-09 RX ADMIN — FINASTERIDE 5 MILLIGRAM(S): 5 TABLET, FILM COATED ORAL at 12:27

## 2024-08-09 RX ADMIN — HEPARIN SODIUM 5000 UNIT(S): 1000 INJECTION, SOLUTION INTRAVENOUS; SUBCUTANEOUS at 05:10

## 2024-08-09 RX ADMIN — ERTAPENEM SODIUM 100 MILLIGRAM(S): 1 INJECTION INTRAMUSCULAR; INTRAVENOUS at 06:26

## 2024-08-09 RX ADMIN — HEPARIN SODIUM 5000 UNIT(S): 1000 INJECTION, SOLUTION INTRAVENOUS; SUBCUTANEOUS at 17:51

## 2024-08-09 RX ADMIN — HYDRALAZINE HYDROCHLORIDE 50 MILLIGRAM(S): 100 TABLET ORAL at 17:52

## 2024-08-09 RX ADMIN — VENLAFAXINE 75 MILLIGRAM(S): 25 TABLET ORAL at 12:27

## 2024-08-09 RX ADMIN — PANTOPRAZOLE SODIUM 40 MILLIGRAM(S): 20 TABLET, DELAYED RELEASE ORAL at 05:10

## 2024-08-09 RX ADMIN — BACLOFEN 5 MILLIGRAM(S): 10 TABLET ORAL at 17:52

## 2024-08-09 RX ADMIN — HYDRALAZINE HYDROCHLORIDE 50 MILLIGRAM(S): 100 TABLET ORAL at 05:10

## 2024-08-09 RX ADMIN — Medication 25 MILLIGRAM(S): at 05:10

## 2024-08-09 RX ADMIN — Medication 1: at 17:42

## 2024-08-09 NOTE — OCCUPATIONAL THERAPY INITIAL EVALUATION ADULT - LIVES WITH, PROFILE
Pt lives with son, PH, +5 CARLOS, +chair lift to access second level with bedroom and bathroom with stall shower, +shower chair/children

## 2024-08-09 NOTE — DIETITIAN INITIAL EVALUATION ADULT - NUTRITON FOCUSED PHYSICAL EXAM
Detail Level: Zone Consent: The patient's consent was obtained including but not limited to risks of crusting, scabbing, blistering, scarring, darker or lighter pigmentary change, recurrence, incomplete removal and infection. Medical Necessity Information: It is in your best interest to select a reason for this procedure from the list below. All of these items fulfill various CMS LCD requirements except the new and changing color options. Add 52 Modifier (Optional): no Duration Of Freeze Thaw-Cycle (Seconds): 0 Show Spray Paint Technique Variable?: Yes Spray Paint Text: The liquid nitrogen was applied to the skin utilizing a spray paint frosting technique. Post-Care Instructions: I reviewed with the patient in detail post-care instructions. Patient is to wear sunprotection, and avoid picking at any of the treated lesions. Pt may apply Vaseline to crusted or scabbing areas. Medical Necessity Clause: This procedure was medically necessary because the lesions that were treated were: Total Number Of Lesions Treated: 1 no...

## 2024-08-09 NOTE — OCCUPATIONAL THERAPY INITIAL EVALUATION ADULT - GENERAL OBSERVATIONS, REHAB EVAL
Pt encountered asleep in bed. Easy to wake. Agreeable to OT IE. +IV locked per RN, +Condemn catherter (off pt, RN aware). Patient presents with rigidity and L sided weakness impacting functional peformance at this time. Paitient left seated in bedside chair, + chair alarm, +self releasing lap belt, call bell within reach, meal tray setup. GRACE Aragon aware.

## 2024-08-09 NOTE — DISCHARGE NOTE NURSING/CASE MANAGEMENT/SOCIAL WORK - NSDCVIVACCINE_GEN_ALL_CORE_FT
Tdap; 20-Jul-2021 15:17; Dejuan Fry (RN); [employer]; [id number] (Exp. Date: 28-Jan-2023); IntraMuscular; Deltoid Left.; 0.5 milliLiter(s); VIS (VIS Published: 09-May-2013, VIS Presented: 20-Jul-2021);

## 2024-08-09 NOTE — CHART NOTE - NSCHARTNOTEFT_GEN_A_CORE
Pt with R MCA stroke in 2017 with residual L deficits since including L facial droop, LUE weakness and LLE weakness. Pt evaluated by Neurology for L sided weakness upon last admission on 8/2 and 8/6 with observed L sided weakness and facial droop. MRI brain obtained on 8/2 with no acute stroke. Continue secondary stroke prevention with ASA/plavix as previously recommended. Please call [phone] if any acute changes in physical exam or mental status.

## 2024-08-09 NOTE — PROGRESS NOTE ADULT - ATTENDING COMMENTS
# UTI  Culture - Urine (08.01.24 @ 21:53) >100,000 CFU/ml Enterobacter cloacae complex  cont antibiotics  per ID     # left side weakness,   MRI brain shows old stroke :  Chronic left SUSANNE territory infarct as well as bilateral basal ganglia   chronic lacunar infarcts.  check MRI C spine   PT eval     # Ckd 4  eGFR: 30: no hyperkalemia   creatinine trend  2.2 (08-08-24 @ 05:44)  2.4 (08-07-24 @ 20:16)  2.0 (08-07-24 @ 09:58)  1.8 (08-06-24 @ 12:17)  1.9 (08-06-24 @ 10:50)  2.2 (08-04-24 @ 08:30)  Creatinine: 2.1 mg/dL (09.06.23 @ 05:29)    # urinary retention  intermittent catheterization     # CAD s/p CABG,   cont meds     # DM,   CAPILLARY BLOOD GLUCOSE  POCT Blood Glucose.: 115 mg/dL (08 Aug 2024 07:56)  POCT Blood Glucose.: 107 mg/dL (07 Aug 2024 11:41)    # PAD, HTN, HLD  cont meds     #Progress Note Handoff    Pending :  mri c spine, discharge planning   Family discussion: nahum pt , resident will call the son   Disposition: SNF   code status: full code .
# UTI  Culture - Urine (08.01.24 @ 21:53) >100,000 CFU/ml Enterobacter cloacae complex  cont antibiotics  per ID: end date: 8/15?     # left side weakness,   MRI brain shows old stroke :  Chronic left SUSANNE territory infarct as well as bilateral basal ganglia   chronic lacunar infarcts.  dw neurology, per previous evaluation the weakness on the left is not new   PT eval     # Ckd 4  eGFR: 30: no hyperkalemia   creatinine trend  2.0 (08-09-24 @ 05:49)  2.2 (08-08-24 @ 05:44)  2.4 (08-07-24 @ 20:16)  2.0 (08-07-24 @ 09:58)  1.8 (08-06-24 @ 12:17)  1.9 (08-06-24 @ 10:50)  Creatinine: 2.1 mg/dL (09.06.23 @ 05:29)    # urinary retention  intermittent catheterization     # CAD s/p CABG,   cont meds     # DM,   CAPILLARY BLOOD GLUCOSE  POCT Blood Glucose.: 118 mg/dL (09 Aug 2024 11:53)  POCT Blood Glucose.: 105 mg/dL (09 Aug 2024 07:49)  POCT Blood Glucose.: 110 mg/dL (08 Aug 2024 21:58)  POCT Blood Glucose.: 90 mg/dL (08 Aug 2024 17:08)    # PAD, HTN, HLD  cont meds     #Progress Note Handoff    Pending :  mri c spine, discharge planning   Family discussion: nahum pt , resident will call the son   Disposition: SNF   code status: full code

## 2024-08-09 NOTE — DISCHARGE NOTE PROVIDER - NSDCFUSCHEDAPPT_GEN_ALL_CORE_FT
[facility]  CARDIOLOGY [address] [facility]  Scheduled Appointment: 08/21/2024

## 2024-08-09 NOTE — DIETITIAN INITIAL EVALUATION ADULT - ORAL INTAKE PTA/DIET HISTORY
Patient reports good PO intake PTA. He was eating 3 meals/day. No vitamin/mineral supplements taken. No oral nutrition supplements taken. UBW: 140 lbs - endorses 10 lbs weight loss due to diarrhea. NKFA, no food intolerances.

## 2024-08-09 NOTE — DISCHARGE NOTE PROVIDER - NSDCMRMEDTOKEN_GEN_ALL_CORE_FT
Albuterol: 1 puff(s) inhaled prn as needed for sob  aspirin 81 mg oral tablet, chewable: 1 tab(s) orally once a day  baclofen 5 mg oral tablet: 1 tab(s) orally every 12 hours  ertapenem 1 g injection: 500 milligram(s) intravenously once a day START 8/8/24, LAST DOSE 8/15/24  fenofibrate 145 mg oral tablet: 1 orally once a day  finasteride 5 mg oral tablet: 1 tab(s) orally once a day  hydrALAZINE 50 mg oral tablet: 1 tab(s) orally 2 times a day  metoprolol tartrate 25 mg oral tablet: 1 tab(s) orally 2 times a day  Plavix 75 mg oral tablet: 1 orally once a day  ranolazine 500 mg oral tablet, extended release: 1 tab(s) orally once a day  rosuvastatin 40 mg oral tablet: 1 tab(s) orally once a day  tamsulosin 0.4 mg oral capsule: 1 cap(s) orally once a day  venlafaxine 75 mg oral capsule, extended release: 1 cap(s) orally once a day

## 2024-08-09 NOTE — DISCHARGE NOTE PROVIDER - HOSPITAL COURSE
78-year-old male with PMH CAD s/p CABG, CKD, prior stroke, DM, PAD, HTN, HLD presented to the ED on 8/1/24 for left sided weakness. evaluated by neuro. His workup also showed Pyelonephritis for which he was treated with Meropenem and discharged today (8/7) on Ertapenem until 8/15. Presented again today for weakness and inability to take care of himself. During admission patient was seen and examined. We monitored his left sided weakness which improved throughout the admission.     #Placement  #inability to take care of self  -patient has a HHA for 8 hours - plan was to return home upon dc   -discussed with his son, agreed to dc home, was discharged today 8/7   -patient is back on the same day of discharge for weakness and inability to take care of himself  -PT/OT consult  -Placement     #SAMANTA on top of CKD  -crea 2 now (was 2 in today am)  -baseline around 1.9   -s/p LR 1 L in ED  -start LR at 40 ml/hr   -trend BMP   -bladder scans + straight cath when needed.     #Complicated UTI  -ID consulted, midline placed and discharged on ertapenem until 8/15  -c/w Ertapenem as per ID  -wbc 14 on admission, repeat CBC in am     #AMS/Rigidity - resolved  #Left sided weakness   #history of CVA  - MR Head last admission - no acute infarct, chronic left SUSANNE territorial infarct and bilateral basal ganglia chronic lacunar infarcts  - neurology --> outpatient follow up recommended   - continue DAPT/statin  - PT follow up   - started on baclofen     #CAD  #PVD  #HTN   #HLD  -c/w home meds    #BPH   - on flomax and finasteride     #DM II - well controlled   -Continue sliding scale insulin regimen    #Depression  - Cont venlefaxine    Patient was seen and examined during admission. Discussed case with Dr. Huffman, patient is stable and ready for discharge.

## 2024-08-09 NOTE — DIETITIAN INITIAL EVALUATION ADULT - OTHER CALCULATIONS
Energy: 1475 - 1770 kcal/day (25-30 kcal/kg)  Protein: 59 - 71 gm/day (1- 1.2 gm/kg)  Fluid: 1 mL/kcal  estimated needs with consideration for age, acuity of illness

## 2024-08-09 NOTE — DIETITIAN INITIAL EVALUATION ADULT - PERTINENT LABORATORY DATA
08-09    142  |  108  |  37<H>  ----------------------------<  89  4.6   |  21  |  2.0<H>    Ca    8.7      09 Aug 2024 05:49  Mg     2.6     08-07    TPro  7.4  /  Alb  4.0  /  TBili  0.2  /  DBili  x   /  AST  32  /  ALT  27  /  AlkPhos  52  08-07  POCT Blood Glucose.: 118 mg/dL (08-09-24 @ 11:53)  A1C with Estimated Average Glucose Result: 6.4 % (08-03-24 @ 07:11)  A1C with Estimated Average Glucose Result: 6.5 % (08-31-23 @ 06:21)

## 2024-08-09 NOTE — DISCHARGE NOTE PROVIDER - NSDCCPCAREPLAN_GEN_ALL_CORE_FT
PRINCIPAL DISCHARGE DIAGNOSIS  Diagnosis: Weakness  Assessment and Plan of Treatment: You came in with left sided weakness. We ran lab work and did imaging to rule out any serious causes. We monitored your weakness and you have improved during your admission. Please continue to take your medications as precribed and please follow up with your primary care physician outpatient.

## 2024-08-09 NOTE — DIETITIAN INITIAL EVALUATION ADULT - OTHER INFO
Patient is a 78-year-old male with PMH CAD s/p CABG, CKD, prior stroke, DM, PAD, HTN, HLD presented to the ED on 8/1/24 for left sided weakness. evaluated by neuro. His workup also showed Pyelonephritis for which he was treated with Meropenem and discharged today (8/7) on Ertapenem until 8/15. Presented again today for weakness and inability to take care of himself.    Placement; inability of take care of self; SAMANTA on top of CKD; Complicated UTI; AMS/Rigidity -resolved; Left sided weakness; hx of CVA; CAD; PVD; HTN; HLD; BPH; DM II; Depression;

## 2024-08-09 NOTE — OCCUPATIONAL THERAPY INITIAL EVALUATION ADULT - SHORT TERM MEMORY, REHAB EVAL
Pt able to repeat, recall 3/3 words post ~3-5 min delay. Pt responsible for and independent with medication management and finances PTA/intact

## 2024-08-09 NOTE — OCCUPATIONAL THERAPY INITIAL EVALUATION ADULT - RANGE OF MOTION EXAMINATION, UPPER EXTREMITY
L shoulder ~1/2-3/4 flexion (pt reports as baseline)/bilateral UE Active ROM was WFL  (within functional limits)

## 2024-08-09 NOTE — DISCHARGE NOTE NURSING/CASE MANAGEMENT/SOCIAL WORK - HAS THE PATIENT USED TOBACCO IN THE PAST 30 DAYS?
Thoracic Surgery Discharge Instructions    Follow up with [facility] on Monday Nov 21 at 930 am. Please arrive 30 minutes early for a chest x-ray.    May remove chest tube dressing on Wednesday Nov 9 at 9 am, then may shower.  Wash incisions daily with soap and water.  Call for redness, drainage, or temperature greater than 101.      WALK, WALK, WALK.  Please walk every 2 hours.  Use your breathing machine on the hours your are not walking.     No driving while on pain medication.  No heavy lifting.    Please avoid any NSAIDS.  These include: Motrin, Advil, Aleve and Ibuprofen.     You may use Tylenol for pain, do not exceed 3000 mg of tylenol in 24 hours.    Take an over the counter stool softener while on pain medication.     Questions or concerns:   [place] office: Mal[facility] [phone]     No

## 2024-08-09 NOTE — OCCUPATIONAL THERAPY INITIAL EVALUATION ADULT - ADL RETRAINING, OT EVAL
Pt will increase UB dressing to MIN A by discharge with strategies to increase independence with self care routine

## 2024-08-09 NOTE — DISCHARGE NOTE NURSING/CASE MANAGEMENT/SOCIAL WORK - PATIENT PORTAL LINK FT
You can access the FollowMyHealth Patient Portal offered by [facility] by registering at the following website: [email or web address]. By joining [facility]’s FollowMyHealth portal, you will also be able to view your health information using other applications (apps) compatible with our system.

## 2024-08-09 NOTE — PROGRESS NOTE ADULT - ASSESSMENT
78-year-old male with PMH CAD s/p CABG, CKD, prior stroke, DM, PAD, HTN, HLD presented to the ED on 8/1/24 for left sided weakness. evaluated by neuro. His workup also showed Pyelonephritis for which he was treated with Meropenem and discharged today (8/7) on Ertapenem until 8/15. Presented again today for weakness and inability to take care of himself.    #Placement  #inability to take care of self  -patient has a HHA for 8 hours - plan was to return home upon dc   -discussed with his son, agreed to dc home, was discharged today 8/7   -patient is back on the same day of discharge for weakness and inability to take care of himself  -PT/OT consult  -Placement     #SAMANTA on top of CKD  -crea 2 now (was 2 in today am)  -baseline around 1.9   -s/p LR 1 L in ED  -start LR at 40 ml/hr   -trend BMP   -bladder scans + straight cath when needed.     #Complicated UTI  -ID consulted, midline placed and discharged on ertapenem until 8/15  -c/w Ertapenem as per ID  -wbc 14 on admission, repeat CBC in am     #AMS/Rigidity - resolved  #Left sided weakness   #history of CVA  - MR Head last admission - no acute infarct, chronic left SUSANNE territorial infarct and bilateral basal ganglia chronic lacunar infarcts  - neurology --> outpatient follow up recommended   - continue DAPT/statin  - PT follow up   - started on baclofen     #CAD  #PVD  #HTN   #HLD  -c/w home meds    #BPH   - on flomax and finasteride     #DM II - well controlled   -Continue sliding scale insulin regimen    #Depression  - Cont venlefaxine    #DVT ppx: Heparin 5000 BID  Diet regular    f/u - MRI neck and pt eval, bladder scan, pending placement 
78-year-old male with PMH CAD s/p CABG, CKD, prior stroke, DM, PAD, HTN, HLD presented to the ED on 8/1/24 for left sided weakness. evaluated by neuro. His workup also showed Pyelonephritis for which he was treated with Meropenem and discharged today (8/7) on Ertapenem until 8/15. Presented again today for weakness and inability to take care of himself.    #Placement  #inability to take care of self  -patient has a HHA for 8 hours - plan was to return home upon dc   -discussed with his son, agreed to dc home, was discharged today 8/7   -patient is back on the same day of discharge for weakness and inability to take care of himself  -PT/OT consult  -Placement     #SAMANTA on top of CKD  -crea 2.4 now (was 2 in today am)  -baseline around 1.9   -s/p LR 1 L in ED  -start LR at 40 ml/hr   -trend BMP   -bladder scans + straight cath when needed.     #Complicated UTI  -ID consulted, midline placed and discharged on ertapenem until 8/15  -c/w Ertapenem as per ID  -wbc 14 on admission, repeat CBC in am     #AMS/Rigidity - resolved  #Left sided weakness   #history of CVA  - MR Head last admission - no acute infarct, chronic left SUSANNE territorial infarct and bilateral basal ganglia chronic lacunar infarcts  - neurology --> outpatient follow up recommended   - continue DAPT/statin  - PT follow up   - started on baclofen     #CAD  #PVD  #HTN   #HLD  -c/w home meds    #BPH   - on flomax and finasteride     #DM II - well controlled   -Continue sliding scale insulin regimen    #Depression  - Cont venlefaxine    #DVT ppx: Heparin 5000 BID  Diet regular    f/u - MRI neck and pt eval, bladder scan

## 2024-08-09 NOTE — PROGRESS NOTE ADULT - SUBJECTIVE AND OBJECTIVE BOX
ERICK MARTINEZ 78y Male  MRN#: [id number]     Hospital Day: 2d    Pt is currently admitted with the primary diagnosis of  Weakness        SUBJECTIVE     78-year-old male with PMH CAD s/p CABG, CKD, prior stroke, DM, PAD, HTN, HLD presented to the ED on 8/1/24 for left sided weakness. evaluated by neuro. His workup also showed Pyelonephritis for which he was treated with Meropenem and discharged today (8/7) on Ertapenem until 8/15. Presented again today for weakness and inability to take care of himself. 8/8, pt was seen and examined at bedside. Pt has notable left sided weakness, most likely baseline. Will get PT eval, MRI of the neck. Bladder scan showed 386ml and was straight cath. Will do bladder scans q6, straight cath >350ml. 8/9, Patient weakness is better. Neurology signed off. Will continue monitoring kidney function and repeating bladder scan.                                             ----------------------------------------------------------  OBJECTIVE  PAST MEDICAL & SURGICAL HISTORY  CAD (coronary artery disease)    HTN (hypertension)    Depression    Anxiety    Diabetes  niddm    Angina pectoris    BPH (benign prostatic hyperplasia)    GERD (gastroesophageal reflux disease)    CVA (cerebral vascular accident)    History of appendectomy    Bilateral cataracts    History of heart bypass surgery                                              -----------------------------------------------------------  ALLERGIES:  clindamycin (Rash)  PC Pen VK (Rash)  penicillin (Rash)                                            ------------------------------------------------------------    HOME MEDICATIONS  Home Medications:  Albuterol: 1 puff(s) inhaled prn as needed for sob (07 Aug 2024 23:51)  baclofen 5 mg oral tablet: 1 tab(s) orally every 12 hours (07 Aug 2024 23:51)  fenofibrate 145 mg oral tablet: 1 orally once a day (07 Aug 2024 23:51)  finasteride 5 mg oral tablet: 1 tab(s) orally once a day (07 Aug 2024 23:51)  hydrALAZINE 50 mg oral tablet: 1 tab(s) orally 2 times a day (07 Aug 2024 23:51)  metoprolol tartrate 25 mg oral tablet: 1 tab(s) orally 2 times a day (07 Aug 2024 23:51)  pantoprazole 40 mg oral delayed release tablet: 1 tab(s) orally once a day (before a meal) (07 Aug 2024 23:51)  Plavix 75 mg oral tablet: 1 orally once a day (07 Aug 2024 23:51)  ranolazine 500 mg oral tablet, extended release: 1 tab(s) orally once a day (07 Aug 2024 23:51)  rosuvastatin 40 mg oral tablet: 1 tab(s) orally once a day (07 Aug 2024 23:51)  venlafaxine 75 mg oral capsule, extended release: 1 cap(s) orally once a day (07 Aug 2024 23:51)                           MEDICATIONS:  STANDING MEDICATIONS  aspirin  chewable 81 milliGRAM(s) Oral daily  atorvastatin 40 milliGRAM(s) Oral at bedtime  baclofen 5 milliGRAM(s) Oral every 12 hours  clopidogrel Tablet 75 milliGRAM(s) Oral daily  dextrose 5%. 1000 milliLiter(s) IV Continuous <Continuous>  dextrose 5%. 1000 milliLiter(s) IV Continuous <Continuous>  dextrose 50% Injectable 25 Gram(s) IV Push once  dextrose 50% Injectable 12.5 Gram(s) IV Push once  dextrose 50% Injectable 25 Gram(s) IV Push once  ertapenem  IVPB 500 milliGRAM(s) IV Intermittent every 24 hours  finasteride 5 milliGRAM(s) Oral daily  glucagon  Injectable 1 milliGRAM(s) IntraMuscular once  heparin   Injectable 5000 Unit(s) SubCutaneous every 12 hours  hydrALAZINE 50 milliGRAM(s) Oral two times a day  insulin lispro (ADMELOG) corrective regimen sliding scale   SubCutaneous three times a day before meals  lactated ringers. 1000 milliLiter(s) IV Continuous <Continuous>  metoprolol tartrate 25 milliGRAM(s) Oral two times a day  pantoprazole    Tablet 40 milliGRAM(s) Oral before breakfast  ranolazine 500 milliGRAM(s) Oral daily  tamsulosin 0.4 milliGRAM(s) Oral at bedtime  venlafaxine XR. 75 milliGRAM(s) Oral daily    PRN MEDICATIONS  albuterol    90 MICROgram(s) HFA Inhaler 1 Puff(s) Inhalation every 6 hours PRN  dextrose Oral Gel 15 Gram(s) Oral once PRN                                            ------------------------------------------------------------  VITAL SIGNS: Last 24 Hours  T(C): 36.8 (09 Aug 2024 04:53), Max: 36.8 (09 Aug 2024 04:53)  T(F): 98.3 (09 Aug 2024 04:53), Max: 98.3 (09 Aug 2024 04:53)  HR: 58 (09 Aug 2024 04:53) (55 - 64)  BP: 159/93 (09 Aug 2024 04:53) (142/66 - 184/95)  BP(mean): --  RR: 19 (09 Aug 2024 04:53) (18 - 19)  SpO2: 98% (09 Aug 2024 04:53) (98% - 100%)                                             --------------------------------------------------------------  LABS:                        10.4   11.80 )-----------( 410      ( 09 Aug 2024 05:49 )             33.8     08-09    142  |  108  |  37<H>  ----------------------------<  89  4.6   |  21  |  2.0<H>    Ca    8.7      09 Aug 2024 05:49  Mg     2.6     08-07    TPro  7.4  /  Alb  4.0  /  TBili  0.2  /  DBili  x   /  AST  32  /  ALT  27  /  AlkPhos  52  08-07      Urinalysis Basic - ( 09 Aug 2024 05:49 )    Color: x / Appearance: x / SG: x / pH: x  Gluc: 89 mg/dL / Ketone: x  / Bili: x / Urobili: x   Blood: x / Protein: x / Nitrite: x   Leuk Esterase: x / RBC: x / WBC x   Sq Epi: x / Non Sq Epi: x / Bacteria: x                                                            -------------------------------------------------------------  RADIOLOGY:                                            --------------------------------------------------------------    PHYSICAL EXAM:  GENERAL: NAD, lying in bed comfortably  HEAD:  Atraumatic, Normocephalic  EYES: EOMI, conjunctiva and sclera clear  ENT: Moist mucous membranes  NECK: Supple, No JVD  CHEST/LUNG: Clear to auscultation bilaterally; No rales, rhonchi, wheezing, or rubs. Unlabored respirations  HEART: regular rate and rhythm; No murmurs, rubs, or gallops  ABDOMEN: Bowel sounds present; Soft, Nontender, Nondistended.    EXTREMITIES: Warm. No clubbing, cyanosis, or edema  NERVOUS SYSTEM:  Alert & Oriented X3. No focal deficits   SKIN: No rashes or lesions                                           --------------------------------------------------------------                 ERICK MARTINEZ 78y Male  MRN#: [id number]     Hospital Day: 2d    Pt is currently admitted with the primary diagnosis of  Weakness    SUBJECTIVE     78-year-old male with PMH CAD s/p CABG, CKD, prior stroke, DM, PAD, HTN, HLD presented to the ED on 8/1/24 for left sided weakness. evaluated by neuro. His workup also showed Pyelonephritis for which he was treated with Meropenem and discharged today (8/7) on Ertapenem until 8/15. Presented again today for weakness and inability to take care of himself. 8/8, pt was seen and examined at bedside. Pt has notable left sided weakness, most likely baseline. Will get PT eval, MRI of the neck. Bladder scan showed 386ml and was straight cath. Will do bladder scans q6, straight cath if  >350ml. 8/9, Patient weakness is better. Neurology signed off. Will continue monitoring kidney function and repeating bladder scan.                                     ----------------------------------------------------------  OBJECTIVE  PAST MEDICAL & SURGICAL HISTORY  CAD (coronary artery disease)    HTN (hypertension)    Depression    Anxiety    Diabetes  niddm    Angina pectoris    BPH (benign prostatic hyperplasia)    GERD (gastroesophageal reflux disease)    CVA (cerebral vascular accident)    History of appendectomy    Bilateral cataracts    History of heart bypass surgery                                              -----------------------------------------------------------  ALLERGIES:  clindamycin (Rash)  PC Pen VK (Rash)  penicillin (Rash)                                            ------------------------------------------------------------    HOME MEDICATIONS  Home Medications:  Albuterol: 1 puff(s) inhaled prn as needed for sob (07 Aug 2024 23:51)  baclofen 5 mg oral tablet: 1 tab(s) orally every 12 hours (07 Aug 2024 23:51)  fenofibrate 145 mg oral tablet: 1 orally once a day (07 Aug 2024 23:51)  finasteride 5 mg oral tablet: 1 tab(s) orally once a day (07 Aug 2024 23:51)  hydrALAZINE 50 mg oral tablet: 1 tab(s) orally 2 times a day (07 Aug 2024 23:51)  metoprolol tartrate 25 mg oral tablet: 1 tab(s) orally 2 times a day (07 Aug 2024 23:51)  pantoprazole 40 mg oral delayed release tablet: 1 tab(s) orally once a day (before a meal) (07 Aug 2024 23:51)  Plavix 75 mg oral tablet: 1 orally once a day (07 Aug 2024 23:51)  ranolazine 500 mg oral tablet, extended release: 1 tab(s) orally once a day (07 Aug 2024 23:51)  rosuvastatin 40 mg oral tablet: 1 tab(s) orally once a day (07 Aug 2024 23:51)  venlafaxine 75 mg oral capsule, extended release: 1 cap(s) orally once a day (07 Aug 2024 23:51)                           MEDICATIONS:  STANDING MEDICATIONS  aspirin  chewable 81 milliGRAM(s) Oral daily  atorvastatin 40 milliGRAM(s) Oral at bedtime  baclofen 5 milliGRAM(s) Oral every 12 hours  clopidogrel Tablet 75 milliGRAM(s) Oral daily  dextrose 5%. 1000 milliLiter(s) IV Continuous <Continuous>  dextrose 5%. 1000 milliLiter(s) IV Continuous <Continuous>  dextrose 50% Injectable 25 Gram(s) IV Push once  dextrose 50% Injectable 12.5 Gram(s) IV Push once  dextrose 50% Injectable 25 Gram(s) IV Push once  ertapenem  IVPB 500 milliGRAM(s) IV Intermittent every 24 hours  finasteride 5 milliGRAM(s) Oral daily  glucagon  Injectable 1 milliGRAM(s) IntraMuscular once  heparin   Injectable 5000 Unit(s) SubCutaneous every 12 hours  hydrALAZINE 50 milliGRAM(s) Oral two times a day  insulin lispro (ADMELOG) corrective regimen sliding scale   SubCutaneous three times a day before meals  lactated ringers. 1000 milliLiter(s) IV Continuous <Continuous>  metoprolol tartrate 25 milliGRAM(s) Oral two times a day  pantoprazole    Tablet 40 milliGRAM(s) Oral before breakfast  ranolazine 500 milliGRAM(s) Oral daily  tamsulosin 0.4 milliGRAM(s) Oral at bedtime  venlafaxine XR. 75 milliGRAM(s) Oral daily    PRN MEDICATIONS  albuterol    90 MICROgram(s) HFA Inhaler 1 Puff(s) Inhalation every 6 hours PRN  dextrose Oral Gel 15 Gram(s) Oral once PRN                                            ------------------------------------------------------------  VITAL SIGNS: Last 24 Hours  T(C): 36.8 (09 Aug 2024 04:53), Max: 36.8 (09 Aug 2024 04:53)  T(F): 98.3 (09 Aug 2024 04:53), Max: 98.3 (09 Aug 2024 04:53)  HR: 58 (09 Aug 2024 04:53) (55 - 64)  BP: 159/93 (09 Aug 2024 04:53) (142/66 - 184/95)  BP(mean): --  RR: 19 (09 Aug 2024 04:53) (18 - 19)  SpO2: 98% (09 Aug 2024 04:53) (98% - 100%)                                             --------------------------------------------------------------  LABS:                        10.4   11.80 )-----------( 410      ( 09 Aug 2024 05:49 )             33.8     08-09    142  |  108  |  37<H>  ----------------------------<  89  4.6   |  21  |  2.0<H>    Ca    8.7      09 Aug 2024 05:49  Mg     2.6     08-07    TPro  7.4  /  Alb  4.0  /  TBili  0.2  /  DBili  x   /  AST  32  /  ALT  27  /  AlkPhos  52  08-07      Urinalysis Basic - ( 09 Aug 2024 05:49 )    Color: x / Appearance: x / SG: x / pH: x  Gluc: 89 mg/dL / Ketone: x  / Bili: x / Urobili: x   Blood: x / Protein: x / Nitrite: x   Leuk Esterase: x / RBC: x / WBC x   Sq Epi: x / Non Sq Epi: x / Bacteria: x                                                            -------------------------------------------------------------  RADIOLOGY:                                            --------------------------------------------------------------    PHYSICAL EXAM:  GENERAL: NAD, lying in bed comfortably  HEAD:  Atraumatic, Normocephalic  EYES: EOMI, conjunctiva and sclera clear  ENT: Moist mucous membranes  NECK: Supple, No JVD  CHEST/LUNG: Clear to auscultation bilaterally; No rales, rhonchi, wheezing, or rubs. Unlabored respirations  HEART: regular rate and rhythm; No murmurs, rubs, or gallops  ABDOMEN: Bowel sounds present; Soft, Nontender, Nondistended.    EXTREMITIES: Warm. No clubbing, cyanosis, or edema  NERVOUS SYSTEM:  Alert & Oriented X3. No focal deficits   SKIN: No rashes or lesions                                           --------------------------------------------------------------

## 2024-08-09 NOTE — DIETITIAN INITIAL EVALUATION ADULT - PERTINENT MEDS FT
MEDICATIONS  (STANDING):  aspirin  chewable 81 milliGRAM(s) Oral daily  atorvastatin 40 milliGRAM(s) Oral at bedtime  baclofen 5 milliGRAM(s) Oral every 12 hours  clopidogrel Tablet 75 milliGRAM(s) Oral daily  dextrose 5%. 1000 milliLiter(s) (50 mL/Hr) IV Continuous <Continuous>  dextrose 5%. 1000 milliLiter(s) (100 mL/Hr) IV Continuous <Continuous>  dextrose 50% Injectable 25 Gram(s) IV Push once  dextrose 50% Injectable 12.5 Gram(s) IV Push once  dextrose 50% Injectable 25 Gram(s) IV Push once  ertapenem  IVPB 500 milliGRAM(s) IV Intermittent every 24 hours  finasteride 5 milliGRAM(s) Oral daily  glucagon  Injectable 1 milliGRAM(s) IntraMuscular once  heparin   Injectable 5000 Unit(s) SubCutaneous every 12 hours  hydrALAZINE 50 milliGRAM(s) Oral two times a day  insulin lispro (ADMELOG) corrective regimen sliding scale   SubCutaneous three times a day before meals  lactated ringers. 1000 milliLiter(s) (40 mL/Hr) IV Continuous <Continuous>  metoprolol tartrate 25 milliGRAM(s) Oral two times a day  pantoprazole    Tablet 40 milliGRAM(s) Oral before breakfast  ranolazine 500 milliGRAM(s) Oral daily  tamsulosin 0.4 milliGRAM(s) Oral at bedtime  venlafaxine XR. 75 milliGRAM(s) Oral daily    MEDICATIONS  (PRN):  albuterol    90 MICROgram(s) HFA Inhaler 1 Puff(s) Inhalation every 6 hours PRN Shortness of Breath  dextrose Oral Gel 15 Gram(s) Oral once PRN Blood Glucose LESS THAN 70 milliGRAM(s)/deciliter

## 2024-08-09 NOTE — OCCUPATIONAL THERAPY INITIAL EVALUATION ADULT - PERTINENT HX OF CURRENT PROBLEM, REHAB EVAL
8-year-old male L handed with PMH CAD s/p CABG, CKD, prior stroke, DM, PAD, HTN, HLD presented to the ED on 8/1/24 with left-sided facial droop, left arm and leg weakness, left shoulder pain that radiates throughout his arm, and left leg pain. He additionally reports 2 episodes of L handed shaking without loss of consciousness. was evaluated by neuro during his admission, MR head showed no acute infarct. Workup also showed pyelonephritis Pt was discharged today 8/7/2024 and readmitted same say for inability to care for self at home, placement

## 2024-08-09 NOTE — DIETITIAN INITIAL EVALUATION ADULT - NAME AND PHONE
Lynn Ji, RD [phone] or via Teams    Patient is at moderate nutrition risk, RD to f/u in 5-7 days or PRN

## 2024-08-09 NOTE — PHARMACOTHERAPY INTERVENTION NOTE - COMMENTS
Modified PCN allergy history to state that patient tolerated ertapenem during this admission.     Christine Hunter, PharmD  Clinical Pharmacy Specialist, Infectious Diseases  Tele-Antimicrobial Stewardship Program (Tele-ASP)  Tele-ASP Phone: [phone]

## 2024-08-09 NOTE — OCCUPATIONAL THERAPY INITIAL EVALUATION ADULT - TRANSFER TRAINING, PT EVAL
Pt will increase functional transfers to [facility] to increase safety and preparation for safe d/c.

## 2024-08-09 NOTE — DISCHARGE NOTE NURSING/CASE MANAGEMENT/SOCIAL WORK - NSDCPEFALRISK_GEN_ALL_CORE
For information on Fall & Injury Prevention, visit: [email or web address] OR  [email or web address] OR  https://www.cdc.gov/steadi/patient.html

## 2024-08-20 DIAGNOSIS — Z95.1 PRESENCE OF AORTOCORONARY BYPASS GRAFT: ICD-10-CM

## 2024-08-20 DIAGNOSIS — I25.10 ATHEROSCLEROTIC HEART DISEASE OF NATIVE CORONARY ARTERY WITHOUT ANGINA PECTORIS: ICD-10-CM

## 2024-08-20 DIAGNOSIS — E11.51 TYPE 2 DIABETES MELLITUS WITH DIABETIC PERIPHERAL ANGIOPATHY WITHOUT GANGRENE: ICD-10-CM

## 2024-08-20 DIAGNOSIS — Z74.2 NEED FOR ASSISTANCE AT HOME AND NO OTHER HOUSEHOLD MEMBER ABLE TO RENDER CARE: ICD-10-CM

## 2024-08-20 DIAGNOSIS — Z79.02 LONG TERM (CURRENT) USE OF ANTITHROMBOTICS/ANTIPLATELETS: ICD-10-CM

## 2024-08-20 DIAGNOSIS — R33.8 OTHER RETENTION OF URINE: ICD-10-CM

## 2024-08-20 DIAGNOSIS — N18.4 CHRONIC KIDNEY DISEASE, STAGE 4 (SEVERE): ICD-10-CM

## 2024-08-20 DIAGNOSIS — I69.354 HEMIPLEGIA AND HEMIPARESIS FOLLOWING CEREBRAL INFARCTION AFFECTING LEFT NON-DOMINANT SIDE: ICD-10-CM

## 2024-08-20 DIAGNOSIS — Z79.82 LONG TERM (CURRENT) USE OF ASPIRIN: ICD-10-CM

## 2024-08-20 DIAGNOSIS — I69.392 FACIAL WEAKNESS FOLLOWING CEREBRAL INFARCTION: ICD-10-CM

## 2024-08-20 DIAGNOSIS — N17.9 ACUTE KIDNEY FAILURE, UNSPECIFIED: ICD-10-CM

## 2024-08-20 DIAGNOSIS — B95.2 ENTEROCOCCUS AS THE CAUSE OF DISEASES CLASSIFIED ELSEWHERE: ICD-10-CM

## 2024-08-20 DIAGNOSIS — Z87.891 PERSONAL HISTORY OF NICOTINE DEPENDENCE: ICD-10-CM

## 2024-08-20 DIAGNOSIS — I12.9 HYPERTENSIVE CHRONIC KIDNEY DISEASE WITH STAGE 1 THROUGH STAGE 4 CHRONIC KIDNEY DISEASE, OR UNSPECIFIED CHRONIC KIDNEY DISEASE: ICD-10-CM

## 2024-08-20 DIAGNOSIS — Z88.0 ALLERGY STATUS TO PENICILLIN: ICD-10-CM

## 2024-08-20 DIAGNOSIS — N40.1 BENIGN PROSTATIC HYPERPLASIA WITH LOWER URINARY TRACT SYMPTOMS: ICD-10-CM

## 2024-08-20 DIAGNOSIS — F32.A DEPRESSION, UNSPECIFIED: ICD-10-CM

## 2024-08-20 DIAGNOSIS — Z88.1 ALLERGY STATUS TO OTHER ANTIBIOTIC AGENTS: ICD-10-CM

## 2024-08-20 DIAGNOSIS — Z79.899 OTHER LONG TERM (CURRENT) DRUG THERAPY: ICD-10-CM

## 2024-08-20 DIAGNOSIS — K21.9 GASTRO-ESOPHAGEAL REFLUX DISEASE WITHOUT ESOPHAGITIS: ICD-10-CM

## 2024-08-20 DIAGNOSIS — Z90.49 ACQUIRED ABSENCE OF OTHER SPECIFIED PARTS OF DIGESTIVE TRACT: ICD-10-CM

## 2024-08-20 DIAGNOSIS — E11.22 TYPE 2 DIABETES MELLITUS WITH DIABETIC CHRONIC KIDNEY DISEASE: ICD-10-CM

## 2024-08-20 DIAGNOSIS — N12 TUBULO-INTERSTITIAL NEPHRITIS, NOT SPECIFIED AS ACUTE OR CHRONIC: ICD-10-CM

## 2024-08-21 ENCOUNTER — APPOINTMENT (OUTPATIENT)
Dept: CARDIOLOGY | Facility: CLINIC | Age: 79
End: 2024-08-21
Payer: MEDICARE

## 2024-08-21 ENCOUNTER — NON-APPOINTMENT (OUTPATIENT)
Age: 79
End: 2024-08-21

## 2024-08-21 PROCEDURE — 93298 REM INTERROG DEV EVAL SCRMS: CPT | Mod: TC

## 2024-08-22 NOTE — CDI QUERY NOTE - NSCDIOTHERTXTBX_GEN_ALL_CORE_HH
There is an uncertain diagnosis documented in the medical record that has not been qualified as ruled in, ruled out or remaining uncertain at the time of discharge.     In order to ensure accurate coding and accuracy of the clinical record, the documentation in this patient’s medical record requires additional clarification.      The diagnosis of possible metabolic encephalopathy has been documented in the medical record:    Please review the documentation in the medical record and clarify the appropriate diagnosis (along with any supporting documentation) in your progress note and/or discharge summary.    • Metabolic encephalopathy has been ruled in.  • Metabolic encephalopathy has been ruled out / was excluded at the time of discharge.  • Metabolic encephalopathy remains probable, likely, suspected / could not be excluded at the time of discharge.  • Other (specify)    Supporting documentation and/or clinical evidence:     8/6 Progress Note Adult-Neurology Resident/Attending: … Improving altered mental status with some catatonic features for the last 24 hours possible 2/2 to metabolic encephalopathy due to urinary retention +/- meropenem in patient with SAMANTA over CKD … Attestation Statements: … Pt w/ waxing/waning mental status currently improved and interactive but still encephalopathic suspect delirium secondary to TME    8/7 Discharge Note Provider: … Patient admitted to medicine for: Sepsis … Neurology was consulted, stated likely recrudescence of prior stroke symptoms 2/2 possible UTI vs rule out seizure. EEG was performed which showed slowing, no seizures. fluids given and 3 days of rocephin given. Pt's BP was found to be soft SBP in 90s, given 500cc LR bolus. Course complicated with worsening of mental status, CT head neg, EEG negative, found to be retaining, improved with intermittent straight catheterization.      Thank you,  Rosalia Garces RN [facility]  [phone]

## 2024-08-30 ENCOUNTER — APPOINTMENT (OUTPATIENT)
Dept: VASCULAR SURGERY | Facility: CLINIC | Age: 79
End: 2024-08-30

## 2024-09-12 NOTE — PROGRESS NOTE ADULT - ASSESSMENT
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 Patient says he has been feeling intermittently dizzy over the past 3 days despite having adequate/normal PO intake. Patient fell yesterday at home after feeling dizzy hitting the back of his head, but with no LOC. No AC. This morning around 10 patient was walking outside, felt his legs get wobbly, and then fell straight down hitting his buttocks and R knee.    #Dizziness and fall after getting up from a sitting or supine position likely orthostatic  - Pt denied LOC  - CT spine: No evidence of acute cervical spine fracture or subluxation. Multilevel severe degenerative changes as described, with severe spinal noted stenosis at C2-3 and C3-4 and multilevel severe neural foraminal stenosis.  - CTAP:No definite evidence of acute traumatic injury within the chest, abdomen, or pelvis.   - CTH:  No evidence of acute intracranial hemorrhage. Probable chronic infarct within the right posterior frontal white matter. Lacunar infarcts within bilateral white matter and deep gray nuclei of indeterminate age. Mild/moderate chronic microvascular changes.  - Chest X-Ray negative for pneumothorax, infiltrate, or effusion. XR Pelvis negative for fx's or dislocations   - Trops Neg on admission, ECG NSR, Rpt   - Fall precautions   - Pt on tizanidine 4mg q8h - hold for now,   - ortho positive, started on HI stockings and abdominal binder, remains ortho positive  - hold flomax, move losartan to PM   - Neurology consult appreciated, spoke to neuro team, in the setting of SAMANTA ok to MR angio head and neck. Follow up results of MRA   - continue with tele monitoring for now   - patient still drives regularly, should not drive a vehicle   - repeat orthostatics    # LE pain likely secondary to PAD   - Significant atherosclerotic disease noted in bilateral common femoral arteries  - vascular consult     #CAD s/p CABG 2009  - 6/2021 :patent SALAZAR to LAD , patent SVG to OM , % , filled distally by collaterals from LAD.  - Continue DAPT ( Aspirin 81 mg daily and Plavix 75 mg daily ), ARB, Imdur,Ranexa, B-Blocker, Statin Therapy  - Cardio f/u OP     #HTN  - cont home meds     #HLD  - continue with atorvastatin and fenofibrate     #CVA - Left UE/ Left LE weakness/ left facial from prior CVA  - cont supportive care     #SAMANTA on CKD vs progression of CKD   - Cr 1.3 in 2017, 1.5 in 2019, 1.7 in June,   - Cr trending down 2.1 -> 1.9  - Pt denies reduced PO intake   - patient retaining on bladder scan harrison placed.    - obtain renal bladder US.   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 Patient says he has been feeling intermittently dizzy over the past 3 days despite having adequate/normal PO intake. Patient fell yesterday at home after feeling dizzy hitting the back of his head, but with no LOC. No AC. This morning around 10 patient was walking outside, felt his legs get wobbly, and then fell straight down hitting his buttocks and R knee.    #Dizziness and fall after getting up from a sitting or supine position likely orthostatic  - Pt denied LOC  - CT spine: No evidence of acute cervical spine fracture or subluxation. Multilevel severe degenerative changes as described, with severe spinal noted stenosis at C2-3 and C3-4 and multilevel severe neural foraminal stenosis.  - CTAP:No definite evidence of acute traumatic injury within the chest, abdomen, or pelvis.   - CTH:  No evidence of acute intracranial hemorrhage. Probable chronic infarct within the right posterior frontal white matter. Lacunar infarcts within bilateral white matter and deep gray nuclei of indeterminate age. Mild/moderate chronic microvascular changes.  - Chest X-Ray negative for pneumothorax, infiltrate, or effusion. XR Pelvis negative for fx's or dislocations   - Trops Neg on admission, ECG NSR, Rpt   - Fall precautions   - Pt on tizanidine 4mg q8h - hold for now,   - ortho positive, started on HI stockings and abdominal binder, remains ortho positive, unable to tolerate HI stocking secondary to pain.   - hold flomax, move losartan to PM   - Neurology consult appreciated, spoke to neuro team, in the setting of SAMANTA ok to MR angio head and neck. Follow up results of MRA   - continue with tele monitoring for now   - patient still drives regularly, should not drive a vehicle   - MRA of the neck is limited by motion. Evaluation of the distal common carotid proximal internal carotid arteries appear grossly unremarkable though better quality study is recommended or CTA of the neck can be done for further evaluation. Evaluation of the proximal external carotid arteries are limited by motion. MRA of the [place] demonstrates decreased caliber of the distal right internal carotid artery. Short segment of stenosis is suspected involving both posterior cerebral arteries.   - Follow up Carotid US   - Neurology aware, no intervention at this time.  - repeat orthostatics    # LE pain likely secondary to PAD   - Significant atherosclerotic disease noted in bilateral common femoral arteries  - vascular consult     #CAD s/p CABG 2009  - 6/2021 :patent SALAZAR to LAD , patent SVG to OM , % , filled distally by collaterals from LAD.  - Continue DAPT ( Aspirin 81 mg daily and Plavix 75 mg daily ), ARB, Imdur,Ranexa, B-Blocker, Statin Therapy  - Cardio f/u OP     #HTN, patient had hypertensive urgency  - continue with home medications     #HLD  - continue with atorvastatin and fenofibrate     #CVA - Left UE/ Left LE weakness/ left facial from prior CVA  - cont supportive care     #SAMANTA on CKD vs progression of CKD - resolved   - Cr 1.3 in 2017, 1.5 in 2019, 1.7 in June,   - Cr trending down 2.1 -> 1.9  - Pt denies reduced PO intake   - patient retaining on bladder scan harrison placed.    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 Patient says he has been feeling intermittently dizzy over the past 3 days despite having adequate/normal PO intake. Patient fell yesterday at home after feeling dizzy hitting the back of his head, but with no LOC. No AC. This morning around 10 patient was walking outside, felt his legs get wobbly, and then fell straight down hitting his buttocks and R knee.    #Dizziness and fall after getting up from a sitting or supine position likely orthostatic  - Pt denied LOC  - CT spine: No evidence of acute cervical spine fracture or subluxation. Multilevel severe degenerative changes as described, with severe spinal noted stenosis at C2-3 and C3-4 and multilevel severe neural foraminal stenosis.  - CTAP:No definite evidence of acute traumatic injury within the chest, abdomen, or pelvis.   - CTH:  No evidence of acute intracranial hemorrhage. Probable chronic infarct within the right posterior frontal white matter. Lacunar infarcts within bilateral white matter and deep gray nuclei of indeterminate age. Mild/moderate chronic microvascular changes.  - Chest X-Ray negative for pneumothorax, infiltrate, or effusion. XR Pelvis negative for fx's or dislocations   - Trops Neg on admission, ECG NSR, Rpt   - Fall precautions   - Pt on tizanidine 4mg q8h - hold for now,   - ortho positive, started on HI stockings and abdominal binder, remains ortho positive, unable to tolerate HI stocking secondary to pain.   - hold flomax, move losartan to PM   - Neurology consult appreciated, spoke to neuro team, in the setting of SAMANTA ok to MR angio head and neck. Follow up results of MRA   - continue with tele monitoring for now   - patient still drives regularly, should not drive a vehicle   - MRA of the neck is limited by motion. Evaluation of the distal common carotid proximal internal carotid arteries appear grossly unremarkable though better quality study is recommended or CTA of the neck can be done for further evaluation. Evaluation of the proximal external carotid arteries are limited by motion. MRA of the [place] demonstrates decreased caliber of the distal right internal carotid artery. Short segment of stenosis is suspected involving both posterior cerebral arteries.   - Follow up Carotid US   - Neurology aware, no intervention at this time. Patient can Follow up with neurology at the clinic, as per conversation with neuro PA.   - repeat orthostatics    # LE pain likely secondary to PAD   - Significant atherosclerotic disease noted in bilateral common femoral arteries  - vascular consult     #CAD s/p CABG 2009  - 6/2021 :patent SALAZAR to LAD , patent SVG to OM , % , filled distally by collaterals from LAD.  - Continue DAPT ( Aspirin 81 mg daily and Plavix 75 mg daily ), ARB, Imdur,Ranexa, B-Blocker, Statin Therapy  - Cardio f/u OP     #HTN, patient had hypertensive urgency  - continue with home medications     #HLD  - continue with atorvastatin and fenofibrate     #CVA - Left UE/ Left LE weakness/ left facial from prior CVA  - cont supportive care     #SAMANTA on CKD vs progression of CKD - resolved   - Cr 1.3 in 2017, 1.5 in 2019, 1.7 in June,   - Cr trending down 2.1 -> 1.9  - Pt denies reduced PO intake   - patient retaining on bladder scan harrison placed.    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 Patient says he has been feeling intermittently dizzy over the past 3 days despite having adequate/normal PO intake. Patient fell yesterday at home after feeling dizzy hitting the back of his head, but with no LOC. No AC. This morning around 10 patient was walking outside, felt his legs get wobbly, and then fell straight down hitting his buttocks and R knee.    #Dizziness and fall after getting up from a sitting or supine position likely orthostatic  - Pt denied LOC  - CT spine: No evidence of acute cervical spine fracture or subluxation. Multilevel severe degenerative changes as described, with severe spinal noted stenosis at C2-3 and C3-4 and multilevel severe neural foraminal stenosis.  - CTAP:No definite evidence of acute traumatic injury within the chest, abdomen, or pelvis.   - CTH:  No evidence of acute intracranial hemorrhage. Probable chronic infarct within the right posterior frontal white matter. Lacunar infarcts within bilateral white matter and deep gray nuclei of indeterminate age. Mild/moderate chronic microvascular changes.  - Chest X-Ray negative for pneumothorax, infiltrate, or effusion. XR Pelvis negative for fx's or dislocations   - Trops Neg on admission, ECG NSR, Rpt   - Fall precautions   - Pt on tizanidine 4mg q8h - hold for now,   - ortho positive, started on HI stockings and abdominal binder, remains ortho positive, unable to tolerate HI stocking secondary to pain.   - flomax held, moved losartan to PM - patient still ortho positive and symptomatic   - continue with tele monitoring for now   - patient still drives regularly, should not drive a vehicle   - Neurology consult appreciated, spoke to neuro team, in the setting of SAMANTA ok to MR angio head and neck. MRA of the neck is limited by motion. Evaluation of the distal common carotid proximal internal carotid arteries appear grossly unremarkable though better quality study is recommended or CTA of the neck can be done for further evaluation. Evaluation of the proximal external carotid arteries are limited by motion. MRA of the [place] demonstrates decreased caliber of the distal right internal carotid artery. Short segment of stenosis is suspected involving both posterior cerebral arteries.   - Follow up Carotid US   - Neurology aware, no intervention at this time. Patient can Follow up with neurology at the clinic, as per conversation with neuro PA.   - repeat orthostatics    # LE pain likely secondary to PAD   - Significant atherosclerotic disease noted in bilateral common femoral arteries  - vascular consult     #CAD s/p CABG 2009  - 6/2021 :patent SALAZAR to LAD , patent SVG to OM , % , filled distally by collaterals from LAD.  - Continue DAPT ( Aspirin 81 mg daily and Plavix 75 mg daily ), ARB, Imdur,Ranexa, B-Blocker, Statin Therapy  - Cardio f/u OP     #HTN, patient had hypertensive urgency  - continue with losartan 100mg   - start nifedipine 60mg xL q24h     #HLD  - continue with atorvastatin and fenofibrate     #CVA - Left UE/ Left LE weakness/ left facial from prior CVA  - cont supportive care     #SAMANTA on CKD vs progression of CKD - resolved   - Cr 1.3 in 2017, 1.5 in 2019, 1.7 in June,   - Cr trending down 2.1 -> 1.9  - Pt denies reduced PO intake   - patient retaining on bladder scan harrison placed.    - renal bladder US showed simple cysts within both kidneys without hydronephrosis or nephrolithiasis   - Monitor   - if Cr remains stable consider restarting losartan   - Nephro consult if worsening     #DM  - Hold home meds  - Start Insulin regimen for FS >180      #DVT PPX : Hep SubQ  #Diet: DASH/TLC/CC  #GI PPX: Protonix  #Activity: Ambulate as tolerated  #Dispo: acute  FULL CODE  No

## 2024-09-25 ENCOUNTER — APPOINTMENT (OUTPATIENT)
Dept: CARDIOLOGY | Facility: CLINIC | Age: 79
End: 2024-09-25

## 2024-10-17 NOTE — STROKE CODE NOTE - NIH STROKE SCALE: 8. SENSORY, QM
I tried twice to fax PT script to [facility] it saying no response on the fax    (0) Normal; no sensory loss

## 2024-10-30 ENCOUNTER — APPOINTMENT (OUTPATIENT)
Dept: CARDIOLOGY | Facility: CLINIC | Age: 79
End: 2024-10-30

## 2024-12-04 ENCOUNTER — APPOINTMENT (OUTPATIENT)
Dept: CARDIOLOGY | Facility: CLINIC | Age: 79
End: 2024-12-04

## 2025-01-07 ENCOUNTER — APPOINTMENT (OUTPATIENT)
Dept: CARDIOLOGY | Facility: CLINIC | Age: 80
End: 2025-01-07

## 2025-02-11 ENCOUNTER — NON-APPOINTMENT (OUTPATIENT)
Age: 80
End: 2025-02-11

## 2025-02-11 ENCOUNTER — APPOINTMENT (OUTPATIENT)
Dept: CARDIOLOGY | Facility: CLINIC | Age: 80
End: 2025-02-11
Payer: COMMERCIAL

## 2025-02-11 PROCEDURE — 93298 REM INTERROG DEV EVAL SCRMS: CPT

## 2025-02-16 ENCOUNTER — EMERGENCY (EMERGENCY)
Facility: HOSPITAL | Age: 80
LOS: 0 days | Discharge: HOME | End: 2025-02-16
Attending: EMERGENCY MEDICINE
Payer: MEDICARE

## 2025-02-16 VITALS
TEMPERATURE: 97 F | HEART RATE: 60 BPM | RESPIRATION RATE: 18 BRPM | OXYGEN SATURATION: 99 % | WEIGHT: 119.93 LBS | HEIGHT: 62 IN | SYSTOLIC BLOOD PRESSURE: 198 MMHG | DIASTOLIC BLOOD PRESSURE: 69 MMHG

## 2025-02-16 VITALS
RESPIRATION RATE: 18 BRPM | SYSTOLIC BLOOD PRESSURE: 163 MMHG | OXYGEN SATURATION: 98 % | HEART RATE: 70 BPM | TEMPERATURE: 98 F | DIASTOLIC BLOOD PRESSURE: 83 MMHG

## 2025-02-16 DIAGNOSIS — H26.9 UNSPECIFIED CATARACT: Chronic | ICD-10-CM

## 2025-02-16 DIAGNOSIS — E78.5 HYPERLIPIDEMIA, UNSPECIFIED: ICD-10-CM

## 2025-02-16 DIAGNOSIS — Z88.1 ALLERGY STATUS TO OTHER ANTIBIOTIC AGENTS: ICD-10-CM

## 2025-02-16 DIAGNOSIS — E11.51 TYPE 2 DIABETES MELLITUS WITH DIABETIC PERIPHERAL ANGIOPATHY WITHOUT GANGRENE: ICD-10-CM

## 2025-02-16 DIAGNOSIS — Z95.1 PRESENCE OF AORTOCORONARY BYPASS GRAFT: ICD-10-CM

## 2025-02-16 DIAGNOSIS — N18.9 CHRONIC KIDNEY DISEASE, UNSPECIFIED: ICD-10-CM

## 2025-02-16 DIAGNOSIS — Z86.79 PERSONAL HISTORY OF OTHER DISEASES OF THE CIRCULATORY SYSTEM: ICD-10-CM

## 2025-02-16 DIAGNOSIS — Z79.02 LONG TERM (CURRENT) USE OF ANTITHROMBOTICS/ANTIPLATELETS: ICD-10-CM

## 2025-02-16 DIAGNOSIS — S90.511A ABRASION, RIGHT ANKLE, INITIAL ENCOUNTER: ICD-10-CM
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
## 2025-02-16 DIAGNOSIS — Z79.82 LONG TERM (CURRENT) USE OF ASPIRIN: ICD-10-CM

## 2025-02-16 DIAGNOSIS — Z90.49 ACQUIRED ABSENCE OF OTHER SPECIFIED PARTS OF DIGESTIVE TRACT: Chronic | ICD-10-CM
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
## 2025-02-16 DIAGNOSIS — E11.22 TYPE 2 DIABETES MELLITUS WITH DIABETIC CHRONIC KIDNEY DISEASE: ICD-10-CM

## 2025-02-16 DIAGNOSIS — I12.9 HYPERTENSIVE CHRONIC KIDNEY DISEASE WITH STAGE 1 THROUGH STAGE 4 CHRONIC KIDNEY DISEASE, OR UNSPECIFIED CHRONIC KIDNEY DISEASE: ICD-10-CM

## 2025-02-16 DIAGNOSIS — Z95.1 PRESENCE OF AORTOCORONARY BYPASS GRAFT: Chronic | ICD-10-CM

## 2025-02-16 DIAGNOSIS — Z88.0 ALLERGY STATUS TO PENICILLIN: ICD-10-CM

## 2025-02-16 PROCEDURE — 99284 EMERGENCY DEPT VISIT MOD MDM: CPT | Mod: FS

## 2025-02-16 PROCEDURE — 99283 EMERGENCY DEPT VISIT LOW MDM: CPT | Mod: 25

## 2025-02-16 PROCEDURE — 73610 X-RAY EXAM OF ANKLE: CPT | Mod: 26,RT

## 2025-02-16 PROCEDURE — 73610 X-RAY EXAM OF ANKLE: CPT | Mod: RT

## 2025-02-16 NOTE — ED ADULT TRIAGE NOTE - CHIEF COMPLAINT QUOTE
Pt. BIBA c/o wound to the bottom of the L foot. As per EMS, pt. was getting out of bed and the wound opened up. Pt. is on Plavix and Aspirin.

## 2025-02-16 NOTE — ED PROVIDER NOTE - PATIENT PORTAL LINK FT
You can access the FollowMyHealth Patient Portal offered by [facility] by registering at the following website: [email or web address]. By joining [employer]’s FollowMyHealth portal, you will also be able to view your health information using other applications (apps) compatible with our system.

## 2025-02-16 NOTE — ED PROVIDER NOTE - OBJECTIVE STATEMENT
Patient is an 79-year-old male PMH CAD s/p CABG on DAPT, CKD, prior stroke, DM, PAD, HTN, HLD, who presents ED with complaints of wound to the right ankle.  Patient endorses he was trying to get out of bed and thinks he hit his ankle, causing a wound to the right lateral portion of the ankle with bleeding.  Patient is accompanied by son who said he could not get the bleeding to stop so took him here for evaluation because he is on Plavix and aspirin.  EMS applied bandage.  Denies fever, chills, CP, SOB, nausea, vomiting, abdominal pain, fall, injury elsewhere, prior injury or wound to the portion of the ankle, or paresthesias. Patient is an 79-year-old male PMH CAD s/p CABG on DAPT, CKD, prior stroke, DM, PAD, HTN, HLD, who presents ED with complaints of wound to the right ankle.  Patient endorses he was trying to get out of bed and thinks he hit his ankle, causing a wound to the right lateral portion of the ankle with bleeding.  Patient is accompanied by son who said he could not get the bleeding to stop so took him here for evaluation because he is on Plavix and aspirin.  EMS applied bandage.  Denies fever, chills, CP, SOB, nausea, vomiting, abdominal pain, fall, injury elsewhere, prior injury or wound to the portion of the ankle, or paresthesias. TDaP updated Aug 2024.

## 2025-02-16 NOTE — ED PROVIDER NOTE - NSFOLLOWUPINSTRUCTIONS_ED_ALL_ED_FT
Follow-up with your primary care doctor in 48 hours to have your wound evaluated.    DISCHARGE INSTRUCTIONS:  Return to the emergency department if:   •Blood soaks through your bandages.  •You suddenly cannot move your injured joint.  •You have sudden numbness around your wound.  •You see red streaks coming from your wound.    Contact your healthcare provider if:   •You have a fever and chills.  •Your wound is red, warm, swollen, or leaking pus.  •There is a bad smell coming from your wound.  •You have increased pain in the wound area.  •You have questions or concerns about your condition or care.

## 2025-02-16 NOTE — ED PROVIDER NOTE - PHYSICAL EXAMINATION
VITAL SIGNS: I have reviewed nursing notes and confirm.  CONSTITUTIONAL: In no acute distress.  SKIN: Skin exam is warm and dry.  HEAD: Normocephalic; atraumatic.  EYES: PERRL, EOM intact; conjunctiva and sclera clear.  ENT: No nasal discharge; airway clear.   NECK: Supple; non tender.  CARD: S1, S2; Regular rate and rhythm.  RESP: CTAB. Speaking in full sentences.   ABD: Normal bowel sounds; soft; non-distended; non-tender; No rebound or guarding. No CVA tenderness.  EXT: Superficial abrasion to the right lateral malleolus, no active bleeding.  No surrounding warmth or erythema.  No TTP to the rest of the foot.  DP 2+.  Sensation intact.  NEURO: Alert, oriented. Grossly unremarkable. No focal deficits.

## 2025-02-16 NOTE — ED PROVIDER NOTE - PROGRESS NOTE DETAILS
CR: Discussed negative x-ray with patient and patient's son.  Cleaned wound, dressed.  Return precautions discussed.  Patient to follow-up with PMD in 48 hours for wound check. CR: Discussed negative x-ray with patient and patient's son.  Cleaned wound, dressed.  Return precautions discussed.  Patient to follow-up with PMD in 48 hours for wound check. Provided supplies for wound care and discussed proper wound care with patient's son who voices understanding.

## 2025-02-16 NOTE — ED PROVIDER NOTE - CLINICAL SUMMARY MEDICAL DECISION MAKING FREE TEXT BOX
Agree with above hx and exam.  Patient here with traumatic ankle wound causing bleeding which self resolved.  No signs of infection to the foot bleeding has resolved wound was cleansed and dressed outpatient follow-up.

## 2025-03-18 ENCOUNTER — APPOINTMENT (OUTPATIENT)
Dept: CARDIOLOGY | Facility: CLINIC | Age: 80
End: 2025-03-18
Payer: MEDICARE

## 2025-03-18 ENCOUNTER — NON-APPOINTMENT (OUTPATIENT)
Age: 80
End: 2025-03-18

## 2025-03-18 PROCEDURE — 93298 REM INTERROG DEV EVAL SCRMS: CPT

## 2025-03-21 NOTE — PHYSICAL THERAPY INITIAL EVALUATION ADULT - REFERRAL TO ANOTHER SERVICE NEEDED, PT EVAL
Writer dialed patient, left voicemail with call back number.    When patient calls back looking to connect them with cardiology office for scheduling an appointment.   Planning for hernia repair but after clearance from cardiology and podiatry.    occupational therapy

## 2025-04-22 ENCOUNTER — APPOINTMENT (OUTPATIENT)
Dept: CARDIOLOGY | Facility: CLINIC | Age: 80
End: 2025-04-22

## 2025-05-19 NOTE — PATIENT PROFILE ADULT - DEAF OR HARD OF HEARING?
Karin Fan has been diagnosed with a pneumonia again with 103-104 fever.  He also has been losing more weight.    I recommended that he stop the [employer] for now. I ordered some baseline labs.    Nadia Pacheco MD  
no